# Patient Record
Sex: FEMALE | Race: WHITE | NOT HISPANIC OR LATINO | Employment: FULL TIME | URBAN - METROPOLITAN AREA
[De-identification: names, ages, dates, MRNs, and addresses within clinical notes are randomized per-mention and may not be internally consistent; named-entity substitution may affect disease eponyms.]

---

## 2018-01-05 ENCOUNTER — OFFICE VISIT CONVERTED (OUTPATIENT)
Dept: FAMILY MEDICINE CLINIC | Facility: CLINIC | Age: 38
End: 2018-01-05
Attending: NURSE PRACTITIONER

## 2018-02-13 ENCOUNTER — OFFICE VISIT CONVERTED (OUTPATIENT)
Dept: FAMILY MEDICINE CLINIC | Facility: CLINIC | Age: 38
End: 2018-02-13
Attending: NURSE PRACTITIONER

## 2018-03-13 ENCOUNTER — OFFICE VISIT CONVERTED (OUTPATIENT)
Dept: GASTROENTEROLOGY | Facility: CLINIC | Age: 38
End: 2018-03-13
Attending: NURSE PRACTITIONER

## 2018-04-10 ENCOUNTER — CONVERSION ENCOUNTER (OUTPATIENT)
Dept: FAMILY MEDICINE CLINIC | Facility: CLINIC | Age: 38
End: 2018-04-10

## 2018-04-10 ENCOUNTER — OFFICE VISIT CONVERTED (OUTPATIENT)
Dept: FAMILY MEDICINE CLINIC | Facility: CLINIC | Age: 38
End: 2018-04-10
Attending: NURSE PRACTITIONER

## 2018-04-11 ENCOUNTER — CONVERSION ENCOUNTER (OUTPATIENT)
Dept: SURGERY | Facility: CLINIC | Age: 38
End: 2018-04-11

## 2018-04-11 ENCOUNTER — OFFICE VISIT CONVERTED (OUTPATIENT)
Dept: UROLOGY | Facility: CLINIC | Age: 38
End: 2018-04-11
Attending: UROLOGY

## 2018-05-11 ENCOUNTER — OFFICE VISIT CONVERTED (OUTPATIENT)
Dept: UROLOGY | Facility: CLINIC | Age: 38
End: 2018-05-11
Attending: UROLOGY

## 2018-05-25 ENCOUNTER — OFFICE VISIT CONVERTED (OUTPATIENT)
Dept: FAMILY MEDICINE CLINIC | Facility: CLINIC | Age: 38
End: 2018-05-25
Attending: NURSE PRACTITIONER

## 2018-11-08 ENCOUNTER — CONVERSION ENCOUNTER (OUTPATIENT)
Dept: FAMILY MEDICINE CLINIC | Facility: CLINIC | Age: 38
End: 2018-11-08

## 2018-11-08 ENCOUNTER — OFFICE VISIT CONVERTED (OUTPATIENT)
Dept: FAMILY MEDICINE CLINIC | Facility: CLINIC | Age: 38
End: 2018-11-08
Attending: NURSE PRACTITIONER

## 2018-11-14 ENCOUNTER — OFFICE VISIT CONVERTED (OUTPATIENT)
Dept: UROLOGY | Facility: CLINIC | Age: 38
End: 2018-11-14
Attending: UROLOGY

## 2018-12-13 ENCOUNTER — OFFICE VISIT CONVERTED (OUTPATIENT)
Dept: FAMILY MEDICINE CLINIC | Facility: CLINIC | Age: 38
End: 2018-12-13
Attending: NURSE PRACTITIONER

## 2019-05-08 ENCOUNTER — HOSPITAL ENCOUNTER (OUTPATIENT)
Dept: URGENT CARE | Facility: CLINIC | Age: 39
Discharge: HOME | End: 2019-05-08

## 2019-05-10 LAB — BACTERIA SPEC AEROBE CULT: NORMAL

## 2019-06-08 ENCOUNTER — HOSPITAL ENCOUNTER (OUTPATIENT)
Dept: OTHER | Facility: HOSPITAL | Age: 39
Discharge: HOME | End: 2019-06-08
Attending: NURSE PRACTITIONER

## 2019-06-08 LAB
25(OH)D3 SERPL-MCNC: 41.2 NG/ML (ref 30–100)
ALBUMIN SERPL-MCNC: 3.6 G/DL (ref 3.5–5)
ALBUMIN/GLOB SERPL: 1.2 {RATIO} (ref 1.4–2.6)
ALP SERPL-CCNC: 65 U/L (ref 42–98)
ALT SERPL-CCNC: 43 U/L (ref 10–40)
ANION GAP SERPL CALC-SCNC: 18 MMOL/L (ref 8–19)
APPEARANCE UR: CLEAR
AST SERPL-CCNC: 57 U/L (ref 15–50)
BASOPHILS # BLD AUTO: 0.09 10*3/UL (ref 0–0.2)
BASOPHILS NFR BLD AUTO: 0.9 % (ref 0–3)
BILIRUB SERPL-MCNC: 0.17 MG/DL (ref 0.2–1.3)
BILIRUB UR QL: NEGATIVE
BUN SERPL-MCNC: 10 MG/DL (ref 5–25)
BUN/CREAT SERPL: 13 {RATIO} (ref 6–20)
CALCIUM SERPL-MCNC: 10 MG/DL (ref 8.7–10.4)
CHLORIDE SERPL-SCNC: 100 MMOL/L (ref 99–111)
CHOLEST SERPL-MCNC: 131 MG/DL (ref 107–200)
CHOLEST/HDLC SERPL: 3.6 {RATIO} (ref 3–6)
COLOR UR: YELLOW
CONV ABS IMM GRAN: 0.31 10*3/UL (ref 0–0.2)
CONV BACTERIA: NEGATIVE
CONV CO2: 26 MMOL/L (ref 22–32)
CONV COLLECTION SOURCE (UA): ABNORMAL
CONV CREATININE URINE, RANDOM: 56 MG/DL (ref 10–300)
CONV IMMATURE GRAN: 3 % (ref 0–1.8)
CONV MICROALBUM.,U,RANDOM: <12 MG/L (ref 0–20)
CONV TOTAL PROTEIN: 6.6 G/DL (ref 6.3–8.2)
CONV UROBILINOGEN IN URINE BY AUTOMATED TEST STRIP: 0.2 {EHRLICHU}/DL (ref 0.1–1)
CREAT UR-MCNC: 0.76 MG/DL (ref 0.5–0.9)
DEPRECATED RDW RBC AUTO: 48.1 FL (ref 36.4–46.3)
EOSINOPHIL # BLD AUTO: 0.16 10*3/UL (ref 0–0.7)
EOSINOPHIL # BLD AUTO: 1.5 % (ref 0–7)
ERYTHROCYTE [DISTWIDTH] IN BLOOD BY AUTOMATED COUNT: 16 % (ref 11.7–14.4)
EST. AVERAGE GLUCOSE BLD GHB EST-MCNC: 169 MG/DL
GFR SERPLBLD BASED ON 1.73 SQ M-ARVRAT: >60 ML/MIN/{1.73_M2}
GLOBULIN UR ELPH-MCNC: 3 G/DL (ref 2–3.5)
GLUCOSE SERPL-MCNC: 114 MG/DL (ref 65–99)
GLUCOSE UR QL: NEGATIVE MG/DL
HBA1C MFR BLD: 12.3 G/DL (ref 12–16)
HBA1C MFR BLD: 7.5 % (ref 3.5–5.7)
HCT VFR BLD AUTO: 38.2 % (ref 37–47)
HDLC SERPL-MCNC: 36 MG/DL (ref 40–60)
HGB UR QL STRIP: ABNORMAL
KETONES UR QL STRIP: NEGATIVE MG/DL
LDLC SERPL CALC-MCNC: 43 MG/DL (ref 70–100)
LEUKOCYTE ESTERASE UR QL STRIP: NEGATIVE
LYMPHOCYTES # BLD AUTO: 2.78 10*3/UL (ref 1–5)
MCH RBC QN AUTO: 27.4 PG (ref 27–31)
MCHC RBC AUTO-ENTMCNC: 32.2 G/DL (ref 33–37)
MCV RBC AUTO: 85.1 FL (ref 81–99)
MICROALBUMIN/CREAT UR: 21.4 MG/G{CRE} (ref 0–35)
MONOCYTES # BLD AUTO: 0.69 10*3/UL (ref 0.2–1.2)
MONOCYTES NFR BLD AUTO: 6.6 % (ref 3–10)
NEUTROPHILS # BLD AUTO: 6.43 10*3/UL (ref 2–8)
NEUTROPHILS NFR BLD AUTO: 61.4 % (ref 30–85)
NITRITE UR QL STRIP: NEGATIVE
NRBC CBCN: 0 % (ref 0–0.7)
OSMOLALITY SERPL CALC.SUM OF ELEC: 288 MOSM/KG (ref 273–304)
PH UR STRIP.AUTO: 6 [PH] (ref 5–8)
PLATELET # BLD AUTO: 245 10*3/UL (ref 130–400)
PMV BLD AUTO: 9.6 FL (ref 9.4–12.3)
POTASSIUM SERPL-SCNC: 4.6 MMOL/L (ref 3.5–5.3)
PROT UR QL: NEGATIVE MG/DL
RBC # BLD AUTO: 4.49 10*6/UL (ref 4.2–5.4)
RBC #/AREA URNS HPF: ABNORMAL /[HPF]
SODIUM SERPL-SCNC: 139 MMOL/L (ref 135–147)
SP GR UR: 1.01 (ref 1–1.03)
TRIGL SERPL-MCNC: 259 MG/DL (ref 40–150)
VARIANT LYMPHS NFR BLD MANUAL: 26.6 % (ref 20–45)
VLDLC SERPL-MCNC: 52 MG/DL (ref 5–37)
WBC # BLD AUTO: 10.46 10*3/UL (ref 4.8–10.8)
WBC #/AREA URNS HPF: ABNORMAL /[HPF]

## 2019-06-25 ENCOUNTER — OFFICE VISIT CONVERTED (OUTPATIENT)
Dept: FAMILY MEDICINE CLINIC | Facility: CLINIC | Age: 39
End: 2019-06-25
Attending: NURSE PRACTITIONER

## 2019-09-10 ENCOUNTER — HOSPITAL ENCOUNTER (OUTPATIENT)
Dept: GENERAL RADIOLOGY | Facility: HOSPITAL | Age: 39
Discharge: HOME | End: 2019-09-10
Attending: NURSE PRACTITIONER

## 2020-02-01 ENCOUNTER — HOSPITAL ENCOUNTER (OUTPATIENT)
Dept: OTHER | Facility: HOSPITAL | Age: 40
Discharge: HOME | End: 2020-02-01
Attending: NURSE PRACTITIONER

## 2020-02-01 LAB
ALBUMIN SERPL-MCNC: 4 G/DL (ref 3.5–5)
ALBUMIN/GLOB SERPL: 1.3 {RATIO} (ref 1.4–2.6)
ALP SERPL-CCNC: 65 U/L (ref 42–98)
ALT SERPL-CCNC: 41 U/L (ref 10–40)
ANION GAP SERPL CALC-SCNC: 18 MMOL/L (ref 8–19)
APPEARANCE UR: CLEAR
AST SERPL-CCNC: 68 U/L (ref 15–50)
BILIRUB SERPL-MCNC: 0.22 MG/DL (ref 0.2–1.3)
BILIRUB UR QL: ABNORMAL
BUN SERPL-MCNC: 13 MG/DL (ref 5–25)
BUN/CREAT SERPL: 17 {RATIO} (ref 6–20)
CALCIUM SERPL-MCNC: 9.4 MG/DL (ref 8.7–10.4)
CHLORIDE SERPL-SCNC: 99 MMOL/L (ref 99–111)
CHOLEST SERPL-MCNC: 141 MG/DL (ref 107–200)
CHOLEST/HDLC SERPL: 3.7 {RATIO} (ref 3–6)
COLOR UR: ABNORMAL
CONV BACTERIA: NEGATIVE
CONV CO2: 24 MMOL/L (ref 22–32)
CONV COLLECTION SOURCE (UA): ABNORMAL
CONV CREATININE URINE, RANDOM: 268.9 MG/DL (ref 10–300)
CONV HYALINE CASTS IN URINE MICRO: ABNORMAL /[LPF]
CONV MICROALBUM.,U,RANDOM: 47.2 MG/L (ref 0–20)
CONV TOTAL PROTEIN: 7.2 G/DL (ref 6.3–8.2)
CONV UROBILINOGEN IN URINE BY AUTOMATED TEST STRIP: 1 {EHRLICHU}/DL (ref 0.1–1)
CREAT UR-MCNC: 0.76 MG/DL (ref 0.5–0.9)
EST. AVERAGE GLUCOSE BLD GHB EST-MCNC: 189 MG/DL
GFR SERPLBLD BASED ON 1.73 SQ M-ARVRAT: >60 ML/MIN/{1.73_M2}
GLOBULIN UR ELPH-MCNC: 3.2 G/DL (ref 2–3.5)
GLUCOSE SERPL-MCNC: 168 MG/DL (ref 65–99)
GLUCOSE UR QL: NEGATIVE MG/DL
HBA1C MFR BLD: 8.2 % (ref 3.5–5.7)
HDLC SERPL-MCNC: 38 MG/DL (ref 40–60)
HGB UR QL STRIP: ABNORMAL
KETONES UR QL STRIP: ABNORMAL MG/DL
LDLC SERPL CALC-MCNC: 46 MG/DL (ref 70–100)
LEUKOCYTE ESTERASE UR QL STRIP: ABNORMAL
MICROALBUMIN/CREAT UR: 17.6 MG/G{CRE} (ref 0–35)
NITRITE UR QL STRIP: NEGATIVE
OSMOLALITY SERPL CALC.SUM OF ELEC: 286 MOSM/KG (ref 273–304)
PH UR STRIP.AUTO: 5.5 [PH] (ref 5–8)
POTASSIUM SERPL-SCNC: 4.7 MMOL/L (ref 3.5–5.3)
PROT UR QL: ABNORMAL MG/DL
RBC #/AREA URNS HPF: ABNORMAL /[HPF]
SODIUM SERPL-SCNC: 136 MMOL/L (ref 135–147)
SP GR UR: 1.03 (ref 1–1.03)
SQUAMOUS SPT QL MICRO: ABNORMAL /[HPF]
T4 FREE SERPL-MCNC: 1.5 NG/DL (ref 0.9–1.8)
TRIGL SERPL-MCNC: 284 MG/DL (ref 40–150)
TSH SERPL-ACNC: 1.75 M[IU]/L (ref 0.27–4.2)
VLDLC SERPL-MCNC: 57 MG/DL (ref 5–37)
WBC #/AREA URNS HPF: ABNORMAL /[HPF]

## 2020-02-04 ENCOUNTER — CONVERSION ENCOUNTER (OUTPATIENT)
Dept: FAMILY MEDICINE CLINIC | Facility: CLINIC | Age: 40
End: 2020-02-04

## 2020-02-04 ENCOUNTER — OFFICE VISIT CONVERTED (OUTPATIENT)
Dept: FAMILY MEDICINE CLINIC | Facility: CLINIC | Age: 40
End: 2020-02-04
Attending: NURSE PRACTITIONER

## 2020-02-08 ENCOUNTER — HOSPITAL ENCOUNTER (OUTPATIENT)
Dept: URGENT CARE | Facility: CLINIC | Age: 40
Discharge: HOME | End: 2020-02-08

## 2020-03-04 ENCOUNTER — OFFICE VISIT CONVERTED (OUTPATIENT)
Dept: FAMILY MEDICINE CLINIC | Facility: CLINIC | Age: 40
End: 2020-03-04
Attending: NURSE PRACTITIONER

## 2020-03-09 PROBLEM — R12 HEARTBURN: Status: ACTIVE | Noted: 2020-03-09

## 2020-03-09 PROBLEM — M25.50 MULTIPLE JOINT PAIN: Status: ACTIVE | Noted: 2020-03-09

## 2020-03-09 PROBLEM — E66.9 DIABETES MELLITUS TYPE 2 IN OBESE: Status: ACTIVE | Noted: 2020-03-09

## 2020-03-09 PROBLEM — F32.A DEPRESSION: Status: ACTIVE | Noted: 2020-03-09

## 2020-03-09 PROBLEM — I10 ESSENTIAL HYPERTENSION: Status: ACTIVE | Noted: 2020-03-09

## 2020-03-09 PROBLEM — E78.5 HYPERLIPIDEMIA: Status: ACTIVE | Noted: 2020-03-09

## 2020-03-09 PROBLEM — E11.69 DIABETES MELLITUS TYPE 2 IN OBESE (HCC): Status: ACTIVE | Noted: 2020-03-09

## 2020-03-09 PROBLEM — R53.82 CHRONIC FATIGUE: Status: ACTIVE | Noted: 2020-03-09

## 2020-03-17 DIAGNOSIS — E66.9 DIABETES MELLITUS TYPE 2 IN OBESE (HCC): ICD-10-CM

## 2020-03-17 DIAGNOSIS — E78.5 HYPERLIPIDEMIA, UNSPECIFIED HYPERLIPIDEMIA TYPE: ICD-10-CM

## 2020-03-17 DIAGNOSIS — E66.01 OBESITY, CLASS III, BMI 40-49.9 (MORBID OBESITY) (HCC): Primary | ICD-10-CM

## 2020-03-17 DIAGNOSIS — I10 ESSENTIAL HYPERTENSION: ICD-10-CM

## 2020-03-17 DIAGNOSIS — E11.69 DIABETES MELLITUS TYPE 2 IN OBESE (HCC): ICD-10-CM

## 2020-03-17 PROBLEM — E66.813 OBESITY, CLASS III, BMI 40-49.9 (MORBID OBESITY): Status: ACTIVE | Noted: 2020-03-17

## 2020-03-18 ENCOUNTER — HOSPITAL ENCOUNTER (OUTPATIENT)
Dept: GENERAL RADIOLOGY | Facility: HOSPITAL | Age: 40
Discharge: HOME | End: 2020-03-18

## 2020-03-18 ENCOUNTER — CONSULT (OUTPATIENT)
Dept: BARIATRICS/WEIGHT MGMT | Facility: CLINIC | Age: 40
End: 2020-03-18

## 2020-03-18 ENCOUNTER — APPOINTMENT (OUTPATIENT)
Dept: LAB | Facility: HOSPITAL | Age: 40
End: 2020-03-18

## 2020-03-18 ENCOUNTER — HOSPITAL ENCOUNTER (OUTPATIENT)
Dept: CARDIOLOGY | Facility: HOSPITAL | Age: 40
Discharge: HOME | End: 2020-03-18
Admitting: NURSE PRACTITIONER

## 2020-03-18 ENCOUNTER — HOSPITAL ENCOUNTER (OUTPATIENT)
Dept: CARDIOLOGY | Facility: HOSPITAL | Age: 40
Discharge: HOME | End: 2020-03-18

## 2020-03-18 VITALS
SYSTOLIC BLOOD PRESSURE: 134 MMHG | HEIGHT: 67 IN | DIASTOLIC BLOOD PRESSURE: 83 MMHG | WEIGHT: 293 LBS | BODY MASS INDEX: 45.99 KG/M2 | TEMPERATURE: 98.6 F | RESPIRATION RATE: 18 BRPM | HEART RATE: 82 BPM

## 2020-03-18 DIAGNOSIS — F32.A DEPRESSION, UNSPECIFIED DEPRESSION TYPE: ICD-10-CM

## 2020-03-18 DIAGNOSIS — E66.01 OBESITY, CLASS III, BMI 40-49.9 (MORBID OBESITY) (HCC): ICD-10-CM

## 2020-03-18 DIAGNOSIS — R53.82 CHRONIC FATIGUE: ICD-10-CM

## 2020-03-18 DIAGNOSIS — M25.50 MULTIPLE JOINT PAIN: ICD-10-CM

## 2020-03-18 DIAGNOSIS — E11.69 DIABETES MELLITUS TYPE 2 IN OBESE (HCC): ICD-10-CM

## 2020-03-18 DIAGNOSIS — E66.9 DIABETES MELLITUS TYPE 2 IN OBESE (HCC): ICD-10-CM

## 2020-03-18 DIAGNOSIS — I10 ESSENTIAL HYPERTENSION: ICD-10-CM

## 2020-03-18 DIAGNOSIS — K21.9 GASTROESOPHAGEAL REFLUX DISEASE, ESOPHAGITIS PRESENCE NOT SPECIFIED: ICD-10-CM

## 2020-03-18 DIAGNOSIS — E66.01 OBESITY, CLASS III, BMI 40-49.9 (MORBID OBESITY) (HCC): Primary | ICD-10-CM

## 2020-03-18 DIAGNOSIS — E78.5 HYPERLIPIDEMIA, UNSPECIFIED HYPERLIPIDEMIA TYPE: ICD-10-CM

## 2020-03-18 PROBLEM — K31.84 GASTROPARESIS: Status: ACTIVE | Noted: 2020-03-18

## 2020-03-18 LAB
ALBUMIN SERPL-MCNC: 4.1 G/DL (ref 3.5–5.2)
ALBUMIN/GLOB SERPL: 1.3 G/DL
ALP SERPL-CCNC: 64 U/L (ref 39–117)
ALT SERPL W P-5'-P-CCNC: 37 U/L (ref 1–33)
ANION GAP SERPL CALCULATED.3IONS-SCNC: 13.6 MMOL/L (ref 5–15)
AST SERPL-CCNC: 49 U/L (ref 1–32)
BASOPHILS # BLD AUTO: 0.11 10*3/MM3 (ref 0–0.2)
BASOPHILS NFR BLD AUTO: 0.9 % (ref 0–1.5)
BILIRUB SERPL-MCNC: <0.2 MG/DL (ref 0.2–1.2)
BUN BLD-MCNC: 14 MG/DL (ref 6–20)
BUN/CREAT SERPL: 15.7 (ref 7–25)
CALCIUM SPEC-SCNC: 9.1 MG/DL (ref 8.6–10.5)
CHLORIDE SERPL-SCNC: 97 MMOL/L (ref 98–107)
CHOLEST SERPL-MCNC: 137 MG/DL (ref 0–200)
CO2 SERPL-SCNC: 23.4 MMOL/L (ref 22–29)
CREAT BLD-MCNC: 0.89 MG/DL (ref 0.57–1)
DEPRECATED RDW RBC AUTO: 40.7 FL (ref 37–54)
EOSINOPHIL # BLD AUTO: 0.19 10*3/MM3 (ref 0–0.4)
EOSINOPHIL NFR BLD AUTO: 1.6 % (ref 0.3–6.2)
ERYTHROCYTE [DISTWIDTH] IN BLOOD BY AUTOMATED COUNT: 14.3 % (ref 12.3–15.4)
GFR SERPL CREATININE-BSD FRML MDRD: 70 ML/MIN/1.73
GLOBULIN UR ELPH-MCNC: 3.2 GM/DL
GLUCOSE BLD-MCNC: 172 MG/DL (ref 65–99)
HBA1C MFR BLD: 8.7 % (ref 4.8–5.6)
HCT VFR BLD AUTO: 40.1 % (ref 34–46.6)
HDLC SERPL-MCNC: 35 MG/DL (ref 40–60)
HGB BLD-MCNC: 13.4 G/DL (ref 12–15.9)
IMM GRANULOCYTES # BLD AUTO: 0.37 10*3/MM3 (ref 0–0.05)
IMM GRANULOCYTES NFR BLD AUTO: 3 % (ref 0–0.5)
LDLC SERPL CALC-MCNC: 50 MG/DL (ref 0–100)
LDLC/HDLC SERPL: 1.44 {RATIO}
LYMPHOCYTES # BLD AUTO: 3.28 10*3/MM3 (ref 0.7–3.1)
LYMPHOCYTES NFR BLD AUTO: 27 % (ref 19.6–45.3)
MCH RBC QN AUTO: 26.9 PG (ref 26.6–33)
MCHC RBC AUTO-ENTMCNC: 33.4 G/DL (ref 31.5–35.7)
MCV RBC AUTO: 80.4 FL (ref 79–97)
MONOCYTES # BLD AUTO: 0.78 10*3/MM3 (ref 0.1–0.9)
MONOCYTES NFR BLD AUTO: 6.4 % (ref 5–12)
NEUTROPHILS # BLD AUTO: 7.43 10*3/MM3 (ref 1.7–7)
NEUTROPHILS NFR BLD AUTO: 61.1 % (ref 42.7–76)
NRBC BLD AUTO-RTO: 0 /100 WBC (ref 0–0.2)
PLATELET # BLD AUTO: 256 10*3/MM3 (ref 140–450)
PMV BLD AUTO: 10.2 FL (ref 6–12)
POTASSIUM BLD-SCNC: 4.6 MMOL/L (ref 3.5–5.2)
PROT SERPL-MCNC: 7.3 G/DL (ref 6–8.5)
RBC # BLD AUTO: 4.99 10*6/MM3 (ref 3.77–5.28)
SODIUM BLD-SCNC: 134 MMOL/L (ref 136–145)
TRIGL SERPL-MCNC: 258 MG/DL (ref 0–150)
TSH SERPL DL<=0.05 MIU/L-ACNC: 2.1 UIU/ML (ref 0.27–4.2)
VLDLC SERPL-MCNC: 51.6 MG/DL (ref 5–40)
WBC NRBC COR # BLD: 12.16 10*3/MM3 (ref 3.4–10.8)

## 2020-03-18 PROCEDURE — 99205 OFFICE O/P NEW HI 60 MIN: CPT | Performed by: NURSE PRACTITIONER

## 2020-03-18 PROCEDURE — 83036 HEMOGLOBIN GLYCOSYLATED A1C: CPT | Performed by: NURSE PRACTITIONER

## 2020-03-18 PROCEDURE — 93010 ELECTROCARDIOGRAM REPORT: CPT | Performed by: INTERNAL MEDICINE

## 2020-03-18 PROCEDURE — 94690 O2 UPTK REST INDIRECT: CPT | Performed by: NURSE PRACTITIONER

## 2020-03-18 PROCEDURE — 71046 X-RAY EXAM CHEST 2 VIEWS: CPT

## 2020-03-18 PROCEDURE — 85025 COMPLETE CBC W/AUTO DIFF WBC: CPT | Performed by: NURSE PRACTITIONER

## 2020-03-18 PROCEDURE — 84443 ASSAY THYROID STIM HORMONE: CPT | Performed by: NURSE PRACTITIONER

## 2020-03-18 PROCEDURE — 93005 ELECTROCARDIOGRAM TRACING: CPT | Performed by: NURSE PRACTITIONER

## 2020-03-18 PROCEDURE — 36415 COLL VENOUS BLD VENIPUNCTURE: CPT | Performed by: NURSE PRACTITIONER

## 2020-03-18 PROCEDURE — 80053 COMPREHEN METABOLIC PANEL: CPT | Performed by: NURSE PRACTITIONER

## 2020-03-18 PROCEDURE — 80061 LIPID PANEL: CPT | Performed by: NURSE PRACTITIONER

## 2020-03-18 RX ORDER — BUPROPION HYDROCHLORIDE 300 MG/1
1 TABLET ORAL EVERY MORNING
COMMUNITY
Start: 2020-03-14 | End: 2021-06-16 | Stop reason: SDUPTHER

## 2020-03-18 RX ORDER — LISINOPRIL AND HYDROCHLOROTHIAZIDE 20; 12.5 MG/1; MG/1
1 TABLET ORAL DAILY
COMMUNITY
Start: 2020-03-14 | End: 2020-12-04

## 2020-03-18 RX ORDER — DICLOFENAC SODIUM 75 MG/1
1 TABLET, DELAYED RELEASE ORAL 2 TIMES DAILY
COMMUNITY
Start: 2020-01-30 | End: 2020-12-08 | Stop reason: HOSPADM

## 2020-03-18 NOTE — PROGRESS NOTES
[facility]ATR[facility] BARIATRIC SURGERY  [address] [id number] [phone]  [address] [id number] [phone]  Dept: [phone]  3/18/2020      Bianca Boles.  [id number]  [id number]  1980  female      Chief Complaint of weight gain; unable to maintain weight loss    History of Present Illness:   Bianca is a 39 y.o. female who presents today for evaluation, education and consultation regarding weight loss surgery. The patient is interested in the sleeve gastrectomy.      Diet History:Bianca has been overweight for at least 33 years, has been 35 pounds or more overweight for at least 35 years, has been 100 pounds or more overweight for 28 or more years and started dieting at age 14.  The most weight Bianca lost was 50 pounds on weight watchers and maintained the weight loss for 10 years. Bianca describes her eating habits as eating a lot of sweets, drinking coffee and soda, eating to cope with boredom, emotional eating. Bianca Boles has tried Weight Watchers, Fasting and reduced calorie among others with success of losing up to 50 pounds, but in each instance regained the weight.    See dietician documentation for complete history.    Bariatric Surgery Evaluation: The patient is being seen for an initial visit for bariatric surgery evaluation.     Bariatric Co-morbidities:  diabetes, hypertension, dyslipidemia, GERD and depression    Patient Active Problem List   Diagnosis   • Chronic fatigue   • Essential hypertension   • Hyperlipidemia   • Heartburn   • Multiple joint pain   • Depression   • Diabetes mellitus type 2 in obese (CMS/[facility])   • Obesity, Class III, BMI 40-49.9 (morbid obesity) (CMS/HCC)   • GERD (gastroesophageal reflux disease)       History reviewed. No pertinent past medical history.    Past Surgical History:   Procedure Laterality Date   • ADENOIDECTOMY     •  SECTION  ,   • CYSTOSCOPY  BLADDER STONE LITHOTRIPSY     • EAR TUBES  1984   • FOOT FRACTURE SURGERY Left 2017    screw placed   • TONSILLECTOMY  1984       No Known Allergies      Current Outpatient Medications:   •  B Complex Vitamins (VITAMIN B COMPLEX PO), Take  by mouth., Disp: , Rfl:   •  buPROPion XL (WELLBUTRIN XL) 300 MG 24 hr tablet, Take 1 tablet by mouth Daily., Disp: , Rfl:   •  diclofenac (VOLTAREN) 75 MG EC tablet, Take 1 tablet by mouth Daily., Disp: , Rfl:   •  Insulin Degludec (TRESIBA SC), Inject 12 Units under the skin into the appropriate area as directed Daily., Disp: , Rfl:   •  Levonorgest-Eth Estrad 91-Day (SEASONIQUE) 0.15-0.03 &0.01 MG tablet, Take  by mouth., Disp: , Rfl:   •  lisinopril-hydrochlorothiazide (PRINZIDE,ZESTORETIC) 20-12.5 MG per tablet, Take 1 tablet by mouth Daily., Disp: , Rfl:   •  metFORMIN (GLUCOPHAGE) 1000 MG tablet, Take 1,000 mg by mouth 2 (Two) Times a Day With Meals., Disp: , Rfl:   •  Multiple Vitamins-Minerals (MULTIVITAMIN PO), Take  by mouth., Disp: , Rfl:   •  Omega-3 Fatty Acids (FISH OIL PO), Take  by mouth., Disp: , Rfl:   •  omeprazole (priLOSEC) 20 MG capsule, Take 20 mg by mouth Daily., Disp: , Rfl:   •  pravastatin (PRAVACHOL) 20 MG tablet, Take 20 mg by mouth Daily., Disp: , Rfl:   •  Semaglutide (OZEMPIC, 0.25 OR 0.5 MG/DOSE, SC), Inject 0.25 mg under the skin into the appropriate area as directed Daily., Disp: , Rfl:   •  venlafaxine (EFFEXOR) 37.5 MG tablet, Take 37.5 mg by mouth Daily., Disp: , Rfl:     Social History     Socioeconomic History   • Marital status:      Spouse name: Not on file   • Number of children: Not on file   • Years of education: Not on file   • Highest education level: Not on file   Tobacco Use   • Smoking status: Current Every Day Smoker     Years: 35.00     Types: Cigarettes   • Smokeless tobacco: Never Used   • Tobacco comment: down to 1-2 per day   Substance and Sexual Activity   • Alcohol use: Yes     Frequency: Monthly or less     Drinks  per session: 1 or 2     Binge frequency: Never     Comment: RARELY   • Drug use: Never       Family History   Problem Relation Age of Onset   • Diabetes Father    • Hypertension Father    • Heart disease Father    • Cancer Father         BLADDER   • Stroke Maternal Grandmother    • Heart disease Maternal Grandmother    • Diabetes Paternal Grandfather    • Heart disease Paternal Grandfather          Review of Systems:  Review of Systems   Constitutional: Positive for fatigue.   HENT: Negative.    Respiratory: Negative.    Cardiovascular: Negative.    Gastrointestinal: Negative.    Endocrine: Negative.    Genitourinary: Negative.    Musculoskeletal: Positive for back pain.   Skin: Negative.    Neurological: Negative.    Psychiatric/Behavioral: Negative.        Physical Exam:  Vital Signs:  Weight: (!) 141 kg (310 lb)   Body mass index is 47.86 kg/m².  Temp: 98.6 °F (37 °C)   Heart Rate: 82   BP: 134/83     Physical Exam   Constitutional: She is oriented to person, place, and time. She appears well-developed and well-nourished.   HENT:   Head: Normocephalic and atraumatic.   Neck: Normal range of motion.   Cardiovascular: Normal rate, regular rhythm and normal heart sounds.   Pulmonary/Chest: Effort normal and breath sounds normal. No respiratory distress. She has no wheezes.   Abdominal: Soft. Bowel sounds are normal. She exhibits no distension. There is no tenderness.   Musculoskeletal: She exhibits no edema or deformity.   Neurological: She is alert and oriented to person, place, and time.   Skin: Skin is warm and dry.   Psychiatric: She has a normal mood and affect. Her behavior is normal.   Nursing note and vitals reviewed.         Assessment:         Bianca Boles is a 39 y.o. year old female with medically complicated severe obesity. Weight: (!) 141 kg (310 lb), Body mass index is 47.86 kg/m². and weight related problems including diabetes, hypertension, dyslipidemia, back pain, knee pain, GERD and  depression.    I explained in detail the procedures that we are performing.  All of those procedures can be performed laparoscopically but there is a chance to convert to open if any technical challenges or complications do occur.  Bariatric surgery is not cosmetic surgery but rather a tool to help a patient make a life-long commitment lifestyle changes including diet, exercise, behavior changes, and taking supplemental vitamins and minerals.    Due to the patient's BMI and co-morbidities they are at a high risk for surgery and will obtain the following:  The patient has been advised that a letter of medical support and a history and physical must be obtained from her primary care physician. A psychological evaluation will be arranged for this patient. CBC, CMP, FLP, TSH and HgbA1C will be drawn- reviewed in the office . Bianca Boles will obtain a pre-operative CXR and EKG.   Bianca Boles was screened for sleep apnea in our office today and based on their results she is low risk for ILIANA    Bianca Boles will be set up for a pre-operative diagnostic esophagogastroduodenoscopy with biopsy for evaluation. The risks and benefits of the procedure were discussed with the patient in detail and all questions were answered.  Possibility of perforation, bleeding, aspiration, anoxic brain injury, respiratory and/or cardiac arrest and death were discussed.   She received handouts regarding, all questions were answered.     The risks, benefits, alternatives, and potential complications of all of the procedures were explained in detail including, but not limited to death, anesthesia and medication adverse effect/DVT, pulmonary embolism, trocar site/incisional hernia, wound infection, abdominal infection, bleeding, failure to lose weight or gain weight and change in body image, metabolic complications with calcium, thiamine, vitamin B12, folate, iron, and anemia.    The patient was advised to start a high protein, low  fat and low carbohydrate diet. The patient was given individualized information by our dietician along with general group information and handouts.     The patient had the Basal Metabolic Rate test performed in our office today then I reviewed the results with them including changes to help increase metabolism and a recommended daily caloric intake range- see scanned results.     The patient was given information regarding the CRISTO educational video. CRISTO is an internet based educational video which explains the surgical procedure and answers basic questions regarding the procedure. The patient was provided with instructions and a password to watch the video.    The patient was encouraged to start routine exercise including but not limited to 150 minutes per week. The patient received a resistance band along with a handout of exercises.     The consultation plan was reviewed with the patient.    The patient understands the surgical procedures and the different surgical options that are available.  She understands the lifestyle changes that would be required after surgery and has agreed to participate in a pre-operative and postoperative weight management program.  She also expressed understanding of possible risks, had several questions answered and desires to proceed.    I think she is a good candidate for this surgery, and is interested in a sleeve gastrectomy.    Encounter Diagnoses   Name Primary?   • Obesity, Class III, BMI 40-49.9 (morbid obesity) (CMS/HCC) Yes   • Essential hypertension    • Hyperlipidemia, unspecified hyperlipidemia type    • Diabetes mellitus type 2 in obese (CMS/HCC)    • Multiple joint pain    • Chronic fatigue    • Depression, unspecified depression type    • Gastroesophageal reflux disease, esophagitis presence not specified        Plan:    Patient will have evaluations and follow up with bariatric dieticians and a psychologist before undergoing a multidisciplinary review of her  candidacy.  We also discussed the weight loss requirement and rationale, and other program requirements.      Lori Manzanares, CARLTON  3/18/2020

## 2020-03-18 NOTE — PROGRESS NOTES
"Bariatric Nutrition Counseling Interview    Patient Name:  Bianca Boles  YOB: 1980  Age:  39 y.o.  Sex:  female  MRN: [id number]  Date:  20    Procedure Considering:  Sleeve    Last Documented Height:    Ht Readings from Last 1 Encounters:   20 171.4 cm (67.48\")     Last Documented Weight:   Wt Readings from Last 1 Encounters:   20 (!) 141 kg (310 lb)      Body mass index is 47.86 kg/m².    Highest Weight:  350  Goal Weight: 170    History:  History reviewed. No pertinent past medical history.  Past Surgical History:   Procedure Laterality Date   • ADENOIDECTOMY     •  SECTION  ,   • CYSTOSCOPY BLADDER STONE LITHOTRIPSY     • EAR TUBES     • FOOT FRACTURE SURGERY Left 2017    screw placed   • TONSILLECTOMY       Family History   Problem Relation Age of Onset   • Diabetes Father    • Hypertension Father    • Heart disease Father    • Cancer Father         BLADDER   • Stroke Maternal Grandmother    • Heart disease Maternal Grandmother    • Diabetes Paternal Grandfather    • Heart disease Paternal Grandfather      Social History     Socioeconomic History   • Marital status:      Spouse name: Not on file   • Number of children: Not on file   • Years of education: Not on file   • Highest education level: Not on file   Tobacco Use   • Smoking status: Current Every Day Smoker     Years: 35.00     Types: Cigarettes   • Smokeless tobacco: Never Used   • Tobacco comment: down to 1-2 per day   Substance and Sexual Activity   • Alcohol use: Yes     Frequency: Monthly or less     Drinks per session: 1 or 2     Binge frequency: Never     Comment: RARELY   • Drug use: Never     Additional Health Issues to Consider:  Depression, GERD, Diabetes on insulin, high cholesterol, HTN, gastroparesis (vomits sometimes in the morning when wakes up) per patient report; high HgbA1c 8.7 and TG's noted    Weight History:  Always been overweight    Previous Weight Loss " Efforts:  Weight Watchers, total body makeover, Noom wilfrido  Most Successful Weight Loss Effort:  Weight Watchers - 45lbs    Eating Habits: Eat in response to stress, Late night eating, Snacking on high calorie foods, emotional eating, binge eating - has been in therapy in the past  Eat three meals on most days?  No  Worst eating habit?  emotional eating, binge eating    How often do you eat fast food? daily    Do you exercise regularly? (at least 3 times each week)  No    Occupation:  Pharmacy Tech    Personal Goal After Procedure:  Come off of insulin and improve blood sugar control   Personal Support:  parents    Assessment:  Program materials for successful weight loss before/after bariatric surgery were provided, reviewed, and discussed. The significance of taking in at least 70g of protein and 64 ounces of fluid was emphasized. The importance of routine exercise was discussed. Nutrition materials provided included a reduced calorie meal plan, protein sources, snack options, and diet guidelines post-surgery. Discussed personal habits and lifestyle behaviors that may influence diet efforts. She demonstrated a good comprehension of diet requirements and a commitment to work on personal challenges.  Patient was also provided a list of short-term goals to work towards prior to surgery (plan snacks and breakfast instead of vending machines and fast food, limit to 4 diet sodas/week to eventually eliminate). She appears to be an appropriate candidate for bariatric surgery.    Electronically signed by:  Gabby Cannon RD  03/18/20 17:19

## 2020-03-19 PROBLEM — K21.9 GASTROESOPHAGEAL REFLUX DISEASE: Status: ACTIVE | Noted: 2020-03-19

## 2020-03-20 ENCOUNTER — TELEPHONE (OUTPATIENT)
Dept: BARIATRICS/WEIGHT MGMT | Facility: CLINIC | Age: 40
End: 2020-03-20

## 2020-03-20 NOTE — TELEPHONE ENCOUNTER
----- Message from CARLTON Harley sent at 3/18/2020  2:02 PM EDT -----  theo reviewed in office with patient

## 2020-03-24 ENCOUNTER — TELEPHONE (OUTPATIENT)
Dept: BARIATRICS/WEIGHT MGMT | Facility: CLINIC | Age: 40
End: 2020-03-24

## 2020-03-24 DIAGNOSIS — E66.01 OBESITY, CLASS III, BMI 40-49.9 (MORBID OBESITY) (HCC): Primary | ICD-10-CM

## 2020-03-24 DIAGNOSIS — R94.31 ABNORMAL EKG: ICD-10-CM

## 2020-03-24 NOTE — TELEPHONE ENCOUNTER
Spoke to pt regarding EKG result and pt is aware that she needs to have a cardiac clearance as well. Informed pt someone would call to schedule that with her. Pt gave a verbal understanding.       ----- Message from CARLTON Amato sent at 3/24/2020  1:28 PM EDT -----  This patient needs cardiac clearance. Please push back scope until they can get her in. I will order referral

## 2020-04-08 ENCOUNTER — TELEMEDICINE CONVERTED (OUTPATIENT)
Dept: FAMILY MEDICINE CLINIC | Facility: CLINIC | Age: 40
End: 2020-04-08
Attending: NURSE PRACTITIONER

## 2020-04-15 ENCOUNTER — TELEMEDICINE CONVERTED (OUTPATIENT)
Dept: FAMILY MEDICINE CLINIC | Facility: CLINIC | Age: 40
End: 2020-04-15
Attending: NURSE PRACTITIONER

## 2020-04-17 ENCOUNTER — TELEMEDICINE - AUDIO (OUTPATIENT)
Dept: CARDIOLOGY | Facility: CLINIC | Age: 40
End: 2020-04-17

## 2020-04-17 VITALS — HEIGHT: 67 IN | BODY MASS INDEX: 45.99 KG/M2 | WEIGHT: 293 LBS

## 2020-04-17 DIAGNOSIS — I10 ESSENTIAL HYPERTENSION: Primary | ICD-10-CM

## 2020-04-17 DIAGNOSIS — E78.5 HYPERLIPIDEMIA, UNSPECIFIED HYPERLIPIDEMIA TYPE: ICD-10-CM

## 2020-04-17 DIAGNOSIS — Z01.818 PRE-OPERATIVE CLEARANCE: ICD-10-CM

## 2020-04-17 PROCEDURE — 99443 PR PHYS/QHP TELEPHONE EVALUATION 21-30 MIN: CPT | Performed by: INTERNAL MEDICINE

## 2020-04-17 NOTE — PROGRESS NOTES
Subjective:        [facility]  Cardiology Consult Note    Patient Identification:  Name: Binaca Boles  Age: 40 y.o.  Sex: female  :  1980  MRN: [id number]             CC  TELEPHONE CON  ABNORMAL EKG  CP 1 MONTH AGO  DM FOR 10 YR  HTN  FOR   F/H CAD      History of Present Illness:   40-year-old female with known history of the diabetes, hyperlipidemia, hypertension here for the cardiac evaluation as the patient needs a surgical clearance for the bariatric surgery was found to have abnormal EKG as preop evaluation recently    Bianca Boles has been complaining of the shortness of breath mild-to-moderate in intensity with mild-to-moderate usually relieved with rest associated with anxiety and fatigue      Comorbid cardiac risk factors:     Past Medical History:  Past Medical History:   Diagnosis Date   • Diabetes mellitus (CMS/HCC)    • Hyperlipidemia    • Hypertension      Past Surgical History:  Past Surgical History:   Procedure Laterality Date   • ADENOIDECTOMY     •  SECTION  ,   • CYSTOSCOPY BLADDER STONE LITHOTRIPSY     • EAR TUBES     • FOOT FRACTURE SURGERY Left 2017    screw placed   • TONSILLECTOMY        Allergies:  No Known Allergies  Home Meds:    (Not in a hospital admission)  Current Meds:   [unfilled]  Social History:   Social History     Tobacco Use   • Smoking status: Current Every Day Smoker     Years: 35.00     Types: Cigarettes   • Smokeless tobacco: Never Used   • Tobacco comment: down to 1-2 per day   Substance Use Topics   • Alcohol use: Yes     Frequency: Monthly or less     Drinks per session: 1 or 2     Binge frequency: Never     Comment: RARELY      Family History:  Family History   Problem Relation Age of Onset   • Diabetes Father    • Hypertension Father    • Heart disease Father    • Cancer Father         BLADDER   • Stroke Maternal Grandmother    • Heart disease Maternal Grandmother    • Diabetes Paternal Grandfather    • Heart  disease Paternal Grandfather         Review of Systems    Constitutional: No weakness,fatigue, fever, rigors, chills   Eyes: No vision changes, eye pain   ENT/oropharynx: No difficulty swallowing, sore throat, epistaxis, changes in hearing   Cardiovascular: No chest pain, chest tightness, palpitations, paroxysmal nocturnal dyspnea, orthopnea, diaphoresis, dizziness / syncopal episode   Respiratory:  Moderate shortness of breath, dyspnea on exertion, cough, wheezing hemoptysis   Gastrointestinal: No abdominal pain, nausea, vomiting, diarrhea, bloody stools   Genitourinary: No hematuria, dysuria   Neurological: No headache, tremors, numbness,  one-sided weakness    Musculoskeletal: No cramps, myalgias,  joint pain, joint swelling   Integument: No rash, edema           Constitutional:       Telephone conference only           Procedures    EKG normal sinus rhythm nonspecific ST-T wave changes personally reviewed    Cardiographics  ECG:     Telemetry:    Echocardiogram:     Imaging  Chest X-ray:     Lab Review               @[email or web address]@              Assessment:/ Recommendations / Plan:   Patient Active Problem List   Diagnosis   • Chronic fatigue   • Essential hypertension   • Hyperlipidemia   • Heartburn   • Multiple joint pain   • Depression   • Diabetes mellitus type 2 in obese (CMS/[facility])   • Obesity, Class III, BMI 40-49.9 (morbid obesity) (CMS/[facility])   • GERD (gastroesophageal reflux disease)   • Gastroparesis   • Gastroesophageal reflux disease                    ICD-10-CM ICD-9-CM   1. Essential hypertension I10 401.9   2. Hyperlipidemia, unspecified hyperlipidemia type E78.5 272.4   3. Pre-operative clearance Z01.818 V72.84     1. Essential hypertension  Blood pressure under control  - Stress Test With Myocardial Perfusion One Day  - Adult Transthoracic Echo Complete W/ Cont if Necessary Per Protocol    2. Hyperlipidemia, unspecified hyperlipidemia type  Continue current treatment  - Stress Test With Myocardial  Perfusion One Day  - Adult Transthoracic Echo Complete W/ Cont if Necessary Per Protocol    3. Pre-operative clearance  Considering the patient's symptoms as well as clinical situation and  EKG findings, along with cardiac risk factors, ischemic workup is necessary to rule out ischemic cardiomyopathy, stress induced arrhythmias, and functional capacity for diagnosis as well as prognostic consideration    Considering patient's medical condition as well as the risk factors, patient will require echocardiogram for further evaluation for the LV function, four-chamber evaluation, including the pressures, valvular function and  pericardial disease and pericardial effusion    - Stress Test With Myocardial Perfusion One Day  - Adult Transthoracic Echo Complete W/ Cont if Necessary Per Protocol       40-year-old female moderately obese, needs a surgical clearance for the bariatric surgery will require a stress Cardiolite as well as echocardiogram, this will be deferred for approximately 1 more month until COVID-19 situation is better    Labs/tests ordered for am    This patient has consented to a telehealth visit via TELEPHONE. The visit was scheduled as a TELEPHONE visit to comply with patient safety concerns in accordance with CDC recommendations.  All vitals recorded within this visit are reported by the patient.  I spent  25 minutes in total including but not limited to the 20 minutes spent in direct conversation with this patient.       Sander Benitez MD  4/17/2020, 09:54      EMR Dragon/Transcription:   Dictated utilizing Dragon dictation

## 2020-04-20 ENCOUNTER — HOSPITAL ENCOUNTER (OUTPATIENT)
Dept: URGENT CARE | Facility: CLINIC | Age: 40
Discharge: HOME | End: 2020-04-20
Attending: FAMILY MEDICINE

## 2020-04-29 ENCOUNTER — HOSPITAL ENCOUNTER (OUTPATIENT)
Dept: LAB | Facility: HOSPITAL | Age: 40
Discharge: HOME | End: 2020-04-29
Attending: NURSE PRACTITIONER

## 2020-04-29 LAB
ALBUMIN SERPL-MCNC: 3.9 G/DL (ref 3.5–5)
ALBUMIN/GLOB SERPL: 1.2 {RATIO} (ref 1.4–2.6)
ALP SERPL-CCNC: 73 U/L (ref 42–98)
ALT SERPL-CCNC: 38 U/L (ref 10–40)
ANION GAP SERPL CALC-SCNC: 27 MMOL/L (ref 8–19)
APPEARANCE UR: CLEAR
AST SERPL-CCNC: 49 U/L (ref 15–50)
BASOPHILS # BLD AUTO: 0.08 10*3/UL (ref 0–0.2)
BASOPHILS NFR BLD AUTO: 0.7 % (ref 0–3)
BILIRUB SERPL-MCNC: 0.21 MG/DL (ref 0.2–1.3)
BILIRUB UR QL: NEGATIVE
BUN SERPL-MCNC: 11 MG/DL (ref 5–25)
BUN/CREAT SERPL: 12 {RATIO} (ref 6–20)
CALCIUM SERPL-MCNC: 9.7 MG/DL (ref 8.7–10.4)
CHLORIDE SERPL-SCNC: 100 MMOL/L (ref 99–111)
CHOLEST SERPL-MCNC: 133 MG/DL (ref 107–200)
CHOLEST/HDLC SERPL: 3.6 {RATIO} (ref 3–6)
COLOR UR: NORMAL
CONV ABS IMM GRAN: 0.21 10*3/UL (ref 0–0.2)
CONV CO2: 21 MMOL/L (ref 22–32)
CONV COLLECTION SOURCE (UA): NORMAL
CONV CREATININE URINE, RANDOM: 142.4 MG/DL (ref 10–300)
CONV IMMATURE GRAN: 1.8 % (ref 0–1.8)
CONV MICROALBUM.,U,RANDOM: 53.3 MG/L (ref 0–20)
CONV TOTAL PROTEIN: 7.1 G/DL (ref 6.3–8.2)
CONV UROBILINOGEN IN URINE BY AUTOMATED TEST STRIP: 0.2 {EHRLICHU}/DL (ref 0.1–1)
CREAT UR-MCNC: 0.94 MG/DL (ref 0.5–0.9)
DEPRECATED RDW RBC AUTO: 45.5 FL (ref 36.4–46.3)
EOSINOPHIL # BLD AUTO: 0.35 10*3/UL (ref 0–0.7)
EOSINOPHIL # BLD AUTO: 3 % (ref 0–7)
ERYTHROCYTE [DISTWIDTH] IN BLOOD BY AUTOMATED COUNT: 15.1 % (ref 11.7–14.4)
EST. AVERAGE GLUCOSE BLD GHB EST-MCNC: 206 MG/DL
GFR SERPLBLD BASED ON 1.73 SQ M-ARVRAT: >60 ML/MIN/{1.73_M2}
GLOBULIN UR ELPH-MCNC: 3.2 G/DL (ref 2–3.5)
GLUCOSE SERPL-MCNC: 104 MG/DL (ref 65–99)
GLUCOSE UR QL: NEGATIVE MG/DL
HBA1C MFR BLD: 8.8 % (ref 3.5–5.7)
HCT VFR BLD AUTO: 41.2 % (ref 37–47)
HDLC SERPL-MCNC: 37 MG/DL (ref 40–60)
HGB BLD-MCNC: 12.8 G/DL (ref 12–16)
HGB UR QL STRIP: NEGATIVE
KETONES UR QL STRIP: NEGATIVE MG/DL
LDLC SERPL CALC-MCNC: 56 MG/DL (ref 70–100)
LEUKOCYTE ESTERASE UR QL STRIP: NEGATIVE
LYMPHOCYTES # BLD AUTO: 2.99 10*3/UL (ref 1–5)
LYMPHOCYTES NFR BLD AUTO: 25.6 % (ref 20–45)
MCH RBC QN AUTO: 26.3 PG (ref 27–31)
MCHC RBC AUTO-ENTMCNC: 31.1 G/DL (ref 33–37)
MCV RBC AUTO: 84.6 FL (ref 81–99)
MICROALBUMIN/CREAT UR: 37.4 MG/G{CRE} (ref 0–35)
MONOCYTES # BLD AUTO: 0.66 10*3/UL (ref 0.2–1.2)
MONOCYTES NFR BLD AUTO: 5.7 % (ref 3–10)
NEUTROPHILS # BLD AUTO: 7.38 10*3/UL (ref 2–8)
NEUTROPHILS NFR BLD AUTO: 63.2 % (ref 30–85)
NITRITE UR QL STRIP: NEGATIVE
NRBC CBCN: 0 % (ref 0–0.7)
OSMOLALITY SERPL CALC.SUM OF ELEC: 296 MOSM/KG (ref 273–304)
PH UR STRIP.AUTO: 5 [PH] (ref 5–8)
PLATELET # BLD AUTO: 250 10*3/UL (ref 130–400)
PMV BLD AUTO: 10.4 FL (ref 9.4–12.3)
POTASSIUM SERPL-SCNC: 4.5 MMOL/L (ref 3.5–5.3)
PROT UR QL: NEGATIVE MG/DL
RBC # BLD AUTO: 4.87 10*6/UL (ref 4.2–5.4)
SODIUM SERPL-SCNC: 143 MMOL/L (ref 135–147)
SP GR UR: 1.02 (ref 1–1.03)
T4 FREE SERPL-MCNC: 1.5 NG/DL (ref 0.9–1.8)
TRIGL SERPL-MCNC: 199 MG/DL (ref 40–150)
TSH SERPL-ACNC: 1.47 M[IU]/L (ref 0.27–4.2)
VLDLC SERPL-MCNC: 40 MG/DL (ref 5–37)
WBC # BLD AUTO: 11.67 10*3/UL (ref 4.8–10.8)

## 2020-05-01 ENCOUNTER — TELEMEDICINE CONVERTED (OUTPATIENT)
Dept: FAMILY MEDICINE CLINIC | Facility: CLINIC | Age: 40
End: 2020-05-01
Attending: NURSE PRACTITIONER

## 2020-07-08 ENCOUNTER — APPOINTMENT (OUTPATIENT)
Dept: CARDIOLOGY | Facility: HOSPITAL | Age: 40
End: 2020-07-08

## 2020-07-08 ENCOUNTER — HOSPITAL ENCOUNTER (OUTPATIENT)
Dept: CARDIOLOGY | Facility: HOSPITAL | Age: 40
End: 2020-07-08

## 2020-08-03 ENCOUNTER — HOSPITAL ENCOUNTER (OUTPATIENT)
Dept: OTHER | Facility: HOSPITAL | Age: 40
Discharge: HOME | End: 2020-08-03
Attending: NURSE PRACTITIONER

## 2020-08-03 LAB
ALBUMIN SERPL-MCNC: 3.8 G/DL (ref 3.5–5)
ALBUMIN/GLOB SERPL: 1.3 {RATIO} (ref 1.4–2.6)
ALP SERPL-CCNC: 58 U/L (ref 42–98)
ALT SERPL-CCNC: 32 U/L (ref 10–40)
ANION GAP SERPL CALC-SCNC: 19 MMOL/L (ref 8–19)
APPEARANCE UR: CLEAR
AST SERPL-CCNC: 36 U/L (ref 15–50)
BASOPHILS # BLD AUTO: 0.06 10*3/UL (ref 0–0.2)
BASOPHILS NFR BLD AUTO: 0.5 % (ref 0–3)
BILIRUB SERPL-MCNC: <0.15 MG/DL (ref 0.2–1.3)
BILIRUB UR QL: ABNORMAL
BUN SERPL-MCNC: 13 MG/DL (ref 5–25)
BUN/CREAT SERPL: 14 {RATIO} (ref 6–20)
CALCIUM SERPL-MCNC: 10 MG/DL (ref 8.7–10.4)
CHLORIDE SERPL-SCNC: 102 MMOL/L (ref 99–111)
CHOLEST SERPL-MCNC: 134 MG/DL (ref 107–200)
CHOLEST/HDLC SERPL: 3.9 {RATIO} (ref 3–6)
COLOR UR: ABNORMAL
CONV ABS IMM GRAN: 0.17 10*3/UL (ref 0–0.2)
CONV BACTERIA: NEGATIVE
CONV CO2: 22 MMOL/L (ref 22–32)
CONV COLLECTION SOURCE (UA): ABNORMAL
CONV CREATININE URINE, RANDOM: 401.6 MG/DL (ref 10–300)
CONV HYALINE CASTS IN URINE MICRO: ABNORMAL /[LPF]
CONV IMMATURE GRAN: 1.4 % (ref 0–1.8)
CONV MICROALBUM.,U,RANDOM: 422.5 MG/L (ref 0–20)
CONV TOTAL PROTEIN: 6.8 G/DL (ref 6.3–8.2)
CONV UROBILINOGEN IN URINE BY AUTOMATED TEST STRIP: 1 {EHRLICHU}/DL (ref 0.1–1)
CREAT UR-MCNC: 0.91 MG/DL (ref 0.5–0.9)
DEPRECATED RDW RBC AUTO: 44.1 FL (ref 36.4–46.3)
EOSINOPHIL # BLD AUTO: 0.3 10*3/UL (ref 0–0.7)
EOSINOPHIL # BLD AUTO: 2.5 % (ref 0–7)
ERYTHROCYTE [DISTWIDTH] IN BLOOD BY AUTOMATED COUNT: 14.5 % (ref 11.7–14.4)
EST. AVERAGE GLUCOSE BLD GHB EST-MCNC: 134 MG/DL
GFR SERPLBLD BASED ON 1.73 SQ M-ARVRAT: >60 ML/MIN/{1.73_M2}
GLOBULIN UR ELPH-MCNC: 3 G/DL (ref 2–3.5)
GLUCOSE SERPL-MCNC: 107 MG/DL (ref 65–99)
GLUCOSE UR QL: NEGATIVE MG/DL
HBA1C MFR BLD: 6.3 % (ref 3.5–5.7)
HCT VFR BLD AUTO: 38.5 % (ref 37–47)
HDLC SERPL-MCNC: 34 MG/DL (ref 40–60)
HGB BLD-MCNC: 12.4 G/DL (ref 12–16)
HGB UR QL STRIP: NEGATIVE
KETONES UR QL STRIP: ABNORMAL MG/DL
LDLC SERPL CALC-MCNC: 59 MG/DL (ref 70–100)
LEUKOCYTE ESTERASE UR QL STRIP: ABNORMAL
LYMPHOCYTES # BLD AUTO: 3.38 10*3/UL (ref 1–5)
LYMPHOCYTES NFR BLD AUTO: 28.6 % (ref 20–45)
MCH RBC QN AUTO: 27.4 PG (ref 27–31)
MCHC RBC AUTO-ENTMCNC: 32.2 G/DL (ref 33–37)
MCV RBC AUTO: 85 FL (ref 81–99)
MICROALBUMIN/CREAT UR: 105.2 MG/G{CRE} (ref 0–35)
MONOCYTES # BLD AUTO: 0.73 10*3/UL (ref 0.2–1.2)
MONOCYTES NFR BLD AUTO: 6.2 % (ref 3–10)
NEUTROPHILS # BLD AUTO: 7.17 10*3/UL (ref 2–8)
NEUTROPHILS NFR BLD AUTO: 60.8 % (ref 30–85)
NITRITE UR QL STRIP: NEGATIVE
NRBC CBCN: 0 % (ref 0–0.7)
OSMOLALITY SERPL CALC.SUM OF ELEC: 287 MOSM/KG (ref 273–304)
PH UR STRIP.AUTO: 5 [PH] (ref 5–8)
PLATELET # BLD AUTO: 264 10*3/UL (ref 130–400)
PMV BLD AUTO: 10 FL (ref 9.4–12.3)
POTASSIUM SERPL-SCNC: 4.8 MMOL/L (ref 3.5–5.3)
PROT UR QL: 100 MG/DL
RBC # BLD AUTO: 4.53 10*6/UL (ref 4.2–5.4)
RBC #/AREA URNS HPF: ABNORMAL /[HPF]
SODIUM SERPL-SCNC: 138 MMOL/L (ref 135–147)
SP GR UR: 1.03 (ref 1–1.03)
SQUAMOUS SPT QL MICRO: ABNORMAL /[HPF]
T4 FREE SERPL-MCNC: 1.2 NG/DL (ref 0.9–1.8)
TRIGL SERPL-MCNC: 205 MG/DL (ref 40–150)
TSH SERPL-ACNC: 2.83 M[IU]/L (ref 0.27–4.2)
VLDLC SERPL-MCNC: 41 MG/DL (ref 5–37)
WBC # BLD AUTO: 11.81 10*3/UL (ref 4.8–10.8)
WBC #/AREA URNS HPF: ABNORMAL /[HPF]

## 2020-08-04 ENCOUNTER — OFFICE VISIT CONVERTED (OUTPATIENT)
Dept: FAMILY MEDICINE CLINIC | Facility: CLINIC | Age: 40
End: 2020-08-04
Attending: NURSE PRACTITIONER

## 2020-08-05 ENCOUNTER — HOSPITAL ENCOUNTER (OUTPATIENT)
Dept: CARDIOLOGY | Facility: HOSPITAL | Age: 40
Discharge: HOME | End: 2020-08-05

## 2020-08-05 ENCOUNTER — HOSPITAL ENCOUNTER (OUTPATIENT)
Dept: CARDIOLOGY | Facility: HOSPITAL | Age: 40
Discharge: HOME | End: 2020-08-05
Admitting: INTERNAL MEDICINE

## 2020-08-05 VITALS — SYSTOLIC BLOOD PRESSURE: 90 MMHG | DIASTOLIC BLOOD PRESSURE: 48 MMHG | HEART RATE: 87 BPM

## 2020-08-05 VITALS
BODY MASS INDEX: 45.99 KG/M2 | HEIGHT: 67 IN | WEIGHT: 293 LBS | OXYGEN SATURATION: 98 % | SYSTOLIC BLOOD PRESSURE: 90 MMHG | HEART RATE: 80 BPM | DIASTOLIC BLOOD PRESSURE: 48 MMHG | RESPIRATION RATE: 18 BRPM

## 2020-08-05 LAB
BH CV ECHO MEAS - ACS: 2.1 CM
BH CV ECHO MEAS - AO ARCH DIAM (PROXIMAL TRANS.): 2 CM
BH CV ECHO MEAS - AO MAX PG (FULL): 7.8 MMHG
BH CV ECHO MEAS - AO MAX PG: 14.9 MMHG
BH CV ECHO MEAS - AO MEAN PG (FULL): 4.4 MMHG
BH CV ECHO MEAS - AO MEAN PG: 8.8 MMHG
BH CV ECHO MEAS - AO ROOT AREA (BSA CORRECTED): 1.2
BH CV ECHO MEAS - AO ROOT AREA: 6.6 CM^2
BH CV ECHO MEAS - AO ROOT DIAM: 2.9 CM
BH CV ECHO MEAS - AO V2 MAX: 193.1 CM/SEC
BH CV ECHO MEAS - AO V2 MEAN: 139.6 CM/SEC
BH CV ECHO MEAS - AO V2 VTI: 32.5 CM
BH CV ECHO MEAS - ASC AORTA: 2.8 CM
BH CV ECHO MEAS - AVA(I,A): 2.6 CM^2
BH CV ECHO MEAS - AVA(I,D): 2.6 CM^2
BH CV ECHO MEAS - AVA(V,A): 2.5 CM^2
BH CV ECHO MEAS - AVA(V,D): 2.5 CM^2
BH CV ECHO MEAS - BSA(HAYCOCK): 2.6 M^2
BH CV ECHO MEAS - BSA: 2.4 M^2
BH CV ECHO MEAS - BZI_BMI: 47.8 KILOGRAMS/M^2
BH CV ECHO MEAS - BZI_METRIC_HEIGHT: 170.2 CM
BH CV ECHO MEAS - BZI_METRIC_WEIGHT: 138.3 KG
BH CV ECHO MEAS - EDV(CUBED): 101 ML
BH CV ECHO MEAS - EDV(MOD-SP2): 108 ML
BH CV ECHO MEAS - EDV(MOD-SP4): 112 ML
BH CV ECHO MEAS - EDV(TEICH): 100.2 ML
BH CV ECHO MEAS - EF(CUBED): 70.9 %
BH CV ECHO MEAS - EF(MOD-BP): 65.2 %
BH CV ECHO MEAS - EF(MOD-SP2): 68.5 %
BH CV ECHO MEAS - EF(MOD-SP4): 61.6 %
BH CV ECHO MEAS - EF(TEICH): 62.6 %
BH CV ECHO MEAS - ESV(CUBED): 29.3 ML
BH CV ECHO MEAS - ESV(MOD-SP2): 34 ML
BH CV ECHO MEAS - ESV(MOD-SP4): 43 ML
BH CV ECHO MEAS - ESV(TEICH): 37.5 ML
BH CV ECHO MEAS - FS: 33.8 %
BH CV ECHO MEAS - IVS/LVPW: 1.2
BH CV ECHO MEAS - IVSD: 1.4 CM
BH CV ECHO MEAS - LA DIMENSION: 4.6 CM
BH CV ECHO MEAS - LA/AO: 1.6
BH CV ECHO MEAS - LAT PEAK E' VEL: 14.9 CM/SEC
BH CV ECHO MEAS - LV DIASTOLIC VOL/BSA (35-75): 46.3 ML/M^2
BH CV ECHO MEAS - LV MASS(C)D: 223.9 GRAMS
BH CV ECHO MEAS - LV MASS(C)DI: 92.6 GRAMS/M^2
BH CV ECHO MEAS - LV MAX PG: 7.1 MMHG
BH CV ECHO MEAS - LV MEAN PG: 4.4 MMHG
BH CV ECHO MEAS - LV SYSTOLIC VOL/BSA (12-30): 17.8 ML/M^2
BH CV ECHO MEAS - LV V1 MAX: 133.4 CM/SEC
BH CV ECHO MEAS - LV V1 MEAN: 99.3 CM/SEC
BH CV ECHO MEAS - LV V1 VTI: 23.3 CM
BH CV ECHO MEAS - LVIDD: 4.7 CM
BH CV ECHO MEAS - LVIDS: 3.1 CM
BH CV ECHO MEAS - LVLD AP2: 8.3 CM
BH CV ECHO MEAS - LVLD AP4: 8.3 CM
BH CV ECHO MEAS - LVLS AP2: 6.8 CM
BH CV ECHO MEAS - LVLS AP4: 6.7 CM
BH CV ECHO MEAS - LVOT AREA (M): 3.8 CM^2
BH CV ECHO MEAS - LVOT AREA: 3.6 CM^2
BH CV ECHO MEAS - LVOT DIAM: 2.2 CM
BH CV ECHO MEAS - LVPWD: 1.1 CM
BH CV ECHO MEAS - MED PEAK E' VEL: 6.7 CM/SEC
BH CV ECHO MEAS - MV A DUR: 0.14 SEC
BH CV ECHO MEAS - MV A MAX VEL: 115.9 CM/SEC
BH CV ECHO MEAS - MV DEC SLOPE: 230.4 CM/SEC^2
BH CV ECHO MEAS - MV DEC TIME: 0.2 SEC
BH CV ECHO MEAS - MV E MAX VEL: 99 CM/SEC
BH CV ECHO MEAS - MV E/A: 0.85
BH CV ECHO MEAS - MV MAX PG: 4.3 MMHG
BH CV ECHO MEAS - MV MEAN PG: 2.4 MMHG
BH CV ECHO MEAS - MV P1/2T MAX VEL: 94.5 CM/SEC
BH CV ECHO MEAS - MV P1/2T: 120.1 MSEC
BH CV ECHO MEAS - MV V2 MAX: 103.2 CM/SEC
BH CV ECHO MEAS - MV V2 MEAN: 74.1 CM/SEC
BH CV ECHO MEAS - MV V2 VTI: 28.4 CM
BH CV ECHO MEAS - MVA P1/2T LCG: 2.3 CM^2
BH CV ECHO MEAS - MVA(P1/2T): 1.8 CM^2
BH CV ECHO MEAS - MVA(VTI): 3 CM^2
BH CV ECHO MEAS - PA ACC TIME: 0.09 SEC
BH CV ECHO MEAS - PA MAX PG (FULL): 3.7 MMHG
BH CV ECHO MEAS - PA MAX PG: 6.4 MMHG
BH CV ECHO MEAS - PA PR(ACCEL): 38.6 MMHG
BH CV ECHO MEAS - PA V2 MAX: 126.2 CM/SEC
BH CV ECHO MEAS - PULM A REVS DUR: 0.16 SEC
BH CV ECHO MEAS - PULM A REVS VEL: 49.2 CM/SEC
BH CV ECHO MEAS - PULM DIAS VEL: 36.6 CM/SEC
BH CV ECHO MEAS - PULM S/D: 1.6
BH CV ECHO MEAS - PULM SYS VEL: 57.5 CM/SEC
BH CV ECHO MEAS - PVA(V,A): 2.2 CM^2
BH CV ECHO MEAS - PVA(V,D): 2.2 CM^2
BH CV ECHO MEAS - QP/QS: 0.55
BH CV ECHO MEAS - RAP SYSTOLE: 3 MMHG
BH CV ECHO MEAS - RV MAX PG: 2.7 MMHG
BH CV ECHO MEAS - RV MEAN PG: 1.5 MMHG
BH CV ECHO MEAS - RV V1 MAX: 81.5 CM/SEC
BH CV ECHO MEAS - RV V1 MEAN: 57 CM/SEC
BH CV ECHO MEAS - RV V1 VTI: 13.5 CM
BH CV ECHO MEAS - RVOT AREA: 3.5 CM^2
BH CV ECHO MEAS - RVOT DIAM: 2.1 CM
BH CV ECHO MEAS - RVSP: 30.9 MMHG
BH CV ECHO MEAS - SI(AO): 88.8 ML/M^2
BH CV ECHO MEAS - SI(CUBED): 29.6 ML/M^2
BH CV ECHO MEAS - SI(LVOT): 35.1 ML/M^2
BH CV ECHO MEAS - SI(MOD-SP2): 30.6 ML/M^2
BH CV ECHO MEAS - SI(MOD-SP4): 28.5 ML/M^2
BH CV ECHO MEAS - SI(TEICH): 25.9 ML/M^2
BH CV ECHO MEAS - SV(AO): 214.9 ML
BH CV ECHO MEAS - SV(CUBED): 71.6 ML
BH CV ECHO MEAS - SV(LVOT): 84.9 ML
BH CV ECHO MEAS - SV(MOD-SP2): 74 ML
BH CV ECHO MEAS - SV(MOD-SP4): 69 ML
BH CV ECHO MEAS - SV(RVOT): 47.1 ML
BH CV ECHO MEAS - SV(TEICH): 62.7 ML
BH CV ECHO MEAS - TAPSE (>1.6): 3.5 CM
BH CV ECHO MEAS - TR MAX VEL: 263.9 CM/SEC
BH CV ECHO MEASUREMENTS AVERAGE E/E' RATIO: 9.17
BH CV XLRA - RV BASE: 3.7 CM
BH CV XLRA - RV LENGTH: 7.2 CM
BH CV XLRA - RV MID: 3.5 CM
BH CV XLRA - TDI S': 17.8 CM/SEC
LEFT ATRIUM VOLUME INDEX: 24 ML/M2
MAXIMAL PREDICTED HEART RATE: 179 BPM
STRESS TARGET HR: 152 BPM

## 2020-08-05 PROCEDURE — A9500 TC99M SESTAMIBI: HCPCS | Performed by: INTERNAL MEDICINE

## 2020-08-05 PROCEDURE — 0 TECHNETIUM SESTAMIBI: Performed by: INTERNAL MEDICINE

## 2020-08-05 PROCEDURE — 25010000002 PERFLUTREN (DEFINITY) 8.476 MG IN SODIUM CHLORIDE (PF) 0.9 % 10 ML INJECTION: Performed by: INTERNAL MEDICINE

## 2020-08-05 PROCEDURE — 93016 CV STRESS TEST SUPVJ ONLY: CPT | Performed by: NURSE PRACTITIONER

## 2020-08-05 PROCEDURE — 78452 HT MUSCLE IMAGE SPECT MULT: CPT | Performed by: INTERNAL MEDICINE

## 2020-08-05 PROCEDURE — 78452 HT MUSCLE IMAGE SPECT MULT: CPT

## 2020-08-05 PROCEDURE — 93306 TTE W/DOPPLER COMPLETE: CPT | Performed by: INTERNAL MEDICINE

## 2020-08-05 PROCEDURE — 93017 CV STRESS TEST TRACING ONLY: CPT

## 2020-08-05 PROCEDURE — 93018 CV STRESS TEST I&R ONLY: CPT | Performed by: INTERNAL MEDICINE

## 2020-08-05 PROCEDURE — 93306 TTE W/DOPPLER COMPLETE: CPT

## 2020-08-05 RX ORDER — BIOTIN 1 MG
1000 TABLET ORAL DAILY
COMMUNITY
End: 2020-12-08 | Stop reason: HOSPADM

## 2020-08-05 RX ADMIN — SODIUM CHLORIDE 2 ML: 9 INJECTION INTRAMUSCULAR; INTRAVENOUS; SUBCUTANEOUS at 11:30

## 2020-08-05 RX ADMIN — TECHNETIUM TC 99M SESTAMIBI 1 DOSE: 1 INJECTION INTRAVENOUS at 10:42

## 2020-08-05 RX ADMIN — TECHNETIUM TC 99M SESTAMIBI 1 DOSE: 1 INJECTION INTRAVENOUS at 08:08

## 2020-08-06 LAB
BH CV STRESS BP STAGE 1: NORMAL
BH CV STRESS DURATION MIN STAGE 1: 3
BH CV STRESS DURATION SEC STAGE 1: 52
BH CV STRESS GRADE STAGE 1: 10
BH CV STRESS HR STAGE 1: 164
BH CV STRESS METS STAGE 1: 4.6
BH CV STRESS PROTOCOL 1: NORMAL
BH CV STRESS RECOVERY BP: NORMAL MMHG
BH CV STRESS RECOVERY HR: 96 BPM
BH CV STRESS RECOVERY O2: 98 %
BH CV STRESS SPEED STAGE 1: 1.7
BH CV STRESS STAGE 1: 1
LV EF NUC BP: 68 %
MAXIMAL PREDICTED HEART RATE: 179 BPM
PERCENT MAX PREDICTED HR: 91.62 %
STRESS BASELINE BP: NORMAL MMHG
STRESS BASELINE HR: 80 BPM
STRESS O2 SAT REST: 98 %
STRESS PERCENT HR: 108 %
STRESS POST ESTIMATED WORKLOAD: 4.6 METS
STRESS POST EXERCISE DUR MIN: 3 MIN
STRESS POST EXERCISE DUR SEC: 52 SEC
STRESS POST PEAK BP: NORMAL MMHG
STRESS POST PEAK HR: 164 BPM
STRESS TARGET HR: 152 BPM

## 2020-08-13 ENCOUNTER — OFFICE VISIT (OUTPATIENT)
Dept: CARDIOLOGY | Facility: CLINIC | Age: 40
End: 2020-08-13

## 2020-08-13 VITALS
DIASTOLIC BLOOD PRESSURE: 83 MMHG | HEART RATE: 78 BPM | HEIGHT: 67 IN | BODY MASS INDEX: 47.2 KG/M2 | SYSTOLIC BLOOD PRESSURE: 125 MMHG

## 2020-08-13 DIAGNOSIS — I10 ESSENTIAL HYPERTENSION: Primary | ICD-10-CM

## 2020-08-13 DIAGNOSIS — E78.5 HYPERLIPIDEMIA, UNSPECIFIED HYPERLIPIDEMIA TYPE: ICD-10-CM

## 2020-08-13 DIAGNOSIS — E66.01 OBESITY, CLASS III, BMI 40-49.9 (MORBID OBESITY) (HCC): ICD-10-CM

## 2020-08-13 PROCEDURE — 99213 OFFICE O/P EST LOW 20 MIN: CPT | Performed by: INTERNAL MEDICINE

## 2020-08-13 NOTE — PROGRESS NOTES
RESULTS   Subjective:        Bianca Boles is a 40 y.o. female who here for follow up    CC  SURG CLEARANCE FOR BARIATRIC  SURG  HPI  40-year-old female with a benign essential arterial hypertension, hyperlipidemia as well as obesity underwent the stress test as well as echocardiogram for the surgical clearance for bariatric surgery denies any chest pains tightness heaviness or the pressure sensation     Problem List Items Addressed This Visit        Cardiovascular and Mediastinum    Essential hypertension - Primary    Hyperlipidemia       Digestive    Obesity, Class III, BMI 40-49.9 (morbid obesity) (CMS/[facility])      Interpretation Summary        · Patient BMI is 47.2 kg/m2..  · Findings consistent with an equivocal ECG stress test.  · Left ventricular ejection fraction is normal (Calculated EF = 68%).  · Myocardial perfusion imaging indicates a normal myocardial perfusion study with no evidence of ischemia.  · Impressions are consistent with a low risk study.        Interpretation Summary     · Calculated EF = 65.2%  · There is no evidence of pericardial effusion.          .    The following portions of the patient's history were reviewed and updated as appropriate: allergies, current medications, past family history, past medical history, past social history, past surgical history and problem list.    Past Medical History:   Diagnosis Date   • Diabetes mellitus (CMS/[facility])    • Hyperlipidemia    • Hypertension    • Kidney stone      reports that she quit smoking about 6 months ago. Her smoking use included cigarettes. She quit after 35.00 years of use. She has never used smokeless tobacco. She reports that she drinks alcohol. She reports that she does not use drugs.   Family History   Problem Relation Age of Onset   • Diabetes Father    • Hypertension Father    • Heart disease Father    • Cancer Father         BLADDER   • Stroke Maternal Grandmother    • Heart disease Maternal Grandmother    • Diabetes Paternal  "Grandfather    • Heart disease Paternal Grandfather        Review of Systems  Constitutional: No wt loss, fever, fatigue  Gastrointestinal: No nausea, abdominal pain  Behavioral/Psych: No insomnia or anxiety   Cardiovascular no chest pains or tightness in the chest  Objective:       Physical Exam  /83 (BP Location: Right arm, Patient Position: Sitting)   Pulse 78   Ht 171.2 cm (67.4\")   BMI 47.20 kg/m²   General appearance: No acute changes   Neck: Trachea midline; NECK, supple, no thyromegaly or lymphadenopathy   Lungs: Normal size and shape, normal breath sounds, equal distribution of air, no rales and rhonchi   CV: S1-S2 regular, no murmurs, no rub, no gallop   Abdomen: Soft, non-tender; no masses , no abnormal abdominal sounds   Extremities: No deformity , normal color , no peripheral edema   Skin: Normal temperature, turgor and texture; no rash, ulcers          Procedures      Echocardiogram:        Current Outpatient Medications:   •  B Complex Vitamins (VITAMIN B COMPLEX PO), Take  by mouth., Disp: , Rfl:   •  Biotin 1000 MCG tablet, Take 1,000 mcg by mouth 3 (Three) Times a Day., Disp: , Rfl:   •  buPROPion XL (WELLBUTRIN XL) 300 MG 24 hr tablet, Take 1 tablet by mouth Daily., Disp: , Rfl:   •  diclofenac (VOLTAREN) 75 MG EC tablet, Take 1 tablet by mouth Daily., Disp: , Rfl:   •  Insulin Degludec (TRESIBA SC), Inject 12 Units under the skin into the appropriate area as directed Daily., Disp: , Rfl:   •  Levonorgest-Eth Estrad 91-Day (SEASONIQUE) 0.15-0.03 &0.01 MG tablet, Take  by mouth., Disp: , Rfl:   •  lisinopril-hydrochlorothiazide (PRINZIDE,ZESTORETIC) 20-12.5 MG per tablet, Take 1 tablet by mouth Daily., Disp: , Rfl:   •  metFORMIN (GLUCOPHAGE) 1000 MG tablet, Take 1,000 mg by mouth 2 (Two) Times a Day With Meals., Disp: , Rfl:   •  Multiple Vitamins-Minerals (MULTIVITAMIN PO), Take  by mouth., Disp: , Rfl:   •  omeprazole (priLOSEC) 20 MG capsule, Take 20 mg by mouth Daily., Disp: , Rfl: "   •  pravastatin (PRAVACHOL) 20 MG tablet, Take 20 mg by mouth Daily., Disp: , Rfl:   •  Semaglutide (OZEMPIC, 0.25 OR 0.5 MG/DOSE, SC), Inject 0.25 mg under the skin into the appropriate area as directed Daily., Disp: , Rfl:   •  venlafaxine (EFFEXOR) 37.5 MG tablet, Take 37.5 mg by mouth Daily., Disp: , Rfl:    Assessment:        Patient Active Problem List   Diagnosis   • Chronic fatigue   • Essential hypertension   • Hyperlipidemia   • Heartburn   • Multiple joint pain   • Depression   • Diabetes mellitus type 2 in obese (CMS/[facility])   • Obesity, Class III, BMI 40-49.9 (morbid obesity) (CMS/[facility])   • GERD (gastroesophageal reflux disease)   • Gastroparesis   • Gastroesophageal reflux disease               Plan:            ICD-10-CM ICD-9-CM   1. Essential hypertension I10 401.9   2. Obesity, Class III, BMI 40-49.9 (morbid obesity) (CMS/[facility]) E66.01 278.01   3. Hyperlipidemia, unspecified hyperlipidemia type E78.5 272.4     1. Essential hypertension  Blood pressure under control    2. Obesity, Class III, BMI 40-49.9 (morbid obesity) (CMS/[facility])  Significant risk of obesity to CAD, HTN has been explained  Advantages of wt reduction has been explained      3. Hyperlipidemia, unspecified hyperlipidemia type  Continue current treatment       Specificity and sensitivity of the stress test/ cardiac workup has been explained. Pt has been explained if  Symptoms continue please go to ER, and further w/p will be required.    Also explained this does not rule out coronary artery disease or the future events, continue to emphasize on risk reductions for coronary artery disease    Pt also advised to contact PCP for other causes of symptoms    Bianca Boles seen and examined with no clinical signs of angina or chf, pt is cleared for surgery with non modifiable risk factors.  Bianca Boles has been advised to take cardiac meds with sip of water on the day of surgery.    Please use beta blocker for tachycardia  perioperatively    Anticoagulation to be managed appropriately    Watch for chest pain, shortness of breath, palpitations, arrhythmias, and significant change in the blood pressure perioperatively,     Please check EKG preop and postop if any questions, notify us if any change in patient's cardiovascular conditions      SEE IN 1 YR  COUNSELING:    Bianca Monreal was given to patient for the following topics: diagnostic results, risk factor reductions, impressions, risks and benefits of treatment options and importance of treatment compliance .       SMOKING COUNSELING:    [unfilled]    Dictated using Dragon dictation

## 2020-10-12 ENCOUNTER — TRANSCRIBE ORDERS (OUTPATIENT)
Dept: ADMINISTRATIVE | Facility: HOSPITAL | Age: 40
End: 2020-10-12

## 2020-10-12 DIAGNOSIS — Z01.818 OTHER SPECIFIED PRE-OPERATIVE EXAMINATION: Primary | ICD-10-CM

## 2020-10-17 ENCOUNTER — LAB (OUTPATIENT)
Dept: LAB | Facility: HOSPITAL | Age: 40
End: 2020-10-17

## 2020-10-17 DIAGNOSIS — Z01.818 OTHER SPECIFIED PRE-OPERATIVE EXAMINATION: ICD-10-CM

## 2020-10-17 PROCEDURE — U0004 COV-19 TEST NON-CDC HGH THRU: HCPCS | Performed by: INTERNAL MEDICINE

## 2020-10-17 PROCEDURE — C9803 HOPD COVID-19 SPEC COLLECT: HCPCS

## 2020-10-19 LAB — SARS-COV-2 RNA RESP QL NAA+PROBE: NOT DETECTED

## 2020-10-19 RX ORDER — INSULIN DEGLUDEC 200 U/ML
66 INJECTION, SOLUTION SUBCUTANEOUS NIGHTLY
COMMUNITY
End: 2021-01-12

## 2020-10-19 RX ORDER — PRAVASTATIN SODIUM 10 MG
10 TABLET ORAL NIGHTLY
COMMUNITY
End: 2021-08-04 | Stop reason: SDUPTHER

## 2020-10-20 ENCOUNTER — ANESTHESIA (OUTPATIENT)
Dept: GASTROENTEROLOGY | Facility: HOSPITAL | Age: 40
End: 2020-10-20

## 2020-10-20 ENCOUNTER — ANESTHESIA EVENT (OUTPATIENT)
Dept: GASTROENTEROLOGY | Facility: HOSPITAL | Age: 40
End: 2020-10-20

## 2020-10-20 ENCOUNTER — HOSPITAL ENCOUNTER (OUTPATIENT)
Facility: HOSPITAL | Age: 40
Setting detail: HOSPITAL OUTPATIENT SURGERY
Discharge: HOME | End: 2020-10-20
Attending: SURGERY | Admitting: SURGERY

## 2020-10-20 VITALS
DIASTOLIC BLOOD PRESSURE: 84 MMHG | SYSTOLIC BLOOD PRESSURE: 113 MMHG | HEART RATE: 84 BPM | BODY MASS INDEX: 45.99 KG/M2 | WEIGHT: 293 LBS | RESPIRATION RATE: 16 BRPM | OXYGEN SATURATION: 96 % | HEIGHT: 67 IN

## 2020-10-20 DIAGNOSIS — E66.01 OBESITY, CLASS III, BMI 40-49.9 (MORBID OBESITY) (HCC): ICD-10-CM

## 2020-10-20 DIAGNOSIS — K21.9 GASTROESOPHAGEAL REFLUX DISEASE: ICD-10-CM

## 2020-10-20 DIAGNOSIS — K21.9 GASTROESOPHAGEAL REFLUX DISEASE, ESOPHAGITIS PRESENCE NOT SPECIFIED: ICD-10-CM

## 2020-10-20 PROBLEM — K31.7 GASTRIC POLYPS: Status: ACTIVE | Noted: 2020-10-20

## 2020-10-20 LAB
B-HCG UR QL: NEGATIVE
GLUCOSE BLDC GLUCOMTR-MCNC: 134 MG/DL (ref 70–130)
INTERNAL NEGATIVE CONTROL: NEGATIVE
INTERNAL POSITIVE CONTROL: POSITIVE
Lab: NORMAL

## 2020-10-20 PROCEDURE — 82962 GLUCOSE BLOOD TEST: CPT

## 2020-10-20 PROCEDURE — 43239 EGD BIOPSY SINGLE/MULTIPLE: CPT | Performed by: SURGERY

## 2020-10-20 PROCEDURE — 25010000002 PROPOFOL 10 MG/ML EMULSION: Performed by: ANESTHESIOLOGY

## 2020-10-20 PROCEDURE — 87081 CULTURE SCREEN ONLY: CPT | Performed by: SURGERY

## 2020-10-20 PROCEDURE — 81025 URINE PREGNANCY TEST: CPT | Performed by: SURGERY

## 2020-10-20 PROCEDURE — 88305 TISSUE EXAM BY PATHOLOGIST: CPT | Performed by: SURGERY

## 2020-10-20 RX ORDER — SODIUM CHLORIDE, SODIUM LACTATE, POTASSIUM CHLORIDE, CALCIUM CHLORIDE 600; 310; 30; 20 MG/100ML; MG/100ML; MG/100ML; MG/100ML
1000 INJECTION, SOLUTION INTRAVENOUS CONTINUOUS
Status: DISCONTINUED | OUTPATIENT
Start: 2020-10-20 | End: 2020-10-20 | Stop reason: HOSPADM

## 2020-10-20 RX ORDER — PROPOFOL 10 MG/ML
VIAL (ML) INTRAVENOUS CONTINUOUS PRN
Status: DISCONTINUED | OUTPATIENT
Start: 2020-10-20 | End: 2020-10-20 | Stop reason: SURG

## 2020-10-20 RX ORDER — LIDOCAINE HYDROCHLORIDE 20 MG/ML
INJECTION, SOLUTION INFILTRATION; PERINEURAL AS NEEDED
Status: DISCONTINUED | OUTPATIENT
Start: 2020-10-20 | End: 2020-10-20 | Stop reason: SURG

## 2020-10-20 RX ORDER — PROPOFOL 10 MG/ML
VIAL (ML) INTRAVENOUS AS NEEDED
Status: DISCONTINUED | OUTPATIENT
Start: 2020-10-20 | End: 2020-10-20 | Stop reason: SURG

## 2020-10-20 RX ADMIN — SODIUM CHLORIDE, POTASSIUM CHLORIDE, SODIUM LACTATE AND CALCIUM CHLORIDE 1000 ML: 600; 310; 30; 20 INJECTION, SOLUTION INTRAVENOUS at 06:29

## 2020-10-20 RX ADMIN — LIDOCAINE HYDROCHLORIDE 60 MG: 20 INJECTION, SOLUTION INFILTRATION; PERINEURAL at 07:04

## 2020-10-20 RX ADMIN — PROPOFOL 200 MG: 10 INJECTION, EMULSION INTRAVENOUS at 07:04

## 2020-10-20 RX ADMIN — PROPOFOL 225 MCG/KG/MIN: 10 INJECTION, EMULSION INTRAVENOUS at 07:04

## 2020-10-20 NOTE — ANESTHESIA PREPROCEDURE EVALUATION
Anesthesia Evaluation     Patient summary reviewed and Nursing notes reviewed                Airway   Mallampati: III  TM distance: >3 FB  Neck ROM: full  Possible difficult intubation  Dental - normal exam     Pulmonary - normal exam   (+) a smoker Former,   Cardiovascular - normal exam    (+) hypertension 2 medications or greater, hyperlipidemia,       Neuro/Psych  (+) headaches, psychiatric history Anxiety and Depression,       ROS Comment: Hx panic attacks  GI/Hepatic/Renal/Endo    (+) obesity, morbid obesity, GERD,  renal disease stones, diabetes mellitus type 2 using insulin,     ROS Comment: Gastroparesis    Musculoskeletal         ROS comment: Herniated lumbar disc  Abdominal   (+) obese,    Substance History      OB/GYN          Other                      Anesthesia Plan    ASA 3     MAC     intravenous induction     Anesthetic plan, all risks, benefits, and alternatives have been provided, discussed and informed consent has been obtained with: patient.

## 2020-10-20 NOTE — ANESTHESIA POSTPROCEDURE EVALUATION
Patient: Bianca Boles    Procedure Summary     Date: 10/20/20 Room / Location:  [facility]    Anesthesia Start: 0700 Anesthesia Stop: 0714    Procedure: ESOPHAGOGASTRODUODENOSCOPY WITH BIOPSY (N/A Esophagus) Diagnosis:       Obesity, Class III, BMI 40-49.9 (morbid obesity) (CMS/[facility])      Gastroesophageal reflux disease, esophagitis presence not specified      (Obesity, Class III, BMI 40-49.9 (morbid obesity) (CMS/[facility]) [E66.01])      (Gastroesophageal reflux disease, esophagitis presence not specified [K21.9])    Surgeon: Fidel Grajeda Jr., MD Provider: Levi Marley MD    Anesthesia Type: MAC ASA Status: 3          Anesthesia Type: MAC    Vitals  No vitals data found for the desired time range.          Post Anesthesia Care and Evaluation    Patient location during evaluation: PHASE II  Patient participation: complete - patient participated  Level of consciousness: awake and alert  Pain management: adequate  Airway patency: patent  Anesthetic complications: No anesthetic complications  PONV Status: none  Cardiovascular status: acceptable  Respiratory status: acceptable  Hydration status: acceptable

## 2020-10-20 NOTE — OP NOTE
Surgeon: Fidel Grajeda Jr., M.D.    Preoperative Diagnosis: Gastroesophageal reflux disease    Postoperative Diagnosis: #1 gastritis #2 left hand food questionable gastroparesis #3 gastric body polyps    Procedure Performed: Transoral esophagogastroduodenoscopy with forceps polypectomies and gastric antral biopsies    Indications: 40-year-old female with morbid obesity with complaints of heartburn.  Patient does take omeprazole on a regular basis.    Procedure:     The procedure, indications, preparation and potential complications were explained to the patient, who indicated understanding and signed the corresponding consent forms.  The patient was identified, taken to the endoscopy suite, and placed on the left side down decubitus position.  The patient underwent a MAC anesthesia and was appropriately monitored through the case by the anesthesia personnel with continuous pulse oximetry, blood pressure, and cardiac monitoring.  A bite block was placed.  After adequate IV sedation and using a transoral technique a lubed flexible endoscope was placed in the hypopharynx and advanced to the second portion of the duodenum without difficulty. The scope was then withdrawn back into the antrum of the stomach.  Cold forcep biopsies of the antrum were taken to rule out Helicobacter pylori.  The scope was retroflexed noting the body, fundus and cardia.  The scope was then withdrawn back into the esophagus after decompressing the stomach.  The Z line was noted and GE junction measured from the incisors.  The scope was then completely withdrawn.  The patient tolerated the procedure well and left the endoscopy suite in stable condition.  The findings are listed below.    Duodenum: Unremarkable  Antrum: Mild patchy erythema  Body/Fundus: Routine food throughout body.  The entire surface could not be visualized.  There were small polyps measuring up to 3 to 4 mm in diameter.  Several of these were removed with biopsy forceps and  sent off pathology rule out dysplasia.  No active bleeding noted  Cardia: Unremarkable  Esophagus: Z-line fairly regular, no erosive esophagitis; GE junction measured approximately 40 cm from the incisors    Recommendations:     Await biopsy results and follow-up in the office

## 2020-10-20 NOTE — DISCHARGE INSTRUCTIONS

## 2020-10-20 NOTE — H&P
Patient Care Team:  Patricia Hernandez APRN as PCP - General (Nurse Practitioner)    Chief complaint Heartburn and in need of preoperative clearance prior to surgery    Subjective     Patient is a 40 y.o. female who is a patient of ours and has undergone our extensive initial evaluation for bariatric surgery and needs to proceed with upper endoscopy for preoperative clearance prior to proceeding with surgery.  Please see the initial history and physical for further detailed information.      Review of Systems   Pertinent items are noted in HPI and no changes since last visit.    Past Medical History:   Diagnosis Date   • Anxiety and depression    • Diabetes mellitus (CMS/HCC)    • High triglycerides    • History of kidney stones    • Hypertension    • Lumbar herniated disc    • Panic attacks    • Psoriasis     ELBOWS   • Spinal headache     AFTER PAIN EPIDURALS.  NO BLOOD PATCH     Past Surgical History:   Procedure Laterality Date   • ADENOIDECTOMY     •  SECTION  ,   • CYSTOSCOPY BLADDER STONE LITHOTRIPSY     • EAR TUBES     • FOOT FRACTURE SURGERY Left 2017    screw placed   • TONSILLECTOMY       Family History   Problem Relation Age of Onset   • Diabetes Father    • Hypertension Father    • Heart disease Father    • Cancer Father         BLADDER   • Stroke Maternal Grandmother    • Heart disease Maternal Grandmother    • Diabetes Paternal Grandfather    • Heart disease Paternal Grandfather    • Malig Hyperthermia Neg Hx      Social History     Tobacco Use   • Smoking status: Former Smoker     Years: 35.00     Types: Cigarettes     Quit date: 1/15/2020     Years since quittin.7   • Smokeless tobacco: Never Used   Substance Use Topics   • Alcohol use: Yes     Frequency: Monthly or less     Drinks per session: 1 or 2     Binge frequency: Never     Comment: RARELY   • Drug use: Never     Medications Prior to Admission   Medication Sig Dispense Refill Last Dose   • ASPIRIN 81  PO Take 1 tablet by mouth Daily.   10/19/2020 at Unknown time   • B Complex Vitamins (VITAMIN B COMPLEX PO) Take 1 tablet by mouth Daily.   10/19/2020 at Unknown time   • Biotin 1000 MCG tablet Take 1,000 mcg by mouth Daily.   10/19/2020 at Unknown time   • buPROPion XL (WELLBUTRIN XL) 300 MG 24 hr tablet Take 1 tablet by mouth Daily.   10/19/2020 at Unknown time   • Calcium-Magnesium-Zinc (MATTY-MAG-ZINC PO) Take 1 tablet by mouth Daily.   10/19/2020 at Unknown time   • diclofenac (VOLTAREN) 75 MG EC tablet Take 1 tablet by mouth 2 (Two) Times a Day.   10/19/2020 at Unknown time   • Insulin Degludec (Tresiba FlexTouch) 200 UNIT/ML solution pen-injector pen injection Inject 160 Units under the skin into the appropriate area as directed Daily.   10/19/2020 at Unknown time   • Levonorgest-Eth Estrad 91-Day (SEASONIQUE) 0.15-0.03 &0.01 MG tablet Take  by mouth.   10/19/2020 at Unknown time   • lisinopril-hydrochlorothiazide (PRINZIDE,ZESTORETIC) 20-12.5 MG per tablet Take 1 tablet by mouth Daily.   10/19/2020 at Unknown time   • metFORMIN (GLUCOPHAGE) 1000 MG tablet Take 1,000 mg by mouth 2 (Two) Times a Day With Meals.   10/19/2020 at Unknown time   • Multiple Vitamins-Minerals (MULTIVITAMIN PO) Take 1 tablet by mouth Daily.   10/19/2020 at Unknown time   • omeprazole (priLOSEC) 20 MG capsule Take 20 mg by mouth Daily.   10/19/2020 at Unknown time   • pravastatin (PRAVACHOL) 10 MG tablet Take 10 mg by mouth Daily.   10/19/2020 at Unknown time   • Semaglutide (OZEMPIC, 0.25 OR 0.5 MG/DOSE, SC) Inject 1 mg under the skin into the appropriate area as directed 1 (One) Time Per Week. TAKES ON SUNDAYS   10/19/2020 at Unknown time   • venlafaxine (EFFEXOR) 37.5 MG tablet Take 37.5 mg by mouth Daily.   10/19/2020 at Unknown time     Allergies:  Patient has no known allergies.    Vital Signs  See PreOp record  Heart Rate:  [82] 82  Resp:  [16] 16  BP: (136)/(34) 136/68    Flowsheet Rows      First Filed Value   Admission Height   "170.2 cm (67\") Documented at 10/20/2020 0614   Admission Weight  136 kg (299 lb) Documented at 10/20/2020 0614           Physical Exam:   Heart: RR  Lungs: CTA B  Abd: soft and NT/ND  Ext: no clubbing, cyanosis    Results Review:    I have reviewed the patient's clinical results      Obesity, Class III, BMI 40-49.9 (morbid obesity) (CMS/[facility])    Gastroesophageal reflux disease      The risks and benefits of the procedure were discussed with the patient in detail and all questions were answered.  Possibility of perforation, bleeding, aspiration, anoxic brain injury, respiratory and/or cardiac arrest and death were discussed.  Consent will be signed and witnessed..     Fidel Grajeda MD  10/20/20  06:34 EDT  Time: Approximately 15 minutes was spent with the patient and over half that time was spent counseling.  All of the patients questions were answered.    "

## 2020-10-21 LAB
LAB AP CASE REPORT: NORMAL
PATH REPORT.FINAL DX SPEC: NORMAL
PATH REPORT.GROSS SPEC: NORMAL
UREASE TISS QL: NEGATIVE

## 2020-10-22 ENCOUNTER — TELEPHONE (OUTPATIENT)
Dept: BARIATRICS/WEIGHT MGMT | Facility: CLINIC | Age: 40
End: 2020-10-22

## 2020-10-22 NOTE — TELEPHONE ENCOUNTER
LVM for pt regarding negative result. Instructed pt to call the office with any questions.         ----- Message from Fidel Grajeda Jr., MD sent at 10/21/2020  9:37 AM EDT -----  Please call patient with negative results.

## 2020-11-11 ENCOUNTER — PREP FOR SURGERY (OUTPATIENT)
Dept: OTHER | Facility: HOSPITAL | Age: 40
End: 2020-11-11

## 2020-11-11 DIAGNOSIS — E66.01 OBESITY, CLASS III, BMI 40-49.9 (MORBID OBESITY) (HCC): Primary | ICD-10-CM

## 2020-11-11 RX ORDER — METOCLOPRAMIDE HYDROCHLORIDE 5 MG/ML
10 INJECTION INTRAMUSCULAR; INTRAVENOUS ONCE
Status: CANCELLED | OUTPATIENT
Start: 2020-12-07 | End: 2020-11-11

## 2020-11-11 RX ORDER — ACETAMINOPHEN 160 MG/5ML
975 SOLUTION ORAL ONCE
Status: CANCELLED | OUTPATIENT
Start: 2020-12-07 | End: 2020-11-11

## 2020-11-11 RX ORDER — CHLORHEXIDINE GLUCONATE 0.12 MG/ML
15 RINSE ORAL SEE ADMIN INSTRUCTIONS
Status: CANCELLED | OUTPATIENT
Start: 2020-12-07

## 2020-11-11 RX ORDER — PANTOPRAZOLE SODIUM 40 MG/10ML
40 INJECTION, POWDER, LYOPHILIZED, FOR SOLUTION INTRAVENOUS ONCE
Status: CANCELLED | OUTPATIENT
Start: 2020-12-07 | End: 2020-11-11

## 2020-11-11 RX ORDER — SODIUM CHLORIDE, SODIUM LACTATE, POTASSIUM CHLORIDE, CALCIUM CHLORIDE 600; 310; 30; 20 MG/100ML; MG/100ML; MG/100ML; MG/100ML
100 INJECTION, SOLUTION INTRAVENOUS CONTINUOUS
Status: CANCELLED | OUTPATIENT
Start: 2020-12-07

## 2020-11-11 RX ORDER — CEFAZOLIN SODIUM IN 0.9 % NACL 3 G/100 ML
3 INTRAVENOUS SOLUTION, PIGGYBACK (ML) INTRAVENOUS
Status: CANCELLED | OUTPATIENT
Start: 2020-12-07

## 2020-11-11 RX ORDER — SCOLOPAMINE TRANSDERMAL SYSTEM 1 MG/1
1 PATCH, EXTENDED RELEASE TRANSDERMAL CONTINUOUS
Status: CANCELLED | OUTPATIENT
Start: 2020-12-07 | End: 2020-12-10

## 2020-11-11 RX ORDER — SODIUM CHLORIDE 0.9 % (FLUSH) 0.9 %
3 SYRINGE (ML) INJECTION EVERY 12 HOURS SCHEDULED
Status: CANCELLED | OUTPATIENT
Start: 2020-11-11

## 2020-11-11 RX ORDER — SODIUM CHLORIDE 0.9 % (FLUSH) 0.9 %
3-10 SYRINGE (ML) INJECTION AS NEEDED
Status: CANCELLED | OUTPATIENT
Start: 2020-12-07

## 2020-11-25 ENCOUNTER — CONSULT (OUTPATIENT)
Dept: BARIATRICS/WEIGHT MGMT | Facility: CLINIC | Age: 40
End: 2020-11-25

## 2020-11-25 ENCOUNTER — LAB (OUTPATIENT)
Dept: LAB | Facility: HOSPITAL | Age: 40
End: 2020-11-25

## 2020-11-25 VITALS
DIASTOLIC BLOOD PRESSURE: 75 MMHG | SYSTOLIC BLOOD PRESSURE: 132 MMHG | TEMPERATURE: 97.7 F | BODY MASS INDEX: 45.99 KG/M2 | WEIGHT: 293 LBS | RESPIRATION RATE: 18 BRPM | HEART RATE: 86 BPM | HEIGHT: 67 IN

## 2020-11-25 DIAGNOSIS — R53.82 CHRONIC FATIGUE: ICD-10-CM

## 2020-11-25 DIAGNOSIS — K21.9 GASTROESOPHAGEAL REFLUX DISEASE, UNSPECIFIED WHETHER ESOPHAGITIS PRESENT: ICD-10-CM

## 2020-11-25 DIAGNOSIS — I10 ESSENTIAL HYPERTENSION: ICD-10-CM

## 2020-11-25 DIAGNOSIS — K31.84 GASTROPARESIS: ICD-10-CM

## 2020-11-25 DIAGNOSIS — M25.50 MULTIPLE JOINT PAIN: ICD-10-CM

## 2020-11-25 DIAGNOSIS — F32.A DEPRESSION, UNSPECIFIED DEPRESSION TYPE: ICD-10-CM

## 2020-11-25 DIAGNOSIS — K31.7 GASTRIC POLYPS: ICD-10-CM

## 2020-11-25 DIAGNOSIS — E66.01 OBESITY, CLASS III, BMI 40-49.9 (MORBID OBESITY) (HCC): Primary | ICD-10-CM

## 2020-11-25 DIAGNOSIS — E66.9 DIABETES MELLITUS TYPE 2 IN OBESE (HCC): ICD-10-CM

## 2020-11-25 DIAGNOSIS — E11.69 DIABETES MELLITUS TYPE 2 IN OBESE (HCC): ICD-10-CM

## 2020-11-25 DIAGNOSIS — E66.01 OBESITY, CLASS III, BMI 40-49.9 (MORBID OBESITY) (HCC): ICD-10-CM

## 2020-11-25 DIAGNOSIS — E78.5 HYPERLIPIDEMIA, UNSPECIFIED HYPERLIPIDEMIA TYPE: ICD-10-CM

## 2020-11-25 LAB
ALBUMIN SERPL-MCNC: 4.3 G/DL (ref 3.5–5.2)
ALBUMIN/GLOB SERPL: 1.5 G/DL
ALP SERPL-CCNC: 85 U/L (ref 39–117)
ALT SERPL W P-5'-P-CCNC: 47 U/L (ref 1–33)
ANION GAP SERPL CALCULATED.3IONS-SCNC: 10.6 MMOL/L (ref 5–15)
AST SERPL-CCNC: 45 U/L (ref 1–32)
BILIRUB SERPL-MCNC: 0.2 MG/DL (ref 0–1.2)
BUN SERPL-MCNC: 18 MG/DL (ref 6–20)
BUN/CREAT SERPL: 19.1 (ref 7–25)
CALCIUM SPEC-SCNC: 9.9 MG/DL (ref 8.6–10.5)
CHLORIDE SERPL-SCNC: 99 MMOL/L (ref 98–107)
CO2 SERPL-SCNC: 26.4 MMOL/L (ref 22–29)
CREAT SERPL-MCNC: 0.94 MG/DL (ref 0.57–1)
DEPRECATED RDW RBC AUTO: 41.9 FL (ref 37–54)
ERYTHROCYTE [DISTWIDTH] IN BLOOD BY AUTOMATED COUNT: 14.6 % (ref 12.3–15.4)
GFR SERPL CREATININE-BSD FRML MDRD: 66 ML/MIN/1.73
GLOBULIN UR ELPH-MCNC: 2.9 GM/DL
GLUCOSE SERPL-MCNC: 125 MG/DL (ref 65–99)
HCT VFR BLD AUTO: 38.9 % (ref 34–46.6)
HGB BLD-MCNC: 12.4 G/DL (ref 12–15.9)
MCH RBC QN AUTO: 25.4 PG (ref 26.6–33)
MCHC RBC AUTO-ENTMCNC: 31.9 G/DL (ref 31.5–35.7)
MCV RBC AUTO: 79.6 FL (ref 79–97)
PLATELET # BLD AUTO: 301 10*3/MM3 (ref 140–450)
PMV BLD AUTO: 10.1 FL (ref 6–12)
POTASSIUM SERPL-SCNC: 5.1 MMOL/L (ref 3.5–5.2)
PROT SERPL-MCNC: 7.2 G/DL (ref 6–8.5)
RBC # BLD AUTO: 4.89 10*6/MM3 (ref 3.77–5.28)
SODIUM SERPL-SCNC: 136 MMOL/L (ref 136–145)
WBC # BLD AUTO: 14.21 10*3/MM3 (ref 3.4–10.8)

## 2020-11-25 PROCEDURE — 99215 OFFICE O/P EST HI 40 MIN: CPT | Performed by: SURGERY

## 2020-11-25 PROCEDURE — 36415 COLL VENOUS BLD VENIPUNCTURE: CPT

## 2020-11-25 PROCEDURE — 80053 COMPREHEN METABOLIC PANEL: CPT

## 2020-11-25 PROCEDURE — 85027 COMPLETE CBC AUTOMATED: CPT

## 2020-11-25 RX ORDER — URSODIOL 300 MG/1
300 CAPSULE ORAL 2 TIMES DAILY
Qty: 60 CAPSULE | Refills: 5 | Status: SHIPPED | OUTPATIENT
Start: 2020-11-25 | End: 2021-06-16

## 2020-11-25 NOTE — PATIENT INSTRUCTIONS
Bariatric Manual    You were provided a manual specific to the procedure that you have chosen.  Please refer to that with any questions or call the office at [phone]

## 2020-11-25 NOTE — H&P
Bariatric Consult:  Referred by Patricia Hernandez APRN    Bianca Boles is here today for consult on Consult (Sleeve consult)      History of Present Illness:     Bianca Boles is a 40 y.o. female with morbid obesity with co-morbidities including diabetes, hypertension, osteoarthritis, back pain, knee pain, GERD and depression who presents for surgical consultation for the above procedure. Bianca has completed the initial intake visit and has been examined by our nurse practitioner, dietician, psychologist and underwent the extensive educational teaching process under the guidance of our bariatric coordinator and myself. Bianca also has seen the educational video CRISTO on the surgical procedure if available. Bianca attended today more educational teaching from our bariatric coordinator and myself. Bianca has had an extensive medical workup including a visit with their primary care physician, EKG, chest radiograph, blood work, EGD or UGI and possibly further testing. These have been reviewed by me and discussed with the patient. Bianca is now ready to proceed with surgery. Bianca presently denies nausea, vomiting, fever, chills, chest pain, shortness of air, melena, hematochezia, hemetemesis, dysuria, frequency, hematuria, jaundice or abdominal pain.       Past Medical History:   Diagnosis Date   • Anxiety and depression    • Diabetes mellitus (CMS/HCC)    • High triglycerides    • History of kidney stones    • Hypertension    • Lumbar herniated disc    • Panic attacks    • Psoriasis     ELBOWS   • Spinal headache     AFTER PAIN EPIDURALS.  NO BLOOD PATCH       Encounter Diagnoses   Name Primary?   • Obesity, Class III, BMI 40-49.9 (morbid obesity) (CMS/HCC) Yes   • Essential hypertension    • Diabetes mellitus type 2 in obese (CMS/HCC)    • Chronic fatigue    • Gastroesophageal reflux disease, unspecified whether esophagitis present    • Multiple joint pain    • Hyperlipidemia, unspecified hyperlipidemia type     • Gastroparesis    • Gastric polyps    • Depression, unspecified depression type        Past Surgical History:   Procedure Laterality Date   • ADENOIDECTOMY     •  SECTION  ,   • CYSTOSCOPY BLADDER STONE LITHOTRIPSY     • EAR TUBES     • ENDOSCOPY N/A 10/20/2020    Procedure: ESOPHAGOGASTRODUODENOSCOPY WITH BIOPSY;  Surgeon: Fidel Grajeda Jr., MD;  Location: [facility] ENDOSCOPY;  Service: General;  Laterality: N/A;  PRE- GERD  POST- RETAINED FOOD, GASTRITIS, GASTRIC POLYPS   • FOOT FRACTURE SURGERY Left 2017    screw placed   • TONSILLECTOMY         Patient Active Problem List   Diagnosis   • Chronic fatigue   • Essential hypertension   • Hyperlipidemia   • Heartburn   • Multiple joint pain   • Depression   • Diabetes mellitus type 2 in obese (CMS/HCC)   • Obesity, Class III, BMI 40-49.9 (morbid obesity) (CMS/HCC)   • Gastroparesis   • Gastroesophageal reflux disease   • Gastric polyps       No Known Allergies      Current Outpatient Medications:   •  ASPIRIN 81 PO, Take 1 tablet by mouth Daily., Disp: , Rfl:   •  B Complex Vitamins (VITAMIN B COMPLEX PO), Take 1 tablet by mouth Daily., Disp: , Rfl:   •  Biotin 1000 MCG tablet, Take 1,000 mcg by mouth Daily., Disp: , Rfl:   •  buPROPion XL (WELLBUTRIN XL) 300 MG 24 hr tablet, Take 1 tablet by mouth Daily., Disp: , Rfl:   •  Calcium-Magnesium-Zinc (MATTY-MAG-ZINC PO), Take 1 tablet by mouth Daily., Disp: , Rfl:   •  diclofenac (VOLTAREN) 75 MG EC tablet, Take 1 tablet by mouth 2 (Two) Times a Day., Disp: , Rfl:   •  Insulin Degludec (Tresiba FlexTouch) 200 UNIT/ML solution pen-injector pen injection, Inject 160 Units under the skin into the appropriate area as directed Daily., Disp: , Rfl:   •  lisinopril-hydrochlorothiazide (PRINZIDE,ZESTORETIC) 20-12.5 MG per tablet, Take 1 tablet by mouth Daily., Disp: , Rfl:   •  metFORMIN (GLUCOPHAGE) 1000 MG tablet, Take 1,000 mg by mouth 2 (Two) Times a Day With Meals., Disp: , Rfl:   •   Multiple Vitamins-Minerals (MULTIVITAMIN PO), Take 1 tablet by mouth Daily., Disp: , Rfl:   •  omeprazole (priLOSEC) 20 MG capsule, Take 20 mg by mouth Daily., Disp: , Rfl:   •  pravastatin (PRAVACHOL) 10 MG tablet, Take 10 mg by mouth Daily., Disp: , Rfl:   •  Semaglutide (OZEMPIC, 0.25 OR 0.5 MG/DOSE, SC), Inject 1 mg under the skin into the appropriate area as directed 1 (One) Time Per Week. TAKES ON SUNDAYS, Disp: , Rfl:   •  venlafaxine (EFFEXOR) 37.5 MG tablet, Take 37.5 mg by mouth Daily., Disp: , Rfl:   •  folic acid-vit B6-vit B12 (FOLBEE) 2.5-25-1 MG tablet tablet, Take 1 tablet by mouth Daily., Disp: 40 tablet, Rfl: 0  •  Levonorgest-Eth Estrad 91-Day (SEASONIQUE) 0.15-0.03 &0.01 MG tablet, Take  by mouth., Disp: , Rfl:   •  ursodiol (Actigall) 300 MG capsule, Take 1 capsule by mouth 2 (Two) Times a Day., Disp: 60 capsule, Rfl: 5    Social History     Socioeconomic History   • Marital status:      Spouse name: Not on file   • Number of children: Not on file   • Years of education: Not on file   • Highest education level: Not on file   Tobacco Use   • Smoking status: Former Smoker     Years: 35.00     Types: Cigarettes     Quit date: 1/15/2020     Years since quittin.8   • Smokeless tobacco: Never Used   Substance and Sexual Activity   • Alcohol use: Yes     Frequency: Monthly or less     Drinks per session: 1 or 2     Binge frequency: Never     Comment: RARELY   • Drug use: Never   • Sexual activity: Defer       Family History   Problem Relation Age of Onset   • Diabetes Father    • Hypertension Father    • Heart disease Father    • Cancer Father         BLADDER   • Stroke Maternal Grandmother    • Heart disease Maternal Grandmother    • Diabetes Paternal Grandfather    • Heart disease Paternal Grandfather    • Malig Hyperthermia Neg Hx        Review of Systems:  Review of Systems   Constitutional: Positive for fatigue.   Musculoskeletal: Positive for arthralgias.   All other systems reviewed  and are negative.        Physical Exam:    Vital Signs:  Weight: (!) 137 kg (301 lb)   Body mass index is 46.47 kg/m².  Temp: 97.7 °F (36.5 °C)   Heart Rate: 86   BP: 132/75       Physical Exam  Vitals signs reviewed.   HENT:      Head: Normocephalic and atraumatic.      Mouth/Throat:      Mouth: Mucous membranes are moist.      Pharynx: Oropharynx is clear.   Eyes:      General: No scleral icterus.     Extraocular Movements: Extraocular movements intact.      Conjunctiva/sclera: Conjunctivae normal.      Pupils: Pupils are equal, round, and reactive to light.   Neck:      Musculoskeletal: Normal range of motion and neck supple.      Thyroid: No thyromegaly.   Cardiovascular:      Rate and Rhythm: Normal rate.   Pulmonary:      Effort: Pulmonary effort is normal. No respiratory distress.      Breath sounds: Normal breath sounds. No stridor. No wheezing or rhonchi.   Abdominal:      General: Bowel sounds are normal. There is no distension.      Palpations: Abdomen is soft. There is no mass.      Tenderness: There is no abdominal tenderness. There is no right CVA tenderness, left CVA tenderness, guarding or rebound.      Hernia: No hernia is present.   Musculoskeletal: Normal range of motion.   Lymphadenopathy:      Cervical: No cervical adenopathy.   Skin:     General: Skin is warm and dry.      Findings: No erythema.   Neurological:      Mental Status: She is alert and oriented to person, place, and time.   Psychiatric:         Mood and Affect: Mood normal.         Behavior: Behavior normal.         Thought Content: Thought content normal.         Judgment: Judgment normal.           Assessment:    Bianca Boles is a 40 y.o. year old female with medically complicated severe obesity with a BMI of Body mass index is 46.47 kg/m². and multiple co-morbidities listed in the encounter diagnosis.    I think she is an appropriate candidate for this surgery, and is ready to proceed.      Plan/Discussion/Summary:  No hiatal  hernia per me but patient does take PPI regularly.  Patient with gastroparesis.  Patient gastric polyp.  H. pylori negative.  Patient does take PPI.  I instructed patient that the gastric bypass is the best procedure for gastroparesis but gastric sleeve should improve.  Patient does understand    The patient has returned to the office for a surgical consultation and has requested to proceed with a laparoscopic gastric sleeve.  I have had the opportunity to obtain a history, examine the patient and review the patient's chart.    The patient understands that surgery is a tool and that weight loss is not guaranteed but only seen in the context of appropriate use, regular follow up, exercise and making appropriate food choices.     I personally discussed the potential complications of the laparoscopic gastric sleeve with this patient.  The patient is well aware of potential complications of the surgery that include but not limited to bleeding, infections, deep vein thrombosis, pulmonary embolism, pulmonary complications such as pneumonia, cardiac event, hernias, small bowel obstruction, damage to the spleen or other organs, bowel injury, disfiguring scars, failure to lose weight, need for additional surgery, conversion to an open procedure and death.  The patient is also aware of complications which apply in particular to the gastric sleeve and can include but not limited to the leakage of gastric contents at the staple line, the development of an intra-abdominal abscess, gastroesophageal reflux disease, Roca's esophagus, ulcers, vitamin/mineral deficiencies, strictures, and the possibility of converting this procedure to a Viviana-en-Y gastric bypass. The patient also understands the possibility of requiring an acid reducer medication for the rest of their life.    The risks, benefits, potential complications and alternative therapies were discussed at great length as outlined in our extensive consent forms, online  consent and educational teaching processes.    The patient has confirmed the participation in the programs extensive educational activities.    All questions and concerns were answered to patient's satisfaction.  The patient now wishes to proceed with surgery.    Patient has declined the pre-operative insertion of an IVC filter.     The patient has declined a postoperative course of anitcoagulant therapy.      I instructed patient to start on a H2 blocker or proton pump inhibitor if not already on one of these medications.    I explained in detail the procedures that we perform.  All of these procedures have a chance to convert to open if any technical challenges or complications do occur.  Bariatric surgery is not cosmetic surgery but rather a tool to help a patient make a life-long commitment lifestyle change including diet, exercise, behavior changes, and taking supplemental vitamins and minerals.    Problems after surgery may require more operations to correct them.    The risks, benefits, alternatives, and potential complications of all of the procedures were explained in detail including, but not limited to death, anesthesia and medication adverse effect, deep venous thrombosis, pulmonary embolism, trocar site/incisional hernia, wound infection, abdominal infection, bleeding, failure to lose weight, gain weight, a change in body image, metabolic complications with vitamin deficiences and anemia.    Weight loss expectations were discussed with the patient in detail. The weight loss operations most commonly performed are the sleeve gastrectomy and the Viviana-en-Y gastric bypass. These operations result in weight losses up to approximately 25-35% of initial body weight 12 to 24 months after surgery with the gastric bypass usually the higher percent of weight loss but depends on patient using the tool.    For the gastric bypass and loop duodenal switch (AUDREY-S) the risks include but not limited to the following  early complications:  Anastomotic leak/peritonitis, Viviana/Alimentary/biliopancreatic limb obstruction, severe & minor wound infection/seroma, and nausea/vomiting.  Late complications can include but are not limited to malnutrition, vitamin deficiencies, frequent loose stools,  stomal stenosis, marginal ulcer, bowel obstruction, intussusception, internal, and incisional hernia.    Regarding the gastric sleeve, there is less long-term outcome data and higher risk of dysphagia and reflux compared to a gastric bypass, as well as risk of internal visceral/organ injury, splenectomy, bleeding, infection, leak (which could require further intervention possible conversion to Viviana-en-Y gastric bypass), stenosis and possibility of regaining weight.    Bianca was counseled regarding diagnostic results, instructions for management, risk factor reductions, prognosis, patient and family education, impressions, risks and benefits of treatment options and importance of compliance with treatment. Total face to face time of the encounter was over 45 minutes and over 30 minutes was spent counseling.     Nelson Report   As part of this patient's treatment plan I am prescribing controlled substances. The patient has been made aware of appropriate use of such medications, including potential risk of somnolence, limited ability to drive and /or work safely, and potential for dependence or overdose. It has also been made clear that these medications are for use by this patient only, without concomitant use of alcohol or other substances unless prescribed.    Bianca has completed prescribing agreement detailing terms of continued prescribing of controlled substances, including monitoring NELSON reports, urine drug screening, and pill counts if necessary. Bianca is aware that inappropriate use will result in cessation of prescribing such medications.    NELSON report has been reviewed      History and physical exam exhibit continued safe and  appropriate use of controlled substances.      Bianca understands the surgical procedures and the different surgical options that are available.  She understands the lifestyle changes that are required after surgery and has agreed to follow the guidelines outlined in the weight management program.  She also expressed understanding of the risks involved and had all of female questions answered and desires to proceed.      Fidel Grajeda MD  11/25/2020

## 2020-12-04 ENCOUNTER — APPOINTMENT (OUTPATIENT)
Dept: PREADMISSION TESTING | Facility: HOSPITAL | Age: 40
End: 2020-12-04

## 2020-12-04 ENCOUNTER — OFFICE VISIT CONVERTED (OUTPATIENT)
Dept: FAMILY MEDICINE CLINIC | Facility: CLINIC | Age: 40
End: 2020-12-04
Attending: NURSE PRACTITIONER

## 2020-12-04 ENCOUNTER — HOSPITAL ENCOUNTER (OUTPATIENT)
Dept: FAMILY MEDICINE CLINIC | Facility: CLINIC | Age: 40
Discharge: HOME | End: 2020-12-04
Attending: NURSE PRACTITIONER

## 2020-12-04 LAB — QT INTERVAL: 358 MS

## 2020-12-04 PROCEDURE — U0004 COV-19 TEST NON-CDC HGH THRU: HCPCS | Performed by: NURSE PRACTITIONER

## 2020-12-04 PROCEDURE — 93005 ELECTROCARDIOGRAM TRACING: CPT

## 2020-12-04 PROCEDURE — 93010 ELECTROCARDIOGRAM REPORT: CPT | Performed by: INTERNAL MEDICINE

## 2020-12-04 PROCEDURE — C9803 HOPD COVID-19 SPEC COLLECT: HCPCS | Performed by: NURSE PRACTITIONER

## 2020-12-04 RX ORDER — MELATONIN
1000 DAILY
COMMUNITY
End: 2021-08-04 | Stop reason: SDUPTHER

## 2020-12-04 RX ORDER — LISINOPRIL 10 MG/1
10 TABLET ORAL DAILY
COMMUNITY
End: 2021-03-16

## 2020-12-04 NOTE — DISCHARGE INSTRUCTIONS
Take only the following medications the morning of surgery:   EFFEXOR AND WELLBUTRIN    ARRIVE AT 8:00      Do not take Bariatric Vitamins, Folic Acid, Actigall (if applicable) or Lovenox Injections (if applicable) the morning of surgery.  If you have a history of blood clots or have a BMI greater than 50, Dr. Grajeda may order Lovenox for after surgery. Do not take Lovenox blood thinner before surgery.      General Instructions:   • Drink one 20 ounce Gatorade G2 the evening before surgery.  Nothing red in color.  • Do not eat solid food after midnight the night before surgery.    • The morning of surgery have another 20 ounce Gatorade G2.  Again, nothing red in color.  Your drink must be completed 2 hours before your arrival time.   • Patients who avoid smoking, chewing tobacco and alcohol for 4 weeks prior to surgery have a reduced risk of post-operative complications.  Quit smoking as many days before surgery as you can.  • Do not smoke, use chewing tobacco or drink alcohol the day of surgery.   • Bring any papers given to you in the doctor's office.  Wear clean comfortable clothes.  • Do not wear contact lenses, false eyelashes or make-up.  Bring a case for your glasses.   • Bring crutches or walker if applicable.  • Remove all piercings.  Leave jewelry and any other valuables at home.  • Remove fingernail polish, gel overlays or any artificial nails.  • Hair extensions with metal clips must be removed prior to surgery.  • The Pre-Admission Testing nurse will instruct you to bring medications if unable to obtain an accurate list in Pre-Admission Testing.    If you were given a blood bank ID arm band remember to bring it with you the day of surgery.    Preventing a Surgical Site Infection:  • For 2 to 3 days before surgery, avoid shaving with a razor because the razor can irritate skin and make it easier to develop an infection.    • Any areas of open skin can increase the risk of a post-operative wound infection  by allowing bacteria to enter and travel throughout the body.  Notify your surgeon if you have any skin wounds / rashes even if it is not near the expected surgical site.  The area will need assessed to determine if surgery should be delayed until it is healed.  • 2 days prior to surgery, take a shower using a fresh bar of anti-bacterial soap (such as Dial).  Use a clean washcloth and dry with a clean towel.    • The day prior to surgery, take a shower using a fresh bar of anti-bacterial soap (such as Dial).  Use a clean washcloth and dry with a clean towel.  Sleep in a clean bed with clean clothing.  Do not allow pets to sleep with you.  • The morning of surgery shower using a fresh bar of anti-bacterial soap (such as Dial).  Use a clean washcloth and dry with a clean towel.  Follow the Chlorhexidine instructions below.    CHLORHEXIDINE CLOTH INSTRUCTIONS  The morning of surgery follow these instructions using the Chlorhexidine cloths you've been given.  These steps reduce bacteria on the body.  Do not use the cloths near your eyes, ears mouth, genitalia or on open wounds.  Throw the cloths away after use but do not try to flush them down a toilet.    • Open and remove one cloth at a time from the package.    • Leave the cloth unfolded and begin the bathing.  • Massage the skin with the cloths using gentle pressure to remove bacteria.  Do not scrub harshly.   • Follow the steps below with one 2% CHG cloth per area (6 total cloths).  • One cloth for neck, shoulders and chest.  • One cloth for both arms, hands, fingers and underarms (do underarms last).  • One cloth for the abdomen followed by groin.  • One cloth for right leg and foot including between the toes.  • One cloth for left leg and foot including between the toes.  • The last cloth is to be used for the back of the neck, back and buttocks.    Allow the CHG to air dry 3 minutes on the skin which will give it time to work and decrease the chance of  irritation.  The skin may feel sticky until it is dry.  Do not rinse with water or any other liquid or you will lose the beneficial effects of the CHG.  If mild skin irritation occurs, do rinse the skin to remove the CHG.  Report this to the nurse at time of admission.  Do not apply lotions, creams, ointments, deodorants or perfumes after using the clothes. Dress in clean clothes before coming to the hospital.    • Ask your surgeon if you will be receiving antibiotics prior to surgery.  • Make sure you, your family, and all healthcare providers clean their hands with soap and water or an alcohol based hand  before caring for you or your wound.      Day of surgery:  Your arrival time is approximately two hours before your scheduled surgery time.  Upon arrival, a Pre-op nurse and Anesthesiologist will review your health history, obtain vital signs, and answer questions you may have.  A Pre-op nurse will start an IV and you may receive medication in preparation for surgery, including something to help you relax.  If applicable, we do ask that you have your C-PAP/BI-PAP machine available. It can be utilized the night of surgery.     Please be aware that surgery does come with discomfort.  We want to make every effort to control your discomfort so please discuss any uncontrolled symptoms with your nurse.   Your doctor will most likely have prescribed pain medications.      If you are going home after surgery you will receive individualized written care instructions before being discharged.  A responsible adult must drive you to and from the hospital on the day of your surgery and stay with you for 24 hours.    If you are staying overnight following surgery, you will be transported to your hospital room following the recovery period.  [facility] has all private rooms.    If you have any questions please call Pre-Admission Testing at [phone].  Deductibles and co-payments are collected on the  day of service. Please be prepared to pay the required co-pay, deductible or deposit on the day of service as defined by your plan.    Patient Education for Self-Quarantine Process    Following your COVID testing, we strongly recommend that you do not leave your home after you have been tested for COVID except to get medical care. This includes not going to work, school or to public areas.  If this is not possible for you to do please limit your activities to only required outings.  Be sure to wear a mask when you are with other people, practice social distancing and wash your hands frequently.      The following items provide additional details to keep you safe.  • Wash your hands with soap and water frequently for at least 20 seconds.   • Avoid touching your eyes, nose and mouth with unwashed hands.  • Do not share anything - utensils, towels, food from the same bowl.   • Have your own utensils, drinking glass, dishes, towels and bedding.   • Do not have visitors.   • Do use FaceTime to stay in touch with family and friends.  • You should stay in a specific room away from others if possible.   • Stay at least 6 feet away from others in the home if you cannot have a dedicated room to yourself.   • Do not snuggle with your pet. While the CDC says there is no evidence that pets can spread COVID-19 or be infected from humans, it is probably best to avoid “petting, snuggling, being kissed or licked and sharing food (during self-quarantine)”, according to the CDC.   • Sanitize household surfaces daily. Include all high touch areas (door handles, light switches, phones, countertops, etc.)  • Do not share a bathroom with others, if possible.   • Wear a mask around others in your home if you are unable to stay in a separate room or 6 feet apart. If  you are unable to wear a mask, have your family member wear a mask if they must be within 6 feet of you.   Call your surgeon immediately if you experience any of the following  symptoms:  • Sore Throat  • Shortness of Breath or difficulty breathing  • Cough  • Chills  • Body soreness or muscle pain  • Headache  • Fever  • New loss of taste or smell  • Do not arrive for your surgery ill.  Your procedure will need to be rescheduled to another time.  You will need to call your physician before the day of surgery to avoid any unnecessary exposure to hospital staff as well as other patients.

## 2020-12-05 LAB — SARS-COV-2 RNA RESP QL NAA+PROBE: NOT DETECTED

## 2020-12-07 ENCOUNTER — HOSPITAL ENCOUNTER (INPATIENT)
Facility: HOSPITAL | Age: 40
LOS: 1 days | Discharge: HOME | End: 2020-12-08
Attending: SURGERY | Admitting: SURGERY

## 2020-12-07 ENCOUNTER — ANESTHESIA EVENT (OUTPATIENT)
Dept: PERIOP | Facility: HOSPITAL | Age: 40
End: 2020-12-07

## 2020-12-07 ENCOUNTER — ANESTHESIA (OUTPATIENT)
Dept: PERIOP | Facility: HOSPITAL | Age: 40
End: 2020-12-07

## 2020-12-07 DIAGNOSIS — K21.9 GASTROESOPHAGEAL REFLUX DISEASE, UNSPECIFIED WHETHER ESOPHAGITIS PRESENT: ICD-10-CM

## 2020-12-07 DIAGNOSIS — M25.50 MULTIPLE JOINT PAIN: ICD-10-CM

## 2020-12-07 DIAGNOSIS — R12 HEARTBURN: ICD-10-CM

## 2020-12-07 DIAGNOSIS — E78.5 HYPERLIPIDEMIA, UNSPECIFIED HYPERLIPIDEMIA TYPE: ICD-10-CM

## 2020-12-07 DIAGNOSIS — F32.A DEPRESSION, UNSPECIFIED DEPRESSION TYPE: ICD-10-CM

## 2020-12-07 DIAGNOSIS — K31.84 GASTROPARESIS: ICD-10-CM

## 2020-12-07 DIAGNOSIS — I10 ESSENTIAL HYPERTENSION: ICD-10-CM

## 2020-12-07 DIAGNOSIS — E66.01 OBESITY, CLASS III, BMI 40-49.9 (MORBID OBESITY) (HCC): ICD-10-CM

## 2020-12-07 DIAGNOSIS — R53.82 CHRONIC FATIGUE: Primary | ICD-10-CM

## 2020-12-07 DIAGNOSIS — E66.9 DIABETES MELLITUS TYPE 2 IN OBESE (HCC): ICD-10-CM

## 2020-12-07 DIAGNOSIS — K31.7 GASTRIC POLYPS: ICD-10-CM

## 2020-12-07 DIAGNOSIS — E11.69 DIABETES MELLITUS TYPE 2 IN OBESE (HCC): ICD-10-CM

## 2020-12-07 LAB
B-HCG UR QL: NEGATIVE
GLUCOSE BLDC GLUCOMTR-MCNC: 108 MG/DL (ref 70–130)
GLUCOSE BLDC GLUCOMTR-MCNC: 109 MG/DL (ref 70–130)
GLUCOSE BLDC GLUCOMTR-MCNC: 112 MG/DL (ref 70–130)
GLUCOSE BLDC GLUCOMTR-MCNC: 113 MG/DL (ref 70–130)
GLUCOSE BLDC GLUCOMTR-MCNC: 87 MG/DL (ref 70–130)
INTERNAL NEGATIVE CONTROL: NEGATIVE
INTERNAL POSITIVE CONTROL: POSITIVE
Lab: NORMAL

## 2020-12-07 PROCEDURE — 25010000002 ROPIVACAINE PER 1 MG: Performed by: ANESTHESIOLOGY

## 2020-12-07 PROCEDURE — 0DB64Z3 EXCISION OF STOMACH, PERCUTANEOUS ENDOSCOPIC APPROACH, VERTICAL: ICD-10-PCS | Performed by: SURGERY

## 2020-12-07 PROCEDURE — 82962 GLUCOSE BLOOD TEST: CPT

## 2020-12-07 PROCEDURE — 25010000002 ENOXAPARIN PER 10 MG: Performed by: SURGERY

## 2020-12-07 PROCEDURE — 25010000002 METOCLOPRAMIDE PER 10 MG: Performed by: SURGERY

## 2020-12-07 PROCEDURE — 43775 LAP SLEEVE GASTRECTOMY: CPT | Performed by: SURGERY

## 2020-12-07 PROCEDURE — 88307 TISSUE EXAM BY PATHOLOGIST: CPT | Performed by: SURGERY

## 2020-12-07 PROCEDURE — 25010000002 PROPOFOL 10 MG/ML EMULSION: Performed by: NURSE ANESTHETIST, CERTIFIED REGISTERED

## 2020-12-07 PROCEDURE — 25010000002 ONDANSETRON PER 1 MG: Performed by: NURSE ANESTHETIST, CERTIFIED REGISTERED

## 2020-12-07 PROCEDURE — 81025 URINE PREGNANCY TEST: CPT | Performed by: ANESTHESIOLOGY

## 2020-12-07 PROCEDURE — 25010000002 MIDAZOLAM PER 1 MG: Performed by: ANESTHESIOLOGY

## 2020-12-07 PROCEDURE — 25010000002 FENTANYL CITRATE (PF) 100 MCG/2ML SOLUTION: Performed by: NURSE ANESTHETIST, CERTIFIED REGISTERED

## 2020-12-07 DEVICE — ENDOPATH ECHELON ENDOSCOPIC LINEAR CUTTER RELOADS, GOLD, 60MM
Type: IMPLANTABLE DEVICE | Site: ABDOMEN | Status: FUNCTIONAL
Brand: ECHELON ENDOPATH

## 2020-12-07 DEVICE — ENDOPATH ECHELON ENDOSCOPIC LINEAR CUTTER RELOADS, GREEN, 60MM
Type: IMPLANTABLE DEVICE | Site: ABDOMEN | Status: FUNCTIONAL
Brand: ECHELON ENDOPATH

## 2020-12-07 DEVICE — STPL/LN REINF PERISTRIP DRY VERITAS THN: Type: IMPLANTABLE DEVICE | Site: ABDOMEN | Status: FUNCTIONAL

## 2020-12-07 DEVICE — SEALANT WND FIBRIN TISSEEL PREFIL/SYR/PRIMAFZ 4ML: Type: IMPLANTABLE DEVICE | Site: ABDOMEN | Status: FUNCTIONAL

## 2020-12-07 DEVICE — FLOSEAL HEMOSTATIC MATRIX, 5ML
Type: IMPLANTABLE DEVICE | Site: ABDOMEN | Status: FUNCTIONAL
Brand: FLOSEAL HEMOSTATIC MATRIX

## 2020-12-07 RX ORDER — HYDROMORPHONE HYDROCHLORIDE 1 MG/ML
0.5 INJECTION, SOLUTION INTRAMUSCULAR; INTRAVENOUS; SUBCUTANEOUS
Status: DISCONTINUED | OUTPATIENT
Start: 2020-12-07 | End: 2020-12-07

## 2020-12-07 RX ORDER — CHLORHEXIDINE GLUCONATE 0.12 MG/ML
15 RINSE ORAL SEE ADMIN INSTRUCTIONS
Status: COMPLETED | OUTPATIENT
Start: 2020-12-07 | End: 2020-12-07

## 2020-12-07 RX ORDER — METOCLOPRAMIDE HYDROCHLORIDE 5 MG/ML
10 INJECTION INTRAMUSCULAR; INTRAVENOUS EVERY 6 HOURS
Status: DISCONTINUED | OUTPATIENT
Start: 2020-12-07 | End: 2020-12-08 | Stop reason: HOSPADM

## 2020-12-07 RX ORDER — FENTANYL CITRATE 50 UG/ML
INJECTION, SOLUTION INTRAMUSCULAR; INTRAVENOUS AS NEEDED
Status: DISCONTINUED | OUTPATIENT
Start: 2020-12-07 | End: 2020-12-07 | Stop reason: SURG

## 2020-12-07 RX ORDER — HALOPERIDOL 5 MG/ML
0.5 INJECTION INTRAMUSCULAR EVERY 4 HOURS PRN
Status: DISCONTINUED | OUTPATIENT
Start: 2020-12-07 | End: 2020-12-08 | Stop reason: HOSPADM

## 2020-12-07 RX ORDER — ONDANSETRON 2 MG/ML
INJECTION INTRAMUSCULAR; INTRAVENOUS AS NEEDED
Status: DISCONTINUED | OUTPATIENT
Start: 2020-12-07 | End: 2020-12-07 | Stop reason: SURG

## 2020-12-07 RX ORDER — MIRTAZAPINE 15 MG/1
15 TABLET, ORALLY DISINTEGRATING ORAL NIGHTLY
Status: DISCONTINUED | OUTPATIENT
Start: 2020-12-07 | End: 2020-12-08 | Stop reason: HOSPADM

## 2020-12-07 RX ORDER — OXYCODONE AND ACETAMINOPHEN 7.5; 325 MG/1; MG/1
1 TABLET ORAL ONCE AS NEEDED
Status: DISCONTINUED | OUTPATIENT
Start: 2020-12-07 | End: 2020-12-07 | Stop reason: HOSPADM

## 2020-12-07 RX ORDER — ONDANSETRON 4 MG/1
4 TABLET, ORALLY DISINTEGRATING ORAL EVERY 4 HOURS PRN
Status: DISCONTINUED | OUTPATIENT
Start: 2020-12-07 | End: 2020-12-08 | Stop reason: HOSPADM

## 2020-12-07 RX ORDER — PANTOPRAZOLE SODIUM 40 MG/10ML
40 INJECTION, POWDER, LYOPHILIZED, FOR SOLUTION INTRAVENOUS ONCE
Status: COMPLETED | OUTPATIENT
Start: 2020-12-07 | End: 2020-12-07

## 2020-12-07 RX ORDER — ROCURONIUM BROMIDE 10 MG/ML
INJECTION, SOLUTION INTRAVENOUS AS NEEDED
Status: DISCONTINUED | OUTPATIENT
Start: 2020-12-07 | End: 2020-12-07 | Stop reason: SURG

## 2020-12-07 RX ORDER — METOCLOPRAMIDE HYDROCHLORIDE 5 MG/ML
10 INJECTION INTRAMUSCULAR; INTRAVENOUS ONCE
Status: COMPLETED | OUTPATIENT
Start: 2020-12-07 | End: 2020-12-07

## 2020-12-07 RX ORDER — FAMOTIDINE 10 MG/ML
20 INJECTION, SOLUTION INTRAVENOUS EVERY 12 HOURS SCHEDULED
Status: DISCONTINUED | OUTPATIENT
Start: 2020-12-07 | End: 2020-12-08 | Stop reason: HOSPADM

## 2020-12-07 RX ORDER — ONDANSETRON 4 MG/1
4 TABLET, FILM COATED ORAL EVERY 4 HOURS PRN
Status: DISCONTINUED | OUTPATIENT
Start: 2020-12-07 | End: 2020-12-08 | Stop reason: HOSPADM

## 2020-12-07 RX ORDER — SODIUM CHLORIDE, SODIUM LACTATE, POTASSIUM CHLORIDE, CALCIUM CHLORIDE 600; 310; 30; 20 MG/100ML; MG/100ML; MG/100ML; MG/100ML
150 INJECTION, SOLUTION INTRAVENOUS CONTINUOUS
Status: DISCONTINUED | OUTPATIENT
Start: 2020-12-07 | End: 2020-12-08 | Stop reason: HOSPADM

## 2020-12-07 RX ORDER — MAGNESIUM HYDROXIDE 1200 MG/15ML
LIQUID ORAL AS NEEDED
Status: DISCONTINUED | OUTPATIENT
Start: 2020-12-07 | End: 2020-12-07 | Stop reason: HOSPADM

## 2020-12-07 RX ORDER — ALBUTEROL SULFATE 2.5 MG/3ML
2.5 SOLUTION RESPIRATORY (INHALATION) EVERY 6 HOURS PRN
Status: DISCONTINUED | OUTPATIENT
Start: 2020-12-07 | End: 2020-12-08 | Stop reason: HOSPADM

## 2020-12-07 RX ORDER — LABETALOL HYDROCHLORIDE 5 MG/ML
10 INJECTION, SOLUTION INTRAVENOUS
Status: DISCONTINUED | OUTPATIENT
Start: 2020-12-07 | End: 2020-12-08 | Stop reason: HOSPADM

## 2020-12-07 RX ORDER — FENTANYL CITRATE 50 UG/ML
100 INJECTION, SOLUTION INTRAMUSCULAR; INTRAVENOUS
Status: DISCONTINUED | OUTPATIENT
Start: 2020-12-07 | End: 2020-12-07 | Stop reason: HOSPADM

## 2020-12-07 RX ORDER — SODIUM CHLORIDE, SODIUM LACTATE, POTASSIUM CHLORIDE, CALCIUM CHLORIDE 600; 310; 30; 20 MG/100ML; MG/100ML; MG/100ML; MG/100ML
100 INJECTION, SOLUTION INTRAVENOUS CONTINUOUS
Status: DISCONTINUED | OUTPATIENT
Start: 2020-12-07 | End: 2020-12-07 | Stop reason: HOSPADM

## 2020-12-07 RX ORDER — ONDANSETRON 2 MG/ML
4 INJECTION INTRAMUSCULAR; INTRAVENOUS ONCE AS NEEDED
Status: DISCONTINUED | OUTPATIENT
Start: 2020-12-07 | End: 2020-12-07 | Stop reason: HOSPADM

## 2020-12-07 RX ORDER — HYDROMORPHONE HYDROCHLORIDE 1 MG/ML
0.5 INJECTION, SOLUTION INTRAMUSCULAR; INTRAVENOUS; SUBCUTANEOUS
Status: DISCONTINUED | OUTPATIENT
Start: 2020-12-07 | End: 2020-12-07 | Stop reason: HOSPADM

## 2020-12-07 RX ORDER — PROMETHAZINE HYDROCHLORIDE 25 MG/1
SUPPOSITORY RECTAL AS NEEDED
Status: DISCONTINUED | OUTPATIENT
Start: 2020-12-07 | End: 2020-12-07 | Stop reason: HOSPADM

## 2020-12-07 RX ORDER — SODIUM CHLORIDE, SODIUM LACTATE, POTASSIUM CHLORIDE, CALCIUM CHLORIDE 600; 310; 30; 20 MG/100ML; MG/100ML; MG/100ML; MG/100ML
9 INJECTION, SOLUTION INTRAVENOUS CONTINUOUS
Status: DISCONTINUED | OUTPATIENT
Start: 2020-12-07 | End: 2020-12-07 | Stop reason: HOSPADM

## 2020-12-07 RX ORDER — ONDANSETRON 2 MG/ML
4 INJECTION INTRAMUSCULAR; INTRAVENOUS EVERY 4 HOURS PRN
Status: DISCONTINUED | OUTPATIENT
Start: 2020-12-07 | End: 2020-12-08 | Stop reason: HOSPADM

## 2020-12-07 RX ORDER — LIDOCAINE HYDROCHLORIDE 10 MG/ML
0.5 INJECTION, SOLUTION EPIDURAL; INFILTRATION; INTRACAUDAL; PERINEURAL ONCE AS NEEDED
Status: DISCONTINUED | OUTPATIENT
Start: 2020-12-07 | End: 2020-12-07 | Stop reason: HOSPADM

## 2020-12-07 RX ORDER — PROPOFOL 10 MG/ML
VIAL (ML) INTRAVENOUS AS NEEDED
Status: DISCONTINUED | OUTPATIENT
Start: 2020-12-07 | End: 2020-12-07 | Stop reason: SURG

## 2020-12-07 RX ORDER — NITROGLYCERIN 0.4 MG/1
0.4 TABLET SUBLINGUAL
Status: DISCONTINUED | OUTPATIENT
Start: 2020-12-07 | End: 2020-12-08 | Stop reason: HOSPADM

## 2020-12-07 RX ORDER — FLUMAZENIL 0.1 MG/ML
0.2 INJECTION INTRAVENOUS AS NEEDED
Status: DISCONTINUED | OUTPATIENT
Start: 2020-12-07 | End: 2020-12-07 | Stop reason: HOSPADM

## 2020-12-07 RX ORDER — LORAZEPAM 1 MG/1
1 TABLET ORAL EVERY 12 HOURS PRN
Status: DISCONTINUED | OUTPATIENT
Start: 2020-12-07 | End: 2020-12-08 | Stop reason: HOSPADM

## 2020-12-07 RX ORDER — FENTANYL CITRATE 50 UG/ML
50 INJECTION, SOLUTION INTRAMUSCULAR; INTRAVENOUS
Status: DISCONTINUED | OUTPATIENT
Start: 2020-12-07 | End: 2020-12-07 | Stop reason: HOSPADM

## 2020-12-07 RX ORDER — ACETAMINOPHEN 500 MG
1000 TABLET ORAL EVERY 6 HOURS
Status: DISCONTINUED | OUTPATIENT
Start: 2020-12-07 | End: 2020-12-08 | Stop reason: HOSPADM

## 2020-12-07 RX ORDER — SODIUM CHLORIDE 9 MG/ML
INJECTION, SOLUTION INTRAVENOUS AS NEEDED
Status: DISCONTINUED | OUTPATIENT
Start: 2020-12-07 | End: 2020-12-07 | Stop reason: HOSPADM

## 2020-12-07 RX ORDER — SODIUM CHLORIDE 0.9 % (FLUSH) 0.9 %
3 SYRINGE (ML) INJECTION EVERY 12 HOURS SCHEDULED
Status: DISCONTINUED | OUTPATIENT
Start: 2020-12-07 | End: 2020-12-08 | Stop reason: HOSPADM

## 2020-12-07 RX ORDER — SODIUM CHLORIDE 0.9 % (FLUSH) 0.9 %
3-10 SYRINGE (ML) INJECTION AS NEEDED
Status: DISCONTINUED | OUTPATIENT
Start: 2020-12-07 | End: 2020-12-07 | Stop reason: HOSPADM

## 2020-12-07 RX ORDER — ALBUTEROL SULFATE 2.5 MG/3ML
2.5 SOLUTION RESPIRATORY (INHALATION)
Status: DISCONTINUED | OUTPATIENT
Start: 2020-12-07 | End: 2020-12-07

## 2020-12-07 RX ORDER — ACETAMINOPHEN 160 MG/5ML
975 SOLUTION ORAL ONCE
Status: COMPLETED | OUTPATIENT
Start: 2020-12-07 | End: 2020-12-07

## 2020-12-07 RX ORDER — HYDROCODONE BITARTRATE AND ACETAMINOPHEN 7.5; 325 MG/1; MG/1
1 TABLET ORAL ONCE AS NEEDED
Status: DISCONTINUED | OUTPATIENT
Start: 2020-12-07 | End: 2020-12-07 | Stop reason: HOSPADM

## 2020-12-07 RX ORDER — MIDAZOLAM HYDROCHLORIDE 1 MG/ML
2 INJECTION INTRAMUSCULAR; INTRAVENOUS
Status: DISCONTINUED | OUTPATIENT
Start: 2020-12-07 | End: 2020-12-07 | Stop reason: HOSPADM

## 2020-12-07 RX ORDER — BUPIVACAINE HYDROCHLORIDE AND EPINEPHRINE 5; 5 MG/ML; UG/ML
INJECTION, SOLUTION PERINEURAL AS NEEDED
Status: DISCONTINUED | OUTPATIENT
Start: 2020-12-07 | End: 2020-12-07 | Stop reason: HOSPADM

## 2020-12-07 RX ORDER — CYANOCOBALAMIN 1000 UG/ML
1000 INJECTION, SOLUTION INTRAMUSCULAR; SUBCUTANEOUS ONCE
Status: COMPLETED | OUTPATIENT
Start: 2020-12-08 | End: 2020-12-08

## 2020-12-07 RX ORDER — LISINOPRIL 10 MG/1
10 TABLET ORAL DAILY
Status: DISCONTINUED | OUTPATIENT
Start: 2020-12-07 | End: 2020-12-08 | Stop reason: HOSPADM

## 2020-12-07 RX ORDER — EPHEDRINE SULFATE 50 MG/ML
5 INJECTION, SOLUTION INTRAVENOUS ONCE AS NEEDED
Status: DISCONTINUED | OUTPATIENT
Start: 2020-12-07 | End: 2020-12-07 | Stop reason: HOSPADM

## 2020-12-07 RX ORDER — NALOXONE HCL 0.4 MG/ML
0.1 VIAL (ML) INJECTION
Status: DISCONTINUED | OUTPATIENT
Start: 2020-12-07 | End: 2020-12-08 | Stop reason: HOSPADM

## 2020-12-07 RX ORDER — SODIUM CHLORIDE 0.9 % (FLUSH) 0.9 %
3 SYRINGE (ML) INJECTION EVERY 12 HOURS SCHEDULED
Status: DISCONTINUED | OUTPATIENT
Start: 2020-12-07 | End: 2020-12-07 | Stop reason: HOSPADM

## 2020-12-07 RX ORDER — ROPIVACAINE HYDROCHLORIDE 5 MG/ML
INJECTION, SOLUTION EPIDURAL; INFILTRATION; PERINEURAL
Status: COMPLETED | OUTPATIENT
Start: 2020-12-07 | End: 2020-12-07

## 2020-12-07 RX ORDER — DIPHENHYDRAMINE HYDROCHLORIDE 50 MG/ML
25 INJECTION INTRAMUSCULAR; INTRAVENOUS EVERY 4 HOURS PRN
Status: DISCONTINUED | OUTPATIENT
Start: 2020-12-07 | End: 2020-12-08 | Stop reason: HOSPADM

## 2020-12-07 RX ORDER — PROMETHAZINE HYDROCHLORIDE 25 MG/1
25 SUPPOSITORY RECTAL ONCE
Status: DISCONTINUED | OUTPATIENT
Start: 2020-12-07 | End: 2020-12-07 | Stop reason: HOSPADM

## 2020-12-07 RX ORDER — LIDOCAINE HYDROCHLORIDE 20 MG/ML
INJECTION, SOLUTION INFILTRATION; PERINEURAL AS NEEDED
Status: DISCONTINUED | OUTPATIENT
Start: 2020-12-07 | End: 2020-12-07 | Stop reason: SURG

## 2020-12-07 RX ORDER — CEFAZOLIN SODIUM IN 0.9 % NACL 3 G/100 ML
3 INTRAVENOUS SOLUTION, PIGGYBACK (ML) INTRAVENOUS
Status: COMPLETED | OUTPATIENT
Start: 2020-12-07 | End: 2020-12-07

## 2020-12-07 RX ORDER — HYDROMORPHONE HYDROCHLORIDE 2 MG/1
2 TABLET ORAL EVERY 4 HOURS PRN
Status: DISCONTINUED | OUTPATIENT
Start: 2020-12-07 | End: 2020-12-07

## 2020-12-07 RX ORDER — SCOLOPAMINE TRANSDERMAL SYSTEM 1 MG/1
1 PATCH, EXTENDED RELEASE TRANSDERMAL CONTINUOUS
Status: DISCONTINUED | OUTPATIENT
Start: 2020-12-07 | End: 2020-12-08 | Stop reason: HOSPADM

## 2020-12-07 RX ORDER — GABAPENTIN 300 MG/1
300 CAPSULE ORAL EVERY 8 HOURS SCHEDULED
Status: DISCONTINUED | OUTPATIENT
Start: 2020-12-07 | End: 2020-12-08 | Stop reason: HOSPADM

## 2020-12-07 RX ORDER — LORAZEPAM 2 MG/ML
1 INJECTION INTRAMUSCULAR EVERY 12 HOURS PRN
Status: DISCONTINUED | OUTPATIENT
Start: 2020-12-07 | End: 2020-12-08 | Stop reason: HOSPADM

## 2020-12-07 RX ADMIN — ACETAMINOPHEN 1000 MG: 500 TABLET, FILM COATED ORAL at 14:18

## 2020-12-07 RX ADMIN — ONDANSETRON HYDROCHLORIDE 4 MG: 2 SOLUTION INTRAMUSCULAR; INTRAVENOUS at 12:13

## 2020-12-07 RX ADMIN — CEFAZOLIN 3 G: 1 INJECTION, POWDER, FOR SOLUTION INTRAMUSCULAR; INTRAVENOUS; PARENTERAL at 11:15

## 2020-12-07 RX ADMIN — FENTANYL CITRATE 100 MCG: 50 INJECTION INTRAMUSCULAR; INTRAVENOUS at 11:11

## 2020-12-07 RX ADMIN — MIRTAZAPINE 15 MG: 15 TABLET, ORALLY DISINTEGRATING ORAL at 20:27

## 2020-12-07 RX ADMIN — GABAPENTIN 300 MG: 300 CAPSULE ORAL at 22:12

## 2020-12-07 RX ADMIN — MIDAZOLAM 1 MG: 1 INJECTION INTRAMUSCULAR; INTRAVENOUS at 09:11

## 2020-12-07 RX ADMIN — SCOPALAMINE 1 PATCH: 1 PATCH, EXTENDED RELEASE TRANSDERMAL at 08:30

## 2020-12-07 RX ADMIN — SODIUM CHLORIDE, PRESERVATIVE FREE 3 ML: 5 INJECTION INTRAVENOUS at 14:19

## 2020-12-07 RX ADMIN — LIDOCAINE HYDROCHLORIDE 100 MG: 20 INJECTION, SOLUTION INFILTRATION; PERINEURAL at 11:11

## 2020-12-07 RX ADMIN — FAMOTIDINE 20 MG: 10 INJECTION INTRAVENOUS at 20:26

## 2020-12-07 RX ADMIN — PROPOFOL 200 MG: 10 INJECTION, EMULSION INTRAVENOUS at 11:11

## 2020-12-07 RX ADMIN — SUGAMMADEX 280 MG: 100 INJECTION, SOLUTION INTRAVENOUS at 12:14

## 2020-12-07 RX ADMIN — ROCURONIUM BROMIDE 50 MG: 10 INJECTION, SOLUTION INTRAVENOUS at 11:11

## 2020-12-07 RX ADMIN — SODIUM CHLORIDE, POTASSIUM CHLORIDE, SODIUM LACTATE AND CALCIUM CHLORIDE: 600; 310; 30; 20 INJECTION, SOLUTION INTRAVENOUS at 11:03

## 2020-12-07 RX ADMIN — ACETAMINOPHEN ORAL SOLUTION 975 MG: 325 SOLUTION ORAL at 08:30

## 2020-12-07 RX ADMIN — ACETAMINOPHEN 1000 MG: 500 TABLET, FILM COATED ORAL at 20:26

## 2020-12-07 RX ADMIN — METOCLOPRAMIDE HYDROCHLORIDE 10 MG: 5 INJECTION INTRAMUSCULAR; INTRAVENOUS at 09:02

## 2020-12-07 RX ADMIN — PANTOPRAZOLE SODIUM 40 MG: 40 INJECTION, POWDER, FOR SOLUTION INTRAVENOUS at 09:02

## 2020-12-07 RX ADMIN — PROPOFOL 200 MCG/KG/MIN: 10 INJECTION, EMULSION INTRAVENOUS at 11:15

## 2020-12-07 RX ADMIN — ENOXAPARIN SODIUM 40 MG: 40 INJECTION SUBCUTANEOUS at 10:44

## 2020-12-07 RX ADMIN — ROPIVACAINE HYDROCHLORIDE 30 ML: 5 INJECTION, SOLUTION EPIDURAL; INFILTRATION; PERINEURAL at 11:27

## 2020-12-07 RX ADMIN — METOCLOPRAMIDE HYDROCHLORIDE 10 MG: 5 INJECTION INTRAMUSCULAR; INTRAVENOUS at 14:18

## 2020-12-07 RX ADMIN — CHLORHEXIDINE GLUCONATE 15 ML: 1.2 RINSE ORAL at 08:30

## 2020-12-07 RX ADMIN — SODIUM CHLORIDE, POTASSIUM CHLORIDE, SODIUM LACTATE AND CALCIUM CHLORIDE 500 ML: 600; 310; 30; 20 INJECTION, SOLUTION INTRAVENOUS at 09:02

## 2020-12-07 RX ADMIN — METOCLOPRAMIDE HYDROCHLORIDE 10 MG: 5 INJECTION INTRAMUSCULAR; INTRAVENOUS at 20:26

## 2020-12-07 RX ADMIN — SODIUM CHLORIDE, POTASSIUM CHLORIDE, SODIUM LACTATE AND CALCIUM CHLORIDE 150 ML/HR: 600; 310; 30; 20 INJECTION, SOLUTION INTRAVENOUS at 13:57

## 2020-12-07 RX ADMIN — SODIUM CHLORIDE, POTASSIUM CHLORIDE, SODIUM LACTATE AND CALCIUM CHLORIDE 150 ML/HR: 600; 310; 30; 20 INJECTION, SOLUTION INTRAVENOUS at 22:12

## 2020-12-07 RX ADMIN — SODIUM CHLORIDE, PRESERVATIVE FREE 3 ML: 5 INJECTION INTRAVENOUS at 20:29

## 2020-12-07 RX ADMIN — GABAPENTIN 300 MG: 300 CAPSULE ORAL at 14:18

## 2020-12-07 NOTE — ANESTHESIA PROCEDURE NOTES
Peripheral Block      Patient reassessed immediately prior to procedure    Patient location during procedure: OR  Start time: 12/7/2020 11:15 AM  Stop time: 12/7/2020 11:20 AM  Reason for block: at surgeon's request and post-op pain management  Performed by  Anesthesiologist: Juan J Rubio MD  Preanesthetic Checklist  Completed: patient identified, site marked, surgical consent, pre-op evaluation, timeout performed, IV checked, risks and benefits discussed and monitors and equipment checked  Prep:  Pt Position: supine  Sterile barriers:cap, gloves and mask  Prep: ChloraPrep  Patient monitoring: blood pressure monitoring, continuous pulse oximetry and EKG  Procedure  Performed under: PNB  Guidance:ultrasound guided  ULTRASOUND INTERPRETATION. Using ultrasound guidance a 21 G gauge needle was placed in close proximity to the nerve, at which point, under ultrasound guidance anesthetic was injected in the area of the nerve and spread of the anesthesia was seen on ultrasound in close proximity thereto.  There were no abnormalities seen on ultrasound; a digital image was taken; and the patient tolerated the procedure with no complications. Images:still images obtained, printed/placed on chart    Laterality:Bilateral  Block Type:TAP  Injection Technique:single-shot  Needle Type:echogenic  Resistance on Injection: less than 15 psi    Medications Used: ropivacaine (NAROPIN) 0.5 % injection, 30 mL      Medications  Comment:30cc 0.25% Ropivacaine injected bilaterally    Post Assessment  Injection Assessment: negative aspiration for heme, no paresthesia on injection and incremental injection  Patient Tolerance:comfortable throughout block  Complications:no

## 2020-12-07 NOTE — ANESTHESIA PROCEDURE NOTES
Airway  Urgency: elective    Date/Time: 12/7/2020 11:14 AM  Airway not difficult    General Information and Staff    Patient location during procedure: OR  Anesthesiologist: Juan J Rubio MD  CRNA: Rosy Be CRNA    Indications and Patient Condition  Indications for airway management: airway protection    Preoxygenated: yes  Mask difficulty assessment: 0 - not attempted    Final Airway Details  Final airway type: endotracheal airway      Successful airway: ETT  Cuffed: yes   Successful intubation technique: direct laryngoscopy  Facilitating devices/methods: intubating stylet and cricoid pressure  Endotracheal tube insertion site: oral  Blade: Hill  Blade size: 2  ETT size (mm): 7.0  Cormack-Lehane Classification: grade IIa - partial view of glottis  Placement verified by: chest auscultation and capnometry   Measured from: lips  ETT/EBT  to lips (cm): 21  Number of attempts at approach: 1  Assessment: lips, teeth, and gum same as pre-op and atraumatic intubation    Additional Comments  Atraumatic, MOP to cuff, BSBE, no change to dentition, secured with tape

## 2020-12-07 NOTE — PLAN OF CARE
Problem: Adult Inpatient Plan of Care  Goal: Plan of Care Review  Outcome: Ongoing, Progressing   Goal Outcome Evaluation:  Plan of Care Reviewed With: patient      Vss. New admit, tolerating sips of powerade and ice chips. Pt ambulated in santana, tolerated well

## 2020-12-07 NOTE — OP NOTE
PREOPERATIVE DIAGNOSIS:  Morbid obesity with multiple comorbidities as referenced in the most recent history and physical.    POSTOPERATIVE DIAGNOSIS:  Morbid obesity with multiple comorbidities as referenced in the most recent history and physical.    PROCEDURES PERFORMED:  1.  Laparoscopic sleeve gastrectomy.  2.  Tisseel application.     SURGEON:  Fidel Grajeda Jr., MD    ASSISTANT: Suzie Montelongo MD        Surgery assisted and facilitated by a certified physician assistant, who directly resulted in a decreased operative time, anesthetic time, wound exposure, and possibly of an operative wound infection, thereby decreasing patient morbidity and ultimately total expenditures.  The surgical assistant assisted in placement of trochars, take down of the gastrocolic omentum, short gastric vessels and dissection at the angle of His.  Also assisted in retraction of the stomach during stapling so as not to kink the gastric sleeve.  Also assisted in removing of the gastric specimen, closure of the fascial defect as well as closure of the skin incisions.    ANESTHESIA:  General endotracheal and TAP block    ESTIMATED BLOOD LOSS:   Less than 25 mL unless dictated below.    FLUIDS:  Crystalloids.    SPECIMENS:  Gastric remnant    DRAINS:  None.    COUNTS:  Correct.    COMPLICATIONS:  None.    INDICATIONS:  This patient with morbid obesity and associated comorbidities presents for elective laparoscopic, possible open sleeve gastrectomy.  The patient has received medical clearance to proceed.  The patient has undergone our extensive educational process and consent process and wishes to proceed.    DESCRIPTION OF PROCEDURE:  The patient was brought to the operating room and placed supine upon the operating room table. SCD hose were placed.  The patient underwent uneventful general endotracheal anesthesia per the anesthesiology staff. The abdomen was prepped with ChloraPrep and draped in the usual sterile fashion.  An Ioban was  used as well if not allergic.  Anesthesia staff then passed a 18-Panamanian gastric tube into the stomach to decompress.  A 5-10 mm transverse incision was made a few centimeters above and to the left of the umbilicus and the peritoneal cavity entered under direct camera visualization using a 5 or 10 mm 0° laparoscope and an Optiview trocar.  The abdomen was then insufflated to a pressure of 15-16 mmHg with CO2 gas.  Exploratory laparoscopy revealed no evidence of injury from the entrance technique and no significant abnormalities unless addendum dictated below.  An angled laparoscope was then used.  The patient was placed in reverse Trendelenburg position.  Under direct camera visualization a 5 mm trocar was placed in the right lateral subcostal position.  A 12 mm trocar was placed in the right midabdominal position.  A 5-12 mm trocar was placed in the left midabdominal position. A Grisel retractor was placed through an epigastric incision and used to elevate the left lobe of the liver.  The fat pad was elevated and the left kajal exposed.  At this point, approximately retirement along the greater curvature, the gastrocolic omentum was divided with the Enseal and this proceeded superiorly to the angle of His taking down the short gastric vessels.  All posterior attachments of the lesser sac and posterior aspect of the stomach to the pancreas were taken down as well.  The left kajal was exposed along its length.  Dissection then proceeded medially taking down the greater curvature with an Enseal until just proximal to the pylorus. The standard clamp and 18 [place]amanian orogastric tube were used to size the gastric sleeve. The stomach was marked in the 3 positions using the indelible ink pen.  The 3 markings were at the angle of Hiss, the incisura and antrum using 1cm, 3cm and 5cm respectively. Green cartridges were used for a total of 4-5. The 1st load on the [place] Flex stapler with Veritas Fanny-Strip and this was placed 5 cm  proximal to the pylorus.  The next several loads placed with absorbable Veritas Fanny-Strips depending on the size of the stomach. Careful attention was made to stay 1 cm from the esophagus. Areas of the reinforced staple line were oversewn with absorbable sutures as needed for bleeding or questionable staple lines.  At this point, the gastrectomy specimen was withdrawn through the 12 mm trocar site incision. The specimen staple line was inspected and was intact.  The specimen was then sent off to pathology.  At this point, the sleeve was submerged under saline and using the orogastric tube to insufflate the stomach, a leak test was performed.  This revealed the sleeve to be watertight, no air bubbles, no leak, and no bleeding seen from the staple lines and no significant abnormalities.  Irrigation fluid from the abdomen was then suctioned.  The gastric sleeve staple line was then treated with Tisseel fibrin glue. The fascia at the 12 mm trocar site incision was closed with a single 0 Vicryl suture in a figure-of-eight fashion placed under direct laparoscopic camera visualization with a suture passer and tying the knot extracorporeally.  The fascia in the area was infiltrated with local anesthesia. All incisions were then infiltrated with local anesthetic. The remaining trocars were removed under direct camera visualization with no bleeding noted from their sites.  The abdomen was desufflated of gas. The skin in each incision was closed using 4-0 antibiotic impregnated Monocryl in a subcuticular fashion followed by Dermabond.  The patient tolerated the procedure well without complication and was taken to the recovery room in stable condition.  All sponge, needle and instrument counts were correct.     The hiatus was checked for a hernia and no hernia was detected.

## 2020-12-07 NOTE — ANESTHESIA PREPROCEDURE EVALUATION
Anesthesia Evaluation     Patient summary reviewed and Nursing notes reviewed   NPO Solid Status: > 8 hours             Airway   Mallampati: III  TM distance: >3 FB  Neck ROM: full  Possible difficult intubation  Dental - normal exam     Pulmonary - normal exam   (+) a smoker Former,   Cardiovascular - normal exam    (+) hypertension 2 medications or greater, hyperlipidemia,       Neuro/Psych  (+) headaches, psychiatric history Anxiety and Depression,       ROS Comment: Hx panic attacks  GI/Hepatic/Renal/Endo    (+) obesity, morbid obesity, GERD,  renal disease stones, diabetes mellitus type 2 using insulin,     ROS Comment: Gastroparesis    Musculoskeletal         ROS comment: Herniated lumbar disc  Abdominal   (+) obese,    Substance History      OB/GYN          Other                          Anesthesia Plan    ASA 3     general   (Tap popc)  intravenous induction     Anesthetic plan, all risks, benefits, and alternatives have been provided, discussed and informed consent has been obtained with: patient.    Plan discussed with CRNA.

## 2020-12-07 NOTE — ANESTHESIA POSTPROCEDURE EVALUATION
Patient: Bianca Boles    Procedure Summary     Date: 12/07/20 Room / Location:  [facility]  /  [facility]    Anesthesia Start: 1103 Anesthesia Stop: 1229    Procedure: GASTRIC SLEEVE LAPAROSCOPIC (N/A Abdomen) Diagnosis:       Obesity, Class III, BMI 40-49.9 (morbid obesity) (CMS/HCC)      (Obesity, Class III, BMI 40-49.9 (morbid obesity) (CMS/[facility]) [E66.01])    Surgeon: Fidel Grajeda Jr., MD Provider: Juan J Rubio MD    Anesthesia Type: general ASA Status: 3          Anesthesia Type: general    Vitals  Vitals Value Taken Time   /63 12/07/20 1330   Temp 36.4 °C (97.5 °F) 12/07/20 1227   Pulse 66 12/07/20 1332   Resp 18 12/07/20 1315   SpO2 94 % 12/07/20 1333   Vitals shown include unvalidated device data.        Post Anesthesia Care and Evaluation    Patient location during evaluation: bedside  Patient participation: complete - patient participated  Level of consciousness: awake and alert  Pain management: adequate  Airway patency: patent  Anesthetic complications: No anesthetic complications  PONV Status: none  Cardiovascular status: acceptable and hemodynamically stable  Respiratory status: acceptable and spontaneous ventilation  Hydration status: acceptable    Comments: /59   Pulse 67   Temp 36.4 °C (97.5 °F) (Oral)   Resp 18   LMP 12/01/2020   SpO2 98%

## 2020-12-07 NOTE — PERIOPERATIVE NURSING NOTE
Ambulated from slot # 43 to door heading out of pacu and back ( by the huddle board) tolerated walk, utilizing recliner. Afterambulation pain 6.5 tolerable per pt

## 2020-12-08 VITALS
HEIGHT: 67 IN | TEMPERATURE: 98.7 F | DIASTOLIC BLOOD PRESSURE: 81 MMHG | BODY MASS INDEX: 45.99 KG/M2 | WEIGHT: 293 LBS | HEART RATE: 98 BPM | OXYGEN SATURATION: 94 % | RESPIRATION RATE: 18 BRPM | SYSTOLIC BLOOD PRESSURE: 153 MMHG

## 2020-12-08 LAB
ALBUMIN SERPL-MCNC: 3.6 G/DL (ref 3.5–5.2)
ALBUMIN/GLOB SERPL: 1.2 G/DL
ALP SERPL-CCNC: 78 U/L (ref 39–117)
ALT SERPL W P-5'-P-CCNC: 31 U/L (ref 1–33)
ANION GAP SERPL CALCULATED.3IONS-SCNC: 7.8 MMOL/L (ref 5–15)
AST SERPL-CCNC: 30 U/L (ref 1–32)
BASOPHILS # BLD AUTO: 0.03 10*3/MM3 (ref 0–0.2)
BASOPHILS NFR BLD AUTO: 0.3 % (ref 0–1.5)
BILIRUB SERPL-MCNC: 0.2 MG/DL (ref 0–1.2)
BUN SERPL-MCNC: 11 MG/DL (ref 6–20)
BUN/CREAT SERPL: 16.4 (ref 7–25)
CALCIUM SPEC-SCNC: 8.9 MG/DL (ref 8.6–10.5)
CHLORIDE SERPL-SCNC: 100 MMOL/L (ref 98–107)
CO2 SERPL-SCNC: 25.2 MMOL/L (ref 22–29)
CONV LAST MENSTURAL PERIOD: NORMAL
CREAT SERPL-MCNC: 0.67 MG/DL (ref 0.57–1)
DEPRECATED RDW RBC AUTO: 40.4 FL (ref 37–54)
EOSINOPHIL # BLD AUTO: 0.03 10*3/MM3 (ref 0–0.4)
EOSINOPHIL NFR BLD AUTO: 0.3 % (ref 0.3–6.2)
ERYTHROCYTE [DISTWIDTH] IN BLOOD BY AUTOMATED COUNT: 14.8 % (ref 12.3–15.4)
GFR SERPL CREATININE-BSD FRML MDRD: 97 ML/MIN/1.73
GLOBULIN UR ELPH-MCNC: 2.9 GM/DL
GLUCOSE BLDC GLUCOMTR-MCNC: 93 MG/DL (ref 70–130)
GLUCOSE SERPL-MCNC: 93 MG/DL (ref 65–99)
HCT VFR BLD AUTO: 33.4 % (ref 34–46.6)
HGB BLD-MCNC: 11.1 G/DL (ref 12–15.9)
IMM GRANULOCYTES # BLD AUTO: 0.1 10*3/MM3 (ref 0–0.05)
IMM GRANULOCYTES NFR BLD AUTO: 1 % (ref 0–0.5)
LYMPHOCYTES # BLD AUTO: 2.09 10*3/MM3 (ref 0.7–3.1)
LYMPHOCYTES NFR BLD AUTO: 20 % (ref 19.6–45.3)
MAGNESIUM SERPL-MCNC: 1.7 MG/DL (ref 1.6–2.6)
MCH RBC QN AUTO: 25.7 PG (ref 26.6–33)
MCHC RBC AUTO-ENTMCNC: 33.2 G/DL (ref 31.5–35.7)
MCV RBC AUTO: 77.3 FL (ref 79–97)
MONOCYTES # BLD AUTO: 0.64 10*3/MM3 (ref 0.1–0.9)
MONOCYTES NFR BLD AUTO: 6.1 % (ref 5–12)
NEUTROPHILS NFR BLD AUTO: 7.57 10*3/MM3 (ref 1.7–7)
NEUTROPHILS NFR BLD AUTO: 72.3 % (ref 42.7–76)
NRBC BLD AUTO-RTO: 0 /100 WBC (ref 0–0.2)
PHOSPHATE SERPL-MCNC: 4 MG/DL (ref 2.5–4.5)
PLATELET # BLD AUTO: 245 10*3/MM3 (ref 140–450)
PMV BLD AUTO: 9.8 FL (ref 6–12)
POTASSIUM SERPL-SCNC: 3.8 MMOL/L (ref 3.5–5.2)
PROT SERPL-MCNC: 6.5 G/DL (ref 6–8.5)
RBC # BLD AUTO: 4.32 10*6/MM3 (ref 3.77–5.28)
SODIUM SERPL-SCNC: 133 MMOL/L (ref 136–145)
SPECIMEN SOURCE: NORMAL
SPECIMEN SOURCE: NORMAL
THIN PREP CVX: NORMAL
WBC # BLD AUTO: 10.46 10*3/MM3 (ref 3.4–10.8)

## 2020-12-08 PROCEDURE — 80053 COMPREHEN METABOLIC PANEL: CPT | Performed by: SURGERY

## 2020-12-08 PROCEDURE — 82962 GLUCOSE BLOOD TEST: CPT

## 2020-12-08 PROCEDURE — 84100 ASSAY OF PHOSPHORUS: CPT | Performed by: SURGERY

## 2020-12-08 PROCEDURE — 25010000002 METOCLOPRAMIDE PER 10 MG: Performed by: SURGERY

## 2020-12-08 PROCEDURE — 25010000002 THIAMINE PER 100 MG: Performed by: SURGERY

## 2020-12-08 PROCEDURE — 25010000002 CYANOCOBALAMIN PER 1000 MCG: Performed by: SURGERY

## 2020-12-08 PROCEDURE — 85025 COMPLETE CBC W/AUTO DIFF WBC: CPT | Performed by: SURGERY

## 2020-12-08 PROCEDURE — 83735 ASSAY OF MAGNESIUM: CPT | Performed by: SURGERY

## 2020-12-08 PROCEDURE — 25010000002 ENOXAPARIN PER 10 MG: Performed by: SURGERY

## 2020-12-08 RX ORDER — ONDANSETRON 4 MG/1
4 TABLET, FILM COATED ORAL EVERY 6 HOURS PRN
Qty: 10 TABLET | Refills: 0 | Status: SHIPPED | OUTPATIENT
Start: 2020-12-08 | End: 2021-01-12

## 2020-12-08 RX ORDER — HYDROMORPHONE HYDROCHLORIDE 2 MG/1
2 TABLET ORAL EVERY 4 HOURS PRN
Qty: 18 TABLET | Refills: 0 | Status: SHIPPED | OUTPATIENT
Start: 2020-12-08 | End: 2020-12-10

## 2020-12-08 RX ADMIN — LISINOPRIL 10 MG: 10 TABLET ORAL at 08:56

## 2020-12-08 RX ADMIN — SODIUM CHLORIDE, PRESERVATIVE FREE 3 ML: 5 INJECTION INTRAVENOUS at 08:56

## 2020-12-08 RX ADMIN — ACETAMINOPHEN 1000 MG: 500 TABLET, FILM COATED ORAL at 08:56

## 2020-12-08 RX ADMIN — ACETAMINOPHEN 1000 MG: 500 TABLET, FILM COATED ORAL at 02:21

## 2020-12-08 RX ADMIN — CYANOCOBALAMIN 1000 MCG: 1000 INJECTION INTRAMUSCULAR; SUBCUTANEOUS at 08:55

## 2020-12-08 RX ADMIN — METOCLOPRAMIDE HYDROCHLORIDE 10 MG: 5 INJECTION INTRAMUSCULAR; INTRAVENOUS at 02:28

## 2020-12-08 RX ADMIN — THIAMINE HYDROCHLORIDE 250 ML/HR: 100 INJECTION, SOLUTION INTRAMUSCULAR; INTRAVENOUS at 00:34

## 2020-12-08 RX ADMIN — FAMOTIDINE 20 MG: 10 INJECTION INTRAVENOUS at 08:55

## 2020-12-08 RX ADMIN — METOCLOPRAMIDE HYDROCHLORIDE 10 MG: 5 INJECTION INTRAMUSCULAR; INTRAVENOUS at 08:55

## 2020-12-08 RX ADMIN — GABAPENTIN 300 MG: 300 CAPSULE ORAL at 06:06

## 2020-12-08 RX ADMIN — SODIUM CHLORIDE, POTASSIUM CHLORIDE, SODIUM LACTATE AND CALCIUM CHLORIDE 150 ML/HR: 600; 310; 30; 20 INJECTION, SOLUTION INTRAVENOUS at 07:46

## 2020-12-08 RX ADMIN — ENOXAPARIN SODIUM 40 MG: 40 INJECTION SUBCUTANEOUS at 08:55

## 2020-12-08 NOTE — DISCHARGE INSTRUCTIONS
GOING HOME AFTER GASTRIC SLEEVE/ GASTRIC BYPASS SURGERY  [facility] Weight Loss: Post-Operative Information/Instructions  Fidel Grajeda Jr., MD  General Patient Instructions for Discharge   - Call Surgeon's office at [phone] for follow-up appointment.    - Be sure you, the patient, have a follow-up appointment to be seen within seven (7) days after discharge. If not, please call [phone] to schedule an appointment. If you are discharged on a Saturday or Sunday, please call Monday to schedule the appointment.  - Contact the Surgeon at [phone] for any questions or concerns, including temperature greater than or equal to 101F, shortness of breath, leg swelling, redness at incision sites, nausea, vomiting, chills, or problems or questions.    - Follow the Gastric Stage 1 Diet    à Clear liquids, room temperature, sugar-free, caffeine-free, non-carbonated, 70 grams of protein, No Straws.  - You may shower. No tub bath for 2 weeks.  - No lifting, pushing, pulling, or tugging >25 pounds for 3 weeks.  - Ambulate every 3 hours while awake minimum for seven (7) days, increase distance daily.  - For the next several weeks, you are at an increased risk for blood clot formation. Therefore, you should walk regularly. You should not sit for prolonged periods of time, more than 45 minutes, without getting up and walking for 5-10 minutes. This includes any car rides, including the drive home from the hospital. If driving any distance greater than 30 miles over the next two (2) weeks, stop every 30-45 minutes and walk for 5-10 minutes each time.  - Continue using Incentive Spirometer and coughing exercises at least every two (2) hours while awake for one week.  - Continue use of CPAP/BIPAP for diagnosis of sleep apnea as directed.  - No driving or operating machinery allowed while taking narcotic (prescription) pain medication, and until you feel comfortable forcefully applying the brakes if needed. (This  usually takes more than 3 days.)    - Make an appointment with your Primary Care Physician within one week post-op to look at your home medications for possible changes or discontinuity.   Medications  - The nurse will provide a list of medications for you to continue at home   - If you received a Lovenox (Enoxaparin) or Apixiban (Eliquis) prescription at pre-op visit with Surgeon, start taking the medicine the morning after discharge unless directed otherwise.    - If you were prescribed Lovenox (Enoxaparin), review the education/teaching material/video with the nurse.    - Take post op pain meds as prescribed as needed.   - Continue Foltx until finished.   - Resume use of Actigall (Ursodiol) one (1) week after surgery if patient still has gallbladder. You should have been given a prescription at your pre-op visit. Contact the office if you do not have the prescription.   - Resume bariatric vitamin regimen as instructed in pre-op education with bariatric coordinator.    - Zegerid or Prilosec OTC (or generic) by mouth once daily for four (4) weeks unless you are already taking a proton pump inhibitor as home medication. Follow dosing instructions on package.   Nausea/Vomiting:  The following are possible causes for nausea/vomiting:  - Drinking too much or too fast.  - Sinus drainage/post nasal drip for allergy sufferers (you may take Sudafed, Claritin, Tylenol Sinus/Allergy, or other decongestants and nose sprays to help with this discomfort).  - Low blood sugar (sweating, shaky, irritable, weakness, dizzy or tunnel-vision) - treatment is to sip 100% fruit juice - no sugar added until symptoms subside.  - Acid in fruit juice - (may dilute with water or avoid).  - Eating or drinking something that is not on clear liquid (stage 1) diet.  Any nausea/vomiting that prohibits you from keeping fluids down for greater than 24 hours requires a call to the surgeon's office.  Urine:  Use your urine color as a guide to  determine if you are drinking enough fluid. The darker the urine, the more fluids you need to drink. Urine should be clear to light yellow if you are getting enough fluid. If you should experience frequency, burning or pain with urination, blood in urine, contact us or your primary care physician for possible UTI (urinary tract infection), which could require antibiotics (liquid preferred).  Bowel Movements:  You may not have a bowel movement for 2-5 days after going home. You may then experience liquid, runny or loose stools for approximately 3-4 weeks following surgery. This would require you to drink even more fluids to prevent dehydration. Some patients may experience constipation, which can be treated with increased fluids, drinking warm liquids, increased activity and the use of a Fleets Enema, Milk of Magnesia, or suppositories. The first couple of bowel movements could be bloody, tarry black or dark maroon in color. This is OK as long as the stool returns to a normal color in 1-2 days. If however, you have frequent or a large amount of bloody or tarry black stools and/or become light-headed or dizzy, you may be bleeding and require urgent attention. Please call us right away.  Abdominal Incisions:  You will have small incisions. Do not scrub incisions, but allow the warm, soapy water to run over the incisions, rinse well, and pat dry. You may use any brand of anti-bacterial soap. Do not use Peroxide or Neosporin type ointments on sites, unless instructed to do so by a surgeon or nurse. Monitor daily for signs/symptoms of infection, which might include: drainage with a foul odor, pain, redness, swelling or heat at the incision sight; fever, body aches and chills. If you suspect infection or have a fever, give us a call.  Pain:  You will be given a prescription for pain medication to control your pain. If you feel the dose is too strong, you may take half the ordered dose, or you may take Tylenol adult liquid  per package instructions for minor pain. Do not take any medications that contain aspirin or aspirin products.  Do not take medications like: Motrin, Aleve, Ibuprofen, Advil, Naproxen, Celebrex, Daypro, Bextra, Meloxicam or other medications commonly used for arthritis or joint pain.  No steroids or cortisone injections. There may be pain, which should improve every few days. Pain should not suddenly get worse or more intense. Pain that suddenly changes and is constant and severe should be called in to the surgeon's office. Any sudden pain in the lower extremities with associated warmth and redness should be called in to the surgeon's office immediately. Do not rub or massage this area, as it could be a blood clot.  Diet:  Remain on the clear liquid diet (stage 1) per your  which includes 70 grams of protein each day, sugar free, non carbonated and no straws. Day 1 is the day of surgery. If you are tolerating the stage 1 diet, you may then proceed to stage 2 diet, as instructed in the . Do not progress to the stage 2 diet if you are having nausea/vomiting. Refer to the Basic Nutrition and Food Principles guide.  Medications:  The nurse will let you know which medications you will need to continue once you go home. Do not take any medications that are extended or time released if you had the gastric bypass procedure, OK to take if you had the gastric sleeve procedure. Large capsules can be opened and diluted with clear liquids. Check with your physician or pharmacist as to which pills may be crushed and which capsules may be opened and diluted safely. Continue taking Foltx as surgeon orders. If you still have your gall bladder and were prescribed Actigall (Ursodiol), you may resume this medication one week after your surgery. You will remain on Actigall (Ursodiol) for approximately 6 months. The dose is 1 pill, 2 times each day for 6 months.  Activity:  Continue your deep breathing and  coughing exercises with your Incentive Spirometer breathing device at least every 3 hours while awake (10 repetitions each time) for one week. May use CPAP. This will help to prevent respiratory problems such as pneumonia. No lifting, pulling or tugging anything over 25 pounds for 3 weeks after surgery. You may shower but no tub baths, hot tubs or swimming for 2 weeks. Moderate walking is recommended every 3 hours while awake minimum, increase distance daily. Further exercise will be discussed at the first post-op visit. No driving or operating machinery allow until off narcotic pain medication and until you feel comfortable forcefully applying the brakes (usually takes 3 or more days). For the next few weeks you are at an increased risk for blood clot formation. Therefore you should walk regularly and you should not sit for prolonged periods of time, more than 45 minutes without getting up and walking for 5-10 minutes. This includes car rides. Including riding home from the hospital. If riding a distance greater than 30 miles over the next 2 weeks stop every 30-45 minutes and walk 5-10 mintues each time. No tanning bed use for 8 weeks after surgery and in general, not recommended due to the increased risk for skin cancer. Incisions will burn/blister very badly with tanning bed use.  Illness:  Your primary care physician should treat general illness such as ear infections, sinus infections, and viral type illnesses, etc. Medications prescribed should be liquid/elixir form when possible, for the first 30 days.  General:  In general, it is recommended that you weigh yourself no more than once per week. Let the weight come off you and concentrate on more important things. Remember the weight was not gained overnight, nor will it be lost overnight. Gastric Bypass/ Gastric Sleeve weight loss will continue over a period of 12-18 months. Do not  yourself according to how others are doing after surgery, as this will  cause unnecessary discouragement.  THE ABOVE ARE GENERAL GUIDELINES TO ASSIST YOU ONCE HOME, IF YOU ARE IN DOUBT, OR YOU HAVE ANY QUESTIONS, CALL US AT THE NUMBERS LISTED BELOW.  IN THE EVENT OF SUDDEN CHEST PAIN, SHORTNESS OF BREATH, OR ANY LIFE THREATENING CONDITION, CALL 911.  Any time you are evaluated or admitted to another facility, please have someone notify the surgeon's office.  Supplements:  70 grams of protein taken EVERY DAY. Remember to drink at least 64 ounces of fluid a day, sipping slowly early on. Increase this amount during the summertime. Sipping slowly will not stretch your new stomach. Drinking too fast or gulping liquids will cause brief discomfort and early could cause staple line disruption (leak). With eating, tiny bites, then chew, chew, chew, and swallow. Lay your fork/spoon down for 2-3 minutes, and then take your next bite. Your pouch will tell you within 1-2 bites if it is going to tolerate what you are eating.   Protein Vendors:  Refer to protein vendors' handout from consult class. You can always find protein drinks at the bariatric office, grocery stores, [employer], drug [employer], [employer], health food stores, and on the Internet. Find one high in protein (15-30 grams per serving) and low carb (less than 18 grams per serving).  Now is a great time to re-read your . Please review specific instructions given to you at discharge by your physician (surgeon).  HOW/WHEN TO CONTACT US:  It is imperative that you contact us with any of the following:    Ÿ fever greater than 101 degrees  Ÿ shortness of breath  Ÿ leg swelling  Ÿ body aches  Ÿ shaking chills  Ÿ nausea and vomitting  Ÿ pain that has worsened  Ÿ redness at incision sites  Ÿ pus or foul smelling drainage from an incision or wound  Ÿ inability to keep fluids down for more than a day  Ÿ any other condition you feel needs our attention.  [facility] - Bariatric: [phone] call this number anytime 24 hours a  day / 7 days a week.  Teach-back Questions to be answered by the patient prior to discharge.   What complications would prompt you to call your doctor when you return home? _________________    What is the purpose of your prescribed medication? ________________  What are some potential side effects of the medications you will be taking at home? _______________

## 2020-12-08 NOTE — PLAN OF CARE
"Goal Outcome Evaluation:  Plan of Care Reviewed With: patient  Progress: improving   No c/o pain, just a little \"sore\". Patient tolerating PO diet and medications. Ambulating well in hallway. Patient discharged home.  "

## 2020-12-08 NOTE — PLAN OF CARE
Problem: Adult Inpatient Plan of Care  Goal: Plan of Care Review  Outcome: Ongoing, Progressing  Flowsheets (Taken 12/8/2020 0518)  Progress: improving  Plan of Care Reviewed With: patient  Outcome Summary: VSS, tolerating sips, +ambulation in hallway, +voiding, will continue to monitor

## 2020-12-08 NOTE — DISCHARGE SUMMARY
"Discharge Summary    Patient name: Bianca Boles    Medical record number: [id number]    Admission date: 12/7/2020  Discharge date:      Attending physician: Dr. Fidel Grajeda    Primary care physician: Patricia Hernandez APRN    Referring physician: Fidel Grajeda Jr., MD  [address] [address]    Condition on discharge: Stable    Primary Diagnoses:  Morbid obesity with co-morbidities    Operative Procedure:  Laparoscopic gastric sleeve     Bianca Boles  is post op day one status post procedure listed. Patient denies shortness of air and lower extremity pain. Feels better than yesterday. No vomiting this am. Ambulating well and using incentive spirometer.          /81 (BP Location: Right arm, Patient Position: Sitting)   Pulse 98   Temp 98.7 °F (37.1 °C) (Oral)   Resp 18   Ht 170.2 cm (67\")   Wt 135 kg (298 lb)   LMP 12/01/2020   SpO2 94%   BMI 46.67 kg/m²     General:  alert, appears stated age and cooperative   Abdomen: soft, bowel sounds active, appropriate tenderness   Incision:   healing well, no drainage, no erythema, no hernia, no seroma, no swelling, no dehiscence, incision well approximated   Heart: Regular rate   Lungs: Clear to auscultation bilaterally     I reviewed the patient's new clinical results.     Lab Results (last 24 hours)     Procedure Component Value Units Date/Time    POC Glucose Once [[id number]]  (Normal) Collected: 12/08/20 0616    Specimen: Blood Updated: 12/08/20 0618     Glucose 93 mg/dL     Comprehensive Metabolic Panel [[id number]]  (Abnormal) Collected: 12/08/20 0417    Specimen: Blood Updated: 12/08/20 0549     Glucose 93 mg/dL      BUN 11 mg/dL      Creatinine 0.67 mg/dL      Sodium 133 mmol/L      Potassium 3.8 mmol/L      Chloride 100 mmol/L      CO2 25.2 mmol/L      Calcium 8.9 mg/dL      Total Protein 6.5 g/dL      Albumin 3.60 g/dL      ALT (SGPT) 31 U/L      AST (SGOT) 30 U/L      Alkaline Phosphatase 78 U/L      Total " Bilirubin 0.2 mg/dL      eGFR Non African Amer 97 mL/min/1.73      Globulin 2.9 gm/dL      A/G Ratio 1.2 g/dL      BUN/Creatinine Ratio 16.4     Anion Gap 7.8 mmol/L     Narrative:      GFR Normal >60  Chronic Kidney Disease <60  Kidney Failure <15      Phosphorus [[id number]]  (Normal) Collected: 12/08/20 0417    Specimen: Blood Updated: 12/08/20 0549     Phosphorus 4.0 mg/dL     Magnesium [[id number]]  (Normal) Collected: 12/08/20 0417    Specimen: Blood Updated: 12/08/20 0549     Magnesium 1.7 mg/dL     CBC & Differential [[id number]]  (Abnormal) Collected: 12/08/20 0417    Specimen: Blood Updated: 12/08/20 0515    Narrative:      The following orders were created for panel order CBC & Differential.  Procedure                               Abnormality         Status                     ---------                               -----------         ------                     CBC Auto Differential[[id number]]        Abnormal            Final result                 Please view results for these tests on the individual orders.    CBC Auto Differential [[id number]]  (Abnormal) Collected: 12/08/20 0417    Specimen: Blood Updated: 12/08/20 0515     WBC 10.46 10*3/mm3      RBC 4.32 10*6/mm3      Hemoglobin 11.1 g/dL      Hematocrit 33.4 %      MCV 77.3 fL      MCH 25.7 pg      MCHC 33.2 g/dL      RDW 14.8 %      RDW-SD 40.4 fl      MPV 9.8 fL      Platelets 245 10*3/mm3      Neutrophil % 72.3 %      Lymphocyte % 20.0 %      Monocyte % 6.1 %      Eosinophil % 0.3 %      Basophil % 0.3 %      Immature Grans % 1.0 %      Neutrophils, Absolute 7.57 10*3/mm3      Lymphocytes, Absolute 2.09 10*3/mm3      Monocytes, Absolute 0.64 10*3/mm3      Eosinophils, Absolute 0.03 10*3/mm3      Basophils, Absolute 0.03 10*3/mm3      Immature Grans, Absolute 0.10 10*3/mm3      nRBC 0.0 /100 WBC     POC Glucose Once [[id number]]  (Normal) Collected: 12/07/20 2124    Specimen: Blood Updated: 12/07/20 2125     Glucose 109 mg/dL     POC Glucose Once  [[id number]]  (Normal) Collected: 12/07/20 2026    Specimen: Blood Updated: 12/07/20 2027     Glucose 112 mg/dL     POC Glucose Once [[id number]]  (Normal) Collected: 12/07/20 1623    Specimen: Blood Updated: 12/07/20 1624     Glucose 108 mg/dL     POC Glucose Once [[id number]]  (Normal) Collected: 12/07/20 1511    Specimen: Blood Updated: 12/07/20 1512     Glucose 113 mg/dL     Tissue Pathology Exam [[id number]] Collected: 12/07/20 1153    Specimen: Tissue from Stomach Updated: 12/07/20 1321             Assessment:      Doing well postoperatively.      Plan:   1. Continue Stage 1 diet  2. Continue with ambulation and Incentive spirometry  3. Plan for d/c home    Patient was seen and examined by Dr. Grajeda.    Hospital Course: The patient is a very pleasant 40 y.o. female that was admitted to the hospital with morbid obesity with co-morbidities. Patient underwent laparoscopic sleeve gastrectomy (see OP note) without complication. The patient was then admitted to the bariatric unit per protocol where they remained stable. POD #1 she was started on a stage 1 bariatric diet which she tolerated so she was able to be discharged home in good condition.        Discharge medications:      Discharge Medications      New Medications      Instructions Start Date   HYDROmorphone 2 MG tablet  Commonly known as: Dilaudid   2 mg, Oral, Every 4 Hours PRN      ondansetron 4 MG tablet  Commonly known as: Zofran   4 mg, Oral, Every 6 Hours PRN         Changes to Medications      Instructions Start Date   folic acid-vit B6-vit B12 2.5-25-1 MG tablet tablet  Commonly known as: FOLBEE  What changed: when to take this   1 tablet, Oral, Daily         Continue These Medications      Instructions Start Date   buPROPion  MG 24 hr tablet  Commonly known as: WELLBUTRIN XL   1 tablet, Oral, Every Morning      MATTY-MAG-ZINC PO   1 tablet, Oral, 2 times daily      cholecalciferol 25 MCG (1000 UT) tablet  Commonly known as: VITAMIN D3   1,000  Units, Oral, Daily      lisinopril 10 MG tablet  Commonly known as: PRINIVIL,ZESTRIL   10 mg, Oral, Daily      metFORMIN 1000 MG tablet  Commonly known as: GLUCOPHAGE   1,000 mg, Oral, 2 Times Daily With Meals      multivitamin tablet tablet  Commonly known as: THERAGRAN   1 tablet, Oral, Daily      omeprazole 20 MG capsule  Commonly known as: priLOSEC   20 mg, Oral, Nightly      OZEMPIC (0.25 OR 0.5 MG/DOSE) SC   1 mg, Subcutaneous, Weekly, TAKES ON SUNDAYS      pravastatin 10 MG tablet  Commonly known as: PRAVACHOL   10 mg, Oral, Nightly      Tresiba FlexTouch 200 UNIT/ML solution pen-injector pen injection  Generic drug: Insulin Degludec   66 Units, Subcutaneous, Nightly      ursodiol 300 MG capsule  Commonly known as: Actigall   300 mg, Oral, 2 Times Daily      venlafaxine 37.5 MG tablet  Commonly known as: EFFEXOR   37.5 mg, Oral, Every Morning         Stop These Medications    ASPIRIN 81 PO     Biotin 1000 MCG tablet     Chlorhexidine Gluconate 2 % pads     CRANBERRY EXTRACT PO     diclofenac 75 MG EC tablet  Commonly known as: VOLTAREN     Seasonique 0.15-0.03 &0.01 MG tablet  Generic drug: Levonorgest-Eth Estrad 91-Day     VITAMIN B COMPLEX PO            Discharge instructions:  Per Bariatric manual; per our protocol      Follow-up appointment: Follow up with Dr. Grajeda in the office as scheduled.  If not already scheduled call for appointment at [phone].

## 2020-12-08 NOTE — PROGRESS NOTES
Case Management Discharge Note      Final Note: Home with no needs. Transport by private auto         Selected Continued Care - Admitted Since 12/7/2020     Destination    No services have been selected for the patient.              Durable Medical Equipment    No services have been selected for the patient.              Dialysis/Infusion    No services have been selected for the patient.              Home Medical Care    No services have been selected for the patient.              Therapy    No services have been selected for the patient.              Community Resources    No services have been selected for the patient.                  Transportation Services  Private: Car    Final Discharge Disposition Code: 01 - home or self-care

## 2020-12-09 LAB
LAB AP CASE REPORT: NORMAL
PATH REPORT.FINAL DX SPEC: NORMAL
PATH REPORT.GROSS SPEC: NORMAL

## 2020-12-10 ENCOUNTER — OFFICE VISIT (OUTPATIENT)
Dept: BARIATRICS/WEIGHT MGMT | Facility: CLINIC | Age: 40
End: 2020-12-10

## 2020-12-10 VITALS
RESPIRATION RATE: 18 BRPM | WEIGHT: 286 LBS | HEART RATE: 103 BPM | HEIGHT: 67 IN | SYSTOLIC BLOOD PRESSURE: 128 MMHG | TEMPERATURE: 97.3 F | DIASTOLIC BLOOD PRESSURE: 73 MMHG | BODY MASS INDEX: 44.89 KG/M2

## 2020-12-10 DIAGNOSIS — E66.01 OBESITY, CLASS III, BMI 40-49.9 (MORBID OBESITY) (HCC): Primary | ICD-10-CM

## 2020-12-10 DIAGNOSIS — E11.69 DIABETES MELLITUS TYPE 2 IN OBESE (HCC): ICD-10-CM

## 2020-12-10 DIAGNOSIS — Z98.84 S/P LAPAROSCOPIC SLEEVE GASTRECTOMY: ICD-10-CM

## 2020-12-10 DIAGNOSIS — E66.9 DIABETES MELLITUS TYPE 2 IN OBESE (HCC): ICD-10-CM

## 2020-12-10 DIAGNOSIS — K21.9 GASTROESOPHAGEAL REFLUX DISEASE, UNSPECIFIED WHETHER ESOPHAGITIS PRESENT: ICD-10-CM

## 2020-12-10 DIAGNOSIS — R53.82 CHRONIC FATIGUE: ICD-10-CM

## 2020-12-10 PROCEDURE — 99024 POSTOP FOLLOW-UP VISIT: CPT | Performed by: SURGERY

## 2020-12-10 NOTE — PROGRESS NOTES
[facility] BARIATRIC SURGERY  [address] [facility] [address]  [facility] [id number] [phone]  [address] [facility] [address]  [facility] [id number] [phone]  Dept: [phone]  12/10/2020      Bianca Boles.  [id number]  [id number]  1980  female      Chief Complaint   Patient presents with   • Post-op     1 WEEK POST OP SLEEVE       BH Post-Op Bariatric Surgery:   Bianca Boles is status post laparopscopic Laparoscopic Sleeve procedure, performed on 12/7/20.     HPI:   Today's weight is 130 kg (286 lb) pounds, today's BMI is Body mass index is 44.16 kg/m².,@ has a  loss of 15 pounds since the last visit and@ weight loss since surgery is 15/24 pounds. The patient reports a decreased portion size and loss of appetite.  Bianca Boles denies nausea fever chills chest pain shortness of air or lower extremity pain and reports difficulty when taking too many pills.. The patient c/o appropriate post-op incisional discomfort that is improving. she is doing well with protein and water intake so far. Taking their vitamins, walking and using IS. Denies fevers, chills, chest pain or shortness of air.      Diet and Exercise: Diet history reviewed and discussed with the patient. Weight loss/gains to date discussed with the patient. No carbonated beverage consumption and exercising regularly- walking frequently.   Supplements: multivitamins, B-12, calcium, iron, B-1 and Vitamin D.   Patient is taking blood thinner as prescribed: Not indicated  Current outpatient and discharge medications have been reconciled for the patient.  Reviewed by: Fidel Grajeda Jr., MD        Review of Systems   Constitutional: Positive for fatigue.   Gastrointestinal: Positive for abdominal pain.   Musculoskeletal: Positive for arthralgias.   All other systems reviewed and are negative.      Patient Active Problem List   Diagnosis   • Chronic fatigue   • Essential hypertension   • Hyperlipidemia    • Heartburn   • Multiple joint pain   • Depression   • Diabetes mellitus type 2 in obese (CMS/[facility])   • Obesity, Class III, BMI 40-49.9 (morbid obesity) (CMS/[facility])   • Gastroparesis   • Gastroesophageal reflux disease   • Gastric polyps   • S/P laparoscopic sleeve gastrectomy       The following portions of the patient's history were reviewed and updated as appropriate: allergies, current medications, past family history, past medical history, past social history, past surgical history and problem list.    Vitals:    12/10/20 1258   BP: 128/73   Pulse: 103   Resp: 18   Temp: 97.3 °F (36.3 °C)       Physical Exam  Vitals signs reviewed.   HENT:      Head: Normocephalic and atraumatic.      Mouth/Throat:      Mouth: Mucous membranes are moist.      Pharynx: Oropharynx is clear.   Eyes:      General: No scleral icterus.     Extraocular Movements: Extraocular movements intact.      Conjunctiva/sclera: Conjunctivae normal.      Pupils: Pupils are equal, round, and reactive to light.   Neck:      Musculoskeletal: Normal range of motion and neck supple.      Thyroid: No thyromegaly.   Cardiovascular:      Rate and Rhythm: Normal rate.   Pulmonary:      Effort: Pulmonary effort is normal. No respiratory distress.      Breath sounds: Normal breath sounds. No stridor. No wheezing or rhonchi.   Abdominal:      General: Bowel sounds are normal. There is no distension.      Palpations: Abdomen is soft. There is no mass.      Tenderness: There is abdominal tenderness. There is no right CVA tenderness, left CVA tenderness, guarding or rebound.      Hernia: No hernia is present.      Comments: Incisions clean, dry and intact without erythema and appropriate tenderness   Musculoskeletal: Normal range of motion.   Lymphadenopathy:      Cervical: No cervical adenopathy.   Skin:     General: Skin is warm and dry.      Findings: No erythema.   Neurological:      Mental Status: She is alert and oriented to person, place, and time.    Psychiatric:         Mood and Affect: Mood normal.         Behavior: Behavior normal.         Thought Content: Thought content normal.         Judgment: Judgment normal.         Assessment:   Post-op, the patient is postop day 3 status post laparoscopic sleeve gastrectomy.  Patient is afebrile and hemodynamically stable and abdomen is benign on exam.  She is tolerating her diet without dysphagia.  She has started her vitamins and is exercising.  She states her glucose has been running normal in the 90s and has not resumed any Ozempic.  Continue with Metformin and to contact endocrinologist if anything changes with glucose..     Encounter Diagnoses   Name Primary?   • Obesity, Class III, BMI 40-49.9 (morbid obesity) (CMS/[facility]) Yes   • Diabetes mellitus type 2 in obese (CMS/[facility])    • Chronic fatigue    • S/P laparoscopic sleeve gastrectomy    • Gastroesophageal reflux disease, unspecified whether esophagitis present        Plan:   Reviewed with patient the importance of following the manual for diet progression. Increase activity as tolerated. Continue increasing daily intake of protein and water.   Return to work: the patient is to return to 3 weeks from their surgery date with no restrictions unless they develop medical problems in which we will see them back in the office. They received a note in our office today with their return to work date.  Activity restrictions: no lifting, pushing or pulling over 25lbs for 3 weeks.   Recommended patient be sure to get at least 70 grams of protein per day. Discussed with the patient the recommended amount of water per day to intake. Reviewed vitamin requirements. Be sure to do routine exercise and increase activity as tolerated. No asa, nsaids or steroids for 8 weeks if gastric sleeve procedure and lifelong if gastric bypass procedure.. Patient may use miralax as needed if necessary.     Instructions / Recommendations: dietary counseling recommended, recommended a daily  protein intake of  grams, vitamin supplement(s) recommended, recommended exercising at least 150 minutes per week, behavior modifications recommended and instructed to call the office for concerns, questions, or problems.     The patient was instructed to follow up at one month follow up appt.     The patient was counseled regarding post op bariatric manual

## 2020-12-10 NOTE — PATIENT INSTRUCTIONS
Bariatric Manual    You were provided a manual specific to the procedure that you have chosen.  Please refer to that with any questions or call the office at [phone]

## 2021-01-12 ENCOUNTER — OFFICE VISIT (OUTPATIENT)
Dept: BARIATRICS/WEIGHT MGMT | Facility: CLINIC | Age: 41
End: 2021-01-12

## 2021-01-12 VITALS
HEART RATE: 71 BPM | SYSTOLIC BLOOD PRESSURE: 112 MMHG | DIASTOLIC BLOOD PRESSURE: 65 MMHG | HEIGHT: 67 IN | WEIGHT: 263 LBS | BODY MASS INDEX: 41.28 KG/M2 | RESPIRATION RATE: 18 BRPM | TEMPERATURE: 97.3 F

## 2021-01-12 DIAGNOSIS — I10 ESSENTIAL HYPERTENSION: ICD-10-CM

## 2021-01-12 DIAGNOSIS — E11.69 DIABETES MELLITUS TYPE 2 IN OBESE (HCC): ICD-10-CM

## 2021-01-12 DIAGNOSIS — E66.9 DIABETES MELLITUS TYPE 2 IN OBESE (HCC): ICD-10-CM

## 2021-01-12 DIAGNOSIS — Z98.84 S/P LAPAROSCOPIC SLEEVE GASTRECTOMY: ICD-10-CM

## 2021-01-12 DIAGNOSIS — E66.01 OBESITY, CLASS III, BMI 40-49.9 (MORBID OBESITY) (HCC): Primary | ICD-10-CM

## 2021-01-12 DIAGNOSIS — R53.82 CHRONIC FATIGUE: ICD-10-CM

## 2021-01-12 PROCEDURE — 99024 POSTOP FOLLOW-UP VISIT: CPT | Performed by: SURGERY

## 2021-01-12 RX ORDER — NYSTATIN AND TRIAMCINOLONE ACETONIDE 100000; 1 [USP'U]/G; MG/G
OINTMENT TOPICAL
COMMUNITY
Start: 2020-12-04 | End: 2021-08-04

## 2021-02-03 ENCOUNTER — HOSPITAL ENCOUNTER (OUTPATIENT)
Dept: OTHER | Facility: HOSPITAL | Age: 41
Discharge: HOME | End: 2021-02-03
Attending: NURSE PRACTITIONER

## 2021-02-03 LAB
ALBUMIN SERPL-MCNC: 3.7 G/DL (ref 3.5–5)
ALBUMIN/GLOB SERPL: 1.3 {RATIO} (ref 1.4–2.6)
ALP SERPL-CCNC: 74 U/L (ref 42–98)
ALT SERPL-CCNC: 31 U/L (ref 10–40)
ANION GAP SERPL CALC-SCNC: 15 MMOL/L (ref 8–19)
APPEARANCE UR: CLEAR
AST SERPL-CCNC: 20 U/L (ref 15–50)
BASOPHILS # BLD AUTO: 0.04 10*3/UL (ref 0–0.2)
BASOPHILS NFR BLD AUTO: 0.5 % (ref 0–3)
BILIRUB SERPL-MCNC: 0.21 MG/DL (ref 0.2–1.3)
BILIRUB UR QL: ABNORMAL
BUN SERPL-MCNC: 11 MG/DL (ref 5–25)
BUN/CREAT SERPL: 14 {RATIO} (ref 6–20)
CALCIUM SERPL-MCNC: 9.3 MG/DL (ref 8.7–10.4)
CHLORIDE SERPL-SCNC: 107 MMOL/L (ref 99–111)
CHOLEST SERPL-MCNC: 121 MG/DL (ref 107–200)
CHOLEST/HDLC SERPL: 3.6 {RATIO} (ref 3–6)
COLOR UR: ABNORMAL
CONV ABS IMM GRAN: 0.04 10*3/UL (ref 0–0.2)
CONV BACTERIA: NEGATIVE
CONV CO2: 23 MMOL/L (ref 22–32)
CONV COLLECTION SOURCE (UA): ABNORMAL
CONV CREATININE URINE, RANDOM: 233 MG/DL (ref 10–300)
CONV HYALINE CASTS IN URINE MICRO: ABNORMAL /[LPF]
CONV IMMATURE GRAN: 0.5 % (ref 0–1.8)
CONV MICROALBUM.,U,RANDOM: 43.3 MG/L (ref 0–20)
CONV TOTAL PROTEIN: 6.5 G/DL (ref 6.3–8.2)
CONV UROBILINOGEN IN URINE BY AUTOMATED TEST STRIP: 1 {EHRLICHU}/DL (ref 0.1–1)
CREAT UR-MCNC: 0.81 MG/DL (ref 0.5–0.9)
DEPRECATED RDW RBC AUTO: 47.2 FL (ref 36.4–46.3)
EOSINOPHIL # BLD AUTO: 0.08 10*3/UL (ref 0–0.7)
EOSINOPHIL # BLD AUTO: 1 % (ref 0–7)
ERYTHROCYTE [DISTWIDTH] IN BLOOD BY AUTOMATED COUNT: 16.4 % (ref 11.7–14.4)
EST. AVERAGE GLUCOSE BLD GHB EST-MCNC: 111 MG/DL
GFR SERPLBLD BASED ON 1.73 SQ M-ARVRAT: >60 ML/MIN/{1.73_M2}
GLOBULIN UR ELPH-MCNC: 2.8 G/DL (ref 2–3.5)
GLUCOSE SERPL-MCNC: 84 MG/DL (ref 65–99)
GLUCOSE UR QL: NEGATIVE MG/DL
HBA1C MFR BLD: 5.5 % (ref 3.5–5.7)
HCT VFR BLD AUTO: 34.3 % (ref 37–47)
HDLC SERPL-MCNC: 34 MG/DL (ref 40–60)
HGB BLD-MCNC: 10.6 G/DL (ref 12–16)
HGB UR QL STRIP: ABNORMAL
IRON SERPL-MCNC: 34 UG/DL (ref 60–170)
KETONES UR QL STRIP: 15 MG/DL
LDLC SERPL CALC-MCNC: 62 MG/DL (ref 70–100)
LEUKOCYTE ESTERASE UR QL STRIP: NEGATIVE
LYMPHOCYTES # BLD AUTO: 2.27 10*3/UL (ref 1–5)
LYMPHOCYTES NFR BLD AUTO: 27.5 % (ref 20–45)
MCH RBC QN AUTO: 24.8 PG (ref 27–31)
MCHC RBC AUTO-ENTMCNC: 30.9 G/DL (ref 33–37)
MCV RBC AUTO: 80.1 FL (ref 81–99)
MICROALBUMIN/CREAT UR: 18.6 MG/G{CRE} (ref 0–35)
MONOCYTES # BLD AUTO: 0.52 10*3/UL (ref 0.2–1.2)
MONOCYTES NFR BLD AUTO: 6.3 % (ref 3–10)
NEUTROPHILS # BLD AUTO: 5.3 10*3/UL (ref 2–8)
NEUTROPHILS NFR BLD AUTO: 64.2 % (ref 30–85)
NITRITE UR QL STRIP: NEGATIVE
NRBC CBCN: 0 % (ref 0–0.7)
OSMOLALITY SERPL CALC.SUM OF ELEC: 291 MOSM/KG (ref 273–304)
PH UR STRIP.AUTO: 5.5 [PH] (ref 5–8)
PLATELET # BLD AUTO: 231 10*3/UL (ref 130–400)
PMV BLD AUTO: 10.8 FL (ref 9.4–12.3)
POTASSIUM SERPL-SCNC: 4.2 MMOL/L (ref 3.5–5.3)
PROT UR QL: 30 MG/DL
RBC # BLD AUTO: 4.28 10*6/UL (ref 4.2–5.4)
RBC #/AREA URNS HPF: ABNORMAL /[HPF]
SODIUM SERPL-SCNC: 141 MMOL/L (ref 135–147)
SP GR UR: 1.03 (ref 1–1.03)
SQUAMOUS SPT QL MICRO: ABNORMAL /[HPF]
T4 FREE SERPL-MCNC: 1.3 NG/DL (ref 0.9–1.8)
TRANSFERRIN SERPL-MCNC: 300 MG/DL (ref 250–380)
TRIGL SERPL-MCNC: 123 MG/DL (ref 40–150)
TSH SERPL-ACNC: 1.94 M[IU]/L (ref 0.27–4.2)
VLDLC SERPL-MCNC: 25 MG/DL (ref 5–37)
WBC # BLD AUTO: 8.25 10*3/UL (ref 4.8–10.8)
WBC #/AREA URNS HPF: ABNORMAL /[HPF]

## 2021-02-04 ENCOUNTER — HOSPITAL ENCOUNTER (OUTPATIENT)
Dept: FAMILY MEDICINE CLINIC | Facility: CLINIC | Age: 41
Discharge: HOME | End: 2021-02-04
Attending: NURSE PRACTITIONER

## 2021-02-04 ENCOUNTER — OFFICE VISIT CONVERTED (OUTPATIENT)
Dept: FAMILY MEDICINE CLINIC | Facility: CLINIC | Age: 41
End: 2021-02-04
Attending: NURSE PRACTITIONER

## 2021-02-04 LAB
25(OH)D3 SERPL-MCNC: 59.2 NG/ML (ref 30–100)
FERRITIN SERPL-MCNC: 11 NG/ML (ref 10–200)
FOLATE SERPL-MCNC: >20 NG/ML (ref 4.8–20)
PREALB SERPL-MCNC: 25.1 MG/DL (ref 20–40)
VIT B12 SERPL-MCNC: 755 PG/ML (ref 211–911)

## 2021-02-05 LAB — BACTERIA UR CULT: NORMAL

## 2021-02-10 LAB — CONV VITAMIN B-1 (THIAMINE): 236 NMOL/L (ref 66.5–200)

## 2021-03-11 ENCOUNTER — HOSPITAL ENCOUNTER (OUTPATIENT)
Dept: GENERAL RADIOLOGY | Facility: HOSPITAL | Age: 41
Discharge: HOME | End: 2021-03-11
Attending: NURSE PRACTITIONER

## 2021-03-16 ENCOUNTER — OFFICE VISIT (OUTPATIENT)
Dept: BARIATRICS/WEIGHT MGMT | Facility: CLINIC | Age: 41
End: 2021-03-16

## 2021-03-16 VITALS
RESPIRATION RATE: 18 BRPM | DIASTOLIC BLOOD PRESSURE: 71 MMHG | TEMPERATURE: 98.4 F | SYSTOLIC BLOOD PRESSURE: 141 MMHG | BODY MASS INDEX: 37.67 KG/M2 | HEART RATE: 63 BPM | WEIGHT: 240 LBS | HEIGHT: 67 IN

## 2021-03-16 DIAGNOSIS — Z98.84 S/P LAPAROSCOPIC SLEEVE GASTRECTOMY: ICD-10-CM

## 2021-03-16 DIAGNOSIS — E66.9 DIABETES MELLITUS TYPE 2 IN OBESE (HCC): ICD-10-CM

## 2021-03-16 DIAGNOSIS — F32.A DEPRESSION, UNSPECIFIED DEPRESSION TYPE: ICD-10-CM

## 2021-03-16 DIAGNOSIS — I10 ESSENTIAL HYPERTENSION: ICD-10-CM

## 2021-03-16 DIAGNOSIS — K21.9 GASTROESOPHAGEAL REFLUX DISEASE, UNSPECIFIED WHETHER ESOPHAGITIS PRESENT: ICD-10-CM

## 2021-03-16 DIAGNOSIS — E11.69 DIABETES MELLITUS TYPE 2 IN OBESE (HCC): ICD-10-CM

## 2021-03-16 DIAGNOSIS — E66.9 OBESITY, CLASS II, BMI 35-39.9: Primary | ICD-10-CM

## 2021-03-16 PROBLEM — E66.813 OBESITY, CLASS III, BMI 40-49.9 (MORBID OBESITY): Status: RESOLVED | Noted: 2020-03-17 | Resolved: 2021-03-16

## 2021-03-16 PROBLEM — E66.01 OBESITY, CLASS III, BMI 40-49.9 (MORBID OBESITY): Status: RESOLVED | Noted: 2020-03-17 | Resolved: 2021-03-16

## 2021-03-16 PROBLEM — E66.812 OBESITY, CLASS II, BMI 35-39.9: Status: ACTIVE | Noted: 2021-03-16

## 2021-03-16 PROCEDURE — 99214 OFFICE O/P EST MOD 30 MIN: CPT | Performed by: NURSE PRACTITIONER

## 2021-03-16 RX ORDER — FERROUS SULFATE 325(65) MG
1 TABLET ORAL DAILY
COMMUNITY
Start: 2021-02-04 | End: 2021-03-16

## 2021-03-16 RX ORDER — LISINOPRIL 2.5 MG/1
1 TABLET ORAL DAILY
COMMUNITY
Start: 2021-02-04 | End: 2021-08-04 | Stop reason: SDUPTHER

## 2021-03-16 RX ORDER — LEVONORGESTREL AND ETHINYL ESTRADIOL AND ETHINYL ESTRADIOL 150-30(84)
1 KIT ORAL DAILY
COMMUNITY
Start: 2021-02-04 | End: 2021-06-16 | Stop reason: SDUPTHER

## 2021-03-16 NOTE — PROGRESS NOTES
[facility] BARIATRIC SURGERY  [address] [facility] [address]  [facility] [address]  [phone]  [address] [address]  [phone]  Dept: [phone]  3/16/2021      Bianca Boles.  [id number]  [id number]  1980  female      Chief Complaint   Patient presents with   • Follow-up     3 MONTH SLEEVE       BH Post-Op Bariatric Surgery:   Bianca Boles is status post Laparoscopic Sleeve procedure, performed on 12/7/2020     HPI:   Today's weight is 109 kg (240 lb) pounds, today's BMI is Body mass index is 37.06 kg/m².,@ has a  loss of 23 pounds since the last visit and@ weight loss since surgery is 61 pounds. The patient reports a decreased portion size and loss of appetite.      Bianca Boles denies n/v, reflux and reports that she is obtaining her protein all from real food at this point.     Diet and Exercise: Diet history reviewed and discussed with the patient. Weight loss/gains to date discussed with the patient. The patient states they are eating 60 grams of protein per day. She reports eating 3 meals per day, a typical portion size of 1/2 cup, eating 1-2 snacks per day, drinking 5-6 or more 8-oz. glasses of water per day, no carbonated beverage consumption and exercising regularly- she is back to the gym- she is doing more weights and cardio. She is getting up to 1200 calories per day. She is getting some protein from the protein water.     Supplements: celebrate [employer] with iron and calcium.     Review of Systems   Constitutional: Positive for appetite change. Negative for fatigue and unexpected weight change.   HENT: Negative.    Eyes: Negative.    Respiratory: Negative.    Cardiovascular: Negative.  Negative for leg swelling.   Gastrointestinal: Negative for abdominal distention, abdominal pain, constipation, diarrhea, nausea and vomiting.   Genitourinary: Negative for difficulty urinating, frequency and urgency.   Musculoskeletal:  Negative for back pain.   Skin: Negative.    Psychiatric/Behavioral: Negative.    All other systems reviewed and are negative.      Patient Active Problem List   Diagnosis   • Chronic fatigue   • Essential hypertension   • Hyperlipidemia   • Heartburn   • Multiple joint pain   • Depression   • Diabetes mellitus type 2 in obese (CMS/HCC)   • Gastroparesis   • Gastroesophageal reflux disease   • Gastric polyps   • S/P laparoscopic sleeve gastrectomy   • Obesity, Class II, BMI 35-39.9       Past Medical History:   Diagnosis Date   • Anxiety and depression    • Diabetes mellitus (CMS/HCC)    • Gastroparesis    • GERD (gastroesophageal reflux disease)    • High triglycerides    • History of kidney stones    • Hypertension    • Lumbar herniated disc    • Panic attacks    • PCOS (polycystic ovarian syndrome)    • Psoriasis     ELBOWS   • Spinal headache     AFTER PAIN EPIDURALS.  NO BLOOD PATCH       The following portions of the patient's history were reviewed and updated as appropriate: allergies, current medications, past family history, past medical history, past social history, past surgical history and problem list.    Vitals:    03/16/21 1335   BP: 141/71   Pulse: 63   Resp: 18   Temp: 98.4 °F (36.9 °C)       Physical Exam  Vitals and nursing note reviewed.   Constitutional:       Appearance: She is well-developed.   Neck:      Thyroid: No thyromegaly.   Cardiovascular:      Rate and Rhythm: Normal rate and regular rhythm.      Heart sounds: Normal heart sounds.   Pulmonary:      Effort: Pulmonary effort is normal. No respiratory distress.      Breath sounds: Normal breath sounds. No wheezing.   Abdominal:      General: Bowel sounds are normal. There is no distension.      Palpations: Abdomen is soft.      Tenderness: There is no abdominal tenderness. There is no guarding.      Hernia: No hernia is present.   Musculoskeletal:         General: No tenderness.   Skin:     General: Skin is warm and dry.      Findings:  No erythema or rash.   Neurological:      Mental Status: She is alert.   Psychiatric:         Behavior: Behavior normal.         Assessment:   Post-op, the patient is doing well.     Encounter Diagnoses   Name Primary?   • Obesity, Class II, BMI 35-39.9 Yes   • S/P laparoscopic sleeve gastrectomy    • Diabetes mellitus type 2 in obese (CMS/[facility])    • Essential hypertension    • Gastroesophageal reflux disease, unspecified whether esophagitis present    • Depression, unspecified depression type        Plan:   She will trial stopping omeprazole a this time. Will focus on increasing fluid intake adding a fiber supplement twice daily.  Encouraged patient to be sure to get plenty of lean protein per day through small frequent meals all with a protein source.   Activity restrictions: none.   Recommended patient be sure to get at least 70 grams of protein per day by eating small, frequent meals all with high lean protein choices. Be sure to limit/cut back on daily carbohydrate intake. Discussed with the patient the recommended amount of water per day to intake- half of body weight in ounces. Reviewed vitamin requirements. Be sure to do routine exercise, 150 minutes per week minimum, including both cardio and strength training.     Instructions / Recommendations: dietary counseling recommended, recommended a daily protein intake of  grams, vitamin supplement(s) recommended, recommended exercising at least 150 minutes per week, behavior modifications recommended and instructed to call the office for concerns, questions, or problems.     The patient was instructed to follow up in 3 months .      Total time spent during this encounter today was 30 minutes

## 2021-03-18 NOTE — PROGRESS NOTES
Bianca Boles.  [id number]  [id number]  1980  female    Bariatric Nutrition Assessment Follow-Up    Procedure Performed:  Sleeve  Date of Surgery: 12/17/20    Highest Weight:  350 lbs  Current Weight:  240 lbs  Goal Weight:  170 lbs    Present Diet: high protein, low carb, low fat. Diet recall: Breakfast: [employer] yogurt or ham and a mandarin orange; Lunch: chicken and a salad or taco on low carb tortilla; Dinner: Spaghetti squash with meat sauce; Snack: sugar free popsicle. Drinking water and protein water. Logging in [employer] wilfrido and calorie range is 950-1200 per day, protein 60g and carbs <100g. Going to gym 1-2 times/wk and does 30 minutes cardio and 20 minutes weight machines; wants to increase days of exercise.      Stated Problem Areas/Concerns:  3 month post-op follow up; wants to make sure she is on the right track.     Recommendations/Plan:  encouraged to continue current calorie goals if satisfied and losing weight; aim for >70 grams protein/d and 64 oz water daily. Increase exercise to 3-4 days/wk.     Follow-Up:  PRN      Electronically signed by:  Carrol Mccall RD   15:16 EDT03/16/2021

## 2021-03-19 ENCOUNTER — HOSPITAL ENCOUNTER (OUTPATIENT)
Dept: GENERAL RADIOLOGY | Facility: HOSPITAL | Age: 41
Discharge: HOME | End: 2021-03-19
Attending: NURSE PRACTITIONER

## 2021-05-12 NOTE — PROGRESS NOTES
Progress Note      Patient Name: Bianca Boles   Patient ID: [id number]   Sex: Female   YOB: 1980    Primary Care Provider: Patricia VINCENT   Referring Provider: Patricia VINCENT    Visit Date: April 8, 2020    Provider: CARLTON Childers   Location: [facility]   Location Address: [address] [place] [address]  [id number]   Location Phone: [phone]          Chief Complaint  · one month follow DM2      History Of Present Illness  Video Conferencing Visit  Bianca Boles is a 39 year old /White female who is presenting for evaluation via video conferencing. Verbal consent obtained before beginning visit.   The following staff were present during this visit: Elisabet Marrero MA   Bianca Boles is a 39 year old /White female who presents for evaluation and treatment of:      1 month follow-up on DM2    Diabetes is uncontrolled  Fasting is running around 150  Last dose of Ozempic 0.5   Ran out of sample, waiting on PA to have filled  160 units of Tresiba in the evening  Ophthalmology appointment due in July  Podiatry has not been scheduled yet  Still has order just has not scheduled       Started the process with Dr Grajeda, Bariatric  EKG was abnormal so they sent her to a cardiologist in [place]       Past Medical History  Disease Name Date Onset Notes   Allergic rhinitis due to allergen 06/09/2016 --    Anxiety --  --    Diabetes --  --    Diabetes mellitus, type II --  --    Gastroparesis 06/18/2015 --    GERD 06/18/2015 --    Hyperlipemia --  --    Hyperlipidemia --  --    Hypertension --  --    Hypertension, Benign Essential --  --    Hypertriglyceridemia --  --    Psoriasis 09/30/2015 --    Reflux --  --    Seasonal allergies --  --          Past Surgical History  Procedure Name Date Notes   Adenoidectomy --  --    Cesarian Section 2006 2010 --    EGD 2014 --    Kidney --  --    Tonsillectomy --  --   "        Medication List  Name Date Started Instructions   apple cider vinegar 500 mg oral tablet  take 1 tablet by oral route 2 times a day   BD Ultra-Fine Short Pen Needle 31 gauge x 5/16\" miscellaneous needle 03/05/2020 USE AS DIRECTED   bupropion HCl 300 mg oral tablet extended release 24 hr 02/04/2020 TAKE 1 TABLET DAILY   Calcium 500 500 mg calcium (1,250 mg) oral tablet  take 1 tablet by oral route daily   Camrese 0.15 mg-30 mcg (84)/10 mcg (7) oral tablets,dose pack,3 month 02/04/2020 TAKE ONE TABLET BY MOUTH ONCE DAILY for 90 days   diclofenac sodium 75 mg oral tablet,delayed release (DR/EC) 01/30/2020 TAKE 1 TABLET TWICE A DAY   Effexor XR 37.5 mg oral capsule,extended release 24hr 02/04/2020 take 1 capsule (37.5 mg) by oral route once daily with food for 90 days   esomeprazole magnesium 20 mg oral capsule,delayed release(DR/EC)  take 1 capsule (20 mg) by oral route once daily at least 1 hour before a meal swallowing whole. Do not crush or chew granules.   FreeStyle Lancets 28 gauge miscellaneous misc 07/25/2019 use as directed to check glucose levels   FreeStyle Ling 14 Day [place] miscellaneous misc 07/25/2019 use as directed to check glucose levels   FreeStyle Ling 14 Day Sensor miscellaneous kit 03/05/2020 USE AS DIRECTED, CHANGE SENSOR AFTER NO MORE THAN 14 DAYS   Glucometer 01/05/2018 Use QD to check blood sugar (dx: E11.9)   Lancets 01/05/2018 Use QD to test blood sugar (dx:E11.9)   lisinopril-hydrochlorothiazide 20-12.5 mg oral tablet 02/04/2020 TAKE 1 TABLET DAILY   metformin 1,000 mg oral tablet 02/04/2020 TAKE 1 TABLET TWICE A DAY WITH MORNING AND EVENING MEALS for 90 days   multivitamin oral capsule  take 1 capsule by oral route daily   Ozempic 1 mg/dose (2 mg/1.5 mL) subcutaneous pen injector 04/08/2020 1 MG SQ once weekly   Pen Needle 31 gauge x 5/16\" miscellaneous needle 07/25/2019 use as directed   pravastatin 10 mg oral tablet 02/04/2020 TAKE 1 TABLET DAILY AT BEDTIME   Test strips " 01/05/2018 Use to test blood sugar QD (DX: E11.9)   Tresiba FlexTouch U-200 200 unit/mL (3 mL) subcutaneous insulin pen 03/04/2020 inject 160 units by subcutaneous route once a day (at bedtime) for 90 days   Vitamins B Complex oral capsule  take 1 capsule by oral route daily   Zofran 4 mg oral tablet 06/25/2019 Take 1-2 tabs po TID PRN         Allergy List  Allergen Name Date Reaction Notes   NO KNOWN DRUG ALLERGIES --  --  --          Family Medical History  Disease Name Relative/Age Notes   Colon Neoplasm, Malignant Father/57   currentyly 62   Cancer, Unspecified Father/   Father   Diabetes, unspecified type Father/   Father         Social History  Finding Status Start/Stop Quantity Notes   Alcohol Current some day --/-- --  1-2 glasses rarely   Alcohol Use Current some day --/-- --  rarely drinks, less than 1 drink per day, has been drinking for 6-10 years   lives with children --  --/-- --  --    lives with spouse --  --/-- --  --    . --  --/-- --  --    Recreational Drug Use Former --/-- --  in the past   Tobacco Former --/-- 1/4 ppd --    Working --  --/-- --  --          Immunizations  NameDate Admin Mfg Trade Name Lot Number Route Inj VIS Given VIS Publication   Hepatitis A10/02/2018 NE HAVRIX-ADULT  IM RD 10/02/2018    [facility]   Hepatitis B12/29/2016 NE Not Entered  NE NE 12/29/2016    Comments:    Hepatitis B07/28/2016 NE Not Entered  NE NE 07/29/2016    Comments:    Gqectwazn70/03/2018 NE Fluzone Quadrivalent  IM RA 08/06/2018    Comments: fluzone was given at [facility]   Ulnxebhxt46/07/2015 NE Not Entered  NE NE 09/08/2015    Comments: Given by [facility]   Ixjrhsjgwxxm52/14/2015 NE Not Entered [id number] NE NE 04/15/2015 10/06/2009   Comments: given at [facility]   Tdap11/07/2016 SKB BOOSTRIX [id number] IM RD 11/07/2016 02/24/2015   Comments: tolerated well         Review of Systems  · Constitutional  o Denies  o : fever  · Eyes  o Denies  o : blurred vision, changes in  vision  · HENT  o Denies  o : headaches  · Cardiovascular  o Denies  o : chest pain  · Respiratory  o Denies  o : cough  · Gastrointestinal  o Denies  o : nausea, vomiting, diarrhea, constipation, abdominal pain  · Genitourinary  o Denies  o : dysuria  · Endocrine  o Admits  o : central obesity  o Denies  o : polyuria, polydipsia      Physical Examination  · Constitutional  o Appearance  o : well-nourished, in no acute distress  · Eyes  o Conjunctivae  o : conjunctivae normal  o Sclerae  o : sclerae white  o Eyelids/Ocular Adnexae  o : eyelid appearance normal, no exudates present  · Ears, Nose, Mouth and Throat  o Ears  o :   § External Ears  § : external auditory canal appearance within normal limits, no discharge present  o Nose  o :   § External Nose  § : appearance normal  o Oral Cavity  o :   § Oral Mucosa  § : oral mucosa normal  § Lips  § : lip appearance normal  § Teeth  § : normal dentation for age  · Neck  o Inspection/Palpation  o : normal appearance, trachea midline  · Respiratory  o Respiratory Effort  o : breathing unlabored  · Cardiovascular  o Peripheral Vascular System  o :   § Extremities  § : no clubbing or edema  · Skin and Subcutaneous Tissue  o General Inspection  o : pink  · Psychiatric  o Judgement and Insight  o : judgment and insight intact  o Mood and Affect  o : mood normal, affect appropriate          Assessment  · Type 2 diabetes mellitus with other specified complication, with long-term current use of insulin     250.80/E11.69  continue Tresiba 160 units per day, increase osempic to 1 mg weekly starting next week, decrease carb intake, exercise daily, decrease caloric intake to 1200 calories per day  · Obesity     278.00/E66.9      Plan  · Orders  o ACO-39: Current medications updated and reviewed () - - 04/08/2020  o ACO-14: Influenza immunization administered or previously received () - - 04/08/2020  · Medications  o Ozempic 1 mg/dose (2 mg/1.5 mL) subcutaneous pen  injector   SI MG SQ once weekly   DISP: (2) 1.5 ml syringe with 5 refills  Adjusted on 2020     o FreeStyle Ling 14 Day Sensor miscellaneous kit   SIG: use as directed for 90 days   DISP: (3) Kit with 1 refills  Discontinued on 2020     · Instructions  o Continue blood sugar monitoring daily and record. Bring your log to office visits. Call the office for readings below 70 and above 250 or any complications.  o Daily foot care. Avoid walking barefoot. Annual Dilated Eye Exam.  o Discussed with patient blood pressure monitoring, hemoglobin A1C levels need to be below 7.0, and LDL (Lipid) goals below 70.  o Patient was educated/instructed on their diagnosis, treatment and medications prior to discharge from the clinic today.  · Disposition  o FOLLOW UP IN 3 WEEKS            Electronically Signed by: CARLTON Childers -Author on 2020 03:15:59 PM

## 2021-05-12 NOTE — PROGRESS NOTES
Progress Note      Patient Name: Bianca Boles   Patient ID: [id number]   Sex: Female   YOB: 1980    Primary Care Provider: Patricia VINCENT   Referring Provider: Patricia VINCENT    Visit Date: April 15, 2020    Provider: CARLTON Childers   Location: [facility]   Location Address: [address] [place] [address]  [id number]   Location Phone: [phone]          Chief Complaint  · Follow-up on DM2      History Of Present Illness  Video Conferencing Visit  Bianca Boles is a 40 year old /White female who is presenting for evaluation via video conferencing. Verbal consent obtained before beginning visit.   The following staff were present during this visit: Elisabet Marrero MA   Bianca Boles is a 40 year old /White female who presents for evaluation and treatment of:      Follow-up on DM2    Fasting blood sugars are around 125-130  2 hours after a meal 160-180  Tresiba 160 units and Ozempic 0.5 mg once a week  states he insurance is changing in the next 2 weeks  she was recently let go from her job and is applying for [employer]            Past Medical History  Disease Name Date Onset Notes   Allergic rhinitis due to allergen 06/09/2016 --    Anxiety --  --    Diabetes --  --    Diabetes mellitus, type II --  --    Gastroparesis 06/18/2015 --    GERD 06/18/2015 --    Hyperlipemia --  --    Hyperlipidemia --  --    Hypertension --  --    Hypertension, Benign Essential --  --    Hypertriglyceridemia --  --    Psoriasis 09/30/2015 --    Reflux --  --    Seasonal allergies --  --          Past Surgical History  Procedure Name Date Notes   Adenoidectomy --  --    Cesarian Section 2006 2010 --    EGD 2014 --    Kidney --  --    Tonsillectomy --  --          Medication List  Name Date Started Instructions   apple cider vinegar 500 mg oral tablet  take 1 tablet by oral route 2 times a day   BD Ultra-Fine Short Pen Needle  "31 gauge x 5/16\" miscellaneous needle 03/05/2020 USE AS DIRECTED   bupropion HCl 300 mg oral tablet extended release 24 hr 02/04/2020 TAKE 1 TABLET DAILY   Calcium 500 500 mg calcium (1,250 mg) oral tablet  take 1 tablet by oral route daily   Camrese 0.15 mg-30 mcg (84)/10 mcg (7) oral tablets,dose pack,3 month 02/04/2020 TAKE ONE TABLET BY MOUTH ONCE DAILY for 90 days   diclofenac sodium 75 mg oral tablet,delayed release (DR/EC) 01/30/2020 TAKE 1 TABLET TWICE A DAY   Effexor XR 37.5 mg oral capsule,extended release 24hr 02/04/2020 take 1 capsule (37.5 mg) by oral route once daily with food for 90 days   esomeprazole magnesium 20 mg oral capsule,delayed release(DR/EC)  take 1 capsule (20 mg) by oral route once daily at least 1 hour before a meal swallowing whole. Do not crush or chew granules.   FreeStyle Lancets 28 gauge miscellaneous misc 07/25/2019 use as directed to check glucose levels   FreeStyle Ling 14 Day [place] miscellaneous misc 07/25/2019 use as directed to check glucose levels   FreeStyle Ling 14 Day Sensor miscellaneous kit 03/05/2020 USE AS DIRECTED, CHANGE SENSOR AFTER NO MORE THAN 14 DAYS   Glucometer 01/05/2018 Use QD to check blood sugar (dx: E11.9)   Lancets 01/05/2018 Use QD to test blood sugar (dx:E11.9)   lisinopril-hydrochlorothiazide 20-12.5 mg oral tablet 02/04/2020 TAKE 1 TABLET DAILY   metformin 1,000 mg oral tablet 02/04/2020 TAKE 1 TABLET TWICE A DAY WITH MORNING AND EVENING MEALS for 90 days   multivitamin oral capsule  take 1 capsule by oral route daily   Ozempic 1 mg/dose (2 mg/1.5 mL) subcutaneous pen injector 04/08/2020 1 MG SQ once weekly   Pen Needle 31 gauge x 5/16\" miscellaneous needle 07/25/2019 use as directed   pravastatin 10 mg oral tablet 02/04/2020 TAKE 1 TABLET DAILY AT BEDTIME   Test strips 01/05/2018 Use to test blood sugar QD (DX: E11.9)   Tresiba FlexTouch U-200 200 unit/mL (3 mL) subcutaneous insulin pen 03/04/2020 inject 160 units by subcutaneous route once a day " (at bedtime) for 90 days   Vitamins B Complex oral capsule  take 1 capsule by oral route daily   Zofran 4 mg oral tablet 06/25/2019 Take 1-2 tabs po TID PRN         Allergy List  Allergen Name Date Reaction Notes   NO KNOWN DRUG ALLERGIES --  --  --          Family Medical History  Disease Name Relative/Age Notes   Colon Neoplasm, Malignant Father/57   currentyly 62   Cancer, Unspecified Father/   Father   Diabetes, unspecified type Father/   Father         Social History  Finding Status Start/Stop Quantity Notes   Alcohol Current some day --/-- --  1-2 glasses rarely   Alcohol Use Current some day --/-- --  rarely drinks, less than 1 drink per day, has been drinking for 6-10 years   lives with children --  --/-- --  --    lives with spouse --  --/-- --  --    . --  --/-- --  --    Recreational Drug Use Former --/-- --  in the past   Tobacco Former --/-- 1/4 ppd --    Working --  --/-- --  --          Immunizations  NameDate Admin Mfg Trade Name Lot Number Route Inj VIS Given VIS Publication   Hepatitis A10/02/2018 NE HAVRIX-ADULT  IM RD 10/02/2018    [facility]   Hepatitis B12/29/2016 NE Not Entered  NE NE 12/29/2016    Comments:    Hepatitis B07/28/2016 NE Not Entered  NE NE 07/29/2016    Comments:    Jihpdzheu99/03/2018 NE Fluzone Quadrivalent  IM RA 08/06/2018    Comments: fluzone was given at [facility]   Yciewqrvt33/07/2015 NE Not Entered  NE NE 09/08/2015    Comments: Given by [facility]   Fdjjfgvihyyf31/14/2015 NE Not Entered [id number] NE NE 04/15/2015 10/06/2009   Comments: given at [facility]   Tdap11/07/2016 SKB BOOSTRIX [id number] IM RD 11/07/2016 02/24/2015   Comments: tolerated well         Review of Systems  · Constitutional  o Denies  o : fever  · Eyes  o Denies  o : blurred vision, changes in vision  · HENT  o Denies  o : headaches  · Cardiovascular  o Denies  o : chest pain  · Respiratory  o Denies  o : cough  · Gastrointestinal  o Denies  o : nausea, vomiting, diarrhea,  constipation, abdominal pain  · Genitourinary  o Denies  o : dysuria  · Endocrine  o Admits  o : central obesity  o Denies  o : polyuria, polydipsia      Physical Examination  · Constitutional  o Appearance  o : well-nourished, in no acute distress  · Eyes  o Conjunctivae  o : conjunctivae normal  o Sclerae  o : sclerae white  o Eyelids/Ocular Adnexae  o : eyelid appearance normal, no exudates present  · Ears, Nose, Mouth and Throat  o Nose  o :   § External Nose  § : appearance normal  § Intranasal Exam  § : mucosa within normal limits  § Nasopharynx  § : no discharge present  o Oral Cavity  o :   § Oral Mucosa  § : oral mucosa normal  § Lips  § : lip appearance normal  o Throat  o :   § Oropharynx  § : pink  · Neck  o Inspection/Palpation  o : normal appearance,trachea midline  · Respiratory  o Respiratory Effort  o : breathing unlabored  · Cardiovascular  o Peripheral Vascular System  o :   § Extremities  § : no clubbing or edema  · Skin and Subcutaneous Tissue  o General Inspection  o : normal color   · Neurologic  o Mental Status Examination  o :   § Orientation  § : grossly oriented to person, place and time  o Gait and Station  o : normal gait, able to stand without difficulty  · Psychiatric  o Judgement and Insight  o : judgment and insight intact  o Mood and Affect  o : mood normal, affect appropriate              Assessment  · Diabetes mellitus, type 2     250.00/E11.9  increase ozempic to 1mg sq weekly, continue to decrease carb intake, increase exercise, and weight loss       Plan  · Orders  o ACO-39: Current medications updated and reviewed () - - 04/15/2020  o ACO-14: Influenza immunization administered or previously received () - - 04/15/2020  · Instructions  o Patient was educated/instructed on their diagnosis, treatment and medications prior to discharge from the clinic today.  · Disposition  o obtain labs prior to follow up appt  o follow up 2 weeks            Electronically Signed by:  Patricia Hernandez, APRN -Author on April 15, 2020 12:06:43 PM

## 2021-05-13 NOTE — PROGRESS NOTES
Progress Note      Patient Name: Bianca Boles   Patient ID: [id number]   Sex: Female   YOB: 1980    Primary Care Provider: Patricia VINCENT   Referring Provider: Patricia VINCENT    Visit Date: May 1, 2020    Provider: CARLTON Childers   Location: [facility]   Location Address: [address] [place] [address]  [id number]   Location Phone: [phone]          Chief Complaint  · follow up DM2, HTN, hyperlipidemia, and depression  · medication refills  · lab results      History Of Present Illness  Video Conferencing Visit  Bianca Boles is a 40 year old /White female who is presenting for evaluation via video conferencing. Verbal consent obtained before beginning visit.   The following staff were present during this visit: Leslie Avalos MA   Bianca Boles is a 40 year old /White female who presents for evaluation and treatment of:      received flu shot     F/up on DM2, HTN, hyperlipidemia, and depression     increase in ozempic 1 mg for 2 weeks   Tresiba 160 units daily, Ozempic 1mg once a week   fasting sugars are 104 to 125    unable to check blood pressure at home     weight at home 306  loss of 8lbs in the past 8 weeks       Past Medical History  Disease Name Date Onset Notes   Allergic rhinitis due to allergen 06/09/2016 --    Anxiety --  --    Diabetes --  --    Diabetes mellitus, type II --  --    Gastroparesis 06/18/2015 --    GERD 06/18/2015 --    Hyperlipemia --  --    Hyperlipidemia --  --    Hypertension --  --    Hypertension, Benign Essential --  --    Hypertriglyceridemia --  --    Psoriasis 09/30/2015 --    Reflux --  --    Seasonal allergies --  --          Past Surgical History  Procedure Name Date Notes   Adenoidectomy --  --    Cesarian Section 2006 2010 --    EGD 2014 --    Kidney --  --    Tonsillectomy --  --          Medication List  Name Date Started Instructions   apple cider vinegar 500 mg  "oral tablet  take 1 tablet by oral route 2 times a day   BD Ultra-Fine Short Pen Needle 31 gauge x 5/16\" miscellaneous needle 03/05/2020 USE AS DIRECTED   bupropion HCl 300 mg oral tablet extended release 24 hr 02/04/2020 TAKE 1 TABLET DAILY   Calcium 500 500 mg calcium (1,250 mg) oral tablet  take 1 tablet by oral route daily   Camrese 0.15 mg-30 mcg (84)/10 mcg (7) oral tablets,dose pack,3 month 02/04/2020 TAKE ONE TABLET BY MOUTH ONCE DAILY for 90 days   diclofenac sodium 75 mg oral tablet,delayed release (DR/EC) 04/30/2020 TAKE 1 TABLET TWICE A DAY   Effexor XR 37.5 mg oral capsule,extended release 24hr 02/04/2020 take 1 capsule (37.5 mg) by oral route once daily with food for 90 days   esomeprazole magnesium 20 mg oral capsule,delayed release(DR/EC)  take 1 capsule (20 mg) by oral route once daily at least 1 hour before a meal swallowing whole. Do not crush or chew granules.   FreeStyle Lancets 28 gauge miscellaneous misc 07/25/2019 use as directed to check glucose levels   FreeStyle Ling 14 Day [place] miscellaneous misc 07/25/2019 use as directed to check glucose levels   FreeStyle Ling 14 Day Sensor miscellaneous kit 03/05/2020 USE AS DIRECTED, CHANGE SENSOR AFTER NO MORE THAN 14 DAYS   Glucometer 01/05/2018 Use QD to check blood sugar (dx: E11.9)   Lancets 01/05/2018 Use QD to test blood sugar (dx:E11.9)   lisinopril-hydrochlorothiazide 20-12.5 mg oral tablet 02/04/2020 TAKE 1 TABLET DAILY   metformin 1,000 mg oral tablet 02/04/2020 TAKE 1 TABLET TWICE A DAY WITH MORNING AND EVENING MEALS for 90 days   multivitamin oral capsule  take 1 capsule by oral route daily   Ozempic 1 mg/dose (2 mg/1.5 mL) subcutaneous pen injector 05/01/2020 1 MG SQ once weekly   Pen Needle 31 gauge x 5/16\" miscellaneous needle 07/25/2019 use as directed   pravastatin 10 mg oral tablet 02/04/2020 TAKE 1 TABLET DAILY AT BEDTIME   Test strips 01/05/2018 Use to test blood sugar QD (DX: E11.9)   Tresiba FlexTouch U-200 200 unit/mL (3 " mL) subcutaneous insulin pen 03/04/2020 inject 160 units by subcutaneous route once a day (at bedtime) for 90 days   Vitamins B Complex oral capsule  take 1 capsule by oral route daily   Zofran 4 mg oral tablet 06/25/2019 Take 1-2 tabs po TID PRN         Allergy List  Allergen Name Date Reaction Notes   NO KNOWN DRUG ALLERGIES --  --  --          Family Medical History  Disease Name Relative/Age Notes   Colon Neoplasm, Malignant Father/57   currentyly 62   Cancer, Unspecified Father/   Father   Diabetes, unspecified type Father/   Father         Social History  Finding Status Start/Stop Quantity Notes   Alcohol Current some day --/-- --  1-2 glasses rarely   Alcohol Use Current some day --/-- --  rarely drinks, less than 1 drink per day, has been drinking for 6-10 years   lives with children --  --/-- --  --    lives with spouse --  --/-- --  --    . --  --/-- --  --    Recreational Drug Use Former --/-- --  in the past   Tobacco Former --/-- 1/4 ppd --    Working --  --/-- --  --          Immunizations  NameDate Admin Mfg Trade Name Lot Number Route Inj VIS Given VIS Publication   Hepatitis A10/02/2018 NE HAVRIX-ADULT  IM RD 10/02/2018    Comments: Given at [facility]   Hepatitis B12/29/2016 NE Not Entered  NE NE 12/29/2016    Comments:    Hepatitis B07/28/2016 NE Not Entered  NE NE 07/29/2016    Comments:    Pnagugopc42/03/2018 NE Fluzone Quadrivalent  IM RA 08/06/2018    Comments: fluzone was given at [facility]   Gcrlbdnyo86/07/2015 NE Not Entered  NE NE 09/08/2015    Comments: [facility]   Dscdqcbsfqhe94/14/2015 NE Not Entered [id number] NE NE 04/15/2015 10/06/2009   Comments: given at [facility]   Tdap11/07/2016 SKB BOOSTRIX [id number] IM RD 11/07/2016 02/24/2015   Comments: tolerated well         Review of Systems  · Constitutional  o Denies  o : fever, fatigue, weight loss, weight gain  · Cardiovascular  o Denies  o : lower extremity edema, claudication, chest pressure,  palpitations  · Respiratory  o Denies  o : shortness of breath, wheezing, frequent cough, hemoptysis, dyspnea on exertion  · Gastrointestinal  o Denies  o : nausea, vomiting, diarrhea, constipation, abdominal pain      Physical Examination  · Constitutional  o Appearance  o : well-nourished, in no acute distress  · Eyes  o Conjunctivae  o : conjunctivae normal  o Sclerae  o : sclerae white  o Eyelids/Ocular Adnexae  o : eyelid appearance normal, no exudates present  · Neck  o Inspection/Palpation  o : normal appearance, trachea midline  · Respiratory  o Respiratory Effort  o : breathing unlabored  · Cardiovascular  o Peripheral Vascular System  o :   § Extremities  § : no clubbing or edema  · Skin and Subcutaneous Tissue  o General Inspection  o : normal color  · Neurologic  o Mental Status Examination  o :   § Orientation  § : grossly oriented to person, place and time  o Gait and Station  o : normal gait, able to stand without difficulty  · Psychiatric  o Judgement and Insight  o : judgment and insight intact  o Mood and Affect  o : mood normal, affect appropriate          Assessment  · Depression     311/F32.9  currently controlled   · Type 2 diabetes mellitus with other specified complication, with long-term current use of insulin     250.80/E11.69  continue tresiba 160 units qhs and ozempic 1 mg weekly, continue weight loss, increase exercise   · Essential hypertension     401.9/I10  continue current medication, monitor at home if possible   · Hyperlipidemia     272.4/E78.5  stable on statin       Plan  · Orders  o Diabetes 2 Panel (Urine Microalbumin, CMP, Lipid, A1c, ) [facility] (61070, 30511, 52817, 53848) - - 08/01/2020  o CBC with Auto Diff [facility] (41650) - - 08/01/2020  o Urinalysis with Reflex Microscopy if abnormal ([facility]) (59595) - - 08/01/2020  o Thyroid Profile (42040, 22987, THYII) - - 08/01/2020  o OPHTHALMOLOGY CONSULTATION (OPHTH) - - 05/01/2020 20/20 eye care please request records   o PODIATRY  CONSULTATION (PODIA) - - 2020  o ACO-39: Current medications updated and reviewed () - - 2020  · Medications  o Ozempic 1 mg/dose (2 mg/1.5 mL) subcutaneous pen injector   SI MG SQ once weekly   DISP: (2) 1.5 ml syringe with 5 refills  Adjusted on 2020     · Instructions  o Patient was educated/instructed on their diagnosis, treatment and medications prior to discharge from the clinic today.  · Disposition  o Follow Up in Office in 3 months or sooner if needed            Electronically Signed by: CARLTON Childers -Author on May 1, 2020 10:15:09 AM

## 2021-05-13 NOTE — PROGRESS NOTES
Progress Note      Patient Name: Bianca Boles   Patient ID: [id number]   Sex: Female   YOB: 1980    Primary Care Provider: Patricia VINCENT   Referring Provider: Patricia VINCENT    Visit Date: December 4, 2020    Provider: CARLTON Childers   Location: [facility]   Location Address: [address] [place], [place] [id number]   Location Phone: [phone]          Chief Complaint  · Annual Exam  · PAP exam  · follow up DM2      History Of Present Illness  Last PAP Smear: 12/18/2018.   Date of Last Mammogram: 9/10/2019- is scheduled for 3/2021.   Date of Last Colonoscopy: NONE   Bianca Boles is a 40 year old /White female who presents for evaluation and treatment of:      pap    c/o- blood sugars have dropped since doing a high protein diet with  meal replacement shakes   so she's had to adjust insulin.   tresciba- 66 units QHS  bariatric surgery scheduled for Monday 12.7.2020  diet was provided by bariatric surgery     loss of 14 lbs since august  total loss of weight 27lbs since march  blood pressure running lower     mammo- 9/10/2019- has scheduled for 03.2021  pap- 12/18/2018  colonoscopy- never had.    LMP NOW    denies abnormal vaginal itching, discharge or burning       Past Medical History  Disease Name Date Onset Notes   Allergic rhinitis due to allergen 06/09/2016 --    Anxiety --  --    Diabetes --  --    Diabetes Mellitus, Type II --  --    Gastroparesis 06/18/2015 --    GERD 06/18/2015 --    Hyperlipemia --  --    Hyperlipidemia --  --    Hypertension --  --    Hypertension, Benign Essential --  --    Hypertriglyceridemia --  --    Psoriasis 09/30/2015 --    Reflux --  --    Seasonal allergies --  --          Past Surgical History  Procedure Name Date Notes   Adenoidectomy --  --    Cesarian Section 2006 2010 --    EGD 2014 --    Kidney --  --    Tonsillectomy --  --          Medication List  Name  "Date Started Instructions   BD Ultra-Fine Short Pen Needle 31 gauge x 5/16\" miscellaneous needle 03/05/2020 USE AS DIRECTED   bupropion HCl 300 mg oral tablet extended release 24 hr 08/04/2020 TAKE 1 TABLET DAILY   Calcium 500 500 mg calcium (1,250 mg) oral tablet  take 1 tablet by oral route daily   Camrese 0.15 mg-30 mcg (84)/10 mcg (7) oral tablets,dose pack,3 month 08/04/2020 TAKE ONE TABLET BY MOUTH ONCE DAILY for 90 days   diclofenac sodium 75 mg oral tablet,delayed release (DR/EC) 09/21/2020 Take 1 tablet by mouth twice daily   Effexor XR 37.5 mg oral capsule,extended release 24hr 08/04/2020 take 1 capsule (37.5 mg) by oral route once daily with food for 90 days   esomeprazole magnesium 20 mg oral capsule,delayed release(DR/EC)  take 1 capsule (20 mg) by oral route once daily at least 1 hour before a meal swallowing whole. Do not crush or chew granules.   FreeStyle Lancets 28 gauge miscellaneous misc 07/25/2019 use as directed to check glucose levels   FreeStyle Ling 14 Day [place] miscellaneous misc 07/25/2019 use as directed to check glucose levels   FreeStyle Ling 14 Day Sensor miscellaneous kit 03/05/2020 USE AS DIRECTED, CHANGE SENSOR AFTER NO MORE THAN 14 DAYS   Lancets 01/05/2018 Use QD to test blood sugar (dx:E11.9)   lisinopril-hydrochlorothiazide 20-12.5 mg oral tablet 08/04/2020 TAKE 1 TABLET DAILY   metformin 1,000 mg oral tablet 08/04/2020 TAKE 1 TABLET TWICE A DAY WITH MORNING AND EVENING MEALS for 90 days   multivitamin oral capsule  take 1 capsule by oral route daily   Ozempic 1 mg/dose (2 mg/1.5 mL) subcutaneous pen injector 05/01/2020 1 MG SQ once weekly   Pen Needle 31 gauge x 5/16\" miscellaneous needle 07/25/2019 use as directed   pravastatin 10 mg oral tablet 08/04/2020 TAKE 1 TABLET DAILY AT BEDTIME   Test strips 01/05/2018 Use to test blood sugar QD (DX: E11.9)   Tresiba FlexTouch 200 UNIT/ML Subcutaneous Solution Pen-injector 10/24/2020 INJECT 160 UNITS SUBCUTANEOUSLY EVERYDAY AT " BEDTIME   Tresiba FlexTouch U-200 200 unit/mL (3 mL) subcutaneous insulin pen 12/04/2020 INJECT 66 UNITS SUBCUTANEOUSLY EVERYDAY AT BEDTIME   Vitamins B Complex oral capsule  take 1 capsule by oral route daily         Allergy List  Allergen Name Date Reaction Notes   NO KNOWN DRUG ALLERGIES --  --  --          Family Medical History  Disease Name Relative/Age Notes   Colon Neoplasm, Malignant Father/57   currentyly 62   Cancer, Unspecified Father/   Father   Diabetes, unspecified type Father/   Father         Social History  Finding Status Start/Stop Quantity Notes   Alcohol Current some day --/-- --  1-2 glasses rarely   Alcohol Use Current some day --/-- --  rarely drinks, less than 1 drink per day, has been drinking for 6-10 years   lives with children --  --/-- --  --    lives with spouse --  --/-- --  --    . --  --/-- --  --    Recreational Drug Use Former --/-- --  in the past   Tobacco Former --/-- 1/4 ppd --    Working --  --/-- --  --          Immunizations  NameDate Admin Mfg Trade Name Lot Number Route Inj VIS Given VIS Publication   Hepatitis A10/02/2018 NE HAVRIX-ADULT  IM RD 10/02/2018    Comments: Given at [facility]   Hepatitis B12/29/2016 NE Not Entered  NE NE 12/29/2016    Comments:    Hepatitis B07/28/2016 NE Not Entered  NE NE 07/29/2016    Comments:    Tlhqimxva64/03/2018 NE Fluzone Quadrivalent  IM RA 08/06/2018    Comments: fluzone was given at [email or web address]   Shaapbvvabim65/14/2015 NE Not Entered [id number] NE NE 04/15/2015 10/06/2009   Comments: given at [facility]   Tdap11/07/2016 SKB BOOSTRIX [id number] IM RD 11/07/2016 02/24/2015   Comments: tolerated well         Review of Systems  · Constitutional  o Denies  o : fever, chills  · Cardiovascular  o Denies  o : chest Pain, palpitations, edema (swelling)  · Respiratory  o Denies  o : frequent cough, shortness of breath  · Gastrointestinal  o Denies  o : abdominal pain, nausea, vomiting, changes in bowel habits  · Genitourinary  o Denies  o :  "dysuria  · Endocrine  o Denies  o : polydipsia, polyphagia      Vitals  Date Time BP Position Site L\R Cuff Size HR RR TEMP (F) WT  HT  BMI kg/m2 BSA m2 O2 Sat FR L/min FiO2 HC       03/04/2020 08:16 /67 Sitting    83 - R 18 98 313lbs 16oz 5'  7\" 49.18 2.59 98 %  21%    08/04/2020 10:40 /62 Sitting    88 - R 14 98.1 301lbs 0oz 5'  7\" 47.14 2.54 97 %  21%    12/04/2020 08:53 /56 Sitting    93 - R 22 98 287lbs 0oz 5'  7\" 44.95 2.48 98 %            Physical Examination  · Constitutional  o Appearance  o : well-nourished, in no acute distress  · Neck  o Inspection/Palpation  o : normal appearance, trachea midline  o Thyroid  o : gland size normal, nontender  · Respiratory  o Respiratory Effort  o : breathing unlabored  o Inspection of Chest  o : normal appearance  o Auscultation of Lungs  o : normal breath sounds throughout  · Cardiovascular  o Heart  o :   § Auscultation of Heart  § : regular rate and rhythm, no murmurs  · Breasts  o Inspection of Breasts  o : breasts symmetrical, erythematous lacy rash below both breasts, no deformities present, no discharge present  o Palpation of Breasts, Axillae  o : no masses present on palpation, no breast tenderness  · Gastrointestinal  o Abdominal Examination  o : abdomen nontender to palpation, tone normal without rigidity or guarding, no masses present, normal bowel sounds  · Genitourinary  o External Genitalia  o : no inflammation, no lesions present  o Vagina  o : normal vaginal vault, no discharge present, no inflammatory lesions present, no masses present  o Urethra  o :   § Urethral Meatus  § : no inflammation present  o Bladder  o : nontender to palpation  o Cervix  o : appearance healthy, no lesions present, nontender to palpation, no discharges, no bleeding present, normal midline position  o Uterus  o : nontender to palpation, no masses present, position midline/midplane  o Adnexa  o : no tenderness or masses present on bimanual " examination  o Anus  o : no inflammation or lesions present  o Perineum  o : perineum within normal limits  · Lymphatic  o Neck  o : no lymphadenopathy present  o Axilla  o : no lymphadenopathy present  o Groin  o : no lymphadenopathy present  · Neurologic  o Mental Status Examination  o :   § Orientation  § : grossly oriented to person, place and time  o Gait and Station  o : normal gait, able to stand without difficulty  · Psychiatric  o Judgement and Insight  o : judgment and insight intact  o Mood and Affect  o : mood normal, affect appropriate          Assessment  · Pap Smear     V76.2/Z01.419  · Routine gynecological examination     V72.31/Z01.419  · Pap smear, as part of routine gynecological examination     V76.2/Z01.419  · Type 2 diabetes mellitus with other specified complication, without long-term current use of insulin     250.80/E11.69  stable on current dose at 66 units of tresiba. hgb a1c due 2.2020 will follow up at this time   · Essential hypertension     401.9/I10  will reduce Lisinopril/hctz to 1isinopril 10 mg daily   · Candidiasis of skin     112.3/B37.2      Plan  · Orders  o Pap smear (73579) - V76.2/Z01.419 - 12/04/2020  o Vaginal Screen (Candida, Gardnerella & Trichomonas) (55297) - V72.31/Z01.419 - 12/04/2020  o ACO-39: Current medications updated and reviewed (1159F, ) - - 12/04/2020  o Estab. Pt. 15 Min Low/Moderate (92149) - 250.80/E11.69, 401.9/I10 - 12/04/2020  · Medications  o nystatin-triamcinolone 100,000-0.1 unit/gram-% topical ointment   SIG: apply to the affected area(s) by topical route 3 times per day for 14 days   DISP: (1) Tube with 0 refills  Prescribed on 12/04/2020     o lisinopril 10 mg oral tablet   SIG: one tablet po daily   DISP: (90) Tablet with 1 refills  Adjusted on 12/04/2020     o Medications have been Reconciled  o Transition of Care or Provider Policy  · Instructions  o **Pap Test/Liquid Based:   o Thin Prep  o Source:   o Cervix  o **Perform Reflex Human  Papilloma Virus (HPV) High Risk on this Pap (If atypical squamous cells of the undetermined signifigcance (ASCUS)/Atypical Glandular Cells of undetermined significance (AGCUS): Low Grade Squamous Intraepitheal lesion (LGSIL): **  o No  o ********  o Medicare:  o No  o **Is this an annual PAP:  o Yes  o **Pap Test/Liquid Based:   o Thin Prep  o Source:  o Cervix  o **Perform Reflex Human Papilloma Virus (HPV) High Risk on this Pap (If atypical squamous cells of the undetermined signifigcance (ASCUS)/Atypical Glandular Cells of undetermined significance (AGCUS): Low Grade Squamous Intraepitheal lesion (LGSIL): **  o Yes, upon instruction from sending office  o Medicare:  o No  o **Is this an annual PAP:  o Yes  o Counseled on monthly breast self exams.   o Counseled on STD prevention.  o Counseled on diet and exercise.   o Counseled on weight-bearing exercise.  o Recommended Calcium with Vitamin D twice daily.  o Patient was educated/instructed on their diagnosis, treatment and medications prior to discharge from the clinic today.  · Disposition  o obtain labs prior to follow up appt  o Follow up in office in 2 months            Electronically Signed by: CARLTON Childers -Author on December 4, 2020 09:17:18 AM

## 2021-05-13 NOTE — PROGRESS NOTES
Progress Note      Patient Name: Bianca Boles   Patient ID: [id number]   Sex: Female   YOB: 1980    Primary Care Provider: Patircia VINCENT   Referring Provider: Patricia VINCENT    Visit Date: August 4, 2020    Provider: CARLTON Childers   Location: [facility]   Location Address: [address] [place] [address]  [id number]   Location Phone: [phone]          Chief Complaint  · follow up DM2, HTN, hyperlipidemia, and depression  · lab results       History Of Present Illness  Bianca Boles is a 40 year old /White female who presents for evaluation and treatment of:      Follow up DM2, HTN, hyperlipidemia, and depression  lab results     Referral for weight loss surgery for passport  scheduled stress test for cardiac clearance  also EGD schedule for preop   pt reports appointment march 18 with dr Grajeda to discuss procedure of gastric sleeve  needs to follow for 6 months with diet, weight management, and exercise    last eye exam due at 20/20 will schedule   checking feet daily     12.2018 pap  mammogram 9.2019     pt c/o pelvic pressure with dark urine, increase water intake, symptoms improved   history of kidney stones       Past Medical History  Disease Name Date Onset Notes   Allergic rhinitis due to allergen 06/09/2016 --    Anxiety --  --    Diabetes --  --    Diabetes Mellitus, Type II --  --    Gastroparesis 06/18/2015 --    GERD 06/18/2015 --    Hyperlipemia --  --    Hyperlipidemia --  --    Hypertension --  --    Hypertension, Benign Essential --  --    Hypertriglyceridemia --  --    Psoriasis 09/30/2015 --    Reflux --  --    Seasonal allergies --  --          Past Surgical History  Procedure Name Date Notes   Adenoidectomy --  --    Cesarian Section 2006 2010 --    EGD 2014 --    Kidney --  --    Tonsillectomy --  --          Medication List  Name Date Started Instructions   apple cider vinegar 500 mg oral tablet   "take 1 tablet by oral route 2 times a day   BD Ultra-Fine Short Pen Needle 31 gauge x 5/16\" miscellaneous needle 03/05/2020 USE AS DIRECTED   bupropion HCl 300 mg oral tablet extended release 24 hr 08/04/2020 TAKE 1 TABLET DAILY   Calcium 500 500 mg calcium (1,250 mg) oral tablet  take 1 tablet by oral route daily   Camrese 0.15 mg-30 mcg (84)/10 mcg (7) oral tablets,dose pack,3 month 08/04/2020 TAKE ONE TABLET BY MOUTH ONCE DAILY for 90 days   diclofenac sodium 75 mg oral tablet,delayed release (DR/EC) 07/24/2020 TAKE ONE TABLET BY MOUTH TWICE DAILY   Effexor XR 37.5 mg oral capsule,extended release 24hr 08/04/2020 take 1 capsule (37.5 mg) by oral route once daily with food for 90 days   esomeprazole magnesium 20 mg oral capsule,delayed release(DR/EC)  take 1 capsule (20 mg) by oral route once daily at least 1 hour before a meal swallowing whole. Do not crush or chew granules.   FreeStyle Lancets 28 gauge miscellaneous misc 07/25/2019 use as directed to check glucose levels   FreeStyle Ling 14 Day [place] miscellaneous misc 07/25/2019 use as directed to check glucose levels   FreeStyle Ling 14 Day Sensor miscellaneous kit 03/05/2020 USE AS DIRECTED, CHANGE SENSOR AFTER NO MORE THAN 14 DAYS   Glucometer 01/05/2018 Use QD to check blood sugar (dx: E11.9)   Lancets 01/05/2018 Use QD to test blood sugar (dx:E11.9)   lisinopril-hydrochlorothiazide 20-12.5 mg oral tablet 08/04/2020 TAKE 1 TABLET DAILY   metformin 1,000 mg oral tablet 08/04/2020 TAKE 1 TABLET TWICE A DAY WITH MORNING AND EVENING MEALS for 90 days   multivitamin oral capsule  take 1 capsule by oral route daily   Ozempic 1 mg/dose (2 mg/1.5 mL) subcutaneous pen injector 05/01/2020 1 MG SQ once weekly   Pen Needle 31 gauge x 5/16\" miscellaneous needle 07/25/2019 use as directed   pravastatin 10 mg oral tablet 08/04/2020 TAKE 1 TABLET DAILY AT BEDTIME   Test strips 01/05/2018 Use to test blood sugar QD (DX: E11.9)   Tresiba FlexTouch U-200 200 unit/mL (3 mL) " subcutaneous insulin pen 06/05/2020 inject 160 units by subcutaneous route once a day (at bedtime) for 90 days   Vitamins B Complex oral capsule  take 1 capsule by oral route daily   Zofran 4 mg oral tablet 06/25/2019 Take 1-2 tabs po TID PRN         Allergy List  Allergen Name Date Reaction Notes   NO KNOWN DRUG ALLERGIES --  --  --        Allergies Reconciled  Family Medical History  Disease Name Relative/Age Notes   Colon Neoplasm, Malignant Father/57   currentyly 62   Cancer, Unspecified Father/   Father   Diabetes, unspecified type Father/   Father         Social History  Finding Status Start/Stop Quantity Notes   Alcohol Current some day --/-- --  1-2 glasses rarely   Alcohol Use Current some day --/-- --  rarely drinks, less than 1 drink per day, has been drinking for 6-10 years   lives with children --  --/-- --  --    lives with spouse --  --/-- --  --    . --  --/-- --  --    Recreational Drug Use Former --/-- --  in the past   Tobacco Former --/-- 1/4 ppd --    Working --  --/-- --  --          Immunizations  NameDate Admin Mfg Trade Name Lot Number Route Inj VIS Given VIS Publication   Hepatitis A10/02/2018 NE HAVRIX-ADULT  IM RD 10/02/2018    Comments: Given at [facility]   Hepatitis B12/29/2016 NE Not Entered  NE NE 12/29/2016    Comments:    Hepatitis B07/28/2016 NE Not Entered  NE NE 07/29/2016    Comments:    Sragkgoqi49/03/2018 NE Fluzone Quadrivalent  IM RA 08/06/2018    Comments: fluzone was given at [employer] [facility]   Eoxqbbhra34/07/2015 NE Not Entered  NE NE 09/08/2015    Comments: Given by [facility]   Splswbcpqyup06/14/2015 NE Not Entered [id number] NE NE 04/15/2015 10/06/2009   Comments: given at [facility]   Tdap11/07/2016 SKB BOOSTRIX [id number] IM RD 11/07/2016 02/24/2015   Comments: tolerated well         Review of Systems  · Constitutional  o Denies  o : fatigue  · Eyes  o Denies  o : blurred vision, changes in vision  · HENT  o Denies  o : headaches  · Breasts  o Denies  o : lumps,  "tenderness, swelling  · Cardiovascular  o Denies  o : chest pain  · Respiratory  o Denies  o : cough  · Gastrointestinal  o Denies  o : nausea, vomiting, diarrhea, constipation, abdominal pain  · Genitourinary  o Denies  o : dysuria  · Integument  o Denies  o : rash  · Musculoskeletal  o Denies  o : joint pain, joint swelling, muscle pain  · Endocrine  o Admits  o : central obesity  o Denies  o : polyuria, polydipsia  · Psychiatric  o Admits  o : depression      Vitals  Date Time BP Position Site L\R Cuff Size HR RR TEMP (F) WT  HT  BMI kg/m2 BSA m2 O2 Sat HC       02/04/2020 09:06 /65 Sitting    88 - R 19 97.8 313lbs 16oz 5'  7\" 49.18 2.59 95 %    03/04/2020 08:16 /67 Sitting    83 - R 18 98 313lbs 16oz 5'  7\" 49.18 2.59 98 %    08/04/2020 10:40 /62 Sitting    88 - R 14 98.1 301lbs 0oz 5'  7\" 47.14 2.54 97 %          Physical Examination  · Constitutional  o Appearance  o : well-nourished, in no acute distress  · Eyes  o Conjunctivae  o : conjunctivae normal  o Sclerae  o : sclerae white  o Pupils and Irises  o : pupils equal and round  o Eyelids/Ocular Adnexae  o : eyelid appearance normal, no exudates present  · Ears, Nose, Mouth and Throat  o Throat  o : clear   · Neck  o Inspection/Palpation  o : normal appearance, no masses or tenderness, trachea midline  o Thyroid  o : gland size normal, nontender, no nodules or masses present on palpation, symmetric  · Respiratory  o Respiratory Effort  o : breathing unlabored  o Inspection of Chest  o : normal appearance  o Auscultation of Lungs  o : normal breath sounds throughout inspiration and expiration  · Cardiovascular  o Heart  o :   § Auscultation of Heart  § : regular rate and rhythm, no murmurs  o Peripheral Vascular System  o :   § Carotid Arteries  § : no bruits present  § Pedal Pulses  § : pulses 2 bilaterally  § Extremities  § : no clubbing or edema  · Gastrointestinal  o Abdominal Examination  o : abdomen nontender to palpation, tone normal " without rigidity or guarding, no masses present, bowel sounds present   · Lymphatic  o Neck  o : no lymphadenopathy present  · Skin and Subcutaneous Tissue  o General Inspection  o : pink, warm, dry   · Neurologic  o Gait and Station  o : normal gait, able to stand without difficulty  · Left DM Foot Exam  o Sensation  o : normal sensory exam perceptible to 10-gram nylon monofilament exam (4/5) and light touch.  o Visual Inspection  o : visual inspection is normal with no signs of breakdown, ulcerations or deformities unless otherwise noted.   o Vascular  o : palpable dorsalis pedis and posterir tibialis pulses present, normal capillary refill  · Right DM Foot Exam  o Sensation  o : normal sensory exam perceptible to 10-gram nylon monofilament exam (4/5) and light touch.  o Visual Inspection  o : visual inspection is normal with no signs of breakdown, ulcerations or deformities unless otherwise noted.   o Vascular  o : palpable dorsalis pedis and posterir tibialis pulses present, normal capillary refill          Assessment  · Screening for depression     V79.0/Z13.89  · Visit for screening mammogram     V76.12/Z12.31  · Depression     311/F32.9  currently controlled   · Type 2 diabetes mellitus with other specified complication, with long-term current use of insulin     250.80/E11.69  currently controlled   · Essential hypertension     401.9/I10  currently controlled   · Hyperlipidemia     272.4/E78.5  stable on statine  · Obesity     278.00/E66.9  decrease 1200 calories, one gallon of water per day, exercise 150 minutes per week, will refer to bariatric surgery       Plan  · Orders  o ACO-18: Negative screen for clinical depression using a standardized tool () - V79.0/Z13.89 - 08/04/2020  o Screening Mammography; Bilateral 3D (69956, 87763, ) - V76.12/Z12.31 - 08/04/2020  o Diabetes 2 Panel (Urine Microalbumin, CMP, Lipid, A1c, ) [facility] (11028, 64353, 66560, 38216) - - 02/04/2021  o CBC with Auto Diff [facility]  (52860) - - 02/04/2021  o Urinalysis with Reflex Microscopy if abnormal ([facility]) (48647) - - 02/04/2021  o Thyroid Profile (60670, 84213, THYII) - - 02/04/2021  o ACO-39: Current medications updated and reviewed () - - 08/04/2020  o ACO-14: Influenza immunization administered or previously received () - - 08/04/2020  o Bariatric Consultation ([facility]) - 278.00/E66.9 - 08/04/2020  · Medications  o bupropion HCl 300 mg oral tablet extended release 24 hr   SIG: TAKE 1 TABLET DAILY   DISP: (90) Tablet with 1 refills  Adjusted on 08/04/2020     o Camrese 0.15 mg-30 mcg (84)/10 mcg (7) oral tablets,dose pack,3 month   SIG: TAKE ONE TABLET BY MOUTH ONCE DAILY for 90 days   DISP: (90) Tablet with 1 refills  Refilled on 08/04/2020     o Effexor XR 37.5 mg oral capsule,extended release 24hr   SIG: take 1 capsule (37.5 mg) by oral route once daily with food for 90 days   DISP: (90) capsules with 1 refills  Refilled on 08/04/2020     o lisinopril-hydrochlorothiazide 20-12.5 mg oral tablet   SIG: TAKE 1 TABLET DAILY   DISP: (90) Tablet with 1 refills  Refilled on 08/04/2020     o metformin 1,000 mg oral tablet   SIG: TAKE 1 TABLET TWICE A DAY WITH MORNING AND EVENING MEALS for 90 days   DISP: (360) Tablet with 1 refills  Refilled on 08/04/2020     o pravastatin 10 mg oral tablet   SIG: TAKE 1 TABLET DAILY AT BEDTIME   DISP: (90) Tablet with 1 refills  Refilled on 08/04/2020     o Medications have been Reconciled  o Transition of Care or Provider Policy  · Instructions  o Depression Screen completed and scanned into the EMR under the designated folder within the patient's documents.  o Today's PHQ-9 result is 4  o Continue blood sugar monitoring daily and record. Bring your log to office visits. Call the office for readings below 70 and above 250 or any complications.  o Daily foot care. Avoid walking barefoot. Annual Dilated Eye Exam.  o Discussed with patient blood pressure monitoring, hemoglobin A1C levels need to be  below 7.0, and LDL (Lipid) goals below 70.  o Patient was educated/instructed on their diagnosis, treatment and medications prior to discharge from the clinic today.  · Disposition  o Follow Up in Office in 6 months or sooner if needed  o obtain labs prior to follow up appt  o schedule PAP            Electronically Signed by: Patricia Hernandez APRN -Author on August 4, 2020 11:26:17 AM

## 2021-05-14 ENCOUNTER — HOSPITAL ENCOUNTER (OUTPATIENT)
Dept: GENERAL RADIOLOGY | Facility: HOSPITAL | Age: 41
Discharge: HOME | End: 2021-05-14
Attending: NURSE PRACTITIONER

## 2021-05-14 ENCOUNTER — OFFICE VISIT CONVERTED (OUTPATIENT)
Dept: FAMILY MEDICINE CLINIC | Facility: CLINIC | Age: 41
End: 2021-05-14
Attending: NURSE PRACTITIONER

## 2021-05-14 ENCOUNTER — HOSPITAL ENCOUNTER (OUTPATIENT)
Dept: FAMILY MEDICINE CLINIC | Facility: CLINIC | Age: 41
Discharge: HOME | End: 2021-05-14
Attending: NURSE PRACTITIONER

## 2021-05-14 VITALS
DIASTOLIC BLOOD PRESSURE: 51 MMHG | TEMPERATURE: 99 F | OXYGEN SATURATION: 98 % | WEIGHT: 258.12 LBS | BODY MASS INDEX: 40.51 KG/M2 | RESPIRATION RATE: 20 BRPM | HEART RATE: 54 BPM | SYSTOLIC BLOOD PRESSURE: 120 MMHG | HEIGHT: 67 IN

## 2021-05-14 VITALS
HEART RATE: 93 BPM | SYSTOLIC BLOOD PRESSURE: 100 MMHG | DIASTOLIC BLOOD PRESSURE: 56 MMHG | TEMPERATURE: 98 F | BODY MASS INDEX: 45.04 KG/M2 | HEIGHT: 67 IN | WEIGHT: 287 LBS | OXYGEN SATURATION: 98 % | RESPIRATION RATE: 22 BRPM

## 2021-05-14 LAB
BILIRUB UR QL STRIP: NEGATIVE
COLOR UR: YELLOW
CONV CLARITY OF URINE: CLEAR
CONV PROTEIN IN URINE BY AUTOMATED TEST STRIP: NORMAL
CONV UROBILINOGEN IN URINE BY AUTOMATED TEST STRIP: NORMAL
GLUCOSE UR QL: NEGATIVE
HGB UR QL STRIP: NORMAL
KETONES UR QL STRIP: NEGATIVE
LEUKOCYTE ESTERASE UR QL STRIP: NORMAL
NITRITE UR QL STRIP: NEGATIVE
PH UR STRIP.AUTO: 5.5 [PH]
SP GR UR: NORMAL

## 2021-05-14 NOTE — PROGRESS NOTES
Progress Note      Patient Name: Bianca Boles   Patient ID: [id number]   Sex: Female   YOB: 1980    Primary Care Provider: Patricia VINCENT   Referring Provider: Patricia VINCENT    Visit Date: February 4, 2021    Provider: CARLTON Childers   Location: [facility]   Location Address: [address] [place] [address]  [id number]   Location Phone: [phone]          Chief Complaint  · follow up for DM2, HTN, anxiety, depression, and hyperlipidemia       History Of Present Illness  Bianca Boles is a 40 year old /White female who presents for evaluation and treatment of:      follow up for DM2, HTN, anxiety, depression, and hyperlipidemia   review labs  medication refills    bariatric sugery 12.7.2020 gastric sleeve  stopped taking lisinopril around beginning of January, because she states that her BP kept dropping.  stopped insulin as well     Wants to know if she can get labs drawn for bariatric surgeon dr Grajeda [phone]    Concerned about iron level, was going to donate blood on Friday but they said her iron was low.   Takes Vistacal bariatric multivitamin     mammo- scheduled 3/2021  pap-12/2020  eye-5/2018, will self schedule  foot- 2018  [place] foot and ankle, checking feet daily trimming own toenails     BS checked: 1 time every few days, Fasting (around 93)          Past Medical History  Disease Name Date Onset Notes   Allergic rhinitis due to allergen 06/09/2016 --    Anxiety --  --    Diabetes --  --    Diabetes Mellitus, Type II --  --    Gastroparesis 06/18/2015 --    GERD 06/18/2015 --    Hyperlipemia --  --    Hyperlipidemia --  --    Hypertension --  --    Hypertension, Benign Essential --  --    Hypertriglyceridemia --  --    Psoriasis 09/30/2015 --    Reflux --  --    Seasonal allergies --  --          Past Surgical History  Procedure Name Date Notes   Adenoidectomy --  --    Cesarian Section 2006  "2010 --    EGD 2014 --    Kidney --  --    Tonsillectomy --  --          Medication List  Name Date Started Instructions   BD Ultra-Fine Short Pen Needle 31 gauge x 5/16\" miscellaneous needle 03/05/2020 USE AS DIRECTED   bupropion HCl 300 mg oral tablet extended release 24 hr 02/04/2021 TAKE 1 TABLET DAILY for 90 days   Calcium 500 500 mg calcium (1,250 mg) oral tablet  take 1 tablet by oral route daily   Camrese 0.15 mg-30 mcg (84)/10 mcg (7) oral tablets,dose pack,3 month 02/04/2021 TAKE ONE TABLET BY MOUTH ONCE DAILY for 90 days   Effexor XR 37.5 mg oral capsule,extended release 24hr 02/04/2021 take 1 capsule (37.5 mg) by oral route once daily with food for 90 days   esomeprazole magnesium 20 mg oral capsule,delayed release(DR/EC)  take 1 capsule (20 mg) by oral route once daily at least 1 hour before a meal swallowing whole. Do not crush or chew granules.   FreeStyle Lancets 28 gauge miscellaneous misc 07/25/2019 use as directed to check glucose levels   FreeStyle Ling 14 Day Jennings miscellaneous misc 07/25/2019 use as directed to check glucose levels   FreeStyle Ling 14 Day Sensor miscellaneous kit 03/05/2020 USE AS DIRECTED, CHANGE SENSOR AFTER NO MORE THAN 14 DAYS   Lancets 01/05/2018 Use QD to test blood sugar (dx:E11.9)   metformin 1,000 mg oral tablet 02/04/2021 TAKE 1 TABLET TWICE A DAY WITH MORNING AND EVENING MEALS for 90 days   multivitamin oral capsule  take 1 capsule by oral route daily   nystatin-triamcinolone 100,000-0.1 unit/gram-% topical ointment 12/04/2020 apply to the affected area(s) by topical route 3 times per day for 14 days   Pen Needle 31 gauge x 5/16\" miscellaneous needle 07/25/2019 use as directed   pravastatin 10 mg oral tablet 02/04/2021 TAKE 1 TABLET DAILY AT BEDTIME for 90 days   Test strips 01/05/2018 Use to test blood sugar QD (DX: E11.9)   ursodiol 300 mg oral capsule  take 1 capsule (300 mg) by oral route 2 times per day   Vitamins B Complex oral capsule  take 1 capsule by " oral route daily         Allergy List  Allergen Name Date Reaction Notes   NO KNOWN DRUG ALLERGIES --  --  --          Family Medical History  Disease Name Relative/Age Notes   Colon Neoplasm, Malignant Father/57   currentyly 62   Cancer, Unspecified Father/   Father   Diabetes, unspecified type Father/   Father         Social History  Finding Status Start/Stop Quantity Notes   Alcohol Current some day --/-- --  1-2 glasses rarely   Alcohol Use Current some day --/-- --  rarely drinks, less than 1 drink per day, has been drinking for 6-10 years   lives with children --  --/-- --  --    lives with spouse --  --/-- --  --    . --  --/-- --  --    Recreational Drug Use Former --/-- --  in the past   Tobacco Former --/-- 1/4 ppd --    Working --  --/-- --  --          Immunizations  NameDate Admin Mfg Trade Name Lot Number Route Inj VIS Given VIS Publication   Hepatitis A10/02/2018 NE HAVRIX-ADULT  IM RD 10/02/2018    Comments: Given at [facility]   Hepatitis B12/29/2016 NE Not Entered  NE NE 12/29/2016    Comments:    Hepatitis B07/28/2016 NE Not Entered  NE NE 07/29/2016    Comments:    Zvvdtdncr36/03/2018 NE Fluzone Quadrivalent  IM RA 08/06/2018    Comments: fluzone was given at [facility]   Jmsrhfcetrqo82/14/2015 NE Not Entered [id number] NE NE 04/15/2015 10/06/2009   Comments: given at [facility]   Tdap11/07/2016 SKB BOOSTRIX [id number] IM RD 11/07/2016 02/24/2015   Comments: tolerated well         Review of Systems  · Constitutional  o Admits  o : weight loss  o Denies  o : fever, chills  · Eyes  o Denies  o : changes in vision  · HENT  o Denies  o : ear pain, sore throat  · Cardiovascular  o Denies  o : chest Pain, palpitations, edema (swelling)  · Respiratory  o Denies  o : frequent cough, shortness of breath  · Gastrointestinal  o Denies  o : nausea, vomiting, changes in bowel habits  · Genitourinary  o Denies  o : dysuria, urinary frequency, urinary urgency, polyuria  · Integument  o Denies  o :  "rash  · Neurologic  o Denies  o : headache, tingling or numbness, dizziness  · Musculoskeletal  o Denies  o : joint pain, myalgias  · Endocrine  o Denies  o : polydipsia, polyphagia  · Psychiatric  o Denies  o : mood changes, memory changes, SI/HI  · Heme-Lymph  o Denies  o : easy bruising, easy bleeding, lymph node enlargement or tenderness  · Allergic-Immunologic  o Denies  o : eczema, seasonal allergies, urticaria      Vitals  Date Time BP Position Site L\R Cuff Size HR RR TEMP (F) WT  HT  BMI kg/m2 BSA m2 O2 Sat FR L/min FiO2 HC       02/04/2020 09:06 /65 Sitting    88 - R 19 97.8 313lbs 16oz 5'  7\" 49.18 2.59 95 %  21%    03/04/2020 08:16 /67 Sitting    83 - R 18 98 313lbs 16oz 5'  7\" 49.18 2.59 98 %  21%    08/04/2020 10:40 /62 Sitting    88 - R 14 98.1 301lbs 0oz 5'  7\" 47.14 2.54 97 %  21%    12/04/2020 08:53 /56 Sitting    93 - R 22 98 287lbs 0oz 5'  7\" 44.95 2.48 98 %      02/04/2021 09:33 /51 Sitting    54 - R 20 99 258lbs 2oz 5'  7\" 40.43 2.35 98 %            Physical Examination  · Constitutional  o Appearance  o : well-nourished, in no acute distress  · Eyes  o Conjunctivae  o : conjunctivae normal  o Sclerae  o : sclerae white  o Pupils and Irises  o : pupils equal and round  o Eyelids/Ocular Adnexae  o : eyelid appearance normal  · Ears, Nose, Mouth and Throat  o Ears  o :   § External Ears  § : external auditory canal appearance within normal limits  § Otoscopic Examination  § : tympanic membrane appearance within normal limits bilaterally  o Nose  o :   § External Nose  § : appearance normal  § Intranasal Exam  § : mucosa within normal limits  § Nasopharynx  § : no discharge present  o Oral Cavity  o :   § Oral Mucosa  § : oral mucosa normal  o Throat  o :   § Oropharynx  § : no inflammation or lesions present  · Neck  o Inspection/Palpation  o : normal appearance, trachea midline  o Thyroid  o : gland size normal, nontender  · Respiratory  o Respiratory Effort  o : " breathing unlabored  o Auscultation of Lungs  o : normal breath sounds throughout inspiration and expiration  · Cardiovascular  o Heart  o :   § Auscultation of Heart  § : regular rate and rhythm, no murmurs  o Peripheral Vascular System  o :   § Carotid Arteries  § : no bruits present  § Extremities  § : no clubbing or edema  · Gastrointestinal  o Abdominal Examination  o : abdomen nontender to palpation, bowel sounds present   · Lymphatic  o Neck  o : no lymphadenopathy present  · Skin and Subcutaneous Tissue  o General Inspection  o : pink, warm, dry   · Neurologic  o Mental Status Examination  o :   § Orientation  § : grossly oriented   o Gait and Station  o : normal gait, able to stand without difficulty  · Psychiatric  o Judgement and Insight  o : judgment and insight intact  o Mood and Affect  o : mood normal, affect appropriate  · Left DM Foot Exam  o Sensation  o : normal sensory exam perceptible to 10-gram nylon monofilament exam (5/5) and light touch.  o Visual Inspection  o : visual inspection is normal with no signs of breakdown, ulcerations or deformities unless otherwise noted.   o Vascular  o : palpable dorsalis pedis and posterir tibialis pulses present, normal capillary refill  · Right DM Foot Exam  o Sensation  o : normal sensory exam perceptible to 10-gram nylon monofilament exam (5/5) and light touch.  o Visual Inspection  o : visual inspection is normal with no signs of breakdown, ulcerations or deformities unless otherwise noted.   o Vascular  o : palpable dorsalis pedis and posterir tibialis pulses present, normal capillary refill              Assessment  · Anemia     285.9/D64.9  will start iron supplementation   · Anxiety disorder     300.00/F41.9  currently controlled   · Depression     311/F32.9  stable at this time on Wellbutrin   · Type 2 diabetes mellitus with other specified complication, without long-term current use of insulin     250.80/E11.69  currently controlled on metformin    · Essential hypertension     401.9/I10  will resume renal protection dose of Lisinopril 2.5 mg po daily   · Hyperlipidemia     272.4/E78.5  LDL below goal, stable on statin   · History of bariatric surgery     V45.86/Z98.84      Plan  · Orders  o Iron panel (iron, TIBC, transferrin saturation) (53811, 30670, 31727) - 285.9/D64.9 - 08/04/2021  o Diabetes 2 Panel (Urine Microalbumin, CMP, Lipid, A1c, ) [facility] (11911, 58635, 79151, 01696) - - 08/04/2021  o CBC with Auto Diff [facility] (99220) - - 08/04/2021  o Urinalysis with Reflex Microscopy ([facility]) (98446) - - 08/04/2021  o Thyroid Profile (17369, 07128, THYII) - - 08/04/2021  o Diabetic Foot (Motor and Sensory) Exam Completed [facility] (, , 2028F) - - 02/04/2021  o Vitamin D (25-Hydroxy) Level (44772) - - 02/04/2021  o ACO-39: Current medications updated and reviewed (1159F, ) - - 02/04/2021  o B12 Folate levels (B12FO) - - 02/04/2021  o Vitamin B1 (01761) - - 02/04/2021  o Prealbumin level (08301) - - 02/04/2021  · Medications  o lisinopril 2.5 mg oral tablet   SIG: take 1 tablet (2.5 mg) by oral route once daily for 90 days   DISP: (90) Tablet with 1 refills  Prescribed on 02/04/2021     o ferrous sulfate 325 mg (65 mg iron) oral tablet   SIG: take 1 tablet by oral route daily for 90 days   DISP: (90) Tablet with 1 refills  Prescribed on 02/04/2021     o Vitamin C 500 mg oral tablet   SIG: take 1 tablet by oral route daily for 90 days   DISP: (90) Tablet with 1 refills  Prescribed on 02/04/2021     o bupropion HCl 300 mg oral tablet extended release 24 hr   SIG: TAKE 1 TABLET DAILY for 90 days   DISP: (90) Tablet with 0 refills  Refilled on 02/04/2021     o Camrese 0.15 mg-30 mcg (84)/10 mcg (7) oral tablets,dose pack,3 month   SIG: TAKE ONE TABLET BY MOUTH ONCE DAILY for 90 days   DISP: (90) Tablet with 1 refills  Refilled on 02/04/2021     o Effexor XR 37.5 mg oral capsule,extended release 24hr   SIG: take 1 capsule (37.5 mg) by oral route once daily with food  for 90 days   DISP: (90) Capsule with 1 refills  Refilled on 02/04/2021     o metformin 1,000 mg oral tablet   SIG: TAKE 1 TABLET TWICE A DAY WITH MORNING AND EVENING MEALS for 90 days   DISP: (360) Tablet with 1 refills  Refilled on 02/04/2021     o pravastatin 10 mg oral tablet   SIG: TAKE 1 TABLET DAILY AT BEDTIME for 90 days   DISP: (90) Tablet with 1 refills  Refilled on 02/04/2021     o nystatin-triamcinolone 100,000-0.1 unit/gram-% topical ointment   SIG: apply to the affected area(s) by topical route 3 times per day for 14 days   DISP: (1) Tube with 0 refills  Discontinued on 02/04/2021     o Medications have been Reconciled  o Transition of Care or Provider Policy  · Instructions  o Patient was educated/instructed on their diagnosis, treatment and medications prior to discharge from the clinic today.  · Disposition  o Follow Up in Office in 6 months or sooner if needed  o obtain labs prior to follow up appt            Electronically Signed by: CARLTON Childers -Author on February 4, 2021 10:06:28 AM

## 2021-05-15 VITALS
OXYGEN SATURATION: 97 % | HEART RATE: 88 BPM | RESPIRATION RATE: 14 BRPM | DIASTOLIC BLOOD PRESSURE: 62 MMHG | WEIGHT: 293 LBS | BODY MASS INDEX: 45.99 KG/M2 | TEMPERATURE: 98.1 F | HEIGHT: 67 IN | SYSTOLIC BLOOD PRESSURE: 112 MMHG

## 2021-05-15 VITALS
SYSTOLIC BLOOD PRESSURE: 133 MMHG | HEART RATE: 83 BPM | DIASTOLIC BLOOD PRESSURE: 67 MMHG | HEIGHT: 67 IN | TEMPERATURE: 98 F | OXYGEN SATURATION: 98 % | RESPIRATION RATE: 18 BRPM | BODY MASS INDEX: 45.99 KG/M2 | WEIGHT: 293 LBS

## 2021-05-15 VITALS
RESPIRATION RATE: 19 BRPM | SYSTOLIC BLOOD PRESSURE: 129 MMHG | OXYGEN SATURATION: 95 % | WEIGHT: 293 LBS | HEART RATE: 88 BPM | HEIGHT: 67 IN | BODY MASS INDEX: 45.99 KG/M2 | TEMPERATURE: 97.8 F | DIASTOLIC BLOOD PRESSURE: 65 MMHG

## 2021-05-15 VITALS
OXYGEN SATURATION: 97 % | RESPIRATION RATE: 18 BRPM | SYSTOLIC BLOOD PRESSURE: 135 MMHG | HEIGHT: 67 IN | HEART RATE: 88 BPM | BODY MASS INDEX: 45.99 KG/M2 | WEIGHT: 293 LBS | DIASTOLIC BLOOD PRESSURE: 71 MMHG | TEMPERATURE: 97.3 F

## 2021-05-16 VITALS
TEMPERATURE: 98.3 F | HEIGHT: 67 IN | SYSTOLIC BLOOD PRESSURE: 128 MMHG | RESPIRATION RATE: 19 BRPM | WEIGHT: 293 LBS | DIASTOLIC BLOOD PRESSURE: 78 MMHG | HEART RATE: 85 BPM | OXYGEN SATURATION: 100 % | BODY MASS INDEX: 45.99 KG/M2

## 2021-05-16 VITALS — HEIGHT: 67 IN | WEIGHT: 293 LBS | RESPIRATION RATE: 14 BRPM | BODY MASS INDEX: 45.99 KG/M2

## 2021-05-16 VITALS
RESPIRATION RATE: 16 BRPM | SYSTOLIC BLOOD PRESSURE: 130 MMHG | WEIGHT: 293 LBS | HEIGHT: 67 IN | DIASTOLIC BLOOD PRESSURE: 60 MMHG | OXYGEN SATURATION: 98 % | HEART RATE: 79 BPM | BODY MASS INDEX: 45.99 KG/M2

## 2021-05-16 VITALS
SYSTOLIC BLOOD PRESSURE: 135 MMHG | HEART RATE: 85 BPM | WEIGHT: 293 LBS | RESPIRATION RATE: 16 BRPM | DIASTOLIC BLOOD PRESSURE: 64 MMHG | OXYGEN SATURATION: 98 % | HEIGHT: 67 IN | BODY MASS INDEX: 45.99 KG/M2

## 2021-05-16 VITALS
BODY MASS INDEX: 45.99 KG/M2 | HEART RATE: 72 BPM | OXYGEN SATURATION: 99 % | HEIGHT: 67 IN | RESPIRATION RATE: 18 BRPM | TEMPERATURE: 98.7 F | DIASTOLIC BLOOD PRESSURE: 56 MMHG | WEIGHT: 293 LBS | SYSTOLIC BLOOD PRESSURE: 119 MMHG

## 2021-05-16 VITALS
BODY MASS INDEX: 45.99 KG/M2 | RESPIRATION RATE: 16 BRPM | SYSTOLIC BLOOD PRESSURE: 147 MMHG | WEIGHT: 293 LBS | HEART RATE: 81 BPM | OXYGEN SATURATION: 98 % | DIASTOLIC BLOOD PRESSURE: 74 MMHG | HEIGHT: 67 IN

## 2021-05-16 VITALS
WEIGHT: 293 LBS | TEMPERATURE: 98.1 F | BODY MASS INDEX: 45.99 KG/M2 | HEIGHT: 67 IN | OXYGEN SATURATION: 98 % | SYSTOLIC BLOOD PRESSURE: 141 MMHG | DIASTOLIC BLOOD PRESSURE: 78 MMHG | HEART RATE: 94 BPM | RESPIRATION RATE: 18 BRPM

## 2021-05-16 VITALS — OXYGEN SATURATION: 98 % | WEIGHT: 293 LBS | HEIGHT: 67 IN | HEART RATE: 82 BPM | BODY MASS INDEX: 45.99 KG/M2

## 2021-05-16 VITALS
WEIGHT: 293 LBS | SYSTOLIC BLOOD PRESSURE: 151 MMHG | DIASTOLIC BLOOD PRESSURE: 78 MMHG | BODY MASS INDEX: 45.99 KG/M2 | HEIGHT: 67 IN

## 2021-05-16 LAB — BACTERIA UR CULT: NORMAL

## 2021-06-05 NOTE — PROGRESS NOTES
"   Progress Note      Patient Name: Bianca Boles   Patient ID: [id number]   Sex: Female   YOB: 1980    Primary Care Provider: Patricia VINCENT   Referring Provider: Patricia VINCENT    Visit Date: May 14, 2021    Provider: CARLTON Childers   Location: [facility]   Location Address: [address] [place], [place] [id number]   Location Phone: [phone]          Chief Complaint  · follow up on pain in right shoulder       History Of Present Illness  Bianca Boles is a 41 year old /White female who presents for evaluation and treatment of:      follow up on pain in right shoulder    Pt states she has had pain in shoulder since last 2.2020  states she was suppose to get an X-ray for it, but never did.   states she had weight loss surgery and every since then the surgery, the pain had become more severe    states the pain in right hip no injury, worse when bending and lifting leg up, pain for intermittent 2 weeks  exercising more 1-2 x per week at the gym    also c/o urinary urgency and frequency with dribbling at times       Past Medical History  Disease Name Date Onset Notes   Allergic rhinitis due to allergen 06/09/2016 --    Anxiety --  --    Diabetes --  --    Diabetes Mellitus, Type II --  --    Gastroparesis 06/18/2015 --    GERD 06/18/2015 --    History of bariatric surgery 02/04/2021 --    Hyperlipemia --  --    Hyperlipidemia --  --    Hypertension --  --    Hypertension, Benign Essential --  --    Hypertriglyceridemia --  --    Psoriasis 09/30/2015 --    Reflux --  --    Seasonal allergies --  --          Past Surgical History  Procedure Name Date Notes   Adenoidectomy --  --    Cesarian Section 2006 2010 --    EGD 2014 --    Kidney --  --    Tonsillectomy --  --          Medication List  Name Date Started Instructions   BD Ultra-Fine Short Pen Needle 31 gauge x 5/16\" miscellaneous needle 03/05/2020 USE AS " "DIRECTED   bupropion HCl 300 mg oral tablet extended release 24 hr 02/04/2021 TAKE 1 TABLET DAILY for 90 days   Calcium 500 500 mg calcium (1,250 mg) oral tablet  take 1 tablet by oral route daily   Effexor XR 37.5 mg oral capsule,extended release 24hr 02/04/2021 take 1 capsule (37.5 mg) by oral route once daily with food for 90 days   esomeprazole magnesium 20 mg oral capsule,delayed release(DR/EC)  take 1 capsule (20 mg) by oral route once daily at least 1 hour before a meal swallowing whole. Do not crush or chew granules.   ferrous sulfate 325 mg (65 mg iron) oral tablet 02/04/2021 take 1 tablet by oral route daily for 90 days   FreeStyle Lancets 28 gauge miscellaneous misc 07/25/2019 use as directed to check glucose levels   FreeStyle Ling 14 Day [place] miscellaneous misc 07/25/2019 use as directed to check glucose levels   FreeStyle Ling 14 Day Sensor miscellaneous kit 03/05/2020 USE AS DIRECTED, CHANGE SENSOR AFTER NO MORE THAN 14 DAYS   Lancets 01/05/2018 Use QD to test blood sugar (dx:E11.9)   lisinopril 2.5 mg oral tablet 02/04/2021 take 1 tablet (2.5 mg) by oral route once daily for 90 days   metformin 1,000 mg oral tablet 02/04/2021 TAKE 1 TABLET TWICE A DAY WITH MORNING AND EVENING MEALS for 90 days   multivitamin oral capsule  take 1 capsule by oral route daily   Pen Needle 31 gauge x 5/16\" miscellaneous needle 07/25/2019 use as directed   pravastatin 10 mg oral tablet 02/04/2021 TAKE 1 TABLET DAILY AT BEDTIME for 90 days   Test strips 01/05/2018 Use to test blood sugar QD (DX: E11.9)   ursodiol 300 mg oral capsule  take 1 capsule (300 mg) by oral route 2 times per day   Vitamin C 500 mg oral tablet 02/04/2021 take 1 tablet by oral route daily for 90 days   Vitamin D3 50 mcg (2,000 unit) oral tablet 02/08/2021 take 1 tablet by oral route daily for 90 days   Vitamins B Complex oral capsule  take 1 capsule by oral route daily         Allergy List  Allergen Name Date Reaction Notes   NO KNOWN DRUG " ALLERGIES --  --  --        Allergies Reconciled  Family Medical History  Disease Name Relative/Age Notes   Colon Neoplasm, Malignant Father/57   currentyly 62   Cancer, Unspecified Father/   Father   Diabetes, unspecified type Father/   Father         Social History  Finding Status Start/Stop Quantity Notes   Alcohol Current some day --/-- --  05/14/2021 - 1-2 glasses rarely   Alcohol Use Current some day --/-- --  rarely drinks, less than 1 drink per day, has been drinking for 6-10 years   lives with children --  --/-- --  --    lives with spouse --  --/-- --  --    . --  --/-- --  --    Recreational Drug Use Former --/-- --  in the past   Tobacco Former --/-- 1/4 ppd --    Working --  --/-- --  --          Immunizations  NameDate Admin Mfg Trade Name Lot Number Route Inj VIS Given VIS Publication   Hepatitis A10/02/2018 NE HAVRIX-ADULT  IM RD 10/02/2018    Comments: Given at [facility]   Hepatitis B12/29/2016 NE Not Entered  NE NE 12/29/2016    Comments:    Hepatitis B07/28/2016 NE Not Entered  NE NE 07/29/2016    Comments:    Jxdnzyrsi94/03/2018 NE Fluzone Quadrivalent  IM RA 08/06/2018    Comments: fluzone was given at [facility]   Defrjybdapcm84/14/2015 NE Not Entered [id number] NE NE 04/15/2015 10/06/2009   Comments: given at [facility]   Tdap11/07/2016 SKB BOOSTRIX [id number] IM RD 11/07/2016 02/24/2015   Comments: tolerated well         Review of Systems  · Constitutional  o Denies  o : fever, chills  · Cardiovascular  o Denies  o : chest Pain, palpitations, edema (swelling)  · Respiratory  o Denies  o : frequent cough, shortness of breath  · Gastrointestinal  o Denies  o : abdominal pain, nausea, vomiting, changes in bowel habits  · Genitourinary  o Admits  o : urinary frequency, urinary urgency  o Denies  o : dysuria  · Neurologic  o Admits  o : tingling or numbness  · Musculoskeletal  o Admits  o : shoulder pain, hip pain      Vitals  Date Time BP Position Site L\R Cuff Size HR RR TEMP (F) WT  HT  BMI kg/m2  "BSA m2 O2 Sat FR L/min FiO2 HC       03/04/2020 08:16 /67 Sitting    83 - R 18 98 313lbs 16oz 5'  7\" 49.18 2.59 98 %  21%    08/04/2020 10:40 /62 Sitting    88 - R 14 98.1 301lbs 0oz 5'  7\" 47.14 2.54 97 %  21%    12/04/2020 08:53 /56 Sitting    93 - R 22 98 287lbs 0oz 5'  7\" 44.95 2.48 98 %      02/04/2021 09:33 /51 Sitting    54 - R 20 99 258lbs 2oz 5'  7\" 40.43 2.35 98 %      05/14/2021 08:10 /60 Sitting    57 - R  97.1 224lbs 2oz 5'  7\" 35.1 2.19 100 %  21%          Physical Examination  · Constitutional  o Appearance  o : well-nourished, in no acute distress  · Respiratory  o Respiratory Effort  o : breathing unlabored  o Auscultation of Lungs  o : normal breath sounds throughout inspiration and expiration  · Cardiovascular  o Heart  o :   § Auscultation of Heart  § : regular rate and rhythm, no murmurs  · Gastrointestinal  o Abdominal Examination  o : abdomen nontender to palpation, bowel sounds present   · Musculoskeletal  o Spine  o :   § Range of Motion  § : spine range of motion normal  o Right Upper Extremity  o :   § Inspection/Palpation  § : mild pain with rom, rom slightly decreased   o Left Upper Extremity  o :   § Inspection/Palpation  § : no tenderness to palpation, ROM normal  o Right Lower Extremity  o :   § Inspection/Palpation  § : hip pain with internal rotation   o Left Lower Extremity  o :   § Inspection/Palpation  § : no joint or limb tenderness to palpation, ROM normal  · Skin and Subcutaneous Tissue  o General Inspection  o : pink, warm, dry   · Neurologic  o Mental Status Examination  o :   § Orientation  § : grossly oriented to person, place and time  o Gait and Station  o : normal gait, able to stand without difficulty  · Psychiatric  o Judgement and Insight  o : judgment and insight intact  o Mood and Affect  o : mood normal, affect appropriate  · Back  o Inspection  o : no gross deformities  o Palpation  o : right thoracic area tenderness to palpation, no " swelling  o Range of Motion  o : normal range of motion  o Reflexes  o : normal reflexes  o Gait  o : normal gait  o Special Tests  o : negative straight leg raise  · IMPShoulder Injection  o Shoulder  o : Right Shoulder   o Procedure info  o : Informed consent obtained. Patient was informed of possible adverse effects including but not limited to bleeding, damage to nerve, tendon or artery, increased blood sugar, or increased blood pressure. They were instructed to call if they have any concerns or questions. Time out performed. Patient placed with shoulder passively hanging from side of body at 0 degrees. Lateral edgeof scapular spine was palpated and site was marked just inferior to this, to proceed with injection per typical posterior approach. Betadine was swabbed at injection site per typical technique. 27 ga. 1.5 inch needle injected into bursa, directed slightly medially, aspiration was performed and then depomedrol 40 mg and 2 mL of 1% Lidocaine without Epi was slowly injected into joint space, fluid was free flowing. Needle was removed, betadine wiped off and band-aid applied to injection site.           Results  · In-Office Procedures  o Lab procedure  § IOP - Urinalysis without Microscopy (Clinitek) [facility] ([id number])   § Color Ur: Yellow   § Clarity Ur: Clear   § Glucose Ur Ql Strip: Negative   § Bilirub Ur Ql Strip: Negative   § Ketones Ur Ql Strip: Negative   § Sp Gr Ur Qn: greater than 1.030   § Hgb Ur Ql Strip: Moderate   § pH Ur-LsCnc: 5.5   § Prot Ur Ql Strip: >=300 mg/dL   § Urobilinogen Ur Strip-mCnc: 0.2 E.U./dL   § Nitrite Ur Ql Strip: Negative   § WBC Est Ur Ql Strip: Trace       Assessment  · Thoracic back pain     724.1/M54.6  will refer to physical therapy   · Urinary tract infection     599.0/N39.0  will obtain culture   · Shoulder pain, right     719.41/M25.511  will obtain xray and refer to PT   · Right Hip pain     719.45/M25.559  will obtain xray and refer to PT   · Urinary  urgency     788.63/R39.15  · Urinary frequency     788.41/R35.0      Plan  · Orders  o Thoracic Spine (3 view) [facility] ([id number]) - 724.1/M54.6 - 05/14/2021  o Urine Culture (Clean Catch) (85301, 81611) - 599.0/N39.0 - 05/14/2021  o Joint Injection; major joint or bursa (eg. shoulder, hip, knee, subacromial bursa) (20610) - 719.41/M25.511 - 05/14/2021  o ACO-39: Current medications updated and reviewed (1159F, ) - - 05/14/2021  o Xray shoulder right [facility] Preferred View ([id number]) - 719.41/M25.511 - 05/14/2021  o Xray hip right 2 or more views (includes AP Pelvis) [facility]. (70084) - 719.45/M25.559 - 05/14/2021  o PHYSICAL THERAPY/OCCUPATIONAL THERAPY CONSULTATION (Evaluate and Treat) (PTOTR) - 719.41/M25.511, 719.45/M25.559, 724.1/M54.6 - 05/14/2021  o Depo-Medrol injection 80mg () - 719.41/M25.511 - 05/14/2021   LOT [id number] EXP 1/2022  · Medications  o Mobic 15 mg oral tablet   SIG: take 1 tablet (15 mg) by oral route once daily for 14 days   DISP: (14) Tablet with 0 refills  Prescribed on 05/14/2021     o Cipro 500 mg oral tablet   SIG: take 1 tablet (500 mg) by oral route every 12 hours for 7 days   DISP: (14) Tablet with 0 refills  Prescribed on 05/14/2021     · Instructions  o Patient was educated/instructed on their diagnosis, treatment and medications prior to discharge from the clinic today.  · Disposition  o will contact with diagnostics results  o FOLLOW UP PENDING RESULTS            Electronically Signed by: CARLTON Childers -Author on May 14, 2021 09:59:36 AM

## 2021-06-16 ENCOUNTER — OFFICE VISIT (OUTPATIENT)
Dept: BARIATRICS/WEIGHT MGMT | Facility: CLINIC | Age: 41
End: 2021-06-16

## 2021-06-16 ENCOUNTER — LAB (OUTPATIENT)
Dept: LAB | Facility: HOSPITAL | Age: 41
End: 2021-06-16

## 2021-06-16 VITALS
RESPIRATION RATE: 18 BRPM | HEIGHT: 67 IN | BODY MASS INDEX: 34.84 KG/M2 | DIASTOLIC BLOOD PRESSURE: 65 MMHG | HEART RATE: 99 BPM | TEMPERATURE: 96.6 F | SYSTOLIC BLOOD PRESSURE: 100 MMHG | WEIGHT: 222 LBS

## 2021-06-16 DIAGNOSIS — E66.9 DIABETES MELLITUS TYPE 2 IN OBESE (HCC): ICD-10-CM

## 2021-06-16 DIAGNOSIS — E66.9 OBESITY, CLASS I, BMI 30-34.9: ICD-10-CM

## 2021-06-16 DIAGNOSIS — K21.9 GASTROESOPHAGEAL REFLUX DISEASE, UNSPECIFIED WHETHER ESOPHAGITIS PRESENT: ICD-10-CM

## 2021-06-16 DIAGNOSIS — M25.50 MULTIPLE JOINT PAIN: ICD-10-CM

## 2021-06-16 DIAGNOSIS — E11.69 DIABETES MELLITUS TYPE 2 IN OBESE (HCC): ICD-10-CM

## 2021-06-16 DIAGNOSIS — I10 ESSENTIAL HYPERTENSION: ICD-10-CM

## 2021-06-16 DIAGNOSIS — Z98.84 S/P LAPAROSCOPIC SLEEVE GASTRECTOMY: ICD-10-CM

## 2021-06-16 DIAGNOSIS — E66.9 OBESITY, CLASS I, BMI 30-34.9: Primary | ICD-10-CM

## 2021-06-16 PROBLEM — F41.9 ANXIETY: Status: ACTIVE | Noted: 2021-06-16

## 2021-06-16 PROBLEM — E66.811 OBESITY, CLASS I, BMI 30-34.9: Status: ACTIVE | Noted: 2021-03-16

## 2021-06-16 PROBLEM — E66.812 OBESITY, CLASS II, BMI 35-39.9: Status: RESOLVED | Noted: 2021-03-16 | Resolved: 2021-06-16

## 2021-06-16 LAB
BASOPHILS # BLD AUTO: 0.06 10*3/MM3 (ref 0–0.2)
BASOPHILS NFR BLD AUTO: 0.6 % (ref 0–1.5)
DEPRECATED RDW RBC AUTO: 48.6 FL (ref 37–54)
EOSINOPHIL # BLD AUTO: 0.17 10*3/MM3 (ref 0–0.4)
EOSINOPHIL NFR BLD AUTO: 1.6 % (ref 0.3–6.2)
ERYTHROCYTE [DISTWIDTH] IN BLOOD BY AUTOMATED COUNT: 16.5 % (ref 12.3–15.4)
FERRITIN SERPL-MCNC: 32.1 NG/ML (ref 13–150)
HCT VFR BLD AUTO: 36.7 % (ref 34–46.6)
HGB BLD-MCNC: 11.9 G/DL (ref 12–15.9)
IMM GRANULOCYTES # BLD AUTO: 0.06 10*3/MM3 (ref 0–0.05)
IMM GRANULOCYTES NFR BLD AUTO: 0.6 % (ref 0–0.5)
IRON 24H UR-MRATE: 57 MCG/DL (ref 37–145)
LYMPHOCYTES # BLD AUTO: 3.23 10*3/MM3 (ref 0.7–3.1)
LYMPHOCYTES NFR BLD AUTO: 30.8 % (ref 19.6–45.3)
MCH RBC QN AUTO: 26.6 PG (ref 26.6–33)
MCHC RBC AUTO-ENTMCNC: 32.4 G/DL (ref 31.5–35.7)
MCV RBC AUTO: 81.9 FL (ref 79–97)
MONOCYTES # BLD AUTO: 0.61 10*3/MM3 (ref 0.1–0.9)
MONOCYTES NFR BLD AUTO: 5.8 % (ref 5–12)
NEUTROPHILS NFR BLD AUTO: 6.35 10*3/MM3 (ref 1.7–7)
NEUTROPHILS NFR BLD AUTO: 60.6 % (ref 42.7–76)
NRBC BLD AUTO-RTO: 0 /100 WBC (ref 0–0.2)
PLATELET # BLD AUTO: 253 10*3/MM3 (ref 140–450)
PMV BLD AUTO: 10 FL (ref 6–12)
RBC # BLD AUTO: 4.48 10*6/MM3 (ref 3.77–5.28)
WBC # BLD AUTO: 10.48 10*3/MM3 (ref 3.4–10.8)

## 2021-06-16 PROCEDURE — 99213 OFFICE O/P EST LOW 20 MIN: CPT | Performed by: NURSE PRACTITIONER

## 2021-06-16 PROCEDURE — 83540 ASSAY OF IRON: CPT

## 2021-06-16 PROCEDURE — 82728 ASSAY OF FERRITIN: CPT

## 2021-06-16 PROCEDURE — 85025 COMPLETE CBC W/AUTO DIFF WBC: CPT

## 2021-06-16 PROCEDURE — 36415 COLL VENOUS BLD VENIPUNCTURE: CPT

## 2021-06-16 RX ORDER — ETONOGESTREL AND ETHINYL ESTRADIOL .12; .015 MG/D; MG/D
1 RING VAGINAL
COMMUNITY
Start: 2021-05-26 | End: 2021-07-13 | Stop reason: SDUPTHER

## 2021-06-16 RX ORDER — ASPIRIN 81 MG/1
1 TABLET ORAL DAILY
COMMUNITY
End: 2021-06-16

## 2021-06-16 RX ORDER — BUPROPION HYDROCHLORIDE 300 MG/1
1 TABLET ORAL DAILY
COMMUNITY
Start: 2021-06-01 | End: 2021-08-04 | Stop reason: SDUPTHER

## 2021-06-16 RX ORDER — MELOXICAM 15 MG/1
15 TABLET ORAL DAILY
COMMUNITY
Start: 2021-05-14 | End: 2021-09-27

## 2021-06-16 NOTE — PROGRESS NOTES
[facility] SURGERY  [address] [facility] [address]  [facility] [id number] [phone]  [address] [facility] [address]  [facility] [id number] [phone]  Dept: [phone]  6/16/2021      Bianca Boles.  [id number]  [id number]  1980  female      Chief Complaint   Patient presents with   • Follow-up     6 month sleeve       BH Post-Op Bariatric Surgery:   Bianca Boles is status post Laparoscopic Sleeve procedure, performed on 12/7/20     HPI:   Today's weight is 101 kg (222 lb) pounds, today's BMI is Body mass index is 34.28 kg/m².,@ has a  loss of 18 pounds since the last visit and@ weight loss since surgery is 79 pounds. The patient reports a decreased portion size and loss of appetite.      Bianca Boles denies nausea, vomiting, reflux and reports that she is doing well, tolerating all foods in small amounts.      Diet and Exercise: Diet history reviewed and discussed with the patient. Weight loss/gains to date discussed with the patient. The patient states they are eating 60 grams of protein per day. She reports eating 3 meals per day, a typical portion size of 1/2 cup, eating 1 snacks per day, drinking 5-6 or more 8-oz. glasses of water per day, no carbonated beverage consumption. She has activity restrictions due to hip and shoulder but is in PT.     She usually gets a snack between breakfast and lunch and then a small snack after dinner. She has been busier this last week, but has getting 3 of the 20oz bottles of water in most days. She has tried the [employer] shakes. She reports that she may have some heartburn if she eats too late close to laying down for bed.     Supplements: celebrate [employer] with iron and calcium.     Review of Systems   Constitutional: Positive for appetite change. Negative for fatigue and unexpected weight change.   HENT: Negative.    Eyes: Negative.    Respiratory: Negative.    Cardiovascular: Negative.  Negative for leg  swelling.   Gastrointestinal: Negative for abdominal distention, abdominal pain, constipation, diarrhea, nausea and vomiting.   Genitourinary: Negative for difficulty urinating, frequency and urgency.   Musculoskeletal: Negative for back pain.   Skin: Negative.    Psychiatric/Behavioral: Negative.    All other systems reviewed and are negative.      Patient Active Problem List   Diagnosis   • Chronic fatigue   • Essential hypertension   • Hyperlipidemia   • Heartburn   • Multiple joint pain   • Depression   • Diabetes mellitus type 2 in obese (CMS/HCC)   • Gastroparesis   • Gastroesophageal reflux disease   • Gastric polyps   • S/P laparoscopic sleeve gastrectomy   • Obesity, Class I, BMI 30-34.9   • Anxiety       Past Medical History:   Diagnosis Date   • Anxiety and depression    • Diabetes mellitus (CMS/HCC)    • Gastroparesis    • GERD (gastroesophageal reflux disease)    • High triglycerides    • History of kidney stones    • Hypertension    • Lumbar herniated disc    • Panic attacks    • PCOS (polycystic ovarian syndrome)    • Psoriasis     ELBOWS   • Spinal headache     AFTER PAIN EPIDURALS.  NO BLOOD PATCH       The following portions of the patient's history were reviewed and updated as appropriate: allergies, current medications, past family history, past medical history, past social history, past surgical history and problem list.    Vitals:    06/16/21 1535   BP: 100/65   Pulse: 99   Resp: 18   Temp: 96.6 °F (35.9 °C)       Physical Exam  Vitals and nursing note reviewed.   Constitutional:       Appearance: She is well-developed.   Neck:      Thyroid: No thyromegaly.   Cardiovascular:      Rate and Rhythm: Normal rate and regular rhythm.      Heart sounds: Normal heart sounds.   Pulmonary:      Effort: Pulmonary effort is normal. No respiratory distress.      Breath sounds: Normal breath sounds. No wheezing.   Abdominal:      General: Bowel sounds are normal. There is no distension.      Palpations:  Abdomen is soft.      Tenderness: There is no abdominal tenderness. There is no guarding.      Hernia: No hernia is present.   Musculoskeletal:         General: No tenderness.   Skin:     General: Skin is warm and dry.      Findings: No erythema or rash.   Neurological:      Mental Status: She is alert.   Psychiatric:         Behavior: Behavior normal.         Assessment:   Post-op, the patient is doing well.     Encounter Diagnoses   Name Primary?   • Obesity, Class I, BMI 30-34.9 Yes   • S/P laparoscopic sleeve gastrectomy    • Diabetes mellitus type 2 in obese (CMS/[facility])    • Essential hypertension    • Gastroesophageal reflux disease, unspecified whether esophagitis present    • Multiple joint pain        Plan:   Encouraged patient to be sure to get plenty of lean protein per day through small frequent meals all with a protein source.   Activity restrictions: none.   Recommended patient be sure to get at least 70 grams of protein per day by eating small, frequent meals all with high lean protein choices. Be sure to limit/cut back on daily carbohydrate intake. Discussed with the patient the recommended amount of water per day to intake- half of body weight in ounces. Reviewed vitamin requirements. Be sure to do routine exercise, 150 minutes per week minimum, including both cardio and strength training.     Instructions / Recommendations: dietary counseling recommended, recommended a daily protein intake of  grams, vitamin supplement(s) recommended, recommended exercising at least 150 minutes per week, behavior modifications recommended and instructed to call the office for concerns, questions, or problems.     The patient was instructed to follow up in 3 months .     Total time spent during this encounter today was 25 minutes

## 2021-06-18 ENCOUNTER — TELEPHONE (OUTPATIENT)
Dept: BARIATRICS/WEIGHT MGMT | Facility: CLINIC | Age: 41
End: 2021-06-18

## 2021-06-18 NOTE — TELEPHONE ENCOUNTER
Spoke to patient regarding lab results. Patient had no further questions.         ----- Message from CARLTON Amato sent at 6/17/2021  5:03 PM EDT -----  Ferritin and hemoglobin have improved and anemia appears to be resolving

## 2021-06-22 ENCOUNTER — TELEPHONE (OUTPATIENT)
Dept: FAMILY MEDICINE CLINIC | Facility: CLINIC | Age: 41
End: 2021-06-22

## 2021-06-22 NOTE — TELEPHONE ENCOUNTER
Caller: Bianca Boles    Relationship: Self    Best call back number: [phone]      What was the call regarding: PATIENT SAID DR NAVARRO WAS SUPPOSE TO SCHEDULE MRI RT SHOULDER OR HIP. PATIENT HASN'T HEARD ANYTHING YET.  ALSO A REFERRAL TO SEE ORTHOPEDIC SURGEON. OK TO LEAVE MESSAGE ON CELL PLEASE ADVISE     Do you require a callback: YES

## 2021-06-30 DIAGNOSIS — M25.559 HIP PAIN: Primary | ICD-10-CM

## 2021-06-30 NOTE — TELEPHONE ENCOUNTER
Left detailed voicemail on patient's phone. Orders did not transfer from [facility]. They have been put into [facility] today for [facility] to schedule.

## 2021-07-14 RX ORDER — ETONOGESTREL AND ETHINYL ESTRADIOL .12; .015 MG/D; MG/D
1 RING VAGINAL
Qty: 1 EACH | Refills: 2 | Status: SHIPPED | OUTPATIENT
Start: 2021-07-14 | End: 2021-08-04 | Stop reason: SDUPTHER

## 2021-07-15 VITALS
HEART RATE: 57 BPM | BODY MASS INDEX: 35.18 KG/M2 | OXYGEN SATURATION: 100 % | HEIGHT: 67 IN | WEIGHT: 224.12 LBS | DIASTOLIC BLOOD PRESSURE: 60 MMHG | SYSTOLIC BLOOD PRESSURE: 101 MMHG | TEMPERATURE: 97.1 F

## 2021-07-20 ENCOUNTER — APPOINTMENT (OUTPATIENT)
Dept: MRI IMAGING | Facility: HOSPITAL | Age: 41
End: 2021-07-20

## 2021-07-23 ENCOUNTER — OFFICE VISIT (OUTPATIENT)
Dept: ORTHOPEDIC SURGERY | Facility: CLINIC | Age: 41
End: 2021-07-23

## 2021-07-23 VITALS — OXYGEN SATURATION: 98 % | HEIGHT: 67 IN | WEIGHT: 228.4 LBS | BODY MASS INDEX: 35.85 KG/M2 | HEART RATE: 73 BPM

## 2021-07-23 DIAGNOSIS — M76.891 HIP FLEXOR TENDINITIS, RIGHT: Primary | ICD-10-CM

## 2021-07-23 PROCEDURE — 99203 OFFICE O/P NEW LOW 30 MIN: CPT | Performed by: ORTHOPAEDIC SURGERY

## 2021-07-23 NOTE — PROGRESS NOTES
"I have personally performed the services described in this document as scribed by the above individual and it is both accurate and complete. Domenic Bradley MD 21 Chief Complaint  Initial Evaluation of the Right Hip     Subjective      Bianca Boles presents to [facility] for an evaluation of right hip. Patient had weight loss surgery in 2020. She started moving more and going to the gym. She began developing right hip pain that shoots down her leg. She states pain with steps and bending over. She has been going to PT since 2021 that has been giving her relief.     Allergies   Allergen Reactions   • Morphine Itching and Nausea And Vomiting        Social History     Socioeconomic History   • Marital status:      Spouse name: Not on file   • Number of children: Not on file   • Years of education: Not on file   • Highest education level: Not on file   Tobacco Use   • Smoking status: Former Smoker     Years: 35.00     Types: Cigarettes     Quit date: 1/15/2020     Years since quittin.5   • Smokeless tobacco: Never Used   • Tobacco comment: A PACK A WEEK   Vaping Use   • Vaping Use: Never used   Substance and Sexual Activity   • Alcohol use: Yes     Comment: RARELY   • Drug use: Never   • Sexual activity: Defer        Review of Systems     Objective   Vital Signs:   Pulse 73   Ht 170.2 cm (67\")   Wt 104 kg (228 lb 6.4 oz)   SpO2 98%   BMI 35.77 kg/m²       Physical Exam  Constitutional:       Appearance: Normal appearance. He is well-developed and normal weight.   HENT:      Head: Normocephalic.      Right Ear: Hearing and external ear normal.      Left Ear: Hearing and external ear normal.      Nose: Nose normal.   Eyes:      Conjunctiva/sclera: Conjunctivae normal.   Cardiovascular:      Rate and Rhythm: Normal rate.   Pulmonary:      Effort: Pulmonary effort is normal.      Breath sounds: No wheezing or rales.   Abdominal:      Palpations: Abdomen " is soft.      Tenderness: There is no abdominal tenderness.   Musculoskeletal:      Cervical back: Normal range of motion.   Skin:     Findings: No rash.   Neurological:      Mental Status: He is alert and oriented to person, place, and time.   Psychiatric:         Mood and Affect: Mood and affect normal.         Judgment: Judgment normal.       Ortho Exam      RIGHT HIP: Good tone of hip flexors, hip extensors, hip adductor, hip abductors. Good strength to hamstrings, quadriceps, dorsiflexors and plantar flexors. Sensation grossly intact. Neurovascular intact. Skin intact. Dorsal Pedal Pulse 2+, posteriror tibialis pulse 2+. Calf supple, non-tender. Non-tender hip and pelvic muscles. Tender hip flexor. Full hip range of motion. Full leg raise. Pain with straight leg resistance.     Procedures        Imaging Results (Most Recent)     None           Result Review :       [facility]            PACS RADIOLOGY REPORT     Patient: BEBO CHARLTON     Acct: [id number]     Report: #[id number]  UNIT #: [id number]     DOS: 2021 0926     Order #: [id number]  [facility]     : 1980  ORDERING: ROEL NAVARRO  DICTATING: Glenda Hong  REQ #: [id number]     EXAM: HIPPELRT - HIP RIGHT 2 VIEWS w pelvis  REASON FOR EXAM:   REASON FOR VISIT: THORACIC BACK PN/SHOULDER PN RT/RT HIP PN  PROCEDURE: 2 VIEW RIGHT HIP  WITH SINGLE VIEW AP PELVIS         COMPARISON: None         INDICATIONS: GENERAL RIGHT HIP PAIN FOR 3 MONTHS, WORSE THE LAST 2 WEEKS. NO INJURY.         FINDINGS:     There is no evidence for acute fracture or dislocation. The bilateral hips are intact. No focal     osseous abnormalities are seen. The sacral ala are intact. The SI joints are intact.  No     significant degenerative changes identified.  Mild trochanteric enthesopathy is present.  The soft     tissues are unremarkable.         CONCLUSION:     No acute osseous abnormality.  No  significant joint space narrowing.  Mild trochanteric     enthesopathy is present consistent with gluteal tendinopathy.             Assessment and Plan     DX: Right hip flexor tendinitis     Discussed treatment plans and diagnosis with the patient. Patient will continue physical therapy, this has been providing her with relief so far. If she has increasing pain or fails to improve, we will discuss obtaining an MRI in the future.     Call or return if worsening symptoms.    Follow Up     PRN.       Patient was given instructions and counseling regarding her condition or for health maintenance advice. Please see specific information pulled into the AVS if appropriate.     Scribed for Domenic Bradley MD by Sully Martinez.  07/23/21   13:34 EDT    I have personally performed the services described in this document as scribed by the above individual and it is both accurate and complete.  Domenic Bradley MD 07/23/21  13:34 EDT

## 2021-08-02 ENCOUNTER — LAB (OUTPATIENT)
Dept: LAB | Facility: HOSPITAL | Age: 41
End: 2021-08-02

## 2021-08-02 DIAGNOSIS — D64.9 ANEMIA, UNSPECIFIED TYPE: ICD-10-CM

## 2021-08-02 DIAGNOSIS — E11.69 DIABETES MELLITUS TYPE 2 IN OBESE (HCC): ICD-10-CM

## 2021-08-02 DIAGNOSIS — E55.9 VITAMIN D DEFICIENCY: ICD-10-CM

## 2021-08-02 DIAGNOSIS — E66.9 DIABETES MELLITUS TYPE 2 IN OBESE (HCC): ICD-10-CM

## 2021-08-02 DIAGNOSIS — Z98.84 HX OF BARIATRIC SURGERY: ICD-10-CM

## 2021-08-02 DIAGNOSIS — E78.5 HYPERLIPIDEMIA, UNSPECIFIED HYPERLIPIDEMIA TYPE: ICD-10-CM

## 2021-08-02 DIAGNOSIS — I10 HYPERTENSION, ESSENTIAL: ICD-10-CM

## 2021-08-02 LAB
25(OH)D3 SERPL-MCNC: 102 NG/ML
ALBUMIN SERPL-MCNC: 3.7 G/DL (ref 3.5–5.2)
ALBUMIN UR-MCNC: 52.2 MG/DL
ALBUMIN/GLOB SERPL: 1.2 G/DL
ALP SERPL-CCNC: 84 U/L (ref 39–117)
ALT SERPL W P-5'-P-CCNC: 11 U/L (ref 1–33)
ANION GAP SERPL CALCULATED.3IONS-SCNC: 9.3 MMOL/L (ref 5–15)
AST SERPL-CCNC: 12 U/L (ref 1–32)
BACTERIA UR QL AUTO: ABNORMAL /HPF
BASOPHILS # BLD AUTO: 0.04 10*3/MM3 (ref 0–0.2)
BASOPHILS NFR BLD AUTO: 0.4 % (ref 0–1.5)
BILIRUB SERPL-MCNC: 0.2 MG/DL (ref 0–1.2)
BILIRUB UR QL STRIP: NEGATIVE
BUN SERPL-MCNC: 17 MG/DL (ref 6–20)
BUN/CREAT SERPL: 17.7 (ref 7–25)
CALCIUM SPEC-SCNC: 9.4 MG/DL (ref 8.6–10.5)
CHLORIDE SERPL-SCNC: 105 MMOL/L (ref 98–107)
CHOLEST SERPL-MCNC: 135 MG/DL (ref 0–200)
CLARITY UR: ABNORMAL
CO2 SERPL-SCNC: 24.7 MMOL/L (ref 22–29)
COLOR UR: YELLOW
CREAT SERPL-MCNC: 0.96 MG/DL (ref 0.57–1)
CREAT UR-MCNC: 106.8 MG/DL
DEPRECATED RDW RBC AUTO: 43.9 FL (ref 37–54)
EOSINOPHIL # BLD AUTO: 0.25 10*3/MM3 (ref 0–0.4)
EOSINOPHIL NFR BLD AUTO: 2.3 % (ref 0.3–6.2)
ERYTHROCYTE [DISTWIDTH] IN BLOOD BY AUTOMATED COUNT: 14.4 % (ref 12.3–15.4)
FOLATE SERPL-MCNC: >20 NG/ML (ref 4.78–24.2)
GFR SERPL CREATININE-BSD FRML MDRD: 64 ML/MIN/1.73
GLOBULIN UR ELPH-MCNC: 3.2 GM/DL
GLUCOSE SERPL-MCNC: 91 MG/DL (ref 65–99)
GLUCOSE UR STRIP-MCNC: NEGATIVE MG/DL
HBA1C MFR BLD: 5.4 % (ref 4.8–5.6)
HCT VFR BLD AUTO: 38.9 % (ref 34–46.6)
HDLC SERPL-MCNC: 43 MG/DL (ref 40–60)
HGB BLD-MCNC: 12.4 G/DL (ref 12–15.9)
HGB UR QL STRIP.AUTO: ABNORMAL
HYALINE CASTS UR QL AUTO: ABNORMAL /LPF
IMM GRANULOCYTES # BLD AUTO: 0.07 10*3/MM3 (ref 0–0.05)
IMM GRANULOCYTES NFR BLD AUTO: 0.6 % (ref 0–0.5)
IRON 24H UR-MRATE: 53 MCG/DL (ref 37–145)
IRON SATN MFR SERPL: 11 % (ref 20–50)
KETONES UR QL STRIP: NEGATIVE
LDLC SERPL CALC-MCNC: 63 MG/DL (ref 0–100)
LDLC/HDLC SERPL: 1.35 {RATIO}
LEUKOCYTE ESTERASE UR QL STRIP.AUTO: ABNORMAL
LYMPHOCYTES # BLD AUTO: 2.88 10*3/MM3 (ref 0.7–3.1)
LYMPHOCYTES NFR BLD AUTO: 26.2 % (ref 19.6–45.3)
MCH RBC QN AUTO: 27.1 PG (ref 26.6–33)
MCHC RBC AUTO-ENTMCNC: 31.9 G/DL (ref 31.5–35.7)
MCV RBC AUTO: 84.9 FL (ref 79–97)
MICROALBUMIN/CREAT UR: 488.8 MG/G
MONOCYTES # BLD AUTO: 0.48 10*3/MM3 (ref 0.1–0.9)
MONOCYTES NFR BLD AUTO: 4.4 % (ref 5–12)
NEUTROPHILS NFR BLD AUTO: 66.1 % (ref 42.7–76)
NEUTROPHILS NFR BLD AUTO: 7.29 10*3/MM3 (ref 1.7–7)
NITRITE UR QL STRIP: NEGATIVE
NRBC BLD AUTO-RTO: 0 /100 WBC (ref 0–0.2)
PH UR STRIP.AUTO: 5.5 [PH] (ref 5–8)
PLATELET # BLD AUTO: 264 10*3/MM3 (ref 140–450)
PMV BLD AUTO: 10.6 FL (ref 6–12)
POTASSIUM SERPL-SCNC: 4.2 MMOL/L (ref 3.5–5.2)
PROT SERPL-MCNC: 6.9 G/DL (ref 6–8.5)
PROT UR QL STRIP: ABNORMAL
RBC # BLD AUTO: 4.58 10*6/MM3 (ref 3.77–5.28)
RBC # UR: ABNORMAL /HPF
REF LAB TEST METHOD: ABNORMAL
SODIUM SERPL-SCNC: 139 MMOL/L (ref 136–145)
SP GR UR STRIP: 1.02 (ref 1–1.03)
SQUAMOUS #/AREA URNS HPF: ABNORMAL /HPF
T4 SERPL-MCNC: 10.7 MCG/DL (ref 4.5–11.7)
TIBC SERPL-MCNC: 472 MCG/DL (ref 298–536)
TRANSFERRIN SERPL-MCNC: 317 MG/DL (ref 200–360)
TRIGL SERPL-MCNC: 169 MG/DL (ref 0–150)
TSH SERPL DL<=0.05 MIU/L-ACNC: 2.98 UIU/ML (ref 0.27–4.2)
UROBILINOGEN UR QL STRIP: ABNORMAL
VIT B12 BLD-MCNC: 1530 PG/ML (ref 211–946)
VLDLC SERPL-MCNC: 29 MG/DL (ref 5–40)
WBC # BLD AUTO: 11.01 10*3/MM3 (ref 3.4–10.8)
WBC UR QL AUTO: ABNORMAL /HPF

## 2021-08-02 PROCEDURE — 80061 LIPID PANEL: CPT

## 2021-08-02 PROCEDURE — 84436 ASSAY OF TOTAL THYROXINE: CPT

## 2021-08-02 PROCEDURE — 82570 ASSAY OF URINE CREATININE: CPT

## 2021-08-02 PROCEDURE — 36415 COLL VENOUS BLD VENIPUNCTURE: CPT

## 2021-08-02 PROCEDURE — 84443 ASSAY THYROID STIM HORMONE: CPT

## 2021-08-02 PROCEDURE — 83036 HEMOGLOBIN GLYCOSYLATED A1C: CPT

## 2021-08-02 PROCEDURE — 85025 COMPLETE CBC W/AUTO DIFF WBC: CPT

## 2021-08-02 PROCEDURE — 82306 VITAMIN D 25 HYDROXY: CPT

## 2021-08-02 PROCEDURE — 82746 ASSAY OF FOLIC ACID SERUM: CPT

## 2021-08-02 PROCEDURE — 82607 VITAMIN B-12: CPT

## 2021-08-02 PROCEDURE — 82043 UR ALBUMIN QUANTITATIVE: CPT

## 2021-08-02 PROCEDURE — 84466 ASSAY OF TRANSFERRIN: CPT

## 2021-08-02 PROCEDURE — 80053 COMPREHEN METABOLIC PANEL: CPT

## 2021-08-02 PROCEDURE — 83540 ASSAY OF IRON: CPT

## 2021-08-02 PROCEDURE — 81001 URINALYSIS AUTO W/SCOPE: CPT

## 2021-08-04 ENCOUNTER — OFFICE VISIT (OUTPATIENT)
Dept: FAMILY MEDICINE CLINIC | Facility: CLINIC | Age: 41
End: 2021-08-04

## 2021-08-04 VITALS
SYSTOLIC BLOOD PRESSURE: 99 MMHG | HEIGHT: 67 IN | WEIGHT: 222 LBS | TEMPERATURE: 97.8 F | OXYGEN SATURATION: 98 % | BODY MASS INDEX: 34.84 KG/M2 | DIASTOLIC BLOOD PRESSURE: 55 MMHG | HEART RATE: 52 BPM

## 2021-08-04 DIAGNOSIS — R31.9 URINARY TRACT INFECTION WITH HEMATURIA, SITE UNSPECIFIED: ICD-10-CM

## 2021-08-04 DIAGNOSIS — F41.9 ANXIETY: ICD-10-CM

## 2021-08-04 DIAGNOSIS — E78.5 HYPERLIPIDEMIA, UNSPECIFIED HYPERLIPIDEMIA TYPE: ICD-10-CM

## 2021-08-04 DIAGNOSIS — D72.829 LEUKOCYTOSIS, UNSPECIFIED TYPE: ICD-10-CM

## 2021-08-04 DIAGNOSIS — N39.0 URINARY TRACT INFECTION WITH HEMATURIA, SITE UNSPECIFIED: ICD-10-CM

## 2021-08-04 DIAGNOSIS — M25.511 RIGHT SHOULDER PAIN, UNSPECIFIED CHRONICITY: Primary | ICD-10-CM

## 2021-08-04 DIAGNOSIS — E11.69 DIABETES MELLITUS TYPE 2 IN OBESE (HCC): Primary | ICD-10-CM

## 2021-08-04 DIAGNOSIS — G47.00 INSOMNIA, UNSPECIFIED TYPE: ICD-10-CM

## 2021-08-04 DIAGNOSIS — E66.9 DIABETES MELLITUS TYPE 2 IN OBESE (HCC): Primary | ICD-10-CM

## 2021-08-04 DIAGNOSIS — I10 ESSENTIAL HYPERTENSION: ICD-10-CM

## 2021-08-04 DIAGNOSIS — F32.A DEPRESSION, UNSPECIFIED DEPRESSION TYPE: ICD-10-CM

## 2021-08-04 PROCEDURE — 99214 OFFICE O/P EST MOD 30 MIN: CPT | Performed by: NURSE PRACTITIONER

## 2021-08-04 PROCEDURE — 87086 URINE CULTURE/COLONY COUNT: CPT | Performed by: NURSE PRACTITIONER

## 2021-08-04 RX ORDER — ETONOGESTREL AND ETHINYL ESTRADIOL .12; .015 MG/D; MG/D
1 RING VAGINAL
Qty: 3 EACH | Refills: 0 | Status: SHIPPED | OUTPATIENT
Start: 2021-08-04 | End: 2022-11-04 | Stop reason: SDUPTHER

## 2021-08-04 RX ORDER — BUPROPION HYDROCHLORIDE 300 MG/1
300 TABLET ORAL DAILY
Qty: 90 TABLET | Refills: 1 | Status: SHIPPED | OUTPATIENT
Start: 2021-08-04 | End: 2022-02-08 | Stop reason: SDUPTHER

## 2021-08-04 RX ORDER — CIPROFLOXACIN 500 MG/1
500 TABLET, FILM COATED ORAL 2 TIMES DAILY
Qty: 20 TABLET | Refills: 0 | Status: SHIPPED | OUTPATIENT
Start: 2021-08-04 | End: 2021-09-16

## 2021-08-04 RX ORDER — PRAVASTATIN SODIUM 10 MG
10 TABLET ORAL NIGHTLY
Qty: 90 TABLET | Refills: 1 | Status: SHIPPED | OUTPATIENT
Start: 2021-08-04 | End: 2022-02-08 | Stop reason: SDUPTHER

## 2021-08-04 RX ORDER — LISINOPRIL 2.5 MG/1
2.5 TABLET ORAL DAILY
Qty: 90 TABLET | Refills: 1 | Status: SHIPPED | OUTPATIENT
Start: 2021-08-04 | End: 2022-02-08 | Stop reason: SDUPTHER

## 2021-08-04 RX ORDER — GLUCOSAMINE/D3/BOSWELLIA SERRA 1500MG-400
1 TABLET ORAL NIGHTLY
COMMUNITY

## 2021-08-04 RX ORDER — MELATONIN
2000 DAILY
Qty: 180 TABLET | Refills: 1 | Status: SHIPPED | OUTPATIENT
Start: 2021-08-04 | End: 2022-02-08 | Stop reason: SDUPTHER

## 2021-08-04 RX ORDER — TRAZODONE HYDROCHLORIDE 50 MG/1
50 TABLET ORAL NIGHTLY
Qty: 90 TABLET | Refills: 1 | Status: SHIPPED | OUTPATIENT
Start: 2021-08-04 | End: 2022-02-08 | Stop reason: SDUPTHER

## 2021-08-04 NOTE — PROGRESS NOTES
Follow Up Office Visit      Patient Name: Bianca Boles  : 1980   MRN: [id number]     Chief Complaint:    Chief Complaint   Patient presents with   • Follow-up   • Hypertension   • Hyperlipidemia   • Diabetes       History of Present Illness: Bianca Boles is a 41 y.o. female who is here today to follow up for     F/U-DM2,HTN,anxiety,depression,and hyperlipdemia.    C/O-wants something to help her sleep, states she is working second shift and having trouble falling asleep when she gets home from work   Would like to try trazodone   Mammogram-  Pap-2020  Eye-  Checking feet daily   Pt report few episodes of difficulty urinating, holding urine at work pharmacy tech  Subjective      Review of Systems:   Review of Systems   Constitutional: Positive for fatigue.   HENT: Negative for ear pain, sinus pain and sore throat.    Eyes: Negative for discharge.   Respiratory: Negative for cough and shortness of breath.    Cardiovascular: Negative for chest pain.   Gastrointestinal: Negative for abdominal pain, diarrhea, nausea and vomiting.   Endocrine: Negative for polydipsia and polyuria.   Genitourinary: Positive for difficulty urinating. Negative for dysuria and urgency.   Musculoskeletal: Negative for myalgias.   Skin: Negative for pallor.   Neurological: Negative for dizziness, seizures and headaches.   Psychiatric/Behavioral: Positive for sleep disturbance. Negative for confusion. The patient is not nervous/anxious.         Past Medical History:   Past Medical History:   Diagnosis Date   • Acid reflux    • Allergic rhinitis due to allergen 2016   • Anxiety    • Anxiety and depression    • Benign essential hypertension    • Diabetes (CMS/HCC)    • Diabetes mellitus (CMS/HCC)    • Diabetes mellitus, type 2 (CMS/HCC)    • Gastroparesis    • GERD (gastroesophageal reflux disease)    • High triglycerides    • History of bariatric surgery 2021   • History of kidney stones    • HTN  (hypertension)    • Hyperlipemia    • Hyperlipidemia    • Hypertension    • Hypertriglyceridemia    • Lumbar herniated disc    • Panic attacks    • PCOS (polycystic ovarian syndrome)    • Psoriasis     ELBOWS   • Seasonal allergies    • Spinal headache     AFTER PAIN EPIDURALS.  NO BLOOD PATCH       Past Surgical History:   Past Surgical History:   Procedure Laterality Date   • ADENOIDECTOMY     • APPENDECTOMY     •  SECTION  ,   • CYSTOSCOPY BLADDER STONE LITHOTRIPSY     • EAR TUBES     • ENDOSCOPY N/A 10/20/2020    Procedure: ESOPHAGOGASTRODUODENOSCOPY WITH BIOPSY;  Surgeon: Fidel Grajeda Jr., MD;  Location: [facility] END[facility]COPY;  Service: General;  Laterality: N/A;  PRE- GERD  POST- RETAINED FOOD, GASTRITIS, GASTRIC POLYPS   • ENDOSCOPY     • FOOT FRACTURE SURGERY Left 2017    screw placed   • GASTRIC SLEEVE LAPAROSCOPIC N/A 2020    Procedure: GASTRIC SLEEVE LAPAROSCOPIC;  Surgeon: Fidel Grajeda Jr., MD;  [place] [facility] [place] [facility] [place]  Service: Bariatric;  Laterality: N/A;   • KIDNEY SURGERY     • TONSILLECTOMY         Family History:   Family History   Problem Relation Age of Onset   • Diabetes Father    • Hypertension Father    • Heart disease Father    • Cancer Father         BLADDER   • Colon cancer Father         malignant, currently 62   • Stroke Maternal Grandmother    • Heart disease Maternal Grandmother    • Diabetes Paternal Grandfather    • Heart disease Paternal Grandfather    • Malig Hyperthermia Neg Hx        Social History:   Social History     Socioeconomic History   • Marital status:      Spouse name: Not on file   • Number of children: Not on file   • Years of education: Not on file   • Highest education level: Not on file   Tobacco Use   • Smoking status: Former Smoker     Years: 35.00     Types: Cigarettes     Quit date: 1/15/2020     Years since quittin.5   • Smokeless tobacco: Never Used   • Tobacco comment: A PACK A WEEK   Vaping Use   •  "Vaping Use: Never used   Substance and Sexual Activity   • Alcohol use: Yes     Comment: RARELY   • Drug use: Never   • Sexual activity: Defer       Medications:     Current Outpatient Medications:   •  Biotin 94617 MCG tablet, Take 1 tablet by mouth Daily., Disp: , Rfl:   •  buPROPion XL (WELLBUTRIN XL) 300 MG 24 hr tablet, Take 1 tablet by mouth Daily., Disp: 90 tablet, Rfl: 1  •  Calcium-Magnesium-Zinc (MATTY-MAG-ZINC PO), Take 1 tablet by mouth 2 (two) times a day., Disp: , Rfl:   •  cholecalciferol (VITAMIN D3) 25 MCG (1000 UT) tablet, Take 2 tablets by mouth Daily., Disp: 180 tablet, Rfl: 1  •  ciprofloxacin (Cipro) 500 MG tablet, Take 1 tablet by mouth 2 (Two) Times a Day., Disp: 20 tablet, Rfl: 0  •  EluRyng 0.12-0.015 MG/24HR vaginal ring, Insert 1 each into the vagina Every 30 (Thirty) Days., Disp: 3 each, Rfl: 4  •  esomeprazole (nexIUM) 20 MG capsule, Take 1 capsule by mouth Daily., Disp: 90 capsule, Rfl: 1  •  FERROUS SULFATE-VITAMIN C PO, Take 1 tablet by mouth Daily., Disp: , Rfl:   •  lisinopril (PRINIVIL,ZESTRIL) 2.5 MG tablet, Take 1 tablet by mouth Daily., Disp: 90 tablet, Rfl: 1  •  meloxicam (MOBIC) 15 MG tablet, Take 15 mg by mouth Daily., Disp: , Rfl:   •  metFORMIN (GLUCOPHAGE) 1000 MG tablet, Take 1 tablet by mouth 2 (Two) Times a Day With Meals., Disp: 180 tablet, Rfl: 1  •  Multiple Vitamins-Minerals (MULTIVITAMIN PO), Take 1 tablet by mouth Daily., Disp: , Rfl:   •  pravastatin (PRAVACHOL) 10 MG tablet, Take 1 tablet by mouth Every Night., Disp: 90 tablet, Rfl: 1  •  traZODone (DESYREL) 50 MG tablet, Take 1 tablet by mouth Every Night., Disp: 90 tablet, Rfl: 1    Allergies:   Allergies   Allergen Reactions   • Morphine Itching and Nausea And Vomiting           PHQ-2 Total Score: 0   PHQ-9 Total Score: 0     Objective     Physical Exam:  Vital Signs:   Vitals:    08/04/21 0916   BP: 99/55   Pulse: 52   Temp: 97.8 °F (36.6 °C)   SpO2: 98%   Weight: 101 kg (222 lb)   Height: 170.2 cm (67\") "     Body mass index is 34.77 kg/m².     Physical Exam  HENT:      Head: Normocephalic.      Right Ear: Tympanic membrane normal.      Left Ear: Tympanic membrane normal.      Nose: Nose normal.      Mouth/Throat:      Mouth: Mucous membranes are moist.   Eyes:      Conjunctiva/sclera: Conjunctivae normal.      Pupils: Pupils are equal, round, and reactive to light.   Cardiovascular:      Rate and Rhythm: Normal rate and regular rhythm.      Heart sounds: Normal heart sounds. No murmur heard.     Pulmonary:      Effort: Pulmonary effort is normal.      Breath sounds: Normal breath sounds.   Abdominal:      General: Bowel sounds are normal.      Palpations: Abdomen is soft.      Tenderness: There is no right CVA tenderness or left CVA tenderness.   Musculoskeletal:      Cervical back: Neck supple.      Right lower leg: No edema.      Left lower leg: No edema.   Skin:     General: Skin is warm and dry.      Capillary Refill: Capillary refill takes less than 2 seconds.   Neurological:      Mental Status: She is alert and oriented to person, place, and time.   Psychiatric:         Mood and Affect: Mood normal.         Behavior: Behavior normal.             Assessment / Plan      Assessment/Plan:   Diagnoses and all orders for this visit:    1. Diabetes mellitus type 2 in obese (CMS/[facility]) (Primary)  -     CBC Auto Differential; Future  -     Lipid Panel; Future  -     Hemoglobin A1c; Future  -     Microalbumin / Creatinine Urine Ratio - Urine, Clean Catch; Future  -     Comprehensive Metabolic Panel; Future  -     Urinalysis With Culture If Indicated -; Future  -     TSH; Future  -     Urine Culture - Urine, Urine, Clean Catch; Future  -     CBC Auto Differential; Future    2. Hyperlipidemia, unspecified hyperlipidemia type  -     CBC Auto Differential; Future  -     Lipid Panel; Future  -     Hemoglobin A1c; Future  -     Microalbumin / Creatinine Urine Ratio - Urine, Clean Catch; Future  -     Comprehensive Metabolic  Panel; Future  -     Urinalysis With Culture If Indicated -; Future  -     TSH; Future  -     Urine Culture - Urine, Urine, Clean Catch; Future  -     CBC Auto Differential; Future    3. Essential hypertension  -     CBC Auto Differential; Future  -     Lipid Panel; Future  -     Hemoglobin A1c; Future  -     Microalbumin / Creatinine Urine Ratio - Urine, Clean Catch; Future  -     Comprehensive Metabolic Panel; Future  -     Urinalysis With Culture If Indicated -; Future  -     TSH; Future  -     Urine Culture - Urine, Urine, Clean Catch; Future  -     CBC Auto Differential; Future    4. Anxiety  -     CBC Auto Differential; Future  -     Lipid Panel; Future  -     Hemoglobin A1c; Future  -     Microalbumin / Creatinine Urine Ratio - Urine, Clean Catch; Future  -     Comprehensive Metabolic Panel; Future  -     Urinalysis With Culture If Indicated -; Future  -     TSH; Future  -     Urine Culture - Urine, Urine, Clean Catch; Future  -     CBC Auto Differential; Future    5. Depression, unspecified depression type  -     CBC Auto Differential; Future  -     Lipid Panel; Future  -     Hemoglobin A1c; Future  -     Microalbumin / Creatinine Urine Ratio - Urine, Clean Catch; Future  -     Comprehensive Metabolic Panel; Future  -     Urinalysis With Culture If Indicated -; Future  -     TSH; Future  -     Urine Culture - Urine, Urine, Clean Catch; Future  -     CBC Auto Differential; Future    6. Urinary tract infection with hematuria, site unspecified  -     CBC Auto Differential; Future  -     Lipid Panel; Future  -     Hemoglobin A1c; Future  -     Microalbumin / Creatinine Urine Ratio - Urine, Clean Catch; Future  -     Comprehensive Metabolic Panel; Future  -     Urinalysis With Culture If Indicated -; Future  -     TSH; Future  -     Urine Culture - Urine, Urine, Clean Catch; Future  -     CBC Auto Differential; Future    7. Leukocytosis, unspecified type  -     CBC Auto Differential; Future  -     Lipid Panel;  Future  -     Hemoglobin A1c; Future  -     Microalbumin / Creatinine Urine Ratio - Urine, Clean Catch; Future  -     Comprehensive Metabolic Panel; Future  -     Urinalysis With Culture If Indicated -; Future  -     TSH; Future  -     Urine Culture - Urine, Urine, Clean Catch; Future  -     CBC Auto Differential; Future    8. Insomnia, unspecified type    Other orders  -     pravastatin (PRAVACHOL) 10 MG tablet; Take 1 tablet by mouth Every Night.  Dispense: 90 tablet; Refill: 1  -     metFORMIN (GLUCOPHAGE) 1000 MG tablet; Take 1 tablet by mouth 2 (Two) Times a Day With Meals.  Dispense: 180 tablet; Refill: 1  -     lisinopril (PRINIVIL,ZESTRIL) 2.5 MG tablet; Take 1 tablet by mouth Daily.  Dispense: 90 tablet; Refill: 1  -     esomeprazole (nexIUM) 20 MG capsule; Take 1 capsule by mouth Daily.  Dispense: 90 capsule; Refill: 1  -     cholecalciferol (VITAMIN D3) 25 MCG (1000 UT) tablet; Take 2 tablets by mouth Daily.  Dispense: 180 tablet; Refill: 1  -     buPROPion XL (WELLBUTRIN XL) 300 MG 24 hr tablet; Take 1 tablet by mouth Daily.  Dispense: 90 tablet; Refill: 1  -     EluRyng 0.12-0.015 MG/24HR vaginal ring; Insert 1 each into the vagina Every 30 (Thirty) Days.  Dispense: 3 each; Refill: 4  -     ciprofloxacin (Cipro) 500 MG tablet; Take 1 tablet by mouth 2 (Two) Times a Day.  Dispense: 20 tablet; Refill: 0  -     traZODone (DESYREL) 50 MG tablet; Take 1 tablet by mouth Every Night.  Dispense: 90 tablet; Refill: 1       Diabetes mellitus type 2 currently controlled hemoglobin A1c below goal we will continue Metformin refills provided  Hyperlipidemia stable on statin LDL  below goal liver enzymes and lipid panel reviewed provide refills of pravastatin monitor in 6 months  Anxiety and Depression currently controlled on Wellbutrin will provide refills  Abnormal urinalysis will obtain urine culture in office leukocytes elevated will recheck in 1 month we will treat for urinary tract infection and call with urine  "culture results and further instructions  Insomnia will try trazodone, discuss good sleep hygiene       Follow Up:   Return in about 6 months (around 2/4/2022).    Kofi Hernandez, APRN    \"Please note that portions of this note were completed with a voice recognition program.\"    "

## 2021-08-05 ENCOUNTER — HOSPITAL ENCOUNTER (OUTPATIENT)
Dept: MRI IMAGING | Facility: HOSPITAL | Age: 41
Discharge: HOME | End: 2021-08-05
Admitting: NURSE PRACTITIONER

## 2021-08-05 DIAGNOSIS — M19.90 OSTEOARTHRITIS, UNSPECIFIED OSTEOARTHRITIS TYPE, UNSPECIFIED SITE: Primary | ICD-10-CM

## 2021-08-05 DIAGNOSIS — M75.101 TEAR OF RIGHT SUPRASPINATUS TENDON: ICD-10-CM

## 2021-08-05 DIAGNOSIS — M25.511 RIGHT SHOULDER PAIN, UNSPECIFIED CHRONICITY: ICD-10-CM

## 2021-08-05 LAB — BACTERIA SPEC AEROBE CULT: NO GROWTH

## 2021-08-05 PROCEDURE — 73221 MRI JOINT UPR EXTREM W/O DYE: CPT | Performed by: RADIOLOGY

## 2021-08-05 PROCEDURE — 73221 MRI JOINT UPR EXTREM W/O DYE: CPT

## 2021-08-11 ENCOUNTER — OFFICE VISIT (OUTPATIENT)
Dept: ORTHOPEDIC SURGERY | Facility: CLINIC | Age: 41
End: 2021-08-11

## 2021-08-11 VITALS — HEIGHT: 67 IN | BODY MASS INDEX: 35.82 KG/M2 | OXYGEN SATURATION: 99 % | HEART RATE: 64 BPM | WEIGHT: 228.2 LBS

## 2021-08-11 DIAGNOSIS — M75.41 SHOULDER IMPINGEMENT, RIGHT: ICD-10-CM

## 2021-08-11 DIAGNOSIS — M75.81 ROTATOR CUFF TENDINITIS, RIGHT: Primary | ICD-10-CM

## 2021-08-11 PROCEDURE — 20610 DRAIN/INJ JOINT/BURSA W/O US: CPT | Performed by: ORTHOPAEDIC SURGERY

## 2021-08-11 PROCEDURE — 99213 OFFICE O/P EST LOW 20 MIN: CPT | Performed by: ORTHOPAEDIC SURGERY

## 2021-08-11 RX ORDER — METHYLPREDNISOLONE ACETATE 80 MG/ML
80 INJECTION, SUSPENSION INTRA-ARTICULAR; INTRALESIONAL; INTRAMUSCULAR; SOFT TISSUE
Status: COMPLETED | OUTPATIENT
Start: 2021-08-11 | End: 2021-08-11

## 2021-08-11 RX ORDER — LIDOCAINE HYDROCHLORIDE 10 MG/ML
9 INJECTION, SOLUTION INFILTRATION; PERINEURAL
Status: COMPLETED | OUTPATIENT
Start: 2021-08-11 | End: 2021-08-11

## 2021-08-11 RX ADMIN — METHYLPREDNISOLONE ACETATE 80 MG: 80 INJECTION, SUSPENSION INTRA-ARTICULAR; INTRALESIONAL; INTRAMUSCULAR; SOFT TISSUE at 08:15

## 2021-08-11 RX ADMIN — LIDOCAINE HYDROCHLORIDE 9 ML: 10 INJECTION, SOLUTION INFILTRATION; PERINEURAL at 08:15

## 2021-08-11 NOTE — PROGRESS NOTES
"Chief Complaint  Initial Evaluation of the Right Shoulder     Subjective      Bianca Boles presents to [facility] for an evaluation of right shoulder. She has been having right shoulder pain for over 1 year. She states she did not have any injury to her shoulder, she woke up one morning with shoulder pain. Patient states she hasn't taken NSAIDs because of bariatric surgery. She has been attending PT, which has been giving her relief. She states pain on the anterior shoulder that radiates down her arm. She states she gets numbness and tingling in her fingers as well.     Allergies   Allergen Reactions   • Morphine Itching and Nausea And Vomiting        Social History     Socioeconomic History   • Marital status:      Spouse name: Not on file   • Number of children: Not on file   • Years of education: Not on file   • Highest education level: Not on file   Tobacco Use   • Smoking status: Former Smoker     Years: 35.00     Types: Cigarettes     Quit date: 1/15/2020     Years since quittin.5   • Smokeless tobacco: Never Used   • Tobacco comment: A PACK A WEEK   Vaping Use   • Vaping Use: Never used   Substance and Sexual Activity   • Alcohol use: Yes     Comment: RARELY   • Drug use: Never   • Sexual activity: Defer        Review of Systems     Objective   Vital Signs:   Pulse 64   Ht 170.2 cm (67\")   Wt 104 kg (228 lb 3.2 oz)   SpO2 99%   BMI 35.74 kg/m²       Physical Exam  Constitutional:       Appearance: Normal appearance. He is well-developed and normal weight.   HENT:      Head: Normocephalic.      Right Ear: Hearing and external ear normal.      Left Ear: Hearing and external ear normal.      Nose: Nose normal.   Eyes:      Conjunctiva/sclera: Conjunctivae normal.   Cardiovascular:      Rate and Rhythm: Normal rate.   Pulmonary:      Effort: Pulmonary effort is normal.      Breath sounds: No wheezing or rales.   Abdominal:      Palpations: Abdomen is soft.      " Tenderness: There is no abdominal tenderness.   Musculoskeletal:      Cervical back: Normal range of motion.   Skin:     Findings: No rash.   Neurological:      Mental Status: He is alert and oriented to person, place, and time.   Psychiatric:         Mood and Affect: Mood and affect normal.         Judgment: Judgment normal.       Ortho Exam      RIGHT SHOULDER: IR to L5. ER to 90 degrees. Full forward elevation. 4 out of 5 strength. Good tone of deltoid, triceps, wrist extensors and wrist flexors. Sensation grossly intact. Neurovascular intact. Skin intact. Mildly tender AC joint. Pain with cross body adduction. No swelling or skin discoloration. Sensation grossly intact. Neurovascular intact. Radial pulse 2+, ulnar pulse 2+. Full elbow flexion and extension. Positive impingement signs.       Large Joint Arthrocentesis: R subacromial bursa  Date/Time: 8/11/2021 8:15 AM  Consent given by: patient  Site marked: site marked  Timeout: Immediately prior to procedure a time out was called to verify the correct patient, procedure, equipment, support staff and site/side marked as required   Supporting Documentation  Indications: pain   Procedure Details  Location: shoulder - R subacromial bursa  Preparation: Patient was prepped and draped in the usual sterile fashion  Needle size: 22 G  Medications administered: 9 mL lidocaine 1 %; 80 mg methylPREDNISolone acetate 80 MG/ML  Patient tolerance: patient tolerated the procedure well with no immediate complications              Imaging Results (Most Recent)     None           Result Review :       MRI Shoulder Right Without Contrast    Result Date: 8/5/2021  Narrative: PROCEDURE: MRI SHOULDER RIGHT WO CONTRAST  COMPARISON: [facility], CR, SHOULDER >OR= 2V RT, 5/14/2021, 9:36.  INDICATIONS: RIGHT SHOULDER PAIN WITH LIMITED RANGE OF MOTION AND NUMBNESS IN FINGERS OF RIGHT HAND SINCE 2/2021, NO KNOWN INJURY  TECHNIQUE: A variety of imaging planes and  parameters were utilized for visualization of suspected pathology.  Images were performed without contrast.   FINDINGS:  Small cysts are noted in the superolateral humeral head.  No fracture or malalignment is seen.  Mild acromioclavicular osteoarthritis is noted.   Mild intermediate signal abnormality in the supraspinatus tendon is consistent with tendinosis.  There is a small intrasubstance tear involving the anterior fibers of the supraspinatus tendon.  There is a small focus of susceptibility artifact in the distal supraspinatus tendon consistent with calcification/calcific tendinosis.  Mild infra spinatus tendinosis is noted.  The rotator cuff otherwise appears unremarkable.  The rotator cuff otherwise appears unremarkable.  No muscle body atrophy is seen.  The biceps long head tendon and its attachment to the superior labrum are intact.  There is a small tear in the mid anterior labrum.  Intermediate signal abnormality elsewhere in the labrum is consistent with degenerative change.  No glenohumeral joint effusion or loose body is seen.  Cartilage in the joint is intact.   CONCLUSION:  1. Mild acromioclavicular osteoarthritis 2. Mild supraspinatus and infra spinatus tendinosis 3. Small intrasubstance tear of the anterior fibers of the supraspinatus tendon 4. Tiny tear in the mid anterior labrum.      Dandy Soto M.D.       Electronically Signed and Approved By: Dandy Soto M.D. on 8/05/2021 at 11:49                     Assessment and Plan     DX: Right shoulder impingement   Right RTC tendinitis     Discussed treatment plans and diagnosis with the patient. Patient give a right shoulder steroid injection and tolerated this procedure well. She was given home exercises for rotator cuff tendinitis. She is to continue PT.     Call or return if worsening symptoms.    Follow Up     PRN.     Patient was given instructions and counseling regarding her condition or for health maintenance advice. Please see specific  information pulled into the AVS if appropriate.     Scribed for Domenic Bradley MD by Sully Martinez.  08/11/21   07:54 EDT    I have personally performed the services described in this document as scribed by the above individual and it is both accurate and complete. Domenic Bradley MD 08/11/21

## 2021-09-16 ENCOUNTER — OFFICE VISIT (OUTPATIENT)
Dept: BARIATRICS/WEIGHT MGMT | Facility: CLINIC | Age: 41
End: 2021-09-16

## 2021-09-16 VITALS
HEIGHT: 67 IN | RESPIRATION RATE: 18 BRPM | WEIGHT: 220 LBS | DIASTOLIC BLOOD PRESSURE: 76 MMHG | SYSTOLIC BLOOD PRESSURE: 138 MMHG | HEART RATE: 67 BPM | BODY MASS INDEX: 34.53 KG/M2 | TEMPERATURE: 97.1 F

## 2021-09-16 DIAGNOSIS — Z98.84 S/P LAPAROSCOPIC SLEEVE GASTRECTOMY: ICD-10-CM

## 2021-09-16 DIAGNOSIS — E55.9 VITAMIN D DEFICIENCY: ICD-10-CM

## 2021-09-16 DIAGNOSIS — E11.69 DIABETES MELLITUS TYPE 2 IN OBESE (HCC): ICD-10-CM

## 2021-09-16 DIAGNOSIS — I10 HYPERTENSION, ESSENTIAL: ICD-10-CM

## 2021-09-16 DIAGNOSIS — E78.1 HYPERTRIGLYCERIDEMIA: ICD-10-CM

## 2021-09-16 DIAGNOSIS — K21.9 GASTROESOPHAGEAL REFLUX DISEASE, UNSPECIFIED WHETHER ESOPHAGITIS PRESENT: ICD-10-CM

## 2021-09-16 DIAGNOSIS — E78.5 HYPERLIPIDEMIA, UNSPECIFIED HYPERLIPIDEMIA TYPE: ICD-10-CM

## 2021-09-16 DIAGNOSIS — E66.9 OBESITY, CLASS I, BMI 30-34.9: ICD-10-CM

## 2021-09-16 DIAGNOSIS — M25.551 RIGHT HIP PAIN: ICD-10-CM

## 2021-09-16 DIAGNOSIS — E66.9 DIABETES MELLITUS TYPE 2 IN OBESE (HCC): ICD-10-CM

## 2021-09-16 DIAGNOSIS — F32.A DEPRESSION, UNSPECIFIED DEPRESSION TYPE: ICD-10-CM

## 2021-09-16 DIAGNOSIS — D64.9 ANEMIA, UNSPECIFIED TYPE: ICD-10-CM

## 2021-09-16 DIAGNOSIS — F41.9 ANXIETY: ICD-10-CM

## 2021-09-16 DIAGNOSIS — M25.511 RIGHT SHOULDER PAIN, UNSPECIFIED CHRONICITY: ICD-10-CM

## 2021-09-16 DIAGNOSIS — Z98.84 HX OF BARIATRIC SURGERY: Primary | ICD-10-CM

## 2021-09-16 PROCEDURE — 99213 OFFICE O/P EST LOW 20 MIN: CPT | Performed by: NURSE PRACTITIONER

## 2021-09-16 RX ORDER — TOPIRAMATE 25 MG/1
25 TABLET ORAL NIGHTLY
Qty: 30 TABLET | Refills: 0 | Status: SHIPPED | OUTPATIENT
Start: 2021-09-16 | End: 2021-11-08

## 2021-09-16 RX ORDER — CHOLECALCIFEROL (VITAMIN D3) 25 MCG
2000 CAPSULE ORAL DAILY
COMMUNITY
Start: 2021-08-04 | End: 2021-09-16

## 2021-09-16 RX ORDER — FAMOTIDINE 20 MG/1
20 TABLET, FILM COATED ORAL DAILY
COMMUNITY
Start: 2021-08-27 | End: 2021-09-16

## 2021-09-16 RX ORDER — HYDROXYZINE HYDROCHLORIDE 25 MG/1
25 TABLET, FILM COATED ORAL AS NEEDED
COMMUNITY
Start: 2021-08-27 | End: 2021-09-16

## 2021-09-16 RX ORDER — TOPIRAMATE 50 MG/1
50 TABLET, FILM COATED ORAL NIGHTLY
Qty: 60 TABLET | Refills: 0 | Status: SHIPPED | OUTPATIENT
Start: 2021-10-16 | End: 2021-12-15 | Stop reason: SDUPTHER

## 2021-09-16 NOTE — PROGRESS NOTES
"[facility] BARIATRIC [facility] BARIATRIC SURGERY  [address] [facility] [address]  [facility] [id number] [phone]  [address] [id number] [phone]  Dept: [phone]  9/16/2021      Bianca Boles.  [id number]  [id number]  1980  female      Chief Complaint   Patient presents with   • Follow-up     9 MONTH SLEEVE FOLLOW UP       BH Post-Op Bariatric Surgery:   Bianca Boles is status post Laparoscopic Sleeve procedure, performed on 12/7/2020     HPI:   Today's weight is 99.8 kg (220 lb) pounds, today's BMI is Body mass index is 33.97 kg/m².,@ has a  loss of 2 pounds since the last visit and@ weight loss since surgery is 81 pounds. The patient reports a decreased portion size and loss of appetite.      Bianca Boles denies nausea, vomiting, reflux and reports that she is doing well. She reports having more cravings in the evening and having a hard time \"shutting her brain off\" to get to sleep.      Diet and Exercise: Diet history reviewed and discussed with the patient. Weight loss/gains to date discussed with the patient. The patient states they are eating 60 grams of protein per day. She reports eating 3 meals per day, a typical portion size of 1/2 cup, eating 1-2 snacks per day, drinking 5-6 or more 8-oz. glasses of water per day, no carbonated beverage consumption and exercising regularly- she started back exercising a couple of weeks ago    Supplements: celebrate [employer] with iron and calcium, as well as supplemental iron, vitamin C,     Review of Systems   Constitutional: Positive for appetite change. Negative for fatigue and unexpected weight change.   HENT: Negative.    Eyes: Negative.    Respiratory: Negative.    Cardiovascular: Negative.  Negative for leg swelling.   Gastrointestinal: Negative for abdominal distention, abdominal pain, constipation, diarrhea, nausea and vomiting.   Genitourinary: Negative for difficulty urinating, frequency and " urgency.   Musculoskeletal: Negative for back pain.   Skin: Negative.    Psychiatric/Behavioral: Negative.    All other systems reviewed and are negative.      Patient Active Problem List   Diagnosis   • Chronic fatigue   • Essential hypertension   • Hyperlipidemia   • Heartburn   • Multiple joint pain   • Depression   • Diabetes mellitus type 2 in obese (CMS/HCC)   • Gastroparesis   • Gastroesophageal reflux disease   • Gastric polyps   • S/P laparoscopic sleeve gastrectomy   • Obesity, Class I, BMI 30-34.9   • Anxiety   • Urinary tract infection with hematuria   • Leukocytosis   • Insomnia       Past Medical History:   Diagnosis Date   • Acid reflux    • Allergic rhinitis due to allergen 06/09/2016   • Anxiety    • Anxiety and depression    • Benign essential hypertension    • Diabetes (CMS/HCC)    • Diabetes mellitus (CMS/HCC)    • Diabetes mellitus, type 2 (CMS/HCC)    • Gastroparesis    • GERD (gastroesophageal reflux disease)    • High triglycerides    • History of bariatric surgery 02/04/2021   • History of kidney stones    • HTN (hypertension)    • Hyperlipemia    • Hyperlipidemia    • Hypertension    • Hypertriglyceridemia    • Lumbar herniated disc    • Panic attacks    • PCOS (polycystic ovarian syndrome)    • Psoriasis     ELBOWS   • Seasonal allergies    • Spinal headache     AFTER PAIN EPIDURALS.  NO BLOOD PATCH       The following portions of the patient's history were reviewed and updated as appropriate: allergies, current medications, past family history, past medical history, past social history, past surgical history and problem list.    Vitals:    09/16/21 1443   BP: 138/76   Pulse: 67   Resp: 18   Temp: 97.1 °F (36.2 °C)       Physical Exam  Vitals and nursing note reviewed.   Constitutional:       Appearance: She is well-developed.   Neck:      Thyroid: No thyromegaly.   Cardiovascular:      Rate and Rhythm: Normal rate.   Pulmonary:      Effort: Pulmonary effort is normal. No respiratory  distress.      Breath sounds: Normal breath sounds. No wheezing.   Abdominal:      General: Bowel sounds are normal. There is no distension.      Palpations: Abdomen is soft.      Tenderness: There is no abdominal tenderness. There is no guarding.      Hernia: No hernia is present.   Musculoskeletal:         General: No tenderness.   Skin:     General: Skin is warm and dry.      Findings: No erythema or rash.   Neurological:      Mental Status: She is alert.   Psychiatric:         Behavior: Behavior normal.         Assessment:   Post-op, the patient is doing well.     Encounter Diagnoses   Name Primary?   • Hx of bariatric surgery Yes   • Hyperlipidemia, unspecified hyperlipidemia type    • Hypertension, essential    • Hypertriglyceridemia    • Anemia, unspecified type    • Vitamin D deficiency    • Diabetes mellitus type 2 in obese (CMS/HCC)    • Anxiety    • Right shoulder pain, unspecified chronicity    • Right hip pain    • Gastroesophageal reflux disease, unspecified whether esophagitis present    • S/P laparoscopic sleeve gastrectomy    • Obesity, Class I, BMI 30-34.9    • Depression, unspecified depression type        Plan:   We discussed starting topirimate at 25mg in the evenings as adjunct therapy to help with cravings and help improve her sleep quality. She will take this dose for a month and then increase to the 50mg dose. She has had a kidney stone once previously but is great about getting her fluids in. She denies a seizure history, and verbalized understanding at the recommendation to wean this medication down if she ever desires to stop it.  Encouraged patient to be sure to get plenty of lean protein per day through small frequent meals all with a protein source.   Activity restrictions: none.   Recommended patient be sure to get at least 70 grams of protein per day by eating small, frequent meals all with high lean protein choices. Be sure to limit/cut back on daily carbohydrate intake. Discussed  with the patient the recommended amount of water per day to intake- half of body weight in ounces. Reviewed vitamin requirements. Be sure to do routine exercise, 150 minutes per week minimum, including both cardio and strength training.     Instructions / Recommendations: dietary counseling recommended, recommended a daily protein intake of  grams, vitamin supplement(s) recommended, recommended exercising at least 150 minutes per week, behavior modifications recommended and instructed to call the office for concerns, questions, or problems.     The patient was instructed to follow up in 3 months .     The patient was counseled regarding. Total time spent during this encounter today was 35 minutes

## 2021-09-27 ENCOUNTER — APPOINTMENT (OUTPATIENT)
Dept: CT IMAGING | Facility: HOSPITAL | Age: 41
End: 2021-09-27

## 2021-09-27 ENCOUNTER — PREP FOR SURGERY (OUTPATIENT)
Dept: OTHER | Facility: HOSPITAL | Age: 41
End: 2021-09-27

## 2021-09-27 ENCOUNTER — HOSPITAL ENCOUNTER (EMERGENCY)
Facility: HOSPITAL | Age: 41
Discharge: HOME | End: 2021-09-27
Attending: EMERGENCY MEDICINE

## 2021-09-27 VITALS
HEART RATE: 62 BPM | BODY MASS INDEX: 34.12 KG/M2 | HEIGHT: 67 IN | WEIGHT: 217.37 LBS | DIASTOLIC BLOOD PRESSURE: 78 MMHG | SYSTOLIC BLOOD PRESSURE: 121 MMHG | TEMPERATURE: 97.7 F | RESPIRATION RATE: 20 BRPM | OXYGEN SATURATION: 98 %

## 2021-09-27 DIAGNOSIS — N20.0 RIGHT NEPHROLITHIASIS: ICD-10-CM

## 2021-09-27 DIAGNOSIS — N20.1 RIGHT URETERAL STONE: Primary | ICD-10-CM

## 2021-09-27 DIAGNOSIS — N23 RENAL COLIC ON RIGHT SIDE: Primary | ICD-10-CM

## 2021-09-27 DIAGNOSIS — N28.89 RIGHT RENAL MASS: ICD-10-CM

## 2021-09-27 LAB
ALBUMIN SERPL-MCNC: 3.8 G/DL (ref 3.5–5.2)
ALBUMIN/GLOB SERPL: 1.4 G/DL
ALP SERPL-CCNC: 90 U/L (ref 39–117)
ALT SERPL W P-5'-P-CCNC: 15 U/L (ref 1–33)
ANION GAP SERPL CALCULATED.3IONS-SCNC: 11.8 MMOL/L (ref 5–15)
AST SERPL-CCNC: 22 U/L (ref 1–32)
BACTERIA UR QL AUTO: ABNORMAL /HPF
BASOPHILS # BLD AUTO: 0.05 10*3/MM3 (ref 0–0.2)
BASOPHILS NFR BLD AUTO: 0.4 % (ref 0–1.5)
BILIRUB SERPL-MCNC: 0.2 MG/DL (ref 0–1.2)
BILIRUB UR QL STRIP: NEGATIVE
BUN SERPL-MCNC: 19 MG/DL (ref 6–20)
BUN/CREAT SERPL: 21.1 (ref 7–25)
CALCIUM SPEC-SCNC: 8.9 MG/DL (ref 8.6–10.5)
CHLORIDE SERPL-SCNC: 103 MMOL/L (ref 98–107)
CLARITY UR: CLEAR
CO2 SERPL-SCNC: 22.2 MMOL/L (ref 22–29)
COLOR UR: ABNORMAL
CREAT SERPL-MCNC: 0.9 MG/DL (ref 0.57–1)
DEPRECATED RDW RBC AUTO: 42.8 FL (ref 37–54)
EOSINOPHIL # BLD AUTO: 0.09 10*3/MM3 (ref 0–0.4)
EOSINOPHIL NFR BLD AUTO: 0.8 % (ref 0.3–6.2)
ERYTHROCYTE [DISTWIDTH] IN BLOOD BY AUTOMATED COUNT: 14.1 % (ref 12.3–15.4)
GFR SERPL CREATININE-BSD FRML MDRD: 69 ML/MIN/1.73
GLOBULIN UR ELPH-MCNC: 2.7 GM/DL
GLUCOSE SERPL-MCNC: 102 MG/DL (ref 65–99)
GLUCOSE UR STRIP-MCNC: NEGATIVE MG/DL
HCG INTACT+B SERPL-ACNC: <0.5 MIU/ML
HCT VFR BLD AUTO: 36.2 % (ref 34–46.6)
HGB BLD-MCNC: 11.9 G/DL (ref 12–15.9)
HGB UR QL STRIP.AUTO: ABNORMAL
HOLD SPECIMEN: NORMAL
HOLD SPECIMEN: NORMAL
HYALINE CASTS UR QL AUTO: ABNORMAL /LPF
IMM GRANULOCYTES # BLD AUTO: 0.05 10*3/MM3 (ref 0–0.05)
IMM GRANULOCYTES NFR BLD AUTO: 0.4 % (ref 0–0.5)
KETONES UR QL STRIP: NEGATIVE
LEUKOCYTE ESTERASE UR QL STRIP.AUTO: ABNORMAL
LIPASE SERPL-CCNC: 45 U/L (ref 13–60)
LYMPHOCYTES # BLD AUTO: 2.91 10*3/MM3 (ref 0.7–3.1)
LYMPHOCYTES NFR BLD AUTO: 25.1 % (ref 19.6–45.3)
MCH RBC QN AUTO: 27.7 PG (ref 26.6–33)
MCHC RBC AUTO-ENTMCNC: 32.9 G/DL (ref 31.5–35.7)
MCV RBC AUTO: 84.2 FL (ref 79–97)
MONOCYTES # BLD AUTO: 0.57 10*3/MM3 (ref 0.1–0.9)
MONOCYTES NFR BLD AUTO: 4.9 % (ref 5–12)
NEUTROPHILS NFR BLD AUTO: 68.4 % (ref 42.7–76)
NEUTROPHILS NFR BLD AUTO: 7.91 10*3/MM3 (ref 1.7–7)
NITRITE UR QL STRIP: NEGATIVE
NRBC BLD AUTO-RTO: 0 /100 WBC (ref 0–0.2)
PH UR STRIP.AUTO: 5.5 [PH] (ref 5–8)
PLATELET # BLD AUTO: 219 10*3/MM3 (ref 140–450)
PMV BLD AUTO: 10.2 FL (ref 6–12)
POTASSIUM SERPL-SCNC: 3.9 MMOL/L (ref 3.5–5.2)
PROT SERPL-MCNC: 6.5 G/DL (ref 6–8.5)
PROT UR QL STRIP: ABNORMAL
RBC # BLD AUTO: 4.3 10*6/MM3 (ref 3.77–5.28)
RBC # UR: ABNORMAL /HPF
REF LAB TEST METHOD: ABNORMAL
SODIUM SERPL-SCNC: 137 MMOL/L (ref 136–145)
SP GR UR STRIP: 1.02 (ref 1–1.03)
SQUAMOUS #/AREA URNS HPF: ABNORMAL /HPF
UROBILINOGEN UR QL STRIP: ABNORMAL
WBC # BLD AUTO: 11.58 10*3/MM3 (ref 3.4–10.8)
WBC UR QL AUTO: ABNORMAL /HPF
WHOLE BLOOD HOLD SPECIMEN: NORMAL
WHOLE BLOOD HOLD SPECIMEN: NORMAL

## 2021-09-27 PROCEDURE — 25010000002 KETOROLAC TROMETHAMINE PER 15 MG: Performed by: EMERGENCY MEDICINE

## 2021-09-27 PROCEDURE — 85025 COMPLETE CBC W/AUTO DIFF WBC: CPT

## 2021-09-27 PROCEDURE — 84702 CHORIONIC GONADOTROPIN TEST: CPT

## 2021-09-27 PROCEDURE — 74176 CT ABD & PELVIS W/O CONTRAST: CPT

## 2021-09-27 PROCEDURE — 36415 COLL VENOUS BLD VENIPUNCTURE: CPT

## 2021-09-27 PROCEDURE — 25010000002 ONDANSETRON PER 1 MG: Performed by: EMERGENCY MEDICINE

## 2021-09-27 PROCEDURE — 96374 THER/PROPH/DIAG INJ IV PUSH: CPT

## 2021-09-27 PROCEDURE — 81001 URINALYSIS AUTO W/SCOPE: CPT | Performed by: EMERGENCY MEDICINE

## 2021-09-27 PROCEDURE — 96375 TX/PRO/DX INJ NEW DRUG ADDON: CPT

## 2021-09-27 PROCEDURE — 83690 ASSAY OF LIPASE: CPT

## 2021-09-27 PROCEDURE — 99283 EMERGENCY DEPT VISIT LOW MDM: CPT

## 2021-09-27 PROCEDURE — 80053 COMPREHEN METABOLIC PANEL: CPT

## 2021-09-27 RX ORDER — SODIUM CHLORIDE 9 MG/ML
100 INJECTION, SOLUTION INTRAVENOUS CONTINUOUS
Status: CANCELLED | OUTPATIENT
Start: 2021-09-27

## 2021-09-27 RX ORDER — HYDROCODONE BITARTRATE AND ACETAMINOPHEN 7.5; 325 MG/1; MG/1
1 TABLET ORAL EVERY 6 HOURS PRN
Qty: 15 TABLET | Refills: 0 | Status: SHIPPED | OUTPATIENT
Start: 2021-09-27 | End: 2021-11-08

## 2021-09-27 RX ORDER — SODIUM CHLORIDE 0.9 % (FLUSH) 0.9 %
10 SYRINGE (ML) INJECTION EVERY 12 HOURS SCHEDULED
Status: CANCELLED | OUTPATIENT
Start: 2021-09-27

## 2021-09-27 RX ORDER — SODIUM CHLORIDE 0.9 % (FLUSH) 0.9 %
10 SYRINGE (ML) INJECTION AS NEEDED
Status: CANCELLED | OUTPATIENT
Start: 2021-09-27

## 2021-09-27 RX ORDER — LEVOFLOXACIN 5 MG/ML
500 INJECTION, SOLUTION INTRAVENOUS ONCE
Status: CANCELLED | OUTPATIENT
Start: 2021-09-27 | End: 2021-09-27

## 2021-09-27 RX ORDER — KETOROLAC TROMETHAMINE 30 MG/ML
30 INJECTION, SOLUTION INTRAMUSCULAR; INTRAVENOUS ONCE
Status: COMPLETED | OUTPATIENT
Start: 2021-09-27 | End: 2021-09-27

## 2021-09-27 RX ORDER — KETOROLAC TROMETHAMINE 10 MG/1
10 TABLET, FILM COATED ORAL EVERY 6 HOURS PRN
Qty: 15 TABLET | Refills: 0 | Status: SHIPPED | OUTPATIENT
Start: 2021-09-27 | End: 2021-11-08

## 2021-09-27 RX ORDER — ONDANSETRON 2 MG/ML
4 INJECTION INTRAMUSCULAR; INTRAVENOUS ONCE
Status: COMPLETED | OUTPATIENT
Start: 2021-09-27 | End: 2021-09-27

## 2021-09-27 RX ORDER — SODIUM CHLORIDE 0.9 % (FLUSH) 0.9 %
10 SYRINGE (ML) INJECTION AS NEEDED
Status: DISCONTINUED | OUTPATIENT
Start: 2021-09-27 | End: 2021-09-27 | Stop reason: HOSPADM

## 2021-09-27 RX ADMIN — KETOROLAC TROMETHAMINE 30 MG: 30 INJECTION, SOLUTION INTRAMUSCULAR; INTRAVENOUS at 13:07

## 2021-09-27 RX ADMIN — ONDANSETRON 4 MG: 2 INJECTION INTRAMUSCULAR; INTRAVENOUS at 13:05

## 2021-09-27 NOTE — H&P
[facility]   Urology HISTORY AND PHYSICAL    Patient Name: Bianca Boles  : 1980  MRN: [id number]  Primary Care Physician:  Patricia Hernandez APRN  Date of admission: (Not on file)    Subjective   Subjective     Chief Complaint: Right renal stone (1cm)    Patient was seen in the ED with right flank pain and has a 1 cm right renal stone.  She does also have a new exophytic right renal mass that is not fully evaluated without contrast.      Personal History     Past Medical History:   Diagnosis Date   • Acid reflux    • Allergic rhinitis due to allergen 2016   • Anxiety    • Anxiety and depression    • Benign essential hypertension    • Diabetes (CMS/HCC)    • Diabetes mellitus (CMS/HCC)    • Diabetes mellitus, type 2 (CMS/HCC)    • Gastroparesis    • GERD (gastroesophageal reflux disease)    • High triglycerides    • History of bariatric surgery 2021   • History of kidney stones    • HTN (hypertension)    • Hyperlipemia    • Hyperlipidemia    • Hypertension    • Hypertriglyceridemia    • Lumbar herniated disc    • Panic attacks    • PCOS (polycystic ovarian syndrome)    • Psoriasis     ELBOWS   • Seasonal allergies    • Spinal headache     AFTER PAIN EPIDURALS.  NO BLOOD PATCH       Past Surgical History:   Procedure Laterality Date   • ADENOIDECTOMY     •  SECTION  ,   • CYSTOSCOPY BLADDER STONE LITHOTRIPSY     • EAR TUBES     • ENDOSCOPY N/A 10/20/2020    Procedure: ESOPHAGOGASTRODUODENOSCOPY WITH BIOPSY;  Surgeon: Fidel Grajeda Jr., MD;  Location: [facility] ENDOSCOPY;  Service: General;  Laterality: N/A;  PRE- GERD  POST- RETAINED FOOD, GASTRITIS, GASTRIC POLYPS   • ENDOSCOPY     • FOOT FRACTURE SURGERY Left 2017    screw placed   • GASTRIC SLEEVE LAPAROSCOPIC N/A 2020    Procedure: GASTRIC SLEEVE LAPAROSCOPIC;  Surgeon: Fidel Grajeda Jr., MD;  [place] [facility] [place] [facility]  Service: Bariatric;  Laterality: N/A;   • KIDNEY SURGERY     •  TONSILLECTOMY  1984       Family History: family history includes Cancer in her father; Colon cancer in her father; Diabetes in her father and paternal grandfather; Heart disease in her father, maternal grandmother, and paternal grandfather; Hypertension in her father; Stroke in her maternal grandmother. Otherwise pertinent FHx was reviewed and not pertinent to current issue.    Social History:  reports that she quit smoking about 20 months ago. Her smoking use included cigarettes. She quit after 35.00 years of use. She has never used smokeless tobacco. She reports current alcohol use. She reports that she does not use drugs.    Home Medications:  Biotin, Calcium-Magnesium-Zinc, Ferrous Sulfate-Vitamin C, buPROPion XL, cholecalciferol, etonogestrel-ethinyl estradiol, lisinopril, meloxicam, metFORMIN, multivitamin, pravastatin, topiramate, and traZODone    Allergies:  Allergies   Allergen Reactions   • Morphine Itching and Nausea And Vomiting       Objective    Objective     Vitals:   Temp:  [97.6 °F (36.4 °C)] 97.6 °F (36.4 °C)  Heart Rate:  [54-65] 59  Resp:  [18] 18  BP: (111-150)/(55-81) 119/73    Physical Exam  Constitutional:       Appearance: Normal appearance.   Cardiovascular:      Rate and Rhythm: Normal rate and regular rhythm.   Pulmonary:      Effort: Pulmonary effort is normal.      Breath sounds: Normal breath sounds.   Neurological:      Mental Status: She is alert. Mental status is at baseline.   Psychiatric:         Mood and Affect: Mood and affect normal.         Speech: Speech normal.         Judgment: Judgment normal.         Result Review    Result Review:  I have personally reviewed the results from the time of this admission to 9/27/2021 15:33 EDT and agree with these findings:  [x]  Laboratory  []  Microbiology  [x]  Radiology  []  EKG/Telemetry   []  Cardiology/Vascular   []  Pathology  [x]  Old records  []  Other:      Assessment/Plan   Assessment / Plan       Active Hospital  Problems:  There are no active hospital problems to display for this patient.      Plan: Right ureteroscopy, laser lithotripsy, right ureteral stent placement.  Risks and benefits discussed with patient and they are agreeable to proceed.    DVT prophylaxis:  No DVT prophylaxis order currently exists.    CODE STATUS:           Electronically signed by Lori Troy MD, 09/27/21, 3:33 PM EDT.

## 2021-09-28 ENCOUNTER — ANESTHESIA (OUTPATIENT)
Dept: PERIOP | Facility: HOSPITAL | Age: 41
End: 2021-09-28

## 2021-09-28 ENCOUNTER — APPOINTMENT (OUTPATIENT)
Dept: GENERAL RADIOLOGY | Facility: HOSPITAL | Age: 41
End: 2021-09-28

## 2021-09-28 ENCOUNTER — HOSPITAL ENCOUNTER (OUTPATIENT)
Facility: HOSPITAL | Age: 41
Setting detail: HOSPITAL OUTPATIENT SURGERY
Discharge: HOME | End: 2021-09-28
Attending: UROLOGY | Admitting: UROLOGY

## 2021-09-28 ENCOUNTER — ANESTHESIA EVENT (OUTPATIENT)
Dept: PERIOP | Facility: HOSPITAL | Age: 41
End: 2021-09-28

## 2021-09-28 VITALS
BODY MASS INDEX: 34.39 KG/M2 | HEIGHT: 67 IN | HEART RATE: 58 BPM | DIASTOLIC BLOOD PRESSURE: 59 MMHG | SYSTOLIC BLOOD PRESSURE: 113 MMHG | WEIGHT: 219.14 LBS | TEMPERATURE: 96.7 F | OXYGEN SATURATION: 96 % | RESPIRATION RATE: 16 BRPM

## 2021-09-28 DIAGNOSIS — N20.1 RIGHT URETERAL STONE: ICD-10-CM

## 2021-09-28 LAB — GLUCOSE BLDC GLUCOMTR-MCNC: 115 MG/DL (ref 70–99)

## 2021-09-28 PROCEDURE — 25010000002 MIDAZOLAM PER 1MG: Performed by: ANESTHESIOLOGY

## 2021-09-28 PROCEDURE — 82962 GLUCOSE BLOOD TEST: CPT

## 2021-09-28 PROCEDURE — C1758 CATHETER, URETERAL: HCPCS | Performed by: UROLOGY

## 2021-09-28 PROCEDURE — 25010000002 DEXAMETHASONE PER 1 MG: Performed by: NURSE ANESTHETIST, CERTIFIED REGISTERED

## 2021-09-28 PROCEDURE — 25010000002 METOCLOPRAMIDE PER 10 MG: Performed by: ANESTHESIOLOGY

## 2021-09-28 PROCEDURE — C1769 GUIDE WIRE: HCPCS | Performed by: UROLOGY

## 2021-09-28 PROCEDURE — 74018 RADEX ABDOMEN 1 VIEW: CPT

## 2021-09-28 PROCEDURE — 82365 CALCULUS SPECTROSCOPY: CPT | Performed by: UROLOGY

## 2021-09-28 PROCEDURE — 25010000002 FENTANYL CITRATE (PF) 50 MCG/ML SOLUTION: Performed by: NURSE ANESTHETIST, CERTIFIED REGISTERED

## 2021-09-28 PROCEDURE — 76000 FLUOROSCOPY <1 HR PHYS/QHP: CPT

## 2021-09-28 PROCEDURE — 25010000002 PROPOFOL 10 MG/ML EMULSION: Performed by: NURSE ANESTHETIST, CERTIFIED REGISTERED

## 2021-09-28 PROCEDURE — 88300 SURGICAL PATH GROSS: CPT | Performed by: UROLOGY

## 2021-09-28 PROCEDURE — 25010000002 ONDANSETRON PER 1 MG: Performed by: NURSE ANESTHETIST, CERTIFIED REGISTERED

## 2021-09-28 PROCEDURE — 25010000002 KETOROLAC TROMETHAMINE PER 15 MG: Performed by: NURSE ANESTHETIST, CERTIFIED REGISTERED

## 2021-09-28 PROCEDURE — C1894 INTRO/SHEATH, NON-LASER: HCPCS | Performed by: UROLOGY

## 2021-09-28 PROCEDURE — 25010000002 LEVOFLOXACIN PER 250 MG: Performed by: UROLOGY

## 2021-09-28 PROCEDURE — C2617 STENT, NON-COR, TEM W/O DEL: HCPCS | Performed by: UROLOGY

## 2021-09-28 PROCEDURE — 52356 CYSTO/URETERO W/LITHOTRIPSY: CPT | Performed by: UROLOGY

## 2021-09-28 DEVICE — STNT URETRL CLASSC DBL PIG 6F 26CM: Type: IMPLANTABLE DEVICE | Site: URETER | Status: FUNCTIONAL

## 2021-09-28 RX ORDER — ACETAMINOPHEN 500 MG
1000 TABLET ORAL ONCE
Status: COMPLETED | OUTPATIENT
Start: 2021-09-28 | End: 2021-09-28

## 2021-09-28 RX ORDER — ONDANSETRON 4 MG/1
4 TABLET, FILM COATED ORAL ONCE AS NEEDED
Status: DISCONTINUED | OUTPATIENT
Start: 2021-09-28 | End: 2021-09-28 | Stop reason: HOSPADM

## 2021-09-28 RX ORDER — PROMETHAZINE HYDROCHLORIDE 25 MG/1
25 SUPPOSITORY RECTAL ONCE AS NEEDED
Status: DISCONTINUED | OUTPATIENT
Start: 2021-09-28 | End: 2021-09-28 | Stop reason: HOSPADM

## 2021-09-28 RX ORDER — MIDAZOLAM HYDROCHLORIDE 2 MG/2ML
2 INJECTION, SOLUTION INTRAMUSCULAR; INTRAVENOUS ONCE
Status: COMPLETED | OUTPATIENT
Start: 2021-09-28 | End: 2021-09-28

## 2021-09-28 RX ORDER — MAGNESIUM HYDROXIDE 1200 MG/15ML
LIQUID ORAL AS NEEDED
Status: DISCONTINUED | OUTPATIENT
Start: 2021-09-28 | End: 2021-09-28 | Stop reason: HOSPADM

## 2021-09-28 RX ORDER — METOCLOPRAMIDE HYDROCHLORIDE 5 MG/ML
10 INJECTION INTRAMUSCULAR; INTRAVENOUS ONCE AS NEEDED
Status: COMPLETED | OUTPATIENT
Start: 2021-09-28 | End: 2021-09-28

## 2021-09-28 RX ORDER — LEVOFLOXACIN 5 MG/ML
500 INJECTION, SOLUTION INTRAVENOUS ONCE
Status: COMPLETED | OUTPATIENT
Start: 2021-09-28 | End: 2021-09-28

## 2021-09-28 RX ORDER — MEPERIDINE HYDROCHLORIDE 25 MG/ML
12.5 INJECTION INTRAMUSCULAR; INTRAVENOUS; SUBCUTANEOUS
Status: DISCONTINUED | OUTPATIENT
Start: 2021-09-28 | End: 2021-09-28 | Stop reason: HOSPADM

## 2021-09-28 RX ORDER — FENTANYL CITRATE 50 UG/ML
INJECTION, SOLUTION INTRAMUSCULAR; INTRAVENOUS AS NEEDED
Status: DISCONTINUED | OUTPATIENT
Start: 2021-09-28 | End: 2021-09-28 | Stop reason: SURG

## 2021-09-28 RX ORDER — DEXAMETHASONE SODIUM PHOSPHATE 4 MG/ML
INJECTION, SOLUTION INTRA-ARTICULAR; INTRALESIONAL; INTRAMUSCULAR; INTRAVENOUS; SOFT TISSUE AS NEEDED
Status: DISCONTINUED | OUTPATIENT
Start: 2021-09-28 | End: 2021-09-28 | Stop reason: SURG

## 2021-09-28 RX ORDER — PROMETHAZINE HYDROCHLORIDE 12.5 MG/1
25 TABLET ORAL ONCE AS NEEDED
Status: DISCONTINUED | OUTPATIENT
Start: 2021-09-28 | End: 2021-09-28 | Stop reason: HOSPADM

## 2021-09-28 RX ORDER — OXYCODONE HYDROCHLORIDE 5 MG/1
5 TABLET ORAL
Status: DISCONTINUED | OUTPATIENT
Start: 2021-09-28 | End: 2021-09-28 | Stop reason: HOSPADM

## 2021-09-28 RX ORDER — SODIUM CHLORIDE 0.9 % (FLUSH) 0.9 %
10 SYRINGE (ML) INJECTION AS NEEDED
Status: DISCONTINUED | OUTPATIENT
Start: 2021-09-28 | End: 2021-09-28 | Stop reason: HOSPADM

## 2021-09-28 RX ORDER — ONDANSETRON 2 MG/ML
4 INJECTION INTRAMUSCULAR; INTRAVENOUS ONCE AS NEEDED
Status: DISCONTINUED | OUTPATIENT
Start: 2021-09-28 | End: 2021-09-28 | Stop reason: HOSPADM

## 2021-09-28 RX ORDER — PROPOFOL 10 MG/ML
VIAL (ML) INTRAVENOUS AS NEEDED
Status: DISCONTINUED | OUTPATIENT
Start: 2021-09-28 | End: 2021-09-28 | Stop reason: SURG

## 2021-09-28 RX ORDER — LIDOCAINE HYDROCHLORIDE 20 MG/ML
INJECTION, SOLUTION INFILTRATION; PERINEURAL AS NEEDED
Status: DISCONTINUED | OUTPATIENT
Start: 2021-09-28 | End: 2021-09-28 | Stop reason: SURG

## 2021-09-28 RX ORDER — OXYCODONE HYDROCHLORIDE AND ACETAMINOPHEN 5; 325 MG/1; MG/1
1-2 TABLET ORAL EVERY 6 HOURS PRN
Qty: 20 TABLET | Refills: 0 | Status: SHIPPED | OUTPATIENT
Start: 2021-09-28 | End: 2021-11-08

## 2021-09-28 RX ORDER — SODIUM CHLORIDE 9 MG/ML
9 INJECTION, SOLUTION INTRAVENOUS CONTINUOUS PRN
Status: DISCONTINUED | OUTPATIENT
Start: 2021-09-28 | End: 2021-09-28 | Stop reason: HOSPADM

## 2021-09-28 RX ORDER — IBUPROFEN 600 MG/1
600 TABLET ORAL EVERY 6 HOURS PRN
Status: DISCONTINUED | OUTPATIENT
Start: 2021-09-28 | End: 2021-09-28 | Stop reason: HOSPADM

## 2021-09-28 RX ORDER — PROMETHAZINE HYDROCHLORIDE 12.5 MG/1
12.5 TABLET ORAL ONCE AS NEEDED
Status: DISCONTINUED | OUTPATIENT
Start: 2021-09-28 | End: 2021-09-28 | Stop reason: HOSPADM

## 2021-09-28 RX ORDER — KETOROLAC TROMETHAMINE 30 MG/ML
INJECTION, SOLUTION INTRAMUSCULAR; INTRAVENOUS AS NEEDED
Status: DISCONTINUED | OUTPATIENT
Start: 2021-09-28 | End: 2021-09-28 | Stop reason: SURG

## 2021-09-28 RX ORDER — ONDANSETRON 2 MG/ML
INJECTION INTRAMUSCULAR; INTRAVENOUS AS NEEDED
Status: DISCONTINUED | OUTPATIENT
Start: 2021-09-28 | End: 2021-09-28 | Stop reason: SURG

## 2021-09-28 RX ORDER — SODIUM CHLORIDE 9 MG/ML
100 INJECTION, SOLUTION INTRAVENOUS CONTINUOUS
Status: DISCONTINUED | OUTPATIENT
Start: 2021-09-28 | End: 2021-09-28 | Stop reason: HOSPADM

## 2021-09-28 RX ORDER — SODIUM CHLORIDE 0.9 % (FLUSH) 0.9 %
10 SYRINGE (ML) INJECTION EVERY 12 HOURS SCHEDULED
Status: DISCONTINUED | OUTPATIENT
Start: 2021-09-28 | End: 2021-09-28 | Stop reason: HOSPADM

## 2021-09-28 RX ORDER — ACETAMINOPHEN 325 MG/1
650 TABLET ORAL ONCE
Status: DISCONTINUED | OUTPATIENT
Start: 2021-09-28 | End: 2021-09-28 | Stop reason: HOSPADM

## 2021-09-28 RX ADMIN — METOCLOPRAMIDE HYDROCHLORIDE 10 MG: 5 INJECTION INTRAMUSCULAR; INTRAVENOUS at 08:57

## 2021-09-28 RX ADMIN — MIDAZOLAM HYDROCHLORIDE 2 MG: 1 INJECTION, SOLUTION INTRAMUSCULAR; INTRAVENOUS at 08:57

## 2021-09-28 RX ADMIN — DEXAMETHASONE SODIUM PHOSPHATE 4 MG: 4 INJECTION INTRA-ARTICULAR; INTRALESIONAL; INTRAMUSCULAR; INTRAVENOUS; SOFT TISSUE at 09:14

## 2021-09-28 RX ADMIN — FENTANYL CITRATE 50 MCG: 50 INJECTION INTRAMUSCULAR; INTRAVENOUS at 09:09

## 2021-09-28 RX ADMIN — ONDANSETRON 4 MG: 2 INJECTION INTRAMUSCULAR; INTRAVENOUS at 10:18

## 2021-09-28 RX ADMIN — PROPOFOL 200 MG: 10 INJECTION, EMULSION INTRAVENOUS at 09:09

## 2021-09-28 RX ADMIN — KETOROLAC TROMETHAMINE 30 MG: 30 INJECTION, SOLUTION INTRAMUSCULAR; INTRAVENOUS at 10:19

## 2021-09-28 RX ADMIN — ACETAMINOPHEN 1000 MG: 500 TABLET ORAL at 08:16

## 2021-09-28 RX ADMIN — LEVOFLOXACIN 500 MG: 5 INJECTION, SOLUTION INTRAVENOUS at 09:09

## 2021-09-28 RX ADMIN — FENTANYL CITRATE 25 MCG: 50 INJECTION INTRAMUSCULAR; INTRAVENOUS at 09:39

## 2021-09-28 RX ADMIN — SODIUM CHLORIDE 9 ML/HR: 9 INJECTION, SOLUTION INTRAVENOUS at 08:16

## 2021-09-28 RX ADMIN — LIDOCAINE HYDROCHLORIDE 100 MG: 20 INJECTION, SOLUTION INFILTRATION; PERINEURAL at 09:09

## 2021-09-28 RX ADMIN — FENTANYL CITRATE 25 MCG: 50 INJECTION INTRAMUSCULAR; INTRAVENOUS at 09:23

## 2021-09-28 NOTE — ANESTHESIA PREPROCEDURE EVALUATION
Anesthesia Evaluation     Patient summary reviewed and Nursing notes reviewed   no history of anesthetic complications:  NPO Solid Status: > 8 hours  NPO Liquid Status: > 2 hours           Airway   Mallampati: II  TM distance: >3 FB  Neck ROM: full  No difficulty expected  Dental      Pulmonary - negative pulmonary ROS and normal exam    breath sounds clear to auscultation  Cardiovascular - normal exam  Exercise tolerance: good (4-7 METS)    Rhythm: regular    (+) hypertension, hyperlipidemia,       Neuro/Psych  (+) headaches, psychiatric history,     GI/Hepatic/Renal/Endo    (+)  GERD,  renal disease stones, diabetes mellitus type 2,     Musculoskeletal (-) negative ROS    Abdominal    Substance History - negative use     OB/GYN negative ob/gyn ROS         Other - negative ROS                       Anesthesia Plan    ASA 2     general   (Patient understands anesthesia not responsible for dental damage.)  intravenous induction     Anesthetic plan, all risks, benefits, and alternatives have been provided, discussed and informed consent has been obtained with: patient.  Use of blood products discussed with patient .   Plan discussed with CRNA.

## 2021-09-28 NOTE — ANESTHESIA POSTPROCEDURE EVALUATION
Patient: Bianca Boles    Procedure Summary     Date: 09/28/21 Room / Location: [facility] ED OR 01 / [facility] MAIN OR    Anesthesia Start: 0904 Anesthesia Stop: 1026    Procedure: CYSTOSCOPY, RIGHT URETEROSCOPY, LASERTRIPSY, STONE BASKET EXTRACTION AND STENT INSERTION (Right Ureter) Diagnosis:       Right ureteral stone      (Right ureteral stone [N20.1])    Surgeons: Lori Troy MD Provider: Braulio Rod MD    Anesthesia Type: general ASA Status: 2          Anesthesia Type: general    Vitals  Vitals Value Taken Time   /67 09/28/21 1043   Temp 36.3 °C (97.3 °F) 09/28/21 1028   Pulse 59 09/28/21 1047   Resp 16 09/28/21 1028   SpO2 95 % 09/28/21 1047   Vitals shown include unvalidated device data.        Post Anesthesia Care and Evaluation    Patient location during evaluation: PACU  Patient participation: complete - patient participated  Level of consciousness: awake and alert  Pain score: 0  Pain management: adequate  Airway patency: patent  Anesthetic complications: No anesthetic complications  PONV Status: none  Cardiovascular status: acceptable  Respiratory status: acceptable  Hydration status: acceptable  No anesthesia care post op

## 2021-09-29 LAB
LAB AP CASE REPORT: NORMAL
LAB AP CLINICAL INFORMATION: NORMAL
PATH REPORT.FINAL DX SPEC: NORMAL
PATH REPORT.GROSS SPEC: NORMAL

## 2021-10-05 LAB
CALCIUM OXALATE DIHYDRATE MFR STONE IR: 50 %
COLOR STONE: NORMAL
COM MFR STONE: 10 %
COMPN STONE: NORMAL
LABORATORY COMMENT REPORT: NORMAL
Lab: NORMAL
Lab: NORMAL
PHOTO: NORMAL
SIZE STONE: NORMAL MM
SPEC SOURCE SUBJ: NORMAL
URATE MFR STONE: 40 %
WT STONE: 524 MG

## 2021-10-06 ENCOUNTER — OFFICE VISIT (OUTPATIENT)
Dept: UROLOGY | Facility: CLINIC | Age: 41
End: 2021-10-06

## 2021-10-06 VITALS
DIASTOLIC BLOOD PRESSURE: 54 MMHG | SYSTOLIC BLOOD PRESSURE: 108 MMHG | WEIGHT: 213 LBS | HEIGHT: 67 IN | BODY MASS INDEX: 33.43 KG/M2

## 2021-10-06 DIAGNOSIS — R31.9 HEMATURIA, UNSPECIFIED TYPE: ICD-10-CM

## 2021-10-06 DIAGNOSIS — N20.0 KIDNEY STONE: Primary | ICD-10-CM

## 2021-10-06 DIAGNOSIS — N30.01 ACUTE CYSTITIS WITH HEMATURIA: ICD-10-CM

## 2021-10-06 DIAGNOSIS — N28.89 RENAL MASS: ICD-10-CM

## 2021-10-06 LAB
BILIRUB BLD-MCNC: ABNORMAL MG/DL
CLARITY, POC: CLEAR
COLOR UR: ABNORMAL
GLUCOSE UR STRIP-MCNC: NEGATIVE MG/DL
KETONES UR QL: ABNORMAL
LEUKOCYTE EST, POC: ABNORMAL
NITRITE UR-MCNC: POSITIVE MG/ML
PH UR: 5 [PH] (ref 5–8)
PROT UR STRIP-MCNC: ABNORMAL MG/DL
RBC # UR STRIP: ABNORMAL /UL
SP GR UR: 1.02 (ref 1–1.03)
UROBILINOGEN UR QL: NORMAL

## 2021-10-06 PROCEDURE — 81003 URINALYSIS AUTO W/O SCOPE: CPT | Performed by: UROLOGY

## 2021-10-06 PROCEDURE — 99214 OFFICE O/P EST MOD 30 MIN: CPT | Performed by: UROLOGY

## 2021-10-06 RX ORDER — PHENAZOPYRIDINE HYDROCHLORIDE 95 MG/1
95 TABLET ORAL 3 TIMES DAILY PRN
COMMUNITY
End: 2021-11-08

## 2021-10-06 RX ORDER — SULFAMETHOXAZOLE AND TRIMETHOPRIM 800; 160 MG/1; MG/1
1 TABLET ORAL 2 TIMES DAILY
Qty: 14 TABLET | Refills: 0 | Status: SHIPPED | OUTPATIENT
Start: 2021-10-06 | End: 2021-10-13

## 2021-10-06 NOTE — ASSESSMENT & PLAN NOTE
She will need a CT scan with and without IV contrast to fully evaluate her renal mass.  I will call her with those results.

## 2021-10-06 NOTE — PROGRESS NOTES
"Chief Complaint  Post-op Follow-up (stent pulled 10/1)    Subjective          Bianca Boles presents to [facility]  Ms. Boles is 1 week out from right ureteroscopy with laser lithotripsy and stent insertion.  She removed her stent at home.  She said a few cramps but is now having more frequency and pressure.  She is taking AZO which seems to be helping.  Her urine is nitrite positive.  She is not having any flank pain at this point.    She does have a renal mass that was not completely evaluated on her CT scan for kidney stones and so we will get that ordered and I will call her with those results within the next week or so.      Objective   Vital Signs:   /54   Ht 170.2 cm (67\")   Wt 96.6 kg (213 lb)   BMI 33.36 kg/m²     Physical Exam  Vitals and nursing note reviewed.   Constitutional:       Appearance: Normal appearance. She is well-developed.   Pulmonary:      Effort: Pulmonary effort is normal.      Breath sounds: Normal air entry.   Neurological:      Mental Status: She is alert and oriented to person, place, and time.      Motor: Motor function is intact.   Psychiatric:         Mood and Affect: Mood normal.         Behavior: Behavior normal.        Result Review :                  Results for orders placed or performed in visit on 10/06/21   POC Urinalysis Dipstick, Automated    Specimen: Urine   Result Value Ref Range    Color Orange (A) Yellow, Straw, Dark Yellow, Sheri    Clarity, UA Clear Clear    Specific Gravity  1.025 1.005 - 1.030    pH, Urine 5.0 5.0 - 8.0    Leukocytes Small (1+) (A) Negative    Nitrite, UA Positive (A) Negative    Protein,  mg/dL (A) Negative mg/dL    Glucose, UA Negative Negative, 1000 mg/dL (3+) mg/dL    Ketones, UA Trace (A) Negative    Urobilinogen, UA Normal Normal    Bilirubin Small (1+) (A) Negative    Blood, UA Large (A) Negative       Assessment and Plan    Diagnoses and all orders for this visit:    1. Kidney stone " (Primary)  Assessment & Plan:  Her large right renal stone was removed last week in the operating room.  She is done well since then.  Stent is out.  She will have a KUB in 1 year.    Orders:  -     POC Urinalysis Dipstick, Automated  -     CT Abdomen Pelvis With & Without Contrast; Future    2. Hematuria, unspecified type  Assessment & Plan:  Her cystoscopy was normal.  She did have a large stone.  She still needs a CT scan with and without IV contrast which is ordered.    Orders:  -     CT Abdomen Pelvis With & Without Contrast; Future    3. Renal mass  Assessment & Plan:  She will need a CT scan with and without IV contrast to fully evaluate her renal mass.  I will call her with those results.    Orders:  -     CT Abdomen Pelvis With & Without Contrast; Future    4. Acute cystitis with hematuria  Assessment & Plan:  I am sending her urine for culture today.  If any go ahead and start her on antibiotics.    Orders:  -     Urine Culture - Urine, Urine, Clean Catch; Future  -     sulfamethoxazole-trimethoprim (Bactrim DS) 800-160 MG per tablet; Take 1 tablet by mouth 2 (Two) Times a Day for 7 days.  Dispense: 14 tablet; Refill: 0      Follow Up   No follow-ups on file.  Patient was given instructions and counseling regarding her condition or for health maintenance advice. Please see specific information pulled into the AVS if appropriate.

## 2021-10-06 NOTE — ASSESSMENT & PLAN NOTE
Her cystoscopy was normal.  She did have a large stone.  She still needs a CT scan with and without IV contrast which is ordered.

## 2021-10-06 NOTE — ASSESSMENT & PLAN NOTE
Her large right renal stone was removed last week in the operating room.  She is done well since then.  Stent is out.  She will have a KUB in 1 year.

## 2021-10-22 ENCOUNTER — PREP FOR SURGERY (OUTPATIENT)
Dept: OTHER | Facility: HOSPITAL | Age: 41
End: 2021-10-22

## 2021-10-22 ENCOUNTER — TELEPHONE (OUTPATIENT)
Dept: UROLOGY | Facility: CLINIC | Age: 41
End: 2021-10-22

## 2021-10-22 ENCOUNTER — HOSPITAL ENCOUNTER (OUTPATIENT)
Dept: CT IMAGING | Facility: HOSPITAL | Age: 41
Discharge: HOME | End: 2021-10-22
Admitting: UROLOGY

## 2021-10-22 DIAGNOSIS — N28.89 RENAL MASS: ICD-10-CM

## 2021-10-22 DIAGNOSIS — N28.89 RENAL MASS: Primary | ICD-10-CM

## 2021-10-22 DIAGNOSIS — R31.9 HEMATURIA, UNSPECIFIED TYPE: ICD-10-CM

## 2021-10-22 DIAGNOSIS — N20.0 KIDNEY STONE: ICD-10-CM

## 2021-10-22 PROCEDURE — 0 IOPAMIDOL PER 1 ML: Performed by: UROLOGY

## 2021-10-22 PROCEDURE — 74178 CT ABD&PLV WO CNTR FLWD CNTR: CPT

## 2021-10-22 RX ORDER — SODIUM CHLORIDE 0.9 % (FLUSH) 0.9 %
10 SYRINGE (ML) INJECTION AS NEEDED
Status: CANCELLED | OUTPATIENT
Start: 2021-10-22

## 2021-10-22 RX ORDER — SODIUM CHLORIDE 0.9 % (FLUSH) 0.9 %
3 SYRINGE (ML) INJECTION EVERY 12 HOURS SCHEDULED
Status: CANCELLED | OUTPATIENT
Start: 2021-10-22

## 2021-10-22 RX ORDER — SODIUM CHLORIDE 0.9 % (FLUSH) 0.9 %
10 SYRINGE (ML) INJECTION EVERY 12 HOURS SCHEDULED
Status: CANCELLED | OUTPATIENT
Start: 2021-10-22

## 2021-10-22 RX ORDER — LEVOFLOXACIN 5 MG/ML
500 INJECTION, SOLUTION INTRAVENOUS ONCE
Status: CANCELLED | OUTPATIENT
Start: 2021-10-22 | End: 2021-10-22

## 2021-10-22 RX ORDER — SODIUM CHLORIDE 9 MG/ML
100 INJECTION, SOLUTION INTRAVENOUS CONTINUOUS
Status: CANCELLED | OUTPATIENT
Start: 2021-10-22

## 2021-10-22 RX ADMIN — IOPAMIDOL 100 ML: 755 INJECTION, SOLUTION INTRAVENOUS at 09:05

## 2021-10-22 NOTE — H&P
[facility]   Urology HISTORY AND PHYSICAL    Patient Name: Bianca Boles  : 1980  MRN: [id number]  Primary Care Physician:  Patricia Hernandez APRN  Date of admission: (Not on file)    Subjective   Subjective     Chief Complaint: 4cm right renal mass    Patient has a 4 cm right upper pole renal mass and presents for robotic partial nephrectomy.      Personal History     Past Medical History:   Diagnosis Date   • Acid reflux    • Allergic rhinitis due to allergen 2016   • Anxiety    • Anxiety and depression    • Benign essential hypertension    • Diabetes (HCC)    • Diabetes mellitus (HCC)    • Diabetes mellitus, type 2 (HCC)    • Gastroparesis    • GERD (gastroesophageal reflux disease)    • High triglycerides    • History of bariatric surgery 2021   • History of kidney stones    • HTN (hypertension)    • Hyperlipemia    • Hyperlipidemia    • Hypertension    • Hypertriglyceridemia    • Kidney stone    • Lumbar herniated disc    • Panic attacks    • PCOS (polycystic ovarian syndrome)    • Psoriasis     ELBOWS   • Seasonal allergies    • Spinal headache     AFTER PAIN EPIDURALS.  NO BLOOD PATCH       Past Surgical History:   Procedure Laterality Date   • ABDOMINAL SURGERY     • ADENOIDECTOMY     •  SECTION  ,   • CYSTOSCOPY BLADDER STONE LITHOTRIPSY     • EAR TUBES     • ENDOSCOPY N/A 10/20/2020    Procedure: ESOPHAGOGASTRODUODENOSCOPY WITH BIOPSY;  Surgeon: Fidel Grajeda Jr., MD;  Location: [facility] ENDOSCOPY;  Service: General;  Laterality: N/A;  PRE- GERD  POST- RETAINED FOOD, GASTRITIS, GASTRIC POLYPS   • ENDOSCOPY     • FOOT FRACTURE SURGERY Left 2017    screw placed   • FRACTURE SURGERY     • GASTRECTOMY     • GASTRIC SLEEVE LAPAROSCOPIC N/A 2020    Procedure: GASTRIC SLEEVE LAPAROSCOPIC;  Surgeon: Fidel Grajeda Jr., MD;  [place] [facility] [place] [facility]  Service: Bariatric;  Laterality: N/A;   • KIDNEY SURGERY     • TONSILLECTOMY     •  URETEROSCOPY LASER LITHOTRIPSY WITH STENT INSERTION Right 9/28/2021    Procedure: CYSTOSCOPY, RIGHT URETEROSCOPY, LASERTRIPSY, STONE BASKET EXTRACTION AND STENT INSERTION;  Surgeon: Lori Troy MD;  Location: [facility] OR;  Service: Urology;  Laterality: Right;       Family History: family history includes Cancer in her father; Colon cancer in her father; Diabetes in her father and paternal grandfather; Heart disease in her father, maternal grandmother, and paternal grandfather; Hypertension in her father; Stroke in her maternal grandmother. Otherwise pertinent FHx was reviewed and not pertinent to current issue.    Social History:  reports that she quit smoking about 21 months ago. Her smoking use included cigarettes. She quit after 35.00 years of use. She has never used smokeless tobacco. She reports current alcohol use. She reports that she does not use drugs.    Home Medications:  Biotin, Ferrous Sulfate-Vitamin C, HYDROcodone-acetaminophen, buPROPion XL, cholecalciferol, etonogestrel-ethinyl estradiol, ketorolac, lisinopril, metFORMIN, multivitamin, oxyCODONE-acetaminophen, phenazopyridine, pravastatin, topiramate, and traZODone    Allergies:  Allergies   Allergen Reactions   • Morphine Itching and Nausea And Vomiting       Objective    Objective     Vitals:        Physical Exam  Constitutional:       Appearance: Normal appearance.   Cardiovascular:      Rate and Rhythm: Normal rate and regular rhythm.   Pulmonary:      Effort: Pulmonary effort is normal.      Breath sounds: Normal breath sounds.   Neurological:      Mental Status: She is alert. Mental status is at baseline.   Psychiatric:         Mood and Affect: Mood and affect normal.         Speech: Speech normal.         Judgment: Judgment normal.         Result Review    Result Review:  I have personally reviewed the results from the time of this admission to 10/22/2021 12:35 EDT and agree with these findings:  [x]  Laboratory  []   Microbiology  [x]  Radiology  []  EKG/Telemetry   []  Cardiology/Vascular   []  Pathology  [x]  Old records  []  Other:      Assessment/Plan   Assessment / Plan       Active Hospital Problems:  There are no active hospital problems to display for this patient.      Plan: Right robotic partial nephrectomy  Risks and benefits discussed with patient and they are agreeable to proceed.    DVT prophylaxis:  No DVT prophylaxis order currently exists.    CODE STATUS:           Electronically signed by Lori Troy MD, 10/22/21, 12:35 PM EDT.

## 2021-10-22 NOTE — TELEPHONE ENCOUNTER
Called patient and discussed her right renal mass.  It is solid and enhancing and concerning for renal cell carcinoma.  She is understanding of this.  We discussed options for treatment and she would like to proceed with a robotic nephrectomy.  We will schedule this for November 15.

## 2021-10-25 ENCOUNTER — TELEPHONE (OUTPATIENT)
Dept: UROLOGY | Facility: CLINIC | Age: 41
End: 2021-10-25

## 2021-10-25 NOTE — TELEPHONE ENCOUNTER
Pt is scheduled for surgery with Dr. Troy on 11/15/21 (partial nephrectomy). She needs to know an estimated length of recovery for her job. Call at your convenience and you may leave a message.

## 2021-10-26 NOTE — TELEPHONE ENCOUNTER
Spoke to Bianca this morning- she is aware that her surgery will be on 11/15/21 at [facility]. She is going to come to the office on Thursday to go over all surgery instructions and drop off paperwork for FMLA. She is aware she will need to be off work for at least one month.

## 2021-10-26 NOTE — TELEPHONE ENCOUNTER
Spoke to Bianca this morning- she is aware that her surgery will be on 11/15/21 at [facility]. She is going to come to the office on Thursday to go over all surgery instructions and drop off paperwork for FMLA. She is aware she will need to be off work for at least one month.

## 2021-11-08 ENCOUNTER — HOSPITAL ENCOUNTER (OUTPATIENT)
Dept: GENERAL RADIOLOGY | Facility: HOSPITAL | Age: 41
Discharge: HOME | End: 2021-11-08

## 2021-11-08 ENCOUNTER — PRE-ADMISSION TESTING (OUTPATIENT)
Dept: PREADMISSION TESTING | Facility: HOSPITAL | Age: 41
End: 2021-11-08

## 2021-11-08 VITALS
BODY MASS INDEX: 33.67 KG/M2 | SYSTOLIC BLOOD PRESSURE: 120 MMHG | WEIGHT: 214.51 LBS | HEIGHT: 67 IN | RESPIRATION RATE: 16 BRPM | HEART RATE: 62 BPM | OXYGEN SATURATION: 98 % | DIASTOLIC BLOOD PRESSURE: 68 MMHG | TEMPERATURE: 97.2 F

## 2021-11-08 DIAGNOSIS — N30.01 ACUTE CYSTITIS WITH HEMATURIA: ICD-10-CM

## 2021-11-08 DIAGNOSIS — N28.89 RENAL MASS: ICD-10-CM

## 2021-11-08 LAB
ABO GROUP BLD: NORMAL
ALBUMIN SERPL-MCNC: 3.6 G/DL (ref 3.5–5.2)
ALBUMIN/GLOB SERPL: 1.4 G/DL
ALP SERPL-CCNC: 70 U/L (ref 39–117)
ALT SERPL W P-5'-P-CCNC: 11 U/L (ref 1–33)
ANION GAP SERPL CALCULATED.3IONS-SCNC: 10.1 MMOL/L (ref 5–15)
AST SERPL-CCNC: 12 U/L (ref 1–32)
B-HCG UR QL: NEGATIVE
BILIRUB SERPL-MCNC: <0.2 MG/DL (ref 0–1.2)
BUN SERPL-MCNC: 20 MG/DL (ref 6–20)
BUN/CREAT SERPL: 21.5 (ref 7–25)
CALCIUM SPEC-SCNC: 9.2 MG/DL (ref 8.6–10.5)
CHLORIDE SERPL-SCNC: 106 MMOL/L (ref 98–107)
CO2 SERPL-SCNC: 21.9 MMOL/L (ref 22–29)
CREAT SERPL-MCNC: 0.93 MG/DL (ref 0.57–1)
DEPRECATED RDW RBC AUTO: 46.3 FL (ref 37–54)
ERYTHROCYTE [DISTWIDTH] IN BLOOD BY AUTOMATED COUNT: 15.3 % (ref 12.3–15.4)
GFR SERPL CREATININE-BSD FRML MDRD: 66 ML/MIN/1.73
GLOBULIN UR ELPH-MCNC: 2.6 GM/DL
GLUCOSE SERPL-MCNC: 94 MG/DL (ref 65–99)
HCT VFR BLD AUTO: 35.8 % (ref 34–46.6)
HGB BLD-MCNC: 11.6 G/DL (ref 12–15.9)
MCH RBC QN AUTO: 27.4 PG (ref 26.6–33)
MCHC RBC AUTO-ENTMCNC: 32.4 G/DL (ref 31.5–35.7)
MCV RBC AUTO: 84.4 FL (ref 79–97)
PLATELET # BLD AUTO: 201 10*3/MM3 (ref 140–450)
PMV BLD AUTO: 10.3 FL (ref 6–12)
POTASSIUM SERPL-SCNC: 4.5 MMOL/L (ref 3.5–5.2)
PROT SERPL-MCNC: 6.2 G/DL (ref 6–8.5)
RBC # BLD AUTO: 4.24 10*6/MM3 (ref 3.77–5.28)
RH BLD: POSITIVE
SODIUM SERPL-SCNC: 138 MMOL/L (ref 136–145)
WBC # BLD AUTO: 9.2 10*3/MM3 (ref 3.4–10.8)

## 2021-11-08 PROCEDURE — 87086 URINE CULTURE/COLONY COUNT: CPT

## 2021-11-08 PROCEDURE — 71046 X-RAY EXAM CHEST 2 VIEWS: CPT

## 2021-11-08 PROCEDURE — 93005 ELECTROCARDIOGRAM TRACING: CPT

## 2021-11-08 PROCEDURE — 80053 COMPREHEN METABOLIC PANEL: CPT

## 2021-11-08 PROCEDURE — 36415 COLL VENOUS BLD VENIPUNCTURE: CPT

## 2021-11-08 PROCEDURE — 93010 ELECTROCARDIOGRAM REPORT: CPT | Performed by: INTERNAL MEDICINE

## 2021-11-08 PROCEDURE — 85027 COMPLETE CBC AUTOMATED: CPT

## 2021-11-08 PROCEDURE — 86901 BLOOD TYPING SEROLOGIC RH(D): CPT

## 2021-11-08 PROCEDURE — 81025 URINE PREGNANCY TEST: CPT | Performed by: UROLOGY

## 2021-11-08 PROCEDURE — 86900 BLOOD TYPING SEROLOGIC ABO: CPT

## 2021-11-08 NOTE — DISCHARGE INSTRUCTIONS
IMPORTANT INSTRUCTIONS - PRE-ADMISSION TESTING  1. DO NOT EAT OR CHEW anything after midnight the night before your procedure.    2. You may have CLEAR liquids up to __2____ hours prior to ARRIVAL time.   3. Take the following medications the morning of your procedure with JUST A SIP OF WATER:  _ WELLBUTRIN     4. DO NOT BRING your medications to the hospital with you, UNLESS something has changed since your PRE-Admission Testing appointment.  5. Hold all vitamins, supplements, and NSAIDS (Non- steroidal anti-inflammatory meds) for one week prior to surgery (you MAY take Tylenol or Acetaminophen). 11-08-21 stop  6. If you are diabetic, check your blood sugar the morning of your procedure. If it is less than 70 or if you are feeling symptomatic, call the following number for further instructions: [phone]_.  7. Use your inhalers/nebulizers as usual, the morning of your procedure. BRING YOUR INHALERS with you.   8. Bring your CPAP or BIPAP to hospital, ONLY IF YOU WILL BE SPENDING THE NIGHT.   9. Make sure you have a ride home and have someone who will stay with you the day of your procedure after you go home.  10. If you have any questions, please call your Pre-Admission Testing Nurse, Pravin___ at [phone]_.   11. Per anesthesia request, do not smoke for 24 hours before your procedure or as instructed by your surgeon.      SURGERY 11-15-21 ELEVATOR A, THIRD FLOOR  WILL CALL ARRIVAL TIME THE Friday PRIOR TO SURGERY AFTER 1 PM  USE SURGICAL SOAP 2 TIMES  FOLLOW ANY INSTRUCTIONS DR GIRON'S ON CLEAR LIQUIDS DIET AND BOWEL PREP   TAKE METFORMIN ON Sunday BY 6 PM      PREOPERATIVE (BEFORE SURGERY)              BATHING INSTRUCTIONS  Instructions:   • You will need to shower 2  separate times utilizing the soap provided; at the times indicated   below:     11-14-21 Sunday PM  11-15-21 Monday AM     • Wash your hair and face with normal shampoo and soap, rinse it well before using the surgical soap.     • In the  shower, wet the skin completely with water from your neck to your feet. Apply the cleanser to your   body ONLY FROM THE NECK TO YOUR FEET.    • Do NOT USE THE CLEANSER ON YOUR FACE, HEAD, OR GENITAL (PRIVATE) AREAS.   Keep it out of your eyes, ears, and mouth because of the risk of injury to those areas.     • Scrub with a clean washcloth for each bath utilizing the soap provided from the top of your body to the   bottom starting at the neck area.     • Pay close attention to your armpits, groin area, and the site of surgery.     • Wash your body gently for 5 minutes. Stand outside the stream or turn off the water while scrubbing your   body. Do NOT wash with your regular soap after the surgical cleanser is used.     • RINSE THE CLEANSER OFF COMPLETELY with plenty of water. Rinse the area again thoroughly.     • Dry off with a clean towel. The surgical soap can cause dryness; however do NOT APPLY LOTION,   CREAM, POWDER, and/or DEODORANT AFTER SHOWERING.    • Be sure to where clean clothes after showering.     • Ensure CLEAN BED LINENS AFTER FIRST wash with the surgical soap.     • NO PETS ALLOWED IN THE BED with you after utilizing the surgical soap.      ••••••Clear Liquid Diet        Find out when you need to start a clear liquid diet.   Think of “clear liquids” as anything you could read a newspaper through. This includes things like water, broth, sports drinks, or tea WITHOUT any kind of milk or cream.           Once you are told to start a clear liquid diet, only drink these things until 2 hours before arrival to the hospital or when the hospital says to stop. Total volume limitation: 8 oz.       Clear liquids you CAN drink:   ; Water   ; Clear broth: beef, chicken, vegetable, or bone broth with nothing in it   ; Gatorade   ; Lemonade or Khalif-aid   ; Soda   ; Tea, coffee (NO cream or honey)   ; Jell-O (without fruit)   ; Popsicles (without fruit or cream)   ; Italian ices   ; Juice without pulp: apple, white,  grape   ; You may use salt, pepper, and sugar    Do NOT drink:   ; Milk or cream   ; Soy milk, almond milk, coconut milk, or other non-dairy drinks and   creamers   ; Milkshakes or smoothies   ; Tomato juice   ; Orange juice   ; Grapefruit juice   ; Cream soups or any other than broth         Clear Liquid Diet:  ? Do NOT eat any solid food.  ? Do NOT eat or suck on mints or candy.  ? Do NOT chew gum.  ? Do NOT drink thick liquids like milk or juice with pulp in it.  ? Do NOT add milk, cream, or anything like soy milk or almond milk to coffee or tea.

## 2021-11-09 LAB — BACTERIA SPEC AEROBE CULT: NO GROWTH

## 2021-11-10 ENCOUNTER — ANESTHESIA EVENT (OUTPATIENT)
Dept: PERIOP | Facility: HOSPITAL | Age: 41
End: 2021-11-10

## 2021-11-11 ENCOUNTER — HOSPITAL ENCOUNTER (EMERGENCY)
Facility: HOSPITAL | Age: 41
Discharge: HOME | End: 2021-11-11
Attending: EMERGENCY MEDICINE | Admitting: EMERGENCY MEDICINE

## 2021-11-11 VITALS
SYSTOLIC BLOOD PRESSURE: 122 MMHG | WEIGHT: 209 LBS | HEART RATE: 72 BPM | TEMPERATURE: 98.7 F | BODY MASS INDEX: 32.8 KG/M2 | HEIGHT: 67 IN | DIASTOLIC BLOOD PRESSURE: 64 MMHG | RESPIRATION RATE: 15 BRPM | OXYGEN SATURATION: 100 %
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
## 2021-11-11 LAB
ALBUMIN SERPL-MCNC: 3.9 G/DL (ref 3.5–5.2)
ALBUMIN/GLOB SERPL: 1.3 G/DL
ALP SERPL-CCNC: 80 U/L (ref 39–117)
ALT SERPL W P-5'-P-CCNC: 15 U/L (ref 1–33)
ANION GAP SERPL CALCULATED.3IONS-SCNC: 9.7 MMOL/L (ref 5–15)
AST SERPL-CCNC: 16 U/L (ref 1–32)
BILIRUB SERPL-MCNC: 0.2 MG/DL (ref 0–1.2)
BUN SERPL-MCNC: 28 MG/DL (ref 6–20)
BUN/CREAT SERPL: 20.9 (ref 7–25)
CALCIUM SPEC-SCNC: 8.9 MG/DL (ref 8.6–10.5)
CHLORIDE SERPL-SCNC: 106 MMOL/L (ref 98–107)
CO2 SERPL-SCNC: 21.3 MMOL/L (ref 22–29)
CREAT SERPL-MCNC: 1.34 MG/DL (ref 0.57–1)
DEPRECATED RDW RBC AUTO: 45.7 FL (ref 37–54)
ERYTHROCYTE [DISTWIDTH] IN BLOOD BY AUTOMATED COUNT: 15.2 % (ref 12.3–15.4)
GFR SERPL CREATININE-BSD FRML MDRD: 43 ML/MIN/1.73
GLOBULIN UR ELPH-MCNC: 3 GM/DL
GLUCOSE SERPL-MCNC: 85 MG/DL (ref 65–99)
HCT VFR BLD AUTO: 38.4 % (ref 34–46.6)
HGB BLD-MCNC: 12.7 G/DL (ref 12–15.9)
MCH RBC QN AUTO: 27.8 PG (ref 26.6–33)
MCHC RBC AUTO-ENTMCNC: 33.1 G/DL (ref 31.5–35.7)
MCV RBC AUTO: 84 FL (ref 79–97)
PLATELET # BLD AUTO: 238 10*3/MM3 (ref 140–450)
PMV BLD AUTO: 9.7 FL (ref 6–12)
POTASSIUM SERPL-SCNC: 4.2 MMOL/L (ref 3.5–5.2)
PROT SERPL-MCNC: 6.9 G/DL (ref 6–8.5)
QT INTERVAL: 394 MS
RBC # BLD AUTO: 4.57 10*6/MM3 (ref 3.77–5.28)
SODIUM SERPL-SCNC: 137 MMOL/L (ref 136–145)
WBC # BLD AUTO: 8.83 10*3/MM3 (ref 3.4–10.8)

## 2021-11-11 PROCEDURE — 86803 HEPATITIS C AB TEST: CPT | Performed by: NURSE PRACTITIONER

## 2021-11-11 PROCEDURE — 85027 COMPLETE CBC AUTOMATED: CPT | Performed by: NURSE PRACTITIONER

## 2021-11-11 PROCEDURE — 80053 COMPREHEN METABOLIC PANEL: CPT | Performed by: NURSE PRACTITIONER

## 2021-11-11 PROCEDURE — G0432 EIA HIV-1/HIV-2 SCREEN: HCPCS | Performed by: NURSE PRACTITIONER

## 2021-11-11 PROCEDURE — 86706 HEP B SURFACE ANTIBODY: CPT | Performed by: NURSE PRACTITIONER

## 2021-11-11 PROCEDURE — 99283 EMERGENCY DEPT VISIT LOW MDM: CPT

## 2021-11-11 PROCEDURE — 87340 HEPATITIS B SURFACE AG IA: CPT | Performed by: NURSE PRACTITIONER

## 2021-11-12 LAB
HBV SURFACE AB SER RIA-ACNC: NORMAL
HBV SURFACE AG SERPL QL IA: NORMAL
HCV AB SER DONR QL: NORMAL
HIV1+2 AB SER QL: NORMAL

## 2021-11-12 NOTE — ED PROVIDER NOTES
Subjective   Pt was giving flu shot to gentleman, did not feel a needlestick when engaging the safety cap. Took her glove off after pt left and noticed blood on her finger tip and superficial laceration, believed the needle grazed her finger but did not puncture it when safety was engaged.       History provided by:  Patient  Body Fluid Exposure  Type of exposure:  Needle stick  Exposure substance: blood    Exposure location:  Finger  Finger exposure location:  L index finger  Time since exposure:  1 hour  Context:  Patient care  Tetanus immunization status:  Up to date  Patient immunocompromised: no    Known source: yes    Source HIV status:  Unknown  Source hepatitis B status:  Unknown  Source hepatitis C status:  Unknown  STD concern: no        Review of Systems   Constitutional: Negative for chills and fever.   HENT: Negative for congestion, ear pain and sore throat.    Eyes: Negative for pain.   Respiratory: Negative for cough, chest tightness and shortness of breath.    Cardiovascular: Negative for chest pain.   Gastrointestinal: Negative for abdominal pain, diarrhea, nausea and vomiting.   Genitourinary: Negative for flank pain and hematuria.   Musculoskeletal: Negative for joint swelling.   Skin: Negative for pallor.   Neurological: Negative for seizures and headaches.   All other systems reviewed and are negative.      Past Medical History:   Diagnosis Date   • Acid reflux    • Allergic rhinitis due to allergen 06/09/2016   • Anxiety    • Anxiety and depression    • Benign essential hypertension    • Cancer (HCC)     renal cancer/mass   • Diabetes (HCC)    • Diabetes mellitus (HCC)     type ii, doesn't check bg at home    • Diabetes mellitus, type 2 (HCC)    • Gastroparesis    • GERD (gastroesophageal reflux disease)    • High triglycerides    • History of bariatric surgery 02/04/2021   • History of kidney stones    • HTN (hypertension)    • Hyperlipemia    • Hyperlipidemia    • Hypertension    •  Hypertriglyceridemia    • Kidney stone    • Lumbar herniated disc    • Obesity    • Panic attacks    • PCOS (polycystic ovarian syndrome)    • Psoriasis     ELBOWS   • Seasonal allergies    • Spinal headache     AFTER PAIN EPIDURALS.  NO BLOOD PATCH       Allergies   Allergen Reactions   • Morphine Itching and Nausea And Vomiting       Past Surgical History:   Procedure Laterality Date   • ABDOMINAL SURGERY     • ADENOIDECTOMY     •  SECTION  ,   • CYSTOSCOPY BLADDER STONE LITHOTRIPSY     • EAR TUBES     • ENDOSCOPY N/A 10/20/2020    Procedure: ESOPHAGOGASTRODUODENOSCOPY WITH BIOPSY;  Surgeon: Fidel Grajeda Jr., MD;  Location: [facility] ENDOSCOPY;  Service: General;  Laterality: N/A;  PRE- GERD  POST- RETAINED FOOD, GASTRITIS, GASTRIC POLYPS   • ENDOSCOPY     • FOOT FRACTURE SURGERY Left 2017    screw placed   • FRACTURE SURGERY     • GASTRECTOMY     • GASTRIC SLEEVE LAPAROSCOPIC N/A 2020    Procedure: GASTRIC SLEEVE LAPAROSCOPIC;  Surgeon: Fidel Grajeda Jr., MD;  Location: [facility] [place] [facility];  Service: Bariatric;  Laterality: N/A;   • KIDNEY SURGERY     • TONSILLECTOMY     • URETEROSCOPY LASER LITHOTRIPSY WITH STENT INSERTION Right 2021    Procedure: CYSTOSCOPY, RIGHT URETEROSCOPY, LASERTRIPSY, STONE BASKET EXTRACTION AND STENT INSERTION;  Surgeon: Lori Troy MD;  Location: [facility] OR;  Service: Urology;  Laterality: Right;       Family History   Problem Relation Age of Onset   • Diabetes Father    • Hypertension Father    • Heart disease Father    • Cancer Father         BLADDER   • Colon cancer Father         malignant, currently 62   • Stroke Maternal Grandmother    • Heart disease Maternal Grandmother    • Diabetes Paternal Grandfather    • Heart disease Paternal Grandfather    • Malig Hyperthermia Neg Hx        Social History     Socioeconomic History   • Marital status:    Tobacco Use   • Smoking status: Former Smoker     Years: 35.00     Types:  Cigarettes     Quit date: 1/15/2020     Years since quittin.8   • Smokeless tobacco: Never Used   • Tobacco comment: A PACK A WEEK   Vaping Use   • Vaping Use: Never used   Substance and Sexual Activity   • Alcohol use: Yes     Comment: RARELY   • Drug use: Never   • Sexual activity: Defer           Objective   Physical Exam  Vitals and nursing note reviewed.   Constitutional:       General: She is not in acute distress.     Appearance: Normal appearance. She is not toxic-appearing.   HENT:      Head: Normocephalic and atraumatic.      Mouth/Throat:      Mouth: Mucous membranes are moist.   Eyes:      General: No scleral icterus.  Cardiovascular:      Rate and Rhythm: Normal rate and regular rhythm.      Pulses: Normal pulses.      Heart sounds: Normal heart sounds.   Pulmonary:      Effort: Pulmonary effort is normal. No respiratory distress.      Breath sounds: Normal breath sounds.   Abdominal:      General: Abdomen is flat.      Palpations: Abdomen is soft.      Tenderness: There is no abdominal tenderness.   Musculoskeletal:         General: Normal range of motion.      Left hand: Laceration present. Normal capillary refill. Normal pulse.        Hands:       Cervical back: Normal range of motion and neck supple.   Skin:     General: Skin is warm and dry.   Neurological:      Mental Status: She is alert and oriented to person, place, and time. Mental status is at baseline.         Procedures           ED Course                                           MDM  Number of Diagnoses or Management Options  Exposure to body fluid due to accidental needlestick injury: new and requires workup  Diagnosis management comments: I have spoken with the patient. I have explained the patient´s condition, diagnoses and treatment plan based on the information available to me at this time. I have answered the patient's questions and addressed any concerns. The patient has a good  understanding of the patient´s diagnosis,  condition, and treatment plan as can be expected at this point. The vital signs have been stable. The patient´s condition is stable and appropriate for discharge from the emergency department.      The patient will pursue further outpatient evaluation with the primary care physician or other designated or consulting physician as outlined in the discharge instructions. They are agreeable to this plan of care and follow-up instructions have been explained in detail. The patient has received these instructions in written format and have expressed an understanding of the discharge instructions. The patient is aware that any significant change in condition or worsening of symptoms should prompt an immediate return to this or the closest emergency department or call to 911.    Risk of Complications, Morbidity, and/or Mortality  Presenting problems: low  Diagnostic procedures: low  Management options: low    Patient Progress  Patient progress: stable      Final diagnoses:   Exposure to body fluid due to accidental needlestick injury       ED Disposition  ED Disposition     ED Disposition Condition Comment    Discharge Stable           Patricia Hernandez, APRN  [address] [facility] [address]  [facility] [address]  [phone]    In 3 days  As needed    [facility]  [address]  [facility] [address]  [phone]             Medication List      Changed    EluRyng 0.12-0.015 MG/24HR vaginal ring  Generic drug: etonogestrel-ethinyl estradiol  Insert 1 each into the vagina Every 30 (Thirty) Days.  What changed: additional instructions     metFORMIN 1000 MG tablet  Commonly known as: GLUCOPHAGE  Take 1 tablet by mouth 2 (Two) Times a Day With Meals.  What changed: additional instructions     traZODone 50 MG tablet  Commonly known as: DESYREL  Take 1 tablet by mouth Every Night.  What changed:   · when to take this  · reasons to take this             Chuy Guerra,  APRN  11/11/21 8862

## 2021-11-15 ENCOUNTER — ANESTHESIA (OUTPATIENT)
Dept: PERIOP | Facility: HOSPITAL | Age: 41
End: 2021-11-15

## 2021-11-15 ENCOUNTER — HOSPITAL ENCOUNTER (OUTPATIENT)
Facility: HOSPITAL | Age: 41
Discharge: HOME | End: 2021-11-16
Attending: UROLOGY | Admitting: UROLOGY

## 2021-11-15 DIAGNOSIS — N28.89 RENAL MASS: ICD-10-CM

## 2021-11-15 DIAGNOSIS — N28.89 RIGHT RENAL MASS: Primary | ICD-10-CM

## 2021-11-15 LAB
ABO GROUP BLD: NORMAL
BLD GP AB SCN SERPL QL: NEGATIVE
GLUCOSE BLDC GLUCOMTR-MCNC: 137 MG/DL (ref 70–99)
RH BLD: POSITIVE
T&S EXPIRATION DATE: NORMAL

## 2021-11-15 PROCEDURE — 86850 RBC ANTIBODY SCREEN: CPT | Performed by: UROLOGY

## 2021-11-15 PROCEDURE — C1889 IMPLANT/INSERT DEVICE, NOC: HCPCS | Performed by: UROLOGY

## 2021-11-15 PROCEDURE — 25010000002 FENTANYL CITRATE (PF) 50 MCG/ML SOLUTION: Performed by: NURSE ANESTHETIST, CERTIFIED REGISTERED

## 2021-11-15 PROCEDURE — 25010000002 DEXAMETHASONE PER 1 MG: Performed by: NURSE ANESTHETIST, CERTIFIED REGISTERED

## 2021-11-15 PROCEDURE — 25010000002 MIDAZOLAM PER 1MG: Performed by: ANESTHESIOLOGY

## 2021-11-15 PROCEDURE — 50543 LAPARO PARTIAL NEPHRECTOMY: CPT | Performed by: UROLOGY

## 2021-11-15 PROCEDURE — 82962 GLUCOSE BLOOD TEST: CPT

## 2021-11-15 PROCEDURE — 25010000002 PROPOFOL 10 MG/ML EMULSION: Performed by: NURSE ANESTHETIST, CERTIFIED REGISTERED

## 2021-11-15 PROCEDURE — 25010000002 ONDANSETRON PER 1 MG: Performed by: NURSE ANESTHETIST, CERTIFIED REGISTERED

## 2021-11-15 PROCEDURE — 25010000002 HYDROMORPHONE 1 MG/ML SOLUTION: Performed by: NURSE ANESTHETIST, CERTIFIED REGISTERED

## 2021-11-15 PROCEDURE — 94799 UNLISTED PULMONARY SVC/PX: CPT

## 2021-11-15 PROCEDURE — G0378 HOSPITAL OBSERVATION PER HR: HCPCS

## 2021-11-15 PROCEDURE — 88307 TISSUE EXAM BY PATHOLOGIST: CPT | Performed by: UROLOGY

## 2021-11-15 PROCEDURE — 86901 BLOOD TYPING SEROLOGIC RH(D): CPT | Performed by: UROLOGY

## 2021-11-15 PROCEDURE — 25010000002 METOCLOPRAMIDE PER 10 MG: Performed by: ANESTHESIOLOGY

## 2021-11-15 PROCEDURE — 86900 BLOOD TYPING SEROLOGIC ABO: CPT | Performed by: UROLOGY

## 2021-11-15 PROCEDURE — 25010000002 LEVOFLOXACIN PER 250 MG: Performed by: UROLOGY

## 2021-11-15 DEVICE — FLOSEAL HEMOSTATIC MATRIX, 5ML
Type: IMPLANTABLE DEVICE | Site: KIDNEY | Status: FUNCTIONAL
Brand: FLOSEAL HEMOSTATIC MATRIX

## 2021-11-15 DEVICE — ABSORBABLE WOUND CLOSURE DEVICE
Type: IMPLANTABLE DEVICE | Site: KIDNEY | Status: FUNCTIONAL
Brand: V-LOC 90

## 2021-11-15 DEVICE — ABSORBABLE HEMOSTAT (OXIDIZED REGENERATED CELLULOSE)
Type: IMPLANTABLE DEVICE | Site: KIDNEY | Status: FUNCTIONAL
Brand: SURGICEL NU-KNIT

## 2021-11-15 DEVICE — CLIP LIG HEMOLOK PA LG 6CT PRP: Type: IMPLANTABLE DEVICE | Site: KIDNEY | Status: FUNCTIONAL

## 2021-11-15 RX ORDER — ONDANSETRON 2 MG/ML
4 INJECTION INTRAMUSCULAR; INTRAVENOUS EVERY 6 HOURS PRN
Status: DISCONTINUED | OUTPATIENT
Start: 2021-11-15 | End: 2021-11-16 | Stop reason: HOSPADM

## 2021-11-15 RX ORDER — TOPIRAMATE 25 MG/1
50 TABLET ORAL NIGHTLY
Status: DISCONTINUED | OUTPATIENT
Start: 2021-11-15 | End: 2021-11-16 | Stop reason: HOSPADM

## 2021-11-15 RX ORDER — PROMETHAZINE HYDROCHLORIDE 12.5 MG/1
25 TABLET ORAL ONCE AS NEEDED
Status: DISCONTINUED | OUTPATIENT
Start: 2021-11-15 | End: 2021-11-15

## 2021-11-15 RX ORDER — LEVOFLOXACIN 5 MG/ML
500 INJECTION, SOLUTION INTRAVENOUS ONCE
Status: COMPLETED | OUTPATIENT
Start: 2021-11-15 | End: 2021-11-15

## 2021-11-15 RX ORDER — PROMETHAZINE HYDROCHLORIDE 12.5 MG/1
12.5 TABLET ORAL EVERY 6 HOURS PRN
Status: DISCONTINUED | OUTPATIENT
Start: 2021-11-15 | End: 2021-11-16 | Stop reason: HOSPADM

## 2021-11-15 RX ORDER — SODIUM CHLORIDE 0.9 % (FLUSH) 0.9 %
3 SYRINGE (ML) INJECTION EVERY 12 HOURS SCHEDULED
Status: DISCONTINUED | OUTPATIENT
Start: 2021-11-15 | End: 2021-11-15 | Stop reason: HOSPADM

## 2021-11-15 RX ORDER — ACETAMINOPHEN 325 MG/1
650 TABLET ORAL EVERY 4 HOURS PRN
Status: DISCONTINUED | OUTPATIENT
Start: 2021-11-15 | End: 2021-11-16 | Stop reason: HOSPADM

## 2021-11-15 RX ORDER — OXYCODONE HYDROCHLORIDE AND ACETAMINOPHEN 5; 325 MG/1; MG/1
2 TABLET ORAL EVERY 4 HOURS PRN
Status: DISCONTINUED | OUTPATIENT
Start: 2021-11-15 | End: 2021-11-16 | Stop reason: HOSPADM

## 2021-11-15 RX ORDER — LISINOPRIL 2.5 MG/1
2.5 TABLET ORAL DAILY
Status: DISCONTINUED | OUTPATIENT
Start: 2021-11-15 | End: 2021-11-16 | Stop reason: HOSPADM

## 2021-11-15 RX ORDER — BUPIVACAINE HYDROCHLORIDE 5 MG/ML
INJECTION, SOLUTION EPIDURAL; INTRACAUDAL AS NEEDED
Status: DISCONTINUED | OUTPATIENT
Start: 2021-11-15 | End: 2021-11-15 | Stop reason: HOSPADM

## 2021-11-15 RX ORDER — ONDANSETRON 4 MG/1
4 TABLET, FILM COATED ORAL EVERY 6 HOURS PRN
Status: DISCONTINUED | OUTPATIENT
Start: 2021-11-15 | End: 2021-11-16 | Stop reason: HOSPADM

## 2021-11-15 RX ORDER — TRAZODONE HYDROCHLORIDE 50 MG/1
50 TABLET ORAL NIGHTLY PRN
Status: DISCONTINUED | OUTPATIENT
Start: 2021-11-15 | End: 2021-11-16 | Stop reason: HOSPADM

## 2021-11-15 RX ORDER — MIDAZOLAM HYDROCHLORIDE 2 MG/2ML
2 INJECTION, SOLUTION INTRAMUSCULAR; INTRAVENOUS ONCE
Status: COMPLETED | OUTPATIENT
Start: 2021-11-15 | End: 2021-11-15

## 2021-11-15 RX ORDER — SODIUM CHLORIDE, SODIUM LACTATE, POTASSIUM CHLORIDE, CALCIUM CHLORIDE 600; 310; 30; 20 MG/100ML; MG/100ML; MG/100ML; MG/100ML
100 INJECTION, SOLUTION INTRAVENOUS CONTINUOUS
Status: DISCONTINUED | OUTPATIENT
Start: 2021-11-15 | End: 2021-11-16 | Stop reason: HOSPADM

## 2021-11-15 RX ORDER — LIDOCAINE HYDROCHLORIDE 20 MG/ML
INJECTION, SOLUTION INFILTRATION; PERINEURAL AS NEEDED
Status: DISCONTINUED | OUTPATIENT
Start: 2021-11-15 | End: 2021-11-15 | Stop reason: SURG

## 2021-11-15 RX ORDER — PROMETHAZINE HYDROCHLORIDE 25 MG/1
25 SUPPOSITORY RECTAL ONCE AS NEEDED
Status: DISCONTINUED | OUTPATIENT
Start: 2021-11-15 | End: 2021-11-15

## 2021-11-15 RX ORDER — NALOXONE HCL 0.4 MG/ML
0.1 VIAL (ML) INJECTION
Status: DISCONTINUED | OUTPATIENT
Start: 2021-11-15 | End: 2021-11-16 | Stop reason: HOSPADM

## 2021-11-15 RX ORDER — ACETAMINOPHEN 500 MG
1000 TABLET ORAL ONCE
Status: COMPLETED | OUTPATIENT
Start: 2021-11-15 | End: 2021-11-15

## 2021-11-15 RX ORDER — PRAVASTATIN SODIUM 10 MG
10 TABLET ORAL NIGHTLY
Status: DISCONTINUED | OUTPATIENT
Start: 2021-11-15 | End: 2021-11-16 | Stop reason: HOSPADM

## 2021-11-15 RX ORDER — ONDANSETRON 2 MG/ML
4 INJECTION INTRAMUSCULAR; INTRAVENOUS ONCE AS NEEDED
Status: DISCONTINUED | OUTPATIENT
Start: 2021-11-15 | End: 2021-11-15

## 2021-11-15 RX ORDER — SODIUM CHLORIDE 0.9 % (FLUSH) 0.9 %
10 SYRINGE (ML) INJECTION EVERY 12 HOURS SCHEDULED
Status: DISCONTINUED | OUTPATIENT
Start: 2021-11-15 | End: 2021-11-15 | Stop reason: HOSPADM

## 2021-11-15 RX ORDER — OXYCODONE HYDROCHLORIDE 5 MG/1
5 TABLET ORAL
Status: DISCONTINUED | OUTPATIENT
Start: 2021-11-15 | End: 2021-11-15

## 2021-11-15 RX ORDER — ACETAMINOPHEN 650 MG/1
650 SUPPOSITORY RECTAL EVERY 4 HOURS PRN
Status: DISCONTINUED | OUTPATIENT
Start: 2021-11-15 | End: 2021-11-16 | Stop reason: HOSPADM

## 2021-11-15 RX ORDER — SODIUM CHLORIDE 0.9 % (FLUSH) 0.9 %
10 SYRINGE (ML) INJECTION AS NEEDED
Status: DISCONTINUED | OUTPATIENT
Start: 2021-11-15 | End: 2021-11-15 | Stop reason: HOSPADM

## 2021-11-15 RX ORDER — MEPERIDINE HYDROCHLORIDE 25 MG/ML
12.5 INJECTION INTRAMUSCULAR; INTRAVENOUS; SUBCUTANEOUS
Status: DISCONTINUED | OUTPATIENT
Start: 2021-11-15 | End: 2021-11-15

## 2021-11-15 RX ORDER — MAGNESIUM HYDROXIDE 1200 MG/15ML
LIQUID ORAL AS NEEDED
Status: DISCONTINUED | OUTPATIENT
Start: 2021-11-15 | End: 2021-11-15 | Stop reason: HOSPADM

## 2021-11-15 RX ORDER — BUPROPION HYDROCHLORIDE 150 MG/1
300 TABLET ORAL DAILY
Status: DISCONTINUED | OUTPATIENT
Start: 2021-11-15 | End: 2021-11-16 | Stop reason: HOSPADM

## 2021-11-15 RX ORDER — AMOXICILLIN 250 MG
2 CAPSULE ORAL 2 TIMES DAILY
Status: DISCONTINUED | OUTPATIENT
Start: 2021-11-15 | End: 2021-11-16 | Stop reason: HOSPADM

## 2021-11-15 RX ORDER — GLYCOPYRROLATE 0.2 MG/ML
INJECTION INTRAMUSCULAR; INTRAVENOUS AS NEEDED
Status: DISCONTINUED | OUTPATIENT
Start: 2021-11-15 | End: 2021-11-15 | Stop reason: SURG

## 2021-11-15 RX ORDER — FENTANYL CITRATE 50 UG/ML
INJECTION, SOLUTION INTRAMUSCULAR; INTRAVENOUS AS NEEDED
Status: DISCONTINUED | OUTPATIENT
Start: 2021-11-15 | End: 2021-11-15 | Stop reason: SURG

## 2021-11-15 RX ORDER — DEXAMETHASONE SODIUM PHOSPHATE 4 MG/ML
INJECTION, SOLUTION INTRA-ARTICULAR; INTRALESIONAL; INTRAMUSCULAR; INTRAVENOUS; SOFT TISSUE AS NEEDED
Status: DISCONTINUED | OUTPATIENT
Start: 2021-11-15 | End: 2021-11-15 | Stop reason: SURG

## 2021-11-15 RX ORDER — PHENYLEPHRINE HCL IN 0.9% NACL 1 MG/10 ML
SYRINGE (ML) INTRAVENOUS AS NEEDED
Status: DISCONTINUED | OUTPATIENT
Start: 2021-11-15 | End: 2021-11-15 | Stop reason: SURG

## 2021-11-15 RX ORDER — ONDANSETRON 2 MG/ML
INJECTION INTRAMUSCULAR; INTRAVENOUS AS NEEDED
Status: DISCONTINUED | OUTPATIENT
Start: 2021-11-15 | End: 2021-11-15 | Stop reason: SURG

## 2021-11-15 RX ORDER — SODIUM CHLORIDE, SODIUM LACTATE, POTASSIUM CHLORIDE, CALCIUM CHLORIDE 600; 310; 30; 20 MG/100ML; MG/100ML; MG/100ML; MG/100ML
9 INJECTION, SOLUTION INTRAVENOUS CONTINUOUS PRN
Status: DISCONTINUED | OUTPATIENT
Start: 2021-11-15 | End: 2021-11-15

## 2021-11-15 RX ORDER — SODIUM CHLORIDE 9 MG/ML
100 INJECTION, SOLUTION INTRAVENOUS CONTINUOUS
Status: DISCONTINUED | OUTPATIENT
Start: 2021-11-15 | End: 2021-11-16 | Stop reason: HOSPADM

## 2021-11-15 RX ORDER — HYDROMORPHONE HCL 110MG/55ML
1 PATIENT CONTROLLED ANALGESIA SYRINGE INTRAVENOUS EVERY 4 HOURS PRN
Status: DISCONTINUED | OUTPATIENT
Start: 2021-11-15 | End: 2021-11-16 | Stop reason: HOSPADM

## 2021-11-15 RX ORDER — PROPOFOL 10 MG/ML
VIAL (ML) INTRAVENOUS AS NEEDED
Status: DISCONTINUED | OUTPATIENT
Start: 2021-11-15 | End: 2021-11-15 | Stop reason: SURG

## 2021-11-15 RX ORDER — METOCLOPRAMIDE HYDROCHLORIDE 5 MG/ML
10 INJECTION INTRAMUSCULAR; INTRAVENOUS ONCE AS NEEDED
Status: COMPLETED | OUTPATIENT
Start: 2021-11-15 | End: 2021-11-15

## 2021-11-15 RX ORDER — ROCURONIUM BROMIDE 10 MG/ML
INJECTION, SOLUTION INTRAVENOUS AS NEEDED
Status: DISCONTINUED | OUTPATIENT
Start: 2021-11-15 | End: 2021-11-15 | Stop reason: SURG

## 2021-11-15 RX ADMIN — SODIUM CHLORIDE, POTASSIUM CHLORIDE, SODIUM LACTATE AND CALCIUM CHLORIDE 9 ML/HR: 600; 310; 30; 20 INJECTION, SOLUTION INTRAVENOUS at 09:13

## 2021-11-15 RX ADMIN — MIDAZOLAM HYDROCHLORIDE 2 MG: 1 INJECTION, SOLUTION INTRAMUSCULAR; INTRAVENOUS at 11:46

## 2021-11-15 RX ADMIN — TOPIRAMATE 50 MG: 25 TABLET, FILM COATED ORAL at 21:45

## 2021-11-15 RX ADMIN — LIDOCAINE HYDROCHLORIDE 60 MG: 20 INJECTION, SOLUTION INFILTRATION; PERINEURAL at 11:56

## 2021-11-15 RX ADMIN — GLYCOPYRROLATE 0.2 MCG: 0.2 INJECTION INTRAMUSCULAR; INTRAVENOUS at 12:21

## 2021-11-15 RX ADMIN — ROCURONIUM BROMIDE 20 MG: 10 INJECTION INTRAVENOUS at 12:53

## 2021-11-15 RX ADMIN — OXYCODONE HYDROCHLORIDE AND ACETAMINOPHEN 2 TABLET: 5; 325 TABLET ORAL at 21:48

## 2021-11-15 RX ADMIN — PROPOFOL 150 MG: 10 INJECTION, EMULSION INTRAVENOUS at 11:56

## 2021-11-15 RX ADMIN — SENNOSIDES AND DOCUSATE SODIUM 2 TABLET: 50; 8.6 TABLET ORAL at 21:45

## 2021-11-15 RX ADMIN — SUGAMMADEX 200 MG: 100 INJECTION, SOLUTION INTRAVENOUS at 14:47

## 2021-11-15 RX ADMIN — FENTANYL CITRATE 50 MCG: 50 INJECTION, SOLUTION INTRAMUSCULAR; INTRAVENOUS at 14:56

## 2021-11-15 RX ADMIN — Medication 50 MCG: at 14:13

## 2021-11-15 RX ADMIN — DEXAMETHASONE SODIUM PHOSPHATE 4 MG: 4 INJECTION INTRA-ARTICULAR; INTRALESIONAL; INTRAMUSCULAR; INTRAVENOUS; SOFT TISSUE at 11:56

## 2021-11-15 RX ADMIN — FENTANYL CITRATE 50 MCG: 50 INJECTION, SOLUTION INTRAMUSCULAR; INTRAVENOUS at 14:54

## 2021-11-15 RX ADMIN — HYDROMORPHONE HYDROCHLORIDE 0.5 MG: 1 INJECTION, SOLUTION INTRAMUSCULAR; INTRAVENOUS; SUBCUTANEOUS at 15:43

## 2021-11-15 RX ADMIN — FENTANYL CITRATE 100 MCG: 50 INJECTION, SOLUTION INTRAMUSCULAR; INTRAVENOUS at 11:56

## 2021-11-15 RX ADMIN — ROCURONIUM BROMIDE 50 MG: 10 INJECTION INTRAVENOUS at 11:56

## 2021-11-15 RX ADMIN — ONDANSETRON 4 MG: 2 INJECTION INTRAMUSCULAR; INTRAVENOUS at 14:48

## 2021-11-15 RX ADMIN — Medication 50 MCG: at 12:30

## 2021-11-15 RX ADMIN — Medication 100 MCG: at 12:19

## 2021-11-15 RX ADMIN — PRAVASTATIN SODIUM 10 MG: 10 TABLET ORAL at 21:45

## 2021-11-15 RX ADMIN — LEVOFLOXACIN 500 MG: 5 INJECTION, SOLUTION INTRAVENOUS at 12:03

## 2021-11-15 RX ADMIN — SODIUM CHLORIDE, POTASSIUM CHLORIDE, SODIUM LACTATE AND CALCIUM CHLORIDE: 600; 310; 30; 20 INJECTION, SOLUTION INTRAVENOUS at 14:26

## 2021-11-15 RX ADMIN — METOCLOPRAMIDE HYDROCHLORIDE 10 MG: 5 INJECTION INTRAMUSCULAR; INTRAVENOUS at 11:46

## 2021-11-15 RX ADMIN — ACETAMINOPHEN 1000 MG: 500 TABLET ORAL at 09:17

## 2021-11-15 RX ADMIN — LISINOPRIL 2.5 MG: 2.5 TABLET ORAL at 17:22

## 2021-11-15 NOTE — PLAN OF CARE
Goal Outcome Evaluation:         Minimal complaints of pain. Pt had partial nephrectomy today. Dressings intact with old drainage. Austin in place, good UO. VS stable. Able to stand and ambulate. Tolerating clear liquids well. Will continue to monitor. Obdulia Anna RN

## 2021-11-15 NOTE — OP NOTE
NEPHRECTOMY PARTIAL LAPAROSCOPIC WITH DAVINCI ROBOT  Procedure Report    Patient Name:  Bianca Boles  YOB: 1980    Date of Surgery:  11/15/2021     Pre-op Diagnosis:   Renal mass [N28.89]       Post-Op Diagnosis Codes:     * Renal mass [N28.89]    Procedure/CPT® Codes:      Procedure(s):  NEPHRECTOMY PARTIAL LAPAROSCOPIC WITH DAVINCI ROBOT    Staff:  Surgeon(s):  Lori Troy MD    Assistant: Feliciano Swain RN    Anesthesia: General    Estimated Blood Loss: 400 mL    Implants:    Implant Name Type Inv. Item Serial No.  Lot No. LRB No. Used Action   CLIP LIG HEMOLOK PA LG 6CT PRP - [id number] Implant CLIP LIG HEMOLOK PA LG 6CT PRP  [employer] [id number] Right 2 Implanted   HEMOST ABS SURGICEL NUKNIT 3X4IN - [id number] Implant HEMOST ABS SURGICEL NUKNIT 3X4IN  [employer] J [id number] Right 1 Implanted   KT SEAL HEMOS ABS FLOSEAL MATRX FAST/PREP 5ML - [id number] Implant KT SEAL HEMOS ABS FLOSEAL MATRX FAST/PREP 5ML  [employer] [id number] Right 2 Implanted   DEV CLS WND VLOC/90 GASPER ABS 1/2CIR SZ3/0 26MM 23CM LISA - [id number] Implant DEV CLS WND VLOC/90 GASPER ABS 1/2CIR SZ3/0 26MM 23CM LISA  [employer] [id number] Right 1 Implanted       Specimen:          Specimens     ID Source Type Tests Collected By Collected At Frozen?    A Kidney, Right Tissue · TISSUE PATHOLOGY EXAM   Lori Troy MD 11/15/21 1403 No    Description: RIGHT RENAL MASS              Complications: None    Description of Procedure: After appropriate consent was obtained, the patient was taken to the operating room.  After induction of general anesthesia the patient  was placed in the left lateral decubitus position with the right side up.  Care was taken to pad all bony prominences to prevent neurological and vascular injury.  An 8 mm trocar was placed in the right upper quadrant, just lateral to the umbilicus.    Veress needle was inserted through this and the abdomen was insufflated.  At this  point, 8 mm robotic port was placed.  All other ports were placed under direct vision.  8 mm trocar was placed in the right upper quadrant and 8 mm trocar in the right lower quadrant and a 10 mm trocar just above the midline in the right upper quadrant.  Due to the very large liver overlying the area of tumor in the right kidney a fourth arm trocar was placed laterally liver retraction.The robot was then docked in the standard fashion.  The colon was taken down off of the right kidney using sharp dissection.  The attachments from the peritoneum to the liver were taken down, as well, using sharp dissection.  Once the colon was reflected off, I located the lower pole of the kidney and the right ureter and elevated that. I then followed the  right ureter up toward the right renal hilum.    There was a right gonadal vein, which we left in place.  Following that up further, I encountered the right renal artery and right renal vein.  The right renal vein was just superior to the right renal artery.  I cleaned off the right renal artery to prepare for clamping.  At this point, Gerota's fascia was taken off the right lower  pole until the mass was noted and I did leave Gerota's fascia overlying the mass itself.  The tumor was then marked around the edge scoring for resection.  At this time, a bulldog clamp was placed across the right renal artery.  The mass was then excised in cold fashion using scissors.    All margins appeared clear.  There was a small area with branch of the collecting system and two bleeding vessels on the resection bed.  These were sutured using Vicryl sutures.  At this point, the edges of the resection bed were cauterized using the monopolar cautery.  I ran the resection bed using a V-lock suture.  FloSeal was placed into the resection bed and a Surgicel bolster was placed on that.  I then placed two stay-sutures to hold the bolsters in place.  These were tightened down.  At this point, the bulldog  clamp on the right renal artery was removed.  Total clamp time was 20 minutes and 23 seconds.  There was no evidence of bleeding at the renal hilum with removal of the clamp.  There was no evidence of any further bleeding at the resection bed, either.  The clamps were then tightened down on the bed and LAPRA-TYs were placed. A second Surgicel bolster was then placed near the area of resection.  The rest of the FloSeal was used, as well.  The patient tolerated the procedure well.  The right partial nephrectomy specimen was placed into the bag.  Robot was then undocked in the standard fashion.  The cyst was removed through the 12 mm port in the right upper quadrant.      I closed the fascia using interrupted Vicryl sutures.  All the rest of the ports were removed.  The abdomen was desufflated.  The patient tolerated the procedure well.  Skin was closed using 4-0 Monocryl sutures.  The patient was transferred to the postanesthesia care unit in satisfactory condition with stable vital signs.  All counts were correct at the end of the procedure.    Assistant: Feliciano Swain RN  was responsible for performing the following activities: Retraction, Suction, Irrigation, Suturing, Closing, Placing Dressing, Held/Positioned Camera and clamping of renal artery. and their skilled assistance was necessary for the success of this case.    Lori Troy MD     Date: 11/15/2021  Time: 15:28 EST

## 2021-11-15 NOTE — ANESTHESIA POSTPROCEDURE EVALUATION
Patient: Bianca Boles    Procedure Summary     Date: 11/15/21 Room / Location: [facility] OR 08 / [facility] MAIN OR    Anesthesia Start: 1153 Anesthesia Stop: 1516    Procedure: NEPHRECTOMY PARTIAL LAPAROSCOPIC WITH DAVINCI ROBOT (Right Abdomen) Diagnosis:       Renal mass      (Renal mass [N28.89])    Surgeons: Lori Troy MD Provider: Lance Sewell MD    Anesthesia Type: general ASA Status: 2          Anesthesia Type: general    Vitals  Vitals Value Taken Time   /57 11/15/21 1540   Temp 36.2 °C (97.2 °F) 11/15/21 1515   Pulse 61 11/15/21 1544   Resp 12 11/15/21 1540   SpO2 99 % 11/15/21 1544   Vitals shown include unvalidated device data.        Post Anesthesia Care and Evaluation    Patient location during evaluation: bedside  Patient participation: complete - patient participated  Level of consciousness: awake  Pain management: adequate  Airway patency: patent  Anesthetic complications: No anesthetic complications  PONV Status: none  Cardiovascular status: acceptable and stable  Respiratory status: acceptable  Hydration status: acceptable    Comments: An Anesthesiologist personally participated in the most demanding procedures (including induction and emergence if applicable) in the anesthesia plan, monitored the course of anesthesia administration at frequent intervals and remained physically present and available for immediate diagnosis and treatment of emergencies.

## 2021-11-15 NOTE — ANESTHESIA PREPROCEDURE EVALUATION
Anesthesia Evaluation     Patient summary reviewed and Nursing notes reviewed   history of anesthetic complications: PONV  NPO Solid Status: > 8 hours  NPO Liquid Status: > 2 hours           Airway   Mallampati: II  TM distance: >3 FB  Neck ROM: full  No difficulty expected  Dental      Pulmonary - negative pulmonary ROS and normal exam    breath sounds clear to auscultation  Cardiovascular - normal exam  Exercise tolerance: good (4-7 METS)    Rhythm: regular    (+) hypertension, hyperlipidemia,       Neuro/Psych  (+) headaches, psychiatric history,     GI/Hepatic/Renal/Endo    (+) obesity,  GERD,  renal disease, diabetes mellitus type 2,     Musculoskeletal (-) negative ROS    Abdominal    Substance History - negative use     OB/GYN negative ob/gyn ROS         Other      history of cancer    ROS/Med Hx Other: Pt took wellbutrin this am with a sip of water      Phys Exam Other: One chipped right lower premolar tooth                Anesthesia Plan    ASA 2     general   (Patient understands anesthesia not responsible for dental damage.)  intravenous induction     Anesthetic plan, all risks, benefits, and alternatives have been provided, discussed and informed consent has been obtained with: patient.  Use of blood products discussed with patient .   Plan discussed with CRNA.

## 2021-11-16 ENCOUNTER — READMISSION MANAGEMENT (OUTPATIENT)
Dept: CALL CENTER | Facility: HOSPITAL | Age: 41
End: 2021-11-16

## 2021-11-16 VITALS
OXYGEN SATURATION: 97 % | SYSTOLIC BLOOD PRESSURE: 101 MMHG | RESPIRATION RATE: 18 BRPM | TEMPERATURE: 97.7 F | BODY MASS INDEX: 33.22 KG/M2 | WEIGHT: 211.64 LBS | DIASTOLIC BLOOD PRESSURE: 53 MMHG | HEART RATE: 73 BPM | HEIGHT: 67 IN

## 2021-11-16 LAB
ANION GAP SERPL CALCULATED.3IONS-SCNC: 9.2 MMOL/L (ref 5–15)
BUN SERPL-MCNC: 17 MG/DL (ref 6–20)
BUN/CREAT SERPL: 17.2 (ref 7–25)
CALCIUM SPEC-SCNC: 8.8 MG/DL (ref 8.6–10.5)
CHLORIDE SERPL-SCNC: 106 MMOL/L (ref 98–107)
CO2 SERPL-SCNC: 23.8 MMOL/L (ref 22–29)
CREAT SERPL-MCNC: 0.99 MG/DL (ref 0.57–1)
DEPRECATED RDW RBC AUTO: 44.4 FL (ref 37–54)
ERYTHROCYTE [DISTWIDTH] IN BLOOD BY AUTOMATED COUNT: 14.7 % (ref 12.3–15.4)
GFR SERPL CREATININE-BSD FRML MDRD: 62 ML/MIN/1.73
GLUCOSE SERPL-MCNC: 99 MG/DL (ref 65–99)
HCT VFR BLD AUTO: 35.7 % (ref 34–46.6)
HGB BLD-MCNC: 11.7 G/DL (ref 12–15.9)
MCH RBC QN AUTO: 27.2 PG (ref 26.6–33)
MCHC RBC AUTO-ENTMCNC: 32.8 G/DL (ref 31.5–35.7)
MCV RBC AUTO: 83 FL (ref 79–97)
PLATELET # BLD AUTO: 211 10*3/MM3 (ref 140–450)
PMV BLD AUTO: 9.9 FL (ref 6–12)
POTASSIUM SERPL-SCNC: 4.2 MMOL/L (ref 3.5–5.2)
RBC # BLD AUTO: 4.3 10*6/MM3 (ref 3.77–5.28)
SODIUM SERPL-SCNC: 139 MMOL/L (ref 136–145)
WBC # BLD AUTO: 11.59 10*3/MM3 (ref 3.4–10.8)

## 2021-11-16 PROCEDURE — 94799 UNLISTED PULMONARY SVC/PX: CPT

## 2021-11-16 PROCEDURE — 80048 BASIC METABOLIC PNL TOTAL CA: CPT | Performed by: UROLOGY

## 2021-11-16 PROCEDURE — 85027 COMPLETE CBC AUTOMATED: CPT | Performed by: UROLOGY

## 2021-11-16 PROCEDURE — G0378 HOSPITAL OBSERVATION PER HR: HCPCS

## 2021-11-16 RX ORDER — OXYCODONE HYDROCHLORIDE AND ACETAMINOPHEN 5; 325 MG/1; MG/1
1 TABLET ORAL EVERY 4 HOURS PRN
Qty: 18 TABLET | Refills: 0 | Status: SHIPPED | OUTPATIENT
Start: 2021-11-16 | End: 2021-11-19

## 2021-11-16 RX ADMIN — OXYCODONE HYDROCHLORIDE AND ACETAMINOPHEN 2 TABLET: 5; 325 TABLET ORAL at 05:21

## 2021-11-16 RX ADMIN — BUPROPION HYDROCHLORIDE 300 MG: 150 TABLET, EXTENDED RELEASE ORAL at 08:12

## 2021-11-16 RX ADMIN — LISINOPRIL 2.5 MG: 2.5 TABLET ORAL at 08:12

## 2021-11-16 RX ADMIN — SENNOSIDES AND DOCUSATE SODIUM 2 TABLET: 50; 8.6 TABLET ORAL at 08:12

## 2021-11-16 RX ADMIN — OXYCODONE HYDROCHLORIDE AND ACETAMINOPHEN 2 TABLET: 5; 325 TABLET ORAL at 10:36

## 2021-11-16 RX ADMIN — SODIUM CHLORIDE, POTASSIUM CHLORIDE, SODIUM LACTATE AND CALCIUM CHLORIDE 100 ML/HR: 600; 310; 30; 20 INJECTION, SOLUTION INTRAVENOUS at 02:58

## 2021-11-16 NOTE — SIGNIFICANT NOTE
11/16/21 1030   Plan   Plan Patient reports lives with spouse that can assist if needed.  No needs with transportation or medications.  Reports works at [employer].  Plans to return home with no needs at this time.

## 2021-11-16 NOTE — PROGRESS NOTES
[facility]     Progress Note    Patient Name: Bianca Boles  : 1980  MRN: [id number]    Date of admission: 11/15/2021    Subjective   Subjective     Patient without complaints.  Tolerating diet.  Pain controlled.         Objective   Objective     Review of Systems:    A 10 point review of systems was performed and all are negative except for what is documented in the HPI.     Vitals:   Temp:  [97.2 °F (36.2 °C)-97.9 °F (36.6 °C)] 97.7 °F (36.5 °C)  Heart Rate:  [51-81] 73  Resp:  [12-18] 18  BP: ()/(49-83) 101/53  Flow (L/min):  [2] 2  Physical Exam    Constitutional: Awake, alert   Eyes: PERRLA    Neck: Supple, trachea midline   Respiratory: respirations even and unlabored   Cardiovascular: RRR   Gastrointestinal: Soft, approp TTP   Psychiatric: Appropriate affect, cooperative   Neurologic: Oriented x 3, speech clear   Skin: No rashes     Result Review    Result Review:  I have personally reviewed the results from the time of this admission to 2021 14:04 EST and agree with these findings:  []  Laboratory  []  Microbiology  []  Radiology  []  EKG/Telemetry   []  Cardiology/Vascular   []  Pathology  []  Old records  []  Other:      Assessment/Plan   Assessment / Plan     Diagnosis     Renal mass  S/P NEPHRECTOMY PARTIAL LAPAROSCOPIC WITH DAVINCI ROBOT       Active Hospital Problems:  Active Hospital Problems    Diagnosis    • Right renal mass        Plan:      DC home later today       DVT prophylaxis:  Mechanical DVT prophylaxis orders are present.          This document has been electronically signed by CARLTON Purdy  2021 14:04 EST

## 2021-11-16 NOTE — PLAN OF CARE
Problem: Adult Inpatient Plan of Care  Goal: Plan of Care Review  Outcome: Met  Flowsheets  Taken 11/16/2021 0554 by Annabelle Ojeda RN  Progress: improving  Outcome Summary: VSS, pain controlled PO PRN meds  Taken 11/15/2021 1357 by Ashlee Faria RN  Plan of Care Reviewed With: patient  Goal: Patient-Specific Goal (Individualized)  Outcome: Met  Goal: Absence of Hospital-Acquired Illness or Injury  Outcome: Met  Intervention: Identify and Manage Fall Risk  Recent Flowsheet Documentation  Taken 11/16/2021 0441 by Annabelle Ojeda RN  Safety Promotion/Fall Prevention: safety round/check completed  Taken 11/16/2021 0208 by Annabelle Ojeda RN  Safety Promotion/Fall Prevention: safety round/check completed  Taken 11/16/2021 0015 by Annabelle Ojeda RN  Safety Promotion/Fall Prevention: safety round/check completed  Taken 11/15/2021 2245 by Annabelle Ojeda RN  Safety Promotion/Fall Prevention: safety round/check completed  Taken 11/15/2021 2145 by Annabelle Ojeda RN  Safety Promotion/Fall Prevention: safety round/check completed  Taken 11/15/2021 2005 by Annabelle Ojeda RN  Safety Promotion/Fall Prevention: safety round/check completed  Taken 11/15/2021 1910 by Annabelle Ojeda RN  Safety Promotion/Fall Prevention: safety round/check completed  Intervention: Prevent and Manage VTE (venous thromboembolism) Risk  Recent Flowsheet Documentation  Taken 11/15/2021 1910 by Annabelle Ojeda RN  VTE Prevention/Management:   bilateral   sequential compression devices on  Goal: Optimal Comfort and Wellbeing  Outcome: Met  Intervention: Provide Person-Centered Care  Recent Flowsheet Documentation  Taken 11/15/2021 1910 by Annabelle Ojeda RN  Trust Relationship/Rapport: care explained  Goal: Readiness for Transition of Care  Outcome: Met     Problem: Bleeding (Surgery Nonspecified)  Goal: Absence of Bleeding  Outcome: Met     Problem: Bowel Elimination Impaired (Surgery Nonspecified)  Goal: Effective Bowel Elimination  Outcome: Met     Problem:  Infection (Surgery Nonspecified)  Goal: Absence of Infection Signs and Symptoms  Outcome: Met     Problem: Ongoing Anesthesia Effects (Surgery Nonspecified)  Goal: Anesthesia/Sedation Recovery  Outcome: Met  Intervention: Optimize Anesthesia Recovery  Recent Flowsheet Documentation  Taken 11/16/2021 0441 by Annabelle Ojeda RN  Safety Promotion/Fall Prevention: safety round/check completed  Taken 11/16/2021 0208 by Annabelle Ojeda RN  Safety Promotion/Fall Prevention: safety round/check completed  Taken 11/16/2021 0015 by Annabelle Ojeda RN  Safety Promotion/Fall Prevention: safety round/check completed  Taken 11/15/2021 2245 by Annabelle Ojeda RN  Safety Promotion/Fall Prevention: safety round/check completed  Taken 11/15/2021 2145 by Annabelle Ojeda RN  Safety Promotion/Fall Prevention: safety round/check completed  Taken 11/15/2021 2005 by Annabelle Ojeda RN  Safety Promotion/Fall Prevention: safety round/check completed  Taken 11/15/2021 1910 by Annabelle Ojeda RN  Safety Promotion/Fall Prevention: safety round/check completed     Problem: Pain (Surgery Nonspecified)  Goal: Acceptable Pain Control  Outcome: Met  Intervention: Prevent or Manage Pain  Recent Flowsheet Documentation  Taken 11/15/2021 2145 by Annabelle Ojeda RN  Pain Management Interventions: see MAR     Problem: Postoperative Nausea and Vomiting (Surgery Nonspecified)  Goal: Nausea and Vomiting Relief  Outcome: Met     Problem: Postoperative Urinary Retention (Surgery Nonspecified)  Goal: Effective Urinary Elimination  Outcome: Met   Goal Outcome Evaluation:           Progress: improving  Outcome Summary: VSS, pain controlled PO PRN meds

## 2021-11-16 NOTE — OUTREACH NOTE
Prep Survey      Responses   [facility] patient discharged from? [facility]   Is LACE score < 7 ? Yes   Emergency Room discharge w/ pulse ox? No   Eligibility [facility]   Date of Admission 11/15/21   Date of Discharge 11/16/21   Discharge Disposition Home or Self Care   Discharge diagnosis NEPHRECTOMY PARTIAL LAPAROSCOPIC WITH DAVINCI ROBOT   Does the patient have one of the following disease processes/diagnoses(primary or secondary)? General Surgery   Does the patient have Home health ordered? No   Is there a DME ordered? No   Prep survey completed? Yes          Breonna Laird RN

## 2021-11-17 ENCOUNTER — TRANSITIONAL CARE MANAGEMENT TELEPHONE ENCOUNTER (OUTPATIENT)
Dept: CALL CENTER | Facility: HOSPITAL | Age: 41
End: 2021-11-17

## 2021-11-17 ENCOUNTER — TELEPHONE (OUTPATIENT)
Dept: UROLOGY | Facility: CLINIC | Age: 41
End: 2021-11-17

## 2021-11-17 LAB
CYTO UR: NORMAL
LAB AP CASE REPORT: NORMAL
LAB AP CLINICAL INFORMATION: NORMAL
LAB AP SYNOPTIC CHECKLIST: NORMAL
PATH REPORT.FINAL DX SPEC: NORMAL
PATH REPORT.GROSS SPEC: NORMAL

## 2021-11-17 NOTE — OUTREACH NOTE
Call Center TCM Note      Responses   [facility] patient discharged from? [facility]   Does the patient have one of the following disease processes/diagnoses(primary or secondary)? General Surgery   TCM attempt successful? Yes   Call start time 1318   Call end time 1323   Discharge diagnosis NEPHRECTOMY PARTIAL LAPAROSCOPIC WITH DAVINCI ROBOT   Meds reviewed with patient/caregiver? Yes   Is the patient having any side effects they believe may be caused by any medication additions or changes? No   Does the patient have all medications related to this admission filled (includes all antibiotics, pain medications, etc.) Yes   Is the patient taking all medications as directed (includes completed medication regime)? Yes   Medication comments Taking Percocet for effective pain control   Does the patient have a follow up appointment scheduled with their surgeon? Yes  [Surgical f/u on 11/24/21]   Has the patient kept scheduled appointments due by today? N/A   Comments Hospital PCP FOLLOW UP APPOINTMENT IS 11/23/21 @315pm   Has home health visited the patient within 72 hours of discharge? N/A   Psychosocial issues? No   Did the patient receive a copy of their discharge instructions? Yes   Nursing interventions Reviewed instructions with patient   What is the patient's perception of their health status since discharge? Improving  [Pt with expected surgical pain, using pain medications to control. Patient tolerating po intake, has had a post op BM and urinating w/o issue.  Patient is ambulating frequently-aware of limitations.]   Nursing interventions Nurse provided patient education   Is the patient /caregiver able to teach back basic post-op care? No tub bath, swimming, or hot tub until instructed by MD,  Practice 'cough and deep breath',  Keep incision areas clean,dry and protected,  Lifting as instructed by MD in discharge instructions   Is the patient/caregiver able to teach back signs and symptoms of incisional  infection? Increased redness, swelling or pain at the incisonal site,  Increased drainage or bleeding,  Incisional warmth,  Pus or odor from incision,  Fever  [Pt to removed dressing today--rev'd s/s infection]   Is the patient/caregiver able to teach back steps to recovery at home? Set small, achievable goals for return to baseline health,  Rest and rebuild strength, gradually increase activity   Is the patient/caregiver able to teach back the hierarchy of who to call/visit for symptoms/problems? PCP, Specialist, Home health nurse, Urgent Care, ED, 911 Yes   TCM call completed? Yes          Quin Boland RN    11/17/2021, 13:26 EST

## 2021-11-17 NOTE — OUTREACH NOTE
Call Center TCM Note      Responses   [facility] patient discharged from? [facility]   Does the patient have one of the following disease processes/diagnoses(primary or secondary)? General Surgery   TCM attempt successful? No   Unsuccessful attempts Attempt 1  [Spoke with  who requested a return call to wife after noon]          Quin Boland RN    11/17/2021, 09:04 EST

## 2021-11-18 NOTE — DISCHARGE SUMMARY
Date of Discharge:  11/18/2021    Discharge Diagnosis:   Renal mass [N28.89]  Right renal mass [N28.89]  Post-Op Diagnosis Codes:     * Renal mass [N28.89]     Problem List:  Active Hospital Problems    Diagnosis  POA   • Right renal mass [N28.89]  Yes      Resolved Hospital Problems   No resolved problems to display.       Presenting Problem/History of Present Illness          Hospital Course  Patient is a 41 y.o. female presented to [facility] for a planned partial laparoscopic nephrectomy with davinci robot.  She was admitted after the procedure for routine postoperative care.  Upon discharge to home she is tolerating a regular diet, her pain is controlled, and she is ambulating without difficulty.   Procedures Performed    Procedure(s):  NEPHRECTOMY PARTIAL LAPAROSCOPIC WITH DAVINCI ROBOT  -------------------       Consults:   Consults     No orders found from 10/17/2021 to 11/16/2021.          Pertinent Test Results:       Vital Signs       Discharge Disposition  Home or Self Care    Discharge Medications     Discharge Medications      New Medications      Instructions Start Date   oxyCODONE-acetaminophen 5-325 MG per tablet  Commonly known as: PERCOCET   1 tablet, Oral, Every 4 Hours PRN         Changes to Medications      Instructions Start Date   EluRyng 0.12-0.015 MG/24HR vaginal ring  Generic drug: etonogestrel-ethinyl estradiol  What changed: additional instructions   1 each, Vaginal, Every 30 Days      metFORMIN 1000 MG tablet  Commonly known as: GLUCOPHAGE  What changed: additional instructions   1,000 mg, Oral, 2 Times Daily With Meals      traZODone 50 MG tablet  Commonly known as: DESYREL  What changed:   · when to take this  · reasons to take this   50 mg, Oral, Nightly         Continue These Medications      Instructions Start Date   Biotin 22860 MCG tablet   1 tablet, Oral, Nightly      buPROPion  MG 24 hr tablet  Commonly known as: WELLBUTRIN XL   300 mg, Oral, Daily      CALCIUM-MAGNESIUM-ZINC  PO   3 tablets, Oral, Daily      cholecalciferol 25 MCG (1000 UT) tablet  Commonly known as: VITAMIN D3   2,000 Units, Oral, Daily      COLLAGEN PO   3 tablets, Oral, Daily      FERROUS SULFATE-VITAMIN C PO   325 mg, Oral, Nightly, Iron/vitamin C combo      lisinopril 2.5 MG tablet  Commonly known as: PRINIVIL,ZESTRIL   2.5 mg, Oral, Daily      multivitamin tablet tablet  Commonly known as: THERAGRAN   1 tablet, Oral, Nightly      pravastatin 10 MG tablet  Commonly known as: PRAVACHOL   10 mg, Oral, Nightly      PROBIOTIC-10 PO   1 tablet, Oral, Nightly      topiramate 50 MG tablet  Commonly known as: TOPAMAX   50 mg, Oral, Nightly             Discharge Diet   Diet Instructions     Diet: Regular      Discharge Diet: Regular          Activity at Discharge  Activity Instructions     Activity as Tolerated      Bathing Restrictions      Type of Restriction: Bathing    Bathing Restrictions: No Tub Bath    May shower starting tomorrow    Driving Restrictions      Type of Restriction: Driving    Driving Restrictions: No Driving While Taking Narcotics          Follow-up Appointments  Future Appointments   Date Time Provider Department [place]   11/23/2021  3:15 PM Patricia Hernandez APRN [facility]   11/24/2021  9:45 AM Lori Troy MD [facility]   12/15/2021 11:30 AM Lori Manzanares APRN [facility] None   2/8/2022  8:00 AM Patricia Hernandez APRN [facility]     Additional Instructions for the Follow-ups that You Need to Schedule     Call MD With Problems / Concerns   As directed      Instructions: call if you develop a fever, chills, body aches, or pain uncontrolled with pain medication    Order Comments: Instructions: call if you develop a fever, chills, body aches, or pain uncontrolled with pain medication          Discharge Follow-up with Specialty: Dr Troy; 1 Week   As directed      Specialty: Dr Troy    Follow Up: 1 Week

## 2021-11-23 ENCOUNTER — OFFICE VISIT (OUTPATIENT)
Dept: FAMILY MEDICINE CLINIC | Facility: CLINIC | Age: 41
End: 2021-11-23

## 2021-11-23 VITALS
DIASTOLIC BLOOD PRESSURE: 59 MMHG | TEMPERATURE: 98.2 F | SYSTOLIC BLOOD PRESSURE: 107 MMHG | OXYGEN SATURATION: 99 % | BODY MASS INDEX: 32.49 KG/M2 | WEIGHT: 207 LBS | HEIGHT: 67 IN | HEART RATE: 80 BPM

## 2021-11-23 DIAGNOSIS — I10 ESSENTIAL HYPERTENSION: ICD-10-CM

## 2021-11-23 DIAGNOSIS — E78.2 MIXED HYPERLIPIDEMIA: ICD-10-CM

## 2021-11-23 DIAGNOSIS — J30.2 SEASONAL ALLERGIES: ICD-10-CM

## 2021-11-23 DIAGNOSIS — R39.15 URINARY URGENCY: ICD-10-CM

## 2021-11-23 DIAGNOSIS — N28.89 RENAL MASS: ICD-10-CM

## 2021-11-23 DIAGNOSIS — E11.69 DIABETES MELLITUS TYPE 2 IN OBESE (HCC): ICD-10-CM

## 2021-11-23 DIAGNOSIS — E66.9 DIABETES MELLITUS TYPE 2 IN OBESE (HCC): ICD-10-CM

## 2021-11-23 LAB
BILIRUB BLD-MCNC: NEGATIVE MG/DL
CLARITY, POC: CLEAR
COLOR UR: YELLOW
EXPIRATION DATE: 0
GLUCOSE UR STRIP-MCNC: NEGATIVE MG/DL
KETONES UR QL: ABNORMAL
LEUKOCYTE EST, POC: ABNORMAL
Lab: 0
NITRITE UR-MCNC: NEGATIVE MG/ML
PH UR: 5 [PH] (ref 5–8)
PROT UR STRIP-MCNC: ABNORMAL MG/DL
RBC # UR STRIP: ABNORMAL /UL
SP GR UR: 1.03 (ref 1–1.03)
UROBILINOGEN UR QL: NORMAL

## 2021-11-23 PROCEDURE — 87086 URINE CULTURE/COLONY COUNT: CPT | Performed by: NURSE PRACTITIONER

## 2021-11-23 PROCEDURE — 99495 TRANSJ CARE MGMT MOD F2F 14D: CPT | Performed by: NURSE PRACTITIONER

## 2021-11-23 PROCEDURE — 81003 URINALYSIS AUTO W/O SCOPE: CPT | Performed by: NURSE PRACTITIONER

## 2021-11-23 RX ORDER — FLUTICASONE PROPIONATE 50 MCG
2 SPRAY, SUSPENSION (ML) NASAL DAILY
Qty: 3 ML | Refills: 1 | Status: SHIPPED | OUTPATIENT
Start: 2021-11-23 | End: 2022-02-08 | Stop reason: SDUPTHER

## 2021-11-23 NOTE — PROGRESS NOTES
Transitional Care Follow Up Visit     Patient Name: Bianca Boles  : 1980   MRN: [id number]     Chief Complaint:    Chief Complaint   Patient presents with   • Hospital Follow Up Visit       History of Present Illness: Bianca Boles is a 41 y.o. female who presents today for transitional care management visit.  The patient was contacted by our office within 48 hours after the discharge of the patient via telephone to coordinate their follow up care and needs.   PT was admitted on 11/15/2021-2021 for a Partial nephrectomy of a right renal mass. She has a follow-up appt. with Urology tomorrow 2021    Pt c/o congestion, ear fullness     Subjective      Review of Systems:   Review of Systems   Constitutional: Negative for fever.   Respiratory: Negative for cough.    Cardiovascular: Negative for chest pain.   Gastrointestinal: Negative for abdominal pain, constipation, diarrhea, nausea and vomiting.   Genitourinary: Negative for dysuria.   Musculoskeletal: Negative for myalgias.   Neurological: Negative for dizziness and headache.       I reviewed and discussed the details of that call along with the discharge summary, hospital problems, inpatient lab results, inpatient diagnostic studies, and consultation reports with Bianca Boles.     Current outpatient and discharge medications have been reconciled for the patient.  Date of TCM Phone Call 2021   [facility]   Date of Admission 11/15/2021   Date of Discharge 2021   Discharge Disposition Home or Self Care       Medications:     Current Outpatient Medications:   •  Biotin 56319 MCG tablet, Take 1 tablet by mouth Every Night., Disp: , Rfl:   •  buPROPion XL (WELLBUTRIN XL) 300 MG 24 hr tablet, Take 1 tablet by mouth Daily., Disp: 90 tablet, Rfl: 1  •  CALCIUM-MAGNESIUM-ZINC PO, Take 3 tablets by mouth Daily., Disp: , Rfl:   •  Cetirizine HCl 10 MG capsule, Take 10 mg by mouth every night at bedtime.,  Disp: 90 capsule, Rfl: 1  •  cholecalciferol (VITAMIN D3) 25 MCG (1000 UT) tablet, Take 2 tablets by mouth Daily., Disp: 180 tablet, Rfl: 1  •  COLLAGEN PO, Take 3 tablets by mouth Daily., Disp: , Rfl:   •  EluRyng 0.12-0.015 MG/24HR vaginal ring, Insert 1 each into the vagina Every 30 (Thirty) Days. (Patient taking differently: Insert 1 each into the vagina Every 30 (Thirty) Days. Removed currently. To be placed on Sunday 11-14-21), Disp: 3 each, Rfl: 4  •  FERROUS SULFATE-VITAMIN C PO, Take 325 mg by mouth Every Night. Iron/vitamin C combo, Disp: , Rfl:   •  fluticasone (FLONASE) 50 MCG/ACT nasal spray, 2 sprays into the nostril(s) as directed by provider Daily. 2 sprays each nostril daily, Disp: 3 mL, Rfl: 1  •  lisinopril (PRINIVIL,ZESTRIL) 2.5 MG tablet, Take 1 tablet by mouth Daily., Disp: 90 tablet, Rfl: 1  •  metFORMIN (GLUCOPHAGE) 1000 MG tablet, Take 1 tablet by mouth 2 (Two) Times a Day With Meals. (Patient taking differently: Take 1,000 mg by mouth 2 (Two) Times a Day With Meals. Instructed per anesthesia standing orders), Disp: 180 tablet, Rfl: 1  •  Multiple Vitamins-Minerals (MULTIVITAMIN PO), Take 1 tablet by mouth Every Night., Disp: , Rfl:   •  pravastatin (PRAVACHOL) 10 MG tablet, Take 1 tablet by mouth Every Night., Disp: 90 tablet, Rfl: 1  •  Probiotic Product (PROBIOTIC-10 PO), Take 1 tablet by mouth Every Night., Disp: , Rfl:   •  topiramate (TOPAMAX) 50 MG tablet, Take 1 tablet by mouth Every Night for 60 days., Disp: 60 tablet, Rfl: 0  •  traZODone (DESYREL) 50 MG tablet, Take 1 tablet by mouth Every Night. (Patient taking differently: Take 50 mg by mouth At Night As Needed.), Disp: 90 tablet, Rfl: 1    Allergies:   Allergies   Allergen Reactions   • Morphine Itching and Nausea And Vomiting       Hospital Course Information:  Risk for Readmission (LACE) Score: 4 (11/16/2021  6:01 AM)       Course During Hospital Stay:  Patient is a 41 y.o. female presented to [facility] for a planned partial  "laparoscopic nephrectomy with davinci robot.  She was admitted after the procedure for routine postoperative care.   PMH, PSH, Care Team, Medications including OTC/CAM and Allergies reviewed and updated as appropriate.     Objective     Physical Exam:  Vital Signs:   Vitals:    11/23/21 1517   BP: 107/59   Pulse: 80   Temp: 98.2 °F (36.8 °C)   SpO2: 99%   Weight: 93.9 kg (207 lb)   Height: 170.2 cm (67\")     Body mass index is 32.42 kg/m².     Physical Exam  HENT:      Right Ear: Tympanic membrane normal.      Left Ear: Tympanic membrane normal.      Nose: Nose normal.      Mouth/Throat:      Mouth: Mucous membranes are moist.   Eyes:      Pupils: Pupils are equal, round, and reactive to light.   Neck:      Vascular: No carotid bruit.   Cardiovascular:      Rate and Rhythm: Normal rate and regular rhythm.      Heart sounds: Normal heart sounds. No murmur heard.      Pulmonary:      Effort: Pulmonary effort is normal.      Breath sounds: Normal breath sounds.   Abdominal:      General: Bowel sounds are normal.      Palpations: Abdomen is soft.   Musculoskeletal:      Right lower leg: No edema.      Left lower leg: No edema.   Skin:     General: Skin is warm and dry.   Neurological:      Mental Status: She is alert.         Assessment / Plan      Assessment/Plan:   Diagnoses and all orders for this visit:    1. Diabetes mellitus type 2 in obese (HCC)    2. Essential hypertension    3. Mixed hyperlipidemia    4. Renal mass    5. Seasonal allergies    6. Urinary urgency  -     POCT urinalysis dipstick, automated  -     Urine Culture - Urine, Urine, Clean Catch    Other orders  -     fluticasone (FLONASE) 50 MCG/ACT nasal spray; 2 sprays into the nostril(s) as directed by provider Daily. 2 sprays each nostril daily  Dispense: 3 mL; Refill: 1  -     Cetirizine HCl 10 MG capsule; Take 10 mg by mouth every night at bedtime.  Dispense: 90 capsule; Refill: 1    DM2 currently controlled reviewed hgb a1c below 6  On 8.2021 will " "repeat at follow up visit  HTN currently controlled  Hyperlipidemia LDL below goal on pravastatin 10 mg po daily   Seasonal allergies will start flonase and zyrtec           Follow Up:   Return in about 11 weeks (around 2/8/2022).      David Gomez, [place]    \"Please note that portions of this note were completed with a voice recognition program.\"      *Note: 74955 is for high complexity patients with a face to face visit within 7 days of discharge.  32238 is for high complexity patients with a face to face on days 8-14 post discharge or medium complexity with face to face visit within 14 days post discharge.  "

## 2021-11-24 ENCOUNTER — OFFICE VISIT (OUTPATIENT)
Dept: UROLOGY | Facility: CLINIC | Age: 41
End: 2021-11-24

## 2021-11-24 VITALS
HEIGHT: 67 IN | SYSTOLIC BLOOD PRESSURE: 108 MMHG | DIASTOLIC BLOOD PRESSURE: 62 MMHG | BODY MASS INDEX: 32.08 KG/M2 | WEIGHT: 204.4 LBS

## 2021-11-24 DIAGNOSIS — N20.0 KIDNEY STONE: Primary | ICD-10-CM

## 2021-11-24 DIAGNOSIS — N28.89 RENAL MASS: ICD-10-CM

## 2021-11-24 DIAGNOSIS — C64.1 RENAL CELL CARCINOMA OF RIGHT KIDNEY (HCC): ICD-10-CM

## 2021-11-24 LAB
BACTERIA SPEC AEROBE CULT: NO GROWTH
BILIRUB BLD-MCNC: ABNORMAL MG/DL
CLARITY, POC: CLEAR
COLOR UR: YELLOW
EXPIRATION DATE: ABNORMAL
GLUCOSE UR STRIP-MCNC: NEGATIVE MG/DL
KETONES UR QL: ABNORMAL
LEUKOCYTE EST, POC: NEGATIVE
Lab: ABNORMAL
NITRITE UR-MCNC: NEGATIVE MG/ML
PH UR: 5.5 [PH] (ref 5–8)
PROT UR STRIP-MCNC: ABNORMAL MG/DL
RBC # UR STRIP: ABNORMAL /UL
SP GR UR: 1.02 (ref 1–1.03)
UROBILINOGEN UR QL: NORMAL

## 2021-11-24 PROCEDURE — 81003 URINALYSIS AUTO W/O SCOPE: CPT | Performed by: UROLOGY

## 2021-11-24 PROCEDURE — 99024 POSTOP FOLLOW-UP VISIT: CPT | Performed by: UROLOGY

## 2021-11-24 NOTE — PROGRESS NOTES
"Chief Complaint  Post-op Follow-up ([facility] 1 WK)    Subjective          Bianca Boles presents to [facility] UROLOGY  History of Present Illness     She underwent a right robotic partial nephrectomy on 11/15/2021. Pathology revealed a clear cell renal carcinoma grade 2 with negative margins. The size of the tumor was 4 cm and stage was T1a.     She reports she is feeling well and beginning to feel somewhat itchy. The patient notes of soreness and mild pain. She reports that she takes 2.5 mg lisinopril as a \"protective dose\" and that her blood pressure is within normal limits on this. The patient denies visible hematuria.       Objective   Vital Signs:   /62   Ht 170.2 cm (67\")   Wt 92.7 kg (204 lb 6.4 oz)   BMI 32.01 kg/m²     Physical Exam  Vitals and nursing note reviewed.   Constitutional:       Appearance: Normal appearance. She is well-developed.   Pulmonary:      Effort: Pulmonary effort is normal.      Breath sounds: Normal air entry.   Neurological:      Mental Status: She is alert and oriented to person, place, and time.      Motor: Motor function is intact.   Psychiatric:         Mood and Affect: Mood normal.         Behavior: Behavior normal.        Result Review :                  Results for orders placed or performed in visit on 11/24/21   POC Urinalysis Dipstick, Automated    Specimen: Urine   Result Value Ref Range    Color Yellow Yellow, Straw, Dark Yellow, Sheri    Clarity, UA Clear Clear    Specific Gravity  1.025 1.005 - 1.030    pH, Urine 5.5 5.0 - 8.0    Leukocytes Negative Negative    Nitrite, UA Negative Negative    Protein, POC 30 mg/dL (A) Negative mg/dL    Glucose, UA Negative Negative, 1000 mg/dL (3+) mg/dL    Ketones, UA Trace (A) Negative    Urobilinogen, UA Normal Normal    Bilirubin Small (1+) (A) Negative    Blood, UA Large (A) Negative    Lot Number [id number]     Expiration Date 11/30/2021        Assessment and Plan    Diagnoses and all orders for this " visit:    1. Kidney stone (Primary)    2. Renal cell carcinoma of right kidney (HCC)  -  She will follow-up in 3 to 4 weeks and she will return to work following this. Pathology was discussed with the patient today. She is advised to continue limiting physical activity postoperatively until she advised to no longer do so.     Current outpatient and discharge medications have been reconciled for the patient.  Reviewed by: Lori Troy MD      Follow Up   Return in about 4 weeks (around 12/22/2021).  Patient was given instructions and counseling regarding her condition or for health maintenance advice. Please see specific information pulled into the AVS if appropriate.     Transcribed from ambient dictation for Lori Troy MD by Yanique Thomas.  11/24/21   11:13 EST    Patient verbalized consent to the visit recording.  I have personally performed the services described in this document as transcribed by the above individual, and it is both accurate and complete.  Lori Troy MD  11/24/2021  17:06 EST

## 2021-12-10 DIAGNOSIS — Z09 STATUS POST UROLOGICAL SURGERY, FOLLOW-UP EXAM: Primary | ICD-10-CM

## 2021-12-15 ENCOUNTER — OFFICE VISIT (OUTPATIENT)
Dept: BARIATRICS/WEIGHT MGMT | Facility: CLINIC | Age: 41
End: 2021-12-15

## 2021-12-15 ENCOUNTER — LAB (OUTPATIENT)
Dept: LAB | Facility: HOSPITAL | Age: 41
End: 2021-12-15

## 2021-12-15 ENCOUNTER — OFFICE VISIT (OUTPATIENT)
Dept: UROLOGY | Facility: CLINIC | Age: 41
End: 2021-12-15

## 2021-12-15 VITALS
HEART RATE: 68 BPM | RESPIRATION RATE: 18 BRPM | BODY MASS INDEX: 32.8 KG/M2 | DIASTOLIC BLOOD PRESSURE: 67 MMHG | TEMPERATURE: 97.1 F | SYSTOLIC BLOOD PRESSURE: 108 MMHG | WEIGHT: 209 LBS | HEIGHT: 67 IN

## 2021-12-15 VITALS
SYSTOLIC BLOOD PRESSURE: 112 MMHG | DIASTOLIC BLOOD PRESSURE: 62 MMHG | BODY MASS INDEX: 32.74 KG/M2 | WEIGHT: 208.6 LBS | HEIGHT: 67 IN

## 2021-12-15 DIAGNOSIS — N28.89 RIGHT RENAL MASS: Primary | ICD-10-CM

## 2021-12-15 DIAGNOSIS — E66.9 DIABETES MELLITUS TYPE 2 IN OBESE (HCC): ICD-10-CM

## 2021-12-15 DIAGNOSIS — I10 ESSENTIAL HYPERTENSION: ICD-10-CM

## 2021-12-15 DIAGNOSIS — E11.69 DIABETES MELLITUS TYPE 2 IN OBESE (HCC): ICD-10-CM

## 2021-12-15 DIAGNOSIS — E66.9 OBESITY, CLASS I, BMI 30-34.9: Primary | ICD-10-CM

## 2021-12-15 DIAGNOSIS — M25.50 MULTIPLE JOINT PAIN: ICD-10-CM

## 2021-12-15 DIAGNOSIS — E66.9 OBESITY, CLASS I, BMI 30-34.9: ICD-10-CM

## 2021-12-15 DIAGNOSIS — Z98.84 S/P LAPAROSCOPIC SLEEVE GASTRECTOMY: ICD-10-CM

## 2021-12-15 DIAGNOSIS — K21.9 GASTROESOPHAGEAL REFLUX DISEASE, UNSPECIFIED WHETHER ESOPHAGITIS PRESENT: ICD-10-CM

## 2021-12-15 LAB
25(OH)D3 SERPL-MCNC: 87.2 NG/ML (ref 30–100)
BILIRUB BLD-MCNC: NEGATIVE MG/DL
CLARITY, POC: CLEAR
COLOR UR: YELLOW
EXPIRATION DATE: ABNORMAL
GLUCOSE UR STRIP-MCNC: NEGATIVE MG/DL
KETONES UR QL: ABNORMAL
LEUKOCYTE EST, POC: NEGATIVE
Lab: ABNORMAL
NITRITE UR-MCNC: NEGATIVE MG/ML
PH UR: 5 [PH] (ref 5–8)
PROT UR STRIP-MCNC: NEGATIVE MG/DL
RBC # UR STRIP: NEGATIVE /UL
SP GR UR: 1.03 (ref 1–1.03)
UROBILINOGEN UR QL: NORMAL

## 2021-12-15 PROCEDURE — 99024 POSTOP FOLLOW-UP VISIT: CPT | Performed by: UROLOGY

## 2021-12-15 PROCEDURE — 81003 URINALYSIS AUTO W/O SCOPE: CPT | Performed by: UROLOGY

## 2021-12-15 PROCEDURE — 36415 COLL VENOUS BLD VENIPUNCTURE: CPT

## 2021-12-15 PROCEDURE — 99214 OFFICE O/P EST MOD 30 MIN: CPT | Performed by: NURSE PRACTITIONER

## 2021-12-15 PROCEDURE — 82306 VITAMIN D 25 HYDROXY: CPT

## 2021-12-15 RX ORDER — TOPIRAMATE 50 MG/1
50 TABLET, FILM COATED ORAL NIGHTLY
Qty: 90 TABLET | Refills: 1 | Status: SHIPPED | OUTPATIENT
Start: 2021-12-15 | End: 2022-06-08

## 2021-12-15 NOTE — PROGRESS NOTES
[facility] BARIATR[facility] BARIATRIC SURGERY  [address] [facility] [address]  [facility] [address]  [phone]  [address] [id number] [phone]  Dept: [phone]  12/15/2021      Bianca Boles.  [id number]  [id number]  1980  female      Chief Complaint   Patient presents with   • Follow-up     1 year sleeve follow up       BH Post-Op Bariatric Surgery:   Bianca Boles is status post Laparoscopic Sleeve procedure, performed on 12/7/2020     HPI:   Today's weight is 94.8 kg (209 lb) pounds, today's BMI is Body mass index is 32.27 kg/m².,@ has a  loss of 11 pounds since the last visit and@ weight loss since surgery is 92 pounds. The patient reports a decreased portion size and loss of appetite.      Bianca Boles denies nausea, vomiting, reflux and reports that she had to undergo a partial nephrectomy due to renal cancer noted during an ER visit to treat a kidney stone. She has been taking topirimate at the 50mg dose and reports diminished cravings in the evenings.      Diet and Exercise: Diet history reviewed and discussed with the patient. Weight loss/gains to date discussed with the patient. The patient states they are eating 50-70 grams of protein per day. She reports eating 2 meals per day, a typical portion size of 1/2 cup, eating 3 snacks per day, drinking 5-6 or more 8-oz. glasses of water per day, no carbonated beverage consumption. She has not yet been cleared by her urologic surgeon for exercise but is excited to get back into it this next week.    Supplements: celebrate [employer] with iron and calcium, OTC vitamin D3 1000IU daily, OTC iron     Review of Systems   Constitutional: Positive for appetite change. Negative for fatigue and unexpected weight change.   HENT: Negative.    Eyes: Negative.    Respiratory: Negative.    Cardiovascular: Negative.  Negative for leg swelling.   Gastrointestinal: Positive for abdominal pain (incisional  soreness related to partial nephrectomy). Negative for abdominal distention, constipation, diarrhea, nausea and vomiting.   Genitourinary: Negative for difficulty urinating, frequency and urgency.   Musculoskeletal: Negative for back pain.   Skin: Negative.    Psychiatric/Behavioral: Negative.    All other systems reviewed and are negative.      Patient Active Problem List   Diagnosis   • Chronic fatigue   • Essential hypertension   • Hyperlipidemia   • Heartburn   • Multiple joint pain   • Depression   • Diabetes mellitus type 2 in obese (HCC)   • Gastroparesis   • Gastroesophageal reflux disease   • Gastric polyps   • S/P laparoscopic sleeve gastrectomy   • Obesity, Class I, BMI 30-34.9   • Anxiety   • Urinary tract infection with hematuria   • Leukocytosis   • Insomnia   • Right ureteral stone   • Kidney stone   • Hematuria   • Acute cystitis with hematuria   • Right renal mass   • Seasonal allergies   • Urinary urgency   • Renal cell carcinoma of right kidney (HCC)       Past Medical History:   Diagnosis Date   • Acid reflux    • Allergic rhinitis due to allergen 06/09/2016   • Anxiety    • Anxiety and depression    • Benign essential hypertension    • Cancer (HCC)     renal cancer/mass   • Diabetes (HCC)    • Diabetes mellitus (HCC)     type ii, doesn't check bg at home    • Diabetes mellitus, type 2 (HCC)    • Gastroparesis    • GERD (gastroesophageal reflux disease)    • High triglycerides    • History of bariatric surgery 02/04/2021   • History of kidney stones    • HTN (hypertension)    • Hyperlipemia    • Hyperlipidemia    • Hypertension    • Hypertriglyceridemia    • Kidney stone    • Lumbar herniated disc    • Obesity    • Panic attacks    • PCOS (polycystic ovarian syndrome)    • Psoriasis     ELBOWS   • Seasonal allergies    • Spinal headache     AFTER PAIN EPIDURALS.  NO BLOOD PATCH       The following portions of the patient's history were reviewed and updated as appropriate: allergies, current  medications, past family history, past medical history, past social history, past surgical history and problem list.    Vitals:    12/15/21 1144   BP: 108/67   Pulse: 68   Resp: 18   Temp: 97.1 °F (36.2 °C)       Physical Exam  Vitals and nursing note reviewed.   Constitutional:       Appearance: She is well-developed.   Neck:      Thyroid: No thyromegaly.   Cardiovascular:      Rate and Rhythm: Normal rate.   Pulmonary:      Effort: Pulmonary effort is normal. No respiratory distress.   Abdominal:      Palpations: Abdomen is soft.   Musculoskeletal:         General: No tenderness.   Skin:     General: Skin is warm and dry.   Neurological:      Mental Status: She is alert.   Psychiatric:         Behavior: Behavior normal.         Assessment:   Post-op, the patient is doing well.     Encounter Diagnoses   Name Primary?   • Obesity, Class I, BMI 30-34.9 Yes   • Essential hypertension    • Diabetes mellitus type 2 in obese (HCC)    • S/P laparoscopic sleeve gastrectomy    • Gastroesophageal reflux disease, unspecified whether esophagitis present    • Multiple joint pain        Plan:   We will continue topirimate for now but patient was advised to discuss usage with her urologist as it does increase the risk of kidney stones. She was also advised to consider her calcium intake regarding her chewable calcium (500mg calcium citrate TID) along with drinking premixed protein shakes such as premier or [employer] brands as they usually contain 650-700mg of calcium as well. Her PCP did trend her B vitamins, iron, and folic acid 4 months ago, CBC and CMP were drawn in the ER last month. Her last vitamin D level was elevated and I am suspect she may no longer need her OTC dose of vitamin D on top of her bariatric vitamin. We will trend vitamin D today.  Encouraged patient to be sure to get plenty of lean protein per day through small frequent meals all with a protein source.   Activity restrictions: none.   Recommended patient be  sure to get at least 70 grams of protein per day by eating small, frequent meals all with high lean protein choices. Be sure to limit/cut back on daily carbohydrate intake. Discussed with the patient the recommended amount of water per day to intake- half of body weight in ounces. Reviewed vitamin requirements. Be sure to do routine exercise, 150 minutes per week minimum, including both cardio and strength training.     Instructions / Recommendations: dietary counseling recommended, recommended a daily protein intake of  grams, vitamin supplement(s) recommended, recommended exercising at least 150 minutes per week, behavior modifications recommended and instructed to call the office for concerns, questions, or problems.     The patient was instructed to follow up in 6 months .      Total time spent during this encounter today was 35 minutes

## 2021-12-15 NOTE — PROGRESS NOTES
[facility] BARIATR[facility] BARIATRIC SURGERY  [address] [id number] [phone]  [address] [id number] [phone]  Dept: [phone]  1/12/2021      Bianca Boles.  [id number]  [id number]  1980  female      Chief Complaint   Patient presents with   • Post-op     1 MONTH POST OP SLEEVE       BH Post-Op Bariatric Surgery:   Bianca Boles is status post Laparoscopic Sleeve procedure, performed on 12/7/20     HPI:   Today's weight is 119 kg (263 lb) pounds, today's BMI is Body mass index is 40.61 kg/m².,@ has a  loss of 23 pounds since the last visit and@ weight loss since surgery is 38/45 pounds. The patient reports a decreased portion size and loss of appetite.      Bianca Boles denies fever chills chest pain shortness of air or lower extremity pain and reports nausea when taking her multivitamin.     Diet and Exercise: Diet history reviewed and discussed with the patient. Weight loss/gains to date discussed with the patient. The patient states they are eating 70 grams of protein per day. She reports eating 3 meals per day, a typical portion size of 1 cup, eating 1 snacks per day, drinking 5 or more 8-oz. glasses of water per day, no carbonated beverage consumption and exercising regularly.     Supplements: Bariatric per gastric sleeve causing nausea.     Review of Systems   Constitutional: Positive for fatigue.   Gastrointestinal: Positive for nausea.   Musculoskeletal: Positive for arthralgias.   All other systems reviewed and are negative.      Patient Active Problem List   Diagnosis   • Chronic fatigue   • Essential hypertension   • Hyperlipidemia   • Heartburn   • Multiple joint pain   • Depression   • Diabetes mellitus type 2 in obese (CMS/HCC)   • Obesity, Class III, BMI 40-49.9 (morbid obesity) (CMS/HCC)   • Gastroparesis   • Gastroesophageal reflux disease   • Gastric polyps   • S/P laparoscopic sleeve  gastrectomy       Past Medical History:   Diagnosis Date   • Anxiety and depression    • Diabetes mellitus (CMS/HCC)    • Gastroparesis    • GERD (gastroesophageal reflux disease)    • High triglycerides    • History of kidney stones    • Hypertension    • Lumbar herniated disc    • Panic attacks    • PCOS (polycystic ovarian syndrome)    • Psoriasis     ELBOWS   • Spinal headache     AFTER PAIN EPIDURALS.  NO BLOOD PATCH       The following portions of the patient's history were reviewed and updated as appropriate: allergies, current medications, past family history, past medical history, past social history, past surgical history and problem list.    Vitals:    01/12/21 1408   BP: 112/65   Pulse: 71   Resp: 18   Temp: 97.3 °F (36.3 °C)       Physical Exam  Vitals signs reviewed.   HENT:      Head: Normocephalic and atraumatic.      Mouth/Throat:      Mouth: Mucous membranes are moist.      Pharynx: Oropharynx is clear.   Eyes:      General: No scleral icterus.     Extraocular Movements: Extraocular movements intact.      Conjunctiva/sclera: Conjunctivae normal.      Pupils: Pupils are equal, round, and reactive to light.   Neck:      Musculoskeletal: Normal range of motion and neck supple.      Thyroid: No thyromegaly.   Cardiovascular:      Rate and Rhythm: Normal rate.   Pulmonary:      Effort: Pulmonary effort is normal. No respiratory distress.      Breath sounds: Normal breath sounds. No stridor. No wheezing or rhonchi.   Abdominal:      General: Bowel sounds are normal. There is no distension.      Palpations: Abdomen is soft. There is no mass.      Tenderness: There is no abdominal tenderness. There is no right CVA tenderness, left CVA tenderness, guarding or rebound.      Hernia: No hernia is present.   Musculoskeletal: Normal range of motion.   Lymphadenopathy:      Cervical: No cervical adenopathy.   Skin:     General: Skin is warm and dry.      Findings: No erythema.   Neurological:      Mental Status:  She is alert and oriented to person, place, and time.   Psychiatric:         Mood and Affect: Mood normal.         Behavior: Behavior normal.         Thought Content: Thought content normal.         Judgment: Judgment normal.         Assessment:   Post-op, the patient 1 month status post laparoscopic sleeve gastrectomy.  Patient is afebrile and hemodynamically stable.  She has had 2 hold her blood pressure medication on occasion secondary to being low.  She sees her primary care physician this week.  She has stopped the Ozempic as well as Tresiba and is taking Metformin.  She states her blood sugars have been running normal anywhere from .  I instructed her to stop the ursodiol medication to see if this is causing her nausea.  If it is the multivitamin then to try over-the-counter for a few weeks and then try back with the bariatric.  She would like to have her labs drawn by her PCP which we gave her sheet on what to order.  She will again talk with her primary care doctor regarding her blood pressure medications to see if still needed.  She has not started any exercise routine but will start now.  We discussed strength training as well as cardio type exercises..     Encounter Diagnoses   Name Primary?   • Obesity, Class III, BMI 40-49.9 (morbid obesity) (CMS/HCC) Yes   • S/P laparoscopic sleeve gastrectomy    • Diabetes mellitus type 2 in obese (CMS/HCC)    • Chronic fatigue    • Essential hypertension        Plan:     Encouraged patient to be sure to get plenty of lean protein per day through small frequent meals all with a protein source.   Activity restrictions: none.   Recommended patient be sure to get at least 70 grams of protein per day by eating small, frequent meals all with high lean protein choices. Be sure to limit/cut back on daily carbohydrate intake. Discussed with the patient the recommended amount of water per day to intake- half of body weight in ounces. Reviewed vitamin requirements. Be  sure to do routine exercise, 150 minutes per week minimum, including both cardio and strength training.     Instructions / Recommendations: dietary counseling recommended, recommended a daily protein intake of  grams, vitamin supplement(s) recommended, recommended exercising at least 150 minutes per week, behavior modifications recommended and instructed to call the office for concerns, questions, or problems.     The patient was instructed to follow up in 2 months.     The patient was counseled regarding. Total time spent face to face was 20 minutes and 15 minutes was spent counseling.    stated

## 2021-12-16 NOTE — PROGRESS NOTES
"Chief Complaint  Follow-up (4 WK F/U Incision questions)    Subjective          Bianca Boles presents to [facility] UROLOGY  History of Present Illness     The patient has consented being recorded using JE.    The patient is here for a 1 month follow-up status post robotic partial nephrectomy on 11/15/2021. Her pathology revealed a clear cell renal cell carcinoma grade 2 with negative margins. Tumor was 4 cm and stage was T1a.    She complains of mild pain and burning sensation at her incisions. She states that she puts Bio-Oil on her incisions. She requests some paperwork to be filled out for her to return to work on 12/20/2021.    Objective   Vital Signs:   /62   Ht 170.2 cm (67\")   Wt 94.6 kg (208 lb 9.6 oz)   BMI 32.67 kg/m²     Physical Exam  Vitals and nursing note reviewed.   Constitutional:       Appearance: Normal appearance. She is well-developed.   Pulmonary:      Effort: Pulmonary effort is normal.      Breath sounds: Normal air entry.   Neurological:      Mental Status: She is alert and oriented to person, place, and time.      Motor: Motor function is intact.   Psychiatric:         Mood and Affect: Mood normal.         Behavior: Behavior normal.        Review of Systems-  A review of systems was completed and positive findings are noted in the HPI.     Result Review :                  Results for orders placed or performed in visit on 12/15/21   POC Urinalysis Dipstick, Automated    Specimen: Urine   Result Value Ref Range    Color Yellow Yellow, Straw, Dark Yellow, Sheri    Clarity, UA Clear Clear    Specific Gravity  1.030 1.005 - 1.030    pH, Urine 5.0 5.0 - 8.0    Leukocytes Negative Negative    Nitrite, UA Negative Negative    Protein, POC Negative Negative mg/dL    Glucose, UA Negative Negative, 1000 mg/dL (3+) mg/dL    Ketones, UA Trace (A) Negative    Urobilinogen, UA Normal Normal    Bilirubin Negative Negative    Blood, UA Negative Negative    Lot Number " [id number]     Expiration Date 12/01/2022        Assessment and Plan    Diagnoses and all orders for this visit:    1. Right renal mass (Primary)  -     POC Urinalysis Dipstick, Automated      1. Follow-up status post robotic partial nephrectomy  Her incisions are healing well.  - She will follow up in 5 months with a CT scan prior to the visit.  - She will return to work in 1 week.    Transcribed from ambient dictation for Lori Troy MD by Walker Bedoya.  12/16/21   12:02 EST    Patient verbalized consent to the visit recording.          Follow Up   No follow-ups on file.  Patient was given instructions and counseling regarding her condition or for health maintenance advice. Please see specific information pulled into the AVS if appropriate.

## 2021-12-17 ENCOUNTER — TELEPHONE (OUTPATIENT)
Dept: UROLOGY | Facility: CLINIC | Age: 41
End: 2021-12-17

## 2021-12-17 NOTE — TELEPHONE ENCOUNTER
Caller: Edvin Bolesah Ann    Relationship: Self    Best call back number: [phone]    What form or medical record are you requesting: RETURN TO WORK NOTE    Who is requesting this form or medical record from you: EMPLOYER    How would you like to receive the form or medical records (pick-up, mail, fax):     Timeframe paperwork needed: Monday MORNING    Additional notes: PATIENT WAS HOPING TO RETURN TO WORK Monday MORNING AT 10:00AM AND WAS HOPING TO  RETURN TO WORK PAPERWORK ON HER WAY IN. STATES SHE DROPPED OFF A SPECIFIC FORM THAT NEEDED TO BE FILLED OUT AT HER APPT WITH DR. GIRON ON 12/15/21.     SPOKE WITH [employer] AT THE OFFICE, STATED SHE DID NOT SEE THE FORM AND TO CHECK WITH [employer]LOISE MEMBRENO AND ONE OF THEM WOULD CALL THE PATIENT BACK Monday MORNING.

## 2021-12-20 NOTE — TELEPHONE ENCOUNTER
I gave Cindy that paperwork today. She has completed and Cindy called and LMOM for her that paperwork is complete.

## 2022-02-07 ENCOUNTER — LAB (OUTPATIENT)
Dept: LAB | Facility: HOSPITAL | Age: 42
End: 2022-02-07

## 2022-02-07 DIAGNOSIS — F41.9 ANXIETY: ICD-10-CM

## 2022-02-07 DIAGNOSIS — I10 ESSENTIAL HYPERTENSION: ICD-10-CM

## 2022-02-07 DIAGNOSIS — E11.69 DIABETES MELLITUS TYPE 2 IN OBESE: ICD-10-CM

## 2022-02-07 DIAGNOSIS — E78.5 HYPERLIPIDEMIA, UNSPECIFIED HYPERLIPIDEMIA TYPE: ICD-10-CM

## 2022-02-07 DIAGNOSIS — R31.9 URINARY TRACT INFECTION WITH HEMATURIA, SITE UNSPECIFIED: ICD-10-CM

## 2022-02-07 DIAGNOSIS — E66.9 DIABETES MELLITUS TYPE 2 IN OBESE: ICD-10-CM

## 2022-02-07 DIAGNOSIS — N39.0 URINARY TRACT INFECTION WITH HEMATURIA, SITE UNSPECIFIED: ICD-10-CM

## 2022-02-07 DIAGNOSIS — D72.829 LEUKOCYTOSIS, UNSPECIFIED TYPE: ICD-10-CM

## 2022-02-07 DIAGNOSIS — F32.A DEPRESSION, UNSPECIFIED DEPRESSION TYPE: ICD-10-CM

## 2022-02-07 LAB
ALBUMIN SERPL-MCNC: 3.6 G/DL (ref 3.5–5.2)
ALBUMIN/GLOB SERPL: 1.3 G/DL
ALP SERPL-CCNC: 73 U/L (ref 39–117)
ALT SERPL W P-5'-P-CCNC: 16 U/L (ref 1–33)
ANION GAP SERPL CALCULATED.3IONS-SCNC: 8.4 MMOL/L (ref 5–15)
AST SERPL-CCNC: 17 U/L (ref 1–32)
BASOPHILS # BLD AUTO: 0.05 10*3/MM3 (ref 0–0.2)
BASOPHILS NFR BLD AUTO: 0.6 % (ref 0–1.5)
BILIRUB SERPL-MCNC: 0.2 MG/DL (ref 0–1.2)
BUN SERPL-MCNC: 17 MG/DL (ref 6–20)
BUN/CREAT SERPL: 17 (ref 7–25)
CALCIUM SPEC-SCNC: 9.4 MG/DL (ref 8.6–10.5)
CHLORIDE SERPL-SCNC: 105 MMOL/L (ref 98–107)
CHOLEST SERPL-MCNC: 160 MG/DL (ref 0–200)
CO2 SERPL-SCNC: 25.6 MMOL/L (ref 22–29)
CREAT SERPL-MCNC: 1 MG/DL (ref 0.57–1)
DEPRECATED RDW RBC AUTO: 41.9 FL (ref 37–54)
EOSINOPHIL # BLD AUTO: 0.3 10*3/MM3 (ref 0–0.4)
EOSINOPHIL NFR BLD AUTO: 3.4 % (ref 0.3–6.2)
ERYTHROCYTE [DISTWIDTH] IN BLOOD BY AUTOMATED COUNT: 14.2 % (ref 12.3–15.4)
GFR SERPL CREATININE-BSD FRML MDRD: 61 ML/MIN/1.73
GLOBULIN UR ELPH-MCNC: 2.7 GM/DL
GLUCOSE SERPL-MCNC: 87 MG/DL (ref 65–99)
HBA1C MFR BLD: 6.29 % (ref 4.8–5.6)
HCT VFR BLD AUTO: 37.6 % (ref 34–46.6)
HDLC SERPL-MCNC: 49 MG/DL (ref 40–60)
HGB BLD-MCNC: 12.4 G/DL (ref 12–15.9)
IMM GRANULOCYTES # BLD AUTO: 0.04 10*3/MM3 (ref 0–0.05)
IMM GRANULOCYTES NFR BLD AUTO: 0.5 % (ref 0–0.5)
LDLC SERPL CALC-MCNC: 86 MG/DL (ref 0–100)
LDLC/HDLC SERPL: 1.68 {RATIO}
LYMPHOCYTES # BLD AUTO: 3.2 10*3/MM3 (ref 0.7–3.1)
LYMPHOCYTES NFR BLD AUTO: 36.3 % (ref 19.6–45.3)
MCH RBC QN AUTO: 27 PG (ref 26.6–33)
MCHC RBC AUTO-ENTMCNC: 33 G/DL (ref 31.5–35.7)
MCV RBC AUTO: 81.7 FL (ref 79–97)
MONOCYTES # BLD AUTO: 0.52 10*3/MM3 (ref 0.1–0.9)
MONOCYTES NFR BLD AUTO: 5.9 % (ref 5–12)
NEUTROPHILS NFR BLD AUTO: 4.7 10*3/MM3 (ref 1.7–7)
NEUTROPHILS NFR BLD AUTO: 53.3 % (ref 42.7–76)
NRBC BLD AUTO-RTO: 0 /100 WBC (ref 0–0.2)
PLATELET # BLD AUTO: 212 10*3/MM3 (ref 140–450)
PMV BLD AUTO: 10.5 FL (ref 6–12)
POTASSIUM SERPL-SCNC: 4.3 MMOL/L (ref 3.5–5.2)
PROT SERPL-MCNC: 6.3 G/DL (ref 6–8.5)
RBC # BLD AUTO: 4.6 10*6/MM3 (ref 3.77–5.28)
SODIUM SERPL-SCNC: 139 MMOL/L (ref 136–145)
TRIGL SERPL-MCNC: 144 MG/DL (ref 0–150)
TSH SERPL DL<=0.05 MIU/L-ACNC: 1.55 UIU/ML (ref 0.27–4.2)
VLDLC SERPL-MCNC: 25 MG/DL (ref 5–40)
WBC NRBC COR # BLD: 8.81 10*3/MM3 (ref 3.4–10.8)

## 2022-02-07 PROCEDURE — 83036 HEMOGLOBIN GLYCOSYLATED A1C: CPT

## 2022-02-07 PROCEDURE — 80061 LIPID PANEL: CPT

## 2022-02-07 PROCEDURE — 80050 GENERAL HEALTH PANEL: CPT

## 2022-02-08 ENCOUNTER — OFFICE VISIT (OUTPATIENT)
Dept: FAMILY MEDICINE CLINIC | Facility: CLINIC | Age: 42
End: 2022-02-08

## 2022-02-08 VITALS
HEIGHT: 67 IN | BODY MASS INDEX: 32.18 KG/M2 | OXYGEN SATURATION: 100 % | TEMPERATURE: 95.8 F | HEART RATE: 63 BPM | WEIGHT: 205 LBS | DIASTOLIC BLOOD PRESSURE: 47 MMHG | SYSTOLIC BLOOD PRESSURE: 102 MMHG

## 2022-02-08 DIAGNOSIS — K21.9 GASTROESOPHAGEAL REFLUX DISEASE WITHOUT ESOPHAGITIS: ICD-10-CM

## 2022-02-08 DIAGNOSIS — F51.01 PRIMARY INSOMNIA: ICD-10-CM

## 2022-02-08 DIAGNOSIS — J30.2 SEASONAL ALLERGIES: ICD-10-CM

## 2022-02-08 DIAGNOSIS — F33.0 MILD EPISODE OF RECURRENT MAJOR DEPRESSIVE DISORDER: ICD-10-CM

## 2022-02-08 DIAGNOSIS — I10 ESSENTIAL HYPERTENSION: ICD-10-CM

## 2022-02-08 DIAGNOSIS — Z98.84 S/P LAPAROSCOPIC SLEEVE GASTRECTOMY: ICD-10-CM

## 2022-02-08 DIAGNOSIS — Z12.31 SCREENING MAMMOGRAM FOR BREAST CANCER: ICD-10-CM

## 2022-02-08 DIAGNOSIS — E78.2 MIXED HYPERLIPIDEMIA: ICD-10-CM

## 2022-02-08 DIAGNOSIS — F41.9 ANXIETY: ICD-10-CM

## 2022-02-08 DIAGNOSIS — E11.69 DIABETES MELLITUS TYPE 2 IN OBESE: ICD-10-CM

## 2022-02-08 DIAGNOSIS — E66.9 DIABETES MELLITUS TYPE 2 IN OBESE: ICD-10-CM

## 2022-02-08 DIAGNOSIS — D64.9 ANEMIA, UNSPECIFIED TYPE: Primary | ICD-10-CM

## 2022-02-08 PROCEDURE — 99214 OFFICE O/P EST MOD 30 MIN: CPT | Performed by: NURSE PRACTITIONER

## 2022-02-08 RX ORDER — LISINOPRIL 2.5 MG/1
2.5 TABLET ORAL DAILY
Qty: 90 TABLET | Refills: 1 | Status: SHIPPED | OUTPATIENT
Start: 2022-02-08 | End: 2022-05-04 | Stop reason: SDUPTHER

## 2022-02-08 RX ORDER — PRAVASTATIN SODIUM 10 MG
10 TABLET ORAL NIGHTLY
Qty: 90 TABLET | Refills: 1 | Status: SHIPPED | OUTPATIENT
Start: 2022-02-08 | End: 2022-05-04 | Stop reason: SDUPTHER

## 2022-02-08 RX ORDER — TRAZODONE HYDROCHLORIDE 50 MG/1
50 TABLET ORAL NIGHTLY
Qty: 90 TABLET | Refills: 1 | Status: SHIPPED | OUTPATIENT
Start: 2022-02-08 | End: 2022-05-04 | Stop reason: SDUPTHER

## 2022-02-08 RX ORDER — FLUTICASONE PROPIONATE 50 MCG
2 SPRAY, SUSPENSION (ML) NASAL DAILY
Qty: 3 ML | Refills: 1 | Status: SHIPPED | OUTPATIENT
Start: 2022-02-08 | End: 2022-05-04 | Stop reason: SDUPTHER

## 2022-02-08 RX ORDER — MELATONIN
2000 DAILY
Qty: 180 TABLET | Refills: 1 | Status: SHIPPED | OUTPATIENT
Start: 2022-02-08 | End: 2022-05-04 | Stop reason: SDUPTHER

## 2022-02-08 RX ORDER — BUPROPION HYDROCHLORIDE 300 MG/1
300 TABLET ORAL DAILY
Qty: 90 TABLET | Refills: 1 | Status: SHIPPED | OUTPATIENT
Start: 2022-02-08 | End: 2022-05-04 | Stop reason: SDUPTHER

## 2022-02-08 NOTE — PROGRESS NOTES
Follow Up Office Visit      Patient Name: Bianca Boles  : 1980   MRN: [id number]     Chief Complaint:    Chief Complaint   Patient presents with   • Diabetes   • Hypertension   • Anxiety   • Depression   • Hyperlipidemia       History of Present Illness: Bianca Boles is a 41 y.o. female who is here today to follow up for diabetes mellitus type 2 hypertension hyperlipidemia anxiety depression allergic rhinitis    BS-has not been checking recently     Patient complain of postnasal drainage bilateral ear pressure no use of Zyrtec or Flonase    Last Eye exam  dr patricia arce   Mammogram 3-2021  Pap   Checking feet daily      Subjective      Review of Systems:   Review of Systems   Constitutional: Negative for chills and fever.   HENT: Positive for ear pain and postnasal drip. Negative for sinus pain and sore throat.    Eyes: Negative for visual disturbance.   Respiratory: Negative for cough.    Cardiovascular: Negative for chest pain.   Gastrointestinal: Negative for abdominal pain, diarrhea, nausea and vomiting.   Endocrine: Negative for polydipsia and polyuria.   Genitourinary: Negative for dysuria.   Musculoskeletal: Negative for myalgias.   Allergic/Immunologic: Positive for environmental allergies.   Neurological: Negative for dizziness and headaches.   Psychiatric/Behavioral: Negative for sleep disturbance. The patient is not nervous/anxious.         Past Medical History:   Past Medical History:   Diagnosis Date   • Acid reflux    • Allergic rhinitis due to allergen 2016   • Anxiety    • Anxiety and depression    • Benign essential hypertension    • Cancer (HCC)     renal cancer/mass   • Diabetes (HCC)    • Diabetes mellitus (HCC)     type ii, doesn't check bg at home    • Diabetes mellitus, type 2 (HCC)    • Gastroparesis    • GERD (gastroesophageal reflux disease)    • High triglycerides    • History of bariatric surgery 2021   • History of kidney stones     • HTN (hypertension)    • Hyperlipemia    • Hyperlipidemia    • Hypertension    • Hypertriglyceridemia    • Kidney stone    • Lumbar herniated disc    • Obesity    • Panic attacks    • PCOS (polycystic ovarian syndrome)    • Psoriasis     ELBOWS   • Seasonal allergies    • Spinal headache     AFTER PAIN EPIDURALS.  NO BLOOD PATCH       Past Surgical History:   Past Surgical History:   Procedure Laterality Date   • ABDOMINAL SURGERY     • ADENOIDECTOMY     •  SECTION  ,   • CYSTOSCOPY BLADDER STONE LITHOTRIPSY     • EAR TUBES     • ENDOSCOPY N/A 10/20/2020    Procedure: ESOPHAGOGASTRODUODENOSCOPY WITH BIOPSY;  Surgeon: Fidel Grajeda Jr., MD;  Location: [facility] ENDOSCOPY;  Service: General;  Laterality: N/A;  PRE- GERD  POST- RETAINED FOOD, GASTRITIS, GASTRIC POLYPS   • ENDOSCOPY     • FOOT FRACTURE SURGERY Left 2017    screw placed   • FRACTURE SURGERY     • GASTRECTOMY     • GASTRIC SLEEVE LAPAROSCOPIC N/A 2020    Procedure: GASTRIC SLEEVE LAPAROSCOPIC;  Surgeon: Fidel Grajeda Jr., MD;  Location: [facility] OR OSC;  Service: Bariatric;  Laterality: N/A;   • KIDNEY SURGERY     • NEPHRECTOMY PARTIAL Right 11/15/2021    Procedure: NEPHRECTOMY PARTIAL LAPAROSCOPIC WITH DAVINCI ROBOT;  Surgeon: Lori Troy MD;  Location: [facility] OR;  Service: Robotics - DaVinci;  Laterality: Right;   • TONSILLECTOMY     • URETEROSCOPY LASER LITHOTRIPSY WITH STENT INSERTION Right 2021    Procedure: CYSTOSCOPY, RIGHT URETEROSCOPY, LASERTRIPSY, STONE BASKET EXTRACTION AND STENT INSERTION;  Surgeon: Lori Troy MD;  Location: [facility] OR;  Service: Urology;  Laterality: Right;       Family History:   Family History   Problem Relation Age of Onset   • Diabetes Father    • Hypertension Father    • Heart disease Father    • Cancer Father         BLADDER   • Colon cancer Father         malignant, currently 62   • Stroke Maternal Grandmother    • Heart disease Maternal  Grandmother    • Diabetes Paternal Grandfather    • Heart disease Paternal Grandfather    • Malig Hyperthermia Neg Hx        Social History:   Social History     Socioeconomic History   • Marital status:    Tobacco Use   • Smoking status: Former Smoker     Years: 35.00     Types: Cigarettes     Quit date: 1/15/2020     Years since quittin.0   • Smokeless tobacco: Never Used   • Tobacco comment: A PACK A WEEK   Vaping Use   • Vaping Use: Never used   Substance and Sexual Activity   • Alcohol use: Yes     Comment: RARELY   • Drug use: Never   • Sexual activity: Defer       Medications:     Current Outpatient Medications:   •  Biotin 37854 MCG tablet, Take 1 tablet by mouth Every Night., Disp: , Rfl:   •  buPROPion XL (WELLBUTRIN XL) 300 MG 24 hr tablet, Take 1 tablet by mouth Daily., Disp: 90 tablet, Rfl: 1  •  CALCIUM-MAGNESIUM-ZINC PO, Take 3 tablets by mouth Daily., Disp: , Rfl:   •  Cetirizine HCl 10 MG capsule, Take 10 mg by mouth every night at bedtime., Disp: 90 capsule, Rfl: 1  •  cholecalciferol (VITAMIN D3) 25 MCG (1000 UT) tablet, Take 2 tablets by mouth Daily., Disp: 180 tablet, Rfl: 1  •  COLLAGEN PO, Take 3 tablets by mouth Daily., Disp: , Rfl:   •  EluRyng 0.12-0.015 MG/24HR vaginal ring, Insert 1 each into the vagina Every 30 (Thirty) Days. (Patient taking differently: Insert 1 each into the vagina Every 30 (Thirty) Days. Removed currently. To be placed on 21), Disp: 3 each, Rfl: 4  •  fluticasone (FLONASE) 50 MCG/ACT nasal spray, 2 sprays into the nostril(s) as directed by provider Daily. 2 sprays each nostril daily, Disp: 3 mL, Rfl: 1  •  lisinopril (PRINIVIL,ZESTRIL) 2.5 MG tablet, Take 1 tablet by mouth Daily., Disp: 90 tablet, Rfl: 1  •  metFORMIN (GLUCOPHAGE) 1000 MG tablet, Take 1 tablet by mouth 2 (Two) Times a Day With Meals., Disp: 180 tablet, Rfl: 1  •  Multiple Vitamins-Minerals (MULTIVITAMIN PO), Take 1 tablet by mouth Every Night., Disp: , Rfl:   •  pravastatin  "(PRAVACHOL) 10 MG tablet, Take 1 tablet by mouth Every Night., Disp: 90 tablet, Rfl: 1  •  Probiotic Product (PROBIOTIC-10 PO), Take 1 tablet by mouth Every Night., Disp: , Rfl:   •  topiramate (TOPAMAX) 50 MG tablet, Take 1 tablet by mouth Every Night for 180 days., Disp: 90 tablet, Rfl: 1  •  traZODone (DESYREL) 50 MG tablet, Take 1 tablet by mouth Every Night., Disp: 90 tablet, Rfl: 1    Allergies:   Allergies   Allergen Reactions   • Morphine Itching and Nausea And Vomiting           PHQ-2 Total Score: 0   PHQ-9 Total Score: 0     Objective     Physical Exam:  Vital Signs:   Vitals:    02/08/22 0817   BP: 102/47   Pulse: 63   Temp: 95.8 °F (35.4 °C)   SpO2: 100%   Weight: 93 kg (205 lb)   Height: 170.2 cm (67\")     Body mass index is 32.11 kg/m².     Physical Exam  HENT:      Right Ear: Tympanic membrane normal.      Left Ear: Tympanic membrane normal.      Nose: Nose normal.      Mouth/Throat:      Mouth: Mucous membranes are moist.   Eyes:      Conjunctiva/sclera: Conjunctivae normal.      Pupils: Pupils are equal, round, and reactive to light.   Neck:      Vascular: No carotid bruit.   Cardiovascular:      Rate and Rhythm: Normal rate and regular rhythm.      Pulses:           Dorsalis pedis pulses are 2+ on the right side and 2+ on the left side.        Posterior tibial pulses are 2+ on the right side and 2+ on the left side.      Heart sounds: Normal heart sounds. No murmur heard.      Pulmonary:      Effort: Pulmonary effort is normal.      Breath sounds: Normal breath sounds.   Abdominal:      General: Bowel sounds are normal.      Palpations: Abdomen is soft.   Musculoskeletal:      Right lower leg: No edema.      Left lower leg: No edema.   Feet:      Right foot:      Protective Sensation: 10 sites tested. 10 sites sensed.      Skin integrity: Skin integrity normal.      Left foot:      Protective Sensation: 10 sites tested. 10 sites sensed.      Skin integrity: Skin integrity normal.   Skin:     " General: Skin is warm and dry.   Neurological:      Mental Status: She is alert.   Psychiatric:         Mood and Affect: Mood normal.         Behavior: Behavior normal.             Assessment / Plan      Assessment/Plan:   Diagnoses and all orders for this visit:    1. Anemia, unspecified type (Primary)  -     Iron Profile; Future  -     Ferritin; Future    2. Diabetes mellitus type 2 in obese (HCC)  -     CBC Auto Differential; Future  -     Hemoglobin A1c; Future  -     Comprehensive Metabolic Panel; Future  -     Microalbumin / Creatinine Urine Ratio - Urine, Clean Catch; Future  -     Lipid Panel; Future  -     TSH; Future  -     Urinalysis With Culture If Indicated -; Future    3. Mixed hyperlipidemia  -     Lipid Panel; Future    4. Essential hypertension    5. Seasonal allergies    6. Gastroesophageal reflux disease without esophagitis    7. Primary insomnia    8. Anxiety    9. Mild episode of recurrent major depressive disorder (HCC)    10. S/P laparoscopic sleeve gastrectomy    11. Screening mammogram for breast cancer  -     Mammo Screening Digital Tomosynthesis Bilateral With CAD; Future    Other orders  -     metFORMIN (GLUCOPHAGE) 1000 MG tablet; Take 1 tablet by mouth 2 (Two) Times a Day With Meals.  Dispense: 180 tablet; Refill: 1  -     traZODone (DESYREL) 50 MG tablet; Take 1 tablet by mouth Every Night.  Dispense: 90 tablet; Refill: 1  -     pravastatin (PRAVACHOL) 10 MG tablet; Take 1 tablet by mouth Every Night.  Dispense: 90 tablet; Refill: 1  -     lisinopril (PRINIVIL,ZESTRIL) 2.5 MG tablet; Take 1 tablet by mouth Daily.  Dispense: 90 tablet; Refill: 1  -     Cetirizine HCl 10 MG capsule; Take 10 mg by mouth every night at bedtime.  Dispense: 90 capsule; Refill: 1  -     buPROPion XL (WELLBUTRIN XL) 300 MG 24 hr tablet; Take 1 tablet by mouth Daily.  Dispense: 90 tablet; Refill: 1  -     cholecalciferol (VITAMIN D3) 25 MCG (1000 UT) tablet; Take 2 tablets by mouth Daily.  Dispense: 180 tablet;  "Refill: 1  -     fluticasone (FLONASE) 50 MCG/ACT nasal spray; 2 sprays into the nostril(s) as directed by provider Daily. 2 sprays each nostril daily  Dispense: 3 mL; Refill: 1       Diabetes mellitus type 2 currently controlled with Metformin will provide refills  Hyperlipidemia LDL above goal patient reports she had a poor diet during the holidays currently taking pravastatin 10 mg would like to recheck in 90 days if remains elevated will increase to 20 mg nightly  Hypertension currently controlled lisinopril will provide refills  Anxiety depression currently controlled with Wellbutrin will provide refills  Reflux currently controlled at this time.medication  Seasonal allergies controlled without the use of Zyrtec and Flonase recommend to resume both medications refill provided if symptoms persist will add Singulair  Patient is currently taking vitamin D supplementation calcium supplementation and Topamax per bariatric surgeon status post gastric sleeve  Insomnia currently controlled with trazodone as needed use at this time will provide refills denies morning sedation      Follow Up:   Return in about 3 months (around 5/8/2022).    Kofi Hernandez, CARLTON    \"Please note that portions of this note were completed with a voice recognition program.\"    "

## 2022-04-07 ENCOUNTER — HOSPITAL ENCOUNTER (OUTPATIENT)
Dept: MAMMOGRAPHY | Facility: HOSPITAL | Age: 42
Discharge: HOME | End: 2022-04-07
Admitting: NURSE PRACTITIONER

## 2022-04-07 DIAGNOSIS — Z12.31 SCREENING MAMMOGRAM FOR BREAST CANCER: ICD-10-CM

## 2022-04-07 PROCEDURE — 77063 BREAST TOMOSYNTHESIS BI: CPT

## 2022-04-07 PROCEDURE — 77067 SCR MAMMO BI INCL CAD: CPT

## 2022-05-02 ENCOUNTER — LAB (OUTPATIENT)
Dept: LAB | Facility: HOSPITAL | Age: 42
End: 2022-05-02

## 2022-05-02 DIAGNOSIS — E66.9 DIABETES MELLITUS TYPE 2 IN OBESE: ICD-10-CM

## 2022-05-02 DIAGNOSIS — D64.9 ANEMIA, UNSPECIFIED TYPE: ICD-10-CM

## 2022-05-02 DIAGNOSIS — E11.69 DIABETES MELLITUS TYPE 2 IN OBESE: ICD-10-CM

## 2022-05-02 DIAGNOSIS — E78.2 MIXED HYPERLIPIDEMIA: ICD-10-CM

## 2022-05-02 LAB
ALBUMIN SERPL-MCNC: 3.8 G/DL (ref 3.5–5.2)
ALBUMIN UR-MCNC: <1.2 MG/DL
ALBUMIN/GLOB SERPL: 1.4 G/DL
ALP SERPL-CCNC: 78 U/L (ref 39–117)
ALT SERPL W P-5'-P-CCNC: 28 U/L (ref 1–33)
ANION GAP SERPL CALCULATED.3IONS-SCNC: 13.9 MMOL/L (ref 5–15)
AST SERPL-CCNC: 28 U/L (ref 1–32)
BACTERIA UR QL AUTO: NORMAL /HPF
BASOPHILS # BLD AUTO: 0.06 10*3/MM3 (ref 0–0.2)
BASOPHILS NFR BLD AUTO: 0.7 % (ref 0–1.5)
BILIRUB SERPL-MCNC: 0.2 MG/DL (ref 0–1.2)
BILIRUB UR QL STRIP: NEGATIVE
BUN SERPL-MCNC: 21 MG/DL (ref 6–20)
BUN/CREAT SERPL: 22.1 (ref 7–25)
CALCIUM SPEC-SCNC: 9.2 MG/DL (ref 8.6–10.5)
CHLORIDE SERPL-SCNC: 104 MMOL/L (ref 98–107)
CHOLEST SERPL-MCNC: 147 MG/DL (ref 0–200)
CLARITY UR: CLEAR
CO2 SERPL-SCNC: 23.1 MMOL/L (ref 22–29)
COD CRY URNS QL: NORMAL /HPF
COLOR UR: YELLOW
CREAT SERPL-MCNC: 0.95 MG/DL (ref 0.57–1)
CREAT UR-MCNC: 112.7 MG/DL
DEPRECATED RDW RBC AUTO: 39.8 FL (ref 37–54)
EGFRCR SERPLBLD CKD-EPI 2021: 76.4 ML/MIN/1.73
EOSINOPHIL # BLD AUTO: 0.43 10*3/MM3 (ref 0–0.4)
EOSINOPHIL NFR BLD AUTO: 4.7 % (ref 0.3–6.2)
ERYTHROCYTE [DISTWIDTH] IN BLOOD BY AUTOMATED COUNT: 13.2 % (ref 12.3–15.4)
FERRITIN SERPL-MCNC: 21.5 NG/ML (ref 13–150)
GLOBULIN UR ELPH-MCNC: 2.7 GM/DL
GLUCOSE SERPL-MCNC: 92 MG/DL (ref 65–99)
GLUCOSE UR STRIP-MCNC: NEGATIVE MG/DL
HBA1C MFR BLD: 5.6 % (ref 4.8–5.6)
HCT VFR BLD AUTO: 39.5 % (ref 34–46.6)
HDLC SERPL-MCNC: 56 MG/DL (ref 40–60)
HGB BLD-MCNC: 13.5 G/DL (ref 12–15.9)
HGB UR QL STRIP.AUTO: NEGATIVE
HYALINE CASTS UR QL AUTO: NORMAL /LPF
IMM GRANULOCYTES # BLD AUTO: 0.07 10*3/MM3 (ref 0–0.05)
IMM GRANULOCYTES NFR BLD AUTO: 0.8 % (ref 0–0.5)
IRON 24H UR-MRATE: 45 MCG/DL (ref 37–145)
IRON SATN MFR SERPL: 9 % (ref 20–50)
KETONES UR QL STRIP: NEGATIVE
LDLC SERPL CALC-MCNC: 65 MG/DL (ref 0–100)
LDLC/HDLC SERPL: 1.08 {RATIO}
LEUKOCYTE ESTERASE UR QL STRIP.AUTO: ABNORMAL
LYMPHOCYTES # BLD AUTO: 3.74 10*3/MM3 (ref 0.7–3.1)
LYMPHOCYTES NFR BLD AUTO: 41.3 % (ref 19.6–45.3)
MCH RBC QN AUTO: 28.6 PG (ref 26.6–33)
MCHC RBC AUTO-ENTMCNC: 34.2 G/DL (ref 31.5–35.7)
MCV RBC AUTO: 83.7 FL (ref 79–97)
MICROALBUMIN/CREAT UR: NORMAL MG/G{CREAT}
MONOCYTES # BLD AUTO: 0.63 10*3/MM3 (ref 0.1–0.9)
MONOCYTES NFR BLD AUTO: 7 % (ref 5–12)
NEUTROPHILS NFR BLD AUTO: 4.13 10*3/MM3 (ref 1.7–7)
NEUTROPHILS NFR BLD AUTO: 45.5 % (ref 42.7–76)
NITRITE UR QL STRIP: NEGATIVE
NRBC BLD AUTO-RTO: 0 /100 WBC (ref 0–0.2)
PH UR STRIP.AUTO: 6.5 [PH] (ref 5–8)
PLATELET # BLD AUTO: 240 10*3/MM3 (ref 140–450)
PMV BLD AUTO: 10.4 FL (ref 6–12)
POTASSIUM SERPL-SCNC: 3.7 MMOL/L (ref 3.5–5.2)
PROT SERPL-MCNC: 6.5 G/DL (ref 6–8.5)
PROT UR QL STRIP: NEGATIVE
RBC # BLD AUTO: 4.72 10*6/MM3 (ref 3.77–5.28)
RBC # UR STRIP: NORMAL /HPF
REF LAB TEST METHOD: NORMAL
SODIUM SERPL-SCNC: 141 MMOL/L (ref 136–145)
SP GR UR STRIP: 1.02 (ref 1–1.03)
SQUAMOUS #/AREA URNS HPF: NORMAL /HPF
TIBC SERPL-MCNC: 477 MCG/DL (ref 298–536)
TRANSFERRIN SERPL-MCNC: 320 MG/DL (ref 200–360)
TRIGL SERPL-MCNC: 154 MG/DL (ref 0–150)
TSH SERPL DL<=0.05 MIU/L-ACNC: 1.77 UIU/ML (ref 0.27–4.2)
UROBILINOGEN UR QL STRIP: ABNORMAL
VLDLC SERPL-MCNC: 26 MG/DL (ref 5–40)
WBC # UR STRIP: NORMAL /HPF
WBC NRBC COR # BLD: 9.06 10*3/MM3 (ref 3.4–10.8)

## 2022-05-02 PROCEDURE — 81001 URINALYSIS AUTO W/SCOPE: CPT

## 2022-05-02 PROCEDURE — 83540 ASSAY OF IRON: CPT

## 2022-05-02 PROCEDURE — 80050 GENERAL HEALTH PANEL: CPT

## 2022-05-02 PROCEDURE — 82043 UR ALBUMIN QUANTITATIVE: CPT

## 2022-05-02 PROCEDURE — 84466 ASSAY OF TRANSFERRIN: CPT

## 2022-05-02 PROCEDURE — 83036 HEMOGLOBIN GLYCOSYLATED A1C: CPT

## 2022-05-02 PROCEDURE — 82728 ASSAY OF FERRITIN: CPT

## 2022-05-02 PROCEDURE — 80061 LIPID PANEL: CPT

## 2022-05-02 PROCEDURE — 82570 ASSAY OF URINE CREATININE: CPT

## 2022-05-04 ENCOUNTER — OFFICE VISIT (OUTPATIENT)
Dept: FAMILY MEDICINE CLINIC | Facility: CLINIC | Age: 42
End: 2022-05-04

## 2022-05-04 VITALS
OXYGEN SATURATION: 99 % | DIASTOLIC BLOOD PRESSURE: 58 MMHG | HEART RATE: 63 BPM | HEIGHT: 67 IN | BODY MASS INDEX: 34.53 KG/M2 | TEMPERATURE: 98.3 F | SYSTOLIC BLOOD PRESSURE: 108 MMHG | WEIGHT: 220 LBS

## 2022-05-04 DIAGNOSIS — F51.01 PRIMARY INSOMNIA: ICD-10-CM

## 2022-05-04 DIAGNOSIS — E66.9 OBESITY, CLASS I, BMI 30-34.9: ICD-10-CM

## 2022-05-04 DIAGNOSIS — E66.9 DIABETES MELLITUS TYPE 2 IN OBESE: ICD-10-CM

## 2022-05-04 DIAGNOSIS — E11.69 DIABETES MELLITUS TYPE 2 IN OBESE: ICD-10-CM

## 2022-05-04 DIAGNOSIS — F41.9 ANXIETY: ICD-10-CM

## 2022-05-04 DIAGNOSIS — R12 HEARTBURN: ICD-10-CM

## 2022-05-04 DIAGNOSIS — E78.2 MIXED HYPERLIPIDEMIA: ICD-10-CM

## 2022-05-04 DIAGNOSIS — L40.9 PSORIASIS: ICD-10-CM

## 2022-05-04 DIAGNOSIS — F33.0 MILD EPISODE OF RECURRENT MAJOR DEPRESSIVE DISORDER: ICD-10-CM

## 2022-05-04 DIAGNOSIS — I10 ESSENTIAL HYPERTENSION: ICD-10-CM

## 2022-05-04 DIAGNOSIS — J30.2 SEASONAL ALLERGIES: ICD-10-CM

## 2022-05-04 DIAGNOSIS — Z98.84 S/P LAPAROSCOPIC SLEEVE GASTRECTOMY: ICD-10-CM

## 2022-05-04 PROCEDURE — 99214 OFFICE O/P EST MOD 30 MIN: CPT | Performed by: NURSE PRACTITIONER

## 2022-05-04 RX ORDER — FLUTICASONE PROPIONATE 50 MCG
2 SPRAY, SUSPENSION (ML) NASAL DAILY
Qty: 48 G | Refills: 1 | Status: SHIPPED | OUTPATIENT
Start: 2022-05-04

## 2022-05-04 RX ORDER — TRAZODONE HYDROCHLORIDE 50 MG/1
50 TABLET ORAL NIGHTLY
Qty: 90 TABLET | Refills: 0 | Status: SHIPPED | OUTPATIENT
Start: 2022-05-04 | End: 2022-11-04 | Stop reason: SDUPTHER

## 2022-05-04 RX ORDER — LISINOPRIL 2.5 MG/1
2.5 TABLET ORAL DAILY
Qty: 90 TABLET | Refills: 1 | Status: SHIPPED | OUTPATIENT
Start: 2022-05-04 | End: 2022-11-04 | Stop reason: SDUPTHER

## 2022-05-04 RX ORDER — BUPROPION HYDROCHLORIDE 300 MG/1
300 TABLET ORAL DAILY
Qty: 90 TABLET | Refills: 1 | Status: SHIPPED | OUTPATIENT
Start: 2022-05-04 | End: 2022-08-01

## 2022-05-04 RX ORDER — PRAVASTATIN SODIUM 10 MG
10 TABLET ORAL NIGHTLY
Qty: 90 TABLET | Refills: 1 | Status: SHIPPED | OUTPATIENT
Start: 2022-05-04 | End: 2023-01-22 | Stop reason: SDUPTHER

## 2022-05-12 DIAGNOSIS — N28.89 RIGHT RENAL MASS: Primary | ICD-10-CM

## 2022-06-06 ENCOUNTER — HOSPITAL ENCOUNTER (OUTPATIENT)
Dept: CT IMAGING | Facility: HOSPITAL | Age: 42
Discharge: HOME | End: 2022-06-06
Admitting: UROLOGY

## 2022-06-06 DIAGNOSIS — N28.89 RIGHT RENAL MASS: ICD-10-CM

## 2022-06-06 LAB
CREAT BLDA-MCNC: 1.1 MG/DL
EGFRCR SERPLBLD CKD-EPI 2021: 64.1 ML/MIN/1.73

## 2022-06-06 PROCEDURE — 74178 CT ABD&PLV WO CNTR FLWD CNTR: CPT

## 2022-06-06 PROCEDURE — 82565 ASSAY OF CREATININE: CPT

## 2022-06-06 PROCEDURE — 0 IOPAMIDOL PER 1 ML: Performed by: UROLOGY

## 2022-06-06 RX ADMIN — IOPAMIDOL 100 ML: 755 INJECTION, SOLUTION INTRAVENOUS at 15:08

## 2022-06-08 RX ORDER — TOPIRAMATE 50 MG/1
TABLET, FILM COATED ORAL
Qty: 4 TABLET | Refills: 0 | Status: SHIPPED | OUTPATIENT
Start: 2022-06-08 | End: 2022-06-16 | Stop reason: SDUPTHER

## 2022-06-14 ENCOUNTER — OFFICE VISIT (OUTPATIENT)
Dept: BARIATRICS/WEIGHT MGMT | Facility: CLINIC | Age: 42
End: 2022-06-14

## 2022-06-14 VITALS
RESPIRATION RATE: 18 BRPM | HEART RATE: 74 BPM | HEIGHT: 67 IN | BODY MASS INDEX: 34.84 KG/M2 | DIASTOLIC BLOOD PRESSURE: 78 MMHG | TEMPERATURE: 98.4 F | SYSTOLIC BLOOD PRESSURE: 137 MMHG | WEIGHT: 222 LBS

## 2022-06-14 DIAGNOSIS — E66.9 DIABETES MELLITUS TYPE 2 IN OBESE: ICD-10-CM

## 2022-06-14 DIAGNOSIS — E66.9 OBESITY, CLASS I, BMI 30-34.9: Primary | ICD-10-CM

## 2022-06-14 DIAGNOSIS — E11.69 DIABETES MELLITUS TYPE 2 IN OBESE: ICD-10-CM

## 2022-06-14 DIAGNOSIS — I10 ESSENTIAL HYPERTENSION: ICD-10-CM

## 2022-06-14 DIAGNOSIS — K21.9 GASTROESOPHAGEAL REFLUX DISEASE WITHOUT ESOPHAGITIS: ICD-10-CM

## 2022-06-14 PROCEDURE — 99213 OFFICE O/P EST LOW 20 MIN: CPT | Performed by: NURSE PRACTITIONER

## 2022-06-14 RX ORDER — CETIRIZINE HYDROCHLORIDE 10 MG/1
10 TABLET ORAL
COMMUNITY
Start: 2022-05-31

## 2022-06-14 NOTE — PROGRESS NOTES
[facility]IATRIC SURGERY  [address]  [facility] [id number] [phone]  [address] [facility]  [facility] [id number] [phone]  Dept: [phone]  6/14/2022      Bianca Boles.  [id number]  [id number]  1980  female      Chief Complaint   Patient presents with   • Follow-up     1.3 year sleeve       BH Post-Op Bariatric Surgery:   Bianca Boles is status post Laparoscopic Sleeve procedure, performed on 12/7/2020     HPI:   Today's weight is 101 kg (222 lb) pounds, today's BMI is Body mass index is 34.28 kg/m².,@ has a  gain of 13 pounds since the last visit and@ weight loss since surgery is 79 pounds. The patient reports a decreased portion size and loss of appetite.      Bianca Boles denies nausea, vomiting, reflux and reports that she has been going to the gym more and doing more strength training.      Diet and Exercise: Diet history reviewed and discussed with the patient. Weight loss/gains to date discussed with the patient. The patient states they are eating  grams of protein per day. She reports eating 3 meals per day, a typical portion size of 1/2 cup, eating 1-2 snacks per day, drinking 5-6 or more 8-oz. glasses of water per day, no carbonated beverage consumption and exercising regularly- cardio and strength training 3-4 days per week. She will warm up on the bike, will do arms one days and legs another day. She is lifting for around 40 minutes and getting around 15 minutes on the treadmill. She does feel more energetic.     She hasn't noted a change in her portions or intake but has been seeing more sugar cravings. She has been trying to get more fiber from produce. She has gotten away from snacking on chips and is doing more celery with cream cheese in the evenings.     Supplements: bariatric pal MTV with iron and calcium, additional iron and vitamin C.     Review of Systems   Constitutional: Positive for  appetite change. Negative for fatigue and unexpected weight change.   HENT: Negative.    Eyes: Negative.    Respiratory: Negative.    Cardiovascular: Negative.  Negative for leg swelling.   Gastrointestinal: Positive for constipation and diarrhea. Negative for abdominal distention, abdominal pain, nausea and vomiting.   Genitourinary: Negative for difficulty urinating, frequency and urgency.   Musculoskeletal: Negative for back pain.   Skin: Negative.    Psychiatric/Behavioral: Negative.    All other systems reviewed and are negative.      Patient Active Problem List   Diagnosis   • Chronic fatigue   • Essential hypertension   • Hyperlipidemia   • Heartburn   • Multiple joint pain   • Depression   • Diabetes mellitus type 2 in obese (HCC)   • Gastroparesis   • Gastroesophageal reflux disease   • Gastric polyps   • S/P laparoscopic sleeve gastrectomy   • Obesity, Class I, BMI 30-34.9   • Anxiety   • Urinary tract infection with hematuria   • Leukocytosis   • Insomnia   • Right ureteral stone   • Kidney stone   • Hematuria   • Acute cystitis with hematuria   • Right renal mass   • Seasonal allergies   • Urinary urgency   • Renal cell carcinoma of right kidney (HCC)   • Psoriasis       Past Medical History:   Diagnosis Date   • Acid reflux    • Allergic rhinitis due to allergen 06/09/2016   • Anxiety    • Anxiety and depression    • Benign essential hypertension    • Cancer (HCC)     renal cancer/mass   • Diabetes (HCC)    • Diabetes mellitus (HCC)     type ii, doesn't check bg at home    • Diabetes mellitus, type 2 (HCC)    • Gastroparesis    • GERD (gastroesophageal reflux disease)    • High triglycerides    • History of bariatric surgery 02/04/2021   • History of kidney stones    • HTN (hypertension)    • Hyperlipemia    • Hyperlipidemia    • Hypertension    • Hypertriglyceridemia    • Kidney stone    • Lumbar herniated disc    • Obesity    • Panic attacks    • PCOS (polycystic ovarian syndrome)    • Psoriasis      ELBOWS   • Seasonal allergies    • Spinal headache     AFTER PAIN EPIDURALS.  NO BLOOD PATCH       The following portions of the patient's history were reviewed and updated as appropriate: allergies, current medications, past family history, past medical history, past social history, past surgical history and problem list.    Vitals:    06/14/22 1134   BP: 137/78   Pulse: 74   Resp: 18   Temp: 98.4 °F (36.9 °C)       Physical Exam  Vitals and nursing note reviewed.   Constitutional:       Appearance: She is well-developed.   Neck:      Thyroid: No thyromegaly.   Cardiovascular:      Rate and Rhythm: Normal rate.   Pulmonary:      Effort: Pulmonary effort is normal. No respiratory distress.   Abdominal:      Palpations: Abdomen is soft.   Musculoskeletal:         General: No tenderness.   Skin:     General: Skin is warm and dry.   Neurological:      Mental Status: She is alert.   Psychiatric:         Behavior: Behavior normal.         Assessment:   Post-op, the patient is doing well.     Encounter Diagnoses   Name Primary?   • Obesity, Class I, BMI 30-34.9 Yes   • Diabetes mellitus type 2 in obese (HCC)    • Essential hypertension    • Gastroesophageal reflux disease without esophagitis        Plan:     Encouraged patient to be sure to get plenty of lean protein per day through small frequent meals all with a protein source.   Activity restrictions: none.   Recommended patient be sure to get at least 70 grams of protein per day by eating small, frequent meals all with high lean protein choices. Be sure to limit/cut back on daily carbohydrate intake. Discussed with the patient the recommended amount of water per day to intake- half of body weight in ounces. Reviewed vitamin requirements. Be sure to do routine exercise, 150 minutes per week minimum, including both cardio and strength training.     Instructions / Recommendations: dietary counseling recommended, recommended a daily protein intake of  grams, vitamin  supplement(s) recommended, recommended exercising at least 150 minutes per week, behavior modifications recommended and instructed to call the office for concerns, questions, or problems.     The patient was instructed to follow up in 3 months .   Total time spent during this encounter today was 25 minutes

## 2022-06-15 RX ORDER — TOPIRAMATE 50 MG/1
TABLET, FILM COATED ORAL
Qty: 90 TABLET | Refills: 0 | OUTPATIENT
Start: 2022-06-15

## 2022-06-16 RX ORDER — TOPIRAMATE 50 MG/1
50 TABLET, FILM COATED ORAL
Qty: 90 TABLET | Refills: 0 | Status: SHIPPED | OUTPATIENT
Start: 2022-06-16 | End: 2022-09-08 | Stop reason: SDUPTHER

## 2022-06-20 ENCOUNTER — TELEPHONE (OUTPATIENT)
Dept: UROLOGY | Facility: CLINIC | Age: 42
End: 2022-06-20

## 2022-06-20 NOTE — TELEPHONE ENCOUNTER
Patient has had her CT done now, and is asking to get worked in for appointment.  Her appointment was cancelled in May, because she hadn't had the CT done.

## 2022-06-22 NOTE — TELEPHONE ENCOUNTER
Dr. Troy called patient and left her a message. Patient can follow up in a year or can come in if she wants too.

## 2022-08-01 RX ORDER — BUPROPION HYDROCHLORIDE 300 MG/1
TABLET ORAL
Qty: 90 TABLET | Refills: 0 | Status: SHIPPED | OUTPATIENT
Start: 2022-08-01 | End: 2022-11-04 | Stop reason: SDUPTHER

## 2022-09-08 RX ORDER — TOPIRAMATE 50 MG/1
50 TABLET, FILM COATED ORAL
Qty: 90 TABLET | Refills: 0 | Status: SHIPPED | OUTPATIENT
Start: 2022-09-08 | End: 2022-12-13 | Stop reason: SDUPTHER

## 2022-09-09 PROCEDURE — U0004 COV-19 TEST NON-CDC HGH THRU: HCPCS | Performed by: NURSE PRACTITIONER

## 2022-09-10 ENCOUNTER — TELEPHONE (OUTPATIENT)
Dept: URGENT CARE | Facility: CLINIC | Age: 42
End: 2022-09-10

## 2022-09-11 ENCOUNTER — TELEPHONE (OUTPATIENT)
Dept: URGENT CARE | Facility: CLINIC | Age: 42
End: 2022-09-11

## 2022-09-11 NOTE — TELEPHONE ENCOUNTER
Called patient, results reviewed, patient states that she just feels like she has a bad sinus infection, continue quarantine and supportive care, follow-up as needed or symptoms worsen or persist, patient verbalized understanding.

## 2022-09-12 RX ORDER — TOPIRAMATE 50 MG/1
TABLET, FILM COATED ORAL
Qty: 90 TABLET | Refills: 0 | OUTPATIENT
Start: 2022-09-12

## 2022-09-29 ENCOUNTER — OFFICE VISIT (OUTPATIENT)
Dept: FAMILY MEDICINE CLINIC | Facility: CLINIC | Age: 42
End: 2022-09-29

## 2022-09-29 VITALS
DIASTOLIC BLOOD PRESSURE: 63 MMHG | BODY MASS INDEX: 34.37 KG/M2 | WEIGHT: 219 LBS | HEART RATE: 63 BPM | OXYGEN SATURATION: 100 % | TEMPERATURE: 97.3 F | SYSTOLIC BLOOD PRESSURE: 103 MMHG | HEIGHT: 67 IN

## 2022-09-29 DIAGNOSIS — E11.69 DIABETES MELLITUS TYPE 2 IN OBESE: ICD-10-CM

## 2022-09-29 DIAGNOSIS — R00.2 PALPITATION: ICD-10-CM

## 2022-09-29 DIAGNOSIS — E66.9 DIABETES MELLITUS TYPE 2 IN OBESE: ICD-10-CM

## 2022-09-29 DIAGNOSIS — U09.9 POST-COVID SYNDROME: ICD-10-CM

## 2022-09-29 DIAGNOSIS — I10 ESSENTIAL HYPERTENSION: ICD-10-CM

## 2022-09-29 LAB
T3FREE SERPL-MCNC: 3.54 PG/ML (ref 2–4.4)
T4 FREE SERPL-MCNC: 1.21 NG/DL (ref 0.93–1.7)
TSH SERPL DL<=0.05 MIU/L-ACNC: 2.36 UIU/ML (ref 0.27–4.2)

## 2022-09-29 PROCEDURE — 84481 FREE ASSAY (FT-3): CPT | Performed by: NURSE PRACTITIONER

## 2022-09-29 PROCEDURE — 93000 ELECTROCARDIOGRAM COMPLETE: CPT | Performed by: NURSE PRACTITIONER

## 2022-09-29 PROCEDURE — 84443 ASSAY THYROID STIM HORMONE: CPT | Performed by: NURSE PRACTITIONER

## 2022-09-29 PROCEDURE — 84439 ASSAY OF FREE THYROXINE: CPT | Performed by: NURSE PRACTITIONER

## 2022-09-29 PROCEDURE — 99213 OFFICE O/P EST LOW 20 MIN: CPT | Performed by: NURSE PRACTITIONER

## 2022-09-29 NOTE — PROGRESS NOTES
ACUTE VISIT     Patient Name: Bianca Boles  : 1980   MRN: [id number]     Chief Complaint:    Chief Complaint   Patient presents with   • Rapid Heart Rate       History of Present Illness: Bianca Boles is a 42 y.o. female who is here today for post covid elevated heart rate.    Patient states she will be standing still or sitting and she can feel her heart racing and she will monitor it and it will be in the 100's.   Also feeling palpitations, catching her breath   She states this didn't happen before she had covid (tested positive 22), but happened during covid and afterwards.  Dx covid 2022        Subjective      Review of Systems:   Review of Systems   Constitutional: Negative for fever.   Respiratory: Negative for shortness of breath.    Cardiovascular: Positive for palpitations. Negative for chest pain.   Neurological: Negative for dizziness and headaches.        Past Medical History:   Past Medical History:   Diagnosis Date   • Acid reflux    • Allergic rhinitis due to allergen 2016   • Anxiety    • Anxiety and depression    • Benign essential hypertension    • Cancer (HCC)     renal cancer/mass   • Diabetes (HCC)    • Diabetes mellitus (HCC)     type ii, doesn't check bg at home    • Diabetes mellitus, type 2 (HCC)    • Gastroparesis    • GERD (gastroesophageal reflux disease)    • High triglycerides    • History of bariatric surgery 2021   • History of kidney stones    • HTN (hypertension)    • Hyperlipemia    • Hyperlipidemia    • Hypertension    • Hypertriglyceridemia    • Kidney stone    • Lumbar herniated disc    • Obesity    • Panic attacks    • PCOS (polycystic ovarian syndrome)    • Psoriasis     ELBOWS   • Seasonal allergies    • Spinal headache     AFTER PAIN EPIDURALS.  NO BLOOD PATCH       Past Surgical History:   Past Surgical History:   Procedure Laterality Date   • ABDOMINAL SURGERY     • ADENOIDECTOMY     •  SECTION  ,   •  CYSTOSCOPY BLADDER STONE LITHOTRIPSY     • EAR TUBES     • ENDOSCOPY N/A 10/20/2020    Procedure: ESOPHAGOGASTRODUODENOSCOPY WITH BIOPSY;  Surgeon: Fidel Grajeda Jr., MD;  Location: [facility] ENDOSCOPY;  Service: General;  Laterality: N/A;  PRE- GERD  POST- RETAINED FOOD, GASTRITIS, GASTRIC POLYPS   • ENDOSCOPY     • FOOT FRACTURE SURGERY Left 2017    screw placed   • FRACTURE SURGERY     • GASTRECTOMY     • GASTRIC SLEEVE LAPAROSCOPIC N/A 2020    Procedure: GASTRIC SLEEVE LAPAROSCOPIC;  Surgeon: Fidel Grajeda Jr., MD;  Location: [facility] OR [facility];  Service: Bariatric;  Laterality: N/A;   • KIDNEY SURGERY     • NEPHRECTOMY PARTIAL Right 11/15/2021    Procedure: NEPHRECTOMY PARTIAL LAPAROSCOPIC WITH DAVINCI ROBOT;  Surgeon: Lori Troy MD;  Location: [facility] OR;  Service: Robotics - DaVinci;  Laterality: Right;   • TONSILLECTOMY     • URETEROSCOPY LASER LITHOTRIPSY WITH STENT INSERTION Right 2021    Procedure: CYSTOSCOPY, RIGHT URETEROSCOPY, LASERTRIPSY, STONE BASKET EXTRACTION AND STENT INSERTION;  Surgeon: Lori Troy MD;  Location: [facility] OR;  Service: Urology;  Laterality: Right;       Family History:   Family History   Problem Relation Age of Onset   • Diabetes Father    • Hypertension Father    • Heart disease Father    • Cancer Father         BLADDER   • Colon cancer Father         malignant, currently 62   • Stroke Maternal Grandmother    • Heart disease Maternal Grandmother    • Diabetes Paternal Grandfather    • Heart disease Paternal Grandfather    • Malig Hyperthermia Neg Hx        Social History:   Social History     Socioeconomic History   • Marital status:    Tobacco Use   • Smoking status: Former Smoker     Years: 35.00     Types: Cigarettes     Quit date: 1/15/2020     Years since quittin.7   • Smokeless tobacco: Never Used   • Tobacco comment: A PACK A WEEK   Vaping Use   • Vaping Use: Never used   Substance and Sexual Activity   • Alcohol  use: Yes     Comment: RARELY   • Drug use: Never   • Sexual activity: Defer       Medications:     Current Outpatient Medications:   •  Apremilast (Otezla) 30 MG tablet, take 1 tablet by mouth twice a day, Disp: 60 tablet, Rfl: 3  •  Biotin 60195 MCG tablet, Take 1 tablet by mouth Every Night., Disp: , Rfl:   •  buPROPion XL (WELLBUTRIN XL) 300 MG 24 hr tablet, Take 1 tablet by mouth once daily, Disp: 90 tablet, Rfl: 0  •  CALCIUM-MAGNESIUM-ZINC PO, Take 3 tablets by mouth Daily., Disp: , Rfl:   •  cetirizine (zyrTEC) 10 MG tablet, Take 10 mg by mouth every night at bedtime., Disp: , Rfl:   •  Cholecalciferol 25 MCG (1000 UT) capsule, Take 2 capsules by mouth Daily., Disp: 240 capsule, Rfl: 0  •  clobetasol (TEMOVATE) 0.05 % external solution, Apply 10-15 drops every day to affected areas in the scalp after shampooing until clear., Disp: 50 mL, Rfl: 3  •  COLLAGEN PO, Take 3 tablets by mouth Daily., Disp: , Rfl:   •  EluRyng 0.12-0.015 MG/24HR vaginal ring, Insert 1 each into the vagina Every 30 (Thirty) Days as directed, Disp: 3 each, Rfl: 0  •  fluticasone (FLONASE) 50 MCG/ACT nasal spray, USE 2 SPRAYS IN EACH NOSTRIL ONCE DAILY AS DIRECTED, Disp: 48 g, Rfl: 1  •  IRON-VITAMIN C PO, Take 1 tablet by mouth Daily., Disp: , Rfl:   •  lisinopril (PRINIVIL,ZESTRIL) 2.5 MG tablet, Take 1 tablet by mouth Daily., Disp: 90 tablet, Rfl: 1  •  metFORMIN (GLUCOPHAGE) 1000 MG tablet, TAKE 1 TABLET BY MOUTH TWICE DAILY WITH MEALS, Disp: 180 tablet, Rfl: 0  •  Multiple Vitamins-Minerals (MULTIVITAMIN PO), Take 1 tablet by mouth Every Night., Disp: , Rfl:   •  pravastatin (PRAVACHOL) 10 MG tablet, Take 1 tablet by mouth Every Night., Disp: 90 tablet, Rfl: 1  •  Probiotic Product (PROBIOTIC-10 PO), Take 1 tablet by mouth Every Night., Disp: , Rfl:   •  topiramate (TOPAMAX) 50 MG tablet, Take 1 tablet by mouth every night at bedtime for 90 days., Disp: 90 tablet, Rfl: 0  •  traZODone (DESYREL) 50 MG tablet, Take 1 tablet by mouth  "Every Night., Disp: 90 tablet, Rfl: 0    Allergies:   Allergies   Allergen Reactions   • Morphine Itching and Nausea And Vomiting         Objective     Physical Exam:  Vital Signs:   Vitals:    09/29/22 0755   BP: 103/63   Pulse: 63   Temp: 97.3 °F (36.3 °C)   SpO2: 100%   Weight: 99.3 kg (219 lb)   Height: 170.2 cm (67\")     Body mass index is 34.3 kg/m².     Physical Exam  Neck:      Vascular: No carotid bruit.   Cardiovascular:      Rate and Rhythm: Normal rate and regular rhythm.      Heart sounds: Normal heart sounds. No murmur heard.  Pulmonary:      Effort: Pulmonary effort is normal.   Musculoskeletal:      Right lower leg: No edema.      Left lower leg: No edema.   Skin:     General: Skin is warm and dry.   Neurological:      Mental Status: She is alert.           ECG 12 Lead    Date/Time: 9/29/2022 1:30 PM  Performed by: Patricia Hernandez APRN  Authorized by: Patricia Hernandez APRN   Rhythm: sinus rhythm  Conduction: conduction normal  ST Segments: ST segments normal  T Waves: T waves normal    Clinical impression: normal ECG             Assessment / Plan      Assessment/Plan:   Diagnoses and all orders for this visit:    1. Diabetes mellitus type 2 in obese (HCC)    2. Essential hypertension    3. Palpitation  -     Holter Monitor - 48 Hour; Future  -     TSH  -     T3, Free  -     T4, Free  -     ECG 12 Lead    4. Post-COVID syndrome       Diabetes mellitus type 2 currently controlled we will reassess at follow-up in 6 weeks  Hypertension currently controlled at this time on lisinopril   Palpitations will obtain labs Holter monitor EKG in office normal feel this is related to post-COVID syndrome we will call with results of labs and Holter monitor and further recommendations      Follow Up:   Return in about 6 weeks (around 11/10/2022).    CARLTON Solis      Please note that portions of this note were completed with a voice recognition program.  "

## 2022-10-07 ENCOUNTER — HOSPITAL ENCOUNTER (OUTPATIENT)
Dept: CARDIOLOGY | Facility: HOSPITAL | Age: 42
Discharge: HOME | End: 2022-10-07
Admitting: NURSE PRACTITIONER

## 2022-10-07 DIAGNOSIS — R00.2 PALPITATION: ICD-10-CM

## 2022-10-07 PROCEDURE — 93225 XTRNL ECG REC<48 HRS REC: CPT

## 2022-10-19 LAB
MAXIMAL PREDICTED HEART RATE: 177 BPM
STRESS TARGET HR: 150 BPM

## 2022-10-21 ENCOUNTER — TELEPHONE (OUTPATIENT)
Dept: FAMILY MEDICINE CLINIC | Facility: CLINIC | Age: 42
End: 2022-10-21

## 2022-10-21 RX ORDER — METOPROLOL SUCCINATE 25 MG/1
25 TABLET, EXTENDED RELEASE ORAL NIGHTLY
Qty: 90 TABLET | Refills: 1 | Status: SHIPPED | OUTPATIENT
Start: 2022-10-21 | End: 2022-11-04 | Stop reason: SDUPTHER

## 2022-10-21 NOTE — TELEPHONE ENCOUNTER
----- Message from CARLTON Santa sent at 10/20/2022 12:54 PM EDT -----  Basic rhythm was sinus with sinus tachycardia and sinus bradycardia.  There were frequent PVCs and occasional PACs.  No episodes of atrial flutter fibrillation, ventricular or supraventricular cardia, or pauses.    May start toprol xl 25 mg nightly if she is still having palpitation

## 2022-10-21 NOTE — TELEPHONE ENCOUNTER
----- Message from CARLTON Santa sent at 10/21/2022 11:44 AM EDT -----  We will start Toprol-XL 25 mg nightly heart rate blood pressure check in 2 weeks prescription sent to pharmacy  ----- Message -----  From: Debbie Mccall  Sent: 10/21/2022  11:22 AM EDT  To: CARLTON Santa    Notified patient of results, patient stated she is still having palpitations some not as frequent

## 2022-11-01 ENCOUNTER — LAB (OUTPATIENT)
Dept: LAB | Facility: HOSPITAL | Age: 42
End: 2022-11-01

## 2022-11-01 DIAGNOSIS — E11.69 DIABETES MELLITUS TYPE 2 IN OBESE: ICD-10-CM

## 2022-11-01 DIAGNOSIS — Z98.84 S/P LAPAROSCOPIC SLEEVE GASTRECTOMY: ICD-10-CM

## 2022-11-01 DIAGNOSIS — E78.2 MIXED HYPERLIPIDEMIA: ICD-10-CM

## 2022-11-01 DIAGNOSIS — E66.9 DIABETES MELLITUS TYPE 2 IN OBESE: ICD-10-CM

## 2022-11-01 LAB
ALBUMIN SERPL-MCNC: 4 G/DL (ref 3.5–5.2)
ALBUMIN UR-MCNC: <1.2 MG/DL
ALBUMIN/GLOB SERPL: 1.6 G/DL
ALP SERPL-CCNC: 67 U/L (ref 39–117)
ALT SERPL W P-5'-P-CCNC: 18 U/L (ref 1–33)
ANION GAP SERPL CALCULATED.3IONS-SCNC: 11 MMOL/L (ref 5–15)
AST SERPL-CCNC: 22 U/L (ref 1–32)
BASOPHILS # BLD AUTO: 0.05 10*3/MM3 (ref 0–0.2)
BASOPHILS NFR BLD AUTO: 0.5 % (ref 0–1.5)
BILIRUB SERPL-MCNC: 0.2 MG/DL (ref 0–1.2)
BILIRUB UR QL STRIP: NEGATIVE
BUN SERPL-MCNC: 17 MG/DL (ref 6–20)
BUN/CREAT SERPL: 16.3 (ref 7–25)
CALCIUM SPEC-SCNC: 9.1 MG/DL (ref 8.6–10.5)
CHLORIDE SERPL-SCNC: 105 MMOL/L (ref 98–107)
CHOLEST SERPL-MCNC: 130 MG/DL (ref 0–200)
CLARITY UR: ABNORMAL
CO2 SERPL-SCNC: 24 MMOL/L (ref 22–29)
COLOR UR: YELLOW
CREAT SERPL-MCNC: 1.04 MG/DL (ref 0.57–1)
CREAT UR-MCNC: 104.2 MG/DL
DEPRECATED RDW RBC AUTO: 39.7 FL (ref 37–54)
EGFRCR SERPLBLD CKD-EPI 2021: 68.5 ML/MIN/1.73
EOSINOPHIL # BLD AUTO: 0.23 10*3/MM3 (ref 0–0.4)
EOSINOPHIL NFR BLD AUTO: 2.3 % (ref 0.3–6.2)
ERYTHROCYTE [DISTWIDTH] IN BLOOD BY AUTOMATED COUNT: 13.1 % (ref 12.3–15.4)
GLOBULIN UR ELPH-MCNC: 2.5 GM/DL
GLUCOSE SERPL-MCNC: 94 MG/DL (ref 65–99)
GLUCOSE UR STRIP-MCNC: NEGATIVE MG/DL
HBA1C MFR BLD: 5.3 % (ref 4.8–5.6)
HCT VFR BLD AUTO: 34.9 % (ref 34–46.6)
HDLC SERPL-MCNC: 52 MG/DL (ref 40–60)
HGB BLD-MCNC: 11.8 G/DL (ref 12–15.9)
HGB UR QL STRIP.AUTO: NEGATIVE
IMM GRANULOCYTES # BLD AUTO: 0.06 10*3/MM3 (ref 0–0.05)
IMM GRANULOCYTES NFR BLD AUTO: 0.6 % (ref 0–0.5)
KETONES UR QL STRIP: NEGATIVE
LDLC SERPL CALC-MCNC: 48 MG/DL (ref 0–100)
LDLC/HDLC SERPL: 0.79 {RATIO}
LEUKOCYTE ESTERASE UR QL STRIP.AUTO: NEGATIVE
LYMPHOCYTES # BLD AUTO: 2.74 10*3/MM3 (ref 0.7–3.1)
LYMPHOCYTES NFR BLD AUTO: 27 % (ref 19.6–45.3)
MCH RBC QN AUTO: 28 PG (ref 26.6–33)
MCHC RBC AUTO-ENTMCNC: 33.8 G/DL (ref 31.5–35.7)
MCV RBC AUTO: 82.9 FL (ref 79–97)
MICROALBUMIN/CREAT UR: NORMAL MG/G{CREAT}
MONOCYTES # BLD AUTO: 0.54 10*3/MM3 (ref 0.1–0.9)
MONOCYTES NFR BLD AUTO: 5.3 % (ref 5–12)
NEUTROPHILS NFR BLD AUTO: 6.54 10*3/MM3 (ref 1.7–7)
NEUTROPHILS NFR BLD AUTO: 64.3 % (ref 42.7–76)
NITRITE UR QL STRIP: NEGATIVE
NRBC BLD AUTO-RTO: 0 /100 WBC (ref 0–0.2)
PH UR STRIP.AUTO: 7.5 [PH] (ref 5–8)
PLATELET # BLD AUTO: 232 10*3/MM3 (ref 140–450)
PMV BLD AUTO: 10.6 FL (ref 6–12)
POTASSIUM SERPL-SCNC: 4.2 MMOL/L (ref 3.5–5.2)
PROT SERPL-MCNC: 6.5 G/DL (ref 6–8.5)
PROT UR QL STRIP: NEGATIVE
RBC # BLD AUTO: 4.21 10*6/MM3 (ref 3.77–5.28)
SODIUM SERPL-SCNC: 140 MMOL/L (ref 136–145)
SP GR UR STRIP: 1.02 (ref 1–1.03)
TRIGL SERPL-MCNC: 184 MG/DL (ref 0–150)
TSH SERPL DL<=0.05 MIU/L-ACNC: 1.72 UIU/ML (ref 0.27–4.2)
UROBILINOGEN UR QL STRIP: ABNORMAL
VIT B12 BLD-MCNC: 1766 PG/ML (ref 211–946)
VLDLC SERPL-MCNC: 30 MG/DL (ref 5–40)
WBC NRBC COR # BLD: 10.16 10*3/MM3 (ref 3.4–10.8)

## 2022-11-01 PROCEDURE — 82043 UR ALBUMIN QUANTITATIVE: CPT

## 2022-11-01 PROCEDURE — 82570 ASSAY OF URINE CREATININE: CPT

## 2022-11-01 PROCEDURE — 80061 LIPID PANEL: CPT

## 2022-11-01 PROCEDURE — 83036 HEMOGLOBIN GLYCOSYLATED A1C: CPT

## 2022-11-01 PROCEDURE — 81003 URINALYSIS AUTO W/O SCOPE: CPT

## 2022-11-01 PROCEDURE — 82607 VITAMIN B-12: CPT

## 2022-11-01 PROCEDURE — 80050 GENERAL HEALTH PANEL: CPT

## 2022-11-03 ENCOUNTER — HOSPITAL ENCOUNTER (EMERGENCY)
Facility: HOSPITAL | Age: 42
Discharge: HOME | End: 2022-11-03
Attending: EMERGENCY MEDICINE | Admitting: EMERGENCY MEDICINE

## 2022-11-03 ENCOUNTER — APPOINTMENT (OUTPATIENT)
Dept: CT IMAGING | Facility: HOSPITAL | Age: 42
End: 2022-11-03

## 2022-11-03 VITALS
BODY MASS INDEX: 34.74 KG/M2 | SYSTOLIC BLOOD PRESSURE: 104 MMHG | OXYGEN SATURATION: 98 % | HEART RATE: 56 BPM | TEMPERATURE: 98.1 F | DIASTOLIC BLOOD PRESSURE: 63 MMHG | RESPIRATION RATE: 20 BRPM | WEIGHT: 221.78 LBS

## 2022-11-03 DIAGNOSIS — N20.0 LEFT NEPHROLITHIASIS: Primary | ICD-10-CM

## 2022-11-03 DIAGNOSIS — N13.30 HYDRONEPHROSIS OF LEFT KIDNEY: ICD-10-CM

## 2022-11-03 LAB
ALBUMIN SERPL-MCNC: 3.8 G/DL (ref 3.5–5.2)
ALBUMIN/GLOB SERPL: 1.2 G/DL
ALP SERPL-CCNC: 73 U/L (ref 39–117)
ALT SERPL W P-5'-P-CCNC: 18 U/L (ref 1–33)
ANION GAP SERPL CALCULATED.3IONS-SCNC: 11.4 MMOL/L (ref 5–15)
AST SERPL-CCNC: 17 U/L (ref 1–32)
BACTERIA UR QL AUTO: ABNORMAL /HPF
BASOPHILS # BLD AUTO: 0.04 10*3/MM3 (ref 0–0.2)
BASOPHILS NFR BLD AUTO: 0.4 % (ref 0–1.5)
BILIRUB SERPL-MCNC: <0.2 MG/DL (ref 0–1.2)
BILIRUB UR QL STRIP: NEGATIVE
BUN SERPL-MCNC: 21 MG/DL (ref 6–20)
BUN/CREAT SERPL: 19.8 (ref 7–25)
CALCIUM SPEC-SCNC: 9.6 MG/DL (ref 8.6–10.5)
CHLORIDE SERPL-SCNC: 107 MMOL/L (ref 98–107)
CLARITY UR: ABNORMAL
CO2 SERPL-SCNC: 22.6 MMOL/L (ref 22–29)
COD CRY URNS QL: ABNORMAL /HPF
COLOR UR: ABNORMAL
CREAT SERPL-MCNC: 1.06 MG/DL (ref 0.57–1)
DEPRECATED RDW RBC AUTO: 44.5 FL (ref 37–54)
EGFRCR SERPLBLD CKD-EPI 2021: 67 ML/MIN/1.73
EOSINOPHIL # BLD AUTO: 0.18 10*3/MM3 (ref 0–0.4)
EOSINOPHIL NFR BLD AUTO: 2 % (ref 0.3–6.2)
ERYTHROCYTE [DISTWIDTH] IN BLOOD BY AUTOMATED COUNT: 14.3 % (ref 12.3–15.4)
GLOBULIN UR ELPH-MCNC: 3.1 GM/DL
GLUCOSE SERPL-MCNC: 101 MG/DL (ref 65–99)
GLUCOSE UR STRIP-MCNC: NEGATIVE MG/DL
HCG INTACT+B SERPL-ACNC: <0.5 MIU/ML
HCT VFR BLD AUTO: 36.3 % (ref 34–46.6)
HGB BLD-MCNC: 11.9 G/DL (ref 12–15.9)
HGB UR QL STRIP.AUTO: ABNORMAL
HOLD SPECIMEN: NORMAL
HOLD SPECIMEN: NORMAL
HYALINE CASTS UR QL AUTO: ABNORMAL /LPF
IMM GRANULOCYTES # BLD AUTO: 0.05 10*3/MM3 (ref 0–0.05)
IMM GRANULOCYTES NFR BLD AUTO: 0.6 % (ref 0–0.5)
KETONES UR QL STRIP: ABNORMAL
LEUKOCYTE ESTERASE UR QL STRIP.AUTO: ABNORMAL
LIPASE SERPL-CCNC: 66 U/L (ref 13–60)
LYMPHOCYTES # BLD AUTO: 2.38 10*3/MM3 (ref 0.7–3.1)
LYMPHOCYTES NFR BLD AUTO: 26.4 % (ref 19.6–45.3)
MCH RBC QN AUTO: 28.6 PG (ref 26.6–33)
MCHC RBC AUTO-ENTMCNC: 32.8 G/DL (ref 31.5–35.7)
MCV RBC AUTO: 87.3 FL (ref 79–97)
MONOCYTES # BLD AUTO: 0.46 10*3/MM3 (ref 0.1–0.9)
MONOCYTES NFR BLD AUTO: 5.1 % (ref 5–12)
NEUTROPHILS NFR BLD AUTO: 5.89 10*3/MM3 (ref 1.7–7)
NEUTROPHILS NFR BLD AUTO: 65.5 % (ref 42.7–76)
NITRITE UR QL STRIP: NEGATIVE
NRBC BLD AUTO-RTO: 0 /100 WBC (ref 0–0.2)
PH UR STRIP.AUTO: 5.5 [PH] (ref 5–8)
PLATELET # BLD AUTO: 241 10*3/MM3 (ref 140–450)
PMV BLD AUTO: 9.9 FL (ref 6–12)
POTASSIUM SERPL-SCNC: 4.3 MMOL/L (ref 3.5–5.2)
PROT SERPL-MCNC: 6.9 G/DL (ref 6–8.5)
PROT UR QL STRIP: ABNORMAL
RBC # BLD AUTO: 4.16 10*6/MM3 (ref 3.77–5.28)
RBC # UR STRIP: ABNORMAL /HPF
REF LAB TEST METHOD: ABNORMAL
SODIUM SERPL-SCNC: 141 MMOL/L (ref 136–145)
SP GR UR STRIP: >1.03 (ref 1–1.03)
SQUAMOUS #/AREA URNS HPF: ABNORMAL /HPF
UROBILINOGEN UR QL STRIP: ABNORMAL
WBC # UR STRIP: ABNORMAL /HPF
WBC NRBC COR # BLD: 9 10*3/MM3 (ref 3.4–10.8)
WHOLE BLOOD HOLD COAG: NORMAL
WHOLE BLOOD HOLD SPECIMEN: NORMAL

## 2022-11-03 PROCEDURE — 25010000002 CEFTRIAXONE PER 250 MG

## 2022-11-03 PROCEDURE — 83690 ASSAY OF LIPASE: CPT | Performed by: EMERGENCY MEDICINE

## 2022-11-03 PROCEDURE — 87086 URINE CULTURE/COLONY COUNT: CPT

## 2022-11-03 PROCEDURE — 81001 URINALYSIS AUTO W/SCOPE: CPT | Performed by: EMERGENCY MEDICINE

## 2022-11-03 PROCEDURE — 80053 COMPREHEN METABOLIC PANEL: CPT | Performed by: EMERGENCY MEDICINE

## 2022-11-03 PROCEDURE — 99284 EMERGENCY DEPT VISIT MOD MDM: CPT

## 2022-11-03 PROCEDURE — 25010000002 KETOROLAC TROMETHAMINE PER 15 MG: Performed by: EMERGENCY MEDICINE

## 2022-11-03 PROCEDURE — 25010000002 ONDANSETRON PER 1 MG: Performed by: EMERGENCY MEDICINE

## 2022-11-03 PROCEDURE — 84702 CHORIONIC GONADOTROPIN TEST: CPT | Performed by: EMERGENCY MEDICINE

## 2022-11-03 PROCEDURE — 96365 THER/PROPH/DIAG IV INF INIT: CPT

## 2022-11-03 PROCEDURE — 36415 COLL VENOUS BLD VENIPUNCTURE: CPT

## 2022-11-03 PROCEDURE — 96375 TX/PRO/DX INJ NEW DRUG ADDON: CPT

## 2022-11-03 PROCEDURE — 85025 COMPLETE CBC W/AUTO DIFF WBC: CPT | Performed by: EMERGENCY MEDICINE

## 2022-11-03 PROCEDURE — 74176 CT ABD & PELVIS W/O CONTRAST: CPT

## 2022-11-03 RX ORDER — SODIUM CHLORIDE 0.9 % (FLUSH) 0.9 %
10 SYRINGE (ML) INJECTION AS NEEDED
Status: DISCONTINUED | OUTPATIENT
Start: 2022-11-03 | End: 2022-11-03 | Stop reason: HOSPADM

## 2022-11-03 RX ORDER — KETOROLAC TROMETHAMINE 30 MG/ML
30 INJECTION, SOLUTION INTRAMUSCULAR; INTRAVENOUS ONCE
Status: DISCONTINUED | OUTPATIENT
Start: 2022-11-03 | End: 2022-11-03

## 2022-11-03 RX ORDER — CEPHALEXIN 500 MG/1
500 CAPSULE ORAL 4 TIMES DAILY
Qty: 28 CAPSULE | Refills: 0 | Status: SHIPPED | OUTPATIENT
Start: 2022-11-03 | End: 2022-11-10

## 2022-11-03 RX ORDER — ONDANSETRON 2 MG/ML
4 INJECTION INTRAMUSCULAR; INTRAVENOUS ONCE
Status: DISCONTINUED | OUTPATIENT
Start: 2022-11-03 | End: 2022-11-03

## 2022-11-03 RX ORDER — ONDANSETRON 2 MG/ML
4 INJECTION INTRAMUSCULAR; INTRAVENOUS ONCE
Status: COMPLETED | OUTPATIENT
Start: 2022-11-03 | End: 2022-11-03

## 2022-11-03 RX ORDER — KETOROLAC TROMETHAMINE 10 MG/1
10 TABLET, FILM COATED ORAL EVERY 6 HOURS PRN
Qty: 20 TABLET | Refills: 0 | Status: SHIPPED | OUTPATIENT
Start: 2022-11-03 | End: 2022-11-13

## 2022-11-03 RX ORDER — CEFTRIAXONE SODIUM 1 G/50ML
1 INJECTION, SOLUTION INTRAVENOUS ONCE
Status: COMPLETED | OUTPATIENT
Start: 2022-11-03 | End: 2022-11-03

## 2022-11-03 RX ORDER — KETOROLAC TROMETHAMINE 15 MG/ML
15 INJECTION, SOLUTION INTRAMUSCULAR; INTRAVENOUS ONCE
Status: COMPLETED | OUTPATIENT
Start: 2022-11-03 | End: 2022-11-03

## 2022-11-03 RX ADMIN — KETOROLAC TROMETHAMINE 15 MG: 15 INJECTION, SOLUTION INTRAMUSCULAR; INTRAVENOUS at 17:21

## 2022-11-03 RX ADMIN — CEFTRIAXONE SODIUM 1 G: 1 INJECTION, SOLUTION INTRAVENOUS at 18:41

## 2022-11-03 RX ADMIN — ONDANSETRON 4 MG: 2 INJECTION INTRAMUSCULAR; INTRAVENOUS at 17:21

## 2022-11-03 NOTE — ED NOTES
Pt ambulatory to room 40 c/o left flank pain radiating to left lower back x 2 hours. Hx of kidney stones with lithotripsy in 2021.

## 2022-11-03 NOTE — ED PROVIDER NOTES
Time: 4:35 PM EDT  Chief Complaint:   Chief Complaint   Patient presents with   • Flank Pain     left           History of Present Illness:  Patient is a 42 y.o. year old female who presents to the emergency department with left flank pain.  Has been having her off for several days but today approximate 1 hour ago it worsened and is radiating down to her abdomen and groin.  Patient has history of kidney stones.  Urologist is Dr. Cruz.  Has had to have intervention in the past for kidney stones with stenting and lithotripsy.  No fevers but was having nausea and vomiting when pain was severe.  Took Tylenol without relief.  Pain is now rated 3/10. Patient states she had a right partial nephrectomy last year.            History provided by:  Patient   used: No    Flank Pain  Pain location:  L flank  Pain quality: sharp and shooting    Pain radiates to:  LLQ  Pain severity:  Mild (3/10)  Timing:  Constant  Progression:  Waxing and waning  Chronicity:  New  Relieved by:  NSAIDs  Ineffective treatments:  Acetaminophen  Associated symptoms: nausea and vomiting    Associated symptoms: no anorexia, no belching, no chest pain, no chills, no constipation, no cough, no diarrhea, no dysuria, no fatigue, no fever, no hematuria, no melena and no shortness of breath            Patient Care Team  Primary Care Provider: Patricia Hernandez APRN    Past Medical History:     Allergies   Allergen Reactions   • Morphine Itching and Nausea And Vomiting     Past Medical History:   Diagnosis Date   • Acid reflux    • Allergic rhinitis due to allergen 06/09/2016   • Anxiety    • Anxiety and depression    • Benign essential hypertension    • Cancer (HCC)     renal cancer/mass   • Diabetes (HCC)    • Diabetes mellitus (HCC)     type ii, doesn't check bg at home    • Diabetes mellitus, type 2 (HCC)    • Gastroparesis    • GERD (gastroesophageal reflux disease)    • High triglycerides    • History of bariatric surgery  2021   • History of kidney stones    • HTN (hypertension)    • Hyperlipemia    • Hyperlipidemia    • Hypertension    • Hypertriglyceridemia    • Kidney stone    • Lumbar herniated disc    • Obesity    • Panic attacks    • PCOS (polycystic ovarian syndrome)    • Psoriasis     ELBOWS   • Seasonal allergies    • Spinal headache     AFTER PAIN EPIDURALS.  NO BLOOD PATCH     Past Surgical History:   Procedure Laterality Date   • ABDOMINAL SURGERY     • ADENOIDECTOMY     •  SECTION  ,   • CYSTOSCOPY BLADDER STONE LITHOTRIPSY     • EAR TUBES     • ENDOSCOPY N/A 10/20/2020    Procedure: ESOPHAGOGASTRODUODENOSCOPY WITH BIOPSY;  Surgeon: Fidel Grajeda Jr., MD;  Location: [facility] ENDOSCOPY;  Service: General;  Laterality: N/A;  PRE- GERD  POST- RETAINED FOOD, GASTRITIS, GASTRIC POLYPS   • ENDOSCOPY     • FOOT FRACTURE SURGERY Left 2017    screw placed   • FRACTURE SURGERY     • GASTRECTOMY     • GASTRIC SLEEVE LAPAROSCOPIC N/A 2020    Procedure: GASTRIC SLEEVE LAPAROSCOPIC;  Surgeon: Fidel Grajeda Jr., MD;  Location: [facility] OR OSC;  Service: Bariatric;  Laterality: N/A;   • KIDNEY SURGERY     • NEPHRECTOMY PARTIAL Right 11/15/2021    Procedure: NEPHRECTOMY PARTIAL LAPAROSCOPIC WITH DAVINCI ROBOT;  Surgeon: Lori Troy MD;  Location: [facility] OR;  Service: Robotics - DaVinci;  Laterality: Right;   • TONSILLECTOMY     • URETEROSCOPY LASER LITHOTRIPSY WITH STENT INSERTION Right 2021    Procedure: CYSTOSCOPY, RIGHT URETEROSCOPY, LASERTRIPSY, STONE BASKET EXTRACTION AND STENT INSERTION;  Surgeon: Lori Troy MD;  Location: [facility] OR;  Service: Urology;  Laterality: Right;     Family History   Problem Relation Age of Onset   • Diabetes Father    • Hypertension Father    • Heart disease Father    • Cancer Father         BLADDER   • Colon cancer Father         malignant, currently 62   • Stroke Maternal Grandmother    • Heart disease Maternal Grandmother     • Diabetes Paternal Grandfather    • Heart disease Paternal Grandfather    • Malig Hyperthermia Neg Hx        Home Medications:  Prior to Admission medications    Medication Sig Start Date End Date Taking? Authorizing Provider   Apremilast (Otezla) 30 MG tablet take 1 tablet by mouth twice a day 8/16/22      Biotin 37617 MCG tablet Take 1 tablet by mouth Every Night.    Heron Campuzano MD   buPROPion XL (WELLBUTRIN XL) 300 MG 24 hr tablet Take 1 tablet by mouth once daily 8/1/22   Patricia Hernandez APRN   CALCIUM-MAGNESIUM-ZINC PO Take 3 tablets by mouth Daily.    ProviderHeron MD   cetirizine (zyrTEC) 10 MG tablet Take 10 mg by mouth every night at bedtime. 5/31/22   Heron Campuzano MD   Cholecalciferol 25 MCG (1000 UT) capsule Take 2 capsules by mouth Daily. 5/4/22   Patricia Hernandez APRN   clobetasol (TEMOVATE) 0.05 % external solution Apply 10-15 drops every day to affected areas in the scalp after shampooing until clear. 8/16/22      COLLAGEN PO Take 3 tablets by mouth Daily.    ProviderHeron MD   EluRyng 0.12-0.015 MG/24HR vaginal ring Insert 1 each into the vagina Every 30 (Thirty) Days as directed 8/4/21   Patricia Hernandez APRN   fluticasone (FLONASE) 50 MCG/ACT nasal spray USE 2 SPRAYS IN EACH NOSTRIL ONCE DAILY AS DIRECTED 5/4/22   Patricia Hernandez APRN   IRON-VITAMIN C PO Take 1 tablet by mouth Daily.    Heron Campuzano MD   lisinopril (PRINIVIL,ZESTRIL) 2.5 MG tablet Take 1 tablet by mouth Daily. 5/4/22   Patricia Hernandez APRN   metFORMIN (GLUCOPHAGE) 1000 MG tablet TAKE 1 TABLET BY MOUTH TWICE DAILY WITH MEALS 8/1/22   Patricia Hernandez APRN   metoprolol succinate XL (Toprol XL) 25 MG 24 hr tablet Take 1 tablet by mouth Every Night. 10/21/22   Patricia Hernandez APRN   Multiple Vitamins-Minerals (MULTIVITAMIN PO) Take 1 tablet by mouth Every Night.    Heron Campuzano MD   pravastatin (PRAVACHOL) 10 MG  tablet Take 1 tablet by mouth Every Night. 22   Patricia Hernandez APRN   Probiotic Product (PROBIOTIC-10 PO) Take 1 tablet by mouth Every Night.    Provider, MD Heron   topiramate (TOPAMAX) 50 MG tablet Take 1 tablet by mouth every night at bedtime for 90 days. 22  Lori Manzanares APRN   traZODone (DESYREL) 50 MG tablet Take 1 tablet by mouth Every Night. 22   Patricia Hernandez APRN        Social History:   Social History     Tobacco Use   • Smoking status: Former     Years: 35.00     Types: Cigarettes     Quit date: 1/15/2020     Years since quittin.8   • Smokeless tobacco: Never   • Tobacco comments:     A PACK A WEEK   Vaping Use   • Vaping Use: Never used   Substance Use Topics   • Alcohol use: Yes     Comment: RARELY   • Drug use: Never         Review of Systems:  Review of Systems   Constitutional: Negative for chills, fatigue and fever.   HENT: Negative for ear pain.    Eyes: Negative for pain.   Respiratory: Negative for cough and shortness of breath.    Cardiovascular: Negative for chest pain.   Gastrointestinal: Positive for nausea and vomiting. Negative for abdominal pain, anorexia, constipation, diarrhea and melena.   Genitourinary: Positive for flank pain. Negative for dysuria and hematuria.   Musculoskeletal: Negative for arthralgias.   Skin: Negative for rash.   Neurological: Negative for headaches.        Physical Exam:  /63   Pulse 56   Temp 98.1 °F (36.7 °C) (Oral)   Resp 20   Wt 101 kg (221 lb 12.5 oz)   LMP 10/19/2022 (Approximate)   SpO2 97%   BMI 34.74 kg/m²     Physical Exam  Vitals and nursing note reviewed.   Constitutional:       Appearance: Normal appearance.   HENT:      Head: Normocephalic and atraumatic.      Nose: Nose normal.   Eyes:      Extraocular Movements: Extraocular movements intact.      Conjunctiva/sclera: Conjunctivae normal.      Pupils: Pupils are equal, round, and reactive to light.   Cardiovascular:      Rate  and Rhythm: Normal rate and regular rhythm.      Heart sounds: Normal heart sounds.   Pulmonary:      Effort: Pulmonary effort is normal.      Breath sounds: Normal breath sounds.   Abdominal:      General: Abdomen is flat. Bowel sounds are normal. There is no distension.      Palpations: Abdomen is soft. There is no mass.      Tenderness: There is no abdominal tenderness. There is left CVA tenderness. There is no right CVA tenderness or guarding.   Musculoskeletal:         General: Normal range of motion.      Cervical back: Normal range of motion and neck supple.   Skin:     General: Skin is warm and dry.   Neurological:      General: No focal deficit present.      Mental Status: She is alert and oriented to person, place, and time.   Psychiatric:         Mood and Affect: Mood normal.         Behavior: Behavior normal.         Thought Content: Thought content normal.         Judgment: Judgment normal.                Medications in the Emergency Department:  Medications   sodium chloride 0.9 % flush 10 mL (has no administration in time range)   cefTRIAXone (ROCEPHIN) IVPB 1 g (1 g Intravenous New Bag 11/3/22 1841)   ondansetron (ZOFRAN) injection 4 mg (4 mg Intravenous Given 11/3/22 1721)   ketorolac (TORADOL) injection 15 mg (15 mg Intravenous Given 11/3/22 1721)        Labs  Lab Results (last 24 hours)     Procedure Component Value Units Date/Time    CBC & Differential [[id number]]  (Abnormal) Collected: 11/03/22 1650    Specimen: Blood Updated: 11/03/22 1713    Narrative:      The following orders were created for panel order CBC & Differential.  Procedure                               Abnormality         Status                     ---------                               -----------         ------                     CBC Auto Differential[[id number]]        Abnormal            Final result                 Please view results for these tests on the individual orders.    Comprehensive Metabolic Panel [[id number]]   (Abnormal) Collected: 11/03/22 1650    Specimen: Blood Updated: 11/03/22 1739     Glucose 101 mg/dL      BUN 21 mg/dL      Creatinine 1.06 mg/dL      Sodium 141 mmol/L      Potassium 4.3 mmol/L      Chloride 107 mmol/L      CO2 22.6 mmol/L      Calcium 9.6 mg/dL      Total Protein 6.9 g/dL      Albumin 3.80 g/dL      ALT (SGPT) 18 U/L      AST (SGOT) 17 U/L      Alkaline Phosphatase 73 U/L      Total Bilirubin <0.2 mg/dL      Globulin 3.1 gm/dL      A/G Ratio 1.2 g/dL      BUN/Creatinine Ratio 19.8     Anion Gap 11.4 mmol/L      eGFR 67.0 mL/min/1.73      Comment: National Kidney Foundation and American Society of Nephrology (ASN) Task Force recommended calculation based on the Chronic Kidney Disease Epidemiology Collaboration (CKD-EPI) equation refit without adjustment for race.       Narrative:      GFR Normal >60  Chronic Kidney Disease <60  Kidney Failure <15      Lipase [[id number]]  (Abnormal) Collected: 11/03/22 1650    Specimen: Blood Updated: 11/03/22 1739     Lipase 66 U/L     hCG, Quantitative, Pregnancy [[id number]] Collected: 11/03/22 1650    Specimen: Blood Updated: 11/03/22 1738     HCG Quantitative <0.50 mIU/mL     Narrative:      HCG Ranges by Gestational Age    Females - non-pregnant premenopausal   </= 1mIU/mL HCG  Females - postmenopausal               </= 7mIU/mL HCG    3 Weeks       5.4   -      72 mIU/mL  4 Weeks      10.2   -     708 mIU/mL  5 Weeks       217   -   8,245 mIU/mL  6 Weeks       152   -  32,177 mIU/mL  7 Weeks     4,059   - 153,767 mIU/mL  8 Weeks    31,366   - 149,094 mIU/mL  9 Weeks    59,109   - 135,901 mIU/mL  10 Weeks   44,186   - 170,409 mIU/mL  12 Weeks   27,107   - 201,615 mIU/mL  14 Weeks   24,302   -  93,646 mIU/mL  15 Weeks   12,540   -  69,747 mIU/mL  16 Weeks    8,904   -  55,332 mIU/mL  17 Weeks    8,240   -  51,793 mIU/mL  18 Weeks    9,649   -  55,271 mIU/mL    Results may be falsely decreased if patient taking Biotin.      CBC Auto Differential [[id number]]   (Abnormal) Collected: 11/03/22 1650    Specimen: Blood Updated: 11/03/22 1713     WBC 9.00 10*3/mm3      RBC 4.16 10*6/mm3      Hemoglobin 11.9 g/dL      Hematocrit 36.3 %      MCV 87.3 fL      MCH 28.6 pg      MCHC 32.8 g/dL      RDW 14.3 %      RDW-SD 44.5 fl      MPV 9.9 fL      Platelets 241 10*3/mm3      Neutrophil % 65.5 %      Lymphocyte % 26.4 %      Monocyte % 5.1 %      Eosinophil % 2.0 %      Basophil % 0.4 %      Immature Grans % 0.6 %      Neutrophils, Absolute 5.89 10*3/mm3      Lymphocytes, Absolute 2.38 10*3/mm3      Monocytes, Absolute 0.46 10*3/mm3      Eosinophils, Absolute 0.18 10*3/mm3      Basophils, Absolute 0.04 10*3/mm3      Immature Grans, Absolute 0.05 10*3/mm3      nRBC 0.0 /100 WBC     Urinalysis With Microscopic If Indicated (No Culture) - Urine, Clean Catch [[id number]]  (Abnormal) Collected: 11/03/22 1701    Specimen: Urine, Clean Catch Updated: 11/03/22 1722     Color, UA Dark Yellow     Appearance, UA Cloudy     pH, UA 5.5     Specific Gravity, UA >1.030     Glucose, UA Negative     Ketones, UA Trace     Bilirubin, UA Negative     Blood, UA Moderate (2+)     Protein,  mg/dL (2+)     Leuk Esterase, UA Trace     Nitrite, UA Negative     Urobilinogen, UA 0.2 E.U./dL    Urinalysis, Microscopic Only - Urine, Clean Catch [[id number]]  (Abnormal) Collected: 11/03/22 1701    Specimen: Urine, Clean Catch Updated: 11/03/22 1800     RBC, UA Too Numerous to Count /HPF      WBC, UA 0-2 /HPF      Bacteria, UA Trace /HPF      Squamous Epithelial Cells, UA 0-2 /HPF      Hyaline Casts, UA 0-2 /LPF      Calcium Oxalate Crystals, UA Small/1+ /HPF      Methodology Manual Light Microscopy    Urine Culture - Urine, Urine, Clean Catch [[id number]] Collected: 11/03/22 1701    Specimen: Urine, Clean Catch Updated: 11/03/22 1834           Imaging:  CT Abdomen Pelvis Stone Protocol    Result Date: 11/3/2022  PROCEDURE: CT ABDOMEN PELVIS STONE PROTOCOL  COMPARISON: [facility], CT, CT  ABDOMEN PELVIS W WO CONTRAST, 10/22/2021, 8:35.  [facility], CT, CT ABDOMEN PELVIS WO CONTRAST, 9/27/2021, 13:20.  [facility], CT, CT ABDOMEN PELVIS W WO CONTRAST, 6/06/2022, 15:02.  INDICATIONS: Left Flank pain, kidney stone suspected  PROTOCOL:   Standard imaging protocol performed    RADIATION:   DLP: 768.4 mGy*cm   Automated exposure control was utilized to minimize radiation dose.  TECHNIQUE: Axial images of the abdomen and pelvis without intravenous or oral contrast.  FINDINGS:  ABDOMEN: There is a 7 mm calculus in the left renal pelvis at the UPJ with mild left hydronephrosis.  The unenhanced liver, spleen, pancreas and adrenal glands are normal.  There are postoperative changes in the upper pole of the right kidney.  There is a punctate nonobstructing calcification in the lower pole of the right kidney.  The lung bases are clear.  Prior gastric sleeve procedure.  There are no inflammatory changes around the gallbladder.  PELVIS: No evidence of bowel obstruction, perforation or abscess.  The appendix and terminal ileum are normal.  No CT evidence of colitis.  No distal ureteral or urinary bladder calcifications are identified.  The abdominal aorta has a normal caliber.  No CT evidence of colitis.  There is a NuvaRing.  IMPRESSION:   1. 7 mm calculus in the left renal pelvis at the UPJ with mild left hydronephrosis.  2. Punctate nonobstructing calcification in the lower pole of the right kidney.   VIDYA PARIS MD       Electronically Signed and Approved By: VIDYA PARIS MD on 11/03/2022 at 18:09               Procedures:  Procedures    Progress  ED Course as of 11/03/22 1857   u Nov 03, 2022   1639   --- PROVIDER IN TRIAGE NOTE ---    Patient was evaluated in triage by Juan mccauley PA-C.  In short, the pt presented with flank pain.  Orders were written and the patient was placed in waiting, currently awaiting disposition.  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 Troy [MD]   1827 Spoke with Dr. Mckinney and she stated to treat the patient with Rocephin in the ED and have patient call Dr. Torrez office in the morning for follow up appointment since she completed the right partial nephrectomy. [MD]      ED Course User Index  [KM] Juan Corbett PA-C  [MD] Pollo Albrecht PA-C                            The patient was initially evaluated in the triage area where orders were placed. The patient was later dispositioned by Polol Albrecht PA-C.      The patient was advised to stay for completion of workup which includes but is not limited to communication of labs and radiological results, reassessment and plan. The patient was advised that leaving prior to disposition by a provider could result in critical findings that are not communicated to the patient.     Medical Decision Making:  MDM  Number of Diagnoses or Management Options  Hydronephrosis of left kidney  Left nephrolithiasis  Diagnosis management comments: I have spoken with patient. I have explained the patient´s condition, diagnoses and treatment plan based on the information available to me at this time. I have answered the patient's questions and addressed any concerns. The patient has a good  understanding of the patient´s diagnosis, condition, and treatment plan as can be expected at this point. The vital signs have been stable. The patient´s condition is stable and appropriate for discharge from the emergency department.      The patient will pursue further outpatient evaluation with the primary care physician or other designated or consulting physician as outlined in the discharge instructions. They are agreeable to this plan of care and follow-up instructions have been explained in detail. The patient has received these instructions in written format and have expressed an understanding of the discharge instructions. The patient is  aware that any significant change in condition or worsening of symptoms should prompt an immediate return to this or the closest emergency department or call to 911.       Amount and/or Complexity of Data Reviewed  Clinical lab tests: reviewed and ordered  Tests in the radiology section of CPT®: reviewed and ordered  Discuss the patient with other providers: yes (Dr. Devi)    Risk of Complications, Morbidity, and/or Mortality  Presenting problems: moderate  Diagnostic procedures: moderate  Management options: low    Patient Progress  Patient progress: stable           The following orders were placed after triage and evaluation:  Orders Placed This Encounter   Procedures   • Urine Culture - Urine,   • CT Abdomen Pelvis Stone Protocol   • [place] Draw   • Comprehensive Metabolic Panel   • Lipase   • Urinalysis With Microscopic If Indicated (No Culture) - Urine, Clean Catch   • hCG, Quantitative, Pregnancy   • CBC Auto Differential   • Urinalysis, Microscopic Only - Urine, Clean Catch   • NPO Diet NPO Type: Strict NPO   • Undress & Gown   • IP General Consult (Use specialty-specific consult if known)   • Insert Peripheral IV   • CBC & Differential   • Green Top (Gel)   • Lavender Top   • Gold Top - SST   • Light Blue Top       Final diagnoses:   Left nephrolithiasis   Hydronephrosis of left kidney          Disposition:  ED Disposition     ED Disposition   Discharge    Condition   Stable    Comment   --             This medical record created using voice recognition software.           Pollo Albrecht PA-C  11/03/22 4836

## 2022-11-03 NOTE — DISCHARGE INSTRUCTIONS
Take Keflex as prescribed.  Take Tylenol as needed for pain control.  Is important that you call Dr. Cruz's office first thing in the morning to schedule a follow-up appointment.  Return immediately to the emergency department if you develop fever, have intractable vomiting, or uncontrollable pain.

## 2022-11-04 ENCOUNTER — OFFICE VISIT (OUTPATIENT)
Dept: FAMILY MEDICINE CLINIC | Facility: CLINIC | Age: 42
End: 2022-11-04

## 2022-11-04 ENCOUNTER — TELEPHONE (OUTPATIENT)
Dept: UROLOGY | Facility: CLINIC | Age: 42
End: 2022-11-04

## 2022-11-04 ENCOUNTER — PREP FOR SURGERY (OUTPATIENT)
Dept: OTHER | Facility: HOSPITAL | Age: 42
End: 2022-11-04

## 2022-11-04 VITALS
HEART RATE: 56 BPM | OXYGEN SATURATION: 99 % | HEIGHT: 67 IN | DIASTOLIC BLOOD PRESSURE: 62 MMHG | SYSTOLIC BLOOD PRESSURE: 104 MMHG | WEIGHT: 223 LBS | BODY MASS INDEX: 35 KG/M2 | TEMPERATURE: 96.8 F

## 2022-11-04 DIAGNOSIS — E66.9 OBESITY, CLASS I, BMI 30-34.9: ICD-10-CM

## 2022-11-04 DIAGNOSIS — R31.9 HEMATURIA, UNSPECIFIED TYPE: ICD-10-CM

## 2022-11-04 DIAGNOSIS — K21.9 GASTROESOPHAGEAL REFLUX DISEASE WITHOUT ESOPHAGITIS: ICD-10-CM

## 2022-11-04 DIAGNOSIS — E11.69 DIABETES MELLITUS TYPE 2 IN OBESE: ICD-10-CM

## 2022-11-04 DIAGNOSIS — R00.2 PALPITATION: ICD-10-CM

## 2022-11-04 DIAGNOSIS — F41.9 ANXIETY: ICD-10-CM

## 2022-11-04 DIAGNOSIS — I10 ESSENTIAL HYPERTENSION: ICD-10-CM

## 2022-11-04 DIAGNOSIS — J30.2 SEASONAL ALLERGIES: ICD-10-CM

## 2022-11-04 DIAGNOSIS — E66.9 DIABETES MELLITUS TYPE 2 IN OBESE: ICD-10-CM

## 2022-11-04 DIAGNOSIS — R11.0 NAUSEA: ICD-10-CM

## 2022-11-04 DIAGNOSIS — N20.1 LEFT URETERAL STONE: Primary | ICD-10-CM

## 2022-11-04 DIAGNOSIS — F51.01 PRIMARY INSOMNIA: ICD-10-CM

## 2022-11-04 DIAGNOSIS — E78.2 MIXED HYPERLIPIDEMIA: ICD-10-CM

## 2022-11-04 DIAGNOSIS — F33.0 MILD EPISODE OF RECURRENT MAJOR DEPRESSIVE DISORDER: ICD-10-CM

## 2022-11-04 DIAGNOSIS — N20.0 LEFT RENAL STONE: ICD-10-CM

## 2022-11-04 LAB — BACTERIA SPEC AEROBE CULT: NORMAL

## 2022-11-04 PROCEDURE — 99214 OFFICE O/P EST MOD 30 MIN: CPT | Performed by: NURSE PRACTITIONER

## 2022-11-04 PROCEDURE — 96372 THER/PROPH/DIAG INJ SC/IM: CPT | Performed by: NURSE PRACTITIONER

## 2022-11-04 RX ORDER — METOPROLOL SUCCINATE 25 MG/1
25 TABLET, EXTENDED RELEASE ORAL NIGHTLY
Qty: 90 TABLET | Refills: 1 | Status: SHIPPED | OUTPATIENT
Start: 2022-11-04

## 2022-11-04 RX ORDER — SODIUM CHLORIDE 9 MG/ML
100 INJECTION, SOLUTION INTRAVENOUS CONTINUOUS
Status: CANCELLED | OUTPATIENT
Start: 2022-11-04

## 2022-11-04 RX ORDER — BUPROPION HYDROCHLORIDE 300 MG/1
300 TABLET ORAL DAILY
Qty: 90 TABLET | Refills: 1 | Status: SHIPPED | OUTPATIENT
Start: 2022-11-04

## 2022-11-04 RX ORDER — LEVOFLOXACIN 5 MG/ML
500 INJECTION, SOLUTION INTRAVENOUS ONCE
Status: CANCELLED | OUTPATIENT
Start: 2022-11-04 | End: 2022-11-04

## 2022-11-04 RX ORDER — ONDANSETRON 4 MG/1
4 TABLET, ORALLY DISINTEGRATING ORAL EVERY 8 HOURS PRN
Qty: 21 TABLET | Refills: 5 | Status: SHIPPED | OUTPATIENT
Start: 2022-11-04

## 2022-11-04 RX ORDER — LISINOPRIL 2.5 MG/1
2.5 TABLET ORAL DAILY
Qty: 90 TABLET | Refills: 1 | Status: SHIPPED | OUTPATIENT
Start: 2022-11-04

## 2022-11-04 RX ORDER — TRAZODONE HYDROCHLORIDE 50 MG/1
50 TABLET ORAL NIGHTLY
Qty: 90 TABLET | Refills: 1 | Status: SHIPPED | OUTPATIENT
Start: 2022-11-04

## 2022-11-04 RX ORDER — ONDANSETRON 2 MG/ML
4 INJECTION INTRAMUSCULAR; INTRAVENOUS ONCE
Status: COMPLETED | OUTPATIENT
Start: 2022-11-04 | End: 2022-11-04

## 2022-11-04 RX ORDER — ETONOGESTREL AND ETHINYL ESTRADIOL 11.7; 2.7 MG/1; MG/1
1 INSERT, EXTENDED RELEASE VAGINAL
Qty: 3 EACH | Refills: 3 | Status: SHIPPED | OUTPATIENT
Start: 2022-11-04

## 2022-11-04 RX ADMIN — ONDANSETRON 4 MG: 2 INJECTION INTRAMUSCULAR; INTRAVENOUS at 08:52

## 2022-11-04 NOTE — H&P (VIEW-ONLY)
[facility]   Urology HISTORY AND PHYSICAL    Patient Name: Bianca Boles  : 1980  MRN: [id number]  Primary Care Physician:  Patricia Hernandez APRN  Date of admission: (Not on file)    Subjective   Subjective       History of Present Illness  Patient has a left proximal ureteral stone and presents for left ureteroscopy, laser lithotripsy and left ureteral stent placement.       Personal History     Past Medical History:   Diagnosis Date   • Acid reflux    • Allergic rhinitis due to allergen 2016   • Anxiety    • Anxiety and depression    • Benign essential hypertension    • Cancer (HCC)     renal cancer/mass   • Diabetes (HCC)    • Diabetes mellitus (HCC)     type ii, doesn't check bg at home    • Diabetes mellitus, type 2 (HCC)    • Gastroparesis    • GERD (gastroesophageal reflux disease)    • High triglycerides    • History of bariatric surgery 2021   • History of kidney stones    • HTN (hypertension)    • Hyperlipemia    • Hyperlipidemia    • Hypertension    • Hypertriglyceridemia    • Kidney stone    • Lumbar herniated disc    • Obesity    • Panic attacks    • PCOS (polycystic ovarian syndrome)    • Psoriasis     ELBOWS   • Seasonal allergies    • Spinal headache     AFTER PAIN EPIDURALS.  NO BLOOD PATCH       Past Surgical History:   Procedure Laterality Date   • ABDOMINAL SURGERY     • ADENOIDECTOMY     •  SECTION  ,   • CYSTOSCOPY BLADDER STONE LITHOTRIPSY     • EAR TUBES     • ENDOSCOPY N/A 10/20/2020    Procedure: ESOPHAGOGASTRODUODENOSCOPY WITH BIOPSY;  Surgeon: Fidel Grajeda Jr., MD;  Location: [facility];  Service: General;  Laterality: N/A;  PRE- GERD  POST- RETAINED FOOD, GASTRITIS, GASTRIC POLYPS   • ENDOSCOPY     • FOOT FRACTURE SURGERY Left 2017    screw placed   • FRACTURE SURGERY     • GASTRECTOMY     • GASTRIC SLEEVE LAPAROSCOPIC N/A 2020    Procedure: GASTRIC SLEEVE LAPAROSCOPIC;  Surgeon: Fidel Grajeda Jr.,  MD;  Location:  [facility] [place] [facility];  Service: Bariatric;  Laterality: N/A;   • KIDNEY SURGERY     • NEPHRECTOMY PARTIAL Right 11/15/2021    Procedure: NEPHRECTOMY PARTIAL LAPAROSCOPIC WITH DAVINCI ROBOT;  Surgeon: Lori Troy MD;  Location: [facility] OR;  Service: Robotics - DaVinci;  Laterality: Right;   • TONSILLECTOMY  1984   • URETEROSCOPY LASER LITHOTRIPSY WITH STENT INSERTION Right 9/28/2021    Procedure: CYSTOSCOPY, RIGHT URETEROSCOPY, LASERTRIPSY, STONE BASKET EXTRACTION AND STENT INSERTION;  Surgeon: Lori Troy MD;  Location: [facility] OR;  Service: Urology;  Laterality: Right;       Family History: family history includes Cancer in her father; Colon cancer in her father; Diabetes in her father and paternal grandfather; Heart disease in her father, maternal grandmother, and paternal grandfather; Hypertension in her father; Stroke in her maternal grandmother. Otherwise pertinent FHx was reviewed and not pertinent to current issue.    Social History:  reports that she quit smoking about 2 years ago. Her smoking use included cigarettes. She has never used smokeless tobacco. She reports current alcohol use. She reports that she does not use drugs.    Home Medications:  Apremilast, Biotin, Calcium-Magnesium-Zinc, Cholecalciferol, Collagen, Iron-Vitamin C, Probiotic Product, buPROPion XL, cephalexin, cetirizine, clobetasol, etonogestrel-ethinyl estradiol, fluticasone, ketorolac, lisinopril, metFORMIN, metoprolol succinate XL, multivitamin, ondansetron ODT, pravastatin, topiramate, and traZODone    Allergies:  Allergies   Allergen Reactions   • Morphine Itching and Nausea And Vomiting       Objective    Objective     Vitals:   Temp:  [96.8 °F (36 °C)-98.1 °F (36.7 °C)] 96.8 °F (36 °C)  Heart Rate:  [56-66] 56  Resp:  [20] 20  BP: ()/(55-69) 104/62    Physical Exam  Constitutional:       Appearance: Normal appearance.   Cardiovascular:      Rate and Rhythm: Normal rate and regular rhythm.    Pulmonary:      Effort: Pulmonary effort is normal.      Breath sounds: Normal breath sounds.   Neurological:      Mental Status: She is alert. Mental status is at baseline.   Psychiatric:         Mood and Affect: Mood and affect normal.         Speech: Speech normal.         Judgment: Judgment normal.         Result Review    Result Review:  I have personally reviewed the results from the time of this admission to 11/4/2022 08:54 EDT and agree with these findings:  [x]  Laboratory  []  Microbiology  [x]  Radiology  []  EKG/Telemetry   []  Cardiology/Vascular   []  Pathology  [x]  Old records  []  Other:      Assessment & Plan   Assessment / Plan       Active Hospital Problems:  There are no active hospital problems to display for this patient.      Plan: left ureteroscopy, laser lithotripsy and left ureteral stent placement  Risks and benefits discussed with patient and they are agreeable to proceed.    DVT prophylaxis:  No DVT prophylaxis order currently exists.    CODE STATUS:           Electronically signed by Lori Troy MD, 11/04/22, 8:54 AM EDT.

## 2022-11-04 NOTE — TELEPHONE ENCOUNTER
Spoke with PT and canceled her appointment for Monday and scheduled her surgery with Dr. Troy on November 8. I went over preop instructions and she voiced understanding.

## 2022-11-04 NOTE — H&P
[facility]   Urology HISTORY AND PHYSICAL    Patient Name: Bianca Boles  : 1980  MRN: [id number]  Primary Care Physician:  Patricia Hernandez APRN  Date of admission: (Not on file)    Subjective   Subjective       History of Present Illness  Patient has a left proximal ureteral stone and presents for left ureteroscopy, laser lithotripsy and left ureteral stent placement.       Personal History     Past Medical History:   Diagnosis Date   • Acid reflux    • Allergic rhinitis due to allergen 2016   • Anxiety    • Anxiety and depression    • Benign essential hypertension    • Cancer (HCC)     renal cancer/mass   • Diabetes (HCC)    • Diabetes mellitus (HCC)     type ii, doesn't check bg at home    • Diabetes mellitus, type 2 (HCC)    • Gastroparesis    • GERD (gastroesophageal reflux disease)    • High triglycerides    • History of bariatric surgery 2021   • History of kidney stones    • HTN (hypertension)    • Hyperlipemia    • Hyperlipidemia    • Hypertension    • Hypertriglyceridemia    • Kidney stone    • Lumbar herniated disc    • Obesity    • Panic attacks    • PCOS (polycystic ovarian syndrome)    • Psoriasis     ELBOWS   • Seasonal allergies    • Spinal headache     AFTER PAIN EPIDURALS.  NO BLOOD PATCH       Past Surgical History:   Procedure Laterality Date   • ABDOMINAL SURGERY     • ADENOIDECTOMY     •  SECTION  ,   • CYSTOSCOPY BLADDER STONE LITHOTRIPSY     • EAR TUBES     • ENDOSCOPY N/A 10/20/2020    Procedure: ESOPHAGOGASTRODUODENOSCOPY WITH BIOPSY;  Surgeon: Fidel Grajeda Jr., MD;  Location: [facility];  Service: General;  Laterality: N/A;  PRE- GERD  POST- RETAINED FOOD, GASTRITIS, GASTRIC POLYPS   • ENDOSCOPY     • FOOT FRACTURE SURGERY Left 2017    screw placed   • FRACTURE SURGERY     • GASTRECTOMY     • GASTRIC SLEEVE LAPAROSCOPIC N/A 2020    Procedure: GASTRIC SLEEVE LAPAROSCOPIC;  Surgeon: Fidel Grajeda Jr.,  MD;  Location:  [facility] [place];  Service: Bariatric;  Laterality: N/A;   • KIDNEY SURGERY     • NEPHRECTOMY PARTIAL Right 11/15/2021    Procedure: NEPHRECTOMY PARTIAL LAPAROSCOPIC WITH DAVINCI ROBOT;  Surgeon: Lori Troy MD;  Location: [facility] OR;  Service: Robotics - DaVinci;  Laterality: Right;   • TONSILLECTOMY  1984   • URETEROSCOPY LASER LITHOTRIPSY WITH STENT INSERTION Right 9/28/2021    Procedure: CYSTOSCOPY, RIGHT URETEROSCOPY, LASERTRIPSY, STONE BASKET EXTRACTION AND STENT INSERTION;  Surgeon: Lori Troy MD;  Location: [facility] OR;  Service: Urology;  Laterality: Right;       Family History: family history includes Cancer in her father; Colon cancer in her father; Diabetes in her father and paternal grandfather; Heart disease in her father, maternal grandmother, and paternal grandfather; Hypertension in her father; Stroke in her maternal grandmother. Otherwise pertinent FHx was reviewed and not pertinent to current issue.    Social History:  reports that she quit smoking about 2 years ago. Her smoking use included cigarettes. She has never used smokeless tobacco. She reports current alcohol use. She reports that she does not use drugs.    Home Medications:  Apremilast, Biotin, Calcium-Magnesium-Zinc, Cholecalciferol, Collagen, Iron-Vitamin C, Probiotic Product, buPROPion XL, cephalexin, cetirizine, clobetasol, etonogestrel-ethinyl estradiol, fluticasone, ketorolac, lisinopril, metFORMIN, metoprolol succinate XL, multivitamin, ondansetron ODT, pravastatin, topiramate, and traZODone    Allergies:  Allergies   Allergen Reactions   • Morphine Itching and Nausea And Vomiting       Objective    Objective     Vitals:   Temp:  [96.8 °F (36 °C)-98.1 °F (36.7 °C)] 96.8 °F (36 °C)  Heart Rate:  [56-66] 56  Resp:  [20] 20  BP: ()/(55-69) 104/62    Physical Exam  Constitutional:       Appearance: Normal appearance.   Cardiovascular:      Rate and Rhythm: Normal rate and regular rhythm.    Pulmonary:      Effort: Pulmonary effort is normal.      Breath sounds: Normal breath sounds.   Neurological:      Mental Status: She is alert. Mental status is at baseline.   Psychiatric:         Mood and Affect: Mood and affect normal.         Speech: Speech normal.         Judgment: Judgment normal.         Result Review    Result Review:  I have personally reviewed the results from the time of this admission to 11/4/2022 08:54 EDT and agree with these findings:  [x]  Laboratory  []  Microbiology  [x]  Radiology  []  EKG/Telemetry   []  Cardiology/Vascular   []  Pathology  [x]  Old records  []  Other:      Assessment & Plan   Assessment / Plan       Active Hospital Problems:  There are no active hospital problems to display for this patient.      Plan: left ureteroscopy, laser lithotripsy and left ureteral stent placement  Risks and benefits discussed with patient and they are agreeable to proceed.    DVT prophylaxis:  No DVT prophylaxis order currently exists.    CODE STATUS:           Electronically signed by Lori Troy MD, 11/04/22, 8:54 AM EDT.

## 2022-11-04 NOTE — PROGRESS NOTES
ACUTE VISIT     Patient Name: Bianca Boles  : 1980   MRN: [id number]     Chief Complaint:    Chief Complaint   Patient presents with   • Hypertension   • Hyperlipidemia   • Diabetes   • Heartburn   • Anxiety   • Depression   • Insomnia       History of Present Illness: Bianca Boles is a 42 y.o. female who is here today for a ER follow up and 6 month follow up      Patient was in the ER last night for lt flank pain with nausea and vomiting when the pain is extreme. Patient had a UA, urine culture and CT abd/pelvis that showed   1. 7 mm calculus in the left renal pelvis at the UPJ with mild left hydronephrosis.   2. Punctate nonobstructing calcification in the lower pole of the right kidney.   .  She was treated with Cephalexin and Torodol and will be following up with Dr. Troy.      History of Present Illness: Bianca Boles is a 42 y.o. female who is here today to follow up for DM2, HTN, hyperlipidemia, GERD, anxiety, depression, insomnia  Review lab results     BS- checks in the mornings. avg 92-94  A1C- 2022  Eye exam-  or  [employer]  Foot exam- self checks, trimming own nails   mammogram- 2022  Pap- 2020    Pt c/o right flank pain, received toradol last night and has rx     Subjective      Review of Systems:   Review of Systems   Constitutional: Negative for fever.   Eyes: Negative for visual disturbance.   Respiratory: Negative for cough.    Cardiovascular: Negative for chest pain.   Gastrointestinal: Positive for abdominal pain. Negative for diarrhea, nausea and vomiting.   Genitourinary: Positive for flank pain, frequency and urgency. Negative for dysuria.   Musculoskeletal: Negative for myalgias.        Past Medical History:   Past Medical History:   Diagnosis Date   • Acid reflux    • Allergic rhinitis due to allergen 2016   • Anxiety    • Anxiety and depression    • Benign essential hypertension    • Cancer (HCC)     renal cancer/mass   • Diabetes (HCC)     • Diabetes mellitus (HCC)     type ii, doesn't check bg at home    • Diabetes mellitus, type 2 (HCC)    • Gastroparesis    • GERD (gastroesophageal reflux disease)    • High triglycerides    • History of bariatric surgery 2021   • History of kidney stones    • HTN (hypertension)    • Hyperlipemia    • Hyperlipidemia    • Hypertension    • Hypertriglyceridemia    • Kidney stone    • Lumbar herniated disc    • Obesity    • Panic attacks    • PCOS (polycystic ovarian syndrome)    • Psoriasis     ELBOWS   • Seasonal allergies    • Spinal headache     AFTER PAIN EPIDURALS.  NO BLOOD PATCH       Past Surgical History:   Past Surgical History:   Procedure Laterality Date   • ABDOMINAL SURGERY     • ADENOIDECTOMY     •  SECTION  ,   • CYSTOSCOPY BLADDER STONE LITHOTRIPSY     • EAR TUBES     • ENDOSCOPY N/A 10/20/2020    Procedure: ESOPHAGOGASTRODUODENOSCOPY WITH BIOPSY;  Surgeon: Fidel Grajeda Jr., MD;  Location: [facility] ENDOSCOPY;  Service: General;  Laterality: N/A;  PRE- GERD  POST- RETAINED FOOD, GASTRITIS, GASTRIC POLYPS   • ENDOSCOPY     • FOOT FRACTURE SURGERY Left 2017    screw placed   • FRACTURE SURGERY     • GASTRECTOMY     • GASTRIC SLEEVE LAPAROSCOPIC N/A 2020    Procedure: GASTRIC SLEEVE LAPAROSCOPIC;  Surgeon: Fidel Grajeda Jr., MD;  Location: [facility] OR [facility];  Service: Bariatric;  Laterality: N/A;   • KIDNEY SURGERY     • NEPHRECTOMY PARTIAL Right 11/15/2021    Procedure: NEPHRECTOMY PARTIAL LAPAROSCOPIC WITH DAVINCI ROBOT;  Surgeon: Lori Troy MD;  Location: [facility] OR;  Service: Robotics - DaVinci;  Laterality: Right;   • TONSILLECTOMY     • URETEROSCOPY LASER LITHOTRIPSY WITH STENT INSERTION Right 2021    Procedure: CYSTOSCOPY, RIGHT URETEROSCOPY, LASERTRIPSY, STONE BASKET EXTRACTION AND STENT INSERTION;  Surgeon: Lori Troy MD;  Location: [facility] OR;  Service: Urology;  Laterality: Right;       Family History:   Family  History   Problem Relation Age of Onset   • Diabetes Father    • Hypertension Father    • Heart disease Father    • Cancer Father         BLADDER   • Colon cancer Father         malignant, currently 62   • Stroke Maternal Grandmother    • Heart disease Maternal Grandmother    • Diabetes Paternal Grandfather    • Heart disease Paternal Grandfather    • Malig Hyperthermia Neg Hx        Social History:   Social History     Socioeconomic History   • Marital status:    Tobacco Use   • Smoking status: Former     Years: 35.00     Types: Cigarettes     Quit date: 1/15/2020     Years since quittin.8   • Smokeless tobacco: Never   • Tobacco comments:     A PACK A WEEK   Vaping Use   • Vaping Use: Never used   Substance and Sexual Activity   • Alcohol use: Yes     Comment: RARELY   • Drug use: Never   • Sexual activity: Defer       Medications:     Current Outpatient Medications:   •  Apremilast (Otezla) 30 MG tablet, take 1 tablet by mouth twice a day, Disp: 60 tablet, Rfl: 3  •  Biotin 44719 MCG tablet, Take 1 tablet by mouth Every Night., Disp: , Rfl:   •  buPROPion XL (WELLBUTRIN XL) 300 MG 24 hr tablet, Take 1 tablet by mouth Daily., Disp: 90 tablet, Rfl: 1  •  CALCIUM-MAGNESIUM-ZINC PO, Take 3 tablets by mouth Daily., Disp: , Rfl:   •  cephalexin (KEFLEX) 500 MG capsule, Take 1 capsule by mouth 4 (Four) Times a Day for 7 days., Disp: 28 capsule, Rfl: 0  •  cetirizine (zyrTEC) 10 MG tablet, Take 10 mg by mouth every night at bedtime., Disp: , Rfl:   •  Cholecalciferol 25 MCG (1000 UT) capsule, Take 2 capsules by mouth Daily., Disp: 240 capsule, Rfl: 0  •  clobetasol (TEMOVATE) 0.05 % external solution, Apply 10-15 drops every day to affected areas in the scalp after shampooing until clear., Disp: 50 mL, Rfl: 3  •  COLLAGEN PO, Take 3 tablets by mouth Daily., Disp: , Rfl:   •  EluRyng 0.12-0.015 MG/24HR vaginal ring, Insert 1 each into the vagina Every 30 (Thirty) Days as directed, Disp: 3 each, Rfl: 3  •   "fluticasone (FLONASE) 50 MCG/ACT nasal spray, USE 2 SPRAYS IN EACH NOSTRIL ONCE DAILY AS DIRECTED, Disp: 48 g, Rfl: 1  •  IRON-VITAMIN C PO, Take 1 tablet by mouth Daily., Disp: , Rfl:   •  ketorolac (TORADOL) 10 MG tablet, Take 1 tablet by mouth Every 6 (Six) Hours As Needed for Moderate Pain., Disp: 20 tablet, Rfl: 0  •  lisinopril (PRINIVIL,ZESTRIL) 2.5 MG tablet, Take 1 tablet by mouth Daily., Disp: 90 tablet, Rfl: 1  •  metFORMIN (GLUCOPHAGE) 1000 MG tablet, Take 1 tablet by mouth 2 (Two) Times a Day With Meals., Disp: 180 tablet, Rfl: 1  •  metoprolol succinate XL (Toprol XL) 25 MG 24 hr tablet, Take 1 tablet by mouth Every Night., Disp: 90 tablet, Rfl: 1  •  Multiple Vitamins-Minerals (MULTIVITAMIN PO), Take 1 tablet by mouth Every Night., Disp: , Rfl:   •  pravastatin (PRAVACHOL) 10 MG tablet, Take 1 tablet by mouth Every Night., Disp: 90 tablet, Rfl: 1  •  Probiotic Product (PROBIOTIC-10 PO), Take 1 tablet by mouth Every Night., Disp: , Rfl:   •  topiramate (TOPAMAX) 50 MG tablet, Take 1 tablet by mouth every night at bedtime for 90 days., Disp: 90 tablet, Rfl: 0  •  traZODone (DESYREL) 50 MG tablet, Take 1 tablet by mouth Every Night., Disp: 90 tablet, Rfl: 1  •  ondansetron ODT (Zofran ODT) 4 MG disintegrating tablet, Place 1 tablet on the tongue Every 8 (Eight) Hours As Needed for Nausea., Disp: 21 tablet, Rfl: 5  No current facility-administered medications for this visit.    Allergies:   Allergies   Allergen Reactions   • Morphine Itching and Nausea And Vomiting         Objective     Physical Exam:  Vital Signs:   Vitals:    11/04/22 0812 11/04/22 0850   BP: 95/55 104/62   Pulse: 56    Temp: 96.8 °F (36 °C)    SpO2: 99%    Weight: 101 kg (223 lb)    Height: 170.2 cm (67\")      Body mass index is 34.93 kg/m².     Physical Exam  HENT:      Right Ear: Tympanic membrane normal.      Left Ear: Tympanic membrane normal.      Nose: Nose normal.      Mouth/Throat:      Mouth: Mucous membranes are moist. "   Eyes:      Conjunctiva/sclera: Conjunctivae normal.   Neck:      Vascular: No carotid bruit.   Cardiovascular:      Rate and Rhythm: Normal rate and regular rhythm.      Pulses:           Dorsalis pedis pulses are 2+ on the right side and 2+ on the left side.        Posterior tibial pulses are 2+ on the right side and 2+ on the left side.      Heart sounds: Normal heart sounds. No murmur heard.  Pulmonary:      Effort: Pulmonary effort is normal.      Breath sounds: Normal breath sounds.   Abdominal:      General: Bowel sounds are normal.      Palpations: Abdomen is soft.      Tenderness: There is abdominal tenderness. There is left CVA tenderness.      Comments: LLQ tender to palpation   Feet:      Right foot:      Protective Sensation: 10 sites tested. 8 sites sensed.      Skin integrity: Skin integrity normal.      Toenail Condition: Right toenails are normal.      Left foot:      Protective Sensation: 10 sites tested. 7 sites sensed.      Skin integrity: Skin integrity normal.      Toenail Condition: Left toenails are normal.   Neurological:      Mental Status: She is alert.           Assessment / Plan      Assessment/Plan:   Diagnoses and all orders for this visit:    1. Diabetes mellitus type 2 in obese (HCC)  -     CBC Auto Differential; Future  -     Comprehensive Metabolic Panel; Future  -     Microalbumin / Creatinine Urine Ratio - Urine, Clean Catch; Future  -     Hemoglobin A1c; Future  -     Lipid Panel; Future  -     TSH; Future  -     Urinalysis With Culture If Indicated -; Future    2. Mixed hyperlipidemia  -     Comprehensive Metabolic Panel; Future  -     Lipid Panel; Future    3. Essential hypertension  -     Comprehensive Metabolic Panel; Future    4. Seasonal allergies    5. Palpitation    6. Obesity, Class I, BMI 30-34.9    7. Gastroesophageal reflux disease without esophagitis    8. Anxiety    9. Mild episode of recurrent major depressive disorder (HCC)    10. Primary insomnia    11. Left  renal stone    12. Nausea  -     ondansetron (ZOFRAN) injection 4 mg    13. Hematuria, unspecified type    Other orders  -     metFORMIN (GLUCOPHAGE) 1000 MG tablet; Take 1 tablet by mouth 2 (Two) Times a Day With Meals.  Dispense: 180 tablet; Refill: 1  -     buPROPion XL (WELLBUTRIN XL) 300 MG 24 hr tablet; Take 1 tablet by mouth Daily.  Dispense: 90 tablet; Refill: 1  -     EluRyng 0.12-0.015 MG/24HR vaginal ring; Insert 1 each into the vagina Every 30 (Thirty) Days as directed  Dispense: 3 each; Refill: 3  -     lisinopril (PRINIVIL,ZESTRIL) 2.5 MG tablet; Take 1 tablet by mouth Daily.  Dispense: 90 tablet; Refill: 1  -     metoprolol succinate XL (Toprol XL) 25 MG 24 hr tablet; Take 1 tablet by mouth Every Night.  Dispense: 90 tablet; Refill: 1  -     traZODone (DESYREL) 50 MG tablet; Take 1 tablet by mouth Every Night.  Dispense: 90 tablet; Refill: 1  -     ondansetron ODT (Zofran ODT) 4 MG disintegrating tablet; Place 1 tablet on the tongue Every 8 (Eight) Hours As Needed for Nausea.  Dispense: 21 tablet; Refill: 5    Diabetes mellitus type 2 currently controlled hemoglobin A1c below 6.5 we will continue metformin  Hyperlipidemia LDL below goal of 70 we will continue pravastatin 10 mg nightly denies myalgias  Hypertension currently controlled lisinopril 2.5 mg will provide refills  Seasonal allergies currently stable with Flonase  Reflux currently controlled  Anxiety depression stable on Wellbutrin insomnia controlled on trazodone  Left renal stone with nausea will provide a prescription for Zofran and provide injection in office Dr. Cruz was contacted patient will follow up in office on Monday schedule surgery for Tuesday          Follow Up:   Return in about 6 months (around 5/4/2023).    CARLTON Solis      Please note that portions of this note were completed with a voice recognition program.

## 2022-11-04 NOTE — PROGRESS NOTES
Follow Up Office Visit      Patient Name: Bianca Boles  : 1980   MRN: [id number]     Chief Complaint:    Chief Complaint   Patient presents with   • Hypertension   • Hyperlipidemia   • Diabetes   • Heartburn   • Anxiety   • Depression   • Insomnia       History of Present Illness: Bianca Boles is a 42 y.o. female who is here today to follow up for DM2, HTN, hyperlipidemia, GERD, anxiety, depression, insomnia    BS-  A1C- 2022  Eye exam-  Foot exam-  mammo- 2022  dexa-  Colonoscopy-  Pap- 2020    C/o     Subjective      Review of Systems:   Review of Systems     Past Medical History:   Past Medical History:   Diagnosis Date   • Acid reflux    • Allergic rhinitis due to allergen 2016   • Anxiety    • Anxiety and depression    • Benign essential hypertension    • Cancer (HCC)     renal cancer/mass   • Diabetes (HCC)    • Diabetes mellitus (HCC)     type ii, doesn't check bg at home    • Diabetes mellitus, type 2 (HCC)    • Gastroparesis    • GERD (gastroesophageal reflux disease)    • High triglycerides    • History of bariatric surgery 2021   • History of kidney stones    • HTN (hypertension)    • Hyperlipemia    • Hyperlipidemia    • Hypertension    • Hypertriglyceridemia    • Kidney stone    • Lumbar herniated disc    • Obesity    • Panic attacks    • PCOS (polycystic ovarian syndrome)    • Psoriasis     ELBOWS   • Seasonal allergies    • Spinal headache     AFTER PAIN EPIDURALS.  NO BLOOD PATCH       Past Surgical History:   Past Surgical History:   Procedure Laterality Date   • ABDOMINAL SURGERY     • ADENOIDECTOMY     •  SECTION  ,   • CYSTOSCOPY BLADDER STONE LITHOTRIPSY     • EAR TUBES     • ENDOSCOPY N/A 10/20/2020    Procedure: ESOPHAGOGASTRODUODENOSCOPY WITH BIOPSY;  Surgeon: Fidel Grajeda Jr., MD;  Location: [facility] ENDOSCOPY;  Service: General;  Laterality: N/A;  PRE- GERD  POST- RETAINED FOOD, GASTRITIS, GASTRIC POLYPS   • ENDOSCOPY   2014   • FOOT FRACTURE SURGERY Left 2017    screw placed   • FRACTURE SURGERY     • GASTRECTOMY     • GASTRIC SLEEVE LAPAROSCOPIC N/A 2020    Procedure: GASTRIC SLEEVE LAPAROSCOPIC;  Surgeon: Fidel Grajeda Jr., MD;  Location[facility];  Service: Bariatric;  Laterality: N/A;   • KIDNEY SURGERY     • NEPHRECTOMY PARTIAL Right 11/15/2021    Procedure: NEPHRECTOMY PARTIAL LAPAROSCOPIC WITH DAVINCI ROBOT;  Surgeon: Lori Troy MD;  Location: [facility];  Service: Robotics - DaVinci;  Laterality: Right;   • TONSILLECTOMY  1984   • URETEROSCOPY LASER LITHOTRIPSY WITH STENT INSERTION Right 2021    Procedure: CYSTOSCOPY, RIGHT URETEROSCOPY, LASERTRIPSY, STONE BASKET EXTRACTION AND STENT INSERTION;  Surgeon: Lori Troy MD;  Location: [facility];  Service: Urology;  Laterality: Right;       Family History:   Family History   Problem Relation Age of Onset   • Diabetes Father    • Hypertension Father    • Heart disease Father    • Cancer Father         BLADDER   • Colon cancer Father         malignant, currently 62   • Stroke Maternal Grandmother    • Heart disease Maternal Grandmother    • Diabetes Paternal Grandfather    • Heart disease Paternal Grandfather    • Malig Hyperthermia Neg Hx        Social History:   Social History     Socioeconomic History   • Marital status:    Tobacco Use   • Smoking status: Former     Years: 35.00     Types: Cigarettes     Quit date: 1/15/2020     Years since quittin.8   • Smokeless tobacco: Never   • Tobacco comments:     A PACK A WEEK   Vaping Use   • Vaping Use: Never used   Substance and Sexual Activity   • Alcohol use: Yes     Comment: RARELY   • Drug use: Never   • Sexual activity: Defer       Medications:     Current Outpatient Medications:   •  Apremilast (Otezla) 30 MG tablet, take 1 tablet by mouth twice a day, Disp: 60 tablet, Rfl: 3  •  Biotin 91739 MCG tablet, Take 1 tablet by mouth Every Night., Disp: , Rfl:   •  buPROPion XL  (WELLBUTRIN XL) 300 MG 24 hr tablet, Take 1 tablet by mouth once daily, Disp: 90 tablet, Rfl: 0  •  CALCIUM-MAGNESIUM-ZINC PO, Take 3 tablets by mouth Daily., Disp: , Rfl:   •  cephalexin (KEFLEX) 500 MG capsule, Take 1 capsule by mouth 4 (Four) Times a Day for 7 days., Disp: 28 capsule, Rfl: 0  •  cetirizine (zyrTEC) 10 MG tablet, Take 10 mg by mouth every night at bedtime., Disp: , Rfl:   •  Cholecalciferol 25 MCG (1000 UT) capsule, Take 2 capsules by mouth Daily., Disp: 240 capsule, Rfl: 0  •  clobetasol (TEMOVATE) 0.05 % external solution, Apply 10-15 drops every day to affected areas in the scalp after shampooing until clear., Disp: 50 mL, Rfl: 3  •  COLLAGEN PO, Take 3 tablets by mouth Daily., Disp: , Rfl:   •  EluRyng 0.12-0.015 MG/24HR vaginal ring, Insert 1 each into the vagina Every 30 (Thirty) Days as directed, Disp: 3 each, Rfl: 0  •  fluticasone (FLONASE) 50 MCG/ACT nasal spray, USE 2 SPRAYS IN EACH NOSTRIL ONCE DAILY AS DIRECTED, Disp: 48 g, Rfl: 1  •  IRON-VITAMIN C PO, Take 1 tablet by mouth Daily., Disp: , Rfl:   •  ketorolac (TORADOL) 10 MG tablet, Take 1 tablet by mouth Every 6 (Six) Hours As Needed for Moderate Pain., Disp: 20 tablet, Rfl: 0  •  lisinopril (PRINIVIL,ZESTRIL) 2.5 MG tablet, Take 1 tablet by mouth Daily., Disp: 90 tablet, Rfl: 1  •  metFORMIN (GLUCOPHAGE) 1000 MG tablet, TAKE 1 TABLET BY MOUTH TWICE DAILY WITH MEALS, Disp: 180 tablet, Rfl: 0  •  metoprolol succinate XL (Toprol XL) 25 MG 24 hr tablet, Take 1 tablet by mouth Every Night., Disp: 90 tablet, Rfl: 1  •  Multiple Vitamins-Minerals (MULTIVITAMIN PO), Take 1 tablet by mouth Every Night., Disp: , Rfl:   •  pravastatin (PRAVACHOL) 10 MG tablet, Take 1 tablet by mouth Every Night., Disp: 90 tablet, Rfl: 1  •  Probiotic Product (PROBIOTIC-10 PO), Take 1 tablet by mouth Every Night., Disp: , Rfl:   •  topiramate (TOPAMAX) 50 MG tablet, Take 1 tablet by mouth every night at bedtime for 90 days., Disp: 90 tablet, Rfl: 0  •   "traZODone (DESYREL) 50 MG tablet, Take 1 tablet by mouth Every Night., Disp: 90 tablet, Rfl: 0  No current facility-administered medications for this visit.    Allergies:   Allergies   Allergen Reactions   • Morphine Itching and Nausea And Vomiting       Health Maintenance:   Colorectal Screening:     Last Completed Colonoscopy     This patient has no relevant Health Maintenance data.       ***  Pap:    Last Completed Pap Smear          PAP SMEAR (Every 3 Years) Next due on 12/10/2023    12/10/2020  Done    10/21/2014  Outside Procedure: CHG CYTOPATH CERV/VAG THIN LAYER             ***  Mammogram:    Last Completed Mammogram     This patient has no relevant Health Maintenance data.           CT for Smoker (Age 50-80, 20pk yr):  ***    PHQ-2 Total Score:     PHQ-9 Total Score:       Objective     Physical Exam:  Vital Signs: There were no vitals filed for this visit.  There is no height or weight on file to calculate BMI.     Physical Exam    Procedures     Assessment / Plan      Assessment/Plan:   There are no diagnoses linked to this encounter.     1. ***      Follow Up:   No follow-ups on file.    CARLTON Solis    \"Please note that portions of this note were completed with a voice recognition program.\"    "

## 2022-11-08 ENCOUNTER — ANESTHESIA EVENT (OUTPATIENT)
Dept: PERIOP | Facility: HOSPITAL | Age: 42
End: 2022-11-08

## 2022-11-08 ENCOUNTER — APPOINTMENT (OUTPATIENT)
Dept: GENERAL RADIOLOGY | Facility: HOSPITAL | Age: 42
End: 2022-11-08

## 2022-11-08 ENCOUNTER — HOSPITAL ENCOUNTER (OUTPATIENT)
Facility: HOSPITAL | Age: 42
Setting detail: HOSPITAL OUTPATIENT SURGERY
Discharge: HOME | End: 2022-11-08
Attending: UROLOGY | Admitting: UROLOGY

## 2022-11-08 ENCOUNTER — ANESTHESIA (OUTPATIENT)
Dept: PERIOP | Facility: HOSPITAL | Age: 42
End: 2022-11-08

## 2022-11-08 VITALS
BODY MASS INDEX: 34.95 KG/M2 | DIASTOLIC BLOOD PRESSURE: 64 MMHG | TEMPERATURE: 97.6 F | SYSTOLIC BLOOD PRESSURE: 152 MMHG | HEIGHT: 67 IN | RESPIRATION RATE: 16 BRPM | HEART RATE: 60 BPM | WEIGHT: 222.66 LBS | OXYGEN SATURATION: 99 %

## 2022-11-08 DIAGNOSIS — N20.0 LEFT RENAL STONE: Primary | ICD-10-CM

## 2022-11-08 DIAGNOSIS — N20.1 LEFT URETERAL STONE: ICD-10-CM

## 2022-11-08 LAB
ANION GAP SERPL CALCULATED.3IONS-SCNC: 11.3 MMOL/L (ref 5–15)
B-HCG UR QL: NEGATIVE
BUN SERPL-MCNC: 21 MG/DL (ref 6–20)
BUN/CREAT SERPL: 21 (ref 7–25)
CALCIUM SPEC-SCNC: 9.2 MG/DL (ref 8.6–10.5)
CHLORIDE SERPL-SCNC: 107 MMOL/L (ref 98–107)
CO2 SERPL-SCNC: 21.7 MMOL/L (ref 22–29)
CREAT SERPL-MCNC: 1 MG/DL (ref 0.57–1)
EGFRCR SERPLBLD CKD-EPI 2021: 71.8 ML/MIN/1.73
GLUCOSE BLDC GLUCOMTR-MCNC: 72 MG/DL (ref 70–99)
GLUCOSE BLDC GLUCOMTR-MCNC: 75 MG/DL (ref 70–99)
GLUCOSE SERPL-MCNC: 78 MG/DL (ref 65–99)
POTASSIUM SERPL-SCNC: 4.2 MMOL/L (ref 3.5–5.2)
SODIUM SERPL-SCNC: 140 MMOL/L (ref 136–145)

## 2022-11-08 PROCEDURE — C1758 CATHETER, URETERAL: HCPCS | Performed by: UROLOGY

## 2022-11-08 PROCEDURE — 74018 RADEX ABDOMEN 1 VIEW: CPT

## 2022-11-08 PROCEDURE — 80048 BASIC METABOLIC PNL TOTAL CA: CPT | Performed by: ANESTHESIOLOGY

## 2022-11-08 PROCEDURE — 52356 CYSTO/URETERO W/LITHOTRIPSY: CPT | Performed by: UROLOGY

## 2022-11-08 PROCEDURE — 82962 GLUCOSE BLOOD TEST: CPT

## 2022-11-08 PROCEDURE — 25010000002 PROPOFOL 10 MG/ML EMULSION

## 2022-11-08 PROCEDURE — 81025 URINE PREGNANCY TEST: CPT | Performed by: UROLOGY

## 2022-11-08 PROCEDURE — C2617 STENT, NON-COR, TEM W/O DEL: HCPCS | Performed by: UROLOGY

## 2022-11-08 PROCEDURE — 82365 CALCULUS SPECTROSCOPY: CPT | Performed by: UROLOGY

## 2022-11-08 PROCEDURE — C1769 GUIDE WIRE: HCPCS | Performed by: UROLOGY

## 2022-11-08 PROCEDURE — C1894 INTRO/SHEATH, NON-LASER: HCPCS | Performed by: UROLOGY

## 2022-11-08 PROCEDURE — 25010000002 DEXAMETHASONE PER 1 MG

## 2022-11-08 PROCEDURE — 25010000002 FENTANYL CITRATE (PF) 50 MCG/ML SOLUTION

## 2022-11-08 PROCEDURE — 25010000002 MIDAZOLAM PER 1 MG: Performed by: ANESTHESIOLOGY

## 2022-11-08 PROCEDURE — 76000 FLUOROSCOPY <1 HR PHYS/QHP: CPT

## 2022-11-08 PROCEDURE — 25010000002 ONDANSETRON PER 1 MG

## 2022-11-08 PROCEDURE — 88300 SURGICAL PATH GROSS: CPT | Performed by: UROLOGY

## 2022-11-08 PROCEDURE — 25010000002 LEVOFLOXACIN PER 250 MG: Performed by: UROLOGY

## 2022-11-08 DEVICE — STNT CLASSC DBL PIG 4.5F 26CM: Type: IMPLANTABLE DEVICE | Site: URETER | Status: FUNCTIONAL

## 2022-11-08 RX ORDER — OXYCODONE HYDROCHLORIDE 5 MG/1
5 TABLET ORAL
Status: DISCONTINUED | OUTPATIENT
Start: 2022-11-08 | End: 2022-11-08 | Stop reason: HOSPADM

## 2022-11-08 RX ORDER — PROMETHAZINE HYDROCHLORIDE 12.5 MG/1
25 TABLET ORAL ONCE AS NEEDED
Status: DISCONTINUED | OUTPATIENT
Start: 2022-11-08 | End: 2022-11-08 | Stop reason: HOSPADM

## 2022-11-08 RX ORDER — MIDAZOLAM HYDROCHLORIDE 1 MG/ML
2 INJECTION INTRAMUSCULAR; INTRAVENOUS ONCE
Status: COMPLETED | OUTPATIENT
Start: 2022-11-08 | End: 2022-11-08

## 2022-11-08 RX ORDER — SODIUM CHLORIDE 9 MG/ML
100 INJECTION, SOLUTION INTRAVENOUS CONTINUOUS
Status: DISCONTINUED | OUTPATIENT
Start: 2022-11-08 | End: 2022-11-08 | Stop reason: HOSPADM

## 2022-11-08 RX ORDER — OXYCODONE HYDROCHLORIDE AND ACETAMINOPHEN 5; 325 MG/1; MG/1
1-2 TABLET ORAL EVERY 4 HOURS PRN
Qty: 20 TABLET | Refills: 0 | Status: SHIPPED | OUTPATIENT
Start: 2022-11-08 | End: 2023-01-18

## 2022-11-08 RX ORDER — ONDANSETRON 2 MG/ML
4 INJECTION INTRAMUSCULAR; INTRAVENOUS ONCE AS NEEDED
Status: DISCONTINUED | OUTPATIENT
Start: 2022-11-08 | End: 2022-11-08 | Stop reason: HOSPADM

## 2022-11-08 RX ORDER — SCOLOPAMINE TRANSDERMAL SYSTEM 1 MG/1
1 PATCH, EXTENDED RELEASE TRANSDERMAL ONCE
Status: DISCONTINUED | OUTPATIENT
Start: 2022-11-08 | End: 2022-11-08 | Stop reason: HOSPADM

## 2022-11-08 RX ORDER — ACETAMINOPHEN 500 MG
1000 TABLET ORAL ONCE
Status: COMPLETED | OUTPATIENT
Start: 2022-11-08 | End: 2022-11-08

## 2022-11-08 RX ORDER — ONDANSETRON 2 MG/ML
INJECTION INTRAMUSCULAR; INTRAVENOUS AS NEEDED
Status: DISCONTINUED | OUTPATIENT
Start: 2022-11-08 | End: 2022-11-08 | Stop reason: SURG

## 2022-11-08 RX ORDER — ROCURONIUM BROMIDE 10 MG/ML
INJECTION, SOLUTION INTRAVENOUS AS NEEDED
Status: DISCONTINUED | OUTPATIENT
Start: 2022-11-08 | End: 2022-11-08 | Stop reason: SURG

## 2022-11-08 RX ORDER — PROPOFOL 10 MG/ML
VIAL (ML) INTRAVENOUS AS NEEDED
Status: DISCONTINUED | OUTPATIENT
Start: 2022-11-08 | End: 2022-11-08 | Stop reason: SURG

## 2022-11-08 RX ORDER — PROMETHAZINE HYDROCHLORIDE 12.5 MG/1
12.5 TABLET ORAL ONCE AS NEEDED
Status: DISCONTINUED | OUTPATIENT
Start: 2022-11-08 | End: 2022-11-08 | Stop reason: HOSPADM

## 2022-11-08 RX ORDER — SODIUM CHLORIDE, SODIUM LACTATE, POTASSIUM CHLORIDE, CALCIUM CHLORIDE 600; 310; 30; 20 MG/100ML; MG/100ML; MG/100ML; MG/100ML
9 INJECTION, SOLUTION INTRAVENOUS CONTINUOUS PRN
Status: DISCONTINUED | OUTPATIENT
Start: 2022-11-08 | End: 2022-11-08 | Stop reason: HOSPADM

## 2022-11-08 RX ORDER — ACETAMINOPHEN 325 MG/1
650 TABLET ORAL ONCE
Status: DISCONTINUED | OUTPATIENT
Start: 2022-11-08 | End: 2022-11-08 | Stop reason: HOSPADM

## 2022-11-08 RX ORDER — LIDOCAINE HYDROCHLORIDE 20 MG/ML
INJECTION, SOLUTION EPIDURAL; INFILTRATION; INTRACAUDAL; PERINEURAL AS NEEDED
Status: DISCONTINUED | OUTPATIENT
Start: 2022-11-08 | End: 2022-11-08 | Stop reason: SURG

## 2022-11-08 RX ORDER — MAGNESIUM HYDROXIDE 1200 MG/15ML
LIQUID ORAL AS NEEDED
Status: DISCONTINUED | OUTPATIENT
Start: 2022-11-08 | End: 2022-11-08 | Stop reason: HOSPADM

## 2022-11-08 RX ORDER — IBUPROFEN 600 MG/1
600 TABLET ORAL EVERY 6 HOURS PRN
Status: DISCONTINUED | OUTPATIENT
Start: 2022-11-08 | End: 2022-11-08 | Stop reason: HOSPADM

## 2022-11-08 RX ORDER — DEXAMETHASONE SODIUM PHOSPHATE 4 MG/ML
INJECTION, SOLUTION INTRA-ARTICULAR; INTRALESIONAL; INTRAMUSCULAR; INTRAVENOUS; SOFT TISSUE AS NEEDED
Status: DISCONTINUED | OUTPATIENT
Start: 2022-11-08 | End: 2022-11-08 | Stop reason: SURG

## 2022-11-08 RX ORDER — MEPERIDINE HYDROCHLORIDE 25 MG/ML
12.5 INJECTION INTRAMUSCULAR; INTRAVENOUS; SUBCUTANEOUS
Status: DISCONTINUED | OUTPATIENT
Start: 2022-11-08 | End: 2022-11-08 | Stop reason: HOSPADM

## 2022-11-08 RX ORDER — FENTANYL CITRATE 50 UG/ML
INJECTION, SOLUTION INTRAMUSCULAR; INTRAVENOUS AS NEEDED
Status: DISCONTINUED | OUTPATIENT
Start: 2022-11-08 | End: 2022-11-08 | Stop reason: SURG

## 2022-11-08 RX ORDER — PROMETHAZINE HYDROCHLORIDE 25 MG/1
25 SUPPOSITORY RECTAL ONCE AS NEEDED
Status: DISCONTINUED | OUTPATIENT
Start: 2022-11-08 | End: 2022-11-08 | Stop reason: HOSPADM

## 2022-11-08 RX ORDER — LEVOFLOXACIN 5 MG/ML
500 INJECTION, SOLUTION INTRAVENOUS ONCE
Status: COMPLETED | OUTPATIENT
Start: 2022-11-08 | End: 2022-11-08

## 2022-11-08 RX ADMIN — ONDANSETRON 4 MG: 2 INJECTION INTRAMUSCULAR; INTRAVENOUS at 17:45

## 2022-11-08 RX ADMIN — PROPOFOL 150 MG: 10 INJECTION, EMULSION INTRAVENOUS at 17:30

## 2022-11-08 RX ADMIN — ROCURONIUM BROMIDE 50 MG: 10 INJECTION INTRAVENOUS at 17:30

## 2022-11-08 RX ADMIN — FENTANYL CITRATE 100 MCG: 50 INJECTION, SOLUTION INTRAMUSCULAR; INTRAVENOUS at 17:30

## 2022-11-08 RX ADMIN — LEVOFLOXACIN 500 MG: 500 INJECTION, SOLUTION INTRAVENOUS at 17:27

## 2022-11-08 RX ADMIN — DEXAMETHASONE SODIUM PHOSPHATE 4 MG: 4 INJECTION, SOLUTION INTRA-ARTICULAR; INTRALESIONAL; INTRAMUSCULAR; INTRAVENOUS; SOFT TISSUE at 17:45

## 2022-11-08 RX ADMIN — SODIUM CHLORIDE, POTASSIUM CHLORIDE, SODIUM LACTATE AND CALCIUM CHLORIDE 9 ML/HR: 600; 310; 30; 20 INJECTION, SOLUTION INTRAVENOUS at 17:07

## 2022-11-08 RX ADMIN — ACETAMINOPHEN 1000 MG: 500 TABLET ORAL at 17:07

## 2022-11-08 RX ADMIN — MIDAZOLAM 2 MG: 1 INJECTION INTRAMUSCULAR; INTRAVENOUS at 17:07

## 2022-11-08 RX ADMIN — LIDOCAINE HYDROCHLORIDE 100 MG: 20 INJECTION, SOLUTION EPIDURAL; INFILTRATION; INTRACAUDAL; PERINEURAL at 17:30

## 2022-11-08 RX ADMIN — SCOPALAMINE 1 PATCH: 1 PATCH, EXTENDED RELEASE TRANSDERMAL at 17:07

## 2022-11-08 RX ADMIN — SUGAMMADEX 200 MG: 100 INJECTION, SOLUTION INTRAVENOUS at 18:06

## 2022-11-08 RX ADMIN — SODIUM CHLORIDE, POTASSIUM CHLORIDE, SODIUM LACTATE AND CALCIUM CHLORIDE 9 ML/HR: 600; 310; 30; 20 INJECTION, SOLUTION INTRAVENOUS at 18:35

## 2022-11-08 NOTE — ANESTHESIA PREPROCEDURE EVALUATION
Anesthesia Evaluation     Patient summary reviewed and Nursing notes reviewed   history of anesthetic complications: PONV               Airway   Mallampati: I  TM distance: >3 FB  Neck ROM: full  No difficulty expected  Dental      Pulmonary - negative pulmonary ROS and normal exam    breath sounds clear to auscultation  Cardiovascular - normal exam    Rhythm: regular  Rate: normal    (+) hypertension, hyperlipidemia,       Neuro/Psych  (+) headaches, psychiatric history Anxiety and Depression,    GI/Hepatic/Renal/Endo    (+) obesity,  GERD,  renal disease, diabetes mellitus,     Musculoskeletal (-) negative ROS    Abdominal   (+) obese,    Substance History - negative use     OB/GYN negative ob/gyn ROS         Other      history of cancer                    Anesthesia Plan    ASA 3     general     intravenous induction     Anesthetic plan, risks, benefits, and alternatives have been provided, discussed and informed consent has been obtained with: patient.        CODE STATUS:

## 2022-11-08 NOTE — DISCHARGE INSTRUCTIONS
DISCHARGE INSTRUCTIONS  Extracorporeal Shock Wave Lithotripsy (ESWL)/ Ureteroscopy Lasertripsy      For your surgery you had:  General anesthesia (you may have a sore throat for the first 24 hours)  You received a medicated path for nausea prevention today (behind your ear). It is recommended that you remove it 24-48 hours post-operatively. It must be removed within 72 hours.   You have received an anesthesia medication today that can cause hormonal forms of birth control to be ineffective. You should use a different form of birth control (to prevent pregnancy) for 7 days.   You may experience dizziness, drowsiness, or lightheadedness for several hours following surgery.  Do not stay alone today or tonight.  Limit your activity for 24 hours.  You should not drive or operate machinery, drink alcohol, or sign legally binding documents for 24 hours or while you are taking pain medication.  Resume your diet slowly.  Follow any special dietary instructions you may have been given by your doctor.     NOTIFY YOUR DOCTOR IF YOU EXPERIENCE ANY OF THE FOLLOWING:  Temperature greater than 101 degrees Fahrenheit  Shaking Chills  Redness or excessive drainage from incision  Nausea, vomiting and/or pain that is not controlled by prescribed medications  Increase in bleeding or bleeding that is excessive  Unable to urinate in 6 hours after surgery  If unable to reach your doctor, please go to the closest Emergency Room  Strain urine if instructed by physician.  Collect any fragments and take with you on your scheduled appointment. You may pass stone pieces or small blood clots.  Blood in your urine is normal.  It could be light pink to cherry color.  Drink 6-8 glasses of fluid each day to assist with passing of stone fragments.  Back pain is common.  It may feel like a dull ache or back spasm.  Urine will be bloody for several days.  Slight redness or bruising may be noticed on treated side.  If you have difficulty urinating, try  sitting in a bathtub of warm water.    If you have a stent, it must be managed by your urologist.  Do NOT forget.  Medications per physician instructions as indicated on Discharge Medication Information Sheet.      SPECIAL INSTRUCTIONS:  Pull stent by string in 5 days (Sunday).      Last dose of pain medication was given at:   Tylenol (1000mg) last at 5pm. Do not exceed 4000mg of tylenol in a 24 hour period.  May take ibuprofen/toradol at anytime if needed.  May take percocet next at anytime

## 2022-11-08 NOTE — ANESTHESIA POSTPROCEDURE EVALUATION
Patient: Bianca Boles    Procedure Summary     Date: 11/08/22 Room / Location: [facility] OR 07 / [facility] MAIN OR    Anesthesia Start: 1723 Anesthesia Stop: 1815    Procedure: URETEROSCOPY LASER LITHOTRIPSY WITH URETERAL STENT INSERTION (Left) Diagnosis:       Left ureteral stone      (Left ureteral stone [N20.1])    Surgeons: Lori Troy MD Provider: Cabrera Gooden MD    Anesthesia Type: general ASA Status: 3          Anesthesia Type: general    Vitals  Vitals Value Taken Time   /61 11/08/22 1834   Temp 36.1 °C (97 °F) 11/08/22 1814   Pulse 60 11/08/22 1838   Resp 14 11/08/22 1814   SpO2 97 % 11/08/22 1838   Vitals shown include unvalidated device data.        Post Anesthesia Care and Evaluation    Patient location during evaluation: bedside  Patient participation: complete - patient participated  Level of consciousness: awake  Pain management: adequate    Airway patency: patent  Anesthetic complications: No anesthetic complications  PONV Status: none  Cardiovascular status: acceptable  Respiratory status: acceptable  Hydration status: acceptable    Comments: An Anesthesiologist personally participated in the most demanding procedures (including induction and emergence if applicable) in the anesthesia plan, monitored the course of anesthesia administration at frequent intervals and remained physically present and available for immediate diagnosis and treatment of emergencies.

## 2022-11-08 NOTE — OP NOTE
URETEROSCOPY LASER LITHOTRIPSY WITH STENT INSERTION  Procedure Report    Patient Name:  Bianca Boles  YOB: 1980    Date of Surgery:  11/8/2022     Pre-op Diagnosis:   Left ureteral stone [N20.1]       Post-Op Diagnosis Codes:     * Left ureteral stone [N20.1]      Procedure/CPT® Codes:    Procedure(s):  LEFT URETEROSCOPY, LASER LITHOTRIPSY, STONE BASKET EXTRACTION WITH URETERAL STENT INSERTION      Staff:  Surgeon(s):  Lori Troy MD         Anesthesia: General    Estimated Blood Loss: 0 mL    Implants:    Implant Name Type Inv. Item Serial No.  Lot No. LRB No. Used Action   STNT CLASSC DBL PIG 4.5F 26CM - [id number] Stent STNT CLASSC DBL PIG 4.5F 26CM  [facility] JESSICA [id number] Left 1 Implanted       Specimen:          Specimens     ID Source Type Tests Collected By Collected At Frozen?    A Kidney, Left Calculus · TISSUE PATHOLOGY EXAM  · STONE ANALYSIS   Lori Troy MD 11/8/22 [id number] No    Description: LEFT RENAL STONE              Complications: None    Description of Procedure:     After proper consent was obtained, patient was taken to operating room and placed in the dorsal lithotomy position.  The patient was prepped and draped in the normal sterile fashion for a left ureteroscopy.      A 22 Haitian rigid cystoscopy was passed per urethra into the bladder.  The bladder was inspected in a systemic meridian fashion.  No stones, tumors or other abnormalities were seen.      A glide wire was passed up the left ureteral orifice without difficulty.  A dual lumen catheter was placed and a second wire was placed as a safety wire.  Over the working wire a ureteral access sheath was passed.  A flexible scope was then passed into the ureter.  There was a stone  encountered in the left lower pole .  There were no other stones seen.     A 270 laser fiber was used to break the stone up into several small pieces.  A stone basket was placed into the ureter and the stones were  grasped and removed.      There was no evidence of injury to the ureter.  The ureteroscope was removed.  Over the wire, a 4.5 Guatemalan 26 cm stent was passed.  Under fluoroscopy it was seen curling in the renal pelvis and under cystoscopy was seen curling in the bladder.  The cystoscope was removed.      A string was  left on the stent.      The patient tolerated the procedure well and was transferred to the PACU in stable condition.      Lori Troy MD     Date: 11/8/2022  Time: 18:10 EST

## 2022-11-09 ENCOUNTER — TELEPHONE (OUTPATIENT)
Dept: UROLOGY | Facility: CLINIC | Age: 42
End: 2022-11-09

## 2022-11-09 DIAGNOSIS — R11.2 NAUSEA AND VOMITING, UNSPECIFIED VOMITING TYPE: Primary | ICD-10-CM

## 2022-11-09 RX ORDER — PROMETHAZINE HYDROCHLORIDE 25 MG/1
25 SUPPOSITORY RECTAL EVERY 6 HOURS PRN
Qty: 12 SUPPOSITORY | Refills: 1 | Status: SHIPPED | OUTPATIENT
Start: 2022-11-09

## 2022-11-09 NOTE — TELEPHONE ENCOUNTER
Patient called stating she has been vomiting non stop since her surgery. She stated she still has the nausea patch on and is taking zofran but is vomiting it up right after. She can't keep her pain medication down either. She states she is having pain in her left side and in her upper left back that she hasn't experienced before. She is trying to take sips of water but is vomiting that up also. No fever that she can tell.

## 2022-11-12 ENCOUNTER — APPOINTMENT (OUTPATIENT)
Dept: CT IMAGING | Facility: HOSPITAL | Age: 42
End: 2022-11-12

## 2022-11-12 ENCOUNTER — HOSPITAL ENCOUNTER (EMERGENCY)
Facility: HOSPITAL | Age: 42
Discharge: HOME | End: 2022-11-13
Attending: EMERGENCY MEDICINE | Admitting: EMERGENCY MEDICINE

## 2022-11-12 DIAGNOSIS — N30.01 ACUTE CYSTITIS WITH HEMATURIA: Primary | ICD-10-CM

## 2022-11-12 DIAGNOSIS — N13.30 HYDRONEPHROSIS OF LEFT KIDNEY: ICD-10-CM

## 2022-11-12 LAB
ALBUMIN SERPL-MCNC: 3.8 G/DL (ref 3.5–5.2)
ALBUMIN/GLOB SERPL: 1.2 G/DL
ALP SERPL-CCNC: 87 U/L (ref 39–117)
ALT SERPL W P-5'-P-CCNC: 13 U/L (ref 1–33)
ANION GAP SERPL CALCULATED.3IONS-SCNC: 11.1 MMOL/L (ref 5–15)
AST SERPL-CCNC: 15 U/L (ref 1–32)
BACTERIA UR QL AUTO: ABNORMAL /HPF
BASOPHILS # BLD AUTO: 0.05 10*3/MM3 (ref 0–0.2)
BASOPHILS NFR BLD AUTO: 0.4 % (ref 0–1.5)
BILIRUB SERPL-MCNC: 0.2 MG/DL (ref 0–1.2)
BILIRUB UR QL STRIP: NEGATIVE
BUN SERPL-MCNC: 20 MG/DL (ref 6–20)
BUN/CREAT SERPL: 16.1 (ref 7–25)
CALCIUM SPEC-SCNC: 9.4 MG/DL (ref 8.6–10.5)
CHLORIDE SERPL-SCNC: 103 MMOL/L (ref 98–107)
CLARITY UR: ABNORMAL
CO2 SERPL-SCNC: 22.9 MMOL/L (ref 22–29)
COD CRY URNS QL: ABNORMAL /HPF
COLOR UR: ABNORMAL
CREAT SERPL-MCNC: 1.24 MG/DL (ref 0.57–1)
DEPRECATED RDW RBC AUTO: 42.2 FL (ref 37–54)
EGFRCR SERPLBLD CKD-EPI 2021: 55.5 ML/MIN/1.73
EOSINOPHIL # BLD AUTO: 0.36 10*3/MM3 (ref 0–0.4)
EOSINOPHIL NFR BLD AUTO: 3 % (ref 0.3–6.2)
ERYTHROCYTE [DISTWIDTH] IN BLOOD BY AUTOMATED COUNT: 13.9 % (ref 12.3–15.4)
GLOBULIN UR ELPH-MCNC: 3.1 GM/DL
GLUCOSE SERPL-MCNC: 122 MG/DL (ref 65–99)
GLUCOSE UR STRIP-MCNC: NEGATIVE MG/DL
GRAN CASTS URNS QL MICRO: ABNORMAL /LPF
HCG INTACT+B SERPL-ACNC: <0.5 MIU/ML
HCT VFR BLD AUTO: 41.9 % (ref 34–46.6)
HGB BLD-MCNC: 14 G/DL (ref 12–15.9)
HGB UR QL STRIP.AUTO: ABNORMAL
HOLD SPECIMEN: NORMAL
HOLD SPECIMEN: NORMAL
HYALINE CASTS UR QL AUTO: ABNORMAL /LPF
IMM GRANULOCYTES # BLD AUTO: 0.08 10*3/MM3 (ref 0–0.05)
IMM GRANULOCYTES NFR BLD AUTO: 0.7 % (ref 0–0.5)
KETONES UR QL STRIP: ABNORMAL
LEUKOCYTE ESTERASE UR QL STRIP.AUTO: ABNORMAL
LIPASE SERPL-CCNC: 37 U/L (ref 13–60)
LYMPHOCYTES # BLD AUTO: 2.71 10*3/MM3 (ref 0.7–3.1)
LYMPHOCYTES NFR BLD AUTO: 22.2 % (ref 19.6–45.3)
MCH RBC QN AUTO: 28.1 PG (ref 26.6–33)
MCHC RBC AUTO-ENTMCNC: 33.4 G/DL (ref 31.5–35.7)
MCV RBC AUTO: 84 FL (ref 79–97)
MONOCYTES # BLD AUTO: 0.84 10*3/MM3 (ref 0.1–0.9)
MONOCYTES NFR BLD AUTO: 6.9 % (ref 5–12)
NEUTROPHILS NFR BLD AUTO: 66.8 % (ref 42.7–76)
NEUTROPHILS NFR BLD AUTO: 8.15 10*3/MM3 (ref 1.7–7)
NITRITE UR QL STRIP: NEGATIVE
NRBC BLD AUTO-RTO: 0 /100 WBC (ref 0–0.2)
PH UR STRIP.AUTO: 5.5 [PH] (ref 5–8)
PLATELET # BLD AUTO: 250 10*3/MM3 (ref 140–450)
PMV BLD AUTO: 9.5 FL (ref 6–12)
POTASSIUM SERPL-SCNC: 4.2 MMOL/L (ref 3.5–5.2)
PROT SERPL-MCNC: 6.9 G/DL (ref 6–8.5)
PROT UR QL STRIP: ABNORMAL
RBC # BLD AUTO: 4.99 10*6/MM3 (ref 3.77–5.28)
RBC # UR STRIP: ABNORMAL /HPF
REF LAB TEST METHOD: ABNORMAL
SODIUM SERPL-SCNC: 137 MMOL/L (ref 136–145)
SP GR UR STRIP: 1.03 (ref 1–1.03)
SQUAMOUS #/AREA URNS HPF: ABNORMAL /HPF
UROBILINOGEN UR QL STRIP: ABNORMAL
WBC # UR STRIP: ABNORMAL /HPF
WBC NRBC COR # BLD: 12.19 10*3/MM3 (ref 3.4–10.8)
WHOLE BLOOD HOLD COAG: NORMAL
WHOLE BLOOD HOLD SPECIMEN: NORMAL

## 2022-11-12 PROCEDURE — 36415 COLL VENOUS BLD VENIPUNCTURE: CPT

## 2022-11-12 PROCEDURE — 80053 COMPREHEN METABOLIC PANEL: CPT

## 2022-11-12 PROCEDURE — 87086 URINE CULTURE/COLONY COUNT: CPT | Performed by: NURSE PRACTITIONER

## 2022-11-12 PROCEDURE — 99283 EMERGENCY DEPT VISIT LOW MDM: CPT

## 2022-11-12 PROCEDURE — 85025 COMPLETE CBC W/AUTO DIFF WBC: CPT

## 2022-11-12 PROCEDURE — 25010000002 ONDANSETRON PER 1 MG: Performed by: NURSE PRACTITIONER

## 2022-11-12 PROCEDURE — 83690 ASSAY OF LIPASE: CPT

## 2022-11-12 PROCEDURE — 84702 CHORIONIC GONADOTROPIN TEST: CPT

## 2022-11-12 PROCEDURE — 81001 URINALYSIS AUTO W/SCOPE: CPT

## 2022-11-12 PROCEDURE — 96375 TX/PRO/DX INJ NEW DRUG ADDON: CPT

## 2022-11-12 PROCEDURE — 74176 CT ABD & PELVIS W/O CONTRAST: CPT

## 2022-11-12 RX ORDER — ONDANSETRON 2 MG/ML
4 INJECTION INTRAMUSCULAR; INTRAVENOUS ONCE
Status: COMPLETED | OUTPATIENT
Start: 2022-11-12 | End: 2022-11-12

## 2022-11-12 RX ORDER — CEFTRIAXONE SODIUM 2 G/50ML
2 INJECTION, SOLUTION INTRAVENOUS ONCE
Status: COMPLETED | OUTPATIENT
Start: 2022-11-13 | End: 2022-11-13

## 2022-11-12 RX ORDER — SODIUM CHLORIDE 0.9 % (FLUSH) 0.9 %
10 SYRINGE (ML) INJECTION AS NEEDED
Status: DISCONTINUED | OUTPATIENT
Start: 2022-11-12 | End: 2022-11-13 | Stop reason: HOSPADM

## 2022-11-12 RX ADMIN — SODIUM CHLORIDE 1000 ML: 9 INJECTION, SOLUTION INTRAVENOUS at 23:57

## 2022-11-12 RX ADMIN — ONDANSETRON 4 MG: 2 INJECTION INTRAMUSCULAR; INTRAVENOUS at 23:59

## 2022-11-13 VITALS
DIASTOLIC BLOOD PRESSURE: 63 MMHG | WEIGHT: 213.19 LBS | HEART RATE: 79 BPM | SYSTOLIC BLOOD PRESSURE: 108 MMHG | HEIGHT: 67 IN | BODY MASS INDEX: 33.46 KG/M2 | RESPIRATION RATE: 19 BRPM | OXYGEN SATURATION: 95 % | TEMPERATURE: 98.7 F

## 2022-11-13 PROCEDURE — 96376 TX/PRO/DX INJ SAME DRUG ADON: CPT

## 2022-11-13 PROCEDURE — 96367 TX/PROPH/DG ADDL SEQ IV INF: CPT

## 2022-11-13 PROCEDURE — 25010000002 KETOROLAC TROMETHAMINE PER 15 MG: Performed by: NURSE PRACTITIONER

## 2022-11-13 PROCEDURE — 0 CEFTRIAXONE PER 250 MG: Performed by: NURSE PRACTITIONER

## 2022-11-13 PROCEDURE — 25010000002 ONDANSETRON PER 1 MG: Performed by: NURSE PRACTITIONER

## 2022-11-13 PROCEDURE — 25010000002 LEVOFLOXACIN PER 250 MG: Performed by: NURSE PRACTITIONER

## 2022-11-13 PROCEDURE — 96375 TX/PRO/DX INJ NEW DRUG ADDON: CPT

## 2022-11-13 PROCEDURE — 96365 THER/PROPH/DIAG IV INF INIT: CPT

## 2022-11-13 RX ORDER — LEVOFLOXACIN 500 MG/1
500 TABLET, FILM COATED ORAL DAILY
Qty: 7 TABLET | Refills: 0 | Status: SHIPPED | OUTPATIENT
Start: 2022-11-13 | End: 2022-11-20

## 2022-11-13 RX ORDER — ONDANSETRON 2 MG/ML
4 INJECTION INTRAMUSCULAR; INTRAVENOUS ONCE
Status: COMPLETED | OUTPATIENT
Start: 2022-11-13 | End: 2022-11-13

## 2022-11-13 RX ORDER — KETOROLAC TROMETHAMINE 30 MG/ML
30 INJECTION, SOLUTION INTRAMUSCULAR; INTRAVENOUS ONCE
Status: COMPLETED | OUTPATIENT
Start: 2022-11-13 | End: 2022-11-13

## 2022-11-13 RX ORDER — HYDROCODONE BITARTRATE AND ACETAMINOPHEN 5; 325 MG/1; MG/1
1 TABLET ORAL ONCE
Status: COMPLETED | OUTPATIENT
Start: 2022-11-13 | End: 2022-11-13

## 2022-11-13 RX ORDER — LEVOFLOXACIN 5 MG/ML
750 INJECTION, SOLUTION INTRAVENOUS ONCE
Status: COMPLETED | OUTPATIENT
Start: 2022-11-13 | End: 2022-11-13

## 2022-11-13 RX ADMIN — CEFTRIAXONE SODIUM 2 G: 2 INJECTION, SOLUTION INTRAVENOUS at 00:00

## 2022-11-13 RX ADMIN — LEVOFLOXACIN 750 MG: 750 INJECTION, SOLUTION INTRAVENOUS at 01:20

## 2022-11-13 RX ADMIN — KETOROLAC TROMETHAMINE 30 MG: 30 INJECTION, SOLUTION INTRAMUSCULAR; INTRAVENOUS at 01:20

## 2022-11-13 RX ADMIN — HYDROCODONE BITARTRATE AND ACETAMINOPHEN 1 TABLET: 5; 325 TABLET ORAL at 01:23

## 2022-11-13 RX ADMIN — ONDANSETRON 4 MG: 2 INJECTION INTRAMUSCULAR; INTRAVENOUS at 01:22

## 2022-11-13 NOTE — DISCHARGE INSTRUCTIONS
CT scan shows persistent hydronephrosis of the left ureter and kidney.  No obstructing stones were detected.    Urine shows persistent urinary tract infection despite treatment with antibiotic.  You will be started on a new antibiotic and a culture will be sent.    Take medication as prescribed for pain as well as home medications postop.    Follow-up with your urologist on Monday for reexam and further pain management or other treatment as indicated.    Return for new or worsening symptoms

## 2022-11-13 NOTE — ED PROVIDER NOTES
Time: 10:20 PM EST  Chief Complaint:   Chief Complaint   Patient presents with   • Difficulty Urinating   • Vomiting           History of Present Illness:  Patient is a 42 y.o. year old female who presents to the emergency department with c/o difficulty urinating. She is s/p lithotripsy with stent placement 5 days ago. Reports decreased UOP. Reports pain with urination. Patient states she removed her own stent today and pain increased. She reports she is still urinating but not as much. Also complains of nauseated. Vomited earlier today. Has not had anti-emetics since this am.         History provided by:  Patient  Difficulty Urinating  Pain quality:  Aching  Pain severity:  Moderate  Onset quality:  Gradual  Duration:  3 days  Timing:  Constant  Progression:  Waxing and waning  Chronicity:  Recurrent  Recent urinary tract infections: yes    Relieved by:  Nothing  Worsened by:  Nothing  Ineffective treatments: She was on Keflex for UTI.  Urinary symptoms: frequent urination, hesitancy and incontinence    Associated symptoms: abdominal pain, flank pain, nausea and vomiting    Associated symptoms: no fever    Vomiting  The primary symptoms include abdominal pain, nausea, vomiting and dysuria. Primary symptoms do not include fever, fatigue, diarrhea, myalgias, arthralgias or rash.   The dysuria is associated with frequency and urgency.   The illness does not include chills or back pain.       Patient Care Team  Primary Care Provider: Patricia Hernandez APRN    Past Medical History:     Allergies   Allergen Reactions   • Morphine Itching and Nausea And Vomiting     Past Medical History:   Diagnosis Date   • Acid reflux    • Allergic rhinitis due to allergen 06/09/2016   • Anxiety    • Anxiety and depression    • Benign essential hypertension    • Cancer (HCC)     renal cancer/mass   • Diabetes (HCC)    • Diabetes mellitus (HCC)     type ii, doesn't check bg at home    • Diabetes mellitus, type 2 (HCC)    •  Gastroparesis    • GERD (gastroesophageal reflux disease)    • High triglycerides    • History of bariatric surgery 2021   • History of kidney stones    • HTN (hypertension)    • Hyperlipemia    • Hyperlipidemia    • Hypertension    • Hypertriglyceridemia    • Kidney stone    • Lumbar herniated disc    • Obesity    • Panic attacks    • PCOS (polycystic ovarian syndrome)    • Psoriasis     ELBOWS   • Seasonal allergies    • Spinal headache     AFTER PAIN EPIDURALS.  NO BLOOD PATCH     Past Surgical History:   Procedure Laterality Date   • ABDOMINAL SURGERY     • ADENOIDECTOMY     •  SECTION  ,   • CYSTOSCOPY BLADDER STONE LITHOTRIPSY     • EAR TUBES     • ENDOSCOPY N/A 10/20/2020    Procedure: ESOPHAGOGASTRODUODENOSCOPY WITH BIOPSY;  Surgeon: Fidel Grajeda Jr., MD;  Location: [facility] ENDOSCOPY;  Service: General;  Laterality: N/A;  PRE- GERD  POST- RETAINED FOOD, GASTRITIS, GASTRIC POLYPS   • ENDOSCOPY     • FOOT FRACTURE SURGERY Left 2017    screw placed   • FRACTURE SURGERY     • GASTRECTOMY     • GASTRIC SLEEVE LAPAROSCOPIC N/A 2020    Procedure: GASTRIC SLEEVE LAPAROSCOPIC;  Surgeon: Fidel Grajeda Jr., MD;  Location: [facility] OR OSC;  Service: Bariatric;  Laterality: N/A;   • KIDNEY SURGERY     • NEPHRECTOMY PARTIAL Right 11/15/2021    Procedure: NEPHRECTOMY PARTIAL LAPAROSCOPIC WITH DAVINCI ROBOT;  Surgeon: Lori Troy MD;  Location: [facility];  Service: Robotics - DaVinci;  Laterality: Right;   • TONSILLECTOMY     • URETEROSCOPY LASER LITHOTRIPSY WITH STENT INSERTION Right 2021    Procedure: CYSTOSCOPY, RIGHT URETEROSCOPY, LASERTRIPSY, STONE BASKET EXTRACTION AND STENT INSERTION;  Surgeon: Lori Troy MD;  Location: [facility] OR;  Service: Urology;  Laterality: Right;   • URETEROSCOPY LASER LITHOTRIPSY WITH STENT INSERTION Left 2022    Procedure: URETEROSCOPY LASER LITHOTRIPSY WITH URETERAL STENT INSERTION;  Surgeon: Woodrow  Lori COFFEY MD;  Location: [facility] MAIN OR;  Service: Urology;  Laterality: Left;     Family History   Problem Relation Age of Onset   • Diabetes Father    • Hypertension Father    • Heart disease Father    • Cancer Father         BLADDER   • Colon cancer Father         malignant, currently 62   • Stroke Maternal Grandmother    • Heart disease Maternal Grandmother    • Diabetes Paternal Grandfather    • Heart disease Paternal Grandfather    • Malig Hyperthermia Neg Hx        Home Medications:  Prior to Admission medications    Medication Sig Start Date End Date Taking? Authorizing Provider   Apremilast (Otezla) 30 MG tablet take 1 tablet by mouth twice a day 8/16/22      Biotin 57679 MCG tablet Take 1 tablet by mouth Every Night.    Heron Campuzano MD   buPROPion XL (WELLBUTRIN XL) 300 MG 24 hr tablet Take 1 tablet by mouth Daily. 11/4/22   Patricia Hernandez APRN   CALCIUM-MAGNESIUM-ZINC PO Take 3 tablets by mouth Daily.    Heron Campuzano MD   cetirizine (zyrTEC) 10 MG tablet Take 10 mg by mouth every night at bedtime. 5/31/22   Heron Campuzano MD   Cholecalciferol 25 MCG (1000 UT) capsule Take 2 capsules by mouth Daily. 5/4/22   Patricia Hernandez APRN   clobetasol (TEMOVATE) 0.05 % external solution Apply 10-15 drops every day to affected areas in the scalp after shampooing until clear. 8/16/22      COLLAGEN PO Take 3 tablets by mouth Daily.    Heron Campuzano MD   EluRyng 0.12-0.015 MG/24HR vaginal ring Insert 1 each into the vagina Every 30 (Thirty) Days as directed 11/4/22   Patricia Hernandez APRN   fluticasone (FLONASE) 50 MCG/ACT nasal spray USE 2 SPRAYS IN EACH NOSTRIL ONCE DAILY AS DIRECTED 5/4/22   Patricia Hernandez APRN   IRON-VITAMIN C PO Take 1 tablet by mouth Daily.    Heron Campuzano MD   ketorolac (TORADOL) 10 MG tablet Take 1 tablet by mouth Every 6 (Six) Hours As Needed for Moderate Pain. 11/3/22   Pollo Albrecht PA-C   lisinopril  (PRINIVIL,ZESTRIL) 2.5 MG tablet Take 1 tablet by mouth Daily. 22   Patricia Hernandez APRN   metFORMIN (GLUCOPHAGE) 1000 MG tablet Take 1 tablet by mouth 2 (Two) Times a Day With Meals. 22   Patricia Hernandez APRN   metoprolol succinate XL (Toprol XL) 25 MG 24 hr tablet Take 1 tablet by mouth Every Night. 22   Patricia Hernandez APRN   Multiple Vitamins-Minerals (MULTIVITAMIN PO) Take 1 tablet by mouth Every Night.    Provider, MD Heron   ondansetron ODT (Zofran ODT) 4 MG disintegrating tablet Place 1 tablet on the tongue Every 8 (Eight) Hours As Needed for Nausea. 22   Patricia Hernandez APRN   oxyCODONE-acetaminophen (PERCOCET) 5-325 MG per tablet Take 1-2 tablets by mouth Every 4 (Four) Hours As Needed for Moderate Pain or Severe Pain (Pain). 22   Lori Troy MD   pravastatin (PRAVACHOL) 10 MG tablet Take 1 tablet by mouth Every Night. 22   Patricia Hernandez APRN   Probiotic Product (PROBIOTIC-10 PO) Take 1 tablet by mouth Every Night.    Provider, MD Heron   promethazine (PHENERGAN) 25 MG suppository Insert 1 suppository into the rectum Every 6 (Six) Hours As Needed for Nausea. 22   Lori Troy MD   topiramate (TOPAMAX) 50 MG tablet Take 1 tablet by mouth every night at bedtime for 90 days. 22  Lori Manzanares APRN   traZODone (DESYREL) 50 MG tablet Take 1 tablet by mouth Every Night. 22   Patricia Hernandez APRN        Social History:   Social History     Tobacco Use   • Smoking status: Former     Years: 35.00     Types: Cigarettes     Quit date: 1/15/2020     Years since quittin.8   • Smokeless tobacco: Never   • Tobacco comments:     A PACK A WEEK   Vaping Use   • Vaping Use: Never used   Substance Use Topics   • Alcohol use: Yes     Comment: RARELY   • Drug use: Never         Review of Systems:  Review of Systems   Constitutional: Negative for chills, fatigue and fever.   HENT:  "Negative for ear pain, rhinorrhea and sore throat.    Eyes: Negative for visual disturbance.   Respiratory: Negative for cough and shortness of breath.    Cardiovascular: Negative for chest pain.   Gastrointestinal: Positive for abdominal pain, nausea and vomiting. Negative for diarrhea.   Genitourinary: Positive for difficulty urinating, dysuria, flank pain, frequency and urgency.   Musculoskeletal: Negative for arthralgias, back pain and myalgias.   Skin: Negative for rash.   Neurological: Negative for light-headedness and headaches.   Hematological: Negative for adenopathy.   Psychiatric/Behavioral: Negative.         Physical Exam:  /93 (BP Location: Left arm, Patient Position: Sitting)   Pulse 70   Temp 98.1 °F (36.7 °C) (Oral)   Resp 19   Ht 170.2 cm (67\")   Wt 96.7 kg (213 lb 3 oz)   LMP 10/19/2022 (Approximate) Comment: urine preg today  SpO2 94%   BMI 33.39 kg/m²     Physical Exam  Vitals and nursing note reviewed.   Constitutional:       General: She is not in acute distress.     Appearance: Normal appearance. She is not toxic-appearing.   HENT:      Head: Normocephalic and atraumatic.      Nose: Nose normal.      Mouth/Throat:      Mouth: Mucous membranes are moist.   Eyes:      Conjunctiva/sclera: Conjunctivae normal.      Pupils: Pupils are equal, round, and reactive to light.   Cardiovascular:      Rate and Rhythm: Normal rate and regular rhythm.      Heart sounds: Normal heart sounds.   Pulmonary:      Effort: Pulmonary effort is normal.      Breath sounds: Normal breath sounds.   Abdominal:      General: Bowel sounds are normal. There is no distension.      Palpations: Abdomen is soft.      Tenderness: There is abdominal tenderness ( Left mid and lower quadrant). There is left CVA tenderness. There is no right CVA tenderness.   Musculoskeletal:         General: Normal range of motion.      Cervical back: Normal range of motion and neck supple.   Skin:     General: Skin is warm and dry. "   Neurological:      General: No focal deficit present.      Mental Status: She is alert and oriented to person, place, and time.   Psychiatric:         Mood and Affect: Mood normal.         Behavior: Behavior normal.         Thought Content: Thought content normal.         Judgment: Judgment normal.            Medications in the Emergency Department:  Medications   sodium chloride 0.9 % flush 10 mL (has no administration in time range)   sodium chloride 0.9 % flush 10 mL (has no administration in time range)   sodium chloride 0.9 % bolus 1,000 mL (0 mL Intravenous Stopped 11/13/22 0121)   ondansetron (ZOFRAN) injection 4 mg (4 mg Intravenous Given 11/12/22 2359)   cefTRIAXone (ROCEPHIN) IVPB 2 g (0 g Intravenous Stopped 11/13/22 0121)   levoFLOXacin (LEVAQUIN) 750 mg/150 mL D5W (premix) (LEVAQUIN) 750 mg (750 mg Intravenous New Bag 11/13/22 0120)   ketorolac (TORADOL) injection 30 mg (30 mg Intravenous Given 11/13/22 0120)   ondansetron (ZOFRAN) injection 4 mg (4 mg Intravenous Given 11/13/22 0122)   HYDROcodone-acetaminophen (NORCO) 5-325 MG per tablet 1 tablet (1 tablet Oral Given 11/13/22 0123)        Labs  Lab Results (last 24 hours)     Procedure Component Value Units Date/Time    CBC & Differential [[id number]]  (Abnormal) Collected: 11/12/22 2128    Specimen: Blood Updated: 11/12/22 2152    Narrative:      The following orders were created for panel order CBC & Differential.  Procedure                               Abnormality         Status                     ---------                               -----------         ------                     CBC Auto Differential[[id number]]        Abnormal            Final result                 Please view results for these tests on the individual orders.    Comprehensive Metabolic Panel [[id number]]  (Abnormal) Collected: 11/12/22 2128    Specimen: Blood Updated: 11/12/22 2216     Glucose 122 mg/dL      BUN 20 mg/dL      Creatinine 1.24 mg/dL      Sodium 137 mmol/L       Potassium 4.2 mmol/L      Chloride 103 mmol/L      CO2 22.9 mmol/L      Calcium 9.4 mg/dL      Total Protein 6.9 g/dL      Albumin 3.80 g/dL      ALT (SGPT) 13 U/L      AST (SGOT) 15 U/L      Alkaline Phosphatase 87 U/L      Total Bilirubin 0.2 mg/dL      Globulin 3.1 gm/dL      A/G Ratio 1.2 g/dL      BUN/Creatinine Ratio 16.1     Anion Gap 11.1 mmol/L      eGFR 55.5 mL/min/1.73      Comment: National Kidney Foundation and American Society of Nephrology (ASN) Task Force recommended calculation based on the Chronic Kidney Disease Epidemiology Collaboration (CKD-EPI) equation refit without adjustment for race.       Narrative:      GFR Normal >60  Chronic Kidney Disease <60  Kidney Failure <15      Lipase [[id number]]  (Normal) Collected: 11/12/22 2128    Specimen: Blood Updated: 11/12/22 2216     Lipase 37 U/L     hCG, Quantitative, Pregnancy [[id number]] Collected: 11/12/22 2128    Specimen: Blood Updated: 11/12/22 2215     HCG Quantitative <0.50 mIU/mL     Narrative:      HCG Ranges by Gestational Age    Females - non-pregnant premenopausal   </= 1mIU/mL HCG  Females - postmenopausal               </= 7mIU/mL HCG    3 Weeks       5.4   -      72 mIU/mL  4 Weeks      10.2   -     708 mIU/mL  5 Weeks       217   -   8,245 mIU/mL  6 Weeks       152   -  32,177 mIU/mL  7 Weeks     4,059   - 153,767 mIU/mL  8 Weeks    31,366   - 149,094 mIU/mL  9 Weeks    59,109   - 135,901 mIU/mL  10 Weeks   44,186   - 170,409 mIU/mL  12 Weeks   27,107   - 201,615 mIU/mL  14 Weeks   24,302   -  93,646 mIU/mL  15 Weeks   12,540   -  69,747 mIU/mL  16 Weeks    8,904   -  55,332 mIU/mL  17 Weeks    8,240   -  51,793 mIU/mL  18 Weeks    9,649   -  55,271 mIU/mL    Results may be falsely decreased if patient taking Biotin.      CBC Auto Differential [[id number]]  (Abnormal) Collected: 11/12/22 2128    Specimen: Blood Updated: 11/12/22 2152     WBC 12.19 10*3/mm3      RBC 4.99 10*6/mm3      Hemoglobin 14.0 g/dL      Hematocrit 41.9 %      MCV  84.0 fL      MCH 28.1 pg      MCHC 33.4 g/dL      RDW 13.9 %      RDW-SD 42.2 fl      MPV 9.5 fL      Platelets 250 10*3/mm3      Neutrophil % 66.8 %      Lymphocyte % 22.2 %      Monocyte % 6.9 %      Eosinophil % 3.0 %      Basophil % 0.4 %      Immature Grans % 0.7 %      Neutrophils, Absolute 8.15 10*3/mm3      Lymphocytes, Absolute 2.71 10*3/mm3      Monocytes, Absolute 0.84 10*3/mm3      Eosinophils, Absolute 0.36 10*3/mm3      Basophils, Absolute 0.05 10*3/mm3      Immature Grans, Absolute 0.08 10*3/mm3      nRBC 0.0 /100 WBC     Urinalysis With Microscopic If Indicated (No Culture) - Urine, Clean Catch [[id number]]  (Abnormal) Collected: 11/12/22 2139    Specimen: Urine, Clean Catch Updated: 11/12/22 2158     Color, UA Dark Yellow     Appearance, UA Turbid     pH, UA 5.5     Specific Gravity, UA 1.026     Glucose, UA Negative     Ketones, UA Trace     Bilirubin, UA Negative     Blood, UA Large (3+)     Protein, UA >=300 mg/dL (3+)     Leuk Esterase, UA Small (1+)     Nitrite, UA Negative     Urobilinogen, UA 1.0 E.U./dL    Urinalysis, Microscopic Only - Urine, Clean Catch [[id number]]  (Abnormal) Collected: 11/12/22 2139    Specimen: Urine, Clean Catch Updated: 11/12/22 2218     RBC, UA Too Numerous to Count /HPF      WBC, UA 21-30 /HPF      Bacteria, UA 2+ /HPF      Squamous Epithelial Cells, UA 3-6 /HPF      Hyaline Casts, UA 0-2 /LPF      Granular Casts, UA 0-2 /LPF      Calcium Oxalate Crystals, UA Small/1+ /HPF      Methodology Manual Light Microscopy    Urine Culture - Urine, Urine, Clean Catch [[id number]] Collected: 11/12/22 2139    Specimen: Urine, Clean Catch Updated: 11/13/22 0039           Imaging:  CT Abdomen Pelvis Stone Protocol    Result Date: 11/13/2022  PROCEDURE: CT ABDOMEN PELVIS STONE PROTOCOL  COMPARISONS: 11/8/2022; 11/3/2022; 6/6/2022.  INDICATIONS: 42-YEAR-OLD FEMALE W/ H/O LEFT FLANK PAIN; RECENT H/O LITHOTRIPSY W/ LEFT URETERAL STENT PLACEMENT; THE PATIENT REMOVED HER STENT TODAY; H/O  RIGHT RENAL CELL CARCINOMA (2021).  TECHNIQUE: 742 CT images were created without intravenous or oral contrast agent administration.   PROTOCOL:   Standard CT imaging protocol performed.    RADIATION:   DLP: 738.7mGy*cm   Automated exposure control was utilized to minimize radiation dose.  FINDINGS: There has been interval removal of the left-sided ureteral stent since the 11/8/2022 studies.  There is mild-to-moderate left hydronephrosis and left hydroureter without an obstructing ureteral calculus.  The findings may be related to persistent edema at the left ureterovesical junction (UVJ).  A distal left ureteral stricture cannot be excluded.  Left-sided vesicoureteral reflux disease is possible.  Nonobstructing renal calyceal stones are seen on the left, measuring about 3 mm or less.  Probably no right-sided nephrolithiasis.  No right-sided hydronephrosis or ureterolithiasis.  There are postoperative changes of the right kidney, most suggestive of partial nephrectomy involving its upper pole.  No definite urinary bladder calculi are seen.  No other definite acute findings are seen otherwise.  There is no acute intraperitoneal or retroperitoneal hemorrhage.  No definite sizable (drainable) perinephric fluid collection is seen.  There is mild nonspecific periureteral and perinephric inflammatory change on the left.  The lung bases are clear of acute infiltrate.  No acute fracture.  No aggressive osseous lesion.  No convincing nonenhanced CT evidence for an infectious spondylodiscitis.  The patient has undergone sleeve gastrectomy.  The other incidental nonemergent findings are as described in the prior CT exam reports from 11/3/2022 and 6/6/2022.       Mild-to-moderate left-sided hydronephrosis is seen without an obstructing left ureteral calculus.  Nonobstructing left nephrolithiasis is present.  No right-sided hydronephrosis or obstructive uropathy.  No definite right-sided nephrolithiasis or ureterolithiasis.  No  urinary bladder calculi are seen.  Please see above comments for further detail.     Please note that portions of this note were completed with a voice recognition program.  MARIE SAUNDERS JR, MD       Electronically Signed and Approved By: MARIE SAUNDERS JR, MD on 11/13/2022 at 0:26             Progress  ED Course as of 11/13/22 0242   Sat Nov 12, 2022   6899 --- PROVIDER IN TRIAGE NOTE ---    The patient was evaluated in triage by Lori mccauley. Orders were placed and the patient is currently awaiting disposition. [AR]   Sun Nov 13, 2022   0036 CT Abdomen Pelvis Stone Protocol  Left sided hydro no sign of obstructing uropathy.  Patient continues to have urinary tract infection.  Patient had been originally placed on Keflex and has 1 dose left.  Patient will be started on different antibiotic and follow-up with Sacha [DS]      ED Course User Index  [AR] Lori Thompson APRN  [DS] Annalisa Moore APRN                            The patient was initially evaluated in the triage area where orders were placed. The patient was later dispositioned by CARLTON Grant.      The patient was advised to stay for completion of workup which includes but is not limited to communication of labs and radiological results, reassessment and plan. The patient was advised that leaving prior to disposition by a provider could result in critical findings that are not communicated to the patient.     Medical Decision Making:  MDM  Number of Diagnoses or Management Options  Acute cystitis with hematuria  Hydronephrosis of left kidney  Diagnosis management comments: The patient is resting comfortably and feels better, is alert and in no distress. Repeat examination is unremarkable and benign; in particular, there's no discomfort at McBurney's point and there is no pulsatile mass. The history, exam, diagnostic testing, and current condition does not suggest acute appendicitis, bowel obstruction, acute cholecystitis, bowel  perforation, major gastrointestinal bleeding, severe diverticulitis, abdominal aortic aneurysm, mesenteric ischemia, volvulus, sepsis, or other significant pathology that warrants further testing, continued ED treatment, admission, for surgical evaluation at this point. The vital signs have been stable. The patient does not have uncontrollable pain, intractable vomiting, or other significant symptoms. The patient's condition is stable and appropriate for discharge from the emergency department.         Amount and/or Complexity of Data Reviewed  Clinical lab tests: reviewed and ordered  Tests in the radiology section of CPT®: reviewed and ordered  Tests in the medicine section of CPT®: ordered and reviewed  Decide to obtain previous medical records or to obtain history from someone other than the patient: yes  Review and summarize past medical records: yes (Reviewed previous visit.  Urinalysis looks to be worsening instead of improving from previous.  New antibiotic will be started and patient can follow-up with her surgeon)    Risk of Complications, Morbidity, and/or Mortality  Presenting problems: moderate  Diagnostic procedures: moderate  Management options: low    Patient Progress  Patient progress: stable           The following orders were placed after triage and evaluation:  Orders Placed This Encounter   Procedures   • Urine Culture - Urine,   • CT Abdomen Pelvis Stone Protocol   • [place] Draw   • Comprehensive Metabolic Panel   • Lipase   • Urinalysis With Microscopic If Indicated (No Culture) - Urine, Clean Catch   • hCG, Quantitative, Pregnancy   • CBC Auto Differential   • Urinalysis, Microscopic Only - Urine, Clean Catch   • NPO Diet NPO Type: Strict NPO   • Undress & Gown   • Insert Peripheral IV   • Insert peripheral IV   • CBC & Differential   • Green Top (Gel)   • Lavender Top   • Gold Top - SST   • Light Blue Top       Final diagnoses:   Acute cystitis with hematuria   Hydronephrosis of left kidney           Disposition:  ED Disposition     ED Disposition   Discharge    Condition   Stable    Comment   --             This medical record created using voice recognition software.           Annalisa Moore, APRN  11/13/22 0247

## 2022-11-14 ENCOUNTER — HOSPITAL ENCOUNTER (OUTPATIENT)
Dept: CARDIOLOGY | Facility: HOSPITAL | Age: 42
End: 2022-11-14

## 2022-11-14 LAB — BACTERIA SPEC AEROBE CULT: NO GROWTH

## 2022-11-16 ENCOUNTER — OFFICE VISIT (OUTPATIENT)
Dept: UROLOGY | Facility: CLINIC | Age: 42
End: 2022-11-16

## 2022-11-16 VITALS — BODY MASS INDEX: 33.49 KG/M2 | RESPIRATION RATE: 16 BRPM | HEIGHT: 67 IN | WEIGHT: 213.4 LBS

## 2022-11-16 DIAGNOSIS — N28.89 RIGHT RENAL MASS: Primary | ICD-10-CM

## 2022-11-16 LAB
BILIRUB BLD-MCNC: NEGATIVE MG/DL
CLARITY, POC: CLEAR
COLOR UR: YELLOW
EXPIRATION DATE: ABNORMAL
GLUCOSE UR STRIP-MCNC: NEGATIVE MG/DL
KETONES UR QL: NEGATIVE
LEUKOCYTE EST, POC: ABNORMAL
Lab: ABNORMAL
NITRITE UR-MCNC: NEGATIVE MG/ML
PH UR: 6.5 [PH] (ref 5–8)
PROT UR STRIP-MCNC: ABNORMAL MG/DL
RBC # UR STRIP: ABNORMAL /UL
SP GR UR: 1.03 (ref 1–1.03)
UROBILINOGEN UR QL: ABNORMAL

## 2022-11-16 PROCEDURE — 81003 URINALYSIS AUTO W/O SCOPE: CPT | Performed by: UROLOGY

## 2022-11-16 PROCEDURE — 99212 OFFICE O/P EST SF 10 MIN: CPT | Performed by: UROLOGY

## 2022-11-16 NOTE — PROGRESS NOTES
"Chief Complaint  No chief complaint on file.    Subjective          Bianca Boles presents to [facility]  History of Present Illness      The patient presents today for a follow-up.    She reports that she is doing well. She states that she still has a little bit of soreness and pain on the left side.     A review of systems was completed and positive findings are noted in the HPI.    Objective   Vital Signs:   Resp 16   Ht 170.2 cm (67\")   Wt 96.8 kg (213 lb 6.4 oz)   BMI 33.42 kg/m²       Physical Exam  Vitals and nursing note reviewed.   Constitutional:       Appearance: Normal appearance. She is well-developed.   Pulmonary:      Effort: Pulmonary effort is normal.      Breath sounds: Normal air entry.   Neurological:      Mental Status: She is alert and oriented to person, place, and time.      Motor: Motor function is intact.   Psychiatric:         Mood and Affect: Mood normal.         Behavior: Behavior normal.          Result Review :   The following data was reviewed by: Lori Troy MD on 11/16/2022:    Results for orders placed or performed in visit on 11/16/22   POC Urinalysis Dipstick, Automated    Specimen: Urine   Result Value Ref Range    Color Yellow Yellow, Straw, Dark Yellow, Sheri    Clarity, UA Clear Clear    Specific Gravity  1.030 1.005 - 1.030    pH, Urine 6.5 5.0 - 8.0    Leukocytes Small (1+) (A) Negative    Nitrite, UA Negative Negative    Protein, POC 30 mg/dL (A) Negative mg/dL    Glucose, UA Negative Negative mg/dL    Ketones, UA Negative Negative    Urobilinogen, UA 0.2 E.U./dL Normal, 0.2 E.U./dL    Bilirubin Negative Negative    Blood, UA Trace (A) Negative    Lot Number [id number]     Expiration Date 112,023             Assessment and Plan    Diagnoses and all orders for this visit:    1. Right renal mass (Primary)  -     POC Urinalysis Dipstick, Automated    1. Kidney stones  - We will plan on x-ray in 1 year.        Follow Up       No follow-ups " on file.  Patient was given instructions and counseling regarding her condition or for health maintenance advice. Please see specific information pulled into the AVS if appropriate.     Transcribed from ambient dictation for Lori Troy MD by Kelsey Daniels, Quality .  11/16/22   12:11 EST    Patient or patient representative verbalized consent to the visit recording.  I have personally performed the services described in this document as transcribed by the above individual, and it is both accurate and complete.  Lori Troy MD  11/17/2022  15:02 EST

## 2022-11-21 LAB
CALCIUM OXALATE DIHYDRATE MFR STONE IR: 80 %
COLOR STONE: NORMAL
COM MFR STONE: 20 %
COMPN STONE: NORMAL
LABORATORY COMMENT REPORT: NORMAL
LABORATORY COMMENT REPORT: NORMAL
Lab: NORMAL
Lab: NORMAL
PHOTO: NORMAL
SIZE STONE: NORMAL MM
SPEC SOURCE SUBJ: NORMAL
SPECIMEN STATUS: NORMAL
WT STONE: 13 MG

## 2022-12-13 RX ORDER — TOPIRAMATE 50 MG/1
50 TABLET, FILM COATED ORAL
Qty: 90 TABLET | Refills: 0 | Status: SHIPPED | OUTPATIENT
Start: 2022-12-13 | End: 2023-03-06 | Stop reason: SDUPTHER

## 2022-12-14 ENCOUNTER — OFFICE VISIT (OUTPATIENT)
Dept: FAMILY MEDICINE CLINIC | Facility: CLINIC | Age: 42
End: 2022-12-14

## 2022-12-14 VITALS
OXYGEN SATURATION: 100 % | SYSTOLIC BLOOD PRESSURE: 98 MMHG | HEART RATE: 60 BPM | HEIGHT: 67 IN | WEIGHT: 215 LBS | TEMPERATURE: 97.5 F | DIASTOLIC BLOOD PRESSURE: 52 MMHG | BODY MASS INDEX: 33.74 KG/M2

## 2022-12-14 DIAGNOSIS — Z12.4 SCREENING FOR MALIGNANT NEOPLASM OF CERVIX: ICD-10-CM

## 2022-12-14 DIAGNOSIS — R82.90 ABNORMAL URINALYSIS: ICD-10-CM

## 2022-12-14 DIAGNOSIS — N89.8 VAGINAL DISCHARGE: ICD-10-CM

## 2022-12-14 DIAGNOSIS — Z00.00 ENCOUNTER FOR WELL WOMAN EXAM WITHOUT GYNECOLOGICAL EXAM: Primary | ICD-10-CM

## 2022-12-14 LAB
BILIRUB BLD-MCNC: NEGATIVE MG/DL
CANDIDA SPECIES: NEGATIVE
CLARITY, POC: CLEAR
COLOR UR: YELLOW
EXPIRATION DATE: ABNORMAL
GARDNERELLA VAGINALIS: NEGATIVE
GLUCOSE UR STRIP-MCNC: NEGATIVE MG/DL
KETONES UR QL: ABNORMAL
LEUKOCYTE EST, POC: NEGATIVE
Lab: ABNORMAL
NITRITE UR-MCNC: NEGATIVE MG/ML
PH UR: 5.5 [PH] (ref 5–8)
PROT UR STRIP-MCNC: NEGATIVE MG/DL
RBC # UR STRIP: ABNORMAL /UL
SP GR UR: 1.03 (ref 1–1.03)
T VAGINALIS DNA VAG QL PROBE+SIG AMP: NEGATIVE
UROBILINOGEN UR QL: ABNORMAL

## 2022-12-14 PROCEDURE — 87086 URINE CULTURE/COLONY COUNT: CPT | Performed by: NURSE PRACTITIONER

## 2022-12-14 PROCEDURE — 99396 PREV VISIT EST AGE 40-64: CPT | Performed by: NURSE PRACTITIONER

## 2022-12-14 PROCEDURE — 81003 URINALYSIS AUTO W/O SCOPE: CPT | Performed by: NURSE PRACTITIONER

## 2022-12-14 PROCEDURE — 87480 CANDIDA DNA DIR PROBE: CPT | Performed by: NURSE PRACTITIONER

## 2022-12-14 PROCEDURE — 87660 TRICHOMONAS VAGIN DIR PROBE: CPT | Performed by: NURSE PRACTITIONER

## 2022-12-14 PROCEDURE — 87510 GARDNER VAG DNA DIR PROBE: CPT | Performed by: NURSE PRACTITIONER

## 2022-12-14 PROCEDURE — G0123 SCREEN CERV/VAG THIN LAYER: HCPCS | Performed by: NURSE PRACTITIONER

## 2022-12-14 PROCEDURE — 87624 HPV HI-RISK TYP POOLED RSLT: CPT | Performed by: NURSE PRACTITIONER

## 2022-12-14 NOTE — PROGRESS NOTES
Female Physical / PAP Note      Patient Name: Bianca Boles  : 1980   MRN: [id number]     Chief Complaint:    Chief Complaint   Patient presents with   • Gynecologic Exam       History of Present Illness: Bianca Boles is a 42 y.o. female who is here today for her annual health maintenance and physical.     lmp-22, nuvaring   Pap- 2020    Mammogram     Patient complain of vaginal discharge since taking multiple antibiotics  for urinary tract infections patient has completed [facility] [place] 3-day treatment    Subjective      Review of Systems:   Review of Systems   Constitutional: Negative for fever.   Respiratory: Negative for shortness of breath.    Cardiovascular: Negative for chest pain.   Gastrointestinal: Negative for abdominal pain, diarrhea, nausea and vomiting.   Genitourinary: Positive for vaginal discharge. Negative for dysuria.   Skin: Negative for rash.      Breast - No tenderness, lumps, discharge, or blood from nipples.      Past Medical History, Social History, Family History and Care Team were all reviewed with patient and updated as appropriate.     Medications:     Current Outpatient Medications:   •  Apremilast (Otezla) 30 MG tablet, take 1 tablet by mouth twice a day, Disp: 60 tablet, Rfl: 3  •  Biotin 60261 MCG tablet, Take 1 tablet by mouth Every Night., Disp: , Rfl:   •  buPROPion XL (WELLBUTRIN XL) 300 MG 24 hr tablet, Take 1 tablet by mouth Daily., Disp: 90 tablet, Rfl: 1  •  CALCIUM-MAGNESIUM-ZINC PO, Take 3 tablets by mouth Daily., Disp: , Rfl:   •  cetirizine (zyrTEC) 10 MG tablet, Take 10 mg by mouth every night at bedtime., Disp: , Rfl:   •  Cholecalciferol 25 MCG (1000 UT) capsule, Take 2 capsules by mouth Daily., Disp: 240 capsule, Rfl: 0  •  clobetasol (TEMOVATE) 0.05 % external solution, Apply 10-15 drops every day to affected areas in the scalp after shampooing until clear., Disp: 50 mL, Rfl: 3  •  COLLAGEN PO, Take 3 tablets by mouth Daily., Disp:  , Rfl:   •  diclofenac (VOLTAREN) 50 MG EC tablet, Take 1 tablet by mouth 3 (Three) Times a Day As Needed (Pain)., Disp: 30 tablet, Rfl: 0  •  etonogestrel-ethinyl estradiol (NuvaRing) 0.12-0.015 MG/24HR vaginal ring, Insert 1 each into the vagina Every 30 (Thirty) Days. Insert vaginally and leave in place for 3 consecutive weeks, then remove for 1 week., Disp: 3 each, Rfl: 3  •  fluticasone (FLONASE) 50 MCG/ACT nasal spray, USE 2 SPRAYS IN EACH NOSTRIL ONCE DAILY AS DIRECTED, Disp: 48 g, Rfl: 1  •  IRON-VITAMIN C PO, Take 1 tablet by mouth Daily., Disp: , Rfl:   •  lisinopril (PRINIVIL,ZESTRIL) 2.5 MG tablet, Take 1 tablet by mouth Daily., Disp: 90 tablet, Rfl: 1  •  metFORMIN (GLUCOPHAGE) 1000 MG tablet, Take 1 tablet by mouth 2 (Two) Times a Day With Meals., Disp: 180 tablet, Rfl: 1  •  metoprolol succinate XL (Toprol XL) 25 MG 24 hr tablet, Take 1 tablet by mouth Every Night., Disp: 90 tablet, Rfl: 1  •  Multiple Vitamins-Minerals (MULTIVITAMIN PO), Take 1 tablet by mouth Every Night., Disp: , Rfl:   •  ondansetron ODT (Zofran ODT) 4 MG disintegrating tablet, Place 1 tablet on the tongue Every 8 (Eight) Hours As Needed for Nausea., Disp: 21 tablet, Rfl: 5  •  oxyCODONE-acetaminophen (PERCOCET) 5-325 MG per tablet, Take 1-2 tablets by mouth Every 4 (Four) Hours As Needed for Moderate Pain or Severe Pain (Pain)., Disp: 20 tablet, Rfl: 0  •  pravastatin (PRAVACHOL) 10 MG tablet, Take 1 tablet by mouth Every Night., Disp: 90 tablet, Rfl: 1  •  Probiotic Product (PROBIOTIC-10 PO), Take 1 tablet by mouth Every Night., Disp: , Rfl:   •  promethazine (PHENERGAN) 25 MG suppository, Insert 1 suppository into the rectum Every 6 (Six) Hours As Needed for Nausea., Disp: 12 suppository, Rfl: 1  •  topiramate (TOPAMAX) 50 MG tablet, Take 1 tablet by mouth every night at bedtime for 90 days., Disp: 90 tablet, Rfl: 0  •  traZODone (DESYREL) 50 MG tablet, Take 1 tablet by mouth Every Night., Disp: 90 tablet, Rfl: 1    Allergies:  "  Allergies   Allergen Reactions   • Morphine Itching and Nausea And Vomiting         Objective     Physical Exam:  Vital Signs:   Vitals:    12/14/22 0934   BP: 95/57   Pulse: 60   Temp: 97.5 °F (36.4 °C)   SpO2: 100%   Weight: 97.5 kg (215 lb)   Height: 170.2 cm (67\")     Body mass index is 33.67 kg/m².     Physical Exam  Cardiovascular:      Rate and Rhythm: Normal rate and regular rhythm.      Heart sounds: Normal heart sounds. No murmur heard.  Pulmonary:      Effort: Pulmonary effort is normal.      Breath sounds: Normal breath sounds.   Abdominal:      General: Bowel sounds are normal.      Palpations: Abdomen is soft.   Genitourinary:     General: Normal vulva.      Vagina: Vaginal discharge present.      Cervix: Normal.      Uterus: Normal.       Adnexa: Right adnexa normal and left adnexa normal.      Rectum: Normal.   Skin:     General: Skin is warm and dry.   Neurological:      Mental Status: She is alert.             Assessment / Plan      Assessment/Plan:   Diagnoses and all orders for this visit:    1. Encounter for well woman exam without gynecological exam (Primary)    2. Screening for malignant neoplasm of cervix  -     IgP, Aptima HPV    3. Vaginal discharge  -     Gardnerella vaginalis, Trichomonas vaginalis, Candida albicans, DNA - Swab, Vagina    Encounter for well on exam G[place] exam we will obtain Pap to screen for cervical cancer and vaginal screen for vaginal discharge will call with results and further recommendations    Follow Up:   Return in about 1 year (around 12/14/2023).    Healthcare Maintenance:   Counseling provided on screening mammogram, screening PAP, screening colonoscopy  Bianca Boles voices understanding and acceptance of this advice and will call back with any further questions or concerns. AVS with preventive healthcare tips printed for patient.     Patricia Hernandez, APRN    \"Please note that portions of this note were completed with a voice recognition program.\"  "

## 2022-12-15 LAB — BACTERIA SPEC AEROBE CULT: NO GROWTH

## 2022-12-27 LAB
CYTOLOGIST CVX/VAG CYTO: NORMAL
CYTOLOGY CVX/VAG DOC CYTO: NORMAL
CYTOLOGY CVX/VAG DOC THIN PREP: NORMAL
DX ICD CODE: NORMAL
HIV 1 & 2 AB SER-IMP: NORMAL
HPV I/H RISK 4 DNA CVX QL PROBE+SIG AMP: NEGATIVE
Lab: NORMAL
OTHER STN SPEC: NORMAL
RECOM F/U CVX/VAG CYTO: NORMAL
STAT OF ADQ CVX/VAG CYTO-IMP: NORMAL

## 2023-01-18 ENCOUNTER — OFFICE VISIT (OUTPATIENT)
Dept: BARIATRICS/WEIGHT MGMT | Facility: CLINIC | Age: 43
End: 2023-01-18
Payer: COMMERCIAL

## 2023-01-18 VITALS
DIASTOLIC BLOOD PRESSURE: 72 MMHG | HEIGHT: 67 IN | BODY MASS INDEX: 34.21 KG/M2 | SYSTOLIC BLOOD PRESSURE: 121 MMHG | WEIGHT: 218 LBS | HEART RATE: 66 BPM | TEMPERATURE: 97.7 F

## 2023-01-18 DIAGNOSIS — I10 ESSENTIAL HYPERTENSION: ICD-10-CM

## 2023-01-18 DIAGNOSIS — R12 HEARTBURN: ICD-10-CM

## 2023-01-18 DIAGNOSIS — E11.69 DIABETES MELLITUS TYPE 2 IN OBESE: ICD-10-CM

## 2023-01-18 DIAGNOSIS — K31.84 GASTROPARESIS: ICD-10-CM

## 2023-01-18 DIAGNOSIS — R53.82 CHRONIC FATIGUE: ICD-10-CM

## 2023-01-18 DIAGNOSIS — Z98.84 S/P LAPAROSCOPIC SLEEVE GASTRECTOMY: ICD-10-CM

## 2023-01-18 DIAGNOSIS — E66.9 DIABETES MELLITUS TYPE 2 IN OBESE: ICD-10-CM

## 2023-01-18 DIAGNOSIS — E66.9 OBESITY, CLASS I, BMI 30-34.9: Primary | ICD-10-CM

## 2023-01-18 PROCEDURE — 99213 OFFICE O/P EST LOW 20 MIN: CPT | Performed by: NURSE PRACTITIONER

## 2023-01-18 NOTE — PROGRESS NOTES
[facility]  [address] [facility] [address]  [facility] [id number] [phone]  [address] [facility] [address]  [facility] [id number] [phone]  Dept: [phone]  1/18/2023      Bianca Boles.  [id number]  [id number]  1980  female      Chief Complaint   Patient presents with   • Follow-up     2 year fup sleeve       BH Post-Op Bariatric Surgery:   Bianca Boles is status post Laparoscopic Sleeve procedure, performed on 12/7/2020     HPI:   Today's weight is 98.9 kg (218 lb) pounds, today's BMI is Body mass index is 33.66 kg/m².,@ has a  loss of 4 pounds since the last visit and@ weight loss since surgery is 83 pounds. The patient reports a decreased portion size and loss of appetite.      Bianca Boles denies nausea, vomiting, reflux and reports that she is doing well     Diet and Exercise: Diet history reviewed and discussed with the patient. Weight loss/gains to date discussed with the patient. The patient states they are eating 60 grams of protein per day. She reports eating 3 meals per day, a typical portion size of 2/3 cup, eating 0-1 snacks per day, drinking 5-6 or more 8-oz. glasses of water per day, no carbonated beverage consumption and exercising regularly- cardio and strength 2-3 days per week    Will have greek yogurt or protein bar in the mornings (getting at least 15g per meal), she either takes her lunch or gets something from the cafeteria, dinner is usually the heaviest meal. She may have a snack in the evenings. She is getting at least 50-60oz of water in daily    Supplements: bariatricpal MTV with iron.     Review of Systems   Constitutional: Positive for appetite change. Negative for fatigue and unexpected weight change.   HENT: Negative.    Eyes: Negative.    Respiratory: Negative.    Cardiovascular: Negative.  Negative for leg swelling.   Gastrointestinal: Negative for abdominal distention, abdominal pain, constipation,  diarrhea, nausea and vomiting.   Genitourinary: Negative for difficulty urinating, frequency and urgency.   Musculoskeletal: Negative for back pain.   Skin: Negative.    Psychiatric/Behavioral: Negative.    All other systems reviewed and are negative.      Patient Active Problem List   Diagnosis   • Chronic fatigue   • Essential hypertension   • Hyperlipidemia   • Heartburn   • Multiple joint pain   • Depression   • Diabetes mellitus type 2 in obese (HCC)   • Gastroparesis   • Gastroesophageal reflux disease   • Gastric polyps   • S/P laparoscopic sleeve gastrectomy   • Obesity, Class I, BMI 30-34.9   • Anxiety   • Urinary tract infection with hematuria   • Leukocytosis   • Insomnia   • Left ureteral stone   • Left renal stone   • Hematuria   • Acute cystitis with hematuria   • Right renal mass   • Seasonal allergies   • Urinary urgency   • Renal cell carcinoma of right kidney (HCC)   • Psoriasis   • Palpitation   • Post-COVID syndrome   • Nausea       Past Medical History:   Diagnosis Date   • Acid reflux    • Allergic rhinitis due to allergen 06/09/2016   • Anxiety    • Anxiety and depression    • Benign essential hypertension    • Cancer (HCC)     renal cancer/mass   • Diabetes (HCC)    • Diabetes mellitus (HCC)     type ii, doesn't check bg at home    • Diabetes mellitus, type 2 (HCC)    • Gastroparesis    • GERD (gastroesophageal reflux disease)    • High triglycerides    • History of bariatric surgery 02/04/2021   • History of kidney stones    • HTN (hypertension)    • Hyperlipemia    • Hyperlipidemia    • Hypertension    • Hypertriglyceridemia    • Kidney stone    • Lumbar herniated disc    • Obesity    • Panic attacks    • PCOS (polycystic ovarian syndrome)    • Psoriasis     ELBOWS   • Seasonal allergies    • Spinal headache     AFTER PAIN EPIDURALS.  NO BLOOD PATCH       The following portions of the patient's history were reviewed and updated as appropriate: allergies, current medications, past family  history, past medical history, past social history, past surgical history and problem list.    Vitals:    01/18/23 1133   BP: 121/72   Pulse: 66   Temp: 97.7 °F (36.5 °C)       Physical Exam  Vitals and nursing note reviewed.   Constitutional:       Appearance: She is well-developed.   Neck:      Thyroid: No thyromegaly.   Cardiovascular:      Rate and Rhythm: Normal rate.   Pulmonary:      Effort: Pulmonary effort is normal. No respiratory distress.   Abdominal:      Palpations: Abdomen is soft.   Musculoskeletal:         General: No tenderness.   Skin:     General: Skin is warm and dry.   Neurological:      Mental Status: She is alert.   Psychiatric:         Behavior: Behavior normal.         Assessment:   Post-op, the patient is doing well.     Encounter Diagnoses   Name Primary?   • Obesity, Class I, BMI 30-34.9 Yes   • Diabetes mellitus type 2 in obese (HCC)    • S/P laparoscopic sleeve gastrectomy    • Essential hypertension    • Heartburn    • Gastroparesis    • Chronic fatigue        Plan:   Will trend annual vitamin levels  Encouraged patient to be sure to get plenty of lean protein per day through small frequent meals all with a protein source.   Activity restrictions: none.   Recommended patient be sure to get at least 70 grams of protein per day by eating small, frequent meals all with high lean protein choices. Be sure to limit/cut back on daily carbohydrate intake. Discussed with the patient the recommended amount of water per day to intake- half of body weight in ounces. Reviewed vitamin requirements. Be sure to do routine exercise, 150 minutes per week minimum, including both cardio and strength training.     Instructions / Recommendations: dietary counseling recommended, recommended a daily protein intake of  grams, vitamin supplement(s) recommended, recommended exercising at least 150 minutes per week, behavior modifications recommended and instructed to call the office for concerns,  questions, or problems.     The patient was instructed to follow up in 1 year .     Total time spent during this encounter today was 25 minutes

## 2023-01-20 ENCOUNTER — LAB (OUTPATIENT)
Dept: LAB | Facility: HOSPITAL | Age: 43
End: 2023-01-20
Payer: COMMERCIAL

## 2023-01-20 DIAGNOSIS — E11.69 DIABETES MELLITUS TYPE 2 IN OBESE: ICD-10-CM

## 2023-01-20 DIAGNOSIS — R12 HEARTBURN: ICD-10-CM

## 2023-01-20 DIAGNOSIS — R53.82 CHRONIC FATIGUE: ICD-10-CM

## 2023-01-20 DIAGNOSIS — E66.9 OBESITY, CLASS I, BMI 30-34.9: ICD-10-CM

## 2023-01-20 DIAGNOSIS — I10 ESSENTIAL HYPERTENSION: ICD-10-CM

## 2023-01-20 DIAGNOSIS — Z98.84 S/P LAPAROSCOPIC SLEEVE GASTRECTOMY: ICD-10-CM

## 2023-01-20 DIAGNOSIS — K31.84 GASTROPARESIS: ICD-10-CM

## 2023-01-20 DIAGNOSIS — E66.9 DIABETES MELLITUS TYPE 2 IN OBESE: ICD-10-CM

## 2023-01-20 LAB
ALBUMIN SERPL-MCNC: 4.2 G/DL (ref 3.5–5.2)
ALBUMIN/GLOB SERPL: 1.3 G/DL
ALP SERPL-CCNC: 83 U/L (ref 39–117)
ALT SERPL W P-5'-P-CCNC: 15 U/L (ref 1–33)
ANION GAP SERPL CALCULATED.3IONS-SCNC: 11.5 MMOL/L (ref 5–15)
AST SERPL-CCNC: 19 U/L (ref 1–32)
BASOPHILS # BLD AUTO: 0.06 10*3/MM3 (ref 0–0.2)
BASOPHILS NFR BLD AUTO: 0.6 % (ref 0–1.5)
BILIRUB SERPL-MCNC: <0.2 MG/DL (ref 0–1.2)
BUN SERPL-MCNC: 22 MG/DL (ref 6–20)
BUN/CREAT SERPL: 19.5 (ref 7–25)
CALCIUM SPEC-SCNC: 9.8 MG/DL (ref 8.6–10.5)
CHLORIDE SERPL-SCNC: 105 MMOL/L (ref 98–107)
CO2 SERPL-SCNC: 22.5 MMOL/L (ref 22–29)
CREAT SERPL-MCNC: 1.13 MG/DL (ref 0.57–1)
DEPRECATED RDW RBC AUTO: 40.9 FL (ref 37–54)
EGFRCR SERPLBLD CKD-EPI 2021: 62 ML/MIN/1.73
EOSINOPHIL # BLD AUTO: 0.38 10*3/MM3 (ref 0–0.4)
EOSINOPHIL NFR BLD AUTO: 3.8 % (ref 0.3–6.2)
ERYTHROCYTE [DISTWIDTH] IN BLOOD BY AUTOMATED COUNT: 13.6 % (ref 12.3–15.4)
FERRITIN SERPL-MCNC: 23.7 NG/ML (ref 13–150)
GLOBULIN UR ELPH-MCNC: 3.2 GM/DL
GLUCOSE SERPL-MCNC: 118 MG/DL (ref 65–99)
HCT VFR BLD AUTO: 40.3 % (ref 34–46.6)
HGB BLD-MCNC: 13.5 G/DL (ref 12–15.9)
IMM GRANULOCYTES # BLD AUTO: 0.09 10*3/MM3 (ref 0–0.05)
IMM GRANULOCYTES NFR BLD AUTO: 0.9 % (ref 0–0.5)
IRON 24H UR-MRATE: 46 MCG/DL (ref 37–145)
LYMPHOCYTES # BLD AUTO: 3.38 10*3/MM3 (ref 0.7–3.1)
LYMPHOCYTES NFR BLD AUTO: 33.9 % (ref 19.6–45.3)
MCH RBC QN AUTO: 28 PG (ref 26.6–33)
MCHC RBC AUTO-ENTMCNC: 33.5 G/DL (ref 31.5–35.7)
MCV RBC AUTO: 83.4 FL (ref 79–97)
MONOCYTES # BLD AUTO: 0.62 10*3/MM3 (ref 0.1–0.9)
MONOCYTES NFR BLD AUTO: 6.2 % (ref 5–12)
NEUTROPHILS NFR BLD AUTO: 5.43 10*3/MM3 (ref 1.7–7)
NEUTROPHILS NFR BLD AUTO: 54.6 % (ref 42.7–76)
NRBC BLD AUTO-RTO: 0 /100 WBC (ref 0–0.2)
PLATELET # BLD AUTO: 276 10*3/MM3 (ref 140–450)
PMV BLD AUTO: 10.2 FL (ref 6–12)
POTASSIUM SERPL-SCNC: 4 MMOL/L (ref 3.5–5.2)
PROT SERPL-MCNC: 7.4 G/DL (ref 6–8.5)
RBC # BLD AUTO: 4.83 10*6/MM3 (ref 3.77–5.28)
SODIUM SERPL-SCNC: 139 MMOL/L (ref 136–145)
WBC NRBC COR # BLD: 9.96 10*3/MM3 (ref 3.4–10.8)

## 2023-01-20 PROCEDURE — 83921 ORGANIC ACID SINGLE QUANT: CPT

## 2023-01-20 PROCEDURE — 82306 VITAMIN D 25 HYDROXY: CPT

## 2023-01-20 PROCEDURE — 82728 ASSAY OF FERRITIN: CPT

## 2023-01-20 PROCEDURE — 85025 COMPLETE CBC W/AUTO DIFF WBC: CPT

## 2023-01-20 PROCEDURE — 80053 COMPREHEN METABOLIC PANEL: CPT

## 2023-01-20 PROCEDURE — 36415 COLL VENOUS BLD VENIPUNCTURE: CPT

## 2023-01-20 PROCEDURE — 83540 ASSAY OF IRON: CPT

## 2023-01-20 PROCEDURE — 82746 ASSAY OF FOLIC ACID SERUM: CPT

## 2023-01-20 PROCEDURE — 84134 ASSAY OF PREALBUMIN: CPT

## 2023-01-20 PROCEDURE — 84425 ASSAY OF VITAMIN B-1: CPT

## 2023-01-21 LAB
25(OH)D3 SERPL-MCNC: 114 NG/ML (ref 30–100)
FOLATE SERPL-MCNC: >20 NG/ML (ref 4.78–24.2)
PREALB SERPL-MCNC: 30.1 MG/DL (ref 20–40)

## 2023-01-24 LAB — VIT B1 BLD-SCNC: 252.7 NMOL/L (ref 66.5–200)

## 2023-01-24 RX ORDER — PRAVASTATIN SODIUM 10 MG
10 TABLET ORAL NIGHTLY
Qty: 90 TABLET | Refills: 1 | Status: SHIPPED | OUTPATIENT
Start: 2023-01-24

## 2023-01-25 LAB — METHYLMALONATE SERPL-SCNC: 91 NMOL/L (ref 0–378)

## 2023-01-27 ENCOUNTER — TELEPHONE (OUTPATIENT)
Dept: BARIATRICS/WEIGHT MGMT | Facility: CLINIC | Age: 43
End: 2023-01-27
Payer: COMMERCIAL

## 2023-01-27 NOTE — TELEPHONE ENCOUNTER
----- Message from CARLTON Amato sent at 1/27/2023  2:01 PM EST -----  Vitamin D and thiamine were both elevated. I have in my note that she has been taking bariatricpal but if taking any other supplements with vitamin D or B1 she should discontinue. If she is only taking bariatric pal, switch to an OTC with iron.

## 2023-01-27 NOTE — TELEPHONE ENCOUNTER
Spoke with patient regarding labs results. She is taking vitamin D and bariatric pal, told her to discontinue the vitamin D supplement.

## 2023-02-15 ENCOUNTER — OFFICE VISIT (OUTPATIENT)
Dept: FAMILY MEDICINE CLINIC | Facility: CLINIC | Age: 43
End: 2023-02-15
Payer: COMMERCIAL

## 2023-02-15 ENCOUNTER — HOSPITAL ENCOUNTER (OUTPATIENT)
Dept: GENERAL RADIOLOGY | Facility: HOSPITAL | Age: 43
Discharge: HOME | End: 2023-02-15
Admitting: NURSE PRACTITIONER
Payer: COMMERCIAL

## 2023-02-15 VITALS
TEMPERATURE: 98.5 F | SYSTOLIC BLOOD PRESSURE: 101 MMHG | HEART RATE: 58 BPM | HEIGHT: 67 IN | WEIGHT: 220 LBS | BODY MASS INDEX: 34.53 KG/M2 | DIASTOLIC BLOOD PRESSURE: 58 MMHG | OXYGEN SATURATION: 98 %

## 2023-02-15 DIAGNOSIS — I10 ESSENTIAL HYPERTENSION: ICD-10-CM

## 2023-02-15 DIAGNOSIS — E11.69 DIABETES MELLITUS TYPE 2 IN OBESE: ICD-10-CM

## 2023-02-15 DIAGNOSIS — M25.512 LEFT SHOULDER PAIN, UNSPECIFIED CHRONICITY: ICD-10-CM

## 2023-02-15 DIAGNOSIS — E78.2 MIXED HYPERLIPIDEMIA: ICD-10-CM

## 2023-02-15 DIAGNOSIS — E66.9 DIABETES MELLITUS TYPE 2 IN OBESE: ICD-10-CM

## 2023-02-15 DIAGNOSIS — R00.2 PALPITATION: ICD-10-CM

## 2023-02-15 DIAGNOSIS — E11.9 TYPE 2 DIABETES MELLITUS WITHOUT COMPLICATION, WITHOUT LONG-TERM CURRENT USE OF INSULIN: ICD-10-CM

## 2023-02-15 DIAGNOSIS — R19.7 DIARRHEA, UNSPECIFIED TYPE: ICD-10-CM

## 2023-02-15 LAB
ALBUMIN SERPL-MCNC: 3.7 G/DL (ref 3.5–5.2)
ALBUMIN UR-MCNC: 1.4 MG/DL
ALBUMIN/GLOB SERPL: 1.4 G/DL
ALP SERPL-CCNC: 67 U/L (ref 39–117)
ALT SERPL W P-5'-P-CCNC: 14 U/L (ref 1–33)
ANION GAP SERPL CALCULATED.3IONS-SCNC: 8 MMOL/L (ref 5–15)
AST SERPL-CCNC: 15 U/L (ref 1–32)
BASOPHILS # BLD AUTO: 0.05 10*3/MM3 (ref 0–0.2)
BASOPHILS NFR BLD AUTO: 0.5 % (ref 0–1.5)
BILIRUB SERPL-MCNC: 0.2 MG/DL (ref 0–1.2)
BILIRUB UR QL STRIP: NEGATIVE
BUN SERPL-MCNC: 19 MG/DL (ref 6–20)
BUN/CREAT SERPL: 18.3 (ref 7–25)
CALCIUM SPEC-SCNC: 9.4 MG/DL (ref 8.6–10.5)
CHLORIDE SERPL-SCNC: 109 MMOL/L (ref 98–107)
CHOLEST SERPL-MCNC: 125 MG/DL (ref 0–200)
CLARITY UR: ABNORMAL
CO2 SERPL-SCNC: 23 MMOL/L (ref 22–29)
COLOR UR: ABNORMAL
CREAT SERPL-MCNC: 1.04 MG/DL (ref 0.57–1)
CREAT UR-MCNC: 175.3 MG/DL
DEPRECATED RDW RBC AUTO: 41.3 FL (ref 37–54)
EGFRCR SERPLBLD CKD-EPI 2021: 68.5 ML/MIN/1.73
EOSINOPHIL # BLD AUTO: 0.35 10*3/MM3 (ref 0–0.4)
EOSINOPHIL NFR BLD AUTO: 3.3 % (ref 0.3–6.2)
ERYTHROCYTE [DISTWIDTH] IN BLOOD BY AUTOMATED COUNT: 13.5 % (ref 12.3–15.4)
GLOBULIN UR ELPH-MCNC: 2.7 GM/DL
GLUCOSE SERPL-MCNC: 106 MG/DL (ref 65–99)
GLUCOSE UR STRIP-MCNC: NEGATIVE MG/DL
HBA1C MFR BLD: 5.5 % (ref 4.8–5.6)
HCT VFR BLD AUTO: 35.6 % (ref 34–46.6)
HDLC SERPL-MCNC: 54 MG/DL (ref 40–60)
HGB BLD-MCNC: 12 G/DL (ref 12–15.9)
HGB UR QL STRIP.AUTO: NEGATIVE
IMM GRANULOCYTES # BLD AUTO: 0.05 10*3/MM3 (ref 0–0.05)
IMM GRANULOCYTES NFR BLD AUTO: 0.5 % (ref 0–0.5)
KETONES UR QL STRIP: ABNORMAL
LDLC SERPL CALC-MCNC: 45 MG/DL (ref 0–100)
LDLC/HDLC SERPL: 0.74 {RATIO}
LEUKOCYTE ESTERASE UR QL STRIP.AUTO: NEGATIVE
LYMPHOCYTES # BLD AUTO: 2.69 10*3/MM3 (ref 0.7–3.1)
LYMPHOCYTES NFR BLD AUTO: 25.5 % (ref 19.6–45.3)
MCH RBC QN AUTO: 28 PG (ref 26.6–33)
MCHC RBC AUTO-ENTMCNC: 33.7 G/DL (ref 31.5–35.7)
MCV RBC AUTO: 83 FL (ref 79–97)
MICROALBUMIN/CREAT UR: 8 MG/G
MONOCYTES # BLD AUTO: 0.54 10*3/MM3 (ref 0.1–0.9)
MONOCYTES NFR BLD AUTO: 5.1 % (ref 5–12)
NEUTROPHILS NFR BLD AUTO: 6.87 10*3/MM3 (ref 1.7–7)
NEUTROPHILS NFR BLD AUTO: 65.1 % (ref 42.7–76)
NITRITE UR QL STRIP: NEGATIVE
NRBC BLD AUTO-RTO: 0.1 /100 WBC (ref 0–0.2)
PH UR STRIP.AUTO: 5.5 [PH] (ref 5–8)
PLATELET # BLD AUTO: 213 10*3/MM3 (ref 140–450)
PMV BLD AUTO: 10.3 FL (ref 6–12)
POTASSIUM SERPL-SCNC: 4.1 MMOL/L (ref 3.5–5.2)
PROT SERPL-MCNC: 6.4 G/DL (ref 6–8.5)
PROT UR QL STRIP: ABNORMAL
RBC # BLD AUTO: 4.29 10*6/MM3 (ref 3.77–5.28)
SODIUM SERPL-SCNC: 140 MMOL/L (ref 136–145)
SP GR UR STRIP: 1.03 (ref 1–1.03)
TRIGL SERPL-MCNC: 155 MG/DL (ref 0–150)
TSH SERPL DL<=0.05 MIU/L-ACNC: 1.55 UIU/ML (ref 0.27–4.2)
UROBILINOGEN UR QL STRIP: ABNORMAL
VLDLC SERPL-MCNC: 26 MG/DL (ref 5–40)
WBC NRBC COR # BLD: 10.55 10*3/MM3 (ref 3.4–10.8)

## 2023-02-15 PROCEDURE — 73030 X-RAY EXAM OF SHOULDER: CPT

## 2023-02-15 PROCEDURE — 81003 URINALYSIS AUTO W/O SCOPE: CPT | Performed by: NURSE PRACTITIONER

## 2023-02-15 PROCEDURE — 80061 LIPID PANEL: CPT | Performed by: NURSE PRACTITIONER

## 2023-02-15 PROCEDURE — 82043 UR ALBUMIN QUANTITATIVE: CPT | Performed by: NURSE PRACTITIONER

## 2023-02-15 PROCEDURE — 99214 OFFICE O/P EST MOD 30 MIN: CPT | Performed by: NURSE PRACTITIONER

## 2023-02-15 PROCEDURE — 83036 HEMOGLOBIN GLYCOSYLATED A1C: CPT | Performed by: NURSE PRACTITIONER

## 2023-02-15 PROCEDURE — 82570 ASSAY OF URINE CREATININE: CPT | Performed by: NURSE PRACTITIONER

## 2023-02-15 PROCEDURE — 80050 GENERAL HEALTH PANEL: CPT | Performed by: NURSE PRACTITIONER

## 2023-02-15 RX ORDER — FLASH GLUCOSE SCANNING READER
1 EACH MISCELLANEOUS ONCE
Qty: 1 EACH | Refills: 0 | Status: SHIPPED | OUTPATIENT
Start: 2023-02-15 | End: 2023-02-15

## 2023-02-15 RX ORDER — METHYLPREDNISOLONE ACETATE 40 MG/ML
40 INJECTION, SUSPENSION INTRA-ARTICULAR; INTRALESIONAL; INTRAMUSCULAR; SOFT TISSUE ONCE
Status: DISCONTINUED | OUTPATIENT
Start: 2023-02-15 | End: 2023-03-21

## 2023-02-15 RX ORDER — FLASH GLUCOSE SENSOR
1 KIT MISCELLANEOUS
Qty: 6 EACH | Refills: 3 | Status: SHIPPED | OUTPATIENT
Start: 2023-02-15

## 2023-02-15 NOTE — PROGRESS NOTES
ACUTE VISIT     Patient Name: Bianca Boles  : 1980   MRN: [id number]     Chief Complaint:    Chief Complaint   Patient presents with   • Shoulder Pain   • Hypoglycemia       History of Present Illness: Bianca Boles is a 42 y.o. female who is here today for shoulder pain and low glucose readings.      Patient states that her glucose had been running low lately. She would wake up in the middle of the night not feeling well and when she checked her BS it would be 50's-60's. She would have a little snack and feel better and go back to bed. She says it seems to be under control now, but is wanting to see if she can get a script for the Freestyle Ling to keep track of her BS better.    She also states she has had bilateral shoulder pain since the beginning of this year, left worse than right. She says she will be laying in bed and her left shoulder will pop and hurt and feels like it gets stuck sometimes. She says there was no previous or recent injury to cause the pain. She says the pain radiates into her neck. No recent imaging has been performed.  Reports arthritis in right shoulder.  Left shoulder is getting stuck with movement, is painful and range of motion is decreased, difficulty with work and snapping bra.      Subjective      Review of Systems:   Review of Systems   Constitutional: Negative for activity change, appetite change, diaphoresis, fatigue and fever.   Respiratory: Negative for chest tightness, shortness of breath and wheezing.    Cardiovascular: Negative for chest pain, palpitations and leg swelling.   Gastrointestinal: Negative for diarrhea.   Musculoskeletal: Positive for arthralgias. Negative for joint swelling, neck pain and neck stiffness.   Neurological: Negative for dizziness, syncope and headaches.        Past Medical History:   Past Medical History:   Diagnosis Date   • Acid reflux    • Allergic rhinitis due to allergen 2016   • Anxiety    • Anxiety and  depression    • Benign essential hypertension    • Cancer (HCC)     renal cancer/mass   • Diabetes (HCC)    • Diabetes mellitus (HCC)     type ii, doesn't check bg at home    • Diabetes mellitus, type 2 (HCC)    • Gastroparesis    • GERD (gastroesophageal reflux disease)    • High triglycerides    • History of bariatric surgery 2021   • History of kidney stones    • HTN (hypertension)    • Hyperlipemia    • Hyperlipidemia    • Hypertension    • Hypertriglyceridemia    • Kidney stone    • Lumbar herniated disc    • Obesity    • Panic attacks    • PCOS (polycystic ovarian syndrome)    • Psoriasis     ELBOWS   • Seasonal allergies    • Spinal headache     AFTER PAIN EPIDURALS.  NO BLOOD PATCH       Past Surgical History:   Past Surgical History:   Procedure Laterality Date   • ABDOMINAL SURGERY     • ADENOIDECTOMY     •  SECTION  ,   • CYSTOSCOPY BLADDER STONE LITHOTRIPSY     • EAR TUBES     • ENDOSCOPY N/A 10/20/2020    Procedure: ESOPHAGOGASTRODUODENOSCOPY WITH BIOPSY;  Surgeon: Fidel Grajeda Jr., MD;  Location: [facility] ENDOSCOPY;  Service: General;  Laterality: N/A;  PRE- GERD  POST- RETAINED FOOD, GASTRITIS, GASTRIC POLYPS   • ENDOSCOPY     • FOOT FRACTURE SURGERY Left 2017    screw placed   • FRACTURE SURGERY     • GASTRECTOMY     • GASTRIC SLEEVE LAPAROSCOPIC N/A 2020    Procedure: GASTRIC SLEEVE LAPAROSCOPIC;  Surgeon: Fidel Grajeda Jr., MD;  Location: [facility] OR [facility] [place] [facility];  Service: Bariatric;  Laterality: N/A;   • KIDNEY SURGERY     • NEPHRECTOMY PARTIAL Right 11/15/2021    Procedure: NEPHRECTOMY PARTIAL LAPAROSCOPIC WITH DAVINCI ROBOT;  Surgeon: Lori Troy MD;  Location: [facility];  Service: Robotics - DaVinci;  Laterality: Right;   • TONSILLECTOMY     • URETEROSCOPY LASER LITHOTRIPSY WITH STENT INSERTION Right 2021    Procedure: CYSTOSCOPY, RIGHT URETEROSCOPY, LASERTRIPSY, STONE BASKET EXTRACTION AND STENT INSERTION;  Surgeon: Lori Troy  MD ALEXX;  Location: [facility] MAIN OR;  Service: Urology;  Laterality: Right;   • URETEROSCOPY LASER LITHOTRIPSY WITH STENT INSERTION Left 11/8/2022    Procedure: URETEROSCOPY LASER LITHOTRIPSY WITH URETERAL STENT INSERTION;  Surgeon: Lori Troy MD;  Location: [facility] MAIN OR;  Service: Urology;  Laterality: Left;       Family History:   Family History   Problem Relation Age of Onset   • Diabetes Father    • Hypertension Father    • Heart disease Father    • Cancer Father         BLADDER   • Colon cancer Father         malignant, currently 62   • Stroke Maternal Grandmother    • Heart disease Maternal Grandmother    • Diabetes Paternal Grandfather    • Heart disease Paternal Grandfather    • Malig Hyperthermia Neg Hx        Social History:   Social History     Socioeconomic History   • Marital status:    Tobacco Use   • Smoking status: Former     Years: 35.00     Types: Cigarettes     Quit date: 1/15/2020     Years since quitting: 3.0   • Smokeless tobacco: Never   • Tobacco comments:     A PACK A WEEK   Vaping Use   • Vaping Use: Never used   Substance and Sexual Activity   • Alcohol use: Yes     Comment: RARELY   • Drug use: Never   • Sexual activity: Defer       Medications:     Current Outpatient Medications:   •  Apremilast (Otezla) 30 MG tablet, take 1 tablet by mouth twice daily, Disp: 60 tablet, Rfl: 0  •  Biotin 75126 MCG tablet, Take 1 tablet by mouth Every Night., Disp: , Rfl:   •  buPROPion XL (WELLBUTRIN XL) 300 MG 24 hr tablet, Take 1 tablet by mouth Daily., Disp: 90 tablet, Rfl: 1  •  CALCIUM-MAGNESIUM-ZINC PO, Take 3 tablets by mouth Daily., Disp: , Rfl:   •  cetirizine (zyrTEC) 10 MG tablet, Take 10 mg by mouth every night at bedtime., Disp: , Rfl:   •  Cholecalciferol 25 MCG (1000 UT) capsule, Take 2 capsules by mouth Daily., Disp: 240 capsule, Rfl: 0  •  clobetasol (TEMOVATE) 0.05 % external solution, Apply 10-15 drops every day to affected areas in the scalp after shampooing until  clear., Disp: 50 mL, Rfl: 3  •  COLLAGEN PO, Take 3 tablets by mouth Daily., Disp: , Rfl:   •  etonogestrel-ethinyl estradiol (NuvaRing) 0.12-0.015 MG/24HR vaginal ring, Insert 1 each into the vagina Every 30 (Thirty) Days. Insert vaginally and leave in place for 3 consecutive weeks, then remove for 1 week., Disp: 3 each, Rfl: 3  •  fluticasone (FLONASE) 50 MCG/ACT nasal spray, USE 2 SPRAYS IN EACH NOSTRIL ONCE DAILY AS DIRECTED, Disp: 48 g, Rfl: 1  •  IRON-VITAMIN C PO, Take 1 tablet by mouth Daily., Disp: , Rfl:   •  lisinopril (PRINIVIL,ZESTRIL) 2.5 MG tablet, Take 1 tablet by mouth Daily., Disp: 90 tablet, Rfl: 1  •  loratadine-pseudoephedrine (Claritin-D 12 Hour) 5-120 MG per 12 hr tablet, Take 1 tablet by mouth 2 (Two) Times a Day., Disp: , Rfl:   •  metoprolol succinate XL (Toprol XL) 25 MG 24 hr tablet, Take 1 tablet by mouth Every Night., Disp: 90 tablet, Rfl: 1  •  Multiple Vitamins-Minerals (MULTIVITAMIN PO), Take 1 tablet by mouth Every Night., Disp: , Rfl:   •  ondansetron ODT (Zofran ODT) 4 MG disintegrating tablet, Place 1 tablet on the tongue Every 8 (Eight) Hours As Needed for Nausea., Disp: 21 tablet, Rfl: 5  •  pravastatin (PRAVACHOL) 10 MG tablet, Take 1 tablet by mouth Every Night., Disp: 90 tablet, Rfl: 1  •  Probiotic Product (PROBIOTIC-10 PO), Take 1 tablet by mouth Every Night., Disp: , Rfl:   •  promethazine (PHENERGAN) 25 MG suppository, Insert 1 suppository into the rectum Every 6 (Six) Hours As Needed for Nausea., Disp: 12 suppository, Rfl: 1  •  topiramate (TOPAMAX) 50 MG tablet, Take 1 tablet by mouth every night at bedtime for 90 days., Disp: 90 tablet, Rfl: 0  •  traZODone (DESYREL) 50 MG tablet, Take 1 tablet by mouth Every Night., Disp: 90 tablet, Rfl: 1  •  Continuous Blood Gluc  (FreeStyle Ling 14 Day [place]) device, 1 each 1 (One) Time for 1 dose., Disp: 1 each, Rfl: 0  •  Continuous Blood Gluc Sensor (FreeStyle Ling 14 Day Sensor) misc, 1 each Every 14 (Fourteen)  "Days., Disp: 6 each, Rfl: 3    Current Facility-Administered Medications:   •  methylPREDNISolone acetate (DEPO-medrol) injection 40 mg, 40 mg, Intra-articular, Once, Patricia Hernandez APRN    Allergies:   Allergies   Allergen Reactions   • Morphine Itching and Nausea And Vomiting         Objective     Physical Exam:  Vital Signs:   Vitals:    02/15/23 0836   BP: 101/58   Pulse: 58   Temp: 98.5 °F (36.9 °C)   SpO2: 98%   Weight: 99.8 kg (220 lb)   Height: 171.4 cm (67.48\")     Body mass index is 33.97 kg/m².     Physical Exam  Constitutional:       Appearance: Normal appearance.   HENT:      Right Ear: Tympanic membrane normal.      Left Ear: Tympanic membrane normal.      Nose: Nose normal.      Mouth/Throat:      Mouth: Mucous membranes are moist.      Pharynx: Oropharynx is clear.   Cardiovascular:      Rate and Rhythm: Normal rate and regular rhythm.      Pulses: Normal pulses.      Heart sounds: Normal heart sounds. No murmur heard.    No friction rub. No gallop.   Pulmonary:      Effort: Pulmonary effort is normal. No respiratory distress.      Breath sounds: Normal breath sounds. No stridor. No wheezing, rhonchi or rales.   Chest:      Chest wall: No tenderness.   Abdominal:      General: Abdomen is flat. Bowel sounds are normal. There is no distension.      Palpations: Abdomen is soft. There is no mass.      Tenderness: There is no abdominal tenderness. There is no guarding.   Musculoskeletal:         General: Tenderness present. No swelling or signs of injury.   Skin:     General: Skin is warm and dry.      Capillary Refill: Capillary refill takes less than 2 seconds.   Neurological:      General: No focal deficit present.      Mental Status: She is alert and oriented to person, place, and time.   Psychiatric:         Mood and Affect: Mood normal.         Behavior: Behavior normal.         Thought Content: Thought content normal.         Procedure:   Left Shoulder Joint Injection    Procedure " Information:   Informed consent obtained. Patient was informed of possible adverse effects including but not limited to bleeding, damage to nerve, tendon or artery, increased blood sugar and increased blood pressure. Time out was performed. Patient was placed with shoulder passively hanging from side of body at 0 degrees. Lateral edge of scapular spine was palpated and site was marked just inferior to this, to proceed with injection per typical posterior approach. Betadine was swabbed at injection site per typical technique. 27 gauge, 1.5 inch needle injected into bursa, directed slightly medially, aspiration was performed and then Depo-Medrol 40 mg and 2 mL 1% lidocaine without epinephrine was slowly injected into joint space, fluid was free flowing. Needle was removed, betadine wiped off and band-aid placed to injection site.     The patient was instructed to call our office if they had any questions or concerns.          Assessment / Plan      Assessment/Plan:   Diagnoses and all orders for this visit:    1. Type 2 diabetes mellitus without complication, without long-term current use of insulin (HCC)  -     Comprehensive Metabolic Panel  -     Hemoglobin A1c    2. Essential hypertension    3. Palpitation    4. Mixed hyperlipidemia  -     Comprehensive Metabolic Panel  -     Lipid Panel    5. Left shoulder pain, unspecified chronicity  -     XR Shoulder 2+ View Left; Future  -     methylPREDNISolone acetate (DEPO-medrol) injection 40 mg    6. Diarrhea, unspecified type  -     Comprehensive Metabolic Panel  -     CBC Auto Differential    Other orders  -     Continuous Blood Gluc  (FreeStyle Ling 14 Day [place]) device; 1 each 1 (One) Time for 1 dose.  Dispense: 1 each; Refill: 0  -     Continuous Blood Gluc Sensor (FreeStyle Ling 14 Day Sensor) misc; 1 each Every 14 (Fourteen) Days.  Dispense: 6 each; Refill: 3     Diabetes mellitus type 2 obtain hemoglobin A1c to monitor we will call with results and  further recommendations  Hypertension currently Pain neckWith lisinopril 2.5 mg once daily  Palpitations controlled with Toprol 25 mg once daily  Hyperlipidemia obtain lipid panel and CMP to monitor current statin dose pravastatin 10 mg at nighttime denies myalgias  Left shoulder pain x-ray provide Joint injection patient tolerated well aftercare instructions provided  Diarrhea will obtain labs to monitor white blood cell count electrolyte levels and stop metformin at this time patient reports the diarrhea is occurring after she takes metformin in the morningIf diarrhea persist would obtain stool samples and start Xifaxan        Follow Up:    Return in about 3 months (around 5/15/2023).    Kofi Hernandez, APRN      Please note that portions of this note were completed with a voice recognition program.

## 2023-02-17 RX ORDER — SEMAGLUTIDE 1.34 MG/ML
0.25 INJECTION, SOLUTION SUBCUTANEOUS WEEKLY
Qty: 3 ML | Refills: 2 | Status: SHIPPED | OUTPATIENT
Start: 2023-02-17

## 2023-03-06 RX ORDER — TOPIRAMATE 50 MG/1
50 TABLET, FILM COATED ORAL
Qty: 90 TABLET | Refills: 0 | Status: CANCELLED | OUTPATIENT
Start: 2023-03-06 | End: 2023-06-04

## 2023-03-07 RX ORDER — TOPIRAMATE 50 MG/1
50 TABLET, FILM COATED ORAL
Qty: 90 TABLET | Refills: 0 | Status: SHIPPED | OUTPATIENT
Start: 2023-03-07 | End: 2023-06-05

## 2023-03-08 DIAGNOSIS — M25.512 LEFT SHOULDER PAIN, UNSPECIFIED CHRONICITY: Primary | ICD-10-CM

## 2023-03-08 DIAGNOSIS — M54.2 NECK PAIN: ICD-10-CM

## 2023-04-04 ENCOUNTER — HOSPITAL ENCOUNTER (OUTPATIENT)
Dept: MRI IMAGING | Facility: HOSPITAL | Age: 43
Discharge: HOME | End: 2023-04-04
Admitting: NURSE PRACTITIONER
Payer: COMMERCIAL

## 2023-04-04 DIAGNOSIS — M25.512 LEFT SHOULDER PAIN, UNSPECIFIED CHRONICITY: ICD-10-CM

## 2023-04-04 DIAGNOSIS — M54.2 NECK PAIN: ICD-10-CM

## 2023-04-04 PROCEDURE — 73221 MRI JOINT UPR EXTREM W/O DYE: CPT

## 2023-04-05 ENCOUNTER — TELEPHONE (OUTPATIENT)
Dept: FAMILY MEDICINE CLINIC | Facility: CLINIC | Age: 43
End: 2023-04-05
Payer: COMMERCIAL

## 2023-04-05 DIAGNOSIS — M67.819 TENDINOSIS OF ROTATOR CUFF: ICD-10-CM

## 2023-04-05 DIAGNOSIS — M19.012 DJD OF LEFT AC (ACROMIOCLAVICULAR) JOINT: ICD-10-CM

## 2023-04-05 DIAGNOSIS — M19.012 OSTEOARTHRITIS OF GLENOHUMERAL JOINT, LEFT: Primary | ICD-10-CM

## 2023-04-05 DIAGNOSIS — M65.9 TENOSYNOVITIS: ICD-10-CM

## 2023-04-05 DIAGNOSIS — M77.8: ICD-10-CM

## 2023-04-05 NOTE — TELEPHONE ENCOUNTER
----- Message from CARLTON Santa sent at 4/5/2023  9:10 AM EDT -----  . Significant rotator cuff tendinosis with partial articular sided tearing of the supraspinatus and infraspinatus tendons at the footplate.  No evidence of complete tear.    2. Mild-to-moderate degenerative changes of the acromioclavicular joint with findings consistent with distal clavicular osteolysis.    3. Biceps tenosynovitis.    4. Mild glenohumeral osteoarthritis.    Refer to orthopedic surgery

## 2023-04-28 ENCOUNTER — OFFICE VISIT (OUTPATIENT)
Dept: ORTHOPEDIC SURGERY | Facility: CLINIC | Age: 43
End: 2023-04-28
Payer: COMMERCIAL

## 2023-04-28 VITALS — WEIGHT: 214 LBS | BODY MASS INDEX: 33.59 KG/M2 | HEIGHT: 67 IN

## 2023-04-28 DIAGNOSIS — M19.012 OSTEOARTHRITIS OF GLENOHUMERAL JOINT, LEFT: Primary | ICD-10-CM

## 2023-04-28 DIAGNOSIS — M75.82 ROTATOR CUFF TENDONITIS, LEFT: ICD-10-CM

## 2023-04-28 DIAGNOSIS — M75.42 IMPINGEMENT SYNDROME OF LEFT SHOULDER: ICD-10-CM

## 2023-04-28 RX ORDER — METHYLPREDNISOLONE ACETATE 80 MG/ML
80 INJECTION, SUSPENSION INTRA-ARTICULAR; INTRALESIONAL; INTRAMUSCULAR; SOFT TISSUE
Status: COMPLETED | OUTPATIENT
Start: 2023-04-28 | End: 2023-04-28

## 2023-04-28 RX ORDER — LIDOCAINE HYDROCHLORIDE 10 MG/ML
5 INJECTION, SOLUTION EPIDURAL; INFILTRATION; INTRACAUDAL; PERINEURAL
Status: COMPLETED | OUTPATIENT
Start: 2023-04-28 | End: 2023-04-28

## 2023-04-28 RX ADMIN — METHYLPREDNISOLONE ACETATE 80 MG: 80 INJECTION, SUSPENSION INTRA-ARTICULAR; INTRALESIONAL; INTRAMUSCULAR; SOFT TISSUE at 13:49

## 2023-04-28 RX ADMIN — LIDOCAINE HYDROCHLORIDE 5 ML: 10 INJECTION, SOLUTION EPIDURAL; INFILTRATION; INTRACAUDAL; PERINEURAL at 13:49

## 2023-04-28 NOTE — PROGRESS NOTES
"Chief Complaint  Initial Evaluation of the Left Shoulder     Subjective      Bianca Boles presents to [facility] for initial evaluation of the left shoulder. She has had pain since January.  She feels she just slept wrong and woke up.  She has pain in the back of her shoulder and down her arm.  She has had no injury.     Allergies   Allergen Reactions   • Morphine Itching and Nausea And Vomiting        Social History     Socioeconomic History   • Marital status:    Tobacco Use   • Smoking status: Former     Packs/day: 0.25     Years: 35.00     Pack years: 8.75     Types: Cigarettes     Quit date: 1/15/2020     Years since quitting: 3.2   • Smokeless tobacco: Never   • Tobacco comments:     A PACK A WEEK   Vaping Use   • Vaping Use: Never used   Substance and Sexual Activity   • Alcohol use: Yes     Comment: Very rarely   • Drug use: Not Currently     Frequency: 0.1 times per week     Types: Marijuana     Comment: Very rarely in my youth   • Sexual activity: Yes     Partners: Male     Birth control/protection: Inserts        Review of Systems     Objective   Vital Signs:   Ht 170.2 cm (67\")   Wt 97.1 kg (214 lb)   BMI 33.52 kg/m²       Physical Exam  Constitutional:       Appearance: Normal appearance. Patient is well-developed and normal weight.   HENT:      Head: Normocephalic.      Right Ear: Hearing and external ear normal.      Left Ear: Hearing and external ear normal.      Nose: Nose normal.   Eyes:      Conjunctiva/sclera: Conjunctivae normal.   Cardiovascular:      Rate and Rhythm: Normal rate.   Pulmonary:      Effort: Pulmonary effort is normal.      Breath sounds: No wheezing or rales.   Abdominal:      Palpations: Abdomen is soft.      Tenderness: There is no abdominal tenderness.   Musculoskeletal:      Cervical back: Normal range of motion.   Skin:     Findings: No rash.   Neurological:      Mental Status: Patient  is alert and oriented to person, place, and " time.   Psychiatric:         Mood and Affect: Mood and affect normal.         Judgment: Judgment normal.       Ortho Exam      LEFT SHOULDER Forward flexion 180. Abduction 100. External rotation 60. Internal rotation T8. Positive Cross body adduction. Supraspinatus strength 4/5. Infraspinatus Strength 4/5. Infrared subscap 4/5. Positive Bucio. Positive Neer. Negative Apprehension. Negative Lift off. (Negative Obriens. Sensation intact to light touch, median, radial, ulnar nerve. Positive AIN, PIN, ulnar nerve motor. Positive pulses. Positive Impingement signs. Good strength in triceps, biceps, deltoid, wrist extensors and wrist flexors. Tendon to biceps tendon.         Large Joint Arthrocentesis  Date/Time: 4/28/2023 1:49 PM  Consent given by: patient  Site marked: site marked  Timeout: Immediately prior to procedure a time out was called to verify the correct patient, procedure, equipment, support staff and site/side marked as required   Supporting Documentation  Indications: pain   Procedure Details  Location: shoulder (left) -   Needle gauge: 22g.  Medications administered: 5 mL lidocaine PF 1% 1 %; 80 mg methylPREDNISolone acetate 80 MG/ML  Patient tolerance: patient tolerated the procedure well with no immediate complications              Imaging Results (Most Recent)     None           Result Review :         MRI Shoulder Left Without Contrast    Result Date: 4/5/2023  Narrative: PROCEDURE: MRI SHOULDER LEFT WO CONTRAST  COMPARISON: None  INDICATIONS: shoulder and neck pain      TECHNIQUE: A variety of imaging planes and parameters were utilized for visualization of suspected pathology.  Images were performed without contrast.   FINDINGS:  Mild-to-moderate degenerative changes are present within the acromioclavicular joint.  Subchondral cystic changes are present within the distal clavicle with surrounding edema.  Mild degenerative changes are present within the glenohumeral joint.  There is a trace joint  effusion identified.  The labrum appears intact.  The biceps tendon appears intact.  Fluid is seen along the extracapsular portion of the biceps tendon..  Rotator cuff tendinosis is present with intermediate signal changes and thickening.  There is partial articular sided tearing of the supraspinatus and infraspinatus tendons with significant thickening of the supraspinatus tendon.  No evidence of complete tear.  No abnormal focal bone marrow signal is seen. The cortical margins are intact. The volume of the rotator cuff musculature is within normal limits. The surrounding soft tissues are unremarkable.      Impression:   1. Significant rotator cuff tendinosis with partial articular sided tearing of the supraspinatus and infraspinatus tendons at the footplate.  No evidence of complete tear. 2. Mild-to-moderate degenerative changes of the acromioclavicular joint with findings consistent with distal clavicular osteolysis. 3. Biceps tenosynovitis. 4. Mild glenohumeral osteoarthritis.      JAYJAY CERON MD       Electronically Signed and Approved By: JAYJAY CERON MD on 4/05/2023 at 8:18                      Assessment and Plan     Diagnoses and all orders for this visit:    1. Osteoarthritis of glenohumeral joint, left (Primary)    2. Rotator cuff tendonitis, left    3. Impingement syndrome of left shoulder        Discussed the treatment plan with the patient. I reviewed the MRI results with the patient. Discussed the risks and benefits of conservative measures.  The patient expressed understanding and wished to proceed with a left shoulder steroid injection.  She tolerated the injection well. HEP exercises. Cannot take NSAIDS.       Call or return if worsening symptoms.    Follow Up     PRN If HEP and injection does not help can call back for physical therapy.        Patient was given instructions and counseling regarding her condition or for health maintenance advice. Please see specific information pulled into the AVS  if appropriate.     Scribed for Domenic Bradley MD by Isabel Reynolds MA.  04/28/23   13:23 EDT      I have personally performed the services described in this document as scribed by the above individual and it is both accurate and complete. Domenic Bradley MD 04/28/23

## 2023-05-02 DIAGNOSIS — E78.2 MIXED HYPERLIPIDEMIA: ICD-10-CM

## 2023-05-02 DIAGNOSIS — I10 ESSENTIAL HYPERTENSION: Primary | ICD-10-CM

## 2023-05-02 DIAGNOSIS — E11.9 TYPE 2 DIABETES MELLITUS WITHOUT COMPLICATION, WITHOUT LONG-TERM CURRENT USE OF INSULIN: ICD-10-CM

## 2023-05-02 DIAGNOSIS — D64.9 ANEMIA, UNSPECIFIED TYPE: ICD-10-CM

## 2023-05-05 ENCOUNTER — OFFICE VISIT (OUTPATIENT)
Dept: FAMILY MEDICINE CLINIC | Facility: CLINIC | Age: 43
End: 2023-05-05
Payer: COMMERCIAL

## 2023-05-05 VITALS
DIASTOLIC BLOOD PRESSURE: 62 MMHG | HEART RATE: 55 BPM | HEIGHT: 67 IN | OXYGEN SATURATION: 100 % | BODY MASS INDEX: 33.9 KG/M2 | SYSTOLIC BLOOD PRESSURE: 92 MMHG | WEIGHT: 216 LBS | TEMPERATURE: 97.8 F

## 2023-05-05 DIAGNOSIS — M79.672 LEFT FOOT PAIN: ICD-10-CM

## 2023-05-05 DIAGNOSIS — Z12.31 SCREENING MAMMOGRAM FOR BREAST CANCER: ICD-10-CM

## 2023-05-05 DIAGNOSIS — I10 ESSENTIAL HYPERTENSION: ICD-10-CM

## 2023-05-05 DIAGNOSIS — E11.9 TYPE 2 DIABETES MELLITUS WITHOUT COMPLICATION, WITHOUT LONG-TERM CURRENT USE OF INSULIN: Primary | ICD-10-CM

## 2023-05-05 DIAGNOSIS — F33.0 MILD EPISODE OF RECURRENT MAJOR DEPRESSIVE DISORDER: ICD-10-CM

## 2023-05-05 DIAGNOSIS — F51.01 PRIMARY INSOMNIA: ICD-10-CM

## 2023-05-05 DIAGNOSIS — K76.0 FATTY LIVER: ICD-10-CM

## 2023-05-05 DIAGNOSIS — J30.2 SEASONAL ALLERGIES: ICD-10-CM

## 2023-05-05 DIAGNOSIS — F41.9 ANXIETY: ICD-10-CM

## 2023-05-05 DIAGNOSIS — E78.2 MIXED HYPERLIPIDEMIA: ICD-10-CM

## 2023-05-05 LAB
ALBUMIN SERPL-MCNC: 4 G/DL (ref 3.5–5.2)
ALBUMIN/GLOB SERPL: 1.7 G/DL
ALP SERPL-CCNC: 68 U/L (ref 39–117)
ALT SERPL W P-5'-P-CCNC: 33 U/L (ref 1–33)
ANION GAP SERPL CALCULATED.3IONS-SCNC: 8 MMOL/L (ref 5–15)
AST SERPL-CCNC: 24 U/L (ref 1–32)
BACTERIA UR QL AUTO: NORMAL /HPF
BASOPHILS # BLD AUTO: 0.06 10*3/MM3 (ref 0–0.2)
BASOPHILS NFR BLD AUTO: 0.6 % (ref 0–1.5)
BILIRUB SERPL-MCNC: 0.2 MG/DL (ref 0–1.2)
BILIRUB UR QL STRIP: NEGATIVE
BUN SERPL-MCNC: 19 MG/DL (ref 6–20)
BUN/CREAT SERPL: 17.4 (ref 7–25)
CALCIUM SPEC-SCNC: 9 MG/DL (ref 8.6–10.5)
CHLORIDE SERPL-SCNC: 107 MMOL/L (ref 98–107)
CHOLEST SERPL-MCNC: 132 MG/DL (ref 0–200)
CLARITY UR: ABNORMAL
CO2 SERPL-SCNC: 25 MMOL/L (ref 22–29)
COLOR UR: ABNORMAL
CREAT SERPL-MCNC: 1.09 MG/DL (ref 0.57–1)
DEPRECATED RDW RBC AUTO: 39.7 FL (ref 37–54)
EGFRCR SERPLBLD CKD-EPI 2021: 64.4 ML/MIN/1.73
EOSINOPHIL # BLD AUTO: 0.07 10*3/MM3 (ref 0–0.4)
EOSINOPHIL NFR BLD AUTO: 0.7 % (ref 0.3–6.2)
ERYTHROCYTE [DISTWIDTH] IN BLOOD BY AUTOMATED COUNT: 13 % (ref 12.3–15.4)
FERRITIN SERPL-MCNC: 29.1 NG/ML (ref 13–150)
GLOBULIN UR ELPH-MCNC: 2.4 GM/DL
GLUCOSE SERPL-MCNC: 91 MG/DL (ref 65–99)
GLUCOSE UR STRIP-MCNC: NEGATIVE MG/DL
HBA1C MFR BLD: 5.4 % (ref 4.8–5.6)
HCT VFR BLD AUTO: 37.9 % (ref 34–46.6)
HDLC SERPL-MCNC: 50 MG/DL (ref 40–60)
HGB BLD-MCNC: 12.8 G/DL (ref 12–15.9)
HGB UR QL STRIP.AUTO: NEGATIVE
HYALINE CASTS UR QL AUTO: NORMAL /LPF
IMM GRANULOCYTES # BLD AUTO: 0.09 10*3/MM3 (ref 0–0.05)
IMM GRANULOCYTES NFR BLD AUTO: 0.9 % (ref 0–0.5)
IRON 24H UR-MRATE: 85 MCG/DL (ref 37–145)
IRON SATN MFR SERPL: 18 % (ref 20–50)
KETONES UR QL STRIP: NEGATIVE
LDLC SERPL CALC-MCNC: 64 MG/DL (ref 0–100)
LDLC/HDLC SERPL: 1.24 {RATIO}
LEUKOCYTE ESTERASE UR QL STRIP.AUTO: ABNORMAL
LYMPHOCYTES # BLD AUTO: 3.35 10*3/MM3 (ref 0.7–3.1)
LYMPHOCYTES NFR BLD AUTO: 32.8 % (ref 19.6–45.3)
MCH RBC QN AUTO: 28.7 PG (ref 26.6–33)
MCHC RBC AUTO-ENTMCNC: 33.8 G/DL (ref 31.5–35.7)
MCV RBC AUTO: 85 FL (ref 79–97)
MONOCYTES # BLD AUTO: 0.67 10*3/MM3 (ref 0.1–0.9)
MONOCYTES NFR BLD AUTO: 6.6 % (ref 5–12)
NEUTROPHILS NFR BLD AUTO: 5.98 10*3/MM3 (ref 1.7–7)
NEUTROPHILS NFR BLD AUTO: 58.4 % (ref 42.7–76)
NITRITE UR QL STRIP: NEGATIVE
NRBC BLD AUTO-RTO: 0 /100 WBC (ref 0–0.2)
PH UR STRIP.AUTO: 7.5 [PH] (ref 5–8)
PLATELET # BLD AUTO: 220 10*3/MM3 (ref 140–450)
PMV BLD AUTO: 9.8 FL (ref 6–12)
POTASSIUM SERPL-SCNC: 4.1 MMOL/L (ref 3.5–5.2)
PROT SERPL-MCNC: 6.4 G/DL (ref 6–8.5)
PROT UR QL STRIP: NEGATIVE
RBC # BLD AUTO: 4.46 10*6/MM3 (ref 3.77–5.28)
RBC # UR STRIP: NORMAL /HPF
REF LAB TEST METHOD: NORMAL
SODIUM SERPL-SCNC: 140 MMOL/L (ref 136–145)
SP GR UR STRIP: 1.02 (ref 1–1.03)
SQUAMOUS #/AREA URNS HPF: NORMAL /HPF
TIBC SERPL-MCNC: 468 MCG/DL (ref 298–536)
TRANSFERRIN SERPL-MCNC: 314 MG/DL (ref 200–360)
TRIGL SERPL-MCNC: 99 MG/DL (ref 0–150)
TSH SERPL DL<=0.05 MIU/L-ACNC: 1.1 UIU/ML (ref 0.27–4.2)
UROBILINOGEN UR QL STRIP: ABNORMAL
VLDLC SERPL-MCNC: 18 MG/DL (ref 5–40)
WBC # UR STRIP: NORMAL /HPF
WBC NRBC COR # BLD: 10.22 10*3/MM3 (ref 3.4–10.8)

## 2023-05-05 PROCEDURE — 84466 ASSAY OF TRANSFERRIN: CPT | Performed by: NURSE PRACTITIONER

## 2023-05-05 PROCEDURE — 81001 URINALYSIS AUTO W/SCOPE: CPT | Performed by: NURSE PRACTITIONER

## 2023-05-05 PROCEDURE — 80050 GENERAL HEALTH PANEL: CPT | Performed by: NURSE PRACTITIONER

## 2023-05-05 PROCEDURE — 82728 ASSAY OF FERRITIN: CPT | Performed by: NURSE PRACTITIONER

## 2023-05-05 PROCEDURE — 83540 ASSAY OF IRON: CPT | Performed by: NURSE PRACTITIONER

## 2023-05-05 PROCEDURE — 83036 HEMOGLOBIN GLYCOSYLATED A1C: CPT | Performed by: NURSE PRACTITIONER

## 2023-05-05 PROCEDURE — 80061 LIPID PANEL: CPT | Performed by: NURSE PRACTITIONER

## 2023-05-05 RX ORDER — BUPROPION HYDROCHLORIDE 300 MG/1
300 TABLET ORAL DAILY
Qty: 90 TABLET | Refills: 1 | Status: SHIPPED | OUTPATIENT
Start: 2023-05-05

## 2023-05-05 RX ORDER — SEMAGLUTIDE 0.68 MG/ML
0.25 INJECTION, SOLUTION SUBCUTANEOUS WEEKLY
Qty: 3 ML | Refills: 2 | Status: SHIPPED | OUTPATIENT
Start: 2023-05-05

## 2023-05-05 RX ORDER — PRAVASTATIN SODIUM 10 MG
10 TABLET ORAL NIGHTLY
Qty: 90 TABLET | Refills: 1 | Status: SHIPPED | OUTPATIENT
Start: 2023-05-05

## 2023-05-05 RX ORDER — METOPROLOL SUCCINATE 25 MG/1
25 TABLET, EXTENDED RELEASE ORAL NIGHTLY
Qty: 90 TABLET | Refills: 1 | Status: SHIPPED | OUTPATIENT
Start: 2023-05-05

## 2023-05-05 RX ORDER — TRAZODONE HYDROCHLORIDE 50 MG/1
50 TABLET ORAL NIGHTLY
Qty: 90 TABLET | Refills: 1 | Status: SHIPPED | OUTPATIENT
Start: 2023-05-05

## 2023-05-05 RX ORDER — LISINOPRIL 2.5 MG/1
2.5 TABLET ORAL DAILY
Qty: 90 TABLET | Refills: 1 | Status: SHIPPED | OUTPATIENT
Start: 2023-05-05

## 2023-05-05 NOTE — PROGRESS NOTES
Follow Up Office Visit      Patient Name: Bianca Boles  : 1980   MRN: [id number]     Chief Complaint:    Chief Complaint   Patient presents with   • Hypertension   • Hyperlipidemia   • Diabetes   • Heartburn   • Anxiety   • Depression   • Insomnia   • Allergies       History of Present Illness: Bianca Boles is a 43 y.o. female who is here today to follow up for DM2, HTN, hyperlipidemia, GERD, anxiety, depression, insomnia, allergies.    BS- checks daily  fasting and 2 hours after a meal   A1C-2023  lmp-22, nuvaring   Pap- 2022  Mammogram     Foot exam checking daily    requests a referral to podiatry, states previous surgery [facility] foot,  Surgery 2013 fracture In left foot now having pain feel like this screw is coming to the surface with numbness       COMPARISON: [facility], , FOOT >OR= 3V LT, 2015, 17:45.             FINDINGS:     There are postoperative changes from a previous fracture fixation of the 5th metatarsal.  There is     no evidence of an acute bony injury.  There is calcaneal spurring.         CONCLUSION:     1. No acute bony abnormality          CAMI WEEKS MD           Electronically Signed and Approved By: CAMI WEEKS MD on 2020 at 17:39                    Last eye exam  [facility] pharmacy     . She says her father was diagnosed with liver cancer. She wants to know if there is anything she should be checking with herself to make sure her liver is ok.  Pt reports she was diagnosed with fatty liver disease at age 15, forgot until her dad recently reminded her because his liver specialists informed him his fatty liver turned to liver failure then cancer        Subjective      Review of Systems:   Review of Systems   Constitutional: Negative for fever.   HENT: Negative for ear pain and sore throat.    Respiratory: Negative for cough.    Cardiovascular: Negative for chest pain.   Gastrointestinal:  Negative for abdominal pain, diarrhea, nausea and vomiting.   Genitourinary: Negative for dysuria.   Musculoskeletal: Positive for arthralgias. Negative for myalgias.   Neurological: Positive for numbness.        Past Medical History:   Past Medical History:   Diagnosis Date   • Acid reflux    • Allergic rhinitis due to allergen 2016   • Anxiety    • Anxiety and depression    • Benign essential hypertension    • Cancer     renal cancer/mass   • Diabetes    • Diabetes mellitus     type ii, doesn't check bg at home    • Diabetes mellitus, type 2    • Fracture, foot 09/15   • Gastroparesis    • GERD (gastroesophageal reflux disease)    • High triglycerides    • History of bariatric surgery 2021   • History of kidney stones    • HTN (hypertension)    • Hyperlipemia    • Hyperlipidemia    • Hypertension    • Hypertriglyceridemia    • Kidney stone    • Lumbar herniated disc    • Lumbosacral disc disease    • Obesity    • Panic attacks    • PCOS (polycystic ovarian syndrome)    • Periarthritis of shoulder    • Psoriasis     ELBOWS   • Rotator cuff syndrome    • Seasonal allergies    • Spinal headache     AFTER PAIN EPIDURALS.  NO BLOOD PATCH       Past Surgical History:   Past Surgical History:   Procedure Laterality Date   • ABDOMINAL SURGERY     • ADENOIDECTOMY     •  SECTION  ,   • CYSTOSCOPY BLADDER STONE LITHOTRIPSY     • EAR TUBES     • ENDOSCOPY N/A 10/20/2020    Procedure: ESOPHAGOGASTRODUODENOSCOPY WITH BIOPSY;  Surgeon: Fidel Grajeda Jr., MD;  Location: [facility] ENDOSCOPY;  Service: General;  Laterality: N/A;  PRE- GERD  POST- RETAINED FOOD, GASTRITIS, GASTRIC POLYPS   • ENDOSCOPY     • FOOT FRACTURE SURGERY Left 2017    screw placed   • FRACTURE SURGERY     • GASTRECTOMY     • GASTRIC SLEEVE LAPAROSCOPIC N/A 2020    Procedure: GASTRIC SLEEVE LAPAROSCOPIC;  Surgeon: Fidel Grajeda Jr., MD;  [place] [facility] [place] [facility] [place];  Service: Bariatric;   Laterality: N/A;   • KIDNEY SURGERY     • NEPHRECTOMY PARTIAL Right 11/15/2021    Procedure: NEPHRECTOMY PARTIAL LAPAROSCOPIC WITH DAVINCI ROBOT;  Surgeon: Lori Troy MD;  Location: [facility];  Service: Robotics - DaVinci;  Laterality: Right;   • TONSILLECTOMY  1984   • URETEROSCOPY LASER LITHOTRIPSY WITH STENT INSERTION Right 09/28/2021    Procedure: CYSTOSCOPY, RIGHT URETEROSCOPY, LASERTRIPSY, STONE BASKET EXTRACTION AND STENT INSERTION;  Surgeon: Lori Troy MD;  Location[facility] OR;  Service: Urology;  Laterality: Right;   • URETEROSCOPY LASER LITHOTRIPSY WITH STENT INSERTION Left 11/08/2022    Procedure: URETEROSCOPY LASER LITHOTRIPSY WITH URETERAL STENT INSERTION;  Surgeon: Lori Troy MD;  Location: [facility];  Service: Urology;  Laterality: Left;       Family History:   Family History   Problem Relation Age of Onset   • Diabetes Father    • Hypertension Father    • Heart disease Father    • Cancer Father         Bladder prostate liver   • Colon cancer Father         malignant, currently 62   • Stroke Maternal Grandmother    • Heart disease Maternal Grandmother    • Diabetes Paternal Grandfather    • Heart disease Paternal Grandfather    • Cancer Mother         Breast   • Malig Hyperthermia Neg Hx        Social History:   Social History     Socioeconomic History   • Marital status:    Tobacco Use   • Smoking status: Former     Packs/day: 0.25     Years: 35.00     Pack years: 8.75     Types: Cigarettes     Quit date: 1/15/2020     Years since quitting: 3.3   • Smokeless tobacco: Never   • Tobacco comments:     A PACK A WEEK   Vaping Use   • Vaping Use: Never used   Substance and Sexual Activity   • Alcohol use: Yes     Comment: Very rarely   • Drug use: Not Currently     Frequency: 0.1 times per week     Types: Marijuana     Comment: Very rarely in my youth   • Sexual activity: Yes     Partners: Male     Birth control/protection: Inserts       Medications:      Current Outpatient Medications:   •  Apremilast (Otezla) 30 MG tablet, Take 30 mg by mouth 2 (Two) Times a Day., Disp: 60 tablet, Rfl: 3  •  Biotin 58141 MCG tablet, Take 1 tablet by mouth Every Night., Disp: , Rfl:   •  buPROPion XL (WELLBUTRIN XL) 300 MG 24 hr tablet, Take 1 tablet by mouth Daily., Disp: 90 tablet, Rfl: 1  •  CALCIUM-MAGNESIUM-ZINC PO, Take 3 tablets by mouth Daily., Disp: , Rfl:   •  cetirizine (zyrTEC) 10 MG tablet, Take 1 tablet by mouth every night at bedtime., Disp: , Rfl:   •  Cholecalciferol 25 MCG (1000 UT) capsule, Take 2 capsules by mouth Daily., Disp: 240 capsule, Rfl: 0  •  clobetasol (TEMOVATE) 0.05 % external solution, Apply 10-15 drops every day to affected areas in the scalp after shampooing until clear., Disp: 50 mL, Rfl: 3  •  COLLAGEN PO, Take 3 tablets by mouth Daily., Disp: , Rfl:   •  Continuous Blood Gluc Sensor (SoFiStyle Ling 14 Day Sensor) misc, 1 each Every 14 (Fourteen) Days., Disp: 6 each, Rfl: 3  •  etonogestrel-ethinyl estradiol (NuvaRing) 0.12-0.015 MG/24HR vaginal ring, Insert 1 each into the vagina Every 30 (Thirty) Days. Insert vaginally and leave in place for 3 consecutive weeks, then remove for 1 week., Disp: 3 each, Rfl: 3  •  fluticasone (FLONASE) 50 MCG/ACT nasal spray, USE 2 SPRAYS IN EACH NOSTRIL ONCE DAILY AS DIRECTED, Disp: 48 g, Rfl: 1  •  IRON-VITAMIN C PO, Take 1 tablet by mouth Daily., Disp: , Rfl:   •  lisinopril (PRINIVIL,ZESTRIL) 2.5 MG tablet, Take 1 tablet by mouth Daily., Disp: 90 tablet, Rfl: 1  •  metoprolol succinate XL (Toprol XL) 25 MG 24 hr tablet, Take 1 tablet by mouth Every Night., Disp: 90 tablet, Rfl: 1  •  Multiple Vitamins-Minerals (MULTIVITAMIN PO), Take 1 tablet by mouth Every Night., Disp: , Rfl:   •  ondansetron ODT (ZOFRAN-ODT) 4 MG disintegrating tablet, Place 1 tablet on the tongue Every 8 (Eight) Hours As Needed for Nausea., Disp: 21 tablet, Rfl: 5  •  pravastatin (PRAVACHOL) 10 MG tablet, Take 1 tablet by mouth Every  "Night., Disp: 90 tablet, Rfl: 1  •  Probiotic Product (PROBIOTIC-10 PO), Take 1 tablet by mouth Every Night., Disp: , Rfl:   •  promethazine (PHENERGAN) 25 MG suppository, Insert 1 suppository into the rectum Every 6 (Six) Hours As Needed for Nausea., Disp: 12 suppository, Rfl: 1  •  Semaglutide,0.25 or 0.5MG/DOS, (Ozempic, 0.25 or 0.5 MG/DOSE,) 2 MG/1.5ML solution pen-injector, Inject 0.25 mg under the skin into the appropriate area as directed 1 (One) Time Per Week., Disp: 3 mL, Rfl: 2  •  topiramate (TOPAMAX) 50 MG tablet, Take 1 tablet by mouth every night at bedtime for 90 days., Disp: 90 tablet, Rfl: 0  •  traZODone (DESYREL) 50 MG tablet, Take 1 tablet by mouth Every Night., Disp: 90 tablet, Rfl: 1    Allergies:   Allergies   Allergen Reactions   • Morphine Itching and Nausea And Vomiting         Objective     Physical Exam:  Vital Signs:   Vitals:    05/05/23 0815   BP: 92/62   Pulse: 55   Temp: 97.8 °F (36.6 °C)   SpO2: 100%   Weight: 98 kg (216 lb)   Height: 170.2 cm (67\")     Body mass index is 33.83 kg/m².     Physical Exam  HENT:      Right Ear: Tympanic membrane normal.      Left Ear: Tympanic membrane normal.      Nose: Nose normal.      Mouth/Throat:      Mouth: Mucous membranes are moist.   Eyes:      Conjunctiva/sclera: Conjunctivae normal.   Neck:      Vascular: No carotid bruit.   Cardiovascular:      Rate and Rhythm: Normal rate and regular rhythm.      Heart sounds: Normal heart sounds. No murmur heard.  Pulmonary:      Effort: Pulmonary effort is normal.      Breath sounds: Normal breath sounds.   Abdominal:      General: Bowel sounds are normal.      Palpations: Abdomen is soft.   Musculoskeletal:      Right lower leg: No edema.      Left lower leg: No edema.   Skin:     General: Skin is warm and dry.   Neurological:      Mental Status: She is alert.   Psychiatric:         Mood and Affect: Mood normal.         Behavior: Behavior normal.             Assessment / Plan      Assessment/Plan: "   Diagnoses and all orders for this visit:    1. Type 2 diabetes mellitus without complication, without long-term current use of insulin (Primary)    2. Mixed hyperlipidemia    3. Essential hypertension    4. Mild episode of recurrent major depressive disorder    5. Anxiety    6. Primary insomnia    7. Seasonal allergies    8. Screening mammogram for breast cancer  -     Mammo Screening Digital Tomosynthesis Bilateral With CAD; Future    9. Fatty liver  -     US Liver; Future    10. Left foot pain  -     Ambulatory Referral to Podiatry    Other orders  -     buPROPion XL (WELLBUTRIN XL) 300 MG 24 hr tablet; Take 1 tablet by mouth Daily.  Dispense: 90 tablet; Refill: 1  -     Cholecalciferol 25 MCG (1000 UT) capsule; Take 2 capsules by mouth Daily.  Dispense: 240 capsule; Refill: 0  -     lisinopril (PRINIVIL,ZESTRIL) 2.5 MG tablet; Take 1 tablet by mouth Daily.  Dispense: 90 tablet; Refill: 1  -     metoprolol succinate XL (Toprol XL) 25 MG 24 hr tablet; Take 1 tablet by mouth Every Night.  Dispense: 90 tablet; Refill: 1  -     pravastatin (PRAVACHOL) 10 MG tablet; Take 1 tablet by mouth Every Night.  Dispense: 90 tablet; Refill: 1  -     Semaglutide,0.25 or 0.5MG/DOS, (Ozempic, 0.25 or 0.5 MG/DOSE,) 2 MG/1.5ML solution pen-injector; Inject 0.25 mg under the skin into the appropriate area as directed 1 (One) Time Per Week.  Dispense: 3 mL; Refill: 2  -     traZODone (DESYREL) 50 MG tablet; Take 1 tablet by mouth Every Night.  Dispense: 90 tablet; Refill: 1       Diabetes mellitus type 2 obtain hemoglobin A1c to monitor we will continue Ozempic at this time  Hyperlipidemia we will obtain lipid panel and CMP to monitor current statin dose pravastatin 10 mg at nighttime denies myalgias  Hypertension currently controlled lisinopril 2.5 mg once daily  Anxiety depression stable on Wellbutrin  Insomnia currently controlled with trazodone  We will provide order for screening mammogram denies lumps mass tenderness blood or  "discharge or nipple  Fatty liver will obtain liver ultrasound we do not have one on file and obtain liver enzymes  Left foot pain will refer to podiatry per patient request previous left foot x-ray reviewed        Follow Up:   Return in about 6 months (around 11/5/2023).    Kofi Hernandez, APRN    \"Please note that portions of this note were completed with a voice recognition program.\"    "

## 2023-05-08 RX ORDER — DAPAGLIFLOZIN 5 MG/1
5 TABLET, FILM COATED ORAL DAILY
Qty: 90 TABLET | Refills: 1 | Status: SHIPPED | OUTPATIENT
Start: 2023-05-08

## 2023-05-09 ENCOUNTER — TELEPHONE (OUTPATIENT)
Dept: FAMILY MEDICINE CLINIC | Facility: CLINIC | Age: 43
End: 2023-05-09

## 2023-05-09 NOTE — TELEPHONE ENCOUNTER
Caller: Bianca Boles    Relationship to patient: Self    Best call back number: [phone]    Patient is needing: PATIENT STATED SHE NEEDS A PRIOR AUTHORIZATION FOR HER FREESTYLE ESTRADA MONITOR. SHE STATED THAT HER INSURANCE PREFERS THE [employer] AND SHE IS OKAY WITH THIS SWITCH AS LONG AS IT IS THE [employer] 7. PLEASE CALL AND ADVISE.

## 2023-05-10 NOTE — TELEPHONE ENCOUNTER
[facility], waiting on response

## 2023-05-19 RX ORDER — FLUTICASONE PROPIONATE 50 MCG
2 SPRAY, SUSPENSION (ML) NASAL DAILY
Qty: 48 G | Refills: 1 | Status: SHIPPED | OUTPATIENT
Start: 2023-05-19

## 2023-05-30 ENCOUNTER — HOSPITAL ENCOUNTER (OUTPATIENT)
Dept: ULTRASOUND IMAGING | Facility: HOSPITAL | Age: 43
Discharge: HOME | End: 2023-05-30
Admitting: NURSE PRACTITIONER

## 2023-05-30 DIAGNOSIS — K76.0 FATTY LIVER: ICD-10-CM

## 2023-05-30 PROCEDURE — 76705 ECHO EXAM OF ABDOMEN: CPT

## 2023-06-01 ENCOUNTER — OFFICE VISIT (OUTPATIENT)
Dept: PODIATRY | Facility: CLINIC | Age: 43
End: 2023-06-01

## 2023-06-01 VITALS
TEMPERATURE: 97.3 F | HEART RATE: 60 BPM | OXYGEN SATURATION: 99 % | BODY MASS INDEX: 34.06 KG/M2 | DIASTOLIC BLOOD PRESSURE: 64 MMHG | WEIGHT: 217 LBS | SYSTOLIC BLOOD PRESSURE: 99 MMHG | HEIGHT: 67 IN

## 2023-06-01 DIAGNOSIS — S92.352D CLOSED FRACTURE OF BASE OF FIFTH METATARSAL BONE WITH ROUTINE HEALING, LEFT: Primary | ICD-10-CM

## 2023-06-01 DIAGNOSIS — E11.65 TYPE 2 DIABETES MELLITUS WITH HYPERGLYCEMIA, WITH LONG-TERM CURRENT USE OF INSULIN: ICD-10-CM

## 2023-06-01 DIAGNOSIS — Z79.4 TYPE 2 DIABETES MELLITUS WITH HYPERGLYCEMIA, WITH LONG-TERM CURRENT USE OF INSULIN: ICD-10-CM

## 2023-06-01 DIAGNOSIS — Q66.70 PES CAVUS: ICD-10-CM

## 2023-06-01 NOTE — PROGRESS NOTES
[facility] - PODIATRY    Today's Date: 06/01/23    Patient Name: Bianca Boles  MRN: [id number]  CSN: [id number]  PCP: Patricia Hernandez APRN, Last PCP Visit: 5/5/2023  Referring Provider: Patricia Hernandez*    SUBJECTIVE     Chief Complaint   Patient presents with   • Left Foot - Establish Care, Pain     Had surgery with [place] in 2017, had a fracture   Pt has lost weight and is now having pain in foot x 6 months      HPI: Bianca Boles, a 43 y.o.female, presents to clinic.    Patient is a 43-year-old female presenting with left foot pain.  Patient states she had surgery in 2017 for fifth metatarsal fracture.  Overall she did well.  She states that she is lost a lot of weight and has increased activity due to this she is starting to have pain in that area once again.  Patient states it hurts to walk on.  Patient states that it is a soreness and it continues to bother her.  Patient has not sprained it recently or twisted her ankle.    Past Medical History:   Diagnosis Date   • Acid reflux    • Allergic rhinitis due to allergen 06/09/2016   • Anxiety    • Anxiety and depression    • Benign essential hypertension    • Cancer     renal cancer/mass   • Diabetes    • Diabetes mellitus     type ii, doesn't check bg at home    • Diabetes mellitus, type 2    • Fracture, foot 09/15   • Gastroparesis    • GERD (gastroesophageal reflux disease)    • High triglycerides    • History of bariatric surgery 02/04/2021   • History of kidney stones    • HTN (hypertension)    • Hyperlipemia    • Hyperlipidemia    • Hypertension    • Hypertriglyceridemia    • Kidney stone    • Lumbar herniated disc    • Lumbosacral disc disease 2002   • Obesity    • Panic attacks    • PCOS (polycystic ovarian syndrome)    • Periarthritis of shoulder 01/23   • Psoriasis     ELBOWS   • Rotator cuff syndrome 01/23   • Seasonal allergies    • Spinal headache     AFTER PAIN EPIDURALS.  NO BLOOD PATCH     Past Surgical  History:   Procedure Laterality Date   • ABDOMINAL SURGERY     • ADENOIDECTOMY     •  SECTION  ,   • CYSTOSCOPY BLADDER STONE LITHOTRIPSY     • EAR TUBES     • ENDOSCOPY N/A 10/20/2020    Procedure: ESOPHAGOGASTRODUODENOSCOPY WITH BIOPSY;  Surgeon: Fidel Grajeda Jr., MD;  Location: [facility] ENDOSCOPY;  Service: General;  Laterality: N/A;  PRE- GERD  POST- RETAINED FOOD, GASTRITIS, GASTRIC POLYPS   • ENDOSCOPY     • FOOT FRACTURE SURGERY Left 2017    screw placed   • FRACTURE SURGERY     • GASTRECTOMY     • GASTRIC SLEEVE LAPAROSCOPIC N/A 2020    Procedure: GASTRIC SLEEVE LAPAROSCOPIC;  Surgeon: Fidel Grajeda Jr., MD;  Location: [facility] OR OSC;  Service: Bariatric;  Laterality: N/A;   • KIDNEY SURGERY     • NEPHRECTOMY PARTIAL Right 11/15/2021    Procedure: NEPHRECTOMY PARTIAL LAPAROSCOPIC WITH DAVINCI ROBOT;  Surgeon: Lori Troy MD;  Location: [facility];  Service: Robotics - DaVinci;  Laterality: Right;   • TONSILLECTOMY     • URETEROSCOPY LASER LITHOTRIPSY WITH STENT INSERTION Right 2021    Procedure: CYSTOSCOPY, RIGHT URETEROSCOPY, LASERTRIPSY, STONE BASKET EXTRACTION AND STENT INSERTION;  Surgeon: Lori Troy MD;  Location: [facility] OR;  Service: Urology;  Laterality: Right;   • URETEROSCOPY LASER LITHOTRIPSY WITH STENT INSERTION Left 2022    Procedure: URETEROSCOPY LASER LITHOTRIPSY WITH URETERAL STENT INSERTION;  Surgeon: Lori Troy MD;  Location: [facility] OR;  Service: Urology;  Laterality: Left;     Family History   Problem Relation Age of Onset   • Diabetes Father    • Hypertension Father    • Heart disease Father    • Cancer Father         Bladder prostate liver   • Colon cancer Father         malignant, currently 62   • Stroke Maternal Grandmother    • Heart disease Maternal Grandmother    • Diabetes Paternal Grandfather    • Heart disease Paternal Grandfather    • Cancer Mother         Breast   • Malig  Hyperthermia Neg Hx      Social History     Socioeconomic History   • Marital status:    Tobacco Use   • Smoking status: Former     Packs/day: 0.25     Years: 35.00     Pack years: 8.75     Types: Cigarettes     Quit date: 1/15/2020     Years since quitting: 3.3   • Smokeless tobacco: Never   • Tobacco comments:     A PACK A WEEK   Vaping Use   • Vaping Use: Never used   Substance and Sexual Activity   • Alcohol use: Yes     Comment: Very rarely   • Drug use: Not Currently     Frequency: 0.1 times per week     Types: Marijuana     Comment: Very rarely in my youth   • Sexual activity: Yes     Partners: Male     Birth control/protection: Inserts     Allergies   Allergen Reactions   • Morphine Itching and Nausea And Vomiting     Current Outpatient Medications   Medication Sig Dispense Refill   • Apremilast (Otezla) 30 MG tablet Take 30 mg by mouth 2 (Two) Times a Day. 60 tablet 3   • Biotin 57148 MCG tablet Take 1 tablet by mouth Every Night.     • buPROPion XL (WELLBUTRIN XL) 300 MG 24 hr tablet Take 1 tablet by mouth Daily. 90 tablet 1   • CALCIUM-MAGNESIUM-ZINC PO Take 3 tablets by mouth Daily.     • cetirizine (zyrTEC) 10 MG tablet Take 1 tablet by mouth every night at bedtime.     • Cholecalciferol 25 MCG (1000 UT) capsule Take 2 capsules by mouth Daily. 240 capsule 0   • clobetasol (TEMOVATE) 0.05 % external solution Apply 10-15 drops every day to affected areas in the scalp after shampooing until clear. 50 mL 3   • COLLAGEN PO Take 3 tablets by mouth Daily.     • Continuous Blood Gluc Sensor (FreeStyle Ling 14 Day Sensor) misc 1 each Every 14 (Fourteen) Days. 6 each 3   • dapagliflozin (Farxiga) 5 MG tablet tablet Take 1 tablet by mouth Daily. 90 tablet 1   • etonogestrel-ethinyl estradiol (NuvaRing) 0.12-0.015 MG/24HR vaginal ring Insert 1 each into the vagina Every 30 (Thirty) Days. Insert vaginally and leave in place for 3 consecutive weeks, then remove for 1 week. 3 each 3   • fluticasone (FLONASE)  50 MCG/ACT nasal spray USE 2 SPRAYS IN EACH NOSTRIL ONCE DAILY AS DIRECTED 48 g 1   • IRON-VITAMIN C PO Take 1 tablet by mouth Daily.     • lisinopril (PRINIVIL,ZESTRIL) 2.5 MG tablet Take 1 tablet by mouth Daily. 90 tablet 1   • metoprolol succinate XL (Toprol XL) 25 MG 24 hr tablet Take 1 tablet by mouth Every Night. 90 tablet 1   • Multiple Vitamins-Minerals (MULTIVITAMIN PO) Take 1 tablet by mouth Every Night.     • ondansetron ODT (ZOFRAN-ODT) 4 MG disintegrating tablet Place 1 tablet on the tongue Every 8 (Eight) Hours As Needed for Nausea. 21 tablet 5   • pravastatin (PRAVACHOL) 10 MG tablet Take 1 tablet by mouth Every Night. 90 tablet 1   • Probiotic Product (PROBIOTIC-10 PO) Take 1 tablet by mouth Every Night.     • promethazine (PHENERGAN) 25 MG suppository Insert 1 suppository into the rectum Every 6 (Six) Hours As Needed for Nausea. 12 suppository 1   • Semaglutide,0.25 or 0.5MG/DOS, (Ozempic, 0.25 or 0.5 MG/DOSE,) 2 MG/3ML solution pen-injector Inject 0.25 mg under the skin into the appropriate area as directed 1 (One) Time Per Week. 3 mL 2   • topiramate (TOPAMAX) 50 MG tablet Take 1 tablet by mouth every night at bedtime for 90 days. 90 tablet 0   • traZODone (DESYREL) 50 MG tablet Take 1 tablet by mouth Every Night. 90 tablet 1     No current facility-administered medications for this visit.     Review of Systems   Constitutional: Negative.    HENT: Negative.    Eyes: Negative.    Respiratory: Negative.    Cardiovascular: Negative.    Gastrointestinal: Negative.    Endocrine: Negative.    Genitourinary: Negative.    Musculoskeletal: Negative.         Left foot pain   Skin: Negative.    Allergic/Immunologic: Negative.    Neurological: Negative.    Hematological: Negative.    Psychiatric/Behavioral: Negative.    All other systems reviewed and are negative.      OBJECTIVE     Vitals:    06/01/23 0859   BP: 99/64   Pulse: 60   Temp: 97.3 °F (36.3 °C)   SpO2: 99%       WBC   Date Value Ref Range Status    05/05/2023 10.22 3.40 - 10.80 10*3/mm3 Final     RBC   Date Value Ref Range Status   05/05/2023 4.46 3.77 - 5.28 10*6/mm3 Final     Hemoglobin   Date Value Ref Range Status   05/05/2023 12.8 12.0 - 15.9 g/dL Final     Hematocrit   Date Value Ref Range Status   05/05/2023 37.9 34.0 - 46.6 % Final     MCV   Date Value Ref Range Status   05/05/2023 85.0 79.0 - 97.0 fL Final     MCH   Date Value Ref Range Status   05/05/2023 28.7 26.6 - 33.0 pg Final     MCHC   Date Value Ref Range Status   05/05/2023 33.8 31.5 - 35.7 g/dL Final     RDW   Date Value Ref Range Status   05/05/2023 13.0 12.3 - 15.4 % Final     RDW-SD   Date Value Ref Range Status   05/05/2023 39.7 37.0 - 54.0 fl Final     MPV   Date Value Ref Range Status   05/05/2023 9.8 6.0 - 12.0 fL Final     Platelets   Date Value Ref Range Status   05/05/2023 220 140 - 450 10*3/mm3 Final     Neutrophil %   Date Value Ref Range Status   05/05/2023 58.4 42.7 - 76.0 % Final     Lymphocyte %   Date Value Ref Range Status   05/05/2023 32.8 19.6 - 45.3 % Final     Monocyte %   Date Value Ref Range Status   05/05/2023 6.6 5.0 - 12.0 % Final     Eosinophil %   Date Value Ref Range Status   05/05/2023 0.7 0.3 - 6.2 % Final     Basophil %   Date Value Ref Range Status   05/05/2023 0.6 0.0 - 1.5 % Final     Immature Grans %   Date Value Ref Range Status   05/05/2023 0.9 (H) 0.0 - 0.5 % Final     Neutrophils, Absolute   Date Value Ref Range Status   05/05/2023 5.98 1.70 - 7.00 10*3/mm3 Final     Lymphocytes, Absolute   Date Value Ref Range Status   05/05/2023 3.35 (H) 0.70 - 3.10 10*3/mm3 Final     Monocytes, Absolute   Date Value Ref Range Status   05/05/2023 0.67 0.10 - 0.90 10*3/mm3 Final     Eosinophils, Absolute   Date Value Ref Range Status   05/05/2023 0.07 0.00 - 0.40 10*3/mm3 Final     Basophils, Absolute   Date Value Ref Range Status   05/05/2023 0.06 0.00 - 0.20 10*3/mm3 Final     Immature Grans, Absolute   Date Value Ref Range Status   05/05/2023 0.09 (H) 0.00 -  0.05 10*3/mm3 Final     nRBC   Date Value Ref Range Status   05/05/2023 0.0 0.0 - 0.2 /100 WBC Final         Lab Results   Component Value Date    GLUCOSE 91 05/05/2023    BUN 19 05/05/2023    CREATININE 1.09 (H) 05/05/2023    EGFRIFNONA 61 02/07/2022    BCR 17.4 05/05/2023    K 4.1 05/05/2023    CO2 25.0 05/05/2023    CALCIUM 9.0 05/05/2023    ALBUMIN 4.0 05/05/2023    LABIL2 1.3 (L) 02/03/2021    AST 24 05/05/2023    ALT 33 05/05/2023       Patient seen in no apparent distress.      PHYSICAL EXAM:     Foot/Ankle Exam    GENERAL  Appearance:  appears stated age  Orientation:  AAOx3  Affect:  appropriate  Gait:  unimpaired  Assistance:  independent  Right shoe gear: casual shoe  Left shoe gear: casual shoe    VASCULAR     Right Foot Vascularity   Normal vascular exam    Dorsalis pedis:  2+  Posterior tibial:  2+  Skin temperature:  warm  Edema grading:  None  CFT:  < 3 seconds  Pedal hair growth:  Present  Varicosities:  none     Left Foot Vascularity   Normal vascular exam    Dorsalis pedis:  2+  Posterior tibial:  2+  Skin temperature:  warm  Edema grading:  None  CFT:  < 3 seconds  Pedal hair growth:  Present  Varicosities:  none     NEUROLOGIC     Right Foot Neurologic   Normal sensation    Light touch sensation: normal  Vibratory sensation: normal  Hot/Cold sensation: normal  Protective Sensation using Itmann-Lidya Monofilament:   Sites intact: 10  Sites tested: 10     Left Foot Neurologic   Normal sensation    Light touch sensation: normal  Vibratory sensation: normal  Hot/Cold sensation:  normal  Protective Sensation using Itmann-Lidya Monofilament:   Sites intact: 10  Sites tested: 10    MUSCULOSKELETAL     Left Foot Musculoskeletal   Tenderness:  metatarsal 5    MUSCLE STRENGTH     Right Foot Muscle Strength   Foot dorsiflexion:  4  Foot plantar flexion:  4  Foot inversion:  4  Foot eversion:  4     Left Foot Muscle Strength   Foot dorsiflexion:  4  Foot plantar flexion:  4  Foot inversion:  4  Foot  eversion:  4    RANGE OF MOTION     Right Foot Range of Motion   Foot and ankle ROM within normal limits       Left Foot Range of Motion   Foot and ankle ROM within normal limits      DERMATOLOGIC      Right Foot Dermatologic   Skin  Right foot skin is intact.      Left Foot Dermatologic   Skin  Left foot skin is intact.       RADIOLOGY:      US Liver    Result Date: 5/30/2023  Narrative: PROCEDURE: US LIVER  COMPARISON: None  INDICATIONS: fatty liver  FINDINGS:  Visualized pancreas unremarkable  Liver normal in echogenicity and texture.  No intrahepatic biliary duct dilatation.  Hepatopetal portal venous flow.  Patent interrogated hepatic veins  Gallbladder normal in size and wall thickness.  No stones demonstrated.  Common duct normal at 4 mm  Right kidney normal in echogenicity with and morphology with no hydronephrosis      Impression:    1. No evidence of cholelithiasis or biliary obstruction.   2. Normal ultrasound appearance of the liver, right kidney, and visualized pancreas      GILMAR SENIOR MD       Electronically Signed and Approved By: GILMAR SENIOR MD on 5/30/2023 at 8:34               ASSESSMENT/PLAN     Diagnoses and all orders for this visit:    1. Closed fracture of base of fifth metatarsal bone with routine healing, left (Primary)    2. Type 2 diabetes mellitus with hyperglycemia, with long-term current use of insulin    3. Pes cavus      Patient with questionable acute fracture of the left fifth metatarsal.  We will order CT scan for further evaluation.  We will have her wear the cam boot for the next 2 to 3 weeks till CT is finished.  Return to clinic at that time.    Patient to begin stretching exercises and icing in the evening as tolerated. Discussed compression therapy and resting the extremity.  Anti-inflammatory medication to begin taking if okay by PCP.    Comprehensive lower extremity examination and evaluation was performed.    Discussed findings and treatment plan including risks,  benefits, and treatment options with patient in detail. Patient agreed with treatment plan.    Medications and allergies reviewed.  Reviewed available lab values along with other pertinent labs.  These were discussed with the patient.    An After Visit Summary was printed and given to the patient at discharge, including (if requested) any available informative/educational handouts regarding diagnosis, treatment, or medications. All questions were answered to patient/family satisfaction. Should symptoms fail to improve or worsen they agree to call or return to clinic or to go to the Emergency Department. Discussed the importance of following up with any needed screening tests/labs/specialist appointments and any requested follow-up recommended by me today. Importance of maintaining follow-up discussed and patient accepts that missed appointments can delay diagnosis and potentially lead to worsening of conditions.    Return in about 2 weeks (around 6/15/2023)., or sooner if acute issues arise.    This document has been electronically signed by Maxx Short DPM on June 1, 2023 14:33 EDT

## 2023-06-02 RX ORDER — TOPIRAMATE 50 MG/1
50 TABLET, FILM COATED ORAL
Qty: 90 TABLET | Refills: 0 | Status: SHIPPED | OUTPATIENT
Start: 2023-06-02 | End: 2023-09-03

## 2023-06-12 ENCOUNTER — OFFICE VISIT (OUTPATIENT)
Dept: ORTHOPEDIC SURGERY | Facility: CLINIC | Age: 43
End: 2023-06-12
Payer: COMMERCIAL

## 2023-06-12 VITALS
HEART RATE: 66 BPM | SYSTOLIC BLOOD PRESSURE: 95 MMHG | OXYGEN SATURATION: 98 % | BODY MASS INDEX: 33.27 KG/M2 | DIASTOLIC BLOOD PRESSURE: 60 MMHG | HEIGHT: 67 IN | WEIGHT: 212 LBS

## 2023-06-12 DIAGNOSIS — M75.82 ROTATOR CUFF TENDONITIS, LEFT: ICD-10-CM

## 2023-06-12 DIAGNOSIS — M19.012 OSTEOARTHRITIS OF GLENOHUMERAL JOINT, LEFT: Primary | ICD-10-CM

## 2023-06-12 NOTE — PROGRESS NOTES
"Chief Complaint  Pain and Follow-up of the Left Shoulder    Subjective      Bianca Boles presents to [facility] for follow-up of left shoulder.  Previous MRI revealed significant rotator cuff tendinosis with partial articular sided tearing of supraspinatus and infraspinatus tendons at the footplate without evidence of complete tear, mild to moderate degenerative changes of the AC joint, biceps tenosynovitis, and mild glenohumeral osteoarthritis.  She received a left shoulder steroid injection and directions for home exercise program.  Patient presents today for follow-up reporting that she had minimal and temporary relief with the steroid injection, lasting \"only a few days\".  She feels as if home exercise program has \"made my shoulder worse\".  She reports increased soreness and pain following participation with home exercise program.  She is unable to take NSAIDs due to history of bariatric surgery.  She reports persistent difficulties with ADLs, such as fastening her bra.    Objective   Allergies   Allergen Reactions    Morphine Itching and Nausea And Vomiting       Vital Signs:   BP 95/60   Pulse 66   Ht 170.2 cm (67\")   Wt 96.2 kg (212 lb)   SpO2 98%   BMI 33.20 kg/m²       Physical Exam    Constitutional: Awake, alert. Well nourished appearance.    Integumentary: Warm, dry, intact. No obvious rashes.    HENT: Atraumatic, normocephalic.   Respiratory: Non labored respirations .   Cardiovascular: Intact peripheral pulses.    Psychiatric: Normal mood and affect. A&O X3    Ortho Exam  Left shoulder: Full active shoulder forward flexion, abduction 130 degrees, and internal rotation to sacrum.  Full flexion extension of the wrist and elbow.  Full pronation supination.  Patient is able to form a full fist.  Sensation intact light touch.  Distal neurovascular intact.    Imaging Results (Most Recent)       None                    Assessment and Plan   Problem List Items Addressed " This Visit    None  Visit Diagnoses       Osteoarthritis of glenohumeral joint, left    -  Primary    Relevant Orders    Ambulatory Referral to Physical Therapy Evaluate and treat; Heat; Stretching, ROM, Strengthening (Completed)    Rotator cuff tendonitis, left        Relevant Orders    Ambulatory Referral to Physical Therapy Evaluate and treat; Heat; Stretching, ROM, Strengthening (Completed)            Follow Up   Return in about 7 weeks (around 7/28/2023).    Tobacco Use: Medium Risk    Smoking Tobacco Use: Former    Smokeless Tobacco Use: Never    Passive Exposure: Not on file       Educated on risk of smoking. Discussed options for smoking cessation.    Patient Instructions   Patient with minimal and temporary improvement with steroid injection. Referral placed for patient to start PT. Continue home exercises. Unable to take NSAIDs due to bariatric surgery history. Rx for voltaren gel sent to pharmacy.     Follow up on/after 7/28/2023. Repeat steroid injection, if needed. Call with changes or concerns.   Patient was given instructions and counseling regarding her condition or for health maintenance advice. Please see specific information pulled into the AVS if appropriate.

## 2023-06-12 NOTE — PATIENT INSTRUCTIONS
Patient with minimal and temporary improvement with steroid injection. Referral placed for patient to start PT. Continue home exercises. Unable to take NSAIDs due to bariatric surgery history. Rx for voltaren gel sent to pharmacy.     Follow up on/after 7/28/2023. Repeat steroid injection, if needed. Call with changes or concerns.

## 2023-08-02 ENCOUNTER — OFFICE VISIT (OUTPATIENT)
Dept: ORTHOPEDIC SURGERY | Facility: CLINIC | Age: 43
End: 2023-08-02
Payer: COMMERCIAL

## 2023-08-02 ENCOUNTER — PREP FOR SURGERY (OUTPATIENT)
Dept: OTHER | Facility: HOSPITAL | Age: 43
End: 2023-08-02
Payer: COMMERCIAL

## 2023-08-02 VITALS — WEIGHT: 216 LBS | HEIGHT: 67 IN | BODY MASS INDEX: 33.9 KG/M2

## 2023-08-02 DIAGNOSIS — M75.42 IMPINGEMENT SYNDROME OF LEFT SHOULDER: ICD-10-CM

## 2023-08-02 DIAGNOSIS — M75.02 ADHESIVE CAPSULITIS OF LEFT SHOULDER: ICD-10-CM

## 2023-08-02 DIAGNOSIS — M19.012 OSTEOARTHRITIS OF GLENOHUMERAL JOINT, LEFT: Primary | ICD-10-CM

## 2023-08-02 DIAGNOSIS — M75.82 ROTATOR CUFF TENDONITIS, LEFT: ICD-10-CM

## 2023-08-02 DIAGNOSIS — M75.42 IMPINGEMENT SYNDROME OF LEFT SHOULDER: Primary | ICD-10-CM

## 2023-08-02 PROBLEM — M75.00 FROZEN SHOULDER: Status: ACTIVE | Noted: 2023-08-02

## 2023-08-02 RX ORDER — CEFAZOLIN SODIUM IN 0.9 % NACL 3 G/100 ML
3 INTRAVENOUS SOLUTION, PIGGYBACK (ML) INTRAVENOUS ONCE
OUTPATIENT
Start: 2023-08-02 | End: 2023-08-02

## 2023-08-02 RX ORDER — CEFAZOLIN SODIUM 2 G/100ML
2 INJECTION, SOLUTION INTRAVENOUS ONCE
OUTPATIENT
Start: 2023-08-02 | End: 2023-08-02

## 2023-08-09 ENCOUNTER — HOSPITAL ENCOUNTER (OUTPATIENT)
Dept: MAMMOGRAPHY | Facility: HOSPITAL | Age: 43
Discharge: HOME | End: 2023-08-09
Admitting: NURSE PRACTITIONER
Payer: COMMERCIAL

## 2023-08-09 DIAGNOSIS — Z12.31 SCREENING MAMMOGRAM FOR BREAST CANCER: ICD-10-CM

## 2023-08-09 PROCEDURE — 77063 BREAST TOMOSYNTHESIS BI: CPT

## 2023-08-09 PROCEDURE — 77067 SCR MAMMO BI INCL CAD: CPT

## 2023-08-10 NOTE — PRE-PROCEDURE INSTRUCTIONS
PRE-OP INSTRUCTIONS REVIEWED WITH PATIENT: FASTING/BATHING/ARRIVAL PROCEDURES.  INSTRUCTED TO TAKE A.M. DAY OF SURGERY: TERRELL BUPROPRION  UNDERSTANDING VERBALIZED.

## 2023-08-14 ENCOUNTER — HOSPITAL ENCOUNTER (OUTPATIENT)
Facility: HOSPITAL | Age: 43
Setting detail: HOSPITAL OUTPATIENT SURGERY
Discharge: HOME | End: 2023-08-14
Attending: ORTHOPAEDIC SURGERY | Admitting: ORTHOPAEDIC SURGERY
Payer: COMMERCIAL

## 2023-08-14 ENCOUNTER — ANESTHESIA (OUTPATIENT)
Dept: PERIOP | Facility: HOSPITAL | Age: 43
End: 2023-08-14
Payer: COMMERCIAL

## 2023-08-14 ENCOUNTER — ANESTHESIA EVENT CONVERTED (OUTPATIENT)
Dept: ANESTHESIOLOGY | Facility: HOSPITAL | Age: 43
End: 2023-08-14
Payer: COMMERCIAL

## 2023-08-14 ENCOUNTER — ANESTHESIA EVENT (OUTPATIENT)
Dept: PERIOP | Facility: HOSPITAL | Age: 43
End: 2023-08-14
Payer: COMMERCIAL

## 2023-08-14 VITALS
HEIGHT: 67 IN | DIASTOLIC BLOOD PRESSURE: 67 MMHG | HEART RATE: 60 BPM | RESPIRATION RATE: 16 BRPM | TEMPERATURE: 97 F | WEIGHT: 214.07 LBS | BODY MASS INDEX: 33.6 KG/M2 | SYSTOLIC BLOOD PRESSURE: 97 MMHG | OXYGEN SATURATION: 95 %

## 2023-08-14 DIAGNOSIS — M75.42 IMPINGEMENT SYNDROME OF LEFT SHOULDER: ICD-10-CM

## 2023-08-14 DIAGNOSIS — M75.02 ADHESIVE CAPSULITIS OF LEFT SHOULDER: Primary | ICD-10-CM

## 2023-08-14 LAB
B-HCG UR QL: NEGATIVE
GLUCOSE BLDC GLUCOMTR-MCNC: 87 MG/DL (ref 70–99)

## 2023-08-14 PROCEDURE — 25010000002 PROPOFOL 10 MG/ML EMULSION: Performed by: NURSE ANESTHETIST, CERTIFIED REGISTERED

## 2023-08-14 PROCEDURE — 81025 URINE PREGNANCY TEST: CPT | Performed by: ANESTHESIOLOGY

## 2023-08-14 PROCEDURE — 25010000002 METOCLOPRAMIDE PER 10 MG: Performed by: NURSE ANESTHETIST, CERTIFIED REGISTERED

## 2023-08-14 PROCEDURE — 25010000002 ROPIVACAINE PER 1 MG: Performed by: ANESTHESIOLOGY

## 2023-08-14 PROCEDURE — 23700 MNPJ ANES SHO JT FIXJ APRATS: CPT | Performed by: ORTHOPAEDIC SURGERY

## 2023-08-14 PROCEDURE — 25010000002 ONDANSETRON PER 1 MG: Performed by: NURSE ANESTHETIST, CERTIFIED REGISTERED

## 2023-08-14 PROCEDURE — 25010000002 DEXAMETHASONE PER 1 MG: Performed by: NURSE ANESTHETIST, CERTIFIED REGISTERED

## 2023-08-14 PROCEDURE — S0260 H&P FOR SURGERY: HCPCS | Performed by: ORTHOPAEDIC SURGERY

## 2023-08-14 PROCEDURE — 25010000002 MIDAZOLAM PER 1MG: Performed by: ANESTHESIOLOGY

## 2023-08-14 PROCEDURE — 82948 REAGENT STRIP/BLOOD GLUCOSE: CPT

## 2023-08-14 RX ORDER — KETAMINE HCL IN NACL, ISO-OSM 100MG/10ML
SYRINGE (ML) INJECTION AS NEEDED
Status: DISCONTINUED | OUTPATIENT
Start: 2023-08-14 | End: 2023-08-14 | Stop reason: SURG

## 2023-08-14 RX ORDER — MEPERIDINE HYDROCHLORIDE 25 MG/ML
12.5 INJECTION INTRAMUSCULAR; INTRAVENOUS; SUBCUTANEOUS
Status: DISCONTINUED | OUTPATIENT
Start: 2023-08-14 | End: 2023-08-14 | Stop reason: HOSPADM

## 2023-08-14 RX ORDER — PROMETHAZINE HYDROCHLORIDE 25 MG/1
25 SUPPOSITORY RECTAL ONCE AS NEEDED
Status: DISCONTINUED | OUTPATIENT
Start: 2023-08-14 | End: 2023-08-14 | Stop reason: HOSPADM

## 2023-08-14 RX ORDER — MIDAZOLAM HYDROCHLORIDE 2 MG/2ML
3 INJECTION, SOLUTION INTRAMUSCULAR; INTRAVENOUS ONCE
Status: COMPLETED | OUTPATIENT
Start: 2023-08-14 | End: 2023-08-14

## 2023-08-14 RX ORDER — ROPIVACAINE HYDROCHLORIDE 5 MG/ML
INJECTION, SOLUTION EPIDURAL; INFILTRATION; PERINEURAL
Status: COMPLETED | OUTPATIENT
Start: 2023-08-14 | End: 2023-08-14

## 2023-08-14 RX ORDER — PROMETHAZINE HYDROCHLORIDE 12.5 MG/1
25 TABLET ORAL ONCE AS NEEDED
Status: DISCONTINUED | OUTPATIENT
Start: 2023-08-14 | End: 2023-08-14 | Stop reason: HOSPADM

## 2023-08-14 RX ORDER — CEFAZOLIN SODIUM 2 G/100ML
2 INJECTION, SOLUTION INTRAVENOUS ONCE
Status: DISCONTINUED | OUTPATIENT
Start: 2023-08-14 | End: 2023-08-14 | Stop reason: HOSPADM

## 2023-08-14 RX ORDER — ONDANSETRON 2 MG/ML
INJECTION INTRAMUSCULAR; INTRAVENOUS AS NEEDED
Status: DISCONTINUED | OUTPATIENT
Start: 2023-08-14 | End: 2023-08-14 | Stop reason: SURG

## 2023-08-14 RX ORDER — OXYCODONE HCL 5 MG/5 ML
5 SOLUTION, ORAL ORAL EVERY 4 HOURS PRN
Status: DISCONTINUED | OUTPATIENT
Start: 2023-08-14 | End: 2023-08-14 | Stop reason: HOSPADM

## 2023-08-14 RX ORDER — METOCLOPRAMIDE HYDROCHLORIDE 5 MG/ML
INJECTION INTRAMUSCULAR; INTRAVENOUS AS NEEDED
Status: DISCONTINUED | OUTPATIENT
Start: 2023-08-14 | End: 2023-08-14 | Stop reason: SURG

## 2023-08-14 RX ORDER — ONDANSETRON 2 MG/ML
4 INJECTION INTRAMUSCULAR; INTRAVENOUS ONCE AS NEEDED
Status: DISCONTINUED | OUTPATIENT
Start: 2023-08-14 | End: 2023-08-14 | Stop reason: HOSPADM

## 2023-08-14 RX ORDER — SODIUM CHLORIDE, SODIUM LACTATE, POTASSIUM CHLORIDE, CALCIUM CHLORIDE 600; 310; 30; 20 MG/100ML; MG/100ML; MG/100ML; MG/100ML
9 INJECTION, SOLUTION INTRAVENOUS CONTINUOUS PRN
Status: DISCONTINUED | OUTPATIENT
Start: 2023-08-14 | End: 2023-08-14 | Stop reason: HOSPADM

## 2023-08-14 RX ORDER — PROPOFOL 10 MG/ML
VIAL (ML) INTRAVENOUS AS NEEDED
Status: DISCONTINUED | OUTPATIENT
Start: 2023-08-14 | End: 2023-08-14 | Stop reason: SURG

## 2023-08-14 RX ORDER — ACETAMINOPHEN 500 MG
1000 TABLET ORAL ONCE
Status: COMPLETED | OUTPATIENT
Start: 2023-08-14 | End: 2023-08-14

## 2023-08-14 RX ORDER — LIDOCAINE HYDROCHLORIDE 20 MG/ML
INJECTION, SOLUTION EPIDURAL; INFILTRATION; INTRACAUDAL; PERINEURAL AS NEEDED
Status: DISCONTINUED | OUTPATIENT
Start: 2023-08-14 | End: 2023-08-14 | Stop reason: SURG

## 2023-08-14 RX ORDER — DEXAMETHASONE SODIUM PHOSPHATE 4 MG/ML
INJECTION, SOLUTION INTRA-ARTICULAR; INTRALESIONAL; INTRAMUSCULAR; INTRAVENOUS; SOFT TISSUE AS NEEDED
Status: DISCONTINUED | OUTPATIENT
Start: 2023-08-14 | End: 2023-08-14 | Stop reason: SURG

## 2023-08-14 RX ORDER — OXYCODONE HYDROCHLORIDE AND ACETAMINOPHEN 5; 325 MG/1; MG/1
1 TABLET ORAL EVERY 4 HOURS PRN
Status: DISCONTINUED | OUTPATIENT
Start: 2023-08-14 | End: 2023-08-14 | Stop reason: HOSPADM

## 2023-08-14 RX ORDER — CEFAZOLIN SODIUM IN 0.9 % NACL 3 G/100 ML
3 INTRAVENOUS SOLUTION, PIGGYBACK (ML) INTRAVENOUS ONCE
Status: DISCONTINUED | OUTPATIENT
Start: 2023-08-14 | End: 2023-08-14 | Stop reason: DRUGHIGH

## 2023-08-14 RX ORDER — HYDROCODONE BITARTRATE AND ACETAMINOPHEN 7.5; 325 MG/1; MG/1
1 TABLET ORAL EVERY 4 HOURS PRN
Qty: 25 TABLET | Refills: 0 | Status: SHIPPED | OUTPATIENT
Start: 2023-08-14

## 2023-08-14 RX ADMIN — MIDAZOLAM HYDROCHLORIDE 3 MG: 1 INJECTION, SOLUTION INTRAMUSCULAR; INTRAVENOUS at 07:30

## 2023-08-14 RX ADMIN — DEXAMETHASONE SODIUM PHOSPHATE 4 MG: 4 INJECTION, SOLUTION INTRAMUSCULAR; INTRAVENOUS at 07:45

## 2023-08-14 RX ADMIN — ACETAMINOPHEN 1000 MG: 500 TABLET ORAL at 06:49

## 2023-08-14 RX ADMIN — LIDOCAINE HYDROCHLORIDE 100 MG: 20 INJECTION, SOLUTION EPIDURAL; INFILTRATION; INTRACAUDAL; PERINEURAL at 07:47

## 2023-08-14 RX ADMIN — Medication 50 MG: at 07:45

## 2023-08-14 RX ADMIN — ROPIVACAINE HYDROCHLORIDE 25 ML: 5 INJECTION EPIDURAL; INFILTRATION; PERINEURAL at 07:42

## 2023-08-14 RX ADMIN — PROPOFOL 150 MG: 10 INJECTION, EMULSION INTRAVENOUS at 07:49

## 2023-08-14 RX ADMIN — METOCLOPRAMIDE HYDROCHLORIDE 10 MG: 5 INJECTION INTRAMUSCULAR; INTRAVENOUS at 07:45

## 2023-08-14 RX ADMIN — OXYCODONE HYDROCHLORIDE 5 MG: 5 SOLUTION ORAL at 08:25

## 2023-08-14 RX ADMIN — SODIUM CHLORIDE, POTASSIUM CHLORIDE, SODIUM LACTATE AND CALCIUM CHLORIDE 9 ML/HR: 600; 310; 30; 20 INJECTION, SOLUTION INTRAVENOUS at 06:51

## 2023-08-14 RX ADMIN — ONDANSETRON 4 MG: 2 INJECTION INTRAMUSCULAR; INTRAVENOUS at 07:45

## 2023-08-14 RX ADMIN — PROPOFOL 60 MG: 10 INJECTION, EMULSION INTRAVENOUS at 07:47

## 2023-08-14 NOTE — ADDENDUM NOTE
Addendum  created 08/14/23 0906 by Caitlyn Lowe MD    Clinical Note Signed, Diagnosis association updated, Intraprocedure Blocks edited, SmartForm saved

## 2023-08-14 NOTE — DISCHARGE INSTRUCTIONS
DISCHARGE INSTRUCTIONS  ORTHOPEDICS      For your surgery you had:  Monitored anesthesia care  You may experience dizziness, drowsiness, or light-headedness for several hours following surgery  Do not stay alone today or tonight.  Limit your activity for 24 hours.  Resume your diet slowly.  Follow whatever special dietary instructions you may have been given by the doctor.  You should not drive or operate machinery or drink alcohol for 24 hours or while you are taking pain medication.  You should not sign any legally binding documents.  If you had an axillary or regional block, you will not have control of the involved limb for up to 12 hours.  Protect the arm with a sling or follow your physician's specific instructions.  Sleep with the injured part elevated on a pillow.  Medications per physician's instructions as indicated on Discharge Medication Information Sheet.  Follow verbal instructions of your doctor.  Carry the upper arm in a sling so that the hand and wrist are above the level of the heart.  Exercise fingers for 10 minutes every hour while awake. Ice bag to injured area for 72 hours.  Apply 20 minutes on - 20 minutes off.  Never place ice directly on skin or cast.    Avoid getting cast or dressing wet.  In addition to these instructions, follow the discharge instructions on postoperative arthroscopic surgery.  SPECIAL INSTRUCTIONS:           Last dose of pain medication was given at: 0825   NOTIFY THE PHYSICIAN IF YOU EXPERIENCE:  Numbness of fingers or toes.  Inability to move fingers or toes.  Extreme coldness, paleness or blue dis-coloration of fingers or toes.  Excessive swelling of affected surgical site or swelling that causes the cast to rub or cut into skin.  Pain unrelieved by pain medication  Nausea/vomiting not relieved by prescribed medication  Unable to urinate in 6 hours after surgery  Temperature greater than 101 degree Fahrenheit or chills  If unable to reach your doctor, please go to  the closest emergency room    Last pain med: Oxycodone 5mg at 0825am. Next dose of pain med after 1225pm.  Next dose Tylenol after 1049am

## 2023-08-14 NOTE — ANESTHESIA POSTPROCEDURE EVALUATION
Patient: Bianca Boles    Procedure Summary       Date: 08/14/23 Room / Location: [facility] OR  / [facility]    Anesthesia Start: 0744 Anesthesia Stop: 0757    Procedure: LEFT SHOULDER MANIPULATION (Left: Shoulder) Diagnosis:       Impingement syndrome of left shoulder      (Impingement syndrome of left shoulder [M75.42])    Surgeons: Domenic Bradley MD Provider: Caitlyn Lowe MD    Anesthesia Type: general with block ASA Status: 3            Anesthesia Type: general with block    Vitals  Vitals Value Taken Time   /64 08/14/23 0825   Temp 36.3 øC (97.3 øF) 08/14/23 0758   Pulse 61 08/14/23 0828   Resp 16 08/14/23 0820   SpO2 98 % 08/14/23 0828   Vitals shown include unvalidated device data.        Post Anesthesia Care and Evaluation    Patient location during evaluation: bedside  Patient participation: complete - patient participated  Level of consciousness: awake  Pain management: adequate    Airway patency: patent  PONV Status: none  Cardiovascular status: acceptable and stable  Respiratory status: acceptable  Hydration status: acceptable    Comments: An Anesthesiologist personally participated in the most demanding procedures (including induction and emergence if applicable) in the anesthesia plan, monitored the course of anesthesia administration at frequent intervals and remained physically present and available for immediate diagnosis and treatment of emergencies.

## 2023-08-14 NOTE — ANESTHESIA PREPROCEDURE EVALUATION
Anesthesia Evaluation     Patient summary reviewed and Nursing notes reviewed   no history of anesthetic complications:   NPO Solid Status: > 8 hours  NPO Liquid Status: > 2 hours           Airway   Mallampati: II  TM distance: >3 FB  Neck ROM: full  No difficulty expected and Small opening  Dental      Pulmonary - normal exam    breath sounds clear to auscultation  (+) a smoker Former,  Cardiovascular - normal exam  Exercise tolerance: good (4-7 METS)    Patient on routine beta blocker and Beta blocker given within 24 hours of surgery  Rhythm: regular  Rate: normal    (+) hypertensionhyperlipidemia      Neuro/Psych  (+) headaches, psychiatric history Anxiety and Depression  GI/Hepatic/Renal/Endo    (+) obesity, GERD, renal disease stones, diabetes mellitus type 2    Musculoskeletal     Abdominal    Substance History - negative use     OB/GYN negative ob/gyn ROS         Other   arthritis,   history of cancer (Renal)    ROS/Med Hx Other: Takes GLP-1 Agonist for DM, last dose 8/13/23.                 Anesthesia Plan    ASA 3     general with block     (Patient understands anesthesia not responsible for dental damage.)  intravenous induction     Anesthetic plan, risks, benefits, and alternatives have been provided, discussed and informed consent has been obtained with: patient.    Use of blood products discussed with patient .    Plan discussed with CRNA.    CODE STATUS:

## 2023-08-14 NOTE — ANESTHESIA PROCEDURE NOTES
Peripheral Block      Patient reassessed immediately prior to procedure    Patient location during procedure: pre-op  Reason for block: at surgeon's request and post-op pain management  Performed by  Anesthesiologist: Caitlyn Lowe MD  Preanesthetic Checklist  Completed: patient identified, IV checked, site marked, risks and benefits discussed, surgical consent, monitors and equipment checked, pre-op evaluation and timeout performed  Prep:  Pt Position: supine (HOB elevated)  Sterile barriers:cap, washed/disinfected hands, sterile barriers, gloves, mask, partial drape and alcohol skin prep  Prep: ChloraPrep  Patient monitoring: blood pressure monitoring, continuous pulse oximetry and EKG  Procedure    Sedation: yes  Performed under: local infiltration  Guidance:ultrasound guided    ULTRASOUND INTERPRETATION.  Using ultrasound guidance a 22 G gauge needle was placed in close proximity to the brachial plexus nerve, at which point, under ultrasound guidance anesthetic was injected in the area of the nerve and spread of the anesthesia was seen on ultrasound in close proximity thereto.  There were no abnormalities seen on ultrasound; a digital image was taken; and the patient tolerated the procedure with no complications. Images:still images obtained, printed/placed on chart    Laterality:left  Block Type:interscalene  Injection Technique:single-shot  Needle Type:echogenic  Needle Gauge:22 G (2in)  Resistance on Injection: none    Medications Used: ropivacaine (NAROPIN) 0.5 % injection - Injection   25 mL - 8/14/2023 7:42:00 AM      Medications  Comment:With epi 1:200,000    Post Assessment  Injection Assessment: negative aspiration for heme, no paresthesia on injection and incremental injection  Patient Tolerance:comfortable throughout block  Complications:no  Additional Notes  The block or continuous infusion is requested by the referring physician for management of postoperative pain, or pain related to a procedure.  Ultrasound guidance (deemed medically necessary). Painless injection, pt was awake and conversant during the procedure without complications. Needle and surrounding structures visualized throughout procedure. No adverse reactions or complications seen during this period. Post-procedure image showed no signs of complication, and anatomy was consistent with an uncomplicated nerve blockade.

## 2023-08-14 NOTE — H&P
"H and p      Chief Complaint  Pain and Follow-up of the Left Shoulder        Subjective          Bianca Boles presents to [facility] for follow up of the left shoulder. She has had shoulder pain for awhile.  She had a left shoulder injection on 4/28/23 that gave some relief.  Previous MRI revealed significant rotator cuff tendinosis with partial articular sided tearing of supraspinatus and infraspinatus tendons at the footplate without evidence of complete tear, mild to moderate degenerative changes of the AC joint, biceps tenosynovitis, and mild glenohumeral osteoarthritis.  She states she has had physical therapy that made the shoulder worse.  She has had pain since the beginning of the year.            Allergies   Allergen Reactions    Morphine Itching and Nausea And Vomiting         Social History   Social History            Socioeconomic History    Marital status:    Tobacco Use    Smoking status: Former       Packs/day: 0.25       Years: 35.00       Pack years: 8.75       Types: Cigarettes       Quit date: 1/15/2020       Years since quitting: 3.5    Smokeless tobacco: Never    Tobacco comments:       A PACK A WEEK   Vaping Use    Vaping Use: Never used   Substance and Sexual Activity    Alcohol use: Yes       Comment: Very rarely    Drug use: Not Currently       Frequency: 0.1 times per week       Types: Marijuana       Comment: Very rarely in my youth    Sexual activity: Yes       Partners: Male       Birth control/protection: Inserts            Review of Systems            Objective      Vital Signs:   Ht 170.2 cm (67\")   Wt 98 kg (216 lb)   BMI 33.83 kg/mý        Physical Exam  Constitutional:       Appearance: Normal appearance. Patient is well-developed and normal weight.   HENT:      Head: Normocephalic.      Right Ear: Hearing and external ear normal.      Left Ear: Hearing and external ear normal.      Nose: Nose normal.   Eyes:      Conjunctiva/sclera: " Conjunctivae normal.   Cardiovascular:      Rate and Rhythm: Normal rate.   Pulmonary:      Effort: Pulmonary effort is normal.      Breath sounds: No wheezing or rales.   Abdominal:      Palpations: Abdomen is soft.      Tenderness: There is no abdominal tenderness.   Musculoskeletal:      Cervical back: Normal range of motion.   Skin:     Findings: No rash.   Neurological:      Mental Status: Patient  is alert and oriented to person, place, and time.   Psychiatric:         Mood and Affect: Mood and affect normal.         Judgment: Judgment normal.         Ortho Exam       LEFT SHOULDER Forward flexion 160. Abduction 120. External rotation 50. Internal rotation SI joint. Positive Cross body adduction. Supraspinatus strength 4/5. Infraspinatus Strength 4/5. Infrared subscap 4/5. Positive Bucio. Positive Neer. Negative Apprehension. Negative Lift off. (Negative Obriens. Sensation intact to light touch, median, radial, ulnar nerve. Positive AIN, PIN, ulnar nerve motor. Positive pulses. Positive Impingement signs. Good strength in triceps, biceps, deltoid, wrist extensors and wrist flexors.      Procedures           Imaging Results (Most Recent)         None                      Result Review    :       4/4/23        Mild-to-moderate degenerative changes are present within the acromioclavicular joint.  Subchondral   cystic changes are present within the distal clavicle with surrounding edema.  Mild degenerative   changes are present within the glenohumeral joint.  There is a trace joint effusion identified.    The labrum appears intact.  The biceps tendon appears intact.  Fluid is seen along the   extracapsular portion of the biceps tendon..  Rotator cuff tendinosis is present with intermediate   signal changes and thickening.  There is partial articular sided tearing of the supraspinatus and   infraspinatus tendons with significant thickening of the supraspinatus tendon.  No evidence of   complete tear.  No abnormal  focal bone marrow signal is seen. The cortical margins are intact. The   volume of the rotator cuff musculature is within normal limits. The surrounding soft tissues are   unremarkable.     IMPRESSION:                 1. Significant rotator cuff tendinosis with partial articular sided tearing of the supraspinatus   and infraspinatus tendons at the footplate.  No evidence of complete tear.  2. Mild-to-moderate degenerative changes of the acromioclavicular joint with findings consistent   with distal clavicular osteolysis.  3. Biceps tenosynovitis.  4. Mild glenohumeral osteoarthritis.        Assessment      Assessment and Plan      Diagnoses and all orders for this visit:     1. Osteoarthritis of glenohumeral joint, left (Primary)     2. Rotator cuff tear, left     3. Impingement syndrome of left shoulder     4. Adhesive capsulitis of left shoulder           Discussed the treatment plan with the patient.      Discussed the treatment options with the patient, operative vs non-operative.      The patient expressed understanding and wished to proceed with left shoulder manipulation and possible arthroscopy.           Discussed the risks and benefits of conservative measures.  The patient expressed understanding and wished to proceed with a left shoulder steroid injection.  She tolerated the injection well.              Discussed surgery., Risks/benefits discussed with patient including, but not limited to: infection, bleeding, neurovascular damage, malunion, nonunion, aesthetic deformity, need for further surgery, and death., Surgery pamphlet given., and Call or return if worsening symptoms.     Follow Up      2 weeks postoperatively    Domenic Bradley MD  08/14/23

## 2023-08-16 NOTE — OP NOTE
SHOULDER ARTHROSCOPY  Procedure Report    Patient Name:  Bianca Boles  YOB: 1980    Date of Surgery:  8/14/2023     Indications: See H&P    Pre-op Diagnosis:   Left shoulder adhesive capsulitis       Postoperative diagnosis:  Same    Procedure/CPTr Codes:      Procedure(s):  LEFT SHOULDER MANIPULATION          Staff:  Surgeon(s):  Domenic Bradley MD         Anesthesia: Choice    Estimated Blood Loss: none    Implants:    Nothing was implanted during the procedure    Specimen:          None        Findings: See description    Complications: None    Description of Procedure: Patient was taken the operating room after interscalene blocks in the preoperative holding after general anesthesia established the shoulder was examined she had -40 degrees of forward flexion after gentle manipulation she had full forward flexion the shoulder was brought down in abduction and external rotation and posterior capsular stretching ensued and then she had full passive range of motion compared to the other side patient tolerated the procedure well was taken recovery room.                    Domenic Bradley MD     Date: 8/16/2023  Time: 07:24 EDT

## 2023-08-23 ENCOUNTER — OFFICE VISIT (OUTPATIENT)
Dept: ORTHOPEDIC SURGERY | Facility: CLINIC | Age: 43
End: 2023-08-23
Payer: COMMERCIAL

## 2023-08-23 VITALS
DIASTOLIC BLOOD PRESSURE: 69 MMHG | HEART RATE: 57 BPM | SYSTOLIC BLOOD PRESSURE: 103 MMHG | HEIGHT: 67 IN | BODY MASS INDEX: 33.56 KG/M2 | WEIGHT: 213.85 LBS | OXYGEN SATURATION: 98 %

## 2023-08-23 DIAGNOSIS — M75.02 ADHESIVE CAPSULITIS OF LEFT SHOULDER: Primary | ICD-10-CM

## 2023-08-23 NOTE — PROGRESS NOTES
"Chief Complaint  Follow-up and Pain of the Left Shoulder    Subjective      Bianca Boles presents to [facility] for follow-up of left shoulder adhesive capsulitis.  Patient underwent a left shoulder manipulation with Dr. Bradley on 8/14/2023.  Reports that she feels that she is getting better.  She is attending physical therapy at [facility] and is scheduled 3 times per week for 3 more weeks.  Reports that overall she feels her range of motion is getting better, she is still having some pain in the shoulder with some movements.  Reports that this is worse at night and after therapy.    Objective   Allergies   Allergen Reactions    Morphine Itching and Nausea And Vomiting       Vital Signs:   /69   Pulse 57   Ht 170.2 cm (67\")   Wt 97 kg (213 lb 13.5 oz)   SpO2 98%   BMI 33.49 kg/mý       Physical Exam    Constitutional: Awake, alert. Well nourished appearance.    Integumentary: Warm, dry, intact. No obvious rashes.    HENT: Atraumatic, normocephalic.   Respiratory: Non labored respirations .   Cardiovascular: Intact peripheral pulses.    Psychiatric: Normal mood and affect. A&O X3    Ortho Exam  Left shoulder: Active range of motion forward shoulder flexion to 170, abduction 170, internal rotation to back pocket, external rotation 30 to 40 degrees.  Positive impingement sign.  Sensation is intact.  Imaging Results (Most Recent)       None                      Assessment and Plan   Problem List Items Addressed This Visit          Musculoskeletal and Injuries    Frozen shoulder - Primary       Follow Up   Return in about 4 weeks (around 9/20/2023).    Patient is a non-smoker, did not discuss options for smoking cessation.     Social History     Socioeconomic History    Marital status:    Tobacco Use    Smoking status: Former     Packs/day: 0.25     Years: 35.00     Pack years: 8.75     Types: Cigarettes     Quit date: 1/15/2020     Years since quitting: 3.6    Smokeless " tobacco: Never    Tobacco comments:     A PACK A WEEK   Vaping Use    Vaping Use: Never used   Substance and Sexual Activity    Alcohol use: Yes     Comment: Very rarely    Drug use: Not Currently     Frequency: 0.1 times per week     Types: Marijuana     Comment: Very rarely in my youth    Sexual activity: Yes     Partners: Male     Birth control/protection: Inserts       Patient Instructions   Discussed plan of care with patient.    Advised to continue physical therapy and home exercises.  We discussed an IM steroid injection if her symptoms become worse or if she feels she has met a standstill with therapy.  She feels that she is making progress today and wishes to continue with physical therapy.  Follow-up in 3 to 4 weeks once completed with therapy.  Evaluate the need to continue and range of motion.  Call for questions, concerns worsening symptoms.  Patient was given instructions and counseling regarding her condition or for health maintenance advice. Please see specific information pulled into the AVS if appropriate.

## 2023-08-23 NOTE — PATIENT INSTRUCTIONS
Discussed plan of care with patient.    Advised to continue physical therapy and home exercises.  We discussed an IM steroid injection if her symptoms become worse or if she feels she has met a standstill with therapy.  She feels that she is making progress today and wishes to continue with physical therapy.  Follow-up in 3 to 4 weeks once completed with therapy.  Evaluate the need to continue and range of motion.  Call for questions, concerns worsening symptoms.

## 2023-08-31 ENCOUNTER — HOSPITAL ENCOUNTER (OUTPATIENT)
Dept: GENERAL RADIOLOGY | Facility: HOSPITAL | Age: 43
Discharge: HOME | End: 2023-08-31
Admitting: NURSE PRACTITIONER
Payer: COMMERCIAL

## 2023-08-31 ENCOUNTER — OFFICE VISIT (OUTPATIENT)
Dept: FAMILY MEDICINE CLINIC | Facility: CLINIC | Age: 43
End: 2023-08-31
Payer: COMMERCIAL

## 2023-08-31 VITALS
HEIGHT: 67 IN | WEIGHT: 215 LBS | OXYGEN SATURATION: 99 % | SYSTOLIC BLOOD PRESSURE: 107 MMHG | DIASTOLIC BLOOD PRESSURE: 68 MMHG | HEART RATE: 58 BPM | TEMPERATURE: 98.4 F | BODY MASS INDEX: 33.74 KG/M2

## 2023-08-31 DIAGNOSIS — E11.9 TYPE 2 DIABETES MELLITUS WITHOUT COMPLICATION, WITHOUT LONG-TERM CURRENT USE OF INSULIN: ICD-10-CM

## 2023-08-31 DIAGNOSIS — R39.15 URINARY URGENCY: Primary | ICD-10-CM

## 2023-08-31 DIAGNOSIS — R10.9 FLANK PAIN: ICD-10-CM

## 2023-08-31 DIAGNOSIS — R31.9 HEMATURIA, UNSPECIFIED TYPE: ICD-10-CM

## 2023-08-31 DIAGNOSIS — R30.0 DYSURIA: ICD-10-CM

## 2023-08-31 DIAGNOSIS — R81 GLUCOSE FOUND IN URINE ON EXAMINATION: ICD-10-CM

## 2023-08-31 LAB
BILIRUB BLD-MCNC: NEGATIVE MG/DL
CLARITY, POC: CLEAR
COLOR UR: YELLOW
EXPIRATION DATE: ABNORMAL
GLUCOSE UR STRIP-MCNC: ABNORMAL MG/DL
KETONES UR QL: NEGATIVE
LEUKOCYTE EST, POC: NEGATIVE
Lab: ABNORMAL
NITRITE UR-MCNC: NEGATIVE MG/ML
PH UR: 6.5 [PH] (ref 5–8)
PROT UR STRIP-MCNC: NEGATIVE MG/DL
RBC # UR STRIP: NEGATIVE /UL
SP GR UR: 1.02 (ref 1–1.03)
UROBILINOGEN UR QL: ABNORMAL

## 2023-08-31 PROCEDURE — 74018 RADEX ABDOMEN 1 VIEW: CPT

## 2023-08-31 RX ORDER — SEMAGLUTIDE 0.68 MG/ML
0.5 INJECTION, SOLUTION SUBCUTANEOUS WEEKLY
Qty: 3 ML | Refills: 5 | Status: SHIPPED | OUTPATIENT
Start: 2023-08-31

## 2023-08-31 RX ORDER — TOPIRAMATE 50 MG/1
50 TABLET, FILM COATED ORAL
Qty: 90 TABLET | Refills: 0 | Status: SHIPPED | OUTPATIENT
Start: 2023-08-31 | End: 2023-11-29

## 2023-08-31 NOTE — PROGRESS NOTES
ACUTE VISIT     Patient Name: Bianca Boles  : 1980   MRN: [id number]     Chief Complaint:    Chief Complaint   Patient presents with    Flank Pain       History of Present Illness: Bianca Boles is a 43 y.o. female who is here today for flank pain.     She says she has had flank pain and hematuria on and off since mid July. CT scan 23 was normal.  Flank pain and dysuria     She is concerned it may be Farxiga.  Also states she is burning with urination     Subjective      Review of Systems:   Review of Systems   Constitutional:  Negative for fever.   Respiratory:  Negative for shortness of breath.    Cardiovascular:  Negative for chest pain.   Gastrointestinal:  Negative for abdominal pain, constipation, diarrhea, nausea and vomiting.   Genitourinary:  Positive for dysuria, flank pain and hematuria.      Past Medical History:   Past Medical History:   Diagnosis Date    Acid reflux     Anxiety     Anxiety and depression     Benign essential hypertension     Cancer     renal cancer/mass, PARTIAL NEPH, RIGHT    Diabetes     Diabetes mellitus     type ii, doesn't check bg at home     Diabetes mellitus, type 2     Frozen shoulder 2023    GERD (gastroesophageal reflux disease)     High triglycerides     History of bariatric surgery 2021    GASTRIC SLEEVE    History of kidney stones     HTN (hypertension)     Hyperlipemia     Hyperlipidemia     Hypertriglyceridemia     Lumbar herniated disc     Obesity     Panic attacks     PCOS (polycystic ovarian syndrome)     Periarthritis of shoulder     Psoriasis     ELBOWS    Rotator cuff syndrome     Seasonal allergies     Spinal headache     AFTER PAIN EPIDURALS.  NO BLOOD PATCH       Past Surgical History:   Past Surgical History:   Procedure Laterality Date    ADENOIDECTOMY  1984     SECTION  ,    CYSTOSCOPY BLADDER STONE LITHOTRIPSY      EAR TUBES  1984    ENDOSCOPY N/A 10/20/2020    Procedure:  ESOPHAGOGASTRODUODENOSCOPY WITH BIOPSY;  Surgeon: Fidel Grajeda Jr., MD;  [facility];  Service: General;  Laterality: N/A;  PRE- GERD  POST- RETAINED FOOD, GASTRITIS, GASTRIC POLYPS    ENDOSCOPY  2014    FOOT FRACTURE SURGERY Left 2017    screw placed    GASTRIC SLEEVE LAPAROSCOPIC N/A 12/07/2020    Procedure: GASTRIC SLEEVE LAPAROSCOPIC;  Surgeon: Fidel Grajeda Jr., MD;  Location:  [facility];  Service: Bariatric;  Laterality: N/A;    NEPHRECTOMY PARTIAL Right 11/15/2021    Procedure: NEPHRECTOMY PARTIAL LAPAROSCOPIC WITH DAVINCI ROBOT;  Surgeon: Lori Troy MD;  Location: [facility] OR;  Service: Robotics - DaVinci;  Laterality: Right;    SHOULDER ARTHROSCOPY Left 8/14/2023    Procedure: LEFT SHOULDER MANIPULATION;  Surgeon: Domenic Bradley MD;  Location: [facility];  Service: Orthopedics;  Laterality: Left;    TONSILLECTOMY  1984    URETEROSCOPY LASER LITHOTRIPSY WITH STENT INSERTION Right 09/28/2021    Procedure: CYSTOSCOPY, RIGHT URETEROSCOPY, LASERTRIPSY, STONE BASKET EXTRACTION AND STENT INSERTION;  Surgeon: Lori Troy MD;  Location: [facility];  Service: Urology;  Laterality: Right;    URETEROSCOPY LASER LITHOTRIPSY WITH STENT INSERTION Left 11/08/2022    Procedure: URETEROSCOPY LASER LITHOTRIPSY WITH URETERAL STENT INSERTION;  Surgeon: Lori Troy MD;  Location: [facility] OR;  Service: Urology;  Laterality: Left;       Family History:   Family History   Problem Relation Age of Onset    Diabetes Father     Hypertension Father     Heart disease Father     Cancer Father         Bladder prostate liver    Colon cancer Father         malignant, currently 62    Stroke Maternal Grandmother     Heart disease Maternal Grandmother     Diabetes Paternal Grandfather     Heart disease Paternal Grandfather     Cancer Mother         Breast    Malig Hyperthermia Neg Hx        Social History:   Social History     Socioeconomic History    Marital status:     Tobacco Use    Smoking status: Former     Packs/day: 0.25     Years: 35.00     Pack years: 8.75     Types: Cigarettes     Quit date: 1/15/2020     Years since quitting: 3.6    Smokeless tobacco: Never    Tobacco comments:     A PACK A WEEK   Vaping Use    Vaping Use: Never used   Substance and Sexual Activity    Alcohol use: Yes     Comment: Very rarely    Drug use: Not Currently     Frequency: 0.1 times per week     Types: Marijuana     Comment: Very rarely in my youth    Sexual activity: Yes     Partners: Male     Birth control/protection: Inserts       Medications:     Current Outpatient Medications:     Apremilast (Otezla) 30 MG tablet, Take 30 mg by mouth 2 (Two) Times a Day., Disp: 60 tablet, Rfl: 2    Apremilast (Otezla) 30 MG tablet, Take 30 mg by mouth 2 (Two) Times a Day., Disp: 60 tablet, Rfl: 5    Biotin 85194 MCG tablet, Take 1 tablet by mouth Every Night., Disp: , Rfl:     buPROPion XL (WELLBUTRIN XL) 300 MG 24 hr tablet, Take 1 tablet by mouth Daily., Disp: 90 tablet, Rfl: 1    CALCIUM-MAGNESIUM-ZINC PO, Take 3 tablets by mouth Daily., Disp: , Rfl:     cetirizine (zyrTEC) 10 MG tablet, Take 1 tablet by mouth Daily As Needed., Disp: , Rfl:     Cholecalciferol 25 MCG (1000 UT) capsule, Take 2 capsules by mouth Daily., Disp: 240 capsule, Rfl: 0    clobetasol (TEMOVATE) 0.05 % external solution, Apply 10-15 drops every day to affected areas in the scalp after shampooing until clear. (Patient taking differently: Apply  topically to the appropriate area as directed Daily As Needed.), Disp: 50 mL, Rfl: 3    COLLAGEN PO, Take 3 tablets by mouth Daily., Disp: , Rfl:     Continuous Blood Gluc Sensor (FreeStyle Ling 14 Day Sensor) misc, 1 each Every 14 (Fourteen) Days., Disp: 6 each, Rfl: 3    dapagliflozin (Farxiga) 5 MG tablet tablet, Take 1 tablet by mouth Daily. (Patient taking differently: Take 1 tablet by mouth Daily. LAST DOSE 8/13/23), Disp: 90 tablet, Rfl: 1    Diclofenac Sodium (VOLTAREN) 1 % gel  gel, Apply 4 g topically to the appropriate area as directed 4 (Four) Times a Day As Needed (pain)., Disp: 200 g, Rfl: 1    etonogestrel-ethinyl estradiol (NuvaRing) 0.12-0.015 MG/24HR vaginal ring, Insert 1 each into the vagina Every 30 (Thirty) Days. Insert vaginally and leave in place for 3 consecutive weeks, then remove for 1 week., Disp: 3 each, Rfl: 3    fluticasone (FLONASE) 50 MCG/ACT nasal spray, USE 2 SPRAYS IN EACH NOSTRIL ONCE DAILY AS DIRECTED (Patient taking differently: 2 sprays into the nostril(s) as directed by provider At Night As Needed.), Disp: 48 g, Rfl: 1    HYDROcodone-acetaminophen (Norco) 7.5-325 MG per tablet, Take 1 tablet by mouth Every 4 (Four) Hours As Needed for Moderate Pain., Disp: 25 tablet, Rfl: 0    IRON-VITAMIN C PO, Take 1 tablet by mouth Daily., Disp: , Rfl:     lisinopril (PRINIVIL,ZESTRIL) 2.5 MG tablet, Take 1 tablet by mouth Daily. (Patient taking differently: Take 1 tablet by mouth Daily. INST PER ANESTHESIA PROTOCOL. LAST DOSE 8/13/23), Disp: 90 tablet, Rfl: 1    metoprolol succinate XL (Toprol XL) 25 MG 24 hr tablet, Take 1 tablet by mouth Every Night. (Patient taking differently: Take 1 tablet by mouth Every Night. INST PER ANESTHESIA PROTOCOL), Disp: 90 tablet, Rfl: 1    Multiple Vitamins-Minerals (MULTIVITAMIN PO), Take 1 tablet by mouth Every Night., Disp: , Rfl:     ondansetron ODT (ZOFRAN-ODT) 4 MG disintegrating tablet, Place 1 tablet on the tongue Every 8 (Eight) Hours As Needed for Nausea., Disp: 21 tablet, Rfl: 5    phenazopyridine (PYRIDIUM) 200 MG tablet, Take 1 tablet by mouth 3 (Three) Times a Day As Needed for Bladder Spasms., Disp: 6 tablet, Rfl: 0    pravastatin (PRAVACHOL) 10 MG tablet, Take 1 tablet by mouth Every Night., Disp: 90 tablet, Rfl: 1    Probiotic Product (PROBIOTIC-10 PO), Take 1 tablet by mouth Every Night., Disp: , Rfl:     promethazine (PHENERGAN) 25 MG suppository, Insert 1 suppository into the rectum Every 6 (Six) Hours As Needed for  "Nausea., Disp: 12 suppository, Rfl: 1    Semaglutide,0.25 or 0.5MG/DOS, (Ozempic, 0.25 or 0.5 MG/DOSE,) 2 MG/3ML solution pen-injector, Inject 0.25 mg under the skin into the appropriate area as directed 1 (One) Time Per Week. (Patient taking differently: Inject 0.25 mg under the skin into the appropriate area as directed 1 (One) Time Per Week. TAKES ON SUNDAY, LAST DOSE 8/13/23), Disp: 3 mL, Rfl: 2    topiramate (TOPAMAX) 50 MG tablet, Take 1 tablet by mouth every night at bedtime for 90 days., Disp: 90 tablet, Rfl: 0    traZODone (DESYREL) 50 MG tablet, Take 1 tablet by mouth Every Night. (Patient taking differently: Take 1 tablet by mouth At Night As Needed.), Disp: 90 tablet, Rfl: 1    Allergies:   Allergies   Allergen Reactions    Morphine Itching and Nausea And Vomiting         Objective     Physical Exam:  Vital Signs:   Vitals:    08/31/23 0820   BP: 107/68   Pulse: 58   Temp: 98.4 øF (36.9 øC)   SpO2: 99%   Weight: 97.5 kg (215 lb)   Height: 170.2 cm (67\")     Body mass index is 33.67 kg/mý.     Physical Exam  Cardiovascular:      Rate and Rhythm: Normal rate and regular rhythm.   Pulmonary:      Effort: Pulmonary effort is normal.      Breath sounds: Normal breath sounds.   Abdominal:      General: Bowel sounds are normal.      Palpations: Abdomen is soft.      Tenderness: There is right CVA tenderness.   Musculoskeletal:      Right lower leg: No edema.      Left lower leg: No edema.   Skin:     General: Skin is warm and dry.   Neurological:      Mental Status: She is alert.           Assessment / Plan      Assessment/Plan:   Diagnoses and all orders for this visit:    1. Urinary urgency (Primary)  -     POC Urinalysis Dipstick, Automated    2. Dysuria  -     POC Urinalysis Dipstick, Automated    3. Hematuria, unspecified type  -     XR Abdomen KUB; Future    4. Flank pain  -     XR Abdomen KUB; Future    5. Type 2 diabetes mellitus without complication, without long-term current use of insulin    6. " Glucose found in urine on examination       Urinary urgency dysuria hematuria UA in office positive for glucose with flank pain will obtain KUB call with results and further recommendations with history of renal stones  Diabetes mellitus type 2 without complication most recent hemoglobin A1c in May 3, 2023 was normal glucose found in the urine is likely from Farxiga if there is no renal stone causing patient's symptoms she would like to stop Farxiga we will call with results and further recommendations        Follow Up:   Return in about 2 months (around 11/3/2023).    CARLTON Solis      Please note that portions of this note were completed with a voice recognition program.

## 2023-09-07 ENCOUNTER — OFFICE VISIT (OUTPATIENT)
Dept: PODIATRY | Facility: CLINIC | Age: 43
End: 2023-09-07
Payer: COMMERCIAL

## 2023-09-07 VITALS
OXYGEN SATURATION: 97 % | BODY MASS INDEX: 33.59 KG/M2 | HEART RATE: 62 BPM | DIASTOLIC BLOOD PRESSURE: 59 MMHG | WEIGHT: 214 LBS | SYSTOLIC BLOOD PRESSURE: 93 MMHG | TEMPERATURE: 97.1 F | HEIGHT: 67 IN

## 2023-09-07 DIAGNOSIS — E11.65 TYPE 2 DIABETES MELLITUS WITH HYPERGLYCEMIA, WITH LONG-TERM CURRENT USE OF INSULIN: Primary | ICD-10-CM

## 2023-09-07 DIAGNOSIS — Q66.70 PES CAVUS: ICD-10-CM

## 2023-09-07 DIAGNOSIS — Z79.4 TYPE 2 DIABETES MELLITUS WITH HYPERGLYCEMIA, WITH LONG-TERM CURRENT USE OF INSULIN: Primary | ICD-10-CM

## 2023-09-07 NOTE — PROGRESS NOTES
[facility] - PODIATRY    Today's Date: 09/07/23    Patient Name: Bianca Boles  MRN: [id number]  CSN: [id number]  PCP: Patricia Hernandez APRN, Last PCP Visit: 5/5/2023  Referring Provider: No ref. provider found    SUBJECTIVE     Chief Complaint   Patient presents with    Left Foot - Follow-up, Pain     Pes cavus  Pain is greater when on feet longer  Pt has not gotten Power Steps yet      HPI: Bianca Boles, a 43 y.o.female, presents to clinic.    Patient is a 43-year-old female presenting with left foot pain.  Patient states she had surgery in 2017 for fifth metatarsal fracture.  Overall she did well.  She states that she is lost a lot of weight and has increased activity due to this she is starting to have pain in that area once again.  Patient states it hurts to walk on.  Patient states that it is a soreness and it continues to bother her.  Patient has not sprained it recently or twisted her ankle.    9/7/2023-patient states that when she is up walking on her foot for long periods of time to get sore.  She had a busy week at the hospital.    Past Medical History:   Diagnosis Date    Acid reflux     Anxiety     Anxiety and depression     Benign essential hypertension     Cancer     renal cancer/mass, PARTIAL NEPH, RIGHT    Diabetes     Diabetes mellitus     type ii, doesn't check bg at home     Diabetes mellitus, type 2     Frozen shoulder 08/02/2023    GERD (gastroesophageal reflux disease)     High triglycerides     History of bariatric surgery 02/04/2021    GASTRIC SLEEVE    History of kidney stones     HTN (hypertension)     Hyperlipemia     Hyperlipidemia     Hypertriglyceridemia     Lumbar herniated disc     Obesity     Panic attacks     PCOS (polycystic ovarian syndrome)     Periarthritis of shoulder 01/23    Psoriasis     ELBOWS    Rotator cuff syndrome 01/23    Seasonal allergies     Spinal headache     AFTER PAIN EPIDURALS.  NO BLOOD PATCH     Past Surgical History:    Procedure Laterality Date    ADENOIDECTOMY  1984     SECTION  ,    CYSTOSCOPY BLADDER STONE LITHOTRIPSY      EAR TUBES  1984    ENDOSCOPY N/A 10/20/2020    Procedure: ESOPHAGOGASTRODUODENOSCOPY WITH BIOPSY;  Surgeon: Fidel Grajeda Jr., MD;  Location: [facility] ENDOSCOPY;  Service: General;  Laterality: N/A;  PRE- GERD  POST- RETAINED FOOD, GASTRITIS, GASTRIC POLYPS    ENDOSCOPY      FOOT FRACTURE SURGERY Left 2017    screw placed    GASTRIC SLEEVE LAPAROSCOPIC N/A 2020    Procedure: GASTRIC SLEEVE LAPAROSCOPIC;  Surgeon: Fidel Grajeda Jr., MD;  Location: [facility] OR [facility];  Service: Bariatric;  Laterality: N/A;    NEPHRECTOMY PARTIAL Right 11/15/2021    Procedure: NEPHRECTOMY PARTIAL LAPAROSCOPIC WITH DAVINCI ROBOT;  Surgeon: Lori Troy MD;  Location: [facility];  Service: Robotics - DaVinci;  Laterality: Right;    SHOULDER ARTHROSCOPY Left 2023    Procedure: LEFT SHOULDER MANIPULATION;  Surgeon: Domenic Bradley MD;  Location: [facility] OR [facility];  Service: Orthopedics;  Laterality: Left;    TONSILLECTOMY  1984    URETEROSCOPY LASER LITHOTRIPSY WITH STENT INSERTION Right 2021    Procedure: CYSTOSCOPY, RIGHT URETEROSCOPY, LASERTRIPSY, STONE BASKET EXTRACTION AND STENT INSERTION;  Surgeon: Lori Troy MD;  Location: [facility] OR;  Service: Urology;  Laterality: Right;    URETEROSCOPY LASER LITHOTRIPSY WITH STENT INSERTION Left 2022    Procedure: URETEROSCOPY LASER LITHOTRIPSY WITH URETERAL STENT INSERTION;  Surgeon: Lori Troy MD;  Location: [facility] OR;  Service: Urology;  Laterality: Left;     Family History   Problem Relation Age of Onset    Diabetes Father     Hypertension Father     Heart disease Father     Cancer Father         Bladder prostate liver    Colon cancer Father         malignant, currently 62    Stroke Maternal Grandmother     Heart disease Maternal Grandmother     Diabetes Paternal Grandfather     Heart disease Paternal  Grandfather     Cancer Mother         Breast    Malig Hyperthermia Neg Hx      Social History     Socioeconomic History    Marital status:    Tobacco Use    Smoking status: Former     Packs/day: 0.25     Years: 35.00     Pack years: 8.75     Types: Cigarettes     Quit date: 1/15/2020     Years since quitting: 3.6    Smokeless tobacco: Never    Tobacco comments:     A PACK A WEEK   Vaping Use    Vaping Use: Never used   Substance and Sexual Activity    Alcohol use: Yes     Comment: Very rarely    Drug use: Not Currently     Frequency: 0.1 times per week     Types: Marijuana     Comment: Very rarely in my youth    Sexual activity: Yes     Partners: Male     Birth control/protection: Inserts     Allergies   Allergen Reactions    Morphine Itching and Nausea And Vomiting     Current Outpatient Medications   Medication Sig Dispense Refill    Apremilast (Otezla) 30 MG tablet Take 30 mg by mouth 2 (Two) Times a Day. 60 tablet 2    Apremilast (Otezla) 30 MG tablet Take 30 mg by mouth 2 (Two) Times a Day. 60 tablet 5    Biotin 47965 MCG tablet Take 1 tablet by mouth Every Night.      buPROPion XL (WELLBUTRIN XL) 300 MG 24 hr tablet Take 1 tablet by mouth Daily. 90 tablet 1    CALCIUM-MAGNESIUM-ZINC PO Take 3 tablets by mouth Daily.      cetirizine (zyrTEC) 10 MG tablet Take 1 tablet by mouth Daily As Needed.      Cholecalciferol 25 MCG (1000 UT) capsule Take 2 capsules by mouth Daily. 240 capsule 0    clobetasol (TEMOVATE) 0.05 % external solution Apply 10-15 drops every day to affected areas in the scalp after shampooing until clear. (Patient taking differently: Apply  topically to the appropriate area as directed Daily As Needed.) 50 mL 3    COLLAGEN PO Take 3 tablets by mouth Daily.      Continuous Blood Gluc Sensor (FreeStyle Ling 14 Day Sensor) misc 1 each Every 14 (Fourteen) Days. 6 each 3    Diclofenac Sodium (VOLTAREN) 1 % gel gel Apply 4 g topically to the appropriate area as directed 4 (Four) Times a Day As  Needed (pain). 200 g 1    etonogestrel-ethinyl estradiol (NuvaRing) 0.12-0.015 MG/24HR vaginal ring Insert 1 each into the vagina Every 30 (Thirty) Days. Insert vaginally and leave in place for 3 consecutive weeks, then remove for 1 week. 3 each 3    fluticasone (FLONASE) 50 MCG/ACT nasal spray USE 2 SPRAYS IN EACH NOSTRIL ONCE DAILY AS DIRECTED (Patient taking differently: 2 sprays into the nostril(s) as directed by provider At Night As Needed.) 48 g 1    HYDROcodone-acetaminophen (Norco) 7.5-325 MG per tablet Take 1 tablet by mouth Every 4 (Four) Hours As Needed for Moderate Pain. 25 tablet 0    IRON-VITAMIN C PO Take 1 tablet by mouth Daily.      lisinopril (PRINIVIL,ZESTRIL) 2.5 MG tablet Take 1 tablet by mouth Daily. (Patient taking differently: Take 1 tablet by mouth Daily. INST PER ANESTHESIA PROTOCOL. LAST DOSE 8/13/23) 90 tablet 1    metoprolol succinate XL (Toprol XL) 25 MG 24 hr tablet Take 1 tablet by mouth Every Night. (Patient taking differently: Take 1 tablet by mouth Every Night. INST PER ANESTHESIA PROTOCOL) 90 tablet 1    Multiple Vitamins-Minerals (MULTIVITAMIN PO) Take 1 tablet by mouth Every Night.      ondansetron ODT (ZOFRAN-ODT) 4 MG disintegrating tablet Place 1 tablet on the tongue Every 8 (Eight) Hours As Needed for Nausea. 21 tablet 5    phenazopyridine (PYRIDIUM) 200 MG tablet Take 1 tablet by mouth 3 (Three) Times a Day As Needed for Bladder Spasms. 6 tablet 0    pravastatin (PRAVACHOL) 10 MG tablet Take 1 tablet by mouth Every Night. 90 tablet 1    Probiotic Product (PROBIOTIC-10 PO) Take 1 tablet by mouth Every Night.      promethazine (PHENERGAN) 25 MG suppository Insert 1 suppository into the rectum Every 6 (Six) Hours As Needed for Nausea. 12 suppository 1    Semaglutide,0.25 or 0.5MG/DOS, (Ozempic, 0.25 or 0.5 MG/DOSE,) 2 MG/3ML solution pen-injector Inject 0.5 mg under the skin into the appropriate area as directed 1 (One) Time Per Week. 3 mL 5    topiramate (TOPAMAX) 50 MG  tablet Take 1 tablet by mouth every night at bedtime for 90 days. 90 tablet 0    traZODone (DESYREL) 50 MG tablet Take 1 tablet by mouth Every Night. (Patient taking differently: Take 1 tablet by mouth At Night As Needed.) 90 tablet 1     No current facility-administered medications for this visit.     Review of Systems   Constitutional: Negative.    HENT: Negative.     Eyes: Negative.    Respiratory: Negative.     Cardiovascular: Negative.    Gastrointestinal: Negative.    Endocrine: Negative.    Genitourinary: Negative.    Musculoskeletal: Negative.         Left foot pain   Skin: Negative.    Allergic/Immunologic: Negative.    Neurological: Negative.    Hematological: Negative.    Psychiatric/Behavioral: Negative.     All other systems reviewed and are negative.    OBJECTIVE     Vitals:    09/07/23 0758   BP: 93/59   Pulse: 62   Temp: 97.1 °F (36.2 °C)   SpO2: 97%       WBC   Date Value Ref Range Status   07/17/2023 9.26 3.40 - 10.80 10*3/mm3 Final     RBC   Date Value Ref Range Status   07/17/2023 4.56 3.77 - 5.28 10*6/mm3 Final     Hemoglobin   Date Value Ref Range Status   07/17/2023 13.2 12.0 - 15.9 g/dL Final     Hematocrit   Date Value Ref Range Status   07/17/2023 39.8 34.0 - 46.6 % Final     MCV   Date Value Ref Range Status   07/17/2023 87.3 79.0 - 97.0 fL Final     MCH   Date Value Ref Range Status   07/17/2023 28.9 26.6 - 33.0 pg Final     MCHC   Date Value Ref Range Status   07/17/2023 33.2 31.5 - 35.7 g/dL Final     RDW   Date Value Ref Range Status   07/17/2023 14.6 12.3 - 15.4 % Final     RDW-SD   Date Value Ref Range Status   07/17/2023 45.5 37.0 - 54.0 fl Final     MPV   Date Value Ref Range Status   07/17/2023 9.3 6.0 - 12.0 fL Final     Platelets   Date Value Ref Range Status   07/17/2023 237 140 - 450 10*3/mm3 Final     Neutrophil %   Date Value Ref Range Status   07/17/2023 70.0 42.7 - 76.0 % Final     Lymphocyte %   Date Value Ref Range Status   07/17/2023 24.5 19.6 - 45.3 % Final      Monocyte %   Date Value Ref Range Status   07/17/2023 4.3 (L) 5.0 - 12.0 % Final     Eosinophil %   Date Value Ref Range Status   07/17/2023 0.5 0.3 - 6.2 % Final     Basophil %   Date Value Ref Range Status   07/17/2023 0.2 0.0 - 1.5 % Final     Immature Grans %   Date Value Ref Range Status   07/17/2023 0.5 0.0 - 0.5 % Final     Neutrophils, Absolute   Date Value Ref Range Status   07/17/2023 6.47 1.70 - 7.00 10*3/mm3 Final     Lymphocytes, Absolute   Date Value Ref Range Status   07/17/2023 2.27 0.70 - 3.10 10*3/mm3 Final     Monocytes, Absolute   Date Value Ref Range Status   07/17/2023 0.40 0.10 - 0.90 10*3/mm3 Final     Eosinophils, Absolute   Date Value Ref Range Status   07/17/2023 0.05 0.00 - 0.40 10*3/mm3 Final     Basophils, Absolute   Date Value Ref Range Status   07/17/2023 0.02 0.00 - 0.20 10*3/mm3 Final     Immature Grans, Absolute   Date Value Ref Range Status   07/17/2023 0.05 0.00 - 0.05 10*3/mm3 Final     nRBC   Date Value Ref Range Status   07/17/2023 0.0 0.0 - 0.2 /100 WBC Final         Lab Results   Component Value Date    GLUCOSE 110 (H) 07/17/2023    BUN 17 07/17/2023    CREATININE 1.10 (H) 07/17/2023    EGFRIFNONA 61 02/07/2022    BCR 15.5 07/17/2023    K 4.3 07/17/2023    CO2 21.9 (L) 07/17/2023    CALCIUM 9.1 07/17/2023    ALBUMIN 4.0 07/17/2023    LABIL2 1.3 (L) 02/03/2021    AST 19 07/17/2023    ALT 16 07/17/2023       Patient seen in no apparent distress.      PHYSICAL EXAM:     Foot/Ankle Exam    GENERAL  Appearance:  appears stated age  Orientation:  AAOx3  Affect:  appropriate  Gait:  unimpaired  Assistance:  independent  Right shoe gear: casual shoe  Left shoe gear: casual shoe    VASCULAR     Right Foot Vascularity   Normal vascular exam    Dorsalis pedis:  2+  Posterior tibial:  2+  Skin temperature:  warm  Edema grading:  None  CFT:  < 3 seconds  Pedal hair growth:  Present  Varicosities:  none     Left Foot Vascularity   Normal vascular exam    Dorsalis pedis:  2+  Posterior  tibial:  2+  Skin temperature:  warm  Edema grading:  None  CFT:  < 3 seconds  Pedal hair growth:  Present  Varicosities:  none     NEUROLOGIC     Right Foot Neurologic   Normal sensation    Light touch sensation: normal  Vibratory sensation: normal  Hot/Cold sensation: normal  Protective Sensation using [place] Monofilament:   Sites intact: 10  Sites tested: 10     Left Foot Neurologic   Normal sensation    Light touch sensation: normal  Vibratory sensation: normal  Hot/Cold sensation:  normal  Protective Sensation using [place] Monofilament:   Sites intact: 10  Sites tested: 10    MUSCULOSKELETAL     Left Foot Musculoskeletal   Arch:  Pes cavus    MUSCLE STRENGTH     Right Foot Muscle Strength   Foot dorsiflexion:  4  Foot plantar flexion:  4  Foot inversion:  4  Foot eversion:  4     Left Foot Muscle Strength   Foot dorsiflexion:  4  Foot plantar flexion:  4  Foot inversion:  4  Foot eversion:  4    RANGE OF MOTION     Right Foot Range of Motion   Foot and ankle ROM within normal limits       Left Foot Range of Motion   Foot and ankle ROM within normal limits      DERMATOLOGIC      Right Foot Dermatologic   Skin  Right foot skin is intact.      Left Foot Dermatologic   Skin  Left foot skin is intact.     RADIOLOGY:      Peripheral Block    Result Date: 8/14/2023  Narrative: Caitlyn Lowe MD     8/14/2023  9:06 AM Peripheral Block Patient reassessed immediately prior to procedure Patient location during procedure: pre-op Reason for block: at surgeon's request and post-op pain management Performed by Anesthesiologist: Caitlyn Lowe MD Preanesthetic Checklist Completed: patient identified, IV checked, site marked, risks and benefits discussed, surgical consent, monitors and equipment checked, pre-op evaluation and timeout performed Prep: Pt Position: supine (HOB elevated) Sterile barriers:cap, washed/disinfected hands, sterile barriers, gloves, mask, partial drape and alcohol skin prep Prep:  ChloraPrep Patient monitoring: blood pressure monitoring, continuous pulse oximetry and EKG Procedure Sedation: yes Performed under: local infiltration Guidance:ultrasound guided ULTRASOUND INTERPRETATION.  Using ultrasound guidance a 22 G gauge needle was placed in close proximity to the brachial plexus nerve, at which point, under ultrasound guidance anesthetic was injected in the area of the nerve and spread of the anesthesia was seen on ultrasound in close proximity thereto.  There were no abnormalities seen on ultrasound; a digital image was taken; and the patient tolerated the procedure with no complications. Images:still images obtained, printed/placed on chart Laterality:left Block Type:interscalene Injection Technique:single-shot Needle Type:echogenic Needle Gauge:22 G (2in) Resistance on Injection: none Medications Used: ropivacaine (NAROPIN) 0.5 % injection - Injection  25 mL - 8/14/2023 7:42:00 AM Medications Comment:With epi 1:200,000 Post Assessment Injection Assessment: negative aspiration for heme, no paresthesia on injection and incremental injection Patient Tolerance:comfortable throughout block Complications:no Additional Notes The block or continuous infusion is requested by the referring physician for management of postoperative pain, or pain related to a procedure. Ultrasound guidance (deemed medically necessary). Painless injection, pt was awake and conversant during the procedure without complications. Needle and surrounding structures visualized throughout procedure. No adverse reactions or complications seen during this period. Post-procedure image showed no signs of complication, and anatomy was consistent with an uncomplicated nerve blockade.     XR Abdomen KUB    Result Date: 8/31/2023  Narrative: PROCEDURE: XR ABDOMEN KUB  COMPARISON: [facility], CR, ABDOMEN SINGLE AP, 12/03/2018, 14:32.  [facility], JOHN, XR ABDOMEN KUB, 9/28/2021, 9:32.  [facility], CR, XR ABDOMEN KUB, 11/08/2022, 17:39.  INDICATIONS: HEMATURIA/RIGHT SIDE FLANK PAIN, H/O KIDNEY STONES  FINDINGS:  There is a normal bowel gas pattern.  Renal silhouettes are partially obscured by bowel gas and fecal material.  No definite calculi overlying the expected position of the renal calculi or collecting systems.  Osseous structures are grossly unremarkable in appearance.  Phleboliths in the pelvis are similar to the prior study.       Impression:   1. No definite renal calculi identified     JALEN OLIVERA MD       Electronically Signed and Approved By: JALEN OLIVERA MD on 8/31/2023 at 13:22             Mammo Screening Digital Tomosynthesis Bilateral With CAD    Result Date: 8/9/2023  Narrative: PROCEDURE: MAMMO SCREENING DIGITAL TOMOSYNTHESIS BILATERAL W CAD  COMPARISON: [facility], MG, DIG SCREENING BILAT JOSE W 3D KELSEY, 9/10/2019, 8:15.  [facility], MG, DIG SCREENING BILAT JOSE W 3D KELSEY, 3/11/2021, 8:39.  [facility], MG, DIG DIAG BILAT JOSE W 3D KELSEY, 3/19/2021, 9:49.  [facility], MG, MAMMO SCREENING DIGITAL TOMOSYNTHESIS BILATERAL W CAD, 4/07/2022, 7:52.  VIEWS:  BILATERAL CC AND MLO VIEWS WERE OBTAINED UTILIZING 3D TOMOSYNTHESIS AND R2 CAD SOFTWARE  INDICATIONS: SCREENING  FINDINGS:  Decreased fatty content of both breasts. No suspicious mass, area of architectural distortion or suspicious microcalcification is identified.      Impression:  Benign mammogram. Suggest routine mammographic screening.  RECOMMENDATION(S):  ROUTINE MAMMOGRAM AND CLINICAL EVALUATION IN 12 MONTHS.   BIRADS:  DIAGNOSTIC CATEGORY 2--BENIGN FINDING   BREAST COMPOSITION: Scattered areas fibroglandular density.  PLEASE NOTE:  A NORMAL MAMMOGRAM DOES NOT EXCLUDE THE POSSIBILITY OF BREAST CANCER. ANY CLINICALLY SUSPICIOUS PALPABLE LUMP SHOULD BE BIOPSIED.      Rio Hayden M.D.       Electronically Signed and Approved  By: Rio Hayden M.D. on 8/09/2023 at 11:04                ASSESSMENT/PLAN     Diagnoses and all orders for this visit:    1. Type 2 diabetes mellitus with hyperglycemia, with long-term current use of insulin (Primary)    2. Pes cavus      Inserts to shoes  Patient to begin stretching exercises and icing in the evening as tolerated. Discussed compression therapy and resting the extremity.  Anti-inflammatory medication to begin taking if okay by PCP.  When foot becomes aggravated.    Return to clinic as needed.    Comprehensive lower extremity examination and evaluation was performed.    Discussed findings and treatment plan including risks, benefits, and treatment options with patient in detail. Patient agreed with treatment plan.    Medications and allergies reviewed.  Reviewed available lab values along with other pertinent labs.  These were discussed with the patient.    An After Visit Summary was printed and given to the patient at discharge, including (if requested) any available informative/educational handouts regarding diagnosis, treatment, or medications. All questions were answered to patient/family satisfaction. Should symptoms fail to improve or worsen they agree to call or return to clinic or to go to the Emergency Department. Discussed the importance of following up with any needed screening tests/labs/specialist appointments and any requested follow-up recommended by me today. Importance of maintaining follow-up discussed and patient accepts that missed appointments can delay diagnosis and potentially lead to worsening of conditions.    Return if symptoms worsen or fail to improve., or sooner if acute issues arise.    This document has been electronically signed by Maxx Short DPM on September 7, 2023 09:17 EDT

## 2023-09-20 ENCOUNTER — OFFICE VISIT (OUTPATIENT)
Dept: ORTHOPEDIC SURGERY | Facility: CLINIC | Age: 43
End: 2023-09-20
Payer: COMMERCIAL

## 2023-09-20 VITALS
SYSTOLIC BLOOD PRESSURE: 96 MMHG | HEART RATE: 66 BPM | BODY MASS INDEX: 33.6 KG/M2 | HEIGHT: 67 IN | OXYGEN SATURATION: 98 % | DIASTOLIC BLOOD PRESSURE: 64 MMHG | WEIGHT: 214.07 LBS

## 2023-09-20 DIAGNOSIS — M75.02 ADHESIVE CAPSULITIS OF LEFT SHOULDER: Primary | ICD-10-CM

## 2023-09-20 RX ORDER — DEXAMETHASONE SODIUM PHOSPHATE 4 MG/ML
8 INJECTION, SOLUTION INTRA-ARTICULAR; INTRALESIONAL; INTRAMUSCULAR; INTRAVENOUS; SOFT TISSUE ONCE
Status: COMPLETED | OUTPATIENT
Start: 2023-09-20 | End: 2023-09-20

## 2023-09-20 RX ADMIN — DEXAMETHASONE SODIUM PHOSPHATE 8 MG: 4 INJECTION, SOLUTION INTRA-ARTICULAR; INTRALESIONAL; INTRAMUSCULAR; INTRAVENOUS; SOFT TISSUE at 09:24

## 2023-09-27 NOTE — PROGRESS NOTES
"Chief Complaint  Pain and Follow-up of the Left Shoulder    Subjective      Bianca Boles presents to [facility] for follow-up of adhesive capsulitis of the left shoulder.  Patient had a manipulation on 8/14/2023 with Dr. Bradley.  Reports that she is now in physical therapy 2 days/week.  She is scheduled for the second week of October.  Reports that she is still having difficulty with internal rotation and external rotation.  Reports positional numbness during sleep.  Overall is doing well.    Objective   Allergies   Allergen Reactions    Morphine Itching and Nausea And Vomiting       Vital Signs:   BP 96/64   Pulse 66   Ht 170.2 cm (67\")   Wt 97.1 kg (214 lb 1.1 oz)   SpO2 98%   BMI 33.53 kg/m²       Physical Exam    Constitutional: Awake, alert. Well nourished appearance.    Integumentary: Warm, dry, intact. No obvious rashes.    HENT: Atraumatic, normocephalic.   Respiratory: Non labored respirations .   Cardiovascular: Intact peripheral pulses.    Psychiatric: Normal mood and affect. A&O X3    Ortho Exam  Left shoulder: Active range of motion forward shoulder flexion to 175, abduction 160, internal rotation to her back pocket, external rotation 20 degrees.  Full range of motion of the elbow and the wrist.  Sensation is intact throughout.    Imaging Results (Most Recent)       None                      Assessment and Plan   Problem List Items Addressed This Visit          Musculoskeletal and Injuries    Frozen shoulder - Primary    Relevant Orders    Ambulatory Referral to Physical Therapy Evaluate and treat; Stretching, ROM, Strengthening (Completed)       Follow Up   Return in about 6 weeks (around 11/1/2023).    Patient is a non-smoker, did not discuss options for smoking cessation.     Social History     Socioeconomic History    Marital status:    Tobacco Use    Smoking status: Former     Packs/day: 0.25     Years: 35.00     Pack years: 8.75     Types: Cigarettes "     Quit date: 1/15/2020     Years since quitting: 3.7    Smokeless tobacco: Never    Tobacco comments:     A PACK A WEEK   Vaping Use    Vaping Use: Never used   Substance and Sexual Activity    Alcohol use: Yes     Comment: Very rarely    Drug use: Not Currently     Frequency: 0.1 times per week     Types: Marijuana     Comment: Very rarely in my youth    Sexual activity: Yes     Partners: Male     Birth control/protection: Inserts       Patient Instructions   Discussed treatment plan of care with patient.  Advised to continue physical therapy.  Updated order to continue was placed today.  Patient to continue home exercise program as well.  Patient given an IM steroid injection.    Follow-up in 6 weeks to evaluate progress with physical therapy.  Call for questions, concerns or worsening symptoms.  Patient was given instructions and counseling regarding her condition or for health maintenance advice. Please see specific information pulled into the AVS if appropriate.

## 2023-09-27 NOTE — PATIENT INSTRUCTIONS
Discussed treatment plan of care with patient.  Advised to continue physical therapy.  Updated order to continue was placed today.  Patient to continue home exercise program as well.  Patient given an IM steroid injection.    Follow-up in 6 weeks to evaluate progress with physical therapy.  Call for questions, concerns or worsening symptoms.

## 2023-10-10 ENCOUNTER — TELEPHONE (OUTPATIENT)
Dept: UROLOGY | Facility: CLINIC | Age: 43
End: 2023-10-10

## 2023-10-10 DIAGNOSIS — N20.0 LEFT RENAL STONE: Primary | ICD-10-CM

## 2023-10-10 NOTE — TELEPHONE ENCOUNTER
Caller: Bianca Boles    Relationship: Self    Best call back number: [phone]    What orders are you requesting (i.e. lab or imaging): KUB    In what timeframe would the patient need to come in: PRIOR TO APPT SCHEDULED 1/10/24    Where will you receive your lab/imaging services: [facility]    Additional notes: PLEASE SUBMIT ORDERS FOR KUB PRIOR TO ANNUAL VISIT SCHEDULED 1/10/24

## 2023-10-18 RX ORDER — ETONOGESTREL AND ETHINYL ESTRADIOL 11.7; 2.7 MG/1; MG/1
1 INSERT, EXTENDED RELEASE VAGINAL
Qty: 3 EACH | Refills: 3 | Status: SHIPPED | OUTPATIENT
Start: 2023-10-18

## 2023-11-03 ENCOUNTER — OFFICE VISIT (OUTPATIENT)
Dept: FAMILY MEDICINE CLINIC | Facility: CLINIC | Age: 43
End: 2023-11-03
Payer: COMMERCIAL

## 2023-11-03 VITALS
DIASTOLIC BLOOD PRESSURE: 64 MMHG | BODY MASS INDEX: 34.06 KG/M2 | HEART RATE: 58 BPM | SYSTOLIC BLOOD PRESSURE: 97 MMHG | HEIGHT: 67 IN | OXYGEN SATURATION: 100 % | TEMPERATURE: 97.5 F | WEIGHT: 217 LBS

## 2023-11-03 DIAGNOSIS — E55.9 VITAMIN D DEFICIENCY: ICD-10-CM

## 2023-11-03 DIAGNOSIS — E61.1 IRON DEFICIENCY: ICD-10-CM

## 2023-11-03 DIAGNOSIS — E11.9 TYPE 2 DIABETES MELLITUS WITHOUT COMPLICATION, WITHOUT LONG-TERM CURRENT USE OF INSULIN: Primary | ICD-10-CM

## 2023-11-03 DIAGNOSIS — Z30.42 ENCOUNTER FOR SURVEILLANCE OF INJECTABLE CONTRACEPTIVE: ICD-10-CM

## 2023-11-03 DIAGNOSIS — E78.2 MIXED HYPERLIPIDEMIA: ICD-10-CM

## 2023-11-03 DIAGNOSIS — F33.0 MILD EPISODE OF RECURRENT MAJOR DEPRESSIVE DISORDER: ICD-10-CM

## 2023-11-03 DIAGNOSIS — F51.01 PRIMARY INSOMNIA: ICD-10-CM

## 2023-11-03 DIAGNOSIS — F41.9 ANXIETY: ICD-10-CM

## 2023-11-03 DIAGNOSIS — K21.9 GASTROESOPHAGEAL REFLUX DISEASE WITHOUT ESOPHAGITIS: ICD-10-CM

## 2023-11-03 DIAGNOSIS — M75.42 IMPINGEMENT SYNDROME OF LEFT SHOULDER: ICD-10-CM

## 2023-11-03 DIAGNOSIS — R00.2 PALPITATION: ICD-10-CM

## 2023-11-03 DIAGNOSIS — Z98.84 S/P LAPAROSCOPIC SLEEVE GASTRECTOMY: ICD-10-CM

## 2023-11-03 DIAGNOSIS — J30.2 SEASONAL ALLERGIES: ICD-10-CM

## 2023-11-03 DIAGNOSIS — I10 ESSENTIAL HYPERTENSION: ICD-10-CM

## 2023-11-03 LAB
25(OH)D3 SERPL-MCNC: 83.6 NG/ML (ref 30–100)
ALBUMIN SERPL-MCNC: 3.7 G/DL (ref 3.5–5.2)
ALBUMIN UR-MCNC: <1.2 MG/DL
ALBUMIN/GLOB SERPL: 1.4 G/DL
ALP SERPL-CCNC: 66 U/L (ref 39–117)
ALT SERPL W P-5'-P-CCNC: 12 U/L (ref 1–33)
ANION GAP SERPL CALCULATED.3IONS-SCNC: 10 MMOL/L (ref 5–15)
AST SERPL-CCNC: 16 U/L (ref 1–32)
B-HCG UR QL: NEGATIVE
BACTERIA UR QL AUTO: NORMAL /HPF
BASOPHILS # BLD AUTO: 0.04 10*3/MM3 (ref 0–0.2)
BASOPHILS NFR BLD AUTO: 0.4 % (ref 0–1.5)
BILIRUB SERPL-MCNC: 0.2 MG/DL (ref 0–1.2)
BILIRUB UR QL STRIP: NEGATIVE
BUN SERPL-MCNC: 14 MG/DL (ref 6–20)
BUN/CREAT SERPL: 13 (ref 7–25)
CALCIUM SPEC-SCNC: 9.1 MG/DL (ref 8.6–10.5)
CHLORIDE SERPL-SCNC: 109 MMOL/L (ref 98–107)
CHOLEST SERPL-MCNC: 142 MG/DL (ref 0–200)
CLARITY UR: CLEAR
CO2 SERPL-SCNC: 22 MMOL/L (ref 22–29)
COLOR UR: YELLOW
CREAT SERPL-MCNC: 1.08 MG/DL (ref 0.57–1)
CREAT UR-MCNC: 160.1 MG/DL
DEPRECATED RDW RBC AUTO: 41.4 FL (ref 37–54)
EGFRCR SERPLBLD CKD-EPI 2021: 65.1 ML/MIN/1.73
EOSINOPHIL # BLD AUTO: 0.1 10*3/MM3 (ref 0–0.4)
EOSINOPHIL NFR BLD AUTO: 1.1 % (ref 0.3–6.2)
ERYTHROCYTE [DISTWIDTH] IN BLOOD BY AUTOMATED COUNT: 13.5 % (ref 12.3–15.4)
EXPIRATION DATE: NORMAL
FERRITIN SERPL-MCNC: 37.6 NG/ML (ref 13–150)
GLOBULIN UR ELPH-MCNC: 2.7 GM/DL
GLUCOSE SERPL-MCNC: 94 MG/DL (ref 65–99)
GLUCOSE UR STRIP-MCNC: NEGATIVE MG/DL
HBA1C MFR BLD: 5.3 % (ref 4.8–5.6)
HCT VFR BLD AUTO: 31.5 % (ref 34–46.6)
HDLC SERPL-MCNC: 52 MG/DL (ref 40–60)
HGB BLD-MCNC: 10.9 G/DL (ref 12–15.9)
HGB UR QL STRIP.AUTO: NEGATIVE
HOLD SPECIMEN: NORMAL
HYALINE CASTS UR QL AUTO: NORMAL /LPF
IMM GRANULOCYTES # BLD AUTO: 0.08 10*3/MM3 (ref 0–0.05)
IMM GRANULOCYTES NFR BLD AUTO: 0.8 % (ref 0–0.5)
INTERNAL NEGATIVE CONTROL: NORMAL
INTERNAL POSITIVE CONTROL: NORMAL
IRON 24H UR-MRATE: 70 MCG/DL (ref 37–145)
IRON SATN MFR SERPL: 15 % (ref 20–50)
KETONES UR QL STRIP: NEGATIVE
LDLC SERPL CALC-MCNC: 65 MG/DL (ref 0–100)
LDLC/HDLC SERPL: 1.17 {RATIO}
LEUKOCYTE ESTERASE UR QL STRIP.AUTO: ABNORMAL
LYMPHOCYTES # BLD AUTO: 2.79 10*3/MM3 (ref 0.7–3.1)
LYMPHOCYTES NFR BLD AUTO: 29.5 % (ref 19.6–45.3)
Lab: NORMAL
MCH RBC QN AUTO: 29.6 PG (ref 26.6–33)
MCHC RBC AUTO-ENTMCNC: 34.6 G/DL (ref 31.5–35.7)
MCV RBC AUTO: 85.6 FL (ref 79–97)
MICROALBUMIN/CREAT UR: NORMAL MG/G{CREAT}
MONOCYTES # BLD AUTO: 0.54 10*3/MM3 (ref 0.1–0.9)
MONOCYTES NFR BLD AUTO: 5.7 % (ref 5–12)
NEUTROPHILS NFR BLD AUTO: 5.9 10*3/MM3 (ref 1.7–7)
NEUTROPHILS NFR BLD AUTO: 62.5 % (ref 42.7–76)
NITRITE UR QL STRIP: NEGATIVE
NRBC BLD AUTO-RTO: 0 /100 WBC (ref 0–0.2)
PH UR STRIP.AUTO: 6 [PH] (ref 5–8)
PLATELET # BLD AUTO: 222 10*3/MM3 (ref 140–450)
PMV BLD AUTO: 10.4 FL (ref 6–12)
POTASSIUM SERPL-SCNC: 4.1 MMOL/L (ref 3.5–5.2)
PROT SERPL-MCNC: 6.4 G/DL (ref 6–8.5)
PROT UR QL STRIP: ABNORMAL
RBC # BLD AUTO: 3.68 10*6/MM3 (ref 3.77–5.28)
RBC # UR STRIP: NORMAL /HPF
REF LAB TEST METHOD: NORMAL
SODIUM SERPL-SCNC: 141 MMOL/L (ref 136–145)
SP GR UR STRIP: 1.02 (ref 1–1.03)
SQUAMOUS #/AREA URNS HPF: NORMAL /HPF
TIBC SERPL-MCNC: 454 MCG/DL (ref 298–536)
TRANSFERRIN SERPL-MCNC: 305 MG/DL (ref 200–360)
TRIGL SERPL-MCNC: 146 MG/DL (ref 0–150)
TSH SERPL DL<=0.05 MIU/L-ACNC: 1.19 UIU/ML (ref 0.27–4.2)
UROBILINOGEN UR QL STRIP: ABNORMAL
VLDLC SERPL-MCNC: 25 MG/DL (ref 5–40)
WBC # UR STRIP: NORMAL /HPF
WBC NRBC COR # BLD: 9.45 10*3/MM3 (ref 3.4–10.8)

## 2023-11-03 PROCEDURE — 83540 ASSAY OF IRON: CPT | Performed by: NURSE PRACTITIONER

## 2023-11-03 PROCEDURE — 82043 UR ALBUMIN QUANTITATIVE: CPT | Performed by: NURSE PRACTITIONER

## 2023-11-03 PROCEDURE — 84466 ASSAY OF TRANSFERRIN: CPT | Performed by: NURSE PRACTITIONER

## 2023-11-03 PROCEDURE — 82728 ASSAY OF FERRITIN: CPT | Performed by: NURSE PRACTITIONER

## 2023-11-03 PROCEDURE — 83036 HEMOGLOBIN GLYCOSYLATED A1C: CPT | Performed by: NURSE PRACTITIONER

## 2023-11-03 PROCEDURE — 82306 VITAMIN D 25 HYDROXY: CPT | Performed by: NURSE PRACTITIONER

## 2023-11-03 PROCEDURE — 82570 ASSAY OF URINE CREATININE: CPT | Performed by: NURSE PRACTITIONER

## 2023-11-03 PROCEDURE — 80061 LIPID PANEL: CPT | Performed by: NURSE PRACTITIONER

## 2023-11-03 PROCEDURE — 81001 URINALYSIS AUTO W/SCOPE: CPT | Performed by: NURSE PRACTITIONER

## 2023-11-03 PROCEDURE — 80050 GENERAL HEALTH PANEL: CPT | Performed by: NURSE PRACTITIONER

## 2023-11-03 RX ORDER — PRAVASTATIN SODIUM 10 MG
10 TABLET ORAL NIGHTLY
Qty: 90 TABLET | Refills: 1 | Status: SHIPPED | OUTPATIENT
Start: 2023-11-03

## 2023-11-03 RX ORDER — MEDROXYPROGESTERONE ACETATE 150 MG/ML
150 INJECTION, SUSPENSION INTRAMUSCULAR ONCE
Status: COMPLETED | OUTPATIENT
Start: 2023-11-03 | End: 2023-11-03

## 2023-11-03 RX ORDER — LISINOPRIL 2.5 MG/1
2.5 TABLET ORAL DAILY
Qty: 90 TABLET | Refills: 1 | Status: CANCELLED | OUTPATIENT
Start: 2023-11-03

## 2023-11-03 RX ORDER — METOPROLOL SUCCINATE 25 MG/1
25 TABLET, EXTENDED RELEASE ORAL NIGHTLY
Qty: 90 TABLET | Refills: 1 | Status: SHIPPED | OUTPATIENT
Start: 2023-11-03

## 2023-11-03 RX ORDER — TRAZODONE HYDROCHLORIDE 100 MG/1
100 TABLET ORAL NIGHTLY
Qty: 90 TABLET | Refills: 1 | Status: SHIPPED | OUTPATIENT
Start: 2023-11-03

## 2023-11-03 RX ORDER — BUPROPION HYDROCHLORIDE 300 MG/1
300 TABLET ORAL DAILY
Qty: 90 TABLET | Refills: 1 | Status: SHIPPED | OUTPATIENT
Start: 2023-11-03

## 2023-11-03 RX ORDER — TOPIRAMATE 50 MG/1
50 TABLET, FILM COATED ORAL
Qty: 90 TABLET | Refills: 1 | Status: SHIPPED | OUTPATIENT
Start: 2023-11-03 | End: 2024-05-01

## 2023-11-03 RX ORDER — SEMAGLUTIDE 0.68 MG/ML
0.5 INJECTION, SOLUTION SUBCUTANEOUS WEEKLY
Qty: 3 ML | Refills: 5 | Status: SHIPPED | OUTPATIENT
Start: 2023-11-03

## 2023-11-03 RX ADMIN — MEDROXYPROGESTERONE ACETATE 150 MG: 150 INJECTION, SUSPENSION INTRAMUSCULAR at 09:09

## 2023-11-03 NOTE — PROGRESS NOTES
Follow Up Office Visit      Patient Name: Bianca Boles  : 1980   MRN: [id number]     Chief Complaint:    Chief Complaint   Patient presents with    Hypertension    Hyperlipidemia    Diabetes    Heartburn    Anxiety    Depression    Insomnia    Allergic Rhinitis       History of Present Illness: Bianca Boles is a 43 y.o. female who is here today to follow up for HTN, hyperlipidemia, Dm2, GERD, anxiety, depression, insomnia, allergies.    BS- checks daily  fasting   120's 2 hours after a meal   Last dental exam  Last eye exam  dr patricia arcos [place]   A1C-2023  Foot exam 2023 Dr Short  Checking feet daily   lmp-22, nuvaring   Pap- 2022  Mammogram 2023    Pt c/o insomnia, uncontrolled left shoulder pain, manipulation, injection with ortho, follow up scheduled, PT did not help     Subjective      Review of Systems:   Review of Systems   Constitutional:  Negative for fever.   HENT:  Negative for ear pain and sore throat.    Eyes:  Negative for visual disturbance.   Respiratory:  Negative for cough.    Cardiovascular:  Negative for chest pain.   Gastrointestinal:  Negative for abdominal pain, diarrhea, nausea and vomiting.   Endocrine: Negative for polydipsia and polyuria.   Genitourinary:  Negative for dysuria.   Musculoskeletal:  Negative for myalgias.   Neurological:  Negative for headaches.        Past Medical History:   Past Medical History:   Diagnosis Date    Acid reflux     Anxiety     Anxiety and depression     Benign essential hypertension     Cancer     renal cancer/mass, PARTIAL NEPH, RIGHT    Diabetes     Diabetes mellitus     type ii, doesn't check bg at home     Diabetes mellitus, type 2     Frozen shoulder 2023    GERD (gastroesophageal reflux disease)     High triglycerides     History of bariatric surgery 2021    GASTRIC SLEEVE    History of kidney stones     HTN (hypertension)     Hyperlipemia     Hyperlipidemia     Hypertriglyceridemia      Lumbar herniated disc     Obesity     Panic attacks     PCOS (polycystic ovarian syndrome)     Periarthritis of shoulder     Psoriasis     ELBOWS    Rotator cuff syndrome     Seasonal allergies     Spinal headache     AFTER PAIN EPIDURALS.  NO BLOOD PATCH       Past Surgical History:   Past Surgical History:   Procedure Laterality Date    ADENOIDECTOMY  1984     SECTION  ,    CYSTOSCOPY BLADDER STONE LITHOTRIPSY      EAR TUBES  1984    ENDOSCOPY N/A 10/20/2020    Procedure: ESOPHAGOGASTRODUODENOSCOPY WITH BIOPSY;  Surgeon: Fidel Grajeda Jr., MD;  Location: [facility] ENDOSCOPY;  Service: General;  Laterality: N/A;  PRE- GERD  POST- RETAINED FOOD, GASTRITIS, GASTRIC POLYPS    ENDOSCOPY      FOOT FRACTURE SURGERY Left 2017    screw placed    GASTRIC SLEEVE LAPAROSCOPIC N/A 2020    Procedure: GASTRIC SLEEVE LAPAROSCOPIC;  Surgeon: Fidel Grajeda Jr., MD;  Location: [facility] OR [facility];  Service: Bariatric;  Laterality: N/A;    NEPHRECTOMY PARTIAL Right 11/15/2021    Procedure: NEPHRECTOMY PARTIAL LAPAROSCOPIC WITH DAVINCI ROBOT;  Surgeon: Lori Troy MD;  Location: [facility] OR;  Service: Robotics - DaVinci;  Laterality: Right;    SHOULDER ARTHROSCOPY Left 2023    Procedure: LEFT SHOULDER MANIPULATION;  Surgeon: Domenic Bradley MD;  Location: [facility] OR [facility];  Service: Orthopedics;  Laterality: Left;    TONSILLECTOMY  1984    URETEROSCOPY LASER LITHOTRIPSY WITH STENT INSERTION Right 2021    Procedure: CYSTOSCOPY, RIGHT URETEROSCOPY, LASERTRIPSY, STONE BASKET EXTRACTION AND STENT INSERTION;  Surgeon: Lori Troy MD;  Location: [facility] OR;  Service: Urology;  Laterality: Right;    URETEROSCOPY LASER LITHOTRIPSY WITH STENT INSERTION Left 2022    Procedure: URETEROSCOPY LASER LITHOTRIPSY WITH URETERAL STENT INSERTION;  Surgeon: Lori Troy MD;  Location: [facility] MAIN OR;  Service: Urology;  Laterality: Left;       Family History:   Family  History   Problem Relation Age of Onset    Diabetes Father     Hypertension Father     Heart disease Father     Cancer Father         Bladder prostate liver    Colon cancer Father         malignant, currently 62    Stroke Maternal Grandmother     Heart disease Maternal Grandmother     Diabetes Paternal Grandfather     Heart disease Paternal Grandfather     Cancer Mother         Breast    Malig Hyperthermia Neg Hx        Social History:   Social History     Socioeconomic History    Marital status:    Tobacco Use    Smoking status: Former     Packs/day: 0.25     Years: 35.00     Additional pack years: 0.00     Total pack years: 8.75     Types: Cigarettes     Quit date: 1/15/2020     Years since quitting: 3.8    Smokeless tobacco: Never    Tobacco comments:     A PACK A WEEK   Vaping Use    Vaping Use: Never used   Substance and Sexual Activity    Alcohol use: Yes     Comment: Very rarely    Drug use: Not Currently     Frequency: 0.1 times per week     Types: Marijuana     Comment: Very rarely in my youth    Sexual activity: Yes     Partners: Male     Birth control/protection: Inserts       Medications:     Current Outpatient Medications:     Apremilast (Otezla) 30 MG tablet, Take 30 mg by mouth 2 (Two) Times a Day., Disp: 60 tablet, Rfl: 2    Apremilast (Otezla) 30 MG tablet, Take 30 mg by mouth 2 (Two) Times a Day., Disp: 60 tablet, Rfl: 5    Biotin 53203 MCG tablet, Take 1 tablet by mouth Every Night., Disp: , Rfl:     buPROPion XL (WELLBUTRIN XL) 300 MG 24 hr tablet, Take 1 tablet by mouth Daily., Disp: 90 tablet, Rfl: 1    CALCIUM-MAGNESIUM-ZINC PO, Take 3 tablets by mouth Daily., Disp: , Rfl:     cetirizine (zyrTEC) 10 MG tablet, Take 1 tablet by mouth Daily As Needed., Disp: , Rfl:     Cholecalciferol 25 MCG (1000 UT) capsule, Take 2 capsules by mouth Daily., Disp: 180 capsule, Rfl: 1    clobetasol (TEMOVATE) 0.05 % external solution, Apply 10-15 drops every day to affected areas in the scalp after  shampooing until clear. (Patient taking differently: Apply  topically to the appropriate area as directed Daily As Needed.), Disp: 50 mL, Rfl: 3    COLLAGEN PO, Take 3 tablets by mouth Daily., Disp: , Rfl:     Continuous Blood Gluc Sensor (FreeStyle Ling 14 Day Sensor) misc, 1 each Every 14 (Fourteen) Days., Disp: 6 each, Rfl: 3    Diclofenac Sodium (VOLTAREN) 1 % gel gel, Apply 4 g topically to the appropriate area as directed 4 (Four) Times a Day As Needed (pain)., Disp: 200 g, Rfl: 1    etonogestrel-ethinyl estradiol (NuvaRing) 0.12-0.015 MG/24HR vaginal ring, Insert 1 each into the vagina Every 30 (Thirty) Days. Insert vaginally and leave in place for 3 consecutive weeks, then remove for 1 week., Disp: 3 each, Rfl: 3    fluticasone (FLONASE) 50 MCG/ACT nasal spray, USE 2 SPRAYS IN EACH NOSTRIL ONCE DAILY AS DIRECTED (Patient taking differently: 2 sprays into the nostril(s) as directed by provider At Night As Needed.), Disp: 48 g, Rfl: 1    HYDROcodone-acetaminophen (Norco) 7.5-325 MG per tablet, Take 1 tablet by mouth Every 4 (Four) Hours As Needed for Moderate Pain., Disp: 25 tablet, Rfl: 0    IRON-VITAMIN C PO, Take 1 tablet by mouth Daily., Disp: , Rfl:     metoprolol succinate XL (Toprol XL) 25 MG 24 hr tablet, Take 1 tablet by mouth Every Night., Disp: 90 tablet, Rfl: 1    Multiple Vitamins-Minerals (MULTIVITAMIN PO), Take 1 tablet by mouth Every Night., Disp: , Rfl:     ondansetron ODT (ZOFRAN-ODT) 4 MG disintegrating tablet, Place 1 tablet on the tongue Every 8 (Eight) Hours As Needed for Nausea., Disp: 21 tablet, Rfl: 5    pravastatin (PRAVACHOL) 10 MG tablet, Take 1 tablet by mouth Every Night., Disp: 90 tablet, Rfl: 1    Probiotic Product (PROBIOTIC-10 PO), Take 1 tablet by mouth Every Night., Disp: , Rfl:     promethazine (PHENERGAN) 25 MG suppository, Insert 1 suppository into the rectum Every 6 (Six) Hours As Needed for Nausea., Disp: 12 suppository, Rfl: 1    Semaglutide,0.25 or 0.5MG/DOS,  "(Ozempic, 0.25 or 0.5 MG/DOSE,) 2 MG/3ML solution pen-injector, Inject 0.5 mg under the skin into the appropriate area as directed 1 (One) Time Per Week., Disp: 3 mL, Rfl: 5    topiramate (TOPAMAX) 50 MG tablet, Take 1 tablet by mouth every night at bedtime for 180 days., Disp: 90 tablet, Rfl: 1    traZODone (DESYREL) 100 MG tablet, Take 1 tablet by mouth Every Night., Disp: 90 tablet, Rfl: 1    Allergies:   Allergies   Allergen Reactions    Morphine Itching and Nausea And Vomiting               Objective     Physical Exam:  Vital Signs:   Vitals:    11/03/23 0823   BP: 97/64   Pulse: 58   Temp: 97.5 °F (36.4 °C)   SpO2: 100%   Weight: 98.4 kg (217 lb)   Height: 170.2 cm (67\")     Body mass index is 33.99 kg/m².           Physical Exam  HENT:      Right Ear: Tympanic membrane normal.      Left Ear: Tympanic membrane normal.      Nose: Nose normal.      Mouth/Throat:      Mouth: Mucous membranes are moist.   Eyes:      Conjunctiva/sclera: Conjunctivae normal.   Neck:      Vascular: No carotid bruit.   Cardiovascular:      Rate and Rhythm: Normal rate and regular rhythm.      Pulses:           Dorsalis pedis pulses are 2+ on the right side and 2+ on the left side.        Posterior tibial pulses are 2+ on the right side and 2+ on the left side.      Heart sounds: Normal heart sounds. No murmur heard.  Pulmonary:      Effort: Pulmonary effort is normal.      Breath sounds: Normal breath sounds.   Abdominal:      General: Bowel sounds are normal.      Palpations: Abdomen is soft.   Musculoskeletal:      Right lower leg: No edema.      Left lower leg: No edema.   Feet:      Right foot:      Protective Sensation: 10 sites tested.  9 sites sensed.      Skin integrity: Skin integrity normal.      Toenail Condition: Right toenails are normal.      Left foot:      Protective Sensation: 10 sites tested.  9 sites sensed.      Skin integrity: Skin integrity normal.      Toenail Condition: Left toenails are normal.   Skin:     " General: Skin is warm and dry.   Neurological:      Mental Status: She is alert.   Psychiatric:         Mood and Affect: Mood normal.         Behavior: Behavior normal.         Diabetic Foot Exam Performed and Monofilament Test Performed     Assessment / Plan      Assessment/Plan:   Diagnoses and all orders for this visit:    1. Type 2 diabetes mellitus without complication, without long-term current use of insulin (Primary)  -     Comprehensive Metabolic Panel  -     Hemoglobin A1c  -     Microalbumin / Creatinine Urine Ratio - Urine, Clean Catch  -     TSH  -     Urinalysis With Culture If Indicated -    2. Essential hypertension    3. Mixed hyperlipidemia  -     Comprehensive Metabolic Panel  -     Lipid Panel    4. Gastroesophageal reflux disease without esophagitis    5. Seasonal allergies    6. Palpitation    7. S/P laparoscopic sleeve gastrectomy    8. Anxiety    9. Mild episode of recurrent major depressive disorder    10. Primary insomnia    11. Iron deficiency  -     CBC Auto Differential  -     Iron Profile  -     Ferritin    12. Vitamin D deficiency  -     Vitamin D,25-Hydroxy    Other orders  -     buPROPion XL (WELLBUTRIN XL) 300 MG 24 hr tablet; Take 1 tablet by mouth Daily.  Dispense: 90 tablet; Refill: 1  -     Cholecalciferol 25 MCG (1000 UT) capsule; Take 2 capsules by mouth Daily.  Dispense: 180 capsule; Refill: 1  -     metoprolol succinate XL (Toprol XL) 25 MG 24 hr tablet; Take 1 tablet by mouth Every Night.  Dispense: 90 tablet; Refill: 1  -     pravastatin (PRAVACHOL) 10 MG tablet; Take 1 tablet by mouth Every Night.  Dispense: 90 tablet; Refill: 1  -     Semaglutide,0.25 or 0.5MG/DOS, (Ozempic, 0.25 or 0.5 MG/DOSE,) 2 MG/3ML solution pen-injector; Inject 0.5 mg under the skin into the appropriate area as directed 1 (One) Time Per Week.  Dispense: 3 mL; Refill: 5  -     traZODone (DESYREL) 100 MG tablet; Take 1 tablet by mouth Every Night.  Dispense: 90 tablet; Refill: 1  -     topiramate  "(TOPAMAX) 50 MG tablet; Take 1 tablet by mouth every night at bedtime for 180 days.  Dispense: 90 tablet; Refill: 1         DM2 will obtain hgb A1c will continue ozempic Home readings appear controlled  HTN low blood pressure readings we will stop lisinopril pressure check in 2 weeks  Hyperlipidemia we will obtain lipid panel to monitor pravastatin dose 10 mg at nighttime  Reflux currently controlled without occasions at this time  Seasonal allergies currently controlled with Zyrtec will provide a refill  Palpitations currently controlled with Toprol-XL 25 mg daily  Status post laparoscopic sleeve with iron deficiency and vitamin D deficiency we will obtain labs to monitor  Anxiety depression currently controlled with Wellbutrin  Insomnia currently uncontrolled we will increase trazodone to 100 mg nightly recommend good sleep hygiene follow-up with orthopedic surgery for shoulder pain    Follow Up:   Return in about 6 months (around 5/3/2024).    Kofi Hernandez, CARLTON    \"Please note that portions of this note were completed with a voice recognition program.\"    "

## 2023-11-08 ENCOUNTER — OFFICE VISIT (OUTPATIENT)
Dept: ORTHOPEDIC SURGERY | Facility: CLINIC | Age: 43
End: 2023-11-08
Payer: COMMERCIAL

## 2023-11-08 VITALS
HEIGHT: 67 IN | DIASTOLIC BLOOD PRESSURE: 63 MMHG | OXYGEN SATURATION: 98 % | HEART RATE: 66 BPM | SYSTOLIC BLOOD PRESSURE: 105 MMHG | BODY MASS INDEX: 34.05 KG/M2 | WEIGHT: 216.93 LBS

## 2023-11-08 DIAGNOSIS — M75.02 ADHESIVE CAPSULITIS OF LEFT SHOULDER: Primary | ICD-10-CM

## 2023-11-08 PROCEDURE — 20610 DRAIN/INJ JOINT/BURSA W/O US: CPT

## 2023-11-08 PROCEDURE — 99213 OFFICE O/P EST LOW 20 MIN: CPT

## 2023-11-08 RX ADMIN — TRIAMCINOLONE ACETONIDE 40 MG: 40 INJECTION, SUSPENSION INTRA-ARTICULAR; INTRAMUSCULAR at 09:05

## 2023-11-08 RX ADMIN — LIDOCAINE HYDROCHLORIDE 5 ML: 10 INJECTION, SOLUTION INFILTRATION; PERINEURAL at 09:05

## 2023-11-08 NOTE — PROGRESS NOTES
"Chief Complaint  Pain and Follow-up of the Left Shoulder    Subjective      Bianca Boles presents to [facility] for follow-up of left shoulder adhesive capsulitis.  Patient had a manipulation on 8/14/2023 with Dr. Bradley.  She has been completed with physical therapy due to meeting a plateau in her progression.  Patient reports that she is able to do overhead motions okay.  Reports that she has had increased pain that wakes her from sleep and reports that her hand will frequently go to sleep at night.    Objective   Allergies   Allergen Reactions    Morphine Itching and Nausea And Vomiting       Vital Signs:   /63   Pulse 66   Ht 170.2 cm (67\")   Wt 98.4 kg (216 lb 14.9 oz)   SpO2 98%   BMI 33.98 kg/m²       Physical Exam    Constitutional: Awake, alert. Well nourished appearance.    Integumentary: Warm, dry, intact. No obvious rashes.    HENT: Atraumatic, normocephalic.   Respiratory: Non labored respirations .   Cardiovascular: Intact peripheral pulses.    Psychiatric: Normal mood and affect. A&O X3    Ortho Exam  Left shoulder: Active range of motion forward shoulder flexion 150 degrees, abduction 120 degrees, internal rotation to back pocket, external rotation 45 degrees.  Full range of motion of the elbow and the wrist.  Sensation is intact throughout.  Negative impingement sign.    Imaging Results (Most Recent)       None             Large Joint Arthrocentesis: L subacromial bursa  Date/Time: 11/8/2023 9:05 AM  Consent given by: patient  Site marked: site marked  Timeout: Immediately prior to procedure a time out was called to verify the correct patient, procedure, equipment, support staff and site/side marked as required   Supporting Documentation  Indications: pain   Procedure Details  Location: shoulder - L subacromial bursa  Preparation: Patient was prepped and draped in the usual sterile fashion  Needle gauge: 21 G.  Approach: posterior  Medications " administered: 5 mL lidocaine 1 %; 40 mg triamcinolone acetonide 40 MG/ML  Patient tolerance: patient tolerated the procedure well with no immediate complications              Assessment and Plan   Problem List Items Addressed This Visit          Musculoskeletal and Injuries    Frozen shoulder - Primary    Relevant Orders    Large Joint Arthrocentesis: L subacromial bursa       Follow Up   Return in about 6 weeks (around 12/20/2023).    Tobacco Use: Medium Risk (11/8/2023)    Patient History     Smoking Tobacco Use: Former     Smokeless Tobacco Use: Never     Passive Exposure: Not on file     Patient is a non-smoker.    Patient Instructions   Discussed treatment plan of care with patient.  We discussed a shoulder injection and she wishes to proceed with this in office today.  This was given without difficulty.  Patient tolerated procedure well.  Educated on 3 months duration between injections.  Patient feels comfortable and continuing physical therapy exercises at home.    Patient to follow-up in 6 weeks to evaluate motion and effectiveness of injection.  Call for questions, concerns or worsening symptoms.  Patient was given instructions and counseling regarding her condition or for health maintenance advice. Please see specific information pulled into the AVS if appropriate.

## 2023-11-13 RX ORDER — LIDOCAINE HYDROCHLORIDE 10 MG/ML
5 INJECTION, SOLUTION INFILTRATION; PERINEURAL
Status: COMPLETED | OUTPATIENT
Start: 2023-11-08 | End: 2023-11-08

## 2023-11-13 RX ORDER — TRIAMCINOLONE ACETONIDE 40 MG/ML
40 INJECTION, SUSPENSION INTRA-ARTICULAR; INTRAMUSCULAR
Status: COMPLETED | OUTPATIENT
Start: 2023-11-08 | End: 2023-11-08

## 2023-11-13 NOTE — PATIENT INSTRUCTIONS
Discussed treatment plan of care with patient.  We discussed a shoulder injection and she wishes to proceed with this in office today.  This was given without difficulty.  Patient tolerated procedure well.  Educated on 3 months duration between injections.  Patient feels comfortable and continuing physical therapy exercises at home.    Patient to follow-up in 6 weeks to evaluate motion and effectiveness of injection.  Call for questions, concerns or worsening symptoms.

## 2023-11-28 ENCOUNTER — HOSPITAL ENCOUNTER (EMERGENCY)
Facility: HOSPITAL | Age: 43
Discharge: HOME | End: 2023-11-29
Attending: EMERGENCY MEDICINE | Admitting: EMERGENCY MEDICINE
Payer: COMMERCIAL

## 2023-11-28 ENCOUNTER — APPOINTMENT (OUTPATIENT)
Dept: CT IMAGING | Facility: HOSPITAL | Age: 43
End: 2023-11-28
Payer: COMMERCIAL

## 2023-11-28 DIAGNOSIS — R10.84 GENERALIZED ABDOMINAL PAIN: Primary | ICD-10-CM

## 2023-11-28 DIAGNOSIS — K59.00 CONSTIPATION, UNSPECIFIED CONSTIPATION TYPE: ICD-10-CM

## 2023-11-28 LAB
ALBUMIN SERPL-MCNC: 4.1 G/DL (ref 3.5–5.2)
ALBUMIN/GLOB SERPL: 1.6 G/DL
ALP SERPL-CCNC: 85 U/L (ref 39–117)
ALT SERPL W P-5'-P-CCNC: 23 U/L (ref 1–33)
ANION GAP SERPL CALCULATED.3IONS-SCNC: 11.9 MMOL/L (ref 5–15)
AST SERPL-CCNC: 23 U/L (ref 1–32)
BACTERIA UR QL AUTO: ABNORMAL /HPF
BASOPHILS # BLD AUTO: 0.04 10*3/MM3 (ref 0–0.2)
BASOPHILS NFR BLD AUTO: 0.4 % (ref 0–1.5)
BILIRUB SERPL-MCNC: 0.3 MG/DL (ref 0–1.2)
BILIRUB UR QL STRIP: NEGATIVE
BUN SERPL-MCNC: 12 MG/DL (ref 6–20)
BUN/CREAT SERPL: 10.3 (ref 7–25)
CALCIUM SPEC-SCNC: 9.3 MG/DL (ref 8.6–10.5)
CHLORIDE SERPL-SCNC: 104 MMOL/L (ref 98–107)
CLARITY UR: ABNORMAL
CO2 SERPL-SCNC: 22.1 MMOL/L (ref 22–29)
COD CRY URNS QL: ABNORMAL /HPF
COLOR UR: ABNORMAL
CREAT SERPL-MCNC: 1.17 MG/DL (ref 0.57–1)
DEPRECATED RDW RBC AUTO: 42.8 FL (ref 37–54)
EGFRCR SERPLBLD CKD-EPI 2021: 59.1 ML/MIN/1.73
EOSINOPHIL # BLD AUTO: 0.11 10*3/MM3 (ref 0–0.4)
EOSINOPHIL NFR BLD AUTO: 1.1 % (ref 0.3–6.2)
ERYTHROCYTE [DISTWIDTH] IN BLOOD BY AUTOMATED COUNT: 13.5 % (ref 12.3–15.4)
GLOBULIN UR ELPH-MCNC: 2.6 GM/DL
GLUCOSE SERPL-MCNC: 77 MG/DL (ref 65–99)
GLUCOSE UR STRIP-MCNC: NEGATIVE MG/DL
HCG INTACT+B SERPL-ACNC: <0.5 MIU/ML
HCT VFR BLD AUTO: 40 % (ref 34–46.6)
HGB BLD-MCNC: 13 G/DL (ref 12–15.9)
HGB UR QL STRIP.AUTO: NEGATIVE
HOLD SPECIMEN: NORMAL
HOLD SPECIMEN: NORMAL
HYALINE CASTS UR QL AUTO: ABNORMAL /LPF
IMM GRANULOCYTES # BLD AUTO: 0.1 10*3/MM3 (ref 0–0.05)
IMM GRANULOCYTES NFR BLD AUTO: 1 % (ref 0–0.5)
KETONES UR QL STRIP: ABNORMAL
LEUKOCYTE ESTERASE UR QL STRIP.AUTO: ABNORMAL
LIPASE SERPL-CCNC: 70 U/L (ref 13–60)
LYMPHOCYTES # BLD AUTO: 2.96 10*3/MM3 (ref 0.7–3.1)
LYMPHOCYTES NFR BLD AUTO: 30.1 % (ref 19.6–45.3)
MCH RBC QN AUTO: 28.3 PG (ref 26.6–33)
MCHC RBC AUTO-ENTMCNC: 32.5 G/DL (ref 31.5–35.7)
MCV RBC AUTO: 87.1 FL (ref 79–97)
MONOCYTES # BLD AUTO: 0.7 10*3/MM3 (ref 0.1–0.9)
MONOCYTES NFR BLD AUTO: 7.1 % (ref 5–12)
NEUTROPHILS NFR BLD AUTO: 5.92 10*3/MM3 (ref 1.7–7)
NEUTROPHILS NFR BLD AUTO: 60.3 % (ref 42.7–76)
NITRITE UR QL STRIP: NEGATIVE
NRBC BLD AUTO-RTO: 0 /100 WBC (ref 0–0.2)
PH UR STRIP.AUTO: 5.5 [PH] (ref 5–8)
PLATELET # BLD AUTO: 227 10*3/MM3 (ref 140–450)
PMV BLD AUTO: 9.7 FL (ref 6–12)
POTASSIUM SERPL-SCNC: 3.7 MMOL/L (ref 3.5–5.2)
PROT SERPL-MCNC: 6.7 G/DL (ref 6–8.5)
PROT UR QL STRIP: NEGATIVE
RBC # BLD AUTO: 4.59 10*6/MM3 (ref 3.77–5.28)
RBC # UR STRIP: ABNORMAL /HPF
REF LAB TEST METHOD: ABNORMAL
SODIUM SERPL-SCNC: 138 MMOL/L (ref 136–145)
SP GR UR STRIP: 1.02 (ref 1–1.03)
SQUAMOUS #/AREA URNS HPF: ABNORMAL /HPF
UROBILINOGEN UR QL STRIP: ABNORMAL
WBC # UR STRIP: ABNORMAL /HPF
WBC NRBC COR # BLD AUTO: 9.83 10*3/MM3 (ref 3.4–10.8)
WHOLE BLOOD HOLD COAG: NORMAL
WHOLE BLOOD HOLD SPECIMEN: NORMAL

## 2023-11-28 PROCEDURE — 96374 THER/PROPH/DIAG INJ IV PUSH: CPT

## 2023-11-28 PROCEDURE — 36415 COLL VENOUS BLD VENIPUNCTURE: CPT

## 2023-11-28 PROCEDURE — 99285 EMERGENCY DEPT VISIT HI MDM: CPT

## 2023-11-28 PROCEDURE — 25810000003 SODIUM CHLORIDE 0.9 % SOLUTION

## 2023-11-28 PROCEDURE — 80053 COMPREHEN METABOLIC PANEL: CPT

## 2023-11-28 PROCEDURE — 81001 URINALYSIS AUTO W/SCOPE: CPT

## 2023-11-28 PROCEDURE — 85025 COMPLETE CBC W/AUTO DIFF WBC: CPT

## 2023-11-28 PROCEDURE — 84702 CHORIONIC GONADOTROPIN TEST: CPT

## 2023-11-28 PROCEDURE — 83690 ASSAY OF LIPASE: CPT

## 2023-11-28 PROCEDURE — 25010000002 ONDANSETRON PER 1 MG

## 2023-11-28 PROCEDURE — 74177 CT ABD & PELVIS W/CONTRAST: CPT

## 2023-11-28 PROCEDURE — 25510000001 IOPAMIDOL PER 1 ML: Performed by: EMERGENCY MEDICINE

## 2023-11-28 RX ORDER — ONDANSETRON 2 MG/ML
4 INJECTION INTRAMUSCULAR; INTRAVENOUS ONCE
Status: COMPLETED | OUTPATIENT
Start: 2023-11-28 | End: 2023-11-28

## 2023-11-28 RX ORDER — SODIUM CHLORIDE 0.9 % (FLUSH) 0.9 %
10 SYRINGE (ML) INJECTION AS NEEDED
Status: DISCONTINUED | OUTPATIENT
Start: 2023-11-28 | End: 2023-11-29 | Stop reason: HOSPADM

## 2023-11-28 RX ADMIN — ONDANSETRON 4 MG: 2 INJECTION INTRAMUSCULAR; INTRAVENOUS at 21:35

## 2023-11-28 RX ADMIN — IOPAMIDOL 100 ML: 755 INJECTION, SOLUTION INTRAVENOUS at 22:11

## 2023-11-28 RX ADMIN — SODIUM CHLORIDE 1000 ML: 9 INJECTION, SOLUTION INTRAVENOUS at 21:35

## 2023-11-28 NOTE — Clinical Note
[facility] EMERGENCY ROOM  [address]  [place] [address]  Phone: [phone]    Bianca Boles was seen and treated in our emergency department on 11/28/2023.  She may return to work on 11/30/2023.         Thank you for choosing [facility].    aMxx Perez PA-C

## 2023-11-28 NOTE — Clinical Note
[facility] EMERGENCY ROOM  [address]  [place] [address]  Phone: [phone]    Bianca Boles was seen and treated in our emergency department on 11/28/2023.  She may return to work on 11/30/2023.         Thank you for choosing [facility].    Maxx Perez PA-C

## 2023-11-29 VITALS
DIASTOLIC BLOOD PRESSURE: 73 MMHG | WEIGHT: 189.82 LBS | HEART RATE: 68 BPM | SYSTOLIC BLOOD PRESSURE: 131 MMHG | OXYGEN SATURATION: 95 % | TEMPERATURE: 97.8 F | RESPIRATION RATE: 20 BRPM | HEIGHT: 67 IN | BODY MASS INDEX: 29.79 KG/M2

## 2023-11-29 RX ORDER — DICYCLOMINE HCL 20 MG
20 TABLET ORAL EVERY 8 HOURS PRN
Qty: 15 TABLET | Refills: 0 | Status: SHIPPED | OUTPATIENT
Start: 2023-11-29 | End: 2023-12-04

## 2023-12-15 ENCOUNTER — OFFICE VISIT (OUTPATIENT)
Dept: FAMILY MEDICINE CLINIC | Facility: CLINIC | Age: 43
End: 2023-12-15
Payer: COMMERCIAL

## 2023-12-15 VITALS
TEMPERATURE: 97.8 F | HEIGHT: 67 IN | DIASTOLIC BLOOD PRESSURE: 55 MMHG | SYSTOLIC BLOOD PRESSURE: 96 MMHG | OXYGEN SATURATION: 100 % | HEART RATE: 64 BPM | WEIGHT: 214 LBS | BODY MASS INDEX: 33.59 KG/M2

## 2023-12-15 DIAGNOSIS — F41.9 ANXIETY AND DEPRESSION: ICD-10-CM

## 2023-12-15 DIAGNOSIS — F32.A ANXIETY AND DEPRESSION: ICD-10-CM

## 2023-12-15 DIAGNOSIS — Z12.11 SCREENING FOR MALIGNANT NEOPLASM OF COLON: ICD-10-CM

## 2023-12-15 DIAGNOSIS — Z01.419 ENCOUNTER FOR WELL WOMAN EXAM WITH ROUTINE GYNECOLOGICAL EXAM: Primary | ICD-10-CM

## 2023-12-15 DIAGNOSIS — N89.8 VAGINAL DISCHARGE: ICD-10-CM

## 2023-12-15 DIAGNOSIS — Z12.4 SCREENING FOR MALIGNANT NEOPLASM OF CERVIX: ICD-10-CM

## 2023-12-15 LAB
CANDIDA SPECIES: POSITIVE
GARDNERELLA VAGINALIS: NEGATIVE
T VAGINALIS DNA VAG QL PROBE+SIG AMP: NEGATIVE

## 2023-12-15 PROCEDURE — G0123 SCREEN CERV/VAG THIN LAYER: HCPCS | Performed by: NURSE PRACTITIONER

## 2023-12-15 PROCEDURE — 87660 TRICHOMONAS VAGIN DIR PROBE: CPT | Performed by: NURSE PRACTITIONER

## 2023-12-15 PROCEDURE — 87480 CANDIDA DNA DIR PROBE: CPT | Performed by: NURSE PRACTITIONER

## 2023-12-15 PROCEDURE — 87624 HPV HI-RISK TYP POOLED RSLT: CPT | Performed by: NURSE PRACTITIONER

## 2023-12-15 PROCEDURE — 87510 GARDNER VAG DNA DIR PROBE: CPT | Performed by: NURSE PRACTITIONER

## 2023-12-15 RX ORDER — FLUCONAZOLE 150 MG/1
150 TABLET ORAL
Qty: 3 TABLET | Refills: 0 | Status: SHIPPED | OUTPATIENT
Start: 2023-12-15 | End: 2023-12-24

## 2023-12-15 RX ORDER — DESVENLAFAXINE 25 MG/1
25 TABLET, EXTENDED RELEASE ORAL DAILY
Qty: 30 TABLET | Refills: 1 | Status: SHIPPED | OUTPATIENT
Start: 2023-12-15

## 2023-12-15 RX ORDER — METRONIDAZOLE 500 MG/1
500 TABLET ORAL 2 TIMES DAILY
Qty: 14 TABLET | Refills: 0 | Status: SHIPPED | OUTPATIENT
Start: 2023-12-15

## 2023-12-15 NOTE — ADDENDUM NOTE
Addended by: ROEL NAVARRO on: 12/15/2023 08:44 AM     Modules accepted: Orders     1 Principal Discharge DX:	ACS (acute coronary syndrome)

## 2023-12-15 NOTE — PROGRESS NOTES
Female Physical / PAP Note      Patient Name: Bianca Boles  : 1980   MRN: [id number]     Chief Complaint:    Chief Complaint   Patient presents with    Gynecologic Exam       History of Present Illness: Bianca Boles is a 43 y.o. female who is here today for her annual health maintenance and physical.     Mammogram 2023  Lmp- 10/2023. Depo    C/o She wants to discuss medication for anxiety/depression. She currently takes Wellbutrin 300 mg for the last several years. She says the last several months have been more overwhelming for a few months  Alone and hanging on by a thread, working on counseling appt   Dad is currently undergoing treatments for liver and lung cancer     Tried in past   Prozac, celexa, effexor, zoloft       Subjective      Review of Systems:   Review of Systems   Constitutional:  Negative for fever.   Respiratory:  Negative for cough.    Cardiovascular:  Negative for chest pain.   Gastrointestinal:  Negative for abdominal pain, diarrhea, nausea and vomiting.   Genitourinary:  Negative for dysuria.   Skin:  Negative for rash.   Psychiatric/Behavioral:  The patient is nervous/anxious.         Anxious      Breast - No tenderness, lumps, discharge, or blood from nipples.      Past Medical History, Social History, Family History and Care Team were all reviewed with patient and updated as appropriate.     Medications:     Current Outpatient Medications:     Apremilast (Otezla) 30 MG tablet, Take 30 mg by mouth 2 (Two) Times a Day., Disp: 60 tablet, Rfl: 5    Biotin 41125 MCG tablet, Take 1 tablet by mouth Every Night., Disp: , Rfl:     buPROPion XL (WELLBUTRIN XL) 300 MG 24 hr tablet, Take 1 tablet by mouth Daily., Disp: 90 tablet, Rfl: 1    CALCIUM-MAGNESIUM-ZINC PO, Take 3 tablets by mouth Daily., Disp: , Rfl:     cetirizine (zyrTEC) 10 MG tablet, Take 1 tablet by mouth Daily As Needed., Disp: , Rfl:     Cholecalciferol 25 MCG (1000 UT) capsule, Take 2 capsules by mouth  Daily., Disp: 180 capsule, Rfl: 1    clobetasol (TEMOVATE) 0.05 % external solution, Apply 10-15 drops every day to affected areas in the scalp after shampooing until clear. (Patient taking differently: Apply  topically to the appropriate area as directed Daily As Needed.), Disp: 50 mL, Rfl: 3    COLLAGEN PO, Take 3 tablets by mouth Daily., Disp: , Rfl:     Continuous Blood Gluc Sensor ([employer]Style Ling 14 Day Sensor) misc, 1 each Every 14 (Fourteen) Days., Disp: 6 each, Rfl: 3    Diclofenac Sodium (VOLTAREN) 1 % gel gel, Apply 4 g topically to the appropriate area as directed 4 (Four) Times a Day As Needed (pain)., Disp: 200 g, Rfl: 1    fluticasone (FLONASE) 50 MCG/ACT nasal spray, USE 2 SPRAYS IN EACH NOSTRIL ONCE DAILY AS DIRECTED (Patient taking differently: 2 sprays into the nostril(s) as directed by provider At Night As Needed.), Disp: 48 g, Rfl: 1    IRON-VITAMIN C PO, Take 1 tablet by mouth Daily., Disp: , Rfl:     metoprolol succinate XL (Toprol XL) 25 MG 24 hr tablet, Take 1 tablet by mouth Every Night., Disp: 90 tablet, Rfl: 1    Multiple Vitamins-Minerals (MULTIVITAMIN PO), Take 1 tablet by mouth Every Night., Disp: , Rfl:     ondansetron ODT (ZOFRAN-ODT) 4 MG disintegrating tablet, Place 1 tablet on the tongue Every 8 (Eight) Hours As Needed for Nausea., Disp: 21 tablet, Rfl: 5    pravastatin (PRAVACHOL) 10 MG tablet, Take 1 tablet by mouth Every Night., Disp: 90 tablet, Rfl: 1    Probiotic Product (PROBIOTIC-10 PO), Take 1 tablet by mouth Every Night., Disp: , Rfl:     promethazine (PHENERGAN) 25 MG suppository, Insert 1 suppository into the rectum Every 6 (Six) Hours As Needed for Nausea., Disp: 12 suppository, Rfl: 1    Semaglutide,0.25 or 0.5MG/DOS, (Ozempic, 0.25 or 0.5 MG/DOSE,) 2 MG/3ML solution pen-injector, Inject 0.5 mg under the skin into the appropriate area as directed 1 (One) Time Per Week., Disp: 3 mL, Rfl: 5    topiramate (TOPAMAX) 50 MG tablet, Take 1 tablet by mouth every night at  "bedtime for 180 days., Disp: 90 tablet, Rfl: 1    traZODone (DESYREL) 100 MG tablet, Take 1 tablet by mouth Every Night., Disp: 90 tablet, Rfl: 1    Desvenlafaxine Succinate ER 25 MG tablet sustained-release 24 hour, Take 1 tablet by mouth Daily., Disp: 30 tablet, Rfl: 1    metroNIDAZOLE (Flagyl) 500 MG tablet, Take 1 tablet by mouth 2 (Two) Times a Day., Disp: 14 tablet, Rfl: 0    Allergies:   Allergies   Allergen Reactions    Morphine Itching and Nausea And Vomiting               Objective     Physical Exam:  Vital Signs:   Vitals:    12/15/23 0743   BP: 96/55   Pulse: 64   Temp: 97.8 °F (36.6 °C)   SpO2: 100%   Weight: 97.1 kg (214 lb)   Height: 170.2 cm (67\")     Body mass index is 33.52 kg/m².           Physical Exam  Cardiovascular:      Rate and Rhythm: Normal rate and regular rhythm.      Heart sounds: Normal heart sounds. No murmur heard.  Pulmonary:      Effort: Pulmonary effort is normal.      Breath sounds: Normal breath sounds.   Abdominal:      General: Bowel sounds are normal.      Palpations: Abdomen is soft.   Genitourinary:     General: Normal vulva.      Vagina: Normal. Vaginal discharge present.      Cervix: Normal.      Uterus: Normal.       Adnexa: Right adnexa normal and left adnexa normal.      Rectum: Normal.      Comments: Thin green discharge  Skin:     General: Skin is warm and dry.   Neurological:      Mental Status: She is alert.   Psychiatric:         Mood and Affect: Mood normal.             Assessment / Plan      Assessment/Plan:   Diagnoses and all orders for this visit:    1. Encounter for well woman exam with routine gynecological exam (Primary)    2. Screening for malignant neoplasm of cervix  -     IgP, Aptima HPV    3. Anxiety and depression  -     [facility] - Swab,; Future    4. Screening for malignant neoplasm of colon  -     Ambulatory Referral For Screening Colonoscopy    5. Vaginal discharge  -     Gardnerella vaginalis, Trichomonas vaginalis, Candida albicans, DNA - Swab, " "Vagina; Future  -     Gardnerella vaginalis, Trichomonas vaginalis, Candida albicans, DNA - Swab, Vagina    Other orders  -     Desvenlafaxine Succinate ER 25 MG tablet sustained-release 24 hour; Take 1 tablet by mouth Daily.  Dispense: 30 tablet; Refill: 1  -     metroNIDAZOLE (Flagyl) 500 MG tablet; Take 1 tablet by mouth 2 (Two) Times a Day.  Dispense: 14 tablet; Refill: 0           Encounter for well child exam pelvic exam completed  Screening for cervical cancer PAP collected  Anxiety and depression uncontrolled with wellbutrin will add pristiq with trying multiple medications will obtain genesight swab follow up in one month recommend counseling and exercising daily  Vaginal discharge vag screen collected with appearance on physical exam will start flagyl    Follow Up:   Return in about 1 month (around 1/15/2024).    Healthcare Maintenance:   Counseling provided on screening mammogram, screening colonoscopy, screening pap   Bianca Boles voices understanding and acceptance of this advice and will call back with any further questions or concerns. AVS with preventive healthcare tips printed for patient.     Patricia Hernandez, [facility]    \"Please note that portions of this note were completed with a voice recognition program.\"    "

## 2023-12-20 DIAGNOSIS — F41.9 ANXIETY: Primary | ICD-10-CM

## 2023-12-20 DIAGNOSIS — F33.0 MILD EPISODE OF RECURRENT MAJOR DEPRESSIVE DISORDER: ICD-10-CM

## 2023-12-20 LAB
CYTOLOGIST CVX/VAG CYTO: NORMAL
CYTOLOGY CVX/VAG DOC CYTO: NORMAL
CYTOLOGY CVX/VAG DOC THIN PREP: NORMAL
DX ICD CODE: NORMAL
HIV 1 & 2 AB SER-IMP: NORMAL
HPV I/H RISK 4 DNA CVX QL PROBE+SIG AMP: NEGATIVE
OTHER STN SPEC: NORMAL
STAT OF ADQ CVX/VAG CYTO-IMP: NORMAL

## 2023-12-20 RX ORDER — DESVENLAFAXINE SUCCINATE 50 MG/1
50 TABLET, EXTENDED RELEASE ORAL DAILY
COMMUNITY
End: 2023-12-20 | Stop reason: SDUPTHER

## 2023-12-20 RX ORDER — DESVENLAFAXINE SUCCINATE 50 MG/1
50 TABLET, EXTENDED RELEASE ORAL DAILY
Qty: 90 TABLET | Refills: 0 | Status: SHIPPED | OUTPATIENT
Start: 2023-12-20

## 2023-12-20 NOTE — TELEPHONE ENCOUNTER
Gave patient her [facility] results. Told her that Patricia wants to increase her Prisitiq to 50 mg daily. Not to stop her Bupropion at this time. Sending increased prescription of Pristiq to her pharmacy.

## 2024-01-10 ENCOUNTER — HOSPITAL ENCOUNTER (OUTPATIENT)
Dept: GENERAL RADIOLOGY | Facility: HOSPITAL | Age: 44
Discharge: HOME | End: 2024-01-10
Admitting: UROLOGY
Payer: COMMERCIAL

## 2024-01-10 ENCOUNTER — OFFICE VISIT (OUTPATIENT)
Dept: UROLOGY | Facility: CLINIC | Age: 44
End: 2024-01-10
Payer: COMMERCIAL

## 2024-01-10 VITALS
DIASTOLIC BLOOD PRESSURE: 65 MMHG | BODY MASS INDEX: 32.96 KG/M2 | SYSTOLIC BLOOD PRESSURE: 110 MMHG | HEIGHT: 67 IN | WEIGHT: 210 LBS

## 2024-01-10 DIAGNOSIS — N20.0 LEFT RENAL STONE: ICD-10-CM

## 2024-01-10 DIAGNOSIS — N20.0 LEFT RENAL STONE: Primary | ICD-10-CM

## 2024-01-10 DIAGNOSIS — C64.1 RENAL CELL CARCINOMA OF RIGHT KIDNEY: ICD-10-CM

## 2024-01-10 LAB
BILIRUB BLD-MCNC: NEGATIVE MG/DL
CLARITY, POC: CLEAR
COLOR UR: YELLOW
EXPIRATION DATE: NORMAL
GLUCOSE UR STRIP-MCNC: NEGATIVE MG/DL
KETONES UR QL: NEGATIVE
LEUKOCYTE EST, POC: NEGATIVE
Lab: NORMAL
NITRITE UR-MCNC: NEGATIVE MG/ML
PH UR: 6.5 [PH] (ref 5–8)
PROT UR STRIP-MCNC: NEGATIVE MG/DL
RBC # UR STRIP: NEGATIVE /UL
SP GR UR: 1.02 (ref 1–1.03)
UROBILINOGEN UR QL: NORMAL

## 2024-01-10 PROCEDURE — 74018 RADEX ABDOMEN 1 VIEW: CPT

## 2024-01-10 NOTE — PROGRESS NOTES
Chief Complaint  No chief complaint on file.    Subjective          Bianca Boles presents to [facility] UROLOGY  History of Present Illness  Patient has no complaints today.  She is here to review her follow-up CT scan and KUB.  No obvious stones were seen on KUB although there was a small stone on her CT scan.  No evidence of recurrence of her renal cell carcinoma.      Objective   Vital Signs:   There were no vitals taken for this visit.      Physical Exam  Vitals and nursing note reviewed.   Constitutional:       Appearance: Normal appearance. She is well-developed.   Pulmonary:      Effort: Pulmonary effort is normal.      Breath sounds: Normal air entry.   Neurological:      Mental Status: She is alert and oriented to person, place, and time.      Motor: Motor function is intact.   Psychiatric:         Mood and Affect: Mood normal.         Behavior: Behavior normal.          Result Review :   The following data was reviewed by: Lori Troy MD on 01/10/2024:    Results for orders placed or performed in visit on 12/15/23   Gardnerella vaginalis, Trichomonas vaginalis, Candida albicans, DNA - Swab, Vagina    Specimen: Vagina; Swab   Result Value Ref Range    GARDNERELLA VAGINALIS Negative Negative    TRICHOMONAS VAGINALIS Negative Negative    GRETA SPECIES Positive (A) Negative   IgP, Aptima HPV    Specimen: ThinPrep Vial   Result Value Ref Range    Diagnosis Comment     Specimen adequacy: Comment     Clinician Provided ICD-10: Comment     Performed by: Comment     . .     Note: Comment     Method: Comment     HPV Aptima Negative Negative                Study Result    Narrative & Impression   PROCEDURE:  CT ABDOMEN PELVIS W CONTRAST     COMPARISONS:           7/17/2023; 11/12/2022.     INDICATIONS:  abd. pain, left-sided; h/o right renal cell ca     TECHNIQUE:    After obtaining the patient's consent, 923 CT images were created with non-ionic   intravenous contrast material.  No  oral contrast agent was administered for the study.     PROTOCOL:     Standard CT imaging protocol performed.                 RADIATION:      Total DLP: 1,335 mGy*cm.               Automated exposure control was utilized to minimize radiation dose.   CONTRAST:      100 mL Isovue 370 I.V.     FINDINGS:          No acute findings are seen.  There is no convincing CT evidence for residual recurrent tumor.  No   enlarged lymph nodes are seen.  There is incidental nonobstructing left nephrolithiasis.  No   ureterolithiasis.  No hydronephrosis.  There is an incidental 3.4 cm right adnexal cyst.  It   probably represents a normal functional right ovarian cyst.  There are severe degenerative changes   of the imaged spine.  The patient has undergone sleeve gastrectomy, partial right nephrectomy, and   appendectomy.  There is a stable noncalcified non-cavitating 1.5 cm subpleural anterolateral,   inferior right middle lobe pulmonary nodule, as seen on image 29 of series 204 and adjacent images.    No significant interval change since prior abdominal/pelvic CT studies.     IMPRESSION:                 No acute findings are appreciated.            Please note that portions of this note were completed with a voice recognition program.     MARIE SAUNDERS JR, MD         Electronically Signed and Approved By: MARIE SAUNDERS JR, MD on 11/29/2023 at 0:19               Assessment and Plan    Diagnoses and all orders for this visit:    1. Left renal stone (Primary)  -     CT Abdomen Pelvis With & Without Contrast; Future    2. Renal cell carcinoma of right kidney  -     CT Abdomen Pelvis With & Without Contrast; Future    A follow-up CT scan reveals no evidence of recurrence of her tumor.  She will follow-up in 1 year with a scan prior.  Call sooner if there are any other issues.  She does have a few tiny stones.        Follow Up       No follow-ups on file.  Patient was given instructions and counseling regarding her condition or for  health maintenance advice. Please see specific information pulled into the AVS if appropriate.

## 2024-01-18 ENCOUNTER — CLINICAL SUPPORT (OUTPATIENT)
Dept: FAMILY MEDICINE CLINIC | Facility: CLINIC | Age: 44
End: 2024-01-18
Payer: COMMERCIAL

## 2024-01-18 ENCOUNTER — OFFICE VISIT (OUTPATIENT)
Dept: BARIATRICS/WEIGHT MGMT | Facility: CLINIC | Age: 44
End: 2024-01-18
Payer: COMMERCIAL

## 2024-01-18 VITALS
HEIGHT: 67 IN | SYSTOLIC BLOOD PRESSURE: 113 MMHG | TEMPERATURE: 97.8 F | DIASTOLIC BLOOD PRESSURE: 71 MMHG | HEART RATE: 62 BPM | WEIGHT: 212 LBS | BODY MASS INDEX: 33.27 KG/M2

## 2024-01-18 DIAGNOSIS — R12 HEARTBURN: ICD-10-CM

## 2024-01-18 DIAGNOSIS — E11.9 TYPE 2 DIABETES MELLITUS WITHOUT COMPLICATION, WITHOUT LONG-TERM CURRENT USE OF INSULIN: ICD-10-CM

## 2024-01-18 DIAGNOSIS — F33.0 MILD EPISODE OF RECURRENT MAJOR DEPRESSIVE DISORDER: ICD-10-CM

## 2024-01-18 DIAGNOSIS — R53.82 CHRONIC FATIGUE: ICD-10-CM

## 2024-01-18 DIAGNOSIS — E66.9 OBESITY, CLASS I, BMI 30-34.9: Primary | ICD-10-CM

## 2024-01-18 DIAGNOSIS — Z30.42 ENCOUNTER FOR SURVEILLANCE OF INJECTABLE CONTRACEPTIVE: Primary | ICD-10-CM

## 2024-01-18 DIAGNOSIS — I10 ESSENTIAL HYPERTENSION: ICD-10-CM

## 2024-01-18 DIAGNOSIS — K21.9 GASTROESOPHAGEAL REFLUX DISEASE WITHOUT ESOPHAGITIS: ICD-10-CM

## 2024-01-18 LAB
B-HCG UR QL: NEGATIVE
EXPIRATION DATE: NORMAL
INTERNAL NEGATIVE CONTROL: NORMAL
INTERNAL POSITIVE CONTROL: NORMAL
Lab: NORMAL

## 2024-01-18 RX ORDER — MEDROXYPROGESTERONE ACETATE 150 MG/ML
150 INJECTION, SUSPENSION INTRAMUSCULAR
Status: SHIPPED | OUTPATIENT
Start: 2024-01-18

## 2024-01-18 RX ADMIN — MEDROXYPROGESTERONE ACETATE 150 MG: 150 INJECTION, SUSPENSION INTRAMUSCULAR at 14:00

## 2024-02-02 ENCOUNTER — OFFICE VISIT (OUTPATIENT)
Dept: FAMILY MEDICINE CLINIC | Facility: CLINIC | Age: 44
End: 2024-02-02
Payer: COMMERCIAL

## 2024-02-02 VITALS
HEIGHT: 67 IN | TEMPERATURE: 97.8 F | HEART RATE: 68 BPM | DIASTOLIC BLOOD PRESSURE: 63 MMHG | SYSTOLIC BLOOD PRESSURE: 108 MMHG | BODY MASS INDEX: 34.06 KG/M2 | OXYGEN SATURATION: 99 % | WEIGHT: 217 LBS

## 2024-02-02 DIAGNOSIS — E78.2 MIXED HYPERLIPIDEMIA: ICD-10-CM

## 2024-02-02 DIAGNOSIS — E11.9 TYPE 2 DIABETES MELLITUS WITHOUT COMPLICATION, WITHOUT LONG-TERM CURRENT USE OF INSULIN: Primary | ICD-10-CM

## 2024-02-02 DIAGNOSIS — F33.0 MILD EPISODE OF RECURRENT MAJOR DEPRESSIVE DISORDER: ICD-10-CM

## 2024-02-02 DIAGNOSIS — R68.82 LOW LIBIDO: ICD-10-CM

## 2024-02-02 DIAGNOSIS — F51.01 PRIMARY INSOMNIA: ICD-10-CM

## 2024-02-02 DIAGNOSIS — F41.9 ANXIETY: ICD-10-CM

## 2024-02-02 DIAGNOSIS — R00.2 PALPITATION: ICD-10-CM

## 2024-02-02 DIAGNOSIS — I10 ESSENTIAL HYPERTENSION: ICD-10-CM

## 2024-02-02 LAB
FSH SERPL-ACNC: 3.35 MIU/ML
LH SERPL-ACNC: 2.9 MIU/ML
PROGEST SERPL-MCNC: 0.24 NG/ML
PROLACTIN SERPL-MCNC: 15.1 NG/ML (ref 4.79–23.3)

## 2024-02-02 PROCEDURE — 84402 ASSAY OF FREE TESTOSTERONE: CPT | Performed by: NURSE PRACTITIONER

## 2024-02-02 PROCEDURE — 84144 ASSAY OF PROGESTERONE: CPT | Performed by: NURSE PRACTITIONER

## 2024-02-02 PROCEDURE — 84146 ASSAY OF PROLACTIN: CPT | Performed by: NURSE PRACTITIONER

## 2024-02-02 PROCEDURE — 82672 ASSAY OF ESTROGEN: CPT | Performed by: NURSE PRACTITIONER

## 2024-02-02 PROCEDURE — 83002 ASSAY OF GONADOTROPIN (LH): CPT | Performed by: NURSE PRACTITIONER

## 2024-02-02 PROCEDURE — 84403 ASSAY OF TOTAL TESTOSTERONE: CPT | Performed by: NURSE PRACTITIONER

## 2024-02-02 PROCEDURE — 83001 ASSAY OF GONADOTROPIN (FSH): CPT | Performed by: NURSE PRACTITIONER

## 2024-02-02 RX ORDER — ACYCLOVIR 400 MG/1
1 TABLET ORAL
Qty: 1 EACH | Refills: 2 | Status: SHIPPED | OUTPATIENT
Start: 2024-02-02

## 2024-02-02 RX ORDER — ACYCLOVIR 400 MG/1
1 TABLET ORAL
COMMUNITY
End: 2024-02-02 | Stop reason: SDUPTHER

## 2024-02-02 RX ORDER — BUSPIRONE HYDROCHLORIDE 5 MG/1
5 TABLET ORAL 3 TIMES DAILY
Qty: 90 TABLET | Refills: 5 | Status: SHIPPED | OUTPATIENT
Start: 2024-02-02

## 2024-02-02 NOTE — PROGRESS NOTES
Follow Up Office Visit      Patient Name: Bianca Boles  : 1980   MRN: [id number]     Chief Complaint:    Chief Complaint   Patient presents with    Hypertension    Hyperlipidemia    Depression    Heartburn    Anxiety    Insomnia    Diabetes       History of Present Illness: Bianca Boles is a 43 y.o. female who is here today to follow up for HTN, hyperlipidemia, depression, GERD, anxiety, insomnia, DM2.    A1C-2023  150's after meals, feels ozempic is not controlling her glucose  Eye exam- 2023 [facility]  foot exam- checks at home.  Mammogram-2023  Pap-2023    C/o She says she is doing pretty good on the 50 mg Pristiq. She says the depression and sadness is gone, but she still has some anxiety at work with new promotion       She wants to see about getting a Dexcom 7      She also wants to have her hormone levels checked, she thinks she is erika-menopause and thinks maybe that is some of her issues, low libido  LMP 2023 depo provera at this time     Also follow up bariatric surgery 24 per progress note  Plan:   We did discuss using mounjaro but as we don't regularly prescribe this medication for diabetes she was advised to discuss this with her PCP.   Encouraged patient to be sure to get plenty of lean protein per day through small frequent meals all with a protein source.   Activity restrictions: none.   Recommended patient be sure to get at least 70 grams of protein per day by eating small, frequent meals all with high lean protein choices. Be sure to limit/cut back on daily carbohydrate intake. Discussed with the patient the recommended amount of water per day to intake- half of body weight in ounces. Reviewed vitamin requirements. Be sure to do routine exercise, 150 minutes per week minimum, including both cardio and strength training.      Instructions / Recommendations: dietary counseling recommended, recommended a daily protein intake of  grams,  vitamin supplement(s) recommended, recommended exercising at least 150 minutes per week, behavior modifications recommended and instructed to call the office for concerns, questions, or problems.          Subjective      Review of Systems:   Review of Systems   Constitutional:  Negative for fever.   HENT:  Negative for ear pain and sore throat.    Respiratory:  Negative for cough.    Cardiovascular:  Negative for chest pain.   Gastrointestinal:  Negative for abdominal pain, diarrhea and vomiting.   Genitourinary:  Negative for dysuria.   Musculoskeletal:  Negative for myalgias.   Psychiatric/Behavioral:  The patient is nervous/anxious.         Past Medical History:   Past Medical History:   Diagnosis Date    Acid reflux     Anxiety     Anxiety and depression     Benign essential hypertension     Cancer     renal cancer/mass, PARTIAL NEPH, RIGHT    Diabetes     Diabetes mellitus     type ii, doesn't check bg at home     Diabetes mellitus, type 2     Frozen shoulder 2023    GERD (gastroesophageal reflux disease)     High triglycerides     History of bariatric surgery 2021    GASTRIC SLEEVE    History of kidney stones     HTN (hypertension)     Hyperlipemia     Hyperlipidemia     Hypertriglyceridemia     Lumbar herniated disc     Obesity     Panic attacks     PCOS (polycystic ovarian syndrome)     Periarthritis of shoulder     Psoriasis     ELBOWS    Rotator cuff syndrome     Seasonal allergies     Spinal headache     AFTER PAIN EPIDURALS.  NO BLOOD PATCH       Past Surgical History:   Past Surgical History:   Procedure Laterality Date    ADENOIDECTOMY  1984     SECTION  ,    CYSTOSCOPY BLADDER STONE LITHOTRIPSY      EAR TUBES  1984    ENDOSCOPY N/A 10/20/2020    Procedure: ESOPHAGOGASTRODUODENOSCOPY WITH BIOPSY;  Surgeon: Fidel Grajeda Jr., MD;  Location: [facility];  Service: General;  Laterality: N/A;  PRE- GERD  POST- RETAINED FOOD, GASTRITIS, GASTRIC POLYPS     ENDOSCOPY  2014    FOOT FRACTURE SURGERY Left 2017    screw placed    GASTRIC SLEEVE LAPAROSCOPIC N/A 12/07/2020    Procedure: GASTRIC SLEEVE LAPAROSCOPIC;  Surgeon: Fidel Grajeda Jr., MD;  Location: [facility] OR [facility];  Service: Bariatric;  Laterality: N/A;    NEPHRECTOMY PARTIAL Right 11/15/2021    Procedure: NEPHRECTOMY PARTIAL LAPAROSCOPIC WITH DAVINCI ROBOT;  Surgeon: Lori Troy MD;  Location: [facility] OR;  Service: Robotics - DaVinci;  Laterality: Right;    SHOULDER ARTHROSCOPY Left 8/14/2023    Procedure: LEFT SHOULDER MANIPULATION;  Surgeon: Domenic Bradley MD;  Location: [facility] OR [facility];  Service: Orthopedics;  Laterality: Left;    TONSILLECTOMY  1984    URETEROSCOPY LASER LITHOTRIPSY WITH STENT INSERTION Right 09/28/2021    Procedure: CYSTOSCOPY, RIGHT URETEROSCOPY, LASERTRIPSY, STONE BASKET EXTRACTION AND STENT INSERTION;  Surgeon: Lori Troy MD;  Location: [facility] OR;  Service: Urology;  Laterality: Right;    URETEROSCOPY LASER LITHOTRIPSY WITH STENT INSERTION Left 11/08/2022    Procedure: URETEROSCOPY LASER LITHOTRIPSY WITH URETERAL STENT INSERTION;  Surgeon: Lori Troy MD;  Location: [facility] MAIN OR;  Service: Urology;  Laterality: Left;       Family History:   Family History   Problem Relation Age of Onset    Diabetes Father     Hypertension Father     Heart disease Father     Cancer Father         Bladder prostate liver    Colon cancer Father         malignant, currently 62    Stroke Maternal Grandmother     Heart disease Maternal Grandmother     Diabetes Paternal Grandfather     Heart disease Paternal Grandfather     Cancer Mother         Breast    Malig Hyperthermia Neg Hx        Social History:   Social History     Socioeconomic History    Marital status:    Tobacco Use    Smoking status: Former     Packs/day: 0.25     Years: 35.00     Additional pack years: 0.00     Total pack years: 8.75     Types: Cigarettes     Quit date: 1/15/2020     Years since quitting:  4.0     Passive exposure: Never    Smokeless tobacco: Never    Tobacco comments:     A PACK A WEEK   Vaping Use    Vaping Use: Never used   Substance and Sexual Activity    Alcohol use: Not Currently     Comment: Very rarely    Drug use: Not Currently     Frequency: 0.1 times per week     Types: Marijuana     Comment: Very rarely in my youth    Sexual activity: Yes     Partners: Male     Birth control/protection: Inserts       Medications:     Current Outpatient Medications:     Apremilast (Otezla) 30 MG tablet, Take 30 mg by mouth 2 (Two) Times a Day., Disp: 60 tablet, Rfl: 5    Biotin 47850 MCG tablet, Take 1 tablet by mouth Every Night., Disp: , Rfl:     buPROPion XL (WELLBUTRIN XL) 300 MG 24 hr tablet, Take 1 tablet by mouth Daily., Disp: 90 tablet, Rfl: 1    CALCIUM-MAGNESIUM-ZINC PO, Take 3 tablets by mouth Daily., Disp: , Rfl:     cetirizine (zyrTEC) 10 MG tablet, Take 1 tablet by mouth Daily As Needed., Disp: , Rfl:     Cholecalciferol 25 MCG (1000 UT) capsule, Take 2 capsules by mouth Daily., Disp: 180 capsule, Rfl: 1    clobetasol (TEMOVATE) 0.05 % external solution, Apply 10-15 drops every day to affected areas in the scalp after shampooing until clear. (Patient taking differently: Apply  topically to the appropriate area as directed Daily As Needed.), Disp: 50 mL, Rfl: 3    COLLAGEN PO, Take 3 tablets by mouth Daily., Disp: , Rfl:     desvenlafaxine (PRISTIQ) 50 MG 24 hr tablet, Take 1 tablet by mouth Daily., Disp: 90 tablet, Rfl: 0    Diclofenac Sodium (VOLTAREN) 1 % gel gel, Apply 4 g topically to the appropriate area as directed 4 (Four) Times a Day As Needed (pain)., Disp: 200 g, Rfl: 1    fluticasone (FLONASE) 50 MCG/ACT nasal spray, USE 2 SPRAYS IN EACH NOSTRIL ONCE DAILY AS DIRECTED (Patient taking differently: 2 sprays into the nostril(s) as directed by provider At Night As Needed.), Disp: 48 g, Rfl: 1    IRON-VITAMIN C PO, Take 1 tablet by mouth Daily., Disp: , Rfl:     metoprolol succinate XL  "(Toprol XL) 25 MG 24 hr tablet, Take 1 tablet by mouth Every Night., Disp: 90 tablet, Rfl: 1    Multiple Vitamins-Minerals (MULTIVITAMIN PO), Take 1 tablet by mouth Every Night., Disp: , Rfl:     ondansetron ODT (ZOFRAN-ODT) 4 MG disintegrating tablet, Place 1 tablet on the tongue Every 8 (Eight) Hours As Needed for Nausea., Disp: 21 tablet, Rfl: 5    pravastatin (PRAVACHOL) 10 MG tablet, Take 1 tablet by mouth Every Night., Disp: 90 tablet, Rfl: 1    Probiotic Product (PROBIOTIC-10 PO), Take 1 tablet by mouth Every Night., Disp: , Rfl:     promethazine (PHENERGAN) 25 MG suppository, Insert 1 suppository into the rectum Every 6 (Six) Hours As Needed for Nausea., Disp: 12 suppository, Rfl: 1    topiramate (TOPAMAX) 50 MG tablet, Take 1 tablet by mouth every night at bedtime for 180 days., Disp: 90 tablet, Rfl: 1    traZODone (DESYREL) 100 MG tablet, Take 1 tablet by mouth Every Night., Disp: 90 tablet, Rfl: 1    busPIRone (BUSPAR) 5 MG tablet, Take 1 tablet by mouth 3 (Three) Times a Day., Disp: 90 tablet, Rfl: 5    Tirzepatide (MOUNJARO) 2.5 MG/0.5ML solution pen-injector pen, Inject 0.5 mL under the skin into the appropriate area as directed 1 (One) Time Per Week., Disp: 2 mL, Rfl: 0    Current Facility-Administered Medications:     MedroxyPROGESTERone Acetate (DEPO-PROVERA) injection 150 mg, 150 mg, Intramuscular, Q3 Months, Colasanti, Chrysalis Breann, APRN, 150 mg at 01/18/24 1400    Allergies:   Allergies   Allergen Reactions    Morphine Itching and Nausea And Vomiting               Objective     Physical Exam:  Vital Signs:   Vitals:    02/02/24 0910   BP: 108/63   Pulse: 68   Temp: 97.8 °F (36.6 °C)   SpO2: 99%   Weight: 98.4 kg (217 lb)   Height: 171.4 cm (67.48\")     Body mass index is 33.51 kg/m².           Physical Exam  HENT:      Right Ear: Tympanic membrane normal.      Left Ear: Tympanic membrane normal.      Nose: Nose normal.      Mouth/Throat:      Mouth: Mucous membranes are moist.   Eyes:      " Conjunctiva/sclera: Conjunctivae normal.   Neck:      Vascular: No carotid bruit.   Cardiovascular:      Rate and Rhythm: Normal rate and regular rhythm.      Heart sounds: Normal heart sounds. No murmur heard.  Pulmonary:      Effort: Pulmonary effort is normal.      Breath sounds: Normal breath sounds.   Abdominal:      General: Bowel sounds are normal.      Palpations: Abdomen is soft.   Musculoskeletal:      Right lower leg: No edema.      Left lower leg: No edema.   Skin:     General: Skin is warm and dry.   Neurological:      Mental Status: She is alert.   Psychiatric:         Mood and Affect: Mood normal.         Behavior: Behavior normal.             Assessment / Plan      Assessment/Plan:   Diagnoses and all orders for this visit:    1. Type 2 diabetes mellitus without complication, without long-term current use of insulin (Primary)  -     Comprehensive Metabolic Panel; Future  -     Microalbumin / Creatinine Urine Ratio - Urine, Clean Catch; Future  -     Hemoglobin A1c; Future  -     TSH; Future  -     Urinalysis With Culture If Indicated -; Future    2. Essential hypertension  -     CBC Auto Differential; Future    3. Mixed hyperlipidemia  -     Comprehensive Metabolic Panel; Future  -     Lipid Panel; Future    4. Palpitation    5. S/P laparoscopic sleeve gastrectomy    6. Primary insomnia    7. Anxiety    8. Mild episode of recurrent major depressive disorder    9. Low libido  -     Testosterone, Free, Total  -     Follicle Stimulating Hormone  -     Luteinizing Hormone  -     Prolactin  -     Estrogens, Total  -     Progesterone    Other orders  -     busPIRone (BUSPAR) 5 MG tablet; Take 1 tablet by mouth 3 (Three) Times a Day.  Dispense: 90 tablet; Refill: 5  -     Tirzepatide (MOUNJARO) 2.5 MG/0.5ML solution pen-injector pen; Inject 0.5 mL under the skin into the appropriate area as directed 1 (One) Time Per Week.  Dispense: 2 mL; Refill: 0       Diabetes mellitus type 2 blood sugars uncontrolled on  "Ozempic we will stop Ozempic start Mounjaro we will follow-up in 90 days obtaining hemoglobin A1c prior to follow-up  Hypertension currently controlled at this time with Toprol-XL 25 mg daily  Hyperlipidemia will obtain lipid panel and CMP to monitor current statin dose pravastatin 10 mg at nighttime  Palpitations currently controlled with Toprol-XL 25 mg daily  Insomnia currently controlled with trazodone  Anxiety depression improving anxiety still present at work when she feels overwhelmed will start BuSpar continue Pristiq 50 mg daily and Wellbutrin 300 mg daily will follow-up in 90 days if this does not help aid in anxiety control we will increase Pristiq to 100 mg daily  Low libido we will obtain labs per patient request call with results and further recommendations Pap smears up-to-date mammograms up-to-date        Follow Up:   Return in about 3 months (around 5/2/2024).    Kofi Hernandez, APRN    \"Please note that portions of this note were completed with a voice recognition program.\"    "

## 2024-02-08 LAB — ESTROGEN SERPL-MCNC: 234 PG/ML

## 2024-02-13 ENCOUNTER — PATIENT MESSAGE (OUTPATIENT)
Dept: FAMILY MEDICINE CLINIC | Facility: CLINIC | Age: 44
End: 2024-02-13
Payer: COMMERCIAL

## 2024-02-13 DIAGNOSIS — R68.82 LOW LIBIDO: ICD-10-CM

## 2024-02-13 DIAGNOSIS — F41.9 ANXIETY: Primary | ICD-10-CM

## 2024-02-13 DIAGNOSIS — R79.89 LOW TESTOSTERONE LEVEL IN FEMALE: ICD-10-CM

## 2024-02-13 DIAGNOSIS — F41.9 ANXIETY AND DEPRESSION: ICD-10-CM

## 2024-02-13 DIAGNOSIS — F32.A ANXIETY AND DEPRESSION: ICD-10-CM

## 2024-02-13 DIAGNOSIS — Z79.899 MEDICATION MANAGEMENT: Primary | ICD-10-CM

## 2024-02-13 LAB
TESTOST FREE SERPL-MCNC: <0.2 PG/ML (ref 0–4.2)
TESTOST SERPL-MCNC: <3 NG/DL (ref 4–50)

## 2024-02-13 RX ORDER — TESTOSTERONE MICRONIZED 100 %
POWDER (GRAM) MISCELLANEOUS
Qty: 28 G | Refills: 0 | Status: SHIPPED | OUTPATIENT
Start: 2024-02-13

## 2024-02-16 ENCOUNTER — PATIENT MESSAGE (OUTPATIENT)
Dept: FAMILY MEDICINE CLINIC | Facility: CLINIC | Age: 44
End: 2024-02-16

## 2024-02-16 ENCOUNTER — CLINICAL SUPPORT (OUTPATIENT)
Dept: FAMILY MEDICINE CLINIC | Facility: CLINIC | Age: 44
End: 2024-02-16
Payer: COMMERCIAL

## 2024-02-16 DIAGNOSIS — Z79.899 MEDICATION MANAGEMENT: ICD-10-CM

## 2024-02-16 LAB
AMPHET+METHAMPHET UR QL: NEGATIVE
BARBITURATES UR QL SCN: NEGATIVE
BENZODIAZ UR QL SCN: NEGATIVE
CANNABINOIDS SERPL QL: NEGATIVE
COCAINE UR QL: NEGATIVE
FENTANYL UR-MCNC: NEGATIVE NG/ML
METHADONE UR QL SCN: NEGATIVE
OPIATES UR QL: NEGATIVE
OXYCODONE UR QL SCN: NEGATIVE

## 2024-02-16 PROCEDURE — 80307 DRUG TEST PRSMV CHEM ANLYZR: CPT | Performed by: NURSE PRACTITIONER

## 2024-03-04 DIAGNOSIS — R68.82 LOW LIBIDO: ICD-10-CM

## 2024-03-04 DIAGNOSIS — R79.89 LOW TESTOSTERONE LEVEL IN FEMALE: ICD-10-CM

## 2024-03-04 RX ORDER — TESTOSTERONE MICRONIZED 100 %
POWDER (GRAM) MISCELLANEOUS
Qty: 28 G | Refills: 0 | Status: SHIPPED | OUTPATIENT
Start: 2024-03-04

## 2024-03-12 DIAGNOSIS — F33.0 MILD EPISODE OF RECURRENT MAJOR DEPRESSIVE DISORDER: ICD-10-CM

## 2024-03-12 DIAGNOSIS — F41.9 ANXIETY: ICD-10-CM

## 2024-03-12 RX ORDER — DESVENLAFAXINE SUCCINATE 50 MG/1
50 TABLET, EXTENDED RELEASE ORAL DAILY
Qty: 90 TABLET | Refills: 0 | Status: SHIPPED | OUTPATIENT
Start: 2024-03-12

## 2024-03-26 NOTE — PROGRESS NOTES
"[facility]  Initial Evaluation    Referring Provider:  Patricia Hernandez, APRN  [address] [place] [address]    Chief Complaint: \"I want to get into some counseling\"    History of Present Illness: Bianca Boles is a 43 y.o. female who presents today for initial evaluation regarding psychotherapy.  Bianca presents unaccompanied in no acute distress and engages with me appropriately. Psychotropic regimen with which patient presents is described as  bupropion   mg po daily, buspirone 5 mg po TID, trazodone 100 mg po q HS, desvenlafaxine 50 mg po q day.     History is positive for signs/symptoms suggestive of low mood, low energy, anhedonia, changes in sleep, changes in appetite, guilt, poor concentration, psychomotor changes, thoughts of being better off dead-endorses occasional passive suicidal thoughts, and daydreams about \"not being here.\" Psychiatric screening is negative for pathognomonic history of: TBI, kathryn, psychosis.     Ms. Boles has symptoms of history of significant trauma for which there are related intrusion symptoms related to the traumatic event (distressing memories, flashbacks, nightmares, intense distress associated with triggering stimuli, marked physiological reactions to triggering stimuli), persistent avoidance of triggering stimuli, negative alterations in cognition and mood (memory lapses, negative schemas, distorted cognitions about the event, social withdrawal, feelings of detachment/estrangement, persistent anhedonia), and marked alterations in arousal and reactivity (irritability, reckless behavior, hypervigilance, exaggerated startle, sleep disturbances). She also has a history a history of early exposure to enduring trauma associated with re-experiencing trauma, avoidance, hyperarousal (PTSD) and difficulty managing emotions, negative self-view, relationship difficulties, dissociative symptoms, and " demoralization (complex PTSD). Patient has family history of ADHD, and possible ASD, will plan to screen for both for informational gathering purposes and not necessarily diagnosing.   Ms. Boles also voices current body image disturbances related to her bariatric procedure and intense fear of gaining the 98 lbs back, patient admits to the irrational fear, but has put on 10 lbs, I recommended sharing these concerns with her PCP.    I have counseled the patient with regard to diagnoses and the recommended treatment regimen as documented below: I will assume prescriptive responsibility (if PCP desires) for CONTINUE bupropion   mg po daily (in am), CONTINUE buspirone 5 mg po TID (patient to take more regularly for greatest efficacy) CONTINUE trazodone  mg po q HS, CONTINUE desvenlafaxine 50 mg po q day . Patient acknowledges the diagnoses per my rendered interpretation. Patient demonstrates awareness/understanding of viable alternatives for treatment as well as potential risks, benefits, and side effects associated with this regimen and is amenable to proceed in this fashion.     Recommended lifestyle changes: 30 minutes of activity to increase HR 2-3 days weekly.    Psychiatric History:  Diagnoses: depression, anxiety  Outpatient history: 14-14 yo  Inpatient history: none  Medication trials: fluoxetine, Zoloft, Effexor, citalopram, bupropion, pristiq, hx of GENESIGHT  Other treatment modalities: Psychotherapy  Self harm: cutting  Suicide attempts: Yes, OD  Abuse or neglect: emotional    Substance Abuse History:   Types/methods/frequency: marijuana  Transtheoretical stage: Maintenence    Social History:  Residence: lives house, my , 2 daughters  Vocation: yes  Source of income: Employed  Last grade completed: high  Pertinent developmental history: ADHD, will screen at next visit  Pertinent legal history: No history   Hobbies/interests: DND, and video games, entertain  Congregation: Catholicism  "-\"Yazidi light\"  Exercise: walking, but scared to get obsessed  Dietary habits:  history of binge eating , type 2 DM  Sleep hygiene: wakes up at 3 am  Social habits: none  Sunlight: There are no concern for under-exposure.  Caffeine intake: no pertinent issues   Hydration habits: no pertinent issues    history: No, grew up in     Social History     Socioeconomic History    Marital status:    Tobacco Use    Smoking status: Former     Current packs/day: 0.00     Average packs/day: 0.3 packs/day for 35.0 years (8.8 ttl pk-yrs)     Types: Cigarettes     Start date: 1/15/1985     Quit date: 1/15/2020     Years since quittin.2     Passive exposure: Never    Smokeless tobacco: Never    Tobacco comments:     A PACK A WEEK   Vaping Use    Vaping status: Never Used   Substance and Sexual Activity    Alcohol use: Yes     Comment: OCCASIONAL/SOCIAL    Drug use: Not Currently     Frequency: 0.1 times per week     Types: Marijuana     Comment: OTC THC    Sexual activity: Yes     Partners: Male     Birth control/protection: Depo-provera     Access to Firearms: yes    Tobacco use counseling/intervention: N/A, patient does not use tobacco    PHQ-9 Depression Screening  PHQ-9 Total Score: 13    Little interest or pleasure in doing things? 1-->several days   Feeling down, depressed, or hopeless? 1-->several days   Trouble falling or staying asleep, or sleeping too much? 1-->several days   Feeling tired or having little energy? 1-->several days   Poor appetite or overeating? 3-->nearly every day   Feeling bad about yourself - or that you are a failure or have let yourself or your family down? 2-->more than half the days   Trouble concentrating on things, such as reading the newspaper or watching television? 2-->more than half the days   Moving or speaking so slowly that other people could have noticed? Or the opposite - being so fidgety or restless that you have been moving around a lot more than usual? " 1-->several days   Thoughts that you would be better off dead, or of hurting yourself in some way? 1-->several days   PHQ-9 Total Score 13     INDIRA-7  Feeling nervous, anxious or on edge: Nearly every day  Not being able to stop or control worrying: More than half the days  Worrying too much about different things: More than half the days  Trouble Relaxing: More than half the days  Being so restless that it is hard to sit still: Several days  Feeling afraid as if something awful might happen: Several days  Becoming easily annoyed or irritable: More than half the days  INDIRA 7 Total Score: 13  If you checked any problems, how difficult have these problems made it for you to do your work, take care of things at home, or get along with other people: Very difficult    Problem List:  Patient Active Problem List   Diagnosis    Chronic fatigue    Essential hypertension    Hyperlipidemia    Heartburn    Multiple joint pain    Depression    Gastroparesis    Gastroesophageal reflux disease    Gastric polyps    S/P laparoscopic sleeve gastrectomy    Obesity, Class I, BMI 30-34.9    Anxiety    Urinary tract infection with hematuria    Leukocytosis    Insomnia    Left ureteral stone    Left renal stone    Hematuria    Acute cystitis with hematuria    Right renal mass    Seasonal allergies    Urinary urgency    Renal cell carcinoma of right kidney    Psoriasis    Palpitation    Post-COVID syndrome    Nausea    Type 2 diabetes mellitus without complication, without long-term current use of insulin    Left shoulder pain    Frozen shoulder    Impingement syndrome of left shoulder    Dysuria    Glucose found in urine on examination     Allergy:   Allergies   Allergen Reactions    Morphine Itching and Nausea And Vomiting        Discontinued Medications:  Medications Discontinued During This Encounter   Medication Reason    Apremilast (Otezla) 30 MG tablet *Therapy completed    Continuous Blood Gluc Sensor (Dexcom G7 Sensor) misc *Therapy  completed       Current Medications:   Current Outpatient Medications   Medication Sig Dispense Refill    Apremilast (Otezla) 30 MG tablet Take 30 mg by mouth 2 (Two) Times a Day. 60 tablet 5    Biotin 22325 MCG tablet Take 1 tablet by mouth Every Night.      buPROPion XL (WELLBUTRIN XL) 300 MG 24 hr tablet Take 1 tablet by mouth Daily. 90 tablet 1    busPIRone (BUSPAR) 5 MG tablet Take 1 tablet by mouth 3 (Three) Times a Day. 90 tablet 5    CALCIUM-MAGNESIUM-ZINC PO Take 3 tablets by mouth Daily.      cetirizine (zyrTEC) 10 MG tablet Take 1 tablet by mouth Daily As Needed.      Cholecalciferol 25 MCG (1000 UT) capsule Take 2 capsules by mouth Daily. 180 capsule 1    clobetasol (TEMOVATE) 0.05 % external solution Apply 10-15 drops every day to affected areas in the scalp after shampooing until clear. (Patient taking differently: Apply  topically to the appropriate area as directed Daily As Needed.) 50 mL 3    COLLAGEN PO Take 3 tablets by mouth Daily.      desvenlafaxine (PRISTIQ) 50 MG 24 hr tablet Take 1 tablet by mouth Daily. 90 tablet 0    Diclofenac Sodium (VOLTAREN) 1 % gel gel Apply 4 g topically to the appropriate area as directed 4 (Four) Times a Day As Needed (pain). 200 g 1    fluticasone (FLONASE) 50 MCG/ACT nasal spray USE 2 SPRAYS IN EACH NOSTRIL ONCE DAILY AS DIRECTED (Patient taking differently: 2 sprays into the nostril(s) as directed by provider At Night As Needed.) 48 g 1    IRON-VITAMIN C PO Take 1 tablet by mouth Daily.      metoprolol succinate XL (Toprol XL) 25 MG 24 hr tablet Take 1 tablet by mouth Every Night. 90 tablet 1    Multiple Vitamins-Minerals (MULTIVITAMIN PO) Take 1 tablet by mouth Every Night.      ondansetron ODT (ZOFRAN-ODT) 4 MG disintegrating tablet Place 1 tablet on the tongue Every 8 (Eight) Hours As Needed for Nausea. 21 tablet 5    pravastatin (PRAVACHOL) 10 MG tablet Take 1 tablet by mouth Every Night. 90 tablet 1    Probiotic Product (PROBIOTIC-10 PO) Take 1 tablet by  mouth Every Night.      promethazine (PHENERGAN) 25 MG suppository Insert 1 suppository into the rectum Every 6 (Six) Hours As Needed for Nausea. 12 suppository 1    testosterone micronized powder Testosterone 1% cream transdermal SIG appyl 0.5 mL (2 clicks) to skin daily 28 g 0    Tirzepatide (MOUNJARO) 10 MG/0.5ML solution pen-injector pen Inject 0.5 mL under the skin into the appropriate area as directed 1 (One) Time Per Week. 2 mL 5    topiramate (TOPAMAX) 50 MG tablet Take 1 tablet by mouth every night at bedtime for 180 days. 90 tablet 1    traZODone (DESYREL) 100 MG tablet Take 1 tablet by mouth Every Night. 90 tablet 1     Current Facility-Administered Medications   Medication Dose Route Frequency Provider Last Rate Last Admin    MedroxyPROGESTERone Acetate (DEPO-PROVERA) injection 150 mg  150 mg Intramuscular Q3 Months Colapolinari, Patricia Breann, APRN   150 mg at 01/18/24 1400     Past Medical History:  Past Medical History:   Diagnosis Date    Acid reflux     Anxiety     Anxiety and depression     Benign essential hypertension     Cancer     renal cancer/mass, PARTIAL NEPH, RIGHT    Diabetes     Diabetes mellitus     type ii, doesn't check bg at home     Diabetes mellitus, type 2     Frozen shoulder 08/02/2023    GERD (gastroesophageal reflux disease)     High triglycerides     History of bariatric surgery 02/04/2021    GASTRIC SLEEVE    History of kidney stones     HTN (hypertension)     Hyperlipemia     Hyperlipidemia     Hypertriglyceridemia     Lumbar herniated disc     Obesity     Panic attacks     PCOS (polycystic ovarian syndrome)     Periarthritis of shoulder 01/23    Psoriasis     ELBOWS    Rotator cuff syndrome 01/23    Seasonal allergies     Self-injurious behavior 1994    Spinal headache     AFTER PAIN EPIDURALS.  NO BLOOD PATCH    Suicide attempt 1995     Past Surgical History:  Past Surgical History:   Procedure Laterality Date    ABDOMINAL SURGERY  12/2020    Gastric sleeve    ADENOIDECTOMY   1984     SECTION  ,    CYSTOSCOPY BLADDER STONE LITHOTRIPSY      EAR TUBES  1984    ENDOSCOPY N/A 10/20/2020    Procedure: ESOPHAGOGASTRODUODENOSCOPY WITH BIOPSY;  Surgeon: Fidel Grajeda Jr., MD;  Location: [facility] ENDOSCOPY;  Service: General;  Laterality: N/A;  PRE- GERD  POST- RETAINED FOOD, GASTRITIS, GASTRIC POLYPS    ENDOSCOPY      FOOT FRACTURE SURGERY Left 2017    screw placed    GASTRECTOMY  2020    GASTRIC SLEEVE LAPAROSCOPIC N/A 2020    Procedure: GASTRIC SLEEVE LAPAROSCOPIC;  Surgeon: Fidel Grajeda Jr., MD;  Location: [facility] OR [facility];  Service: Bariatric;  Laterality: N/A;    NEPHRECTOMY PARTIAL Right 11/15/2021    Procedure: NEPHRECTOMY PARTIAL LAPAROSCOPIC WITH DAVINCI ROBOT;  Surgeon: Lori Troy MD;  Location: [facility] OR;  Service: Robotics - DaVinci;  Laterality: Right;    SHOULDER ARTHROSCOPY Left 2023    Procedure: LEFT SHOULDER MANIPULATION;  Surgeon: Domenic Bradley MD;  Location: [facility] OR [facility];  Service: Orthopedics;  Laterality: Left;    TONSILLECTOMY  1984    URETEROSCOPY LASER LITHOTRIPSY WITH STENT INSERTION Right 2021    Procedure: CYSTOSCOPY, RIGHT URETEROSCOPY, LASERTRIPSY, STONE BASKET EXTRACTION AND STENT INSERTION;  Surgeon: Lori Troy MD;  Location: [facility] OR;  Service: Urology;  Laterality: Right;    URETEROSCOPY LASER LITHOTRIPSY WITH STENT INSERTION Left 2022    Procedure: URETEROSCOPY LASER LITHOTRIPSY WITH URETERAL STENT INSERTION;  Surgeon: Lori Troy MD;  Location: [facility] OR;  Service: Urology;  Laterality: Left;     Family History:   Family History   Problem Relation Age of Onset    Cancer Mother         Breast    Diabetes Father     Hypertension Father     Heart disease Father     Cancer Father         Bladder prostate liver    Colon cancer Father         malignant, currently 62    No Known Problems Sister     No Known Problems Brother     No Known Problems Maternal Aunt     No  "Known Problems Paternal Aunt     No Known Problems Maternal Uncle     No Known Problems Paternal Uncle     No Known Problems Maternal Grandfather     Stroke Maternal Grandmother     Heart disease Maternal Grandmother     Diabetes Paternal Grandfather     Heart disease Paternal Grandfather     No Known Problems Paternal Grandmother     No Known Problems Cousin     Malig Hyperthermia Neg Hx     ADD / ADHD Neg Hx     Alcohol abuse Neg Hx     Anxiety disorder Neg Hx     Bipolar disorder Neg Hx     Dementia Neg Hx     Depression Neg Hx     Drug abuse Neg Hx     OCD Neg Hx     Paranoid behavior Neg Hx     Schizophrenia Neg Hx     Seizures Neg Hx     Self-Injurious Behavior  Neg Hx     Suicide Attempts Neg Hx        Mental Status Exam:   Observations:  Appearance: Neat  Speech: Normal  Eye Contact: Normal  Motor Activity: Normal  Affect:Full  Comments:  Mood:Mood: Euthymic  Cognition  Orientation Impairment: None  Memory Impairment: None  Attention: Normal  Comments:  Perception  Hallucinations:None  Other:   Comments:  Thoughts:  Suicidality:None  Homicidality:None  Delusions:  None  Comments:  Behavior:Behavior: Cooperative  Insight: Insight: Good  Judgement:Insight: Good    Vital Signs:   /61   Pulse 73   Ht 170.2 cm (67\")   Wt 102 kg (224 lb 12.8 oz)   BMI 35.21 kg/m²    Lab Results:   Clinical Support on 02/16/2024   Component Date Value Ref Range Status    Amphet/Methamphet, Screen 02/16/2024 Negative  Negative Final    Barbiturates Screen, Urine 02/16/2024 Negative  Negative Final    Benzodiazepine Screen, Urine 02/16/2024 Negative  Negative Final    Cocaine Screen, Urine 02/16/2024 Negative  Negative Final    Opiate Screen 02/16/2024 Negative  Negative Final    THC, Screen, Urine 02/16/2024 Negative  Negative Final    Methadone Screen, Urine 02/16/2024 Negative  Negative Final    Oxycodone Screen, Urine 02/16/2024 Negative  Negative Final    Fentanyl, Urine 02/16/2024 Negative  Negative Final   Office " Visit on 02/02/2024   Component Date Value Ref Range Status    Testosterone, Total 02/02/2024 <3 (L)  4 - 50 ng/dL Final    Testosterone, Free 02/02/2024 <0.2  0.0 - 4.2 pg/mL Final    FSH 02/02/2024 3.35  mIU/mL Final    LH 02/02/2024 2.90  mIU/mL Final    Prolactin 02/02/2024 15.10  4.79 - 23.30 ng/mL Final    Estrogen 02/02/2024 234  pg/mL Final                             Prepubertal             < 40                           Female Cycle:                             1-10 Days         16 - 328                             11-20 Days        34 - 501                             21-30 Days        48 - 350                             Post-Menopausal   40 - 244    Progesterone 02/02/2024 0.24  ng/mL Final   Clinical Support on 01/18/2024   Component Date Value Ref Range Status    HCG, Urine, QL 01/18/2024 Negative  Negative Final    Lot Number 01/18/2024 [id number]   Final    Internal Positive Control 01/18/2024 Passed  Positive, Passed Final    Internal Negative Control 01/18/2024 Passed  Negative, Passed Final    Expiration Date 01/18/2024 01/03/2025   Final   Office Visit on 01/10/2024   Component Date Value Ref Range Status    Color 01/10/2024 Yellow  Yellow, Straw, Dark Yellow, Sheri Final    Clarity, UA 01/10/2024 Clear  Clear Final    Specific Gravity  01/10/2024 1.025  1.005 - 1.030 Final    pH, Urine 01/10/2024 6.5  5.0 - 8.0 Final    Leukocytes 01/10/2024 Negative  Negative Final    Nitrite, UA 01/10/2024 Negative  Negative Final    Protein, POC 01/10/2024 Negative  Negative mg/dL Final    Glucose, UA 01/10/2024 Negative  Negative mg/dL Final    Ketones, UA 01/10/2024 Negative  Negative Final    Urobilinogen, UA 01/10/2024 0.2 E.U./dL  Normal, 0.2 E.U./dL Final    Bilirubin 01/10/2024 Negative  Negative Final    Blood, UA 01/10/2024 Negative  Negative Final    Lot Number 01/10/2024 [id number]   Final    Expiration Date 01/10/2024 12/31/24   Final   Office Visit on 12/15/2023   Component Date Value Ref Range Status     Diagnosis 12/15/2023 Comment   Final    NEGATIVE FOR INTRAEPITHELIAL LESION OR MALIGNANCY.  SPECIMEN REPROCESSED FOR INTERPRETATION USING GLACIAL ACETIC ACID  (GAA).    Specimen adequacy: 12/15/2023 Comment   Final    Satisfactory for evaluation.  Endocervical and/or squamous metaplastic  cells (endocervical component) are present.  Areas of partially obscuring blood are present.    Clinician Provided ICD-10: 12/15/2023 Comment   Final    Z12.4    Performed by: 12/15/2023 Comment   Final    Shannon Guevara, Cytotechnologist (ASCP)    . 12/15/2023 .   Final    Note: 12/15/2023 Comment   Final    The Pap smear is a screening test designed to aid in the detection of  premalignant and malignant conditions of the uterine cervix.  It is not a  diagnostic procedure and should not be used as the sole means of detecting  cervical cancer.  Both false-positive and false-negative reports do occur.    Method: 12/15/2023 Comment   Final    This liquid based ThinPrep(R) pap test was screened with the  use of an image guided system.    HPV Aptima 12/15/2023 Negative  Negative Final    This nucleic acid amplification test detects fourteen high-risk  HPV types (16,18,31,33,35,39,45,51,52,56,58,59,66,68) without  differentiation.    GARDNERELLA VAGINALIS 12/15/2023 Negative  Negative Final    TRICHOMONAS VAGINALIS 12/15/2023 Negative  Negative Final    GRETA SPECIES 12/15/2023 Positive (A)  Negative Final   Admission on 11/28/2023, Discharged on 11/29/2023   Component Date Value Ref Range Status    Glucose 11/28/2023 77  65 - 99 mg/dL Final    BUN 11/28/2023 12  6 - 20 mg/dL Final    Creatinine 11/28/2023 1.17 (H)  0.57 - 1.00 mg/dL Final    Sodium 11/28/2023 138  136 - 145 mmol/L Final    Potassium 11/28/2023 3.7  3.5 - 5.2 mmol/L Final    Chloride 11/28/2023 104  98 - 107 mmol/L Final    CO2 11/28/2023 22.1  22.0 - 29.0 mmol/L Final    Calcium 11/28/2023 9.3  8.6 - 10.5 mg/dL Final    Total Protein 11/28/2023 6.7  6.0 - 8.5  g/dL Final    Albumin 11/28/2023 4.1  3.5 - 5.2 g/dL Final    ALT (SGPT) 11/28/2023 23  1 - 33 U/L Final    AST (SGOT) 11/28/2023 23  1 - 32 U/L Final    Alkaline Phosphatase 11/28/2023 85  39 - 117 U/L Final    Total Bilirubin 11/28/2023 0.3  0.0 - 1.2 mg/dL Final    Globulin 11/28/2023 2.6  gm/dL Final    A/G Ratio 11/28/2023 1.6  g/dL Final    BUN/Creatinine Ratio 11/28/2023 10.3  7.0 - 25.0 Final    Anion Gap 11/28/2023 11.9  5.0 - 15.0 mmol/L Final    eGFR 11/28/2023 59.1 (L)  >60.0 mL/min/1.73 Final    Lipase 11/28/2023 70 (H)  13 - 60 U/L Final    Color, UA 11/28/2023 Dark Yellow (A)  Yellow, Straw Final    Appearance, UA 11/28/2023 Cloudy (A)  Clear Final    pH, UA 11/28/2023 5.5  5.0 - 8.0 Final    Specific Worden, UA 11/28/2023 1.023  1.005 - 1.030 Final    Glucose, UA 11/28/2023 Negative  Negative Final    Ketones, UA 11/28/2023 15 mg/dL (1+) (A)  Negative Final    Bilirubin, UA 11/28/2023 Negative  Negative Final    Blood, UA 11/28/2023 Negative  Negative Final    Protein, UA 11/28/2023 Negative  Negative Final    Leuk Esterase, UA 11/28/2023 Trace (A)  Negative Final    Nitrite, UA 11/28/2023 Negative  Negative Final    Urobilinogen, UA 11/28/2023 1.0 E.U./dL  0.2 - 1.0 E.U./dL Final    HCG Quantitative 11/28/2023 <0.50  mIU/mL Final    Extra Tube 11/28/2023 Hold for add-ons.   Final    Auto resulted.    Extra Tube 11/28/2023 hold for add-on   Final    Auto resulted    Extra Tube 11/28/2023 Hold for add-ons.   Final    Auto resulted.    Extra Tube 11/28/2023 Hold for add-ons.   Final    Auto resulted    WBC 11/28/2023 9.83  3.40 - 10.80 10*3/mm3 Final    RBC 11/28/2023 4.59  3.77 - 5.28 10*6/mm3 Final    Hemoglobin 11/28/2023 13.0  12.0 - 15.9 g/dL Final    Hematocrit 11/28/2023 40.0  34.0 - 46.6 % Final    MCV 11/28/2023 87.1  79.0 - 97.0 fL Final    MCH 11/28/2023 28.3  26.6 - 33.0 pg Final    MCHC 11/28/2023 32.5  31.5 - 35.7 g/dL Final    RDW 11/28/2023 13.5  12.3 - 15.4 % Final    RDW-SD  11/28/2023 42.8  37.0 - 54.0 fl Final    MPV 11/28/2023 9.7  6.0 - 12.0 fL Final    Platelets 11/28/2023 227  140 - 450 10*3/mm3 Final    Neutrophil % 11/28/2023 60.3  42.7 - 76.0 % Final    Lymphocyte % 11/28/2023 30.1  19.6 - 45.3 % Final    Monocyte % 11/28/2023 7.1  5.0 - 12.0 % Final    Eosinophil % 11/28/2023 1.1  0.3 - 6.2 % Final    Basophil % 11/28/2023 0.4  0.0 - 1.5 % Final    Immature Grans % 11/28/2023 1.0 (H)  0.0 - 0.5 % Final    Neutrophils, Absolute 11/28/2023 5.92  1.70 - 7.00 10*3/mm3 Final    Lymphocytes, Absolute 11/28/2023 2.96  0.70 - 3.10 10*3/mm3 Final    Monocytes, Absolute 11/28/2023 0.70  0.10 - 0.90 10*3/mm3 Final    Eosinophils, Absolute 11/28/2023 0.11  0.00 - 0.40 10*3/mm3 Final    Basophils, Absolute 11/28/2023 0.04  0.00 - 0.20 10*3/mm3 Final    Immature Grans, Absolute 11/28/2023 0.10 (H)  0.00 - 0.05 10*3/mm3 Final    nRBC 11/28/2023 0.0  0.0 - 0.2 /100 WBC Final    RBC, UA 11/28/2023 3-5 (A)  None Seen, 0-2 /HPF Final    WBC, UA 11/28/2023 0-2  None Seen, 0-2 /HPF Final    Bacteria, UA 11/28/2023 None Seen  None Seen /HPF Final    Squamous Epithelial Cells, UA 11/28/2023 0-2  None Seen, 0-2 /HPF Final    Hyaline Casts, UA 11/28/2023 None Seen  None Seen /LPF Final    Calcium Oxalate Crystals, UA 11/28/2023 Moderate/2+  None Seen /HPF Final    Methodology 11/28/2023 Manual Light Microscopy   Final   Admission on 11/28/2023, Discharged on 11/28/2023   Component Date Value Ref Range Status    Color 11/28/2023 Dark Yellow   Final    Clarity, UA 11/28/2023 Clear   Final    Glucose, UA 11/28/2023 Negative  mg/dL Final    Bilirubin 11/28/2023 Negative   Final    Ketones, UA 11/28/2023 Trace (A)   Final    Specific Gravity  11/28/2023 1.025  1.005 - 1.030 Final    Blood, UA 11/28/2023 Negative   Final    pH, Urine 11/28/2023 6.0  5.0 - 8.0 Final    Protein, POC 11/28/2023 Negative  mg/dL Final    Urobilinogen, UA 11/28/2023 1 E.U./dL (A)   Final    Nitrite, UA 11/28/2023 Negative   Final     Leukocytes 11/28/2023 Negative   Final    HCG, Urine, QL 11/28/2023 Negative   Final    Lot Number 11/28/2023 #[id number]   Final    Internal Positive Control 11/28/2023 Passed   Final    Internal Negative Control 11/28/2023 Passed   Final    Expiration Date 11/28/2023 10,325   Final   Office Visit on 11/03/2023   Component Date Value Ref Range Status    WBC 11/03/2023 9.45  3.40 - 10.80 10*3/mm3 Final    RBC 11/03/2023 3.68 (L)  3.77 - 5.28 10*6/mm3 Final    Hemoglobin 11/03/2023 10.9 (L)  12.0 - 15.9 g/dL Final    Hematocrit 11/03/2023 31.5 (L)  34.0 - 46.6 % Final    MCV 11/03/2023 85.6  79.0 - 97.0 fL Final    MCH 11/03/2023 29.6  26.6 - 33.0 pg Final    MCHC 11/03/2023 34.6  31.5 - 35.7 g/dL Final    RDW 11/03/2023 13.5  12.3 - 15.4 % Final    RDW-SD 11/03/2023 41.4  37.0 - 54.0 fl Final    MPV 11/03/2023 10.4  6.0 - 12.0 fL Final    Platelets 11/03/2023 222  140 - 450 10*3/mm3 Final    Neutrophil % 11/03/2023 62.5  42.7 - 76.0 % Final    Lymphocyte % 11/03/2023 29.5  19.6 - 45.3 % Final    Monocyte % 11/03/2023 5.7  5.0 - 12.0 % Final    Eosinophil % 11/03/2023 1.1  0.3 - 6.2 % Final    Basophil % 11/03/2023 0.4  0.0 - 1.5 % Final    Immature Grans % 11/03/2023 0.8 (H)  0.0 - 0.5 % Final    Neutrophils, Absolute 11/03/2023 5.90  1.70 - 7.00 10*3/mm3 Final    Lymphocytes, Absolute 11/03/2023 2.79  0.70 - 3.10 10*3/mm3 Final    Monocytes, Absolute 11/03/2023 0.54  0.10 - 0.90 10*3/mm3 Final    Eosinophils, Absolute 11/03/2023 0.10  0.00 - 0.40 10*3/mm3 Final    Basophils, Absolute 11/03/2023 0.04  0.00 - 0.20 10*3/mm3 Final    Immature Grans, Absolute 11/03/2023 0.08 (H)  0.00 - 0.05 10*3/mm3 Final    nRBC 11/03/2023 0.0  0.0 - 0.2 /100 WBC Final    Glucose 11/03/2023 94  65 - 99 mg/dL Final    BUN 11/03/2023 14  6 - 20 mg/dL Final    Creatinine 11/03/2023 1.08 (H)  0.57 - 1.00 mg/dL Final    Sodium 11/03/2023 141  136 - 145 mmol/L Final    Potassium 11/03/2023 4.1  3.5 - 5.2 mmol/L Final    Chloride  11/03/2023 109 (H)  98 - 107 mmol/L Final    CO2 11/03/2023 22.0  22.0 - 29.0 mmol/L Final    Calcium 11/03/2023 9.1  8.6 - 10.5 mg/dL Final    Total Protein 11/03/2023 6.4  6.0 - 8.5 g/dL Final    Albumin 11/03/2023 3.7  3.5 - 5.2 g/dL Final    ALT (SGPT) 11/03/2023 12  1 - 33 U/L Final    AST (SGOT) 11/03/2023 16  1 - 32 U/L Final    Alkaline Phosphatase 11/03/2023 66  39 - 117 U/L Final    Total Bilirubin 11/03/2023 0.2  0.0 - 1.2 mg/dL Final    Globulin 11/03/2023 2.7  gm/dL Final    A/G Ratio 11/03/2023 1.4  g/dL Final    BUN/Creatinine Ratio 11/03/2023 13.0  7.0 - 25.0 Final    Anion Gap 11/03/2023 10.0  5.0 - 15.0 mmol/L Final    eGFR 11/03/2023 65.1  >60.0 mL/min/1.73 Final    Hemoglobin A1C 11/03/2023 5.30  4.80 - 5.60 % Final    Microalbumin/Creatinine Ratio 11/03/2023    Final    Unable to calculate    Creatinine, Urine 11/03/2023 160.1  mg/dL Final    Microalbumin, Urine 11/03/2023 <1.2  mg/dL Final    Total Cholesterol 11/03/2023 142  0 - 200 mg/dL Final    Triglycerides 11/03/2023 146  0 - 150 mg/dL Final    HDL Cholesterol 11/03/2023 52  40 - 60 mg/dL Final    LDL Cholesterol  11/03/2023 65  0 - 100 mg/dL Final    VLDL Cholesterol 11/03/2023 25  5 - 40 mg/dL Final    LDL/HDL Ratio 11/03/2023 1.17   Final    TSH 11/03/2023 1.190  0.270 - 4.200 uIU/mL Final    Color, UA 11/03/2023 Yellow  Yellow, Straw Final    Appearance, UA 11/03/2023 Clear  Clear Final    pH, UA 11/03/2023 6.0  5.0 - 8.0 Final    Specific Gravity, UA 11/03/2023 1.022  1.005 - 1.030 Final    Glucose, UA 11/03/2023 Negative  Negative Final    Ketones, UA 11/03/2023 Negative  Negative Final    Bilirubin, UA 11/03/2023 Negative  Negative Final    Blood, UA 11/03/2023 Negative  Negative Final    Protein, UA 11/03/2023 Trace (A)  Negative Final    Leuk Esterase, UA 11/03/2023 Trace (A)  Negative Final    Nitrite, UA 11/03/2023 Negative  Negative Final    Urobilinogen, UA 11/03/2023 0.2 E.U./dL  0.2 - 1.0 E.U./dL Final    25 Hydroxy,  Vitamin D 11/03/2023 83.6  30.0 - 100.0 ng/ml Final    Iron 11/03/2023 70  37 - 145 mcg/dL Final    Iron Saturation (TSAT) 11/03/2023 15 (L)  20 - 50 % Final    Transferrin 11/03/2023 305  200 - 360 mg/dL Final    TIBC 11/03/2023 454  298 - 536 mcg/dL Final    Ferritin 11/03/2023 37.60  13.00 - 150.00 ng/mL Final    HCG, Urine, QL 11/03/2023 Negative  Negative Final    Lot Number 11/03/2023 [id number]   Final    Internal Positive Control 11/03/2023 Passed  Positive, Passed Final    Internal Negative Control 11/03/2023 Passed  Negative, Passed Final    Expiration Date 11/03/2023 01/03/2025   Final    Extra Tube 11/03/2023 Hold for add-ons.   Final    Auto resulted.    RBC, UA 11/03/2023 0-2  None Seen, 0-2 /HPF Final    WBC, UA 11/03/2023 0-2  None Seen, 0-2 /HPF Final    Bacteria, UA 11/03/2023 None Seen  None Seen /HPF Final    Squamous Epithelial Cells, UA 11/03/2023 0-2  None Seen, 0-2 /HPF Final    Hyaline Casts, UA 11/03/2023 None Seen  None Seen /LPF Final    Methodology 11/03/2023 Automated Microscopy   Final       ASSESSMENT AND PLAN:    ICD-10-CM ICD-9-CM   1. Post traumatic stress disorder (PTSD)  F43.10 309.81   2. MDD (major depressive disorder), recurrent episode, moderate  F33.1 296.32   3. Body image disturbance  F45.22 300.7   4. Irritability and anger  R45.4 799.22       Bianca who presents today for initial evaluation regarding referral for psychotherapy. We have discussed the history and interpreted diagnoses as above as well as the treatment plan below, including potential R/B/SE of the recommended regimen of which the patient demonstrates understanding. Patient is agreeable to call 911 or go to the nearest ER should she become concerned for her own safety and/or the safety of those around her. There are not overt indices of acute kathryn/psychosis on evaluation today.     Medication regimen: CONTINUE bupropion   mg po daily (in am), CONTINUE buspirone 5 mg po TID (patient to take more regularly  for greatest efficacy) CONTINUE trazodone  mg po q HS, CONTINUE desvenlafaxine 50 mg po q day ; patient is advised not to misuse prescribed medications or to use any exogenous substances that aren't disclosed to this provider as they may interact with the regimen to her detriment.   Risk Assessment: protracted risk is moderate, imminent risk is moderate-severe.  Risk factors include: psychotic disorder, anxiety disorder, mood disorder, and recent/ongoing psychosocial stressors. Protective factors include: no known family history of suicidality, intact reality testing, no substance use disorder, no access to firearms, patient's exhibited future-orientation, strong social support, and patient's cooperation with care. Do note that this is subject to change with the Buddhism of new stressors, treatment non-adherence, use of substances, and/or new medical ails.  Monitoring: reviewed labs/imaging as populated above, PHQ-9 today is PHQ-9 Total Score: 13 /27, INDIRA-7 today is 13/21  Therapy: Loretta Bradshaw trauma-focused therapist  Follow-up: one month  Communications: N/A    TREATMENT PLAN/GOALS: challenge patterns of living conducive to symptom burden, implement recommended regimen as above with augmentative, intermittent supportive psychotherapy to reduce symptom burden. Patient acknowledged and verbally consented to begin treatment as above. The importance of adherence to the recommended treatment and interval follow-up appointments was emphasized today. Patient was today advised to limit daily caffeine intake, hydrate appropriately, eat healthy and nutritious foods, engage sleep hygiene measures, engage appropriate exposure to sunlight, engage with hobbies in balance with life necessities, and exercise appropriate to their capacity to do so.     Billing: I have seen the patient today and considered her psychiatric complaints, rendered a diagnosis, and discussed treatment with the patient as above with which she  consents.    Parts of this note are electronic transcriptions/translations of spoken language to printed text using the Dragon Dictation system.    Electronically signed by CARLTON Carbajal, 03/26/24,

## 2024-03-29 ENCOUNTER — OFFICE VISIT (OUTPATIENT)
Dept: PSYCHIATRY | Facility: CLINIC | Age: 44
End: 2024-03-29
Payer: COMMERCIAL

## 2024-03-29 VITALS
WEIGHT: 224.8 LBS | HEART RATE: 73 BPM | SYSTOLIC BLOOD PRESSURE: 119 MMHG | DIASTOLIC BLOOD PRESSURE: 61 MMHG | BODY MASS INDEX: 35.28 KG/M2 | HEIGHT: 67 IN

## 2024-03-29 DIAGNOSIS — F43.10 POST TRAUMATIC STRESS DISORDER (PTSD): Primary | ICD-10-CM

## 2024-03-29 DIAGNOSIS — F33.1 MDD (MAJOR DEPRESSIVE DISORDER), RECURRENT EPISODE, MODERATE: ICD-10-CM

## 2024-03-29 DIAGNOSIS — R45.4 IRRITABILITY AND ANGER: ICD-10-CM

## 2024-03-29 DIAGNOSIS — F45.22 BODY IMAGE DISTURBANCE: ICD-10-CM

## 2024-04-01 ENCOUNTER — TELEMEDICINE (OUTPATIENT)
Dept: FAMILY MEDICINE CLINIC | Facility: TELEHEALTH | Age: 44
End: 2024-04-01
Payer: COMMERCIAL

## 2024-04-01 VITALS — HEIGHT: 67 IN | BODY MASS INDEX: 35.16 KG/M2 | WEIGHT: 224 LBS

## 2024-04-01 DIAGNOSIS — N39.0 URINARY TRACT INFECTION WITHOUT HEMATURIA, SITE UNSPECIFIED: Primary | ICD-10-CM

## 2024-04-01 RX ORDER — NITROFURANTOIN 25; 75 MG/1; MG/1
100 CAPSULE ORAL 2 TIMES DAILY
Qty: 14 CAPSULE | Refills: 0 | Status: SHIPPED | OUTPATIENT
Start: 2024-04-01 | End: 2024-04-08

## 2024-04-01 NOTE — PROGRESS NOTES
You have chosen to receive care through a telehealth visit.  Do you consent to use a video/audio connection for your medical care today? Yes     HPI  Bianca Boles is a 43 y.o. female  presents with complaint of urinary problem. She reports burning with urination, urinary frequency and urinary urgency. Additional symptoms she is having are noted in the ROS portion of this visit. She does report a history of both urinary tract infections and kidney stones but this does not feel like a kidney stone to her as she has no back pain. She also has a history of renal carcinoma. Her symptoms started yesterday. She increased her water intake but her symptoms have worsened today.     Review of Systems   Constitutional:  Positive for fatigue (baseline). Negative for chills and fever.   Gastrointestinal:  Positive for nausea. Negative for diarrhea.   Genitourinary:  Positive for dysuria, frequency and urgency. Negative for flank pain, genital sores, hematuria and vaginal discharge.       Past Medical History:   Diagnosis Date    Acid reflux     Anxiety     Anxiety and depression     Benign essential hypertension     Cancer     renal cancer/mass, PARTIAL NEPH, RIGHT    Diabetes     Diabetes mellitus     type ii, doesn't check bg at home     Diabetes mellitus, type 2     Frozen shoulder 08/02/2023    GERD (gastroesophageal reflux disease)     High triglycerides     History of bariatric surgery 02/04/2021    GASTRIC SLEEVE    History of kidney stones     HTN (hypertension)     Hyperlipemia     Hyperlipidemia     Hypertriglyceridemia     Lumbar herniated disc     Obesity     Panic attacks     PCOS (polycystic ovarian syndrome)     Periarthritis of shoulder 01/23    Psoriasis     ELBOWS    Rotator cuff syndrome 01/23    Seasonal allergies     Self-injurious behavior 1994    Spinal headache     AFTER PAIN EPIDURALS.  NO BLOOD PATCH    Suicide attempt 1995       Family History   Problem Relation Age of Onset    Cancer Mother          Breast    Diabetes Father     Hypertension Father     Heart disease Father     Cancer Father         Bladder prostate liver    Colon cancer Father         malignant, currently 62    No Known Problems Sister     No Known Problems Brother     No Known Problems Maternal Aunt     No Known Problems Paternal Aunt     No Known Problems Maternal Uncle     No Known Problems Paternal Uncle     No Known Problems Maternal Grandfather     Stroke Maternal Grandmother     Heart disease Maternal Grandmother     Diabetes Paternal Grandfather     Heart disease Paternal Grandfather     No Known Problems Paternal Grandmother     No Known Problems Cousin     Malig Hyperthermia Neg Hx     ADD / ADHD Neg Hx     Alcohol abuse Neg Hx     Anxiety disorder Neg Hx     Bipolar disorder Neg Hx     Dementia Neg Hx     Depression Neg Hx     Drug abuse Neg Hx     OCD Neg Hx     Paranoid behavior Neg Hx     Schizophrenia Neg Hx     Seizures Neg Hx     Self-Injurious Behavior  Neg Hx     Suicide Attempts Neg Hx        Social History     Socioeconomic History    Marital status:    Tobacco Use    Smoking status: Former     Current packs/day: 0.00     Average packs/day: 0.3 packs/day for 35.0 years (8.8 ttl pk-yrs)     Types: Cigarettes     Start date: 1/15/1985     Quit date: 1/15/2020     Years since quittin.2     Passive exposure: Never    Smokeless tobacco: Never    Tobacco comments:     A PACK A WEEK   Vaping Use    Vaping status: Never Used   Substance and Sexual Activity    Alcohol use: Yes     Comment: OCCASIONAL/SOCIAL    Drug use: Not Currently     Frequency: 0.1 times per week     Types: Marijuana     Comment: OTC THC    Sexual activity: Yes     Partners: Male     Birth control/protection: Depo-provera       Bianca Boles  reports that she quit smoking about 4 years ago. Her smoking use included cigarettes. She started smoking about 39 years ago. She has a 8.8 pack-year smoking history. She has never been exposed to tobacco  "smoke. She has never used smokeless tobacco.       Ht 170.2 cm (67\")   Wt 102 kg (224 lb)   Breastfeeding No   BMI 35.08 kg/m²     PHYSICAL EXAM  Physical Exam   Constitutional: She is oriented to person, place, and time. She appears well-developed.   HENT:   Head: Normocephalic and atraumatic.   Nose: Nose normal.   Eyes: Lids are normal. Right eye exhibits no discharge. Left eye exhibits no discharge. Right conjunctiva is not injected. Left conjunctiva is not injected.   Pulmonary/Chest:  No respiratory distress.  Abdominal:  in the suprapubic area There is no CVA tenderness.   Neurological: She is alert and oriented to person, place, and time. No cranial nerve deficit.   Psychiatric: She has a normal mood and affect. Her speech is normal and behavior is normal. Judgment and thought content normal.       Results for orders placed or performed in visit on 02/16/24   Urine Drug Screen - Urine, Clean Catch    Specimen: Urine, Clean Catch   Result Value Ref Range    Amphet/Methamphet, Screen Negative Negative    Barbiturates Screen, Urine Negative Negative    Benzodiazepine Screen, Urine Negative Negative    Cocaine Screen, Urine Negative Negative    Opiate Screen Negative Negative    THC, Screen, Urine Negative Negative    Methadone Screen, Urine Negative Negative    Oxycodone Screen, Urine Negative Negative    Fentanyl, Urine Negative Negative       Diagnoses and all orders for this visit:    1. Acute URI (Primary)    Continue probiotics   Drink plenty of of clear decaffeinated fluids  Wipe front to back    FOLLOW-UP  If symptoms worsen or persist follow up with PCP or Urgent Care for in person evaluation    Patient verbalizes understanding of medication dosage, comfort measures, instructions for treatment and follow-up.    Juana Haider, CARLTON  04/01/2024  17:22 EDT    The use of a video visit has been reviewed with the patient and verbal informed consent has been obtained. Myself and Bianca Boles " participated in this visit. The patient is located in [address] [place] [address].    I am located in [place], [place]. [employer] and [employer] were utilized. I spent 24 minutes in the patient's chart for this visit.

## 2024-04-04 DIAGNOSIS — F33.0 MILD EPISODE OF RECURRENT MAJOR DEPRESSIVE DISORDER: ICD-10-CM

## 2024-04-04 DIAGNOSIS — F41.9 ANXIETY: ICD-10-CM

## 2024-04-04 RX ORDER — DESVENLAFAXINE SUCCINATE 50 MG/1
50 TABLET, EXTENDED RELEASE ORAL DAILY
Qty: 90 TABLET | Refills: 0 | Status: SHIPPED | OUTPATIENT
Start: 2024-04-04

## 2024-04-10 PROCEDURE — 87086 URINE CULTURE/COLONY COUNT: CPT | Performed by: PHYSICIAN ASSISTANT

## 2024-04-10 PROCEDURE — 87077 CULTURE AEROBIC IDENTIFY: CPT | Performed by: PHYSICIAN ASSISTANT

## 2024-04-10 PROCEDURE — 87186 SC STD MICRODIL/AGAR DIL: CPT | Performed by: PHYSICIAN ASSISTANT

## 2024-04-11 ENCOUNTER — OFFICE VISIT (OUTPATIENT)
Dept: GASTROENTEROLOGY | Facility: CLINIC | Age: 44
End: 2024-04-11
Payer: COMMERCIAL

## 2024-04-11 VITALS
OXYGEN SATURATION: 100 % | DIASTOLIC BLOOD PRESSURE: 61 MMHG | HEART RATE: 67 BPM | SYSTOLIC BLOOD PRESSURE: 98 MMHG | HEIGHT: 67 IN | BODY MASS INDEX: 35.47 KG/M2 | WEIGHT: 226 LBS

## 2024-04-11 DIAGNOSIS — Z80.0 FAMILY HISTORY OF COLON CANCER: ICD-10-CM

## 2024-04-11 DIAGNOSIS — Z12.11 ENCOUNTER FOR SCREENING FOR MALIGNANT NEOPLASM OF COLON: ICD-10-CM

## 2024-04-11 DIAGNOSIS — K59.00 CONSTIPATION, UNSPECIFIED CONSTIPATION TYPE: Primary | ICD-10-CM

## 2024-04-11 RX ORDER — SODIUM, POTASSIUM,MAG SULFATES 17.5-3.13G
2 SOLUTION, RECONSTITUTED, ORAL ORAL TAKE AS DIRECTED
Qty: 354 ML | Refills: 0 | Status: SHIPPED | OUTPATIENT
Start: 2024-04-11

## 2024-04-11 NOTE — PROGRESS NOTES
Chief Complaint  Constipation    Bianca Boles is a 43 y.o. female who presents to [facility] GASTROENTEROLOGY- [facility] on referral from No ref. provider found for a gastroenterology evaluation of constipation.      History of Present Illness  New patient presents to the office for constipation with a history of gastroparesis and gastric sleeve (2020). She has a bowel movement every 4-5 days. She has failed OTC laxative, stool softeners, and Miralax. She previously struggled with constipation but this improved while taking Metformin, discontinued Metformin about 1.5 years ago. Denies melena and hematochezia. Denies upper GI symptoms.     CT abdomen/pelvis with contrast 11/28/2023-no acute findings.    EGD 10/20/2020 by Dr. Grajeda - normal esophagus, food in stomach, small polyps in stomach, mild gastritis, and normal duodenum. Stomach biopsies - focal polypoid foveolar hyperplasia and mild chronic inflammation.     Past Medical History:   Diagnosis Date    Acid reflux     Anxiety     Anxiety and depression     Benign essential hypertension     Cancer     renal cancer/mass, PARTIAL NEPH, RIGHT    Diabetes     Diabetes mellitus     type ii, doesn't check bg at home     Diabetes mellitus, type 2     Frozen shoulder 08/02/2023    GERD (gastroesophageal reflux disease)     High triglycerides     History of bariatric surgery 02/04/2021    GASTRIC SLEEVE    History of kidney stones     HTN (hypertension)     Hyperlipemia     Hyperlipidemia     Hypertriglyceridemia     Lumbar herniated disc     Obesity     Panic attacks     PCOS (polycystic ovarian syndrome)     Periarthritis of shoulder 01/23    Psoriasis     ELBOWS    Rotator cuff syndrome 01/23    Seasonal allergies     Self-injurious behavior 1994    Spinal headache     AFTER PAIN EPIDURALS.  NO BLOOD PATCH    Suicide attempt 1995       Past Surgical History:   Procedure Laterality Date    ABDOMINAL SURGERY  12/2020    Gastric sleeve     ADENOIDECTOMY  1984     SECTION  ,    CYSTOSCOPY BLADDER STONE LITHOTRIPSY      EAR TUBES  1984    ENDOSCOPY N/A 10/20/2020    Procedure: ESOPHAGOGASTRODUODENOSCOPY WITH BIOPSY;  Surgeon: Fidel Grajeda Jr., MD;  Location: [facility] ENDOSCOPY;  Service: General;  Laterality: N/A;  PRE- GERD  POST- RETAINED FOOD, GASTRITIS, GASTRIC POLYPS    ENDOSCOPY      FOOT FRACTURE SURGERY Left 2017    screw placed    GASTRECTOMY  2020    GASTRIC SLEEVE LAPAROSCOPIC N/A 2020    Procedure: GASTRIC SLEEVE LAPAROSCOPIC;  Surgeon: Fidel Grajeda Jr., MD;  [facility];  Service: Bariatric;  Laterality: N/A;    NEPHRECTOMY PARTIAL Right 11/15/2021    Procedure: NEPHRECTOMY PARTIAL LAPAROSCOPIC WITH DAVINCI ROBOT;  Surgeon: Lori Troy MD;  Location: [facility] OR;  Service: Robotics - DaVinci;  Laterality: Right;    SHOULDER ARTHROSCOPY Left 2023    Procedure: LEFT SHOULDER MANIPULATION;  Surgeon: Domenic Bradley MD;  Location: [facility] OR [facility];  Service: Orthopedics;  Laterality: Left;    TONSILLECTOMY      UPPER GASTROINTESTINAL ENDOSCOPY      URETEROSCOPY LASER LITHOTRIPSY WITH STENT INSERTION Right 2021    Procedure: CYSTOSCOPY, RIGHT URETEROSCOPY, LASERTRIPSY, STONE BASKET EXTRACTION AND STENT INSERTION;  Surgeon: Lori Troy MD;  Location: [facility] OR;  Service: Urology;  Laterality: Right;    URETEROSCOPY LASER LITHOTRIPSY WITH STENT INSERTION Left 2022    Procedure: URETEROSCOPY LASER LITHOTRIPSY WITH URETERAL STENT INSERTION;  Surgeon: Lori Troy MD;  Location: [facility] OR;  Service: Urology;  Laterality: Left;         Current Outpatient Medications:     Apremilast (Otezla) 30 MG tablet, Take 30 mg by mouth 2 (Two) Times a Day., Disp: 60 tablet, Rfl: 5    Biotin 29957 MCG tablet, Take 1 tablet by mouth Every Night., Disp: , Rfl:     buPROPion XL (WELLBUTRIN XL) 300 MG 24 hr tablet, Take 1 tablet by mouth Daily., Disp: 90 tablet,  Rfl: 1    busPIRone (BUSPAR) 5 MG tablet, Take 1 tablet by mouth 3 (Three) Times a Day., Disp: 90 tablet, Rfl: 5    CALCIUM-MAGNESIUM-ZINC PO, Take 3 tablets by mouth Daily., Disp: , Rfl:     cefdinir (OMNICEF) 300 MG capsule, Take 1 capsule by mouth 2 (Two) Times a Day for 7 days., Disp: 14 capsule, Rfl: 0    cetirizine (zyrTEC) 10 MG tablet, Take 1 tablet by mouth Daily As Needed., Disp: , Rfl:     Cholecalciferol 25 MCG (1000 UT) capsule, Take 2 capsules by mouth Daily., Disp: 180 capsule, Rfl: 1    clobetasol (TEMOVATE) 0.05 % external solution, Apply 10-15 drops every day to affected areas in the scalp after shampooing until clear. (Patient taking differently: Apply  topically to the appropriate area as directed Daily As Needed.), Disp: 50 mL, Rfl: 3    COLLAGEN PO, Take 3 tablets by mouth Daily., Disp: , Rfl:     desvenlafaxine (PRISTIQ) 50 MG 24 hr tablet, Take 1 tablet by mouth Daily., Disp: 90 tablet, Rfl: 0    Diclofenac Sodium (VOLTAREN) 1 % gel gel, Apply 4 g topically to the appropriate area as directed 4 (Four) Times a Day As Needed (pain)., Disp: 200 g, Rfl: 1    fluticasone (FLONASE) 50 MCG/ACT nasal spray, USE 2 SPRAYS IN EACH NOSTRIL ONCE DAILY AS DIRECTED (Patient taking differently: 2 sprays into the nostril(s) as directed by provider At Night As Needed.), Disp: 48 g, Rfl: 1    IRON-VITAMIN C PO, Take 1 tablet by mouth Daily., Disp: , Rfl:     metoprolol succinate XL (Toprol XL) 25 MG 24 hr tablet, Take 1 tablet by mouth Every Night., Disp: 90 tablet, Rfl: 1    Multiple Vitamins-Minerals (MULTIVITAMIN PO), Take 1 tablet by mouth Every Night., Disp: , Rfl:     ondansetron ODT (ZOFRAN-ODT) 4 MG disintegrating tablet, Place 1 tablet on the tongue Every 8 (Eight) Hours As Needed for Nausea., Disp: 21 tablet, Rfl: 5    pravastatin (PRAVACHOL) 10 MG tablet, Take 1 tablet by mouth Every Night., Disp: 90 tablet, Rfl: 1    Probiotic Product (PROBIOTIC-10 PO), Take 1 tablet by mouth Every Night., Disp: ,  Rfl:     testosterone micronized powder, Testosterone 1% cream transdermal SIG appyl 0.5 mL (2 clicks) to skin daily, Disp: 28 g, Rfl: 0    Tirzepatide (MOUNJARO) 10 MG/0.5ML solution pen-injector pen, Inject 0.5 mL under the skin into the appropriate area as directed 1 (One) Time Per Week., Disp: 2 mL, Rfl: 5    topiramate (TOPAMAX) 50 MG tablet, Take 1 tablet by mouth every night at bedtime for 180 days., Disp: 90 tablet, Rfl: 1    traZODone (DESYREL) 100 MG tablet, Take 1 tablet by mouth Every Night., Disp: 90 tablet, Rfl: 1    linaclotide (Linzess) 72 MCG capsule capsule, Take 1 capsule by mouth Every Morning Before Breakfast for 90 days., Disp: 90 capsule, Rfl: 1    promethazine (PHENERGAN) 25 MG suppository, Insert 1 suppository into the rectum Every 6 (Six) Hours As Needed for Nausea. (Patient not taking: Reported on 4/11/2024), Disp: 12 suppository, Rfl: 1    sodium-potassium-magnesium sulfates (Suprep Bowel Prep Kit) 17.5-3.13-1.6 GM/177ML solution oral solution, Take 2 bottles by mouth Take As Directed., Disp: 177 mL, Rfl: 0    Current Facility-Administered Medications:     MedroxyPROGESTERone Acetate (DEPO-PROVERA) injection 150 mg, 150 mg, Intramuscular, Q3 Months, Coljules, Nidaalis Breann, APRN, 150 mg at 01/18/24 1400     Allergies   Allergen Reactions    Morphine Itching and Nausea And Vomiting       Family History   Problem Relation Age of Onset    Cancer Mother         Breast    Diabetes Father     Hypertension Father     Heart disease Father     Cancer Father         Bladder prostate liver    Colon cancer Father         malignant, currently 62    No Known Problems Sister     No Known Problems Brother     No Known Problems Maternal Aunt     No Known Problems Paternal Aunt     No Known Problems Maternal Uncle     No Known Problems Paternal Uncle     No Known Problems Maternal Grandfather     Stroke Maternal Grandmother     Heart disease Maternal Grandmother     Diabetes Paternal Grandfather     Heart  "disease Paternal Grandfather     No Known Problems Paternal Grandmother     No Known Problems Cousin     Stepan Hyperthermia Neg Hx     ADD / ADHD Neg Hx     Alcohol abuse Neg Hx     Anxiety disorder Neg Hx     Bipolar disorder Neg Hx     Dementia Neg Hx     Depression Neg Hx     Drug abuse Neg Hx     OCD Neg Hx     Paranoid behavior Neg Hx     Schizophrenia Neg Hx     Seizures Neg Hx     Self-Injurious Behavior  Neg Hx     Suicide Attempts Neg Hx         Social History     Social History Narrative    Not on file       Immunization:  Immunization History   Administered Date(s) Administered    COVID-19 (MODERNA) 1st,2nd,3rd Dose Monovalent 01/18/2021, 01/18/2021, 02/22/2021, 02/22/2021    COVID-19 (PFIZER) BIVALENT 12+YRS 11/20/2022    COVID-19 (PFIZER) Purple Cap Monovalent 10/26/2021    COVID-19 F23 (MODERNA) 12YRS+ (SPIKEVAX) 10/18/2023    Flu Vaccine Quad PF >36MO 08/18/2020    Flu Vaccine Split Quad 08/09/2016, 08/03/2018, 09/21/2019    Fluad Quad 65+ 09/14/2021    Fluzone (or Fluarix & Flulaval for VFC) >6mos 09/21/2019, 08/18/2020, 10/18/2023    Fluzone Quad >6mos (Multi-dose) 08/03/2018, 11/01/2022    Hepatitis A 03/29/2018, 03/29/2018, 10/02/2018, 10/02/2018    Hepatitis B Adult/Adolescent IM 06/21/2016, 07/28/2016, 07/28/2016, 12/29/2016, 12/29/2016    Influenza Quad Vaccine (Inpatient) 08/04/2017, 08/04/2017    Influenza TIV (IM) 09/07/2015    Influenza, Unspecified 10/19/2023    Pneumococcal Conjugate 20-Valent (PCV20) 04/23/2022    Pneumococcal Polysaccharide (PPSV23) 04/14/2015, 08/18/2020, 08/18/2020    Tdap 11/07/2016, 02/11/2022        Objective     Vital Signs:   BP 98/61 (BP Location: Right arm, Patient Position: Sitting, Cuff Size: Adult)   Pulse 67   Ht 170.2 cm (67.01\")   Wt 103 kg (226 lb)   SpO2 100%   BMI 35.39 kg/m²       Physical Exam  Constitutional:       Appearance: Normal appearance. She is normal weight.   HENT:      Head: Normocephalic and atraumatic.      Nose: Nose normal. "   Pulmonary:      Effort: Pulmonary effort is normal.   Skin:     General: Skin is warm and dry.   Neurological:      Mental Status: She is alert and oriented to person, place, and time. Mental status is at baseline.   Psychiatric:         Mood and Affect: Mood normal.         Behavior: Behavior normal.         Thought Content: Thought content normal.         Judgment: Judgment normal.         Result Review :     CBC w/diff          7/17/2023    12:09 11/3/2023    09:09 11/28/2023    18:36   CBC w/Diff   WBC 9.26  9.45  9.83    RBC 4.56  3.68  4.59    Hemoglobin 13.2  10.9  13.0    Hematocrit 39.8  31.5  40.0    MCV 87.3  85.6  87.1    MCH 28.9  29.6  28.3    MCHC 33.2  34.6  32.5    RDW 14.6  13.5  13.5    Platelets 237  222  227    Neutrophil Rel % 70.0  62.5  60.3    Immature Granulocyte Rel % 0.5  0.8  1.0    Lymphocyte Rel % 24.5  29.5  30.1    Monocyte Rel % 4.3  5.7  7.1    Eosinophil Rel % 0.5  1.1  1.1    Basophil Rel % 0.2  0.4  0.4      CMP          7/17/2023    12:09 11/3/2023    09:09 11/28/2023    18:36   CMP   Glucose 110  94  77    BUN 17  14  12    Creatinine 1.10  1.08  1.17    EGFR 63.7  65.1  59.1    Sodium 137  141  138    Potassium 4.3  4.1  3.7    Chloride 105  109  104    Calcium 9.1  9.1  9.3    Total Protein 6.9  6.4  6.7    Albumin 4.0  3.7  4.1    Globulin 2.9  2.7  2.6    Total Bilirubin <0.2  0.2  0.3    Alkaline Phosphatase 72  66  85    AST (SGOT) 19  16  23    ALT (SGPT) 16  12  23    Albumin/Globulin Ratio 1.4  1.4  1.6    BUN/Creatinine Ratio 15.5  13.0  10.3    Anion Gap 10.1  10.0  11.9              Assessment and Plan    Diagnoses and all orders for this visit:    1. Constipation, unspecified constipation type (Primary)    2. Encounter for screening for malignant neoplasm of colon  -     Case Request; Standing  -     Follow Anesthesia Guidelines / Protocol; Standing  -     Verify NPO; Standing  -     Verify Bowel Prep Was Successful; Standing  -     Give Tap Water Enema If Bowel  Prep Insufficient; Standing  -     Obtain Informed Consent; Standing  -     Case Request    3. Family history of colon cancer  -     Case Request; Standing  -     Follow Anesthesia Guidelines / Protocol; Standing  -     Verify NPO; Standing  -     Verify Bowel Prep Was Successful; Standing  -     Give Tap Water Enema If Bowel Prep Insufficient; Standing  -     Obtain Informed Consent; Standing  -     Case Request    Other orders  -     sodium-potassium-magnesium sulfates (Suprep Bowel Prep Kit) 17.5-3.13-1.6 GM/177ML solution oral solution; Take 2 bottles by mouth Take As Directed.  Dispense: 177 mL; Refill: 0  -     linaclotide (Linzess) 72 MCG capsule capsule; Take 1 capsule by mouth Every Morning Before Breakfast for 90 days.  Dispense: 90 capsule; Refill: 1    43-year-old new patient presents to the office for long history of constipation, family history of colon cancer, and gastric sleeve (2020).  Patient has failed all over-the-counter products for management of constipation therefore we will proceed with trial of Linzess 72, samples provided to patient in office today.  Patient will proceed with screening colonoscopy due to family history of colon cancer in her father at age 60. Denies cardiopulmonary history.  Patient is agreeable to plan call office any questions or concerns.    COLONOSCOPY Surgical Risk and Benefits: Possible risk/complications, benefits, and alternatives to surgical or invasive procedure have been explained to patient and/or legal guardian. Risks include bleeding, infection, and perforation. Patient has been evaluated and can tolerate anesthesia and/or sedation. Risk, benefits, and alternatives to anesthesia and sedation have been explained to patient and/or legal guardian.     Follow Up   No follow-ups on file.  Patient was given instructions and counseling regarding her condition or for health maintenance advice. Please see specific information pulled into the AVS if appropriate.

## 2024-04-23 DIAGNOSIS — R79.89 LOW TESTOSTERONE LEVEL IN FEMALE: ICD-10-CM

## 2024-04-23 DIAGNOSIS — R68.82 LOW LIBIDO: ICD-10-CM

## 2024-04-23 RX ORDER — TESTOSTERONE MICRONIZED 100 %
POWDER (GRAM) MISCELLANEOUS
Qty: 15 G | Refills: 0 | Status: SHIPPED | OUTPATIENT
Start: 2024-04-23

## 2024-04-24 ENCOUNTER — CLINICAL SUPPORT (OUTPATIENT)
Dept: FAMILY MEDICINE CLINIC | Facility: CLINIC | Age: 44
End: 2024-04-24
Payer: COMMERCIAL

## 2024-04-24 DIAGNOSIS — Z30.42 ENCOUNTER FOR SURVEILLANCE OF INJECTABLE CONTRACEPTIVE: Primary | ICD-10-CM

## 2024-04-24 LAB
B-HCG UR QL: NEGATIVE
EXPIRATION DATE: ABNORMAL
INTERNAL NEGATIVE CONTROL: ABNORMAL
INTERNAL POSITIVE CONTROL: ABNORMAL
Lab: ABNORMAL

## 2024-04-24 PROCEDURE — 96372 THER/PROPH/DIAG INJ SC/IM: CPT | Performed by: NURSE PRACTITIONER

## 2024-04-24 PROCEDURE — 81025 URINE PREGNANCY TEST: CPT | Performed by: NURSE PRACTITIONER

## 2024-04-24 RX ADMIN — MEDROXYPROGESTERONE ACETATE 150 MG: 150 INJECTION, SUSPENSION INTRAMUSCULAR at 10:19

## 2024-04-24 NOTE — PROGRESS NOTES
Patient came in today for her depo-provera injection. She acknowledged that she was late in receiving this injection, so we did a urine pregnancy test- that was negative. Patient received 1 ml in her right ventrogluteal and tolerated the injection well. We provided the depo-provera injection medication.

## 2024-04-29 PROCEDURE — 87077 CULTURE AEROBIC IDENTIFY: CPT | Performed by: NURSE PRACTITIONER

## 2024-04-29 PROCEDURE — 87086 URINE CULTURE/COLONY COUNT: CPT | Performed by: NURSE PRACTITIONER

## 2024-04-29 PROCEDURE — 87186 SC STD MICRODIL/AGAR DIL: CPT | Performed by: NURSE PRACTITIONER

## 2024-04-30 ENCOUNTER — TELEPHONE (OUTPATIENT)
Dept: GASTROENTEROLOGY | Facility: CLINIC | Age: 44
End: 2024-04-30
Payer: COMMERCIAL

## 2024-04-30 NOTE — TELEPHONE ENCOUNTER
Bianca Boles  1980    Patient requested to Reschedule their Colonoscopy. I have offered to reschedule this patient and patient has agreed. Patient has been rescheduled to 5/23/24 with an estimated arrival time of 12pm.    Reason for cancelling/rescheduling: pt adam solo    This procedure was ordered by CARLTON Balderas for an important reason. We want to inform you that there are risks associated with not proceeding with the procedure at this time such as a delay in diagnosis, risk of incurable disease, or cancer.    Patient plans to call us back to reschedule: No    Updated clearance needed?: No    If yes, clearance request has been submitted to: N/A    Is the patient currently on any injectable medications for weight loss or diabetes? Yes    If yes, are they aware that they will need to discontinue this 7 days prior to their procedure? N/A    Patient verbalized understanding for all of the above information.

## 2024-05-02 ENCOUNTER — LAB (OUTPATIENT)
Dept: LAB | Facility: HOSPITAL | Age: 44
End: 2024-05-02
Payer: COMMERCIAL

## 2024-05-02 DIAGNOSIS — E11.9 TYPE 2 DIABETES MELLITUS WITHOUT COMPLICATION, WITHOUT LONG-TERM CURRENT USE OF INSULIN: ICD-10-CM

## 2024-05-02 DIAGNOSIS — E78.2 MIXED HYPERLIPIDEMIA: ICD-10-CM

## 2024-05-02 DIAGNOSIS — I10 ESSENTIAL HYPERTENSION: ICD-10-CM

## 2024-05-02 LAB
ALBUMIN SERPL-MCNC: 4.3 G/DL (ref 3.5–5.2)
ALBUMIN UR-MCNC: 1.4 MG/DL
ALBUMIN/GLOB SERPL: 2 G/DL
ALP SERPL-CCNC: 80 U/L (ref 39–117)
ALT SERPL W P-5'-P-CCNC: 20 U/L (ref 1–33)
ANION GAP SERPL CALCULATED.3IONS-SCNC: 8 MMOL/L (ref 5–15)
AST SERPL-CCNC: 17 U/L (ref 1–32)
BASOPHILS # BLD AUTO: 0.05 10*3/MM3 (ref 0–0.2)
BASOPHILS NFR BLD AUTO: 0.6 % (ref 0–1.5)
BILIRUB SERPL-MCNC: 0.2 MG/DL (ref 0–1.2)
BILIRUB UR QL STRIP: NEGATIVE
BUN SERPL-MCNC: 17 MG/DL (ref 6–20)
BUN/CREAT SERPL: 15.2 (ref 7–25)
CALCIUM SPEC-SCNC: 9 MG/DL (ref 8.6–10.5)
CHLORIDE SERPL-SCNC: 110 MMOL/L (ref 98–107)
CHOLEST SERPL-MCNC: 164 MG/DL (ref 0–200)
CLARITY UR: CLEAR
CO2 SERPL-SCNC: 22 MMOL/L (ref 22–29)
COLOR UR: YELLOW
CREAT SERPL-MCNC: 1.12 MG/DL (ref 0.57–1)
CREAT UR-MCNC: 141.6 MG/DL
DEPRECATED RDW RBC AUTO: 40.6 FL (ref 37–54)
EGFRCR SERPLBLD CKD-EPI 2021: 61.9 ML/MIN/1.73
EOSINOPHIL # BLD AUTO: 0.15 10*3/MM3 (ref 0–0.4)
EOSINOPHIL NFR BLD AUTO: 1.8 % (ref 0.3–6.2)
ERYTHROCYTE [DISTWIDTH] IN BLOOD BY AUTOMATED COUNT: 12.8 % (ref 12.3–15.4)
GLOBULIN UR ELPH-MCNC: 2.1 GM/DL
GLUCOSE SERPL-MCNC: 115 MG/DL (ref 65–99)
GLUCOSE UR STRIP-MCNC: NEGATIVE MG/DL
HBA1C MFR BLD: 5 % (ref 4.8–5.6)
HCT VFR BLD AUTO: 38.9 % (ref 34–46.6)
HDLC SERPL-MCNC: 46 MG/DL (ref 40–60)
HGB BLD-MCNC: 12.9 G/DL (ref 12–15.9)
HGB UR QL STRIP.AUTO: NEGATIVE
IMM GRANULOCYTES # BLD AUTO: 0.09 10*3/MM3 (ref 0–0.05)
IMM GRANULOCYTES NFR BLD AUTO: 1.1 % (ref 0–0.5)
KETONES UR QL STRIP: NEGATIVE
LDLC SERPL CALC-MCNC: 102 MG/DL (ref 0–100)
LDLC/HDLC SERPL: 2.19 {RATIO}
LEUKOCYTE ESTERASE UR QL STRIP.AUTO: NEGATIVE
LYMPHOCYTES # BLD AUTO: 2.5 10*3/MM3 (ref 0.7–3.1)
LYMPHOCYTES NFR BLD AUTO: 30.7 % (ref 19.6–45.3)
MCH RBC QN AUTO: 28.9 PG (ref 26.6–33)
MCHC RBC AUTO-ENTMCNC: 33.2 G/DL (ref 31.5–35.7)
MCV RBC AUTO: 87 FL (ref 79–97)
MICROALBUMIN/CREAT UR: 9.9 MG/G (ref 0–29)
MONOCYTES # BLD AUTO: 0.6 10*3/MM3 (ref 0.1–0.9)
MONOCYTES NFR BLD AUTO: 7.4 % (ref 5–12)
NEUTROPHILS NFR BLD AUTO: 4.75 10*3/MM3 (ref 1.7–7)
NEUTROPHILS NFR BLD AUTO: 58.4 % (ref 42.7–76)
NITRITE UR QL STRIP: NEGATIVE
NRBC BLD AUTO-RTO: 0 /100 WBC (ref 0–0.2)
PH UR STRIP.AUTO: 6.5 [PH] (ref 5–8)
PLATELET # BLD AUTO: 221 10*3/MM3 (ref 140–450)
PMV BLD AUTO: 10.1 FL (ref 6–12)
POTASSIUM SERPL-SCNC: 4.3 MMOL/L (ref 3.5–5.2)
PROT SERPL-MCNC: 6.4 G/DL (ref 6–8.5)
PROT UR QL STRIP: NEGATIVE
RBC # BLD AUTO: 4.47 10*6/MM3 (ref 3.77–5.28)
SODIUM SERPL-SCNC: 140 MMOL/L (ref 136–145)
SP GR UR STRIP: 1.02 (ref 1–1.03)
TRIGL SERPL-MCNC: 87 MG/DL (ref 0–150)
TSH SERPL DL<=0.05 MIU/L-ACNC: 0.78 UIU/ML (ref 0.27–4.2)
UROBILINOGEN UR QL STRIP: NORMAL
VLDLC SERPL-MCNC: 16 MG/DL (ref 5–40)
WBC NRBC COR # BLD AUTO: 8.14 10*3/MM3 (ref 3.4–10.8)

## 2024-05-02 PROCEDURE — 82043 UR ALBUMIN QUANTITATIVE: CPT

## 2024-05-02 PROCEDURE — 83036 HEMOGLOBIN GLYCOSYLATED A1C: CPT

## 2024-05-02 PROCEDURE — 80050 GENERAL HEALTH PANEL: CPT

## 2024-05-02 PROCEDURE — 82570 ASSAY OF URINE CREATININE: CPT

## 2024-05-02 PROCEDURE — 80061 LIPID PANEL: CPT

## 2024-05-02 PROCEDURE — 81003 URINALYSIS AUTO W/O SCOPE: CPT

## 2024-05-03 ENCOUNTER — OFFICE VISIT (OUTPATIENT)
Dept: FAMILY MEDICINE CLINIC | Facility: CLINIC | Age: 44
End: 2024-05-03
Payer: COMMERCIAL

## 2024-05-03 VITALS
OXYGEN SATURATION: 97 % | HEART RATE: 67 BPM | BODY MASS INDEX: 34.53 KG/M2 | SYSTOLIC BLOOD PRESSURE: 95 MMHG | TEMPERATURE: 98.8 F | HEIGHT: 67 IN | DIASTOLIC BLOOD PRESSURE: 64 MMHG | WEIGHT: 220 LBS

## 2024-05-03 DIAGNOSIS — K59.00 CONSTIPATION, UNSPECIFIED CONSTIPATION TYPE: ICD-10-CM

## 2024-05-03 DIAGNOSIS — E11.9 TYPE 2 DIABETES MELLITUS WITHOUT COMPLICATION, WITHOUT LONG-TERM CURRENT USE OF INSULIN: Primary | ICD-10-CM

## 2024-05-03 DIAGNOSIS — I10 ESSENTIAL HYPERTENSION: ICD-10-CM

## 2024-05-03 DIAGNOSIS — F51.01 PRIMARY INSOMNIA: ICD-10-CM

## 2024-05-03 DIAGNOSIS — E55.9 VITAMIN D DEFICIENCY: ICD-10-CM

## 2024-05-03 DIAGNOSIS — E78.2 MIXED HYPERLIPIDEMIA: ICD-10-CM

## 2024-05-03 DIAGNOSIS — Z12.31 SCREENING MAMMOGRAM FOR BREAST CANCER: ICD-10-CM

## 2024-05-03 DIAGNOSIS — J30.2 SEASONAL ALLERGIES: ICD-10-CM

## 2024-05-03 DIAGNOSIS — Z98.84 S/P LAPAROSCOPIC SLEEVE GASTRECTOMY: ICD-10-CM

## 2024-05-03 DIAGNOSIS — F33.0 MILD EPISODE OF RECURRENT MAJOR DEPRESSIVE DISORDER: ICD-10-CM

## 2024-05-03 DIAGNOSIS — K21.9 GASTROESOPHAGEAL REFLUX DISEASE WITHOUT ESOPHAGITIS: ICD-10-CM

## 2024-05-03 DIAGNOSIS — F41.9 ANXIETY: ICD-10-CM

## 2024-05-03 DIAGNOSIS — R00.2 PALPITATION: ICD-10-CM

## 2024-05-03 PROCEDURE — 99214 OFFICE O/P EST MOD 30 MIN: CPT | Performed by: NURSE PRACTITIONER

## 2024-05-03 RX ORDER — TOPIRAMATE 50 MG/1
50 TABLET, FILM COATED ORAL
Qty: 90 TABLET | Refills: 1 | Status: SHIPPED | OUTPATIENT
Start: 2024-05-03 | End: 2024-10-30

## 2024-05-03 RX ORDER — PRAVASTATIN SODIUM 20 MG
20 TABLET ORAL NIGHTLY
Qty: 90 TABLET | Refills: 1 | Status: SHIPPED | OUTPATIENT
Start: 2024-05-03

## 2024-05-03 RX ORDER — METOPROLOL SUCCINATE 25 MG/1
25 TABLET, EXTENDED RELEASE ORAL NIGHTLY
Qty: 90 TABLET | Refills: 1 | Status: SHIPPED | OUTPATIENT
Start: 2024-05-03

## 2024-05-03 RX ORDER — BUSPIRONE HYDROCHLORIDE 5 MG/1
5 TABLET ORAL 3 TIMES DAILY
Qty: 270 TABLET | Refills: 1 | Status: SHIPPED | OUTPATIENT
Start: 2024-05-03

## 2024-05-03 RX ORDER — DAPAGLIFLOZIN 5 MG/1
5 TABLET, FILM COATED ORAL DAILY
Qty: 90 TABLET | Refills: 1 | Status: SHIPPED | OUTPATIENT
Start: 2024-05-03

## 2024-05-03 RX ORDER — BUPROPION HYDROCHLORIDE 300 MG/1
300 TABLET ORAL DAILY
Qty: 90 TABLET | Refills: 1 | Status: SHIPPED | OUTPATIENT
Start: 2024-05-03

## 2024-05-03 RX ORDER — DESVENLAFAXINE SUCCINATE 50 MG/1
50 TABLET, EXTENDED RELEASE ORAL DAILY
Qty: 90 TABLET | Refills: 1 | Status: SHIPPED | OUTPATIENT
Start: 2024-05-03

## 2024-05-03 RX ORDER — TRAZODONE HYDROCHLORIDE 100 MG/1
100 TABLET ORAL NIGHTLY
Qty: 90 TABLET | Refills: 1 | Status: SHIPPED | OUTPATIENT
Start: 2024-05-03

## 2024-05-03 NOTE — PROGRESS NOTES
Follow Up Office Visit      Patient Name: Bianca Boles  : 1980   MRN: [id number]     Chief Complaint:    Chief Complaint   Patient presents with    Hypertension    Diabetes    Hyperlipidemia    Palpitations    Anxiety    Depression    Insomnia    Allergies    Heartburn       History of Present Illness: Bianca Boles is a 44 y.o. female who is here today to follow up for  HTN, hyperlipidemia, depression, GERD, anxiety, insomnia, DM2.  Review lab results      A1C-2024  Mounjaro 0.5ml weekly   Eye exam- 2023 [facility] eye care  foot exam- checks at home.  Mammogram-2023  Pap-2023    C/o She would like to have the next dose of Mounjaro.  Loss of 6 lbs in the past month     Subjective      Review of Systems:   Review of Systems   Constitutional:  Negative for fever.   HENT:  Negative for ear pain and sore throat.    Respiratory:  Negative for cough.    Cardiovascular:  Negative for chest pain.   Gastrointestinal:  Negative for abdominal pain, constipation, diarrhea, nausea and vomiting.   Genitourinary:  Negative for dysuria.   Musculoskeletal:  Negative for myalgias.        Past Medical History:   Past Medical History:   Diagnosis Date    Acid reflux     Anxiety     Anxiety and depression     Benign essential hypertension     Cancer     renal cancer/mass, PARTIAL NEPH, RIGHT    Diabetes     Diabetes mellitus     type ii, doesn't check bg at home     Diabetes mellitus, type 2     Frozen shoulder 2023    GERD (gastroesophageal reflux disease)     High triglycerides     History of bariatric surgery 2021    GASTRIC SLEEVE    History of kidney stones     HTN (hypertension)     Hyperlipemia     Hyperlipidemia     Hypertriglyceridemia     Lumbar herniated disc     Obesity     Panic attacks     PCOS (polycystic ovarian syndrome)     Periarthritis of shoulder     Psoriasis     ELBOWS    Rotator cuff syndrome     Seasonal allergies     Self-injurious behavior      Spinal headache     AFTER PAIN EPIDURALS.  NO BLOOD PATCH    Suicide attempt        Past Surgical History:   Past Surgical History:   Procedure Laterality Date    ABDOMINAL SURGERY  2020    Gastric sleeve    ADENOIDECTOMY  1984     SECTION  ,    CYSTOSCOPY BLADDER STONE LITHOTRIPSY      EAR TUBES  1984    ENDOSCOPY N/A 10/20/2020    Procedure: ESOPHAGOGASTRODUODENOSCOPY WITH BIOPSY;  Surgeon: Fidel Grajeda Jr., MD;  Location: [facility] ENDOSCOPY;  Service: General;  Laterality: N/A;  PRE- GERD  POST- RETAINED FOOD, GASTRITIS, GASTRIC POLYPS    ENDOSCOPY      FOOT FRACTURE SURGERY Left 2017    screw placed    GASTRECTOMY  2020    GASTRIC SLEEVE LAPAROSCOPIC N/A 2020    Procedure: GASTRIC SLEEVE LAPAROSCOPIC;  Surgeon: Fidel Grajeda Jr., MD;  Location: [facility] OR [facility];  Service: Bariatric;  Laterality: N/A;    NEPHRECTOMY PARTIAL Right 11/15/2021    Procedure: NEPHRECTOMY PARTIAL LAPAROSCOPIC WITH DAVINCI ROBOT;  Surgeon: Lori Troy MD;  Location: [facility] OR;  Service: Robotics - DaVinci;  Laterality: Right;    SHOULDER ARTHROSCOPY Left 2023    Procedure: LEFT SHOULDER MANIPULATION;  Surgeon: Domenic Bradley MD;  Location: [facility] OR [facility];  Service: Orthopedics;  Laterality: Left;    TONSILLECTOMY      UPPER GASTROINTESTINAL ENDOSCOPY      URETEROSCOPY LASER LITHOTRIPSY WITH STENT INSERTION Right 2021    Procedure: CYSTOSCOPY, RIGHT URETEROSCOPY, LASERTRIPSY, STONE BASKET EXTRACTION AND STENT INSERTION;  Surgeon: Lori Troy MD;  Location: [facility] OR;  Service: Urology;  Laterality: Right;    URETEROSCOPY LASER LITHOTRIPSY WITH STENT INSERTION Left 2022    Procedure: URETEROSCOPY LASER LITHOTRIPSY WITH URETERAL STENT INSERTION;  Surgeon: Lori Troy MD;  Location: [facility] OR;  Service: Urology;  Laterality: Left;       Family History:   Family History   Problem Relation Age of Onset    Cancer Mother         Breast     Diabetes Father     Hypertension Father     Heart disease Father     Cancer Father         Bladder prostate liver    Colon cancer Father         malignant, currently 62    No Known Problems Sister     No Known Problems Brother     No Known Problems Maternal Aunt     No Known Problems Paternal Aunt     No Known Problems Maternal Uncle     No Known Problems Paternal Uncle     No Known Problems Maternal Grandfather     Stroke Maternal Grandmother     Heart disease Maternal Grandmother     Diabetes Paternal Grandfather     Heart disease Paternal Grandfather     No Known Problems Paternal Grandmother     No Known Problems Cousin     Malig Hyperthermia Neg Hx     ADD / ADHD Neg Hx     Alcohol abuse Neg Hx     Anxiety disorder Neg Hx     Bipolar disorder Neg Hx     Dementia Neg Hx     Depression Neg Hx     Drug abuse Neg Hx     OCD Neg Hx     Paranoid behavior Neg Hx     Schizophrenia Neg Hx     Seizures Neg Hx     Self-Injurious Behavior  Neg Hx     Suicide Attempts Neg Hx        Social History:   Social History     Socioeconomic History    Marital status:    Tobacco Use    Smoking status: Former     Current packs/day: 0.00     Average packs/day: 0.3 packs/day for 35.0 years (8.8 ttl pk-yrs)     Types: Cigarettes     Start date: 1/15/1985     Quit date: 1/15/2020     Years since quittin.3     Passive exposure: Never    Smokeless tobacco: Never    Tobacco comments:     A PACK A WEEK   Vaping Use    Vaping status: Never Used   Substance and Sexual Activity    Alcohol use: Yes     Comment: OCCASIONAL/SOCIAL    Drug use: Not Currently     Frequency: 0.1 times per week     Types: Marijuana     Comment: OTC THC    Sexual activity: Yes     Partners: Male     Birth control/protection: Depo-provera       Medications:     Current Outpatient Medications:     Apremilast (Otezla) 30 MG tablet, Take 30 mg by mouth 2 (Two) Times a Day., Disp: 60 tablet, Rfl: 5    Biotin 83050 MCG tablet, Take 1 tablet by mouth Every Night.,  Disp: , Rfl:     buPROPion XL (WELLBUTRIN XL) 300 MG 24 hr tablet, Take 1 tablet by mouth Daily., Disp: 90 tablet, Rfl: 1    busPIRone (BUSPAR) 5 MG tablet, Take 1 tablet by mouth 3 (Three) Times a Day., Disp: 270 tablet, Rfl: 1    CALCIUM-MAGNESIUM-ZINC PO, Take 3 tablets by mouth Daily., Disp: , Rfl:     cefdinir (OMNICEF) 300 MG capsule, Take 1 capsule by mouth 2 (Two) Times a Day for 10 days., Disp: 20 capsule, Rfl: 0    cetirizine (zyrTEC) 10 MG tablet, Take 1 tablet by mouth Daily As Needed., Disp: , Rfl:     Cholecalciferol 25 MCG (1000 UT) capsule, Take 2 capsules by mouth Daily., Disp: 180 capsule, Rfl: 1    clobetasol (TEMOVATE) 0.05 % external solution, Apply 10-15 drops every day to affected areas in the scalp after shampooing until clear. (Patient taking differently: Apply  topically to the appropriate area as directed Daily As Needed.), Disp: 50 mL, Rfl: 3    COLLAGEN PO, Take 3 tablets by mouth Daily., Disp: , Rfl:     desvenlafaxine (PRISTIQ) 50 MG 24 hr tablet, Take 1 tablet by mouth Daily., Disp: 90 tablet, Rfl: 1    Diclofenac Sodium (VOLTAREN) 1 % gel gel, Apply 4 g topically to the appropriate area as directed 4 (Four) Times a Day As Needed (pain)., Disp: 200 g, Rfl: 1    fluticasone (FLONASE) 50 MCG/ACT nasal spray, USE 2 SPRAYS IN EACH NOSTRIL ONCE DAILY AS DIRECTED (Patient taking differently: 2 sprays into the nostril(s) as directed by provider At Night As Needed.), Disp: 48 g, Rfl: 1    IRON-VITAMIN C PO, Take 1 tablet by mouth Daily., Disp: , Rfl:     linaclotide (Linzess) 72 MCG capsule capsule, Take 1 capsule by mouth Every Morning Before Breakfast for 90 days., Disp: 90 capsule, Rfl: 1    linaclotide (Linzess) 72 MCG capsule capsule, Take 1 capsule by mouth Every Morning Before Breakfast., Disp: 8 capsule, Rfl: 0    metoprolol succinate XL (Toprol XL) 25 MG 24 hr tablet, Take 1 tablet by mouth Every Night., Disp: 90 tablet, Rfl: 1    Multiple Vitamins-Minerals (MULTIVITAMIN PO), Take 1  "tablet by mouth Every Night., Disp: , Rfl:     ondansetron ODT (ZOFRAN-ODT) 4 MG disintegrating tablet, Place 1 tablet on the tongue Every 8 (Eight) Hours As Needed for Nausea., Disp: 21 tablet, Rfl: 5    pravastatin (PRAVACHOL) 20 MG tablet, Take 1 tablet by mouth Every Night., Disp: 90 tablet, Rfl: 1    Probiotic Product (PROBIOTIC-10 PO), Take 1 tablet by mouth Every Night., Disp: , Rfl:     sodium-potassium-magnesium sulfates (Suprep Bowel Prep Kit) 17.5-3.13-1.6 GM/177ML solution oral solution, Take 2 bottles by mouth Take As Directed., Disp: 354 mL, Rfl: 0    testosterone micronized powder, apply 2 clicks (0.5ml) topically EVERY DAY, Disp: 15 g, Rfl: 0    topiramate (TOPAMAX) 50 MG tablet, Take 1 tablet by mouth every night at bedtime for 180 days., Disp: 90 tablet, Rfl: 1    traZODone (DESYREL) 100 MG tablet, Take 1 tablet by mouth Every Night., Disp: 90 tablet, Rfl: 1    dapagliflozin (Farxiga) 5 MG tablet tablet, Take 1 tablet by mouth Daily., Disp: 90 tablet, Rfl: 1    Tirzepatide (MOUNJARO) 12.5 MG/0.5ML solution pen-injector pen, Inject 0.5 mL under the skin into the appropriate area as directed 1 (One) Time Per Week., Disp: 2 mL, Rfl: 6    Current Facility-Administered Medications:     MedroxyPROGESTERone Acetate (DEPO-PROVERA) injection 150 mg, 150 mg, Intramuscular, Q3 Months, Colasanti, Nidaalis Breann, APRN, 150 mg at 04/24/24 1019    Allergies:   Allergies   Allergen Reactions    Morphine Itching and Nausea And Vomiting               Objective     Physical Exam:  Vital Signs:   Vitals:    05/03/24 0821   BP: 95/64   Pulse: 67   Temp: 98.8 °F (37.1 °C)   SpO2: 97%   Weight: 99.8 kg (220 lb)   Height: 170.2 cm (67.01\")     Body mass index is 34.45 kg/m².           Physical Exam  HENT:      Right Ear: Tympanic membrane normal.      Left Ear: Tympanic membrane normal.      Nose: Nose normal.      Mouth/Throat:      Mouth: Mucous membranes are moist.   Eyes:      Conjunctiva/sclera: Conjunctivae normal. "   Neck:      Vascular: No carotid bruit.   Cardiovascular:      Rate and Rhythm: Normal rate and regular rhythm.      Heart sounds: Normal heart sounds. No murmur heard.  Pulmonary:      Effort: Pulmonary effort is normal.      Breath sounds: Normal breath sounds.   Abdominal:      General: Bowel sounds are normal.      Palpations: Abdomen is soft.   Musculoskeletal:      Right lower leg: No edema.      Left lower leg: No edema.   Skin:     General: Skin is warm and dry.   Neurological:      Mental Status: She is alert.   Psychiatric:         Mood and Affect: Mood normal.         Behavior: Behavior normal.           Assessment / Plan      Assessment/Plan:   Diagnoses and all orders for this visit:    1. Type 2 diabetes mellitus without complication, without long-term current use of insulin (Primary)  -     CBC Auto Differential; Future  -     Comprehensive Metabolic Panel; Future  -     Microalbumin / Creatinine Urine Ratio - Urine, Clean Catch; Future  -     Hemoglobin A1c; Future  -     TSH; Future  -     Urinalysis With Culture If Indicated -; Future    2. Essential hypertension    3. Mixed hyperlipidemia  -     Lipid Panel; Future  -     Comprehensive Metabolic Panel; Future  -     Comprehensive Metabolic Panel; Future  -     Lipid Panel; Future    4. Palpitation    5. S/P laparoscopic sleeve gastrectomy    6. Gastroesophageal reflux disease without esophagitis    7. Anxiety  -     desvenlafaxine (PRISTIQ) 50 MG 24 hr tablet; Take 1 tablet by mouth Daily.  Dispense: 90 tablet; Refill: 1    8. Mild episode of recurrent major depressive disorder  -     desvenlafaxine (PRISTIQ) 50 MG 24 hr tablet; Take 1 tablet by mouth Daily.  Dispense: 90 tablet; Refill: 1    9. Primary insomnia    10. Seasonal allergies    11. Screening mammogram for breast cancer  -     Mammo Screening Digital Tomosynthesis Bilateral With CAD; Future    12. Vitamin D deficiency  -     Vitamin D,25-Hydroxy; Future    Other orders  -      buPROPion XL (WELLBUTRIN XL) 300 MG 24 hr tablet; Take 1 tablet by mouth Daily.  Dispense: 90 tablet; Refill: 1  -     busPIRone (BUSPAR) 5 MG tablet; Take 1 tablet by mouth 3 (Three) Times a Day.  Dispense: 270 tablet; Refill: 1  -     metoprolol succinate XL (Toprol XL) 25 MG 24 hr tablet; Take 1 tablet by mouth Every Night.  Dispense: 90 tablet; Refill: 1  -     pravastatin (PRAVACHOL) 20 MG tablet; Take 1 tablet by mouth Every Night.  Dispense: 90 tablet; Refill: 1  -     topiramate (TOPAMAX) 50 MG tablet; Take 1 tablet by mouth every night at bedtime for 180 days.  Dispense: 90 tablet; Refill: 1  -     traZODone (DESYREL) 100 MG tablet; Take 1 tablet by mouth Every Night.  Dispense: 90 tablet; Refill: 1  -     Tirzepatide (MOUNJARO) 12.5 MG/0.5ML solution pen-injector pen; Inject 0.5 mL under the skin into the appropriate area as directed 1 (One) Time Per Week.  Dispense: 2 mL; Refill: 6  -     dapagliflozin (Farxiga) 5 MG tablet tablet; Take 1 tablet by mouth Daily.  Dispense: 90 tablet; Refill: 1         Diabetes mellitus type 2 hemoglobin A1c improving will increase Mounjaro to 12.5 mg once weekly continue to aid in appetite portion control weight loss reduce added carbs sugars exercise 30 minutes daily  Hypertension palpitations currently controlled Toprol XL 25 mg daily  Hyperlipidemia LDL above goal will increase pravastatin to 20 mg nightly rechecking CMP and lipid panel in 30 days  Patient is status post gastric sleeve vitamin D deficiency recommend 2000 units supplementation daily  Anxiety depression currently controlled Pristiq and Wellbutrin  Insomnia stable on trazodone denies morning sedation  Seasonal allergies currently controlled with Zyrtec and Flonase  Will provide order for screening mammogram denies lumps mass tenderness blood or discharge from nipple  Reflux currently controlled without medication at this time with diet  Constipation currently controlled with Linzess      Follow Up:  "  Return in about 6 months (around 11/3/2024).    Kofi Hernandez, APRN    \"Please note that portions of this note were completed with a voice recognition program.\"    "

## 2024-05-16 NOTE — PROGRESS NOTES
"[facility]  Follow-up Visit    Chief Complaint:  \"I want to get into some counseling\"     Initial History of Present Illness: Bianca Boles is a 43 y.o. female who presents today for initial evaluation regarding psychotherapy.  Bianca presents unaccompanied in no acute distress and engages with me appropriately. Psychotropic regimen with which patient presents is described as  bupropion   mg po daily, buspirone 5 mg po TID, trazodone 100 mg po q HS, desvenlafaxine 50 mg po q day.      History is positive for signs/symptoms suggestive of low mood, low energy, anhedonia, changes in sleep, changes in appetite, guilt, poor concentration, psychomotor changes, thoughts of being better off dead-endorses occasional passive suicidal thoughts, and daydreams about \"not being here.\" Psychiatric screening is negative for pathognomonic history of: TBI, kathryn, psychosis.      Ms. Boles has symptoms of history of significant trauma for which there are related intrusion symptoms related to the traumatic event (distressing memories, flashbacks, nightmares, intense distress associated with triggering stimuli, marked physiological reactions to triggering stimuli), persistent avoidance of triggering stimuli, negative alterations in cognition and mood (memory lapses, negative schemas, distorted cognitions about the event, social withdrawal, feelings of detachment/estrangement, persistent anhedonia), and marked alterations in arousal and reactivity (irritability, reckless behavior, hypervigilance, exaggerated startle, sleep disturbances). She also has a history a history of early exposure to enduring trauma associated with re-experiencing trauma, avoidance, hyperarousal (PTSD) and difficulty managing emotions, negative self-view, relationship difficulties, dissociative symptoms, and demoralization (complex PTSD). Patient has family history of ADHD, and possible ASD, will plan to screen " "for both for informational gathering purposes and not necessarily diagnosing.   Ms. Boles also voices current body image disturbances related to her bariatric procedure and intense fear of gaining the 98 lbs back, patient admits to the irrational fear, but has put on 10 lbs, I recommended sharing these concerns with her PCP.     I have counseled the patient with regard to diagnoses and the recommended treatment regimen as documented below: I will assume prescriptive responsibility (if PCP desires) for CONTINUE bupropion   mg po daily (in am), CONTINUE buspirone 5 mg po TID (patient to take more regularly for greatest efficacy) CONTINUE trazodone  mg po q HS, CONTINUE desvenlafaxine 50 mg po q day . Patient acknowledges the diagnoses per my rendered interpretation. Patient demonstrates awareness/understanding of viable alternatives for treatment as well as potential risks, benefits, and side effects associated with this regimen and is amenable to proceed in this fashion.      Recommended lifestyle changes: 30 minutes of activity to increase HR 2-3 days weekly.     Psychiatric History:  Diagnoses: depression, anxiety  Outpatient history: 14-14 yo  Inpatient history: none  Medication trials: fluoxetine, Zoloft, Effexor, citalopram, bupropion, pristiq, hx of GENESIGHT  Other treatment modalities: Psychotherapy  Self harm: cutting  Suicide attempts: Yes, OD  Abuse or neglect: emotional     Substance Abuse History:   Types/methods/frequency: marijuana  Transtheoretical stage: Maintenence     Social History:  Residence: lives house, my , 2 daughters  Vocation: yes  Source of income: Employed  Last grade completed: high  Pertinent developmental history: ADHD, will screen at next visit  Pertinent legal history: No history   Hobbies/interests: DND, and video games, entertain  Orthodox: Catholicism -\"[employer]\"  Exercise: walking, but scared to get obsessed  Dietary habits:  history of binge eating , " "type 2 DM  Sleep hygiene: wakes up at 3 am  Social habits: none  Sunlight: There are no concern for under-exposure.  Caffeine intake: no pertinent issues   Hydration habits: no pertinent issues    history: No, grew up in             Interval History Bianca is a 44 y.o. female who presents today for follow-up regarding medication management.  Bianca presents unaccompanied in no acute distress and engages with me appropriately.     Current treatment regimen includes:   - bupropion 300 mg po daily  -buspirone 5 mg po TID  -desvenlafaxine 50 mg po q day  -trazodone 100 mg po q HS  Side-effects per given history: none.      Today the patient feels hopeful but nervous. She met with therapist and therapist suggested she has borderline personality.  We discussed that current research has indicated that there is a link  to  C-PTSD , and the development of personality disorders, like BPD. Bianca endorses a history of generational trauma, being \"parentified\", and a \"latch key kid\" at age 8.  Bianca experiences the following personality traits: unstable/intense interpersonal relationships across the lifespan, excessive fear of abandonment, impulsivity, history of recurrent self-harm and/or suicidal ideation, history of abuse and neglect, unstable self-image (eating disorder, and BDD) immature object relations with splitting behaviors, intense bouts of anger that are difficult to control, and dissociative/paranoid symptoms with increased stress.    Today her thought process and content are devoid of overt aberration suggestive of acute kathryn/psychosis. The patient denies SI/HI/AVH. There are changes on exam today compared to most recent evaluation.    - sleep: no concerns, mostly better but dog wakes her up on occasion  - appetite: moderately controlled, on Mounjaro    I have counseled the patient with regard to diagnoses and the recommended treatment regimen as documented below. Patient acknowledges the diagnoses " per my rendered interpretation. Patient demonstrates understanding of potential risks/benefits/side effects associated with this regimen and is amenable to proceed in this fashion.     Assignment: begin journaling instances of overwhelm, response thereto, ideal response to cultivate insight and begin breaking a pattern of stimulus/response.      Social History     Socioeconomic History    Marital status:    Tobacco Use    Smoking status: Former     Current packs/day: 0.00     Average packs/day: 0.3 packs/day for 35.0 years (8.8 ttl pk-yrs)     Types: Cigarettes     Start date: 1/15/1985     Quit date: 1/15/2020     Years since quittin.3     Passive exposure: Never    Smokeless tobacco: Never    Tobacco comments:     A PACK A WEEK   Vaping Use    Vaping status: Never Used   Substance and Sexual Activity    Alcohol use: Yes     Comment: OCCASIONAL/SOCIAL    Drug use: Yes     Frequency: 0.1 times per week     Types: Marijuana     Comment: OTC THC    Sexual activity: Yes     Partners: Male     Birth control/protection: Depo-provera       Tobacco use counseling/intervention: N/A. Problem List:  Patient Active Problem List   Diagnosis    Chronic fatigue    Essential hypertension    Hyperlipidemia    Heartburn    Multiple joint pain    Depression    Gastroparesis    Gastroesophageal reflux disease    Gastric polyps    S/P laparoscopic sleeve gastrectomy    Obesity, Class I, BMI 30-34.9    Anxiety    Urinary tract infection with hematuria    Leukocytosis    Insomnia    Left ureteral stone    Left renal stone    Hematuria    Acute cystitis with hematuria    Right renal mass    Seasonal allergies    Urinary urgency    Renal cell carcinoma of right kidney    Psoriasis    Palpitation    Post-COVID syndrome    Nausea    Type 2 diabetes mellitus without complication, without long-term current use of insulin    Left shoulder pain    Frozen shoulder    Impingement syndrome of left shoulder    Dysuria    Glucose found in  urine on examination    Encounter for screening for malignant neoplasm of colon    Family history of colon cancer    Constipation    Vitamin D deficiency     Allergy:   Allergies   Allergen Reactions    Morphine Itching and Nausea And Vomiting        Discontinued Medications:  There are no discontinued medications.    Current Medications:   Current Outpatient Medications   Medication Sig Dispense Refill    Apremilast (Otezla) 30 MG tablet Take 30 mg by mouth 2 (Two) Times a Day. 60 tablet 5    Biotin 84278 MCG tablet Take 1 tablet by mouth Every Night.      buPROPion XL (WELLBUTRIN XL) 300 MG 24 hr tablet Take 1 tablet by mouth Daily. 90 tablet 1    busPIRone (BUSPAR) 5 MG tablet Take 1 tablet by mouth 3 (Three) Times a Day. 270 tablet 1    CALCIUM-MAGNESIUM-ZINC PO Take 3 tablets by mouth Daily.      cetirizine (zyrTEC) 10 MG tablet Take 1 tablet by mouth Daily As Needed.      Cholecalciferol 25 MCG (1000 UT) capsule Take 2 capsules by mouth Daily. 180 capsule 1    clobetasol (TEMOVATE) 0.05 % external solution Apply 10-15 drops every day to affected areas in the scalp after shampooing until clear. (Patient taking differently: Apply  topically to the appropriate area as directed Daily As Needed.) 50 mL 3    COLLAGEN PO Take 3 tablets by mouth Daily.      dapagliflozin (Farxiga) 5 MG tablet tablet Take 1 tablet by mouth Daily. 90 tablet 1    desvenlafaxine (PRISTIQ) 50 MG 24 hr tablet Take 1 tablet by mouth Daily. 90 tablet 1    Diclofenac Sodium (VOLTAREN) 1 % gel gel Apply 4 g topically to the appropriate area as directed 4 (Four) Times a Day As Needed (pain). 200 g 1    fluticasone (FLONASE) 50 MCG/ACT nasal spray USE 2 SPRAYS IN EACH NOSTRIL ONCE DAILY AS DIRECTED (Patient taking differently: 2 sprays into the nostril(s) as directed by provider At Night As Needed.) 48 g 1    IRON-VITAMIN C PO Take 1 tablet by mouth Daily.      linaclotide (Linzess) 72 MCG capsule capsule Take 1 capsule by mouth Every Morning  Before Breakfast for 90 days. 90 capsule 1    linaclotide (Linzess) 72 MCG capsule capsule Take 1 capsule by mouth Every Morning Before Breakfast. 8 capsule 0    metoprolol succinate XL (Toprol XL) 25 MG 24 hr tablet Take 1 tablet by mouth Every Night. 90 tablet 1    Multiple Vitamins-Minerals (MULTIVITAMIN PO) Take 1 tablet by mouth Every Night.      ondansetron ODT (ZOFRAN-ODT) 4 MG disintegrating tablet Place 1 tablet on the tongue Every 8 (Eight) Hours As Needed for Nausea. 21 tablet 5    pravastatin (PRAVACHOL) 20 MG tablet Take 1 tablet by mouth Every Night. 90 tablet 1    Probiotic Product (PROBIOTIC-10 PO) Take 1 tablet by mouth Every Night.      sodium-potassium-magnesium sulfates (Suprep Bowel Prep Kit) 17.5-3.13-1.6 GM/177ML solution oral solution Take 2 bottles by mouth Take As Directed. 354 mL 0    testosterone micronized powder apply 2 clicks (0.5ml) topically EVERY DAY 15 g 0    Tirzepatide (MOUNJARO) 12.5 MG/0.5ML solution pen-injector pen Inject 0.5 mL under the skin into the appropriate area as directed 1 (One) Time Per Week. 2 mL 6    topiramate (TOPAMAX) 50 MG tablet Take 1 tablet by mouth every night at bedtime for 180 days. 90 tablet 1    traZODone (DESYREL) 100 MG tablet Take 1 tablet by mouth Every Night. 90 tablet 1     Current Facility-Administered Medications   Medication Dose Route Frequency Provider Last Rate Last Admin    MedroxyPROGESTERone Acetate (DEPO-PROVERA) injection 150 mg  150 mg Intramuscular Q3 Months Patricia Hernandez APRN   150 mg at 04/24/24 1019     Past Medical History:  Past Medical History:   Diagnosis Date    Acid reflux     Anxiety     Anxiety and depression     Benign essential hypertension     Borderline personality disorder     Cancer     renal cancer/mass, PARTIAL NEPH, RIGHT    Diabetes     Diabetes mellitus     type ii, doesn't check bg at home     Diabetes mellitus, type 2     Frozen shoulder 08/02/2023    GERD (gastroesophageal reflux disease)      High triglycerides     History of bariatric surgery 2021    GASTRIC SLEEVE    History of kidney stones     HTN (hypertension)     Hyperlipemia     Hyperlipidemia     Hypertriglyceridemia     Lumbar herniated disc     Obesity     Panic attacks     PCOS (polycystic ovarian syndrome)     Periarthritis of shoulder     Psoriasis     ELBOWS    Rotator cuff syndrome     Seasonal allergies     Self-injurious behavior     Spinal headache     AFTER PAIN EPIDURALS.  NO BLOOD PATCH    Suicide attempt      Past Surgical History:  Past Surgical History:   Procedure Laterality Date    ABDOMINAL SURGERY  2020    Gastric sleeve    ADENOIDECTOMY  1984     SECTION  ,    CYSTOSCOPY BLADDER STONE LITHOTRIPSY      EAR TUBES  1984    ENDOSCOPY N/A 10/20/2020    Procedure: ESOPHAGOGASTRODUODENOSCOPY WITH BIOPSY;  Surgeon: Fidel Grajeda Jr., MD;  Location: [facility] ENDOSCOPY;  Service: General;  Laterality: N/A;  PRE- GERD  POST- RETAINED FOOD, GASTRITIS, GASTRIC POLYPS    ENDOSCOPY  2014    FOOT FRACTURE SURGERY Left 2017    screw placed    GASTRECTOMY  2020    GASTRIC SLEEVE LAPAROSCOPIC N/A 2020    Procedure: GASTRIC SLEEVE LAPAROSCOPIC;  Surgeon: Fidel Grajeda Jr., MD;  Location: [facility] OR OSC;  Service: Bariatric;  Laterality: N/A;    NEPHRECTOMY PARTIAL Right 11/15/2021    Procedure: NEPHRECTOMY PARTIAL LAPAROSCOPIC WITH DAVINCI ROBOT;  Surgeon: Lori Troy MD;  Location: [facility] MAIN OR;  Service: Robotics - DaVinci;  Laterality: Right;    SHOULDER ARTHROSCOPY Left 2023    Procedure: LEFT SHOULDER MANIPULATION;  Surgeon: Domenic Bradley MD;  Location: [facility] OR [facility];  Service: Orthopedics;  Laterality: Left;    TONSILLECTOMY      UPPER GASTROINTESTINAL ENDOSCOPY      URETEROSCOPY LASER LITHOTRIPSY WITH STENT INSERTION Right 2021    Procedure: CYSTOSCOPY, RIGHT URETEROSCOPY, LASERTRIPSY, STONE BASKET EXTRACTION AND STENT INSERTION;  Surgeon: Woodrow  "Lori COFFEY MD;  Location: [facility];  Service: Urology;  Laterality: Right;    URETEROSCOPY LASER LITHOTRIPSY WITH STENT INSERTION Left 11/08/2022    Procedure: URETEROSCOPY LASER LITHOTRIPSY WITH URETERAL STENT INSERTION;  Surgeon: Lori Troy MD;  Location: [facility];  Service: Urology;  Laterality: Left;       MENTAL STATUS EXAM   General Appearance:  Cleanly groomed and dressed  Eye Contact:  Good eye contact  Attitude:  Cooperative, polite and candid  Motor Activity:  Normal gait, posture and fidgety  Muscle Strength:  Normal  Speech:  Normal rate, tone, volume  Mood and affect:  Normal, pleasant  Thought Process:  Logical and goal-directed  Associations/ Thought Content:  No delusions  Hallucinations:  None  Suicidal Ideations:  Not present  Homicidal Ideation:  Not present  Sensorium:  Alert and clear  Orientation:  Person, place, time and situation  Immediate Recall, Recent, and Remote Memory:  Intact  Attention Span/ Concentration:  Good  Fund of Knowledge:  Appropriate for age and educational level  Intellectual Functioning:  Above average  Insight:  Good  Judgement:  Good  Reliability:  Good  Impulse Control:  Good      Vital Signs:   /63   Pulse 74   Ht 170.2 cm (67.01\")   Wt 100 kg (221 lb)   BMI 34.61 kg/m²    Lab Results:   Lab on 05/02/2024   Component Date Value Ref Range Status    Glucose 05/02/2024 115 (H)  65 - 99 mg/dL Final    BUN 05/02/2024 17  6 - 20 mg/dL Final    Creatinine 05/02/2024 1.12 (H)  0.57 - 1.00 mg/dL Final    Sodium 05/02/2024 140  136 - 145 mmol/L Final    Potassium 05/02/2024 4.3  3.5 - 5.2 mmol/L Final    Chloride 05/02/2024 110 (H)  98 - 107 mmol/L Final    CO2 05/02/2024 22.0  22.0 - 29.0 mmol/L Final    Calcium 05/02/2024 9.0  8.6 - 10.5 mg/dL Final    Total Protein 05/02/2024 6.4  6.0 - 8.5 g/dL Final    Albumin 05/02/2024 4.3  3.5 - 5.2 g/dL Final    ALT (SGPT) 05/02/2024 20  1 - 33 U/L Final    AST (SGOT) 05/02/2024 17  1 - 32 U/L Final    " Alkaline Phosphatase 05/02/2024 80  39 - 117 U/L Final    Total Bilirubin 05/02/2024 0.2  0.0 - 1.2 mg/dL Final    Globulin 05/02/2024 2.1  gm/dL Final    A/G Ratio 05/02/2024 2.0  g/dL Final    BUN/Creatinine Ratio 05/02/2024 15.2  7.0 - 25.0 Final    Anion Gap 05/02/2024 8.0  5.0 - 15.0 mmol/L Final    eGFR 05/02/2024 61.9  >60.0 mL/min/1.73 Final    WBC 05/02/2024 8.14  3.40 - 10.80 10*3/mm3 Final    RBC 05/02/2024 4.47  3.77 - 5.28 10*6/mm3 Final    Hemoglobin 05/02/2024 12.9  12.0 - 15.9 g/dL Final    Hematocrit 05/02/2024 38.9  34.0 - 46.6 % Final    MCV 05/02/2024 87.0  79.0 - 97.0 fL Final    MCH 05/02/2024 28.9  26.6 - 33.0 pg Final    MCHC 05/02/2024 33.2  31.5 - 35.7 g/dL Final    RDW 05/02/2024 12.8  12.3 - 15.4 % Final    RDW-SD 05/02/2024 40.6  37.0 - 54.0 fl Final    MPV 05/02/2024 10.1  6.0 - 12.0 fL Final    Platelets 05/02/2024 221  140 - 450 10*3/mm3 Final    Neutrophil % 05/02/2024 58.4  42.7 - 76.0 % Final    Lymphocyte % 05/02/2024 30.7  19.6 - 45.3 % Final    Monocyte % 05/02/2024 7.4  5.0 - 12.0 % Final    Eosinophil % 05/02/2024 1.8  0.3 - 6.2 % Final    Basophil % 05/02/2024 0.6  0.0 - 1.5 % Final    Immature Grans % 05/02/2024 1.1 (H)  0.0 - 0.5 % Final    Neutrophils, Absolute 05/02/2024 4.75  1.70 - 7.00 10*3/mm3 Final    Lymphocytes, Absolute 05/02/2024 2.50  0.70 - 3.10 10*3/mm3 Final    Monocytes, Absolute 05/02/2024 0.60  0.10 - 0.90 10*3/mm3 Final    Eosinophils, Absolute 05/02/2024 0.15  0.00 - 0.40 10*3/mm3 Final    Basophils, Absolute 05/02/2024 0.05  0.00 - 0.20 10*3/mm3 Final    Immature Grans, Absolute 05/02/2024 0.09 (H)  0.00 - 0.05 10*3/mm3 Final    nRBC 05/02/2024 0.0  0.0 - 0.2 /100 WBC Final    Microalbumin/Creatinine Ratio 05/02/2024 9.9  0.0 - 29.0 mg/g Final    Creatinine, Urine 05/02/2024 141.6  mg/dL Final    Microalbumin, Urine 05/02/2024 1.4  mg/dL Final    Hemoglobin A1C 05/02/2024 5.00  4.80 - 5.60 % Final    Total Cholesterol 05/02/2024 164  0 - 200 mg/dL Final     Triglycerides 05/02/2024 87  0 - 150 mg/dL Final    HDL Cholesterol 05/02/2024 46  40 - 60 mg/dL Final    LDL Cholesterol  05/02/2024 102 (H)  0 - 100 mg/dL Final    VLDL Cholesterol 05/02/2024 16  5 - 40 mg/dL Final    LDL/HDL Ratio 05/02/2024 2.19   Final    TSH 05/02/2024 0.784  0.270 - 4.200 uIU/mL Final    Color, UA 05/02/2024 Yellow  Yellow, Straw Final    Appearance, UA 05/02/2024 Clear  Clear Final    pH, UA 05/02/2024 6.5  5.0 - 8.0 Final    Specific Gravity, UA 05/02/2024 1.018  1.005 - 1.030 Final    Glucose, UA 05/02/2024 Negative  Negative Final    Ketones, UA 05/02/2024 Negative  Negative Final    Bilirubin, UA 05/02/2024 Negative  Negative Final    Blood, UA 05/02/2024 Negative  Negative Final    Protein, UA 05/02/2024 Negative  Negative Final    Leuk Esterase, UA 05/02/2024 Negative  Negative Final    Nitrite, UA 05/02/2024 Negative  Negative Final    Urobilinogen, UA 05/02/2024 1.0 E.U./dL  0.2 - 1.0 E.U./dL Final   Admission on 04/29/2024, Discharged on 04/29/2024   Component Date Value Ref Range Status    Color 04/29/2024 Dark Yellow   Final    Clarity, UA 04/29/2024 Cloudy (A)   Final    Glucose, UA 04/29/2024 Negative  mg/dL Final    Bilirubin 04/29/2024 Negative   Final    Ketones, UA 04/29/2024 Trace (A)   Final    Specific Gravity  04/29/2024 1.030  1.005 - 1.030 Final    Blood, UA 04/29/2024 Moderate (A)   Final    pH, Urine 04/29/2024 5.5  5.0 - 8.0 Final    Protein, POC 04/29/2024 100 mg/dL (A)  mg/dL Final    Urobilinogen, UA 04/29/2024 0.2 E.U./dL   Final    Nitrite, UA 04/29/2024 Positive (A)   Final    Leukocytes 04/29/2024 Small (1+) (A)   Final    Urine Culture 04/29/2024 >100,000 CFU/mL Escherichia coli (A)   Final   Clinical Support on 04/24/2024   Component Date Value Ref Range Status    HCG, Urine, QL 04/24/2024 Negative (NEGATIVE)  Negative Final    Lot Number 04/24/2024 [id number]   Final    Internal Positive Control 04/24/2024 Passed  Positive, Passed Final    Internal  Negative Control 04/24/2024 Passed  Negative, Passed Final    Expiration Date 04/24/2024 03/11/2025   Final   Admission on 04/10/2024, Discharged on 04/10/2024   Component Date Value Ref Range Status    Color 04/10/2024 Dark Yellow  Yellow, Straw, Dark Yellow, Sheri Final    Clarity, UA 04/10/2024 Slightly Cloudy (A)  Clear Final    Glucose, UA 04/10/2024 Negative  Negative mg/dL Final    Bilirubin 04/10/2024 Negative  Negative Final    Ketones, UA 04/10/2024 Negative  Negative Final    Specific Gravity  04/10/2024 1.030  1.005 - 1.030 Final    Blood, UA 04/10/2024 Negative  Negative Final    pH, Urine 04/10/2024 6.5  5.0 - 8.0 Final    Protein, POC 04/10/2024 30 mg/dL (A)  Negative mg/dL Final    Urobilinogen, UA 04/10/2024 0.2 E.U./dL  Normal, 0.2 E.U./dL Final    Nitrite, UA 04/10/2024 Positive (A)  Negative Final    Leukocytes 04/10/2024 Trace (A)  Negative Final    Urine Culture 04/10/2024 >100,000 CFU/mL Escherichia coli (A)   Final   Clinical Support on 02/16/2024   Component Date Value Ref Range Status    Amphet/Methamphet, Screen 02/16/2024 Negative  Negative Final    Barbiturates Screen, Urine 02/16/2024 Negative  Negative Final    Benzodiazepine Screen, Urine 02/16/2024 Negative  Negative Final    Cocaine Screen, Urine 02/16/2024 Negative  Negative Final    Opiate Screen 02/16/2024 Negative  Negative Final    THC, Screen, Urine 02/16/2024 Negative  Negative Final    Methadone Screen, Urine 02/16/2024 Negative  Negative Final    Oxycodone Screen, Urine 02/16/2024 Negative  Negative Final    Fentanyl, Urine 02/16/2024 Negative  Negative Final   Office Visit on 02/02/2024   Component Date Value Ref Range Status    Testosterone, Total 02/02/2024 <3 (L)  4 - 50 ng/dL Final    Testosterone, Free 02/02/2024 <0.2  0.0 - 4.2 pg/mL Final    FSH 02/02/2024 3.35  mIU/mL Final    LH 02/02/2024 2.90  mIU/mL Final    Prolactin 02/02/2024 15.10  4.79 - 23.30 ng/mL Final    Estrogen 02/02/2024 234  pg/mL Final                              Prepubertal             < 40                           Female Cycle:                             1-10 Days         16 - 328                             11-20 Days        34 - 501                             21-30 Days        48 - 350                             Post-Menopausal   40 - 244    Progesterone 02/02/2024 0.24  ng/mL Final   Clinical Support on 01/18/2024   Component Date Value Ref Range Status    HCG, Urine, QL 01/18/2024 Negative  Negative Final    Lot Number 01/18/2024 [id number]   Final    Internal Positive Control 01/18/2024 Passed  Positive, Passed Final    Internal Negative Control 01/18/2024 Passed  Negative, Passed Final    Expiration Date 01/18/2024 01/03/2025   Final   Office Visit on 01/10/2024   Component Date Value Ref Range Status    Color 01/10/2024 Yellow  Yellow, Straw, Dark Yellow, Sheri Final    Clarity, UA 01/10/2024 Clear  Clear Final    Specific Gravity  01/10/2024 1.025  1.005 - 1.030 Final    pH, Urine 01/10/2024 6.5  5.0 - 8.0 Final    Leukocytes 01/10/2024 Negative  Negative Final    Nitrite, UA 01/10/2024 Negative  Negative Final    Protein, POC 01/10/2024 Negative  Negative mg/dL Final    Glucose, UA 01/10/2024 Negative  Negative mg/dL Final    Ketones, UA 01/10/2024 Negative  Negative Final    Urobilinogen, UA 01/10/2024 0.2 E.U./dL  Normal, 0.2 E.U./dL Final    Bilirubin 01/10/2024 Negative  Negative Final    Blood, UA 01/10/2024 Negative  Negative Final    Lot Number 01/10/2024 [id number]   Final    Expiration Date 01/10/2024 12/31/24   Final   Office Visit on 12/15/2023   Component Date Value Ref Range Status    Diagnosis 12/15/2023 Comment   Final    NEGATIVE FOR INTRAEPITHELIAL LESION OR MALIGNANCY.  SPECIMEN REPROCESSED FOR INTERPRETATION USING GLACIAL ACETIC ACID  (GAA).    Specimen adequacy: 12/15/2023 Comment   Final    Satisfactory for evaluation.  Endocervical and/or squamous metaplastic  cells (endocervical component) are present.  Areas of partially  obscuring blood are present.    Clinician Provided ICD-10: 12/15/2023 Comment   Final    Z12.4    Performed by: 12/15/2023 Comment   Final    Shannon Guevara, Cytotechnologist (ASCP)    . 12/15/2023 .   Final    Note: 12/15/2023 Comment   Final    The Pap smear is a screening test designed to aid in the detection of  premalignant and malignant conditions of the uterine cervix.  It is not a  diagnostic procedure and should not be used as the sole means of detecting  cervical cancer.  Both false-positive and false-negative reports do occur.    Method: 12/15/2023 Comment   Final    This liquid based ThinPrep(R) pap test was screened with the  use of an image guided system.    HPV Aptima 12/15/2023 Negative  Negative Final    This nucleic acid amplification test detects fourteen high-risk  HPV types (16,18,31,33,35,39,45,51,52,56,58,59,66,68) without  differentiation.    GARDNERELLA VAGINALIS 12/15/2023 Negative  Negative Final    TRICHOMONAS VAGINALIS 12/15/2023 Negative  Negative Final    GRETA SPECIES 12/15/2023 Positive (A)  Negative Final   Admission on 11/28/2023, Discharged on 11/29/2023   Component Date Value Ref Range Status    Glucose 11/28/2023 77  65 - 99 mg/dL Final    BUN 11/28/2023 12  6 - 20 mg/dL Final    Creatinine 11/28/2023 1.17 (H)  0.57 - 1.00 mg/dL Final    Sodium 11/28/2023 138  136 - 145 mmol/L Final    Potassium 11/28/2023 3.7  3.5 - 5.2 mmol/L Final    Chloride 11/28/2023 104  98 - 107 mmol/L Final    CO2 11/28/2023 22.1  22.0 - 29.0 mmol/L Final    Calcium 11/28/2023 9.3  8.6 - 10.5 mg/dL Final    Total Protein 11/28/2023 6.7  6.0 - 8.5 g/dL Final    Albumin 11/28/2023 4.1  3.5 - 5.2 g/dL Final    ALT (SGPT) 11/28/2023 23  1 - 33 U/L Final    AST (SGOT) 11/28/2023 23  1 - 32 U/L Final    Alkaline Phosphatase 11/28/2023 85  39 - 117 U/L Final    Total Bilirubin 11/28/2023 0.3  0.0 - 1.2 mg/dL Final    Globulin 11/28/2023 2.6  gm/dL Final    A/G Ratio 11/28/2023 1.6  g/dL Final     BUN/Creatinine Ratio 11/28/2023 10.3  7.0 - 25.0 Final    Anion Gap 11/28/2023 11.9  5.0 - 15.0 mmol/L Final    eGFR 11/28/2023 59.1 (L)  >60.0 mL/min/1.73 Final    Lipase 11/28/2023 70 (H)  13 - 60 U/L Final    Color, UA 11/28/2023 Dark Yellow (A)  Yellow, Straw Final    Appearance, UA 11/28/2023 Cloudy (A)  Clear Final    pH, UA 11/28/2023 5.5  5.0 - 8.0 Final    Specific Randolph, UA 11/28/2023 1.023  1.005 - 1.030 Final    Glucose, UA 11/28/2023 Negative  Negative Final    Ketones, UA 11/28/2023 15 mg/dL (1+) (A)  Negative Final    Bilirubin, UA 11/28/2023 Negative  Negative Final    Blood, UA 11/28/2023 Negative  Negative Final    Protein, UA 11/28/2023 Negative  Negative Final    Leuk Esterase, UA 11/28/2023 Trace (A)  Negative Final    Nitrite, UA 11/28/2023 Negative  Negative Final    Urobilinogen, UA 11/28/2023 1.0 E.U./dL  0.2 - 1.0 E.U./dL Final    HCG Quantitative 11/28/2023 <0.50  mIU/mL Final    Extra Tube 11/28/2023 Hold for add-ons.   Final    Auto resulted.    Extra Tube 11/28/2023 hold for add-on   Final    Auto resulted    Extra Tube 11/28/2023 Hold for add-ons.   Final    Auto resulted.    Extra Tube 11/28/2023 Hold for add-ons.   Final    Auto resulted    WBC 11/28/2023 9.83  3.40 - 10.80 10*3/mm3 Final    RBC 11/28/2023 4.59  3.77 - 5.28 10*6/mm3 Final    Hemoglobin 11/28/2023 13.0  12.0 - 15.9 g/dL Final    Hematocrit 11/28/2023 40.0  34.0 - 46.6 % Final    MCV 11/28/2023 87.1  79.0 - 97.0 fL Final    MCH 11/28/2023 28.3  26.6 - 33.0 pg Final    MCHC 11/28/2023 32.5  31.5 - 35.7 g/dL Final    RDW 11/28/2023 13.5  12.3 - 15.4 % Final    RDW-SD 11/28/2023 42.8  37.0 - 54.0 fl Final    MPV 11/28/2023 9.7  6.0 - 12.0 fL Final    Platelets 11/28/2023 227  140 - 450 10*3/mm3 Final    Neutrophil % 11/28/2023 60.3  42.7 - 76.0 % Final    Lymphocyte % 11/28/2023 30.1  19.6 - 45.3 % Final    Monocyte % 11/28/2023 7.1  5.0 - 12.0 % Final    Eosinophil % 11/28/2023 1.1  0.3 - 6.2 % Final    Basophil %  11/28/2023 0.4  0.0 - 1.5 % Final    Immature Grans % 11/28/2023 1.0 (H)  0.0 - 0.5 % Final    Neutrophils, Absolute 11/28/2023 5.92  1.70 - 7.00 10*3/mm3 Final    Lymphocytes, Absolute 11/28/2023 2.96  0.70 - 3.10 10*3/mm3 Final    Monocytes, Absolute 11/28/2023 0.70  0.10 - 0.90 10*3/mm3 Final    Eosinophils, Absolute 11/28/2023 0.11  0.00 - 0.40 10*3/mm3 Final    Basophils, Absolute 11/28/2023 0.04  0.00 - 0.20 10*3/mm3 Final    Immature Grans, Absolute 11/28/2023 0.10 (H)  0.00 - 0.05 10*3/mm3 Final    nRBC 11/28/2023 0.0  0.0 - 0.2 /100 WBC Final    RBC, UA 11/28/2023 3-5 (A)  None Seen, 0-2 /HPF Final    WBC, UA 11/28/2023 0-2  None Seen, 0-2 /HPF Final    Bacteria, UA 11/28/2023 None Seen  None Seen /HPF Final    Squamous Epithelial Cells, UA 11/28/2023 0-2  None Seen, 0-2 /HPF Final    Hyaline Casts, UA 11/28/2023 None Seen  None Seen /LPF Final    Calcium Oxalate Crystals, UA 11/28/2023 Moderate/2+  None Seen /HPF Final    Methodology 11/28/2023 Manual Light Microscopy   Final   There may be more visits with results that are not included.       ASSESSMENT AND PLAN:    ICD-10-CM ICD-9-CM   1. Post traumatic stress disorder (PTSD)  F43.10 309.81   2. MDD (major depressive disorder), recurrent episode, moderate  F33.1 296.32   3. Body image disturbance  F45.22 300.7   4. Irritability and anger  R45.4 799.22       Bianca is a 44 y.o. female who presents today for follow-up regarding medication management. We have discussed the interval history and the treatment plan below, including potential R/B/SE of the recommended regimen of which the patient demonstrates understanding. Patient is agreeable to call 911 or go to the nearest ER should she become concerned for her own safety and/or the safety of those around her. There are are overt indices of acute kathryn/psychosis on exam today. NELSON reviewed and is as expected.    Medication regimen: Continue bupropion  mg p.o. daily continue buspirone 5 mg p.o. 3  times daily continue desvenlafaxine 50 mg p.o. daily continue trazodone 100 mg nightly; patient is advised not to misuse prescribed medications or to use them with any exogenous substances that aren't disclosed to this provider as they may interact with the regimen to the patient's detriment.   Risk Assessment: protracted risk is moderate, imminent risk is moderate do note that this is subject to change with the Latter day of new stressors, treatment non-adherence, use of substances, and/or new medical ails.   Monitoring: reviewed labs/imaging as populated above; ordered  Therapy: Loretta Bradshaw  Follow-up: as needed, June 24, 2024  Communications: N/A    TREATMENT PLAN/GOALS: challenge patterns of living conducive to symptom burden, implement recommended regimen as above with augmentative, intermittent supportive psychotherapy to reduce symptom burden. Patient acknowledged and verbally consented to continue treatment. The importance of adherence to the recommended treatment and interval follow-up appointments was again emphasized today: patient has good treatment adherence per given history. Patient was today reminded to limit daily caffeine intake, hydrate appropriately, eat healthy and nutritious foods, engage sleep hygiene measures, engage appropriate exposure to sunlight, engage with hobbies in balance with life necessities, and exercise appropriate to their capacity to do so.       Parts of this note are electronic transcriptions/translations of spoken language to printed text using the Dragon Dictation system.    Electronically signed by CARLTON Carbajal, 05/16/24

## 2024-05-17 ENCOUNTER — OFFICE VISIT (OUTPATIENT)
Dept: PSYCHIATRY | Facility: CLINIC | Age: 44
End: 2024-05-17
Payer: COMMERCIAL

## 2024-05-17 VITALS
HEIGHT: 67 IN | BODY MASS INDEX: 34.69 KG/M2 | SYSTOLIC BLOOD PRESSURE: 110 MMHG | DIASTOLIC BLOOD PRESSURE: 63 MMHG | WEIGHT: 221 LBS | HEART RATE: 74 BPM

## 2024-05-17 DIAGNOSIS — F45.22 BODY IMAGE DISTURBANCE: ICD-10-CM

## 2024-05-17 DIAGNOSIS — F43.10 POST TRAUMATIC STRESS DISORDER (PTSD): Primary | ICD-10-CM

## 2024-05-17 DIAGNOSIS — F33.1 MDD (MAJOR DEPRESSIVE DISORDER), RECURRENT EPISODE, MODERATE: ICD-10-CM

## 2024-05-17 DIAGNOSIS — R45.4 IRRITABILITY AND ANGER: ICD-10-CM

## 2024-05-17 NOTE — PRE-PROCEDURE INSTRUCTIONS
"Instructed on date and arrival time of 1200. Come to entrance \"C\". Must have  over age 18 to drive home.  May have two visitors; however, children under 12 must stay in waiting room.  Discussed clear liquid diet (no red or purple), bowel prep, and NPO.  May take medications as usual except for blood thinners, diabetic medications, and weight loss medications.  Verbalized understanding of instructions given.  Instructed to call for questions or concerns.  Hold Mounjaro for one week prior to procedure.  "

## 2024-05-22 ENCOUNTER — ANESTHESIA EVENT (OUTPATIENT)
Dept: GASTROENTEROLOGY | Facility: HOSPITAL | Age: 44
End: 2024-05-22
Payer: COMMERCIAL

## 2024-05-22 NOTE — ANESTHESIA PREPROCEDURE EVALUATION
Anesthesia Evaluation     Patient summary reviewed and Nursing notes reviewed   no history of anesthetic complications:   NPO Solid Status: > 8 hours  NPO Liquid Status: > 2 hours           Airway   Mallampati: II  TM distance: >3 FB  Neck ROM: full  No difficulty expected and Small opening  Dental - normal exam     Pulmonary - normal exam    breath sounds clear to auscultation  (+) a smoker (quit 5 yrs prior) Former,  Cardiovascular - normal exam  Exercise tolerance: good (4-7 METS)    Patient on routine beta blocker and Beta blocker given within 24 hours of surgery  Rhythm: regular  Rate: normal    (+) hypertension well controlled, hyperlipidemia      Neuro/Psych  (+) headaches, psychiatric history Anxiety and Depression  GI/Hepatic/Renal/Endo    (+) obesity, GERD well controlled, renal disease- stones, diabetes mellitus type 2 well controlled    Musculoskeletal     Abdominal  - normal exam    Abdomen: soft.  Bowel sounds: normal.   Substance History - negative use     OB/GYN negative ob/gyn ROS         Other   arthritis,   history of cancer (Renal cell carcinoma right kidney s/p partial nephrectomy)    ROS/Med Hx Other: Takes GLP-1 Agonist last dose 5/12/24              Anesthesia Plan    ASA 3     general   total IV anesthesia  (Patient understands anesthesia not responsible for dental damage.)  intravenous induction     Anesthetic plan, risks, benefits, and alternatives have been provided, discussed and informed consent has been obtained with: patient.  Pre-procedure education provided  Use of blood products discussed with patient .    Plan discussed with CRNA.    CODE STATUS:

## 2024-05-23 ENCOUNTER — HOSPITAL ENCOUNTER (OUTPATIENT)
Facility: HOSPITAL | Age: 44
Setting detail: HOSPITAL OUTPATIENT SURGERY
Discharge: HOME | End: 2024-05-23
Attending: INTERNAL MEDICINE | Admitting: INTERNAL MEDICINE
Payer: COMMERCIAL

## 2024-05-23 ENCOUNTER — ANESTHESIA (OUTPATIENT)
Dept: GASTROENTEROLOGY | Facility: HOSPITAL | Age: 44
End: 2024-05-23
Payer: COMMERCIAL

## 2024-05-23 VITALS
SYSTOLIC BLOOD PRESSURE: 104 MMHG | OXYGEN SATURATION: 99 % | HEART RATE: 75 BPM | TEMPERATURE: 97.5 F | WEIGHT: 220.9 LBS | DIASTOLIC BLOOD PRESSURE: 66 MMHG | RESPIRATION RATE: 15 BRPM | BODY MASS INDEX: 34.59 KG/M2

## 2024-05-23 DIAGNOSIS — Z12.11 ENCOUNTER FOR SCREENING FOR MALIGNANT NEOPLASM OF COLON: ICD-10-CM

## 2024-05-23 DIAGNOSIS — Z80.0 FAMILY HISTORY OF COLON CANCER: ICD-10-CM

## 2024-05-23 LAB
B-HCG UR QL: NEGATIVE
GLUCOSE BLDC GLUCOMTR-MCNC: 80 MG/DL (ref 70–99)

## 2024-05-23 PROCEDURE — 82948 REAGENT STRIP/BLOOD GLUCOSE: CPT | Performed by: NURSE ANESTHETIST, CERTIFIED REGISTERED

## 2024-05-23 PROCEDURE — 25810000003 LACTATED RINGERS PER 1000 ML: Performed by: NURSE ANESTHETIST, CERTIFIED REGISTERED

## 2024-05-23 PROCEDURE — 25010000002 PROPOFOL 10 MG/ML EMULSION: Performed by: NURSE ANESTHETIST, CERTIFIED REGISTERED

## 2024-05-23 PROCEDURE — 45378 DIAGNOSTIC COLONOSCOPY: CPT | Performed by: INTERNAL MEDICINE

## 2024-05-23 PROCEDURE — 81025 URINE PREGNANCY TEST: CPT | Performed by: INTERNAL MEDICINE

## 2024-05-23 RX ORDER — PROPOFOL 10 MG/ML
VIAL (ML) INTRAVENOUS AS NEEDED
Status: DISCONTINUED | OUTPATIENT
Start: 2024-05-23 | End: 2024-05-23 | Stop reason: SURG

## 2024-05-23 RX ORDER — LIDOCAINE HYDROCHLORIDE 20 MG/ML
INJECTION, SOLUTION EPIDURAL; INFILTRATION; INTRACAUDAL; PERINEURAL AS NEEDED
Status: DISCONTINUED | OUTPATIENT
Start: 2024-05-23 | End: 2024-05-23 | Stop reason: SURG

## 2024-05-23 RX ORDER — SODIUM CHLORIDE, SODIUM LACTATE, POTASSIUM CHLORIDE, CALCIUM CHLORIDE 600; 310; 30; 20 MG/100ML; MG/100ML; MG/100ML; MG/100ML
INJECTION, SOLUTION INTRAVENOUS CONTINUOUS PRN
Status: DISCONTINUED | OUTPATIENT
Start: 2024-05-23 | End: 2024-05-23 | Stop reason: SURG

## 2024-05-23 RX ORDER — SODIUM CHLORIDE, SODIUM LACTATE, POTASSIUM CHLORIDE, CALCIUM CHLORIDE 600; 310; 30; 20 MG/100ML; MG/100ML; MG/100ML; MG/100ML
30 INJECTION, SOLUTION INTRAVENOUS CONTINUOUS
Status: DISCONTINUED | OUTPATIENT
Start: 2024-05-23 | End: 2024-05-23 | Stop reason: HOSPADM

## 2024-05-23 RX ADMIN — SODIUM CHLORIDE, POTASSIUM CHLORIDE, SODIUM LACTATE AND CALCIUM CHLORIDE: 600; 310; 30; 20 INJECTION, SOLUTION INTRAVENOUS at 13:57

## 2024-05-23 RX ADMIN — PROPOFOL 250 MCG/KG/MIN: 10 INJECTION, EMULSION INTRAVENOUS at 13:58

## 2024-05-23 RX ADMIN — LIDOCAINE HYDROCHLORIDE 100 MG: 20 INJECTION, SOLUTION INTRAVENOUS at 13:58

## 2024-05-23 RX ADMIN — PROPOFOL 50 MG: 10 INJECTION, EMULSION INTRAVENOUS at 13:58

## 2024-05-23 RX ADMIN — SODIUM CHLORIDE, POTASSIUM CHLORIDE, SODIUM LACTATE AND CALCIUM CHLORIDE 30 ML/HR: 600; 310; 30; 20 INJECTION, SOLUTION INTRAVENOUS at 13:31

## 2024-05-23 NOTE — ANESTHESIA POSTPROCEDURE EVALUATION
Patient: Bianca Boles    Procedure Summary       Date: 05/23/24 Room / Location: [facility] ENDOSCOPY 2 / [facility] ENDOSCOPY    Anesthesia Start: 1357 Anesthesia Stop: 1435    Procedure: COLONOSCOPY Diagnosis:       Encounter for screening for malignant neoplasm of colon      Family history of colon cancer      (Encounter for screening for malignant neoplasm of colon [Z12.11])      (Family history of colon cancer [Z80.0])    Surgeons: Terrence Fry MD Provider: Paulina Mcfarland CRNA    Anesthesia Type: general ASA Status: 3            Anesthesia Type: general    Vitals  Vitals Value Taken Time   BP     Temp     Pulse 68 05/23/24 1439   Resp     SpO2 98 % 05/23/24 1438   Vitals shown include unfiled device data.        Post Anesthesia Care and Evaluation    Patient location during evaluation: bedside  Patient participation: complete - patient participated  Level of consciousness: awake  Pain management: adequate    Airway patency: patent  Anesthetic complications: No anesthetic complications  PONV Status: controlled  Cardiovascular status: acceptable and stable  Respiratory status: acceptable

## 2024-05-23 NOTE — H&P
Pre Procedure History & Physical    Chief Complaint:   constipation    Subjective     HPI:   As above    Past Medical History:   Past Medical History:   Diagnosis Date    Acid reflux     Anxiety     Anxiety and depression     Benign essential hypertension     Borderline personality disorder     Cancer     renal cancer/mass, PARTIAL NEPH, RIGHT    Diabetes     Diabetes mellitus     type ii, doesn't check bg at home     Diabetes mellitus, type 2     Elevated cholesterol     Frozen shoulder 2023    GERD (gastroesophageal reflux disease)     High triglycerides     History of bariatric surgery 2021    GASTRIC SLEEVE    History of kidney stones     HTN (hypertension)     Hyperlipemia     Hyperlipidemia     Hypertriglyceridemia     Lumbar herniated disc     Obesity     Panic attacks     PCOS (polycystic ovarian syndrome)     Periarthritis of shoulder     Psoriasis     ELBOWS    Rotator cuff syndrome     Seasonal allergies     Self-injurious behavior     Spinal headache     AFTER PAIN EPIDURALS.  NO BLOOD PATCH    Suicide attempt        Past Surgical History:  Past Surgical History:   Procedure Laterality Date    ABDOMINAL SURGERY  2020    Gastric sleeve    ADENOIDECTOMY  1984     SECTION  ,    CYSTOSCOPY BLADDER STONE LITHOTRIPSY      EAR TUBES  1984    ENDOSCOPY N/A 10/20/2020    Procedure: ESOPHAGOGASTRODUODENOSCOPY WITH BIOPSY;  Surgeon: Fidel Grajeda Jr., MD;  Location: [facility] ENDOSCOPY;  Service: General;  Laterality: N/A;  PRE- GERD  POST- RETAINED FOOD, GASTRITIS, GASTRIC POLYPS    ENDOSCOPY  2014    FOOT FRACTURE SURGERY Left 2017    screw placed    GASTRECTOMY  2020    GASTRIC SLEEVE LAPAROSCOPIC N/A 2020    Procedure: GASTRIC SLEEVE LAPAROSCOPIC;  Surgeon: Fidel Grajeda Jr., MD;  Location[place] [facility] [place] [facility];  Service: Bariatric;  Laterality: N/A;    NEPHRECTOMY PARTIAL Right 11/15/2021    Procedure: NEPHRECTOMY PARTIAL LAPAROSCOPIC WITH DAVINCI  ROBOT;  Surgeon: Lori Troy MD;  Location: [facility] OR;  Service: Robotics - DaVinci;  Laterality: Right;    SHOULDER ARTHROSCOPY Left 08/14/2023    Procedure: LEFT SHOULDER MANIPULATION;  Surgeon: Domenic Bradley MD;  Location: [facility];  Service: Orthopedics;  Laterality: Left;    TONSILLECTOMY  1984    UPPER GASTROINTESTINAL ENDOSCOPY      URETEROSCOPY LASER LITHOTRIPSY WITH STENT INSERTION Right 09/28/2021    Procedure: CYSTOSCOPY, RIGHT URETEROSCOPY, LASERTRIPSY, STONE BASKET EXTRACTION AND STENT INSERTION;  Surgeon: Lori Troy MD;  Location: [facility] OR;  Service: Urology;  Laterality: Right;    URETEROSCOPY LASER LITHOTRIPSY WITH STENT INSERTION Left 11/08/2022    Procedure: URETEROSCOPY LASER LITHOTRIPSY WITH URETERAL STENT INSERTION;  Surgeon: Lori Troy MD;  Location: [facility] OR;  Service: Urology;  Laterality: Left;       Family History:  Family History   Problem Relation Age of Onset    Cancer Mother         Breast    Diabetes Father     Hypertension Father     Heart disease Father     Cancer Father         Bladder prostate liver    Colon cancer Father         malignant, currently 62    No Known Problems Sister     No Known Problems Brother     No Known Problems Maternal Aunt     No Known Problems Paternal Aunt     No Known Problems Maternal Uncle     No Known Problems Paternal Uncle     No Known Problems Maternal Grandfather     Stroke Maternal Grandmother     Heart disease Maternal Grandmother     Diabetes Paternal Grandfather     Heart disease Paternal Grandfather     No Known Problems Paternal Grandmother     No Known Problems Cousin     Malig Hyperthermia Neg Hx     ADD / ADHD Neg Hx     Alcohol abuse Neg Hx     Anxiety disorder Neg Hx     Bipolar disorder Neg Hx     Dementia Neg Hx     Depression Neg Hx     Drug abuse Neg Hx     OCD Neg Hx     Paranoid behavior Neg Hx     Schizophrenia Neg Hx     Seizures Neg Hx     Self-Injurious Behavior  Neg Hx     Suicide  Attempts Neg Hx        Social History:   reports that she quit smoking about 4 years ago. Her smoking use included cigarettes. She started smoking about 39 years ago. She has a 8.8 pack-year smoking history. She has never been exposed to tobacco smoke. She has never used smokeless tobacco. She reports current alcohol use. She reports that she does not currently use drugs after having used the following drugs: Marijuana. Frequency: 0.10 times per week.    Medications:   Facility-Administered Medications Prior to Admission   Medication Dose Route Frequency Provider Last Rate Last Admin    MedroxyPROGESTERone Acetate (DEPO-PROVERA) injection 150 mg  150 mg Intramuscular Q3 Months Coljules, Patricia Breann, APRN   150 mg at 04/24/24 1019     Medications Prior to Admission   Medication Sig Dispense Refill Last Dose    Apremilast (Otezla) 30 MG tablet Take 30 mg by mouth 2 (Two) Times a Day. 60 tablet 5 5/22/2024    Biotin 54837 MCG tablet Take 1 tablet by mouth Every Night.   5/22/2024    buPROPion XL (WELLBUTRIN XL) 300 MG 24 hr tablet Take 1 tablet by mouth Daily. 90 tablet 1 5/22/2024    busPIRone (BUSPAR) 5 MG tablet Take 1 tablet by mouth 3 (Three) Times a Day. 270 tablet 1 5/22/2024    CALCIUM-MAGNESIUM-ZINC PO Take 3 tablets by mouth Daily.   5/22/2024    cetirizine (zyrTEC) 10 MG tablet Take 1 tablet by mouth Daily As Needed.   5/22/2024    Cholecalciferol 25 MCG (1000 UT) capsule Take 2 capsules by mouth Daily. 180 capsule 1 5/22/2024    clobetasol (TEMOVATE) 0.05 % external solution Apply 10-15 drops every day to affected areas in the scalp after shampooing until clear. (Patient taking differently: Apply  topically to the appropriate area as directed Daily As Needed.) 50 mL 3 Past Month    COLLAGEN PO Take 3 tablets by mouth Daily.   5/22/2024    dapagliflozin (Farxiga) 5 MG tablet tablet Take 1 tablet by mouth Daily. 90 tablet 1 5/22/2024    desvenlafaxine (PRISTIQ) 50 MG 24 hr tablet Take 1 tablet by mouth  Daily. 90 tablet 1 5/22/2024    Diclofenac Sodium (VOLTAREN) 1 % gel gel Apply 4 g topically to the appropriate area as directed 4 (Four) Times a Day As Needed (pain). 200 g 1 Past Month    fluticasone (FLONASE) 50 MCG/ACT nasal spray USE 2 SPRAYS IN EACH NOSTRIL ONCE DAILY AS DIRECTED (Patient taking differently: 2 sprays into the nostril(s) as directed by provider At Night As Needed.) 48 g 1 Past Week    IRON-VITAMIN C PO Take 1 tablet by mouth Daily.   5/22/2024    linaclotide (Linzess) 72 MCG capsule capsule Take 1 capsule by mouth Every Morning Before Breakfast for 90 days. 90 capsule 1 5/22/2024    metoprolol succinate XL (Toprol XL) 25 MG 24 hr tablet Take 1 tablet by mouth Every Night. 90 tablet 1 5/22/2024    Multiple Vitamins-Minerals (MULTIVITAMIN PO) Take 1 tablet by mouth Every Night.   5/22/2024    ondansetron ODT (ZOFRAN-ODT) 4 MG disintegrating tablet Place 1 tablet on the tongue Every 8 (Eight) Hours As Needed for Nausea. 21 tablet 5 Past Month    pravastatin (PRAVACHOL) 20 MG tablet Take 1 tablet by mouth Every Night. 90 tablet 1 5/22/2024    Probiotic Product (PROBIOTIC-10 PO) Take 1 tablet by mouth Every Night.   5/22/2024    testosterone micronized powder apply 2 clicks (0.5ml) topically EVERY DAY 15 g 0 5/22/2024    topiramate (TOPAMAX) 50 MG tablet Take 1 tablet by mouth every night at bedtime for 180 days. 90 tablet 1 5/22/2024    traZODone (DESYREL) 100 MG tablet Take 1 tablet by mouth Every Night. 90 tablet 1 Past Week    Tirzepatide (MOUNJARO) 12.5 MG/0.5ML solution pen-injector pen Inject 0.5 mL under the skin into the appropriate area as directed 1 (One) Time Per Week. 2 mL 6 5/12/2024       Allergies:  Morphine        Objective     Blood pressure 112/76, pulse 62, temperature 98.5 °F (36.9 °C), temperature source Temporal, resp. rate 16, weight 100 kg (220 lb 14.4 oz), SpO2 98%, not currently breastfeeding.    Physical Exam   Constitutional: Pt is oriented to person, place, and time and  well-developed, well-nourished, and in no distress.   Mouth/Throat: Oropharynx is clear and moist.   Neck: Normal range of motion.   Cardiovascular: Normal rate, regular rhythm and normal heart sounds.    Pulmonary/Chest: Effort normal and breath sounds normal.   Abdominal: Soft. Nontender  Skin: Skin is warm and dry.   Psychiatric: Mood, memory, affect and judgment normal.     Assessment & Plan     Diagnosis:  Constipation      Anticipated Surgical Procedure:  colonoscopy    The risks, benefits, and alternatives of this procedure have been discussed with the patient or the responsible party- the patient understands and agrees to proceed.

## 2024-05-23 NOTE — ANESTHESIA POSTPROCEDURE EVALUATION
Patient: Bianca Boles    Procedure Summary       Date: 05/23/24 Room / Location: [facility] ENDOSCOPY 2 / [facility] ENDOSCOPY    Anesthesia Start: 1357 Anesthesia Stop: 1435    Procedure: COLONOSCOPY Diagnosis:       Encounter for screening for malignant neoplasm of colon      Family history of colon cancer      (Encounter for screening for malignant neoplasm of colon [Z12.11])      (Family history of colon cancer [Z80.0])    Surgeons: Terrence Fry MD Provider: Paulina Mcfarland CRNA    Anesthesia Type: general ASA Status: 3            Anesthesia Type: general    Vitals  No vitals data found for the desired time range.          Post Anesthesia Care and Evaluation    Post-procedure mental status: acceptable.  Pain management: satisfactory to patient    Airway patency: patent  Anesthetic complications: No anesthetic complications    Cardiovascular status: acceptable  Respiratory status: acceptable    Comments: Per chart review

## 2024-05-24 ENCOUNTER — TELEPHONE (OUTPATIENT)
Dept: GASTROENTEROLOGY | Facility: CLINIC | Age: 44
End: 2024-05-24
Payer: COMMERCIAL

## 2024-05-24 DIAGNOSIS — R68.82 LOW LIBIDO: ICD-10-CM

## 2024-05-24 DIAGNOSIS — R79.89 LOW TESTOSTERONE LEVEL IN FEMALE: ICD-10-CM

## 2024-05-24 RX ORDER — TESTOSTERONE MICRONIZED 100 %
POWDER (GRAM) MISCELLANEOUS
Qty: 15 G | Refills: 0 | Status: SHIPPED | OUTPATIENT
Start: 2024-05-24

## 2024-06-26 DIAGNOSIS — R79.89 LOW TESTOSTERONE LEVEL IN FEMALE: ICD-10-CM

## 2024-06-26 DIAGNOSIS — R68.82 LOW LIBIDO: ICD-10-CM

## 2024-06-26 RX ORDER — TESTOSTERONE MICRONIZED 100 %
POWDER (GRAM) MISCELLANEOUS
Qty: 15 G | Refills: 0 | Status: SHIPPED | OUTPATIENT
Start: 2024-06-26

## 2024-07-05 ENCOUNTER — LAB (OUTPATIENT)
Dept: LAB | Facility: HOSPITAL | Age: 44
End: 2024-07-05
Payer: COMMERCIAL

## 2024-07-05 DIAGNOSIS — E55.9 VITAMIN D DEFICIENCY: ICD-10-CM

## 2024-07-05 DIAGNOSIS — E11.9 TYPE 2 DIABETES MELLITUS WITHOUT COMPLICATION, WITHOUT LONG-TERM CURRENT USE OF INSULIN: ICD-10-CM

## 2024-07-05 DIAGNOSIS — E78.2 MIXED HYPERLIPIDEMIA: ICD-10-CM

## 2024-07-05 LAB
25(OH)D3 SERPL-MCNC: 84.5 NG/ML (ref 30–100)
ALBUMIN SERPL-MCNC: 4.5 G/DL (ref 3.5–5.2)
ALBUMIN UR-MCNC: 7.1 MG/DL
ALBUMIN/GLOB SERPL: 1.7 G/DL
ALP SERPL-CCNC: 88 U/L (ref 39–117)
ALT SERPL W P-5'-P-CCNC: 21 U/L (ref 1–33)
ANION GAP SERPL CALCULATED.3IONS-SCNC: 13 MMOL/L (ref 5–15)
AST SERPL-CCNC: 21 U/L (ref 1–32)
BACTERIA UR QL AUTO: ABNORMAL /HPF
BASOPHILS # BLD AUTO: 0.06 10*3/MM3 (ref 0–0.2)
BASOPHILS NFR BLD AUTO: 0.6 % (ref 0–1.5)
BILIRUB SERPL-MCNC: 0.3 MG/DL (ref 0–1.2)
BILIRUB UR QL STRIP: NEGATIVE
BUN SERPL-MCNC: 14 MG/DL (ref 6–20)
BUN/CREAT SERPL: 10.5 (ref 7–25)
CALCIUM SPEC-SCNC: 9.2 MG/DL (ref 8.6–10.5)
CHLORIDE SERPL-SCNC: 107 MMOL/L (ref 98–107)
CHOLEST SERPL-MCNC: 152 MG/DL (ref 0–200)
CLARITY UR: ABNORMAL
CO2 SERPL-SCNC: 20 MMOL/L (ref 22–29)
COLOR UR: ABNORMAL
CREAT SERPL-MCNC: 1.33 MG/DL (ref 0.57–1)
CREAT UR-MCNC: 248.9 MG/DL
DEPRECATED RDW RBC AUTO: 38 FL (ref 37–54)
EGFRCR SERPLBLD CKD-EPI 2021: 50.4 ML/MIN/1.73
EOSINOPHIL # BLD AUTO: 0.09 10*3/MM3 (ref 0–0.4)
EOSINOPHIL NFR BLD AUTO: 0.9 % (ref 0.3–6.2)
ERYTHROCYTE [DISTWIDTH] IN BLOOD BY AUTOMATED COUNT: 12.7 % (ref 12.3–15.4)
GLOBULIN UR ELPH-MCNC: 2.7 GM/DL
GLUCOSE SERPL-MCNC: 96 MG/DL (ref 65–99)
GLUCOSE UR STRIP-MCNC: ABNORMAL MG/DL
HBA1C MFR BLD: 5.2 % (ref 4.8–5.6)
HCT VFR BLD AUTO: 44.6 % (ref 34–46.6)
HDLC SERPL-MCNC: 46 MG/DL (ref 40–60)
HGB BLD-MCNC: 15.2 G/DL (ref 12–15.9)
HGB UR QL STRIP.AUTO: NEGATIVE
HOLD SPECIMEN: NORMAL
HYALINE CASTS UR QL AUTO: ABNORMAL /LPF
IMM GRANULOCYTES # BLD AUTO: 0.07 10*3/MM3 (ref 0–0.05)
IMM GRANULOCYTES NFR BLD AUTO: 0.7 % (ref 0–0.5)
KETONES UR QL STRIP: ABNORMAL
LDLC SERPL CALC-MCNC: 88 MG/DL (ref 0–100)
LDLC/HDLC SERPL: 1.88 {RATIO}
LEUKOCYTE ESTERASE UR QL STRIP.AUTO: ABNORMAL
LYMPHOCYTES # BLD AUTO: 2.61 10*3/MM3 (ref 0.7–3.1)
LYMPHOCYTES NFR BLD AUTO: 25 % (ref 19.6–45.3)
MCH RBC QN AUTO: 28.6 PG (ref 26.6–33)
MCHC RBC AUTO-ENTMCNC: 34.1 G/DL (ref 31.5–35.7)
MCV RBC AUTO: 83.8 FL (ref 79–97)
MICROALBUMIN/CREAT UR: 28.5 MG/G (ref 0–29)
MONOCYTES # BLD AUTO: 0.62 10*3/MM3 (ref 0.1–0.9)
MONOCYTES NFR BLD AUTO: 5.9 % (ref 5–12)
NEUTROPHILS NFR BLD AUTO: 6.98 10*3/MM3 (ref 1.7–7)
NEUTROPHILS NFR BLD AUTO: 66.9 % (ref 42.7–76)
NITRITE UR QL STRIP: POSITIVE
NRBC BLD AUTO-RTO: 0 /100 WBC (ref 0–0.2)
PH UR STRIP.AUTO: 6.5 [PH] (ref 5–8)
PLATELET # BLD AUTO: 238 10*3/MM3 (ref 140–450)
PMV BLD AUTO: 10.2 FL (ref 6–12)
POTASSIUM SERPL-SCNC: 4.6 MMOL/L (ref 3.5–5.2)
PROT SERPL-MCNC: 7.2 G/DL (ref 6–8.5)
PROT UR QL STRIP: ABNORMAL
RBC # BLD AUTO: 5.32 10*6/MM3 (ref 3.77–5.28)
RBC # UR STRIP: ABNORMAL /HPF
REF LAB TEST METHOD: ABNORMAL
SODIUM SERPL-SCNC: 140 MMOL/L (ref 136–145)
SP GR UR STRIP: 1.03 (ref 1–1.03)
SQUAMOUS #/AREA URNS HPF: ABNORMAL /HPF
TRIGL SERPL-MCNC: 98 MG/DL (ref 0–150)
TSH SERPL DL<=0.05 MIU/L-ACNC: 0.81 UIU/ML (ref 0.27–4.2)
UROBILINOGEN UR QL STRIP: ABNORMAL
VLDLC SERPL-MCNC: 18 MG/DL (ref 5–40)
WBC # UR STRIP: ABNORMAL /HPF
WBC NRBC COR # BLD AUTO: 10.43 10*3/MM3 (ref 3.4–10.8)

## 2024-07-05 PROCEDURE — 87186 SC STD MICRODIL/AGAR DIL: CPT

## 2024-07-05 PROCEDURE — 83036 HEMOGLOBIN GLYCOSYLATED A1C: CPT

## 2024-07-05 PROCEDURE — 82570 ASSAY OF URINE CREATININE: CPT

## 2024-07-05 PROCEDURE — 87086 URINE CULTURE/COLONY COUNT: CPT

## 2024-07-05 PROCEDURE — 80061 LIPID PANEL: CPT

## 2024-07-05 PROCEDURE — 82043 UR ALBUMIN QUANTITATIVE: CPT

## 2024-07-05 PROCEDURE — 81001 URINALYSIS AUTO W/SCOPE: CPT

## 2024-07-05 PROCEDURE — 87088 URINE BACTERIA CULTURE: CPT

## 2024-07-05 PROCEDURE — 80050 GENERAL HEALTH PANEL: CPT

## 2024-07-05 PROCEDURE — 82306 VITAMIN D 25 HYDROXY: CPT

## 2024-07-07 RX ORDER — SULFAMETHOXAZOLE AND TRIMETHOPRIM 800; 160 MG/1; MG/1
1 TABLET ORAL 2 TIMES DAILY
Qty: 20 TABLET | Refills: 0 | Status: SHIPPED | OUTPATIENT
Start: 2024-07-07

## 2024-07-08 LAB — BACTERIA SPEC AEROBE CULT: ABNORMAL

## 2024-07-17 DIAGNOSIS — N30.00 ACUTE CYSTITIS WITHOUT HEMATURIA: ICD-10-CM

## 2024-07-17 DIAGNOSIS — N28.9 RENAL INSUFFICIENCY: Primary | ICD-10-CM

## 2024-08-01 ENCOUNTER — LAB (OUTPATIENT)
Dept: LAB | Facility: HOSPITAL | Age: 44
End: 2024-08-01
Payer: COMMERCIAL

## 2024-08-01 DIAGNOSIS — R79.89 LOW TESTOSTERONE LEVEL IN FEMALE: ICD-10-CM

## 2024-08-01 DIAGNOSIS — R68.82 LOW LIBIDO: ICD-10-CM

## 2024-08-01 LAB
ALBUMIN SERPL-MCNC: 4.1 G/DL (ref 3.5–5.2)
ALBUMIN/GLOB SERPL: 1.7 G/DL
ALP SERPL-CCNC: 72 U/L (ref 39–117)
ALT SERPL W P-5'-P-CCNC: 27 U/L (ref 1–33)
ANION GAP SERPL CALCULATED.3IONS-SCNC: 7 MMOL/L (ref 5–15)
AST SERPL-CCNC: 25 U/L (ref 1–32)
BACTERIA UR QL AUTO: ABNORMAL /HPF
BILIRUB SERPL-MCNC: 0.2 MG/DL (ref 0–1.2)
BILIRUB UR QL STRIP: NEGATIVE
BUN SERPL-MCNC: 14 MG/DL (ref 6–20)
BUN/CREAT SERPL: 12 (ref 7–25)
CALCIUM SPEC-SCNC: 9.3 MG/DL (ref 8.6–10.5)
CHLORIDE SERPL-SCNC: 108 MMOL/L (ref 98–107)
CLARITY UR: CLEAR
CO2 SERPL-SCNC: 24 MMOL/L (ref 22–29)
COLOR UR: YELLOW
CREAT SERPL-MCNC: 1.17 MG/DL (ref 0.57–1)
EGFRCR SERPLBLD CKD-EPI 2021: 58.8 ML/MIN/1.73
GLOBULIN UR ELPH-MCNC: 2.4 GM/DL
GLUCOSE SERPL-MCNC: 90 MG/DL (ref 65–99)
GLUCOSE UR STRIP-MCNC: ABNORMAL MG/DL
HGB UR QL STRIP.AUTO: NEGATIVE
HOLD SPECIMEN: NORMAL
HYALINE CASTS UR QL AUTO: ABNORMAL /LPF
KETONES UR QL STRIP: NEGATIVE
LEUKOCYTE ESTERASE UR QL STRIP.AUTO: ABNORMAL
NITRITE UR QL STRIP: POSITIVE
PH UR STRIP.AUTO: 8 [PH] (ref 5–8)
POTASSIUM SERPL-SCNC: 4.4 MMOL/L (ref 3.5–5.2)
PROT SERPL-MCNC: 6.5 G/DL (ref 6–8.5)
PROT UR QL STRIP: ABNORMAL
RBC # UR STRIP: ABNORMAL /HPF
REF LAB TEST METHOD: ABNORMAL
SODIUM SERPL-SCNC: 139 MMOL/L (ref 136–145)
SP GR UR STRIP: 1.02 (ref 1–1.03)
SQUAMOUS #/AREA URNS HPF: ABNORMAL /HPF
UROBILINOGEN UR QL STRIP: ABNORMAL
WBC # UR STRIP: ABNORMAL /HPF

## 2024-08-01 PROCEDURE — 80053 COMPREHEN METABOLIC PANEL: CPT | Performed by: NURSE PRACTITIONER

## 2024-08-01 PROCEDURE — 87088 URINE BACTERIA CULTURE: CPT | Performed by: NURSE PRACTITIONER

## 2024-08-01 PROCEDURE — 87086 URINE CULTURE/COLONY COUNT: CPT | Performed by: NURSE PRACTITIONER

## 2024-08-01 PROCEDURE — 81001 URINALYSIS AUTO W/SCOPE: CPT | Performed by: NURSE PRACTITIONER

## 2024-08-01 RX ORDER — TESTOSTERONE MICRONIZED 100 %
POWDER (GRAM) MISCELLANEOUS
Qty: 25 G | Refills: 0 | Status: SHIPPED | OUTPATIENT
Start: 2024-08-01

## 2024-08-03 LAB — BACTERIA SPEC AEROBE CULT: ABNORMAL

## 2024-08-03 RX ORDER — NITROFURANTOIN 25; 75 MG/1; MG/1
100 CAPSULE ORAL 2 TIMES DAILY
Qty: 14 CAPSULE | Refills: 0 | Status: SHIPPED | OUTPATIENT
Start: 2024-08-03

## 2024-08-08 ENCOUNTER — CLINICAL SUPPORT (OUTPATIENT)
Dept: FAMILY MEDICINE CLINIC | Facility: CLINIC | Age: 44
End: 2024-08-08
Payer: COMMERCIAL

## 2024-08-08 DIAGNOSIS — Z79.899 MEDICATION MANAGEMENT: Primary | ICD-10-CM

## 2024-08-08 LAB
B-HCG UR QL: NEGATIVE
EXPIRATION DATE: NORMAL
INTERNAL NEGATIVE CONTROL: NEGATIVE
INTERNAL POSITIVE CONTROL: POSITIVE
Lab: NORMAL

## 2024-08-08 PROCEDURE — 81025 URINE PREGNANCY TEST: CPT | Performed by: NURSE PRACTITIONER

## 2024-08-08 PROCEDURE — 96372 THER/PROPH/DIAG INJ SC/IM: CPT | Performed by: NURSE PRACTITIONER

## 2024-08-08 RX ORDER — MEDROXYPROGESTERONE ACETATE 150 MG/ML
150 INJECTION, SUSPENSION INTRAMUSCULAR ONCE
Status: DISCONTINUED | OUTPATIENT
Start: 2024-08-08 | End: 2024-08-08

## 2024-08-08 RX ADMIN — MEDROXYPROGESTERONE ACETATE 150 MG: 150 INJECTION, SUSPENSION INTRAMUSCULAR at 07:56

## 2024-08-13 ENCOUNTER — HOSPITAL ENCOUNTER (EMERGENCY)
Facility: HOSPITAL | Age: 44
Discharge: HOME | End: 2024-08-13
Attending: EMERGENCY MEDICINE
Payer: COMMERCIAL

## 2024-08-13 ENCOUNTER — APPOINTMENT (OUTPATIENT)
Dept: GENERAL RADIOLOGY | Facility: HOSPITAL | Age: 44
End: 2024-08-13
Payer: COMMERCIAL

## 2024-08-13 VITALS
RESPIRATION RATE: 18 BRPM | DIASTOLIC BLOOD PRESSURE: 70 MMHG | TEMPERATURE: 97.8 F | OXYGEN SATURATION: 100 % | WEIGHT: 219.14 LBS | HEIGHT: 67 IN | HEART RATE: 76 BPM | BODY MASS INDEX: 34.39 KG/M2 | SYSTOLIC BLOOD PRESSURE: 118 MMHG

## 2024-08-13 DIAGNOSIS — S20.212A RIB CONTUSION, LEFT, INITIAL ENCOUNTER: Primary | ICD-10-CM

## 2024-08-13 PROCEDURE — 73080 X-RAY EXAM OF ELBOW: CPT

## 2024-08-13 PROCEDURE — 71101 X-RAY EXAM UNILAT RIBS/CHEST: CPT

## 2024-08-13 PROCEDURE — 96372 THER/PROPH/DIAG INJ SC/IM: CPT

## 2024-08-13 PROCEDURE — 99283 EMERGENCY DEPT VISIT LOW MDM: CPT

## 2024-08-13 PROCEDURE — 25010000002 KETOROLAC TROMETHAMINE PER 15 MG

## 2024-08-13 RX ORDER — KETOROLAC TROMETHAMINE 30 MG/ML
30 INJECTION, SOLUTION INTRAMUSCULAR; INTRAVENOUS ONCE
Status: COMPLETED | OUTPATIENT
Start: 2024-08-13 | End: 2024-08-13

## 2024-08-13 RX ORDER — IBUPROFEN 600 MG/1
600 TABLET ORAL EVERY 6 HOURS PRN
Qty: 20 TABLET | Refills: 0 | Status: SHIPPED | OUTPATIENT
Start: 2024-08-13 | End: 2024-08-18

## 2024-08-13 RX ADMIN — KETOROLAC TROMETHAMINE 30 MG: 30 INJECTION, SOLUTION INTRAMUSCULAR; INTRAVENOUS at 12:22

## 2024-08-13 NOTE — DISCHARGE INSTRUCTIONS
Home.  A prescription for ibuprofen has been sent to the pharmacy.  Please  and take as needed for pain relief.  You may also take Tylenol for pain relief.  You can use a heating pad to the affected area for muscle relaxation and pain relief.    Call your primary care provider to schedule appointment for follow-up as needed.    If your symptoms worsen, change presentation, or you develop new symptoms such as fever, cough, or shortness of breath please seek medical attention or return to the ED for further evaluation.

## 2024-08-13 NOTE — Clinical Note
[facility] EMERGENCY ROOM  [address] [place] [address]  Phone: [phone]  Fax: [phone]    Bianca Boles was seen and treated in our emergency department on 8/13/2024.  She may return to work on 08/13/2024.         Thank you for choosing [facility].    Fang Carvalho APRN

## 2024-08-13 NOTE — ED PROVIDER NOTES
Time: 11:59 AM EDT  Date of encounter:  2024  Independent Historian/Clinical History and Information was obtained by:   Patient    History is limited by: N/A    Chief Complaint   Patient presents with    Fall         History of Present Illness:  Patient is a 44 y.o. year old female who presents to the emergency department for evaluation of left rib pain.  Patient states she was walking with her hands full in did not see a stool in front of her.  She tripped and fell to her left toe with direct impact to her left arm and ribs.  She states she was unable to put her arms out because she was caring a load of insulin to put in the refrigerator.  Denies head injury or LOC.    Patient Care Team  Primary Care Provider: Patricia Hernandez APRN    Past Medical History:     Allergies   Allergen Reactions    Morphine Itching and Nausea And Vomiting     Past Medical History:   Diagnosis Date    Acid reflux     Anxiety     Anxiety and depression     Benign essential hypertension     Borderline personality disorder     Cancer     renal cancer/mass, PARTIAL NEPH, RIGHT    Diabetes     Diabetes mellitus     type ii, doesn't check bg at home     Diabetes mellitus, type 2     Elevated cholesterol     Frozen shoulder 2023    GERD (gastroesophageal reflux disease)     High triglycerides     History of bariatric surgery 2021    GASTRIC SLEEVE    History of kidney stones     HTN (hypertension)     Hyperlipemia     Hyperlipidemia     Hypertriglyceridemia     Lumbar herniated disc     Obesity     Panic attacks     PCOS (polycystic ovarian syndrome)     Periarthritis of shoulder     Psoriasis     ELBOWS    Rotator cuff syndrome     Seasonal allergies     Self-injurious behavior     Spinal headache     AFTER PAIN EPIDURALS.  NO BLOOD PATCH    Suicide attempt      Past Surgical History:   Procedure Laterality Date    ABDOMINAL SURGERY  2020    Gastric sleeve    ADENOIDECTOMY  1984      SECTION  2006,2010    COLONOSCOPY N/A 5/23/2024    Procedure: COLONOSCOPY;  Surgeon: Terrence Fry MD;  Location: [facility] ENDOSCOPY;  Service: Gastroenterology;  Laterality: N/A;  NORMAL COLONOSCOPY    CYSTOSCOPY BLADDER STONE LITHOTRIPSY      EAR TUBES  1984    ENDOSCOPY N/A 10/20/2020    Procedure: ESOPHAGOGASTRODUODENOSCOPY WITH BIOPSY;  Surgeon: Fidel Grajeda Jr., MD;  Location: [facility] ENDOSCOPY;  Service: General;  Laterality: N/A;  PRE- GERD  POST- RETAINED FOOD, GASTRITIS, GASTRIC POLYPS    ENDOSCOPY  2014    FOOT FRACTURE SURGERY Left 2017    screw placed    GASTRECTOMY  12/2020    GASTRIC SLEEVE LAPAROSCOPIC N/A 12/07/2020    Procedure: GASTRIC SLEEVE LAPAROSCOPIC;  Surgeon: Fidel Grajeda Jr., MD;  Location: [facility] OR OSC;  Service: Bariatric;  Laterality: N/A;    NEPHRECTOMY PARTIAL Right 11/15/2021    Procedure: NEPHRECTOMY PARTIAL LAPAROSCOPIC WITH DAVINCI ROBOT;  Surgeon: Lori Troy MD;  Location: [facility] MAIN OR;  Service: Robotics - DaVinci;  Laterality: Right;    SHOULDER ARTHROSCOPY Left 08/14/2023    Procedure: LEFT SHOULDER MANIPULATION;  Surgeon: Domenic Bradley MD;  Location: [facility] OR [facility];  Service: Orthopedics;  Laterality: Left;    TONSILLECTOMY  1984    UPPER GASTROINTESTINAL ENDOSCOPY      URETEROSCOPY LASER LITHOTRIPSY WITH STENT INSERTION Right 09/28/2021    Procedure: CYSTOSCOPY, RIGHT URETEROSCOPY, LASERTRIPSY, STONE BASKET EXTRACTION AND STENT INSERTION;  Surgeon: Lori Troy MD;  Location: [facility] MAIN OR;  Service: Urology;  Laterality: Right;    URETEROSCOPY LASER LITHOTRIPSY WITH STENT INSERTION Left 11/08/2022    Procedure: URETEROSCOPY LASER LITHOTRIPSY WITH URETERAL STENT INSERTION;  Surgeon: Lori Troy MD;  Location: [facility] MAIN OR;  Service: Urology;  Laterality: Left;     Family History   Problem Relation Age of Onset    Cancer Mother         Breast    Diabetes Father     Hypertension Father     Heart disease Father     Cancer Father          Bladder prostate liver    Colon cancer Father         malignant, currently 62    No Known Problems Sister     No Known Problems Brother     No Known Problems Maternal Aunt     No Known Problems Paternal Aunt     No Known Problems Maternal Uncle     No Known Problems Paternal Uncle     No Known Problems Maternal Grandfather     Stroke Maternal Grandmother     Heart disease Maternal Grandmother     Diabetes Paternal Grandfather     Heart disease Paternal Grandfather     No Known Problems Paternal Grandmother     No Known Problems Cousin     [place] Hyperthermia Neg Hx     ADD / ADHD Neg Hx     Alcohol abuse Neg Hx     Anxiety disorder Neg Hx     Bipolar disorder Neg Hx     Dementia Neg Hx     Depression Neg Hx     Drug abuse Neg Hx     OCD Neg Hx     Paranoid behavior Neg Hx     Schizophrenia Neg Hx     Seizures Neg Hx     Self-Injurious Behavior  Neg Hx     Suicide Attempts Neg Hx        Home Medications:  Prior to Admission medications    Medication Sig Start Date End Date Taking? Authorizing Provider   Apremilast (Otezla) 30 MG tablet Take 30 mg by mouth 2 (Two) Times a Day. 2/5/24      Biotin 20552 MCG tablet Take 1 tablet by mouth Every Night.    Heron Campuzano MD   buPROPion XL (WELLBUTRIN XL) 300 MG 24 hr tablet Take 1 tablet by mouth Daily. 5/3/24   Patricia Hernandez APRN   busPIRone (BUSPAR) 5 MG tablet Take 1 tablet by mouth 3 (Three) Times a Day. 5/3/24   Patricia Hernandez APRN   CALCIUM-MAGNESIUM-ZINC PO Take 3 tablets by mouth Daily.    Heron Campuzano MD   cetirizine (zyrTEC) 10 MG tablet Take 1 tablet by mouth Daily As Needed. 5/31/22   Heron Campuzano MD   Cholecalciferol 25 MCG (1000 UT) capsule Take 2 capsules by mouth Daily. 11/3/23   Patricia Hernandez APRN   clobetasol (TEMOVATE) 0.05 % external solution Apply 10-15 drops every day to affected areas in the scalp after shampooing until clear.  Patient taking differently: Apply  topically to the  appropriate area as directed Daily As Needed. 8/16/22      COLLAGEN PO Take 3 tablets by mouth Daily.    ProviderHeron MD   dapagliflozin (Farxiga) 5 MG tablet tablet Take 1 tablet by mouth Daily. 5/3/24   Patricia Hernandez APRN   desvenlafaxine (PRISTIQ) 50 MG 24 hr tablet Take 1 tablet by mouth Daily. 5/3/24   Patricia Hernandez APRN   Diclofenac Sodium (VOLTAREN) 1 % gel gel Apply 4 g topically to the appropriate area as directed 4 (Four) Times a Day As Needed (pain). 6/12/23   Nicolasa Meade PA-C   fluticasone (FLONASE) 50 MCG/ACT nasal spray USE 2 SPRAYS IN EACH NOSTRIL ONCE DAILY AS DIRECTED  Patient taking differently: 2 sprays into the nostril(s) as directed by provider At Night As Needed. 5/19/23   Patricia Hernandez APRN   IRON-VITAMIN C PO Take 1 tablet by mouth Daily.    Provider, MD Heron   linaclotide (Linzess) 72 MCG capsule capsule Take 1 capsule by mouth Every Morning Before Breakfast for 90 days. 4/11/24 10/8/24  Michell Horan APRN   metoprolol succinate XL (Toprol XL) 25 MG 24 hr tablet Take 1 tablet by mouth Every Night. 5/3/24   Patricia Hernandez APRN   Multiple Vitamins-Minerals (MULTIVITAMIN PO) Take 1 tablet by mouth Every Night.    ProviderHeron MD   nitrofurantoin, macrocrystal-monohydrate, (Macrobid) 100 MG capsule Take 1 capsule by mouth 2 (Two) Times a Day. 8/3/24   Patricia Hernandez APRN   ondansetron ODT (ZOFRAN-ODT) 4 MG disintegrating tablet Place 1 tablet on the tongue Every 8 (Eight) Hours As Needed for Nausea. 11/4/22   Patricia Hernandez APRN   pravastatin (PRAVACHOL) 20 MG tablet Take 1 tablet by mouth Every Night. 5/3/24   Patricia Hernandez APRN   Probiotic Product (PROBIOTIC-10 PO) Take 1 tablet by mouth Every Night.    ProviderHeron MD   sulfamethoxazole-trimethoprim (Bactrim DS) 800-160 MG per tablet Take 1 tablet by mouth 2 (Two) Times a Day. 7/7/24   Patricia Hernandez, APRN  "  testosterone micronized powder APPLY 2 CLICKS (0.5 ML) TOPICALLY EVERY DAY 24   Patricia Hernandez APRN   Tirzepatide (MOUNJARO) 10 MG/0.5ML solution pen-injector pen Inject 0.5 mL under the skin into the appropriate area as directed 1 (One) Time Per Week. 24   Patricia Hernandez APRN   topiramate (TOPAMAX) 50 MG tablet Take 1 tablet by mouth every night at bedtime for 180 days. 5/3/24 10/30/24  Patricia Hernandez APRN   traZODone (DESYREL) 100 MG tablet Take 1 tablet by mouth Every Night. 5/3/24   Patricia Hernandez APRN        Social History:   Social History     Tobacco Use    Smoking status: Former     Current packs/day: 0.00     Average packs/day: 0.3 packs/day for 35.0 years (8.8 ttl pk-yrs)     Types: Cigarettes     Start date: 1/15/1985     Quit date: 1/15/2020     Years since quittin.5     Passive exposure: Never    Smokeless tobacco: Never    Tobacco comments:     A PACK A WEEK   Vaping Use    Vaping status: Never Used   Substance Use Topics    Alcohol use: Yes     Comment: OCCASIONAL/SOCIAL    Drug use: Not Currently     Frequency: 0.1 times per week     Types: Marijuana     Comment: OTC THC         Review of Systems:  Review of Systems   Constitutional: Negative.    HENT: Negative.     Eyes: Negative.    Respiratory: Negative.     Cardiovascular: Negative.    Gastrointestinal: Negative.    Endocrine: Negative.    Genitourinary: Negative.    Musculoskeletal:  Positive for arthralgias and myalgias.   Skin: Negative.    Allergic/Immunologic: Negative.    Neurological: Negative.    Hematological: Negative.    Psychiatric/Behavioral: Negative.          Physical Exam:  /70 (BP Location: Right arm, Patient Position: Sitting)   Pulse 76   Temp 97.8 °F (36.6 °C) (Oral)   Resp 18   Ht 170.2 cm (67\")   Wt 99.4 kg (219 lb 2.2 oz)   LMP  (LMP Unknown)   SpO2 100%   BMI 34.32 kg/m²         Physical Exam  Vitals and nursing note reviewed.   Constitutional:       " General: She is not in acute distress.     Appearance: Normal appearance. She is not toxic-appearing.   HENT:      Head: Normocephalic and atraumatic.      Jaw: There is normal jaw occlusion.      Right Ear: Tympanic membrane normal.      Left Ear: Tympanic membrane normal.      Nose: Nose normal.      Mouth/Throat:      Mouth: Mucous membranes are moist.      Pharynx: Oropharynx is clear.   Eyes:      General: Lids are normal.      Extraocular Movements: Extraocular movements intact.      Conjunctiva/sclera: Conjunctivae normal.      Pupils: Pupils are equal, round, and reactive to light.   Cardiovascular:      Rate and Rhythm: Normal rate and regular rhythm.      Pulses: Normal pulses.      Heart sounds: Normal heart sounds.   Pulmonary:      Effort: Pulmonary effort is normal. No respiratory distress.      Breath sounds: Normal breath sounds. No stridor. No wheezing, rhonchi or rales.   Chest:      Chest wall: Tenderness (Left rib) present.   Abdominal:      General: Abdomen is flat. Bowel sounds are normal.      Palpations: Abdomen is soft.      Tenderness: There is no abdominal tenderness. There is no right CVA tenderness, left CVA tenderness, guarding or rebound.   Musculoskeletal:         General: Normal range of motion.      Left elbow: No swelling. Normal range of motion. Tenderness present in medial epicondyle.      Cervical back: Normal range of motion and neck supple.      Right lower leg: No edema.      Left lower leg: No edema.   Skin:     General: Skin is warm and dry.   Neurological:      Mental Status: She is alert and oriented to person, place, and time. Mental status is at baseline.   Psychiatric:         Mood and Affect: Mood normal.                Procedures:  Procedures      Medical Decision Making:      Comorbidities that affect care:    Panic attacks, PCOS, hyperlipidemia, GERD, diabetes, obesity, anxiety, self injures behavior    External Notes reviewed:    Hospital Discharge Summary:  Patient was last discharged from the hospital after having a colonoscopy on 5/23/2024.      The following orders were placed and all results were independently analyzed by me:  Orders Placed This Encounter   Procedures    XR Ribs Left With PA Chest    XR Elbow 3+ View Left       Medications Given in the Emergency Department:  Medications   ketorolac (TORADOL) injection 30 mg (30 mg Intramuscular Given 8/13/24 1222)        ED Course:    The patient was initially evaluated in the triage area where orders were placed. The patient was later dispositioned by CARLTON Raphael.      The patient was advised to stay for completion of workup which includes but is not limited to communication of labs and radiological results, reassessment and plan. The patient was advised that leaving prior to disposition by a provider could result in critical findings that are not communicated to the patient.          Labs:    Lab Results (last 24 hours)       ** No results found for the last 24 hours. **             Imaging:    XR Ribs Left With PA Chest    Result Date: 8/13/2024  XR RIBS LEFT W PA CHEST Date of Exam: 8/13/2024 1:20 PM EDT Indication: Had fall onto chest and left rib area today after tripping on a stool. Comparison: Chest radiograph dated 11/8/2021 Findings: The cardiomediastinal silhouette is within normal limits. Pulmonary vascularity appears normal. There is no focal airspace consolidation, pleural effusion, or pneumothorax. No visible acute displaced rib fracture. There is linear chain suture within the left upper quadrant likely related to prior stomach surgery. There are degenerative changes of the thoracic spine.     Impression: 1. No acute cardiopulmonary abnormality. 2. No acute displaced rib fractures. Electronically Signed: Gamaliel Rojo  8/13/2024 1:36 PM EDT  Workstation ID: [id number]    XR Elbow 3+ View Left    Result Date: 8/13/2024  XR ELBOW 3+ VW LEFT Date of Exam: 8/13/2024 1:21 PM EDT Indication: Fall  with injury. Comparison: None available Findings: There is no acute fracture or dislocation. The joint spaces appear normally aligned and well maintained. There is no joint effusion. Bone mineralization is normal. There is degenerative spurring at the coronoid process.     Impression: 1. No acute fracture or dislocation of the left elbow. No elbow joint effusion. Electronically Signed: Gamaliel Mckeonelor  8/13/2024 1:32 PM EDT  Workstation ID: [id number]       Differential Diagnosis and Discussion:      Extremity Pain: Differential diagnosis includes but is not limited to soft tissue sprain, tendonitis, tendon injury, dislocation, fracture, deep vein thrombosis, arterial insufficiency, osteoarthritis, bursitis, and ligamentous damage.    All X-rays impressions were independently interpreted by me.    MDM  Number of Diagnoses or Management Options  Rib contusion, left, initial encounter  Diagnosis management comments: The patient´s symptoms are consistent with musculoskeletal pain. The patient is now resting comfortably, feels better, is alert, talkative, interactive and in no distress. The repeat examination is unremarkable and benign. The patient has circulatory, motor, and sensory intact. The patient is otherwise alert and well appearing. The history, physical exam, and diagnostics (if any) do not suggest the presence of soft tissue sprain, tendonitis, tendon injury, dislocation, fracture, deep vein thrombosis, arterial insufficiency, osteoarthritis, bursitis, ligamentous damage or other process requiring further testing, treatment or consultation in the emergency department. The vital signs have been stable. The patient's condition is stable and appropriate for discharge. The patient will pursue further outpatient evaluation with the primary care physician or other designated for consulting position as indicated in the discharge instructions.         Patient Care Considerations:    NARCOTICS: I considered prescribing  opiate pain medication as an outpatient, however patient refused narcotic medication and pain was improved with NSAID administration in the ED.      Consultants/Shared Management Plan:    None    Social Determinants of Health:    Patient is independent, reliable, and has access to care.       Disposition and Care Coordination:    Discharged: The patient is suitable and stable for discharge with no need for consideration of admission.    I have explained the patient´s condition, diagnoses and treatment plan based on the information available to me at this time. I have answered questions and addressed any concerns. The patient has a good  understanding of the patient´s diagnosis, condition, and treatment plan as can be expected at this point. The vital signs have been stable. The patient´s condition is stable and appropriate for discharge from the emergency department.      The patient will pursue further outpatient evaluation with the primary care physician or other designated or consulting physician as outlined in the discharge instructions. They are agreeable to this plan of care and follow-up instructions have been explained in detail. The patient has received these instructions in written format and has expressed an understanding of the discharge instructions. The patient is aware that any significant change in condition or worsening of symptoms should prompt an immediate return to this or the closest emergency department or call to 911.  I have explained discharge medications and the need for follow up with the patient/caretakers. This was also printed in the discharge instructions. Patient was discharged with the following medications and follow up:      Medication List        New Prescriptions      ibuprofen 600 MG tablet  Commonly known as: ADVIL,MOTRIN  Take 1 tablet by mouth Every 6 (Six) Hours As Needed for Mild Pain for up to 5 days.            Changed      clobetasol 0.05 % external solution  Commonly  known as: TEMOVATE  Apply 10-15 drops every day to affected areas in the scalp after shampooing until clear.  What changed:   when to take this  reasons to take this     fluticasone 50 MCG/ACT nasal spray  Commonly known as: FLONASE  USE 2 SPRAYS IN EACH NOSTRIL ONCE DAILY AS DIRECTED  What changed:   when to take this  reasons to take this               Where to Get Your Medications        These medications were sent to [facility] [place]  [place] [address]      Hours: Monday to Friday 9 AM to 7:30 PM, Saturday 9 AM to 2 PM Phone: [phone]   ibuprofen 600 MG tablet      Patricia Hernandez, APRN  [address] [facility] [place] [address]  [phone]    Call   As needed       Final diagnoses:   Rib contusion, left, initial encounter        ED Disposition       ED Disposition   Discharge    Condition   Stable    Comment   --               This medical record created using voice recognition software.             Fang Carvalho, CARLTON  08/13/24 154

## 2024-08-22 NOTE — PROGRESS NOTES
[facility] BARIATRIC [facility] BARIATRIC SURGERY  [address] [facility] [address]  [facility] [address]  [phone]  [address] [id number] [phone]  Dept: [phone]  1/18/2024      Bianca Boles.  [id number]  [id number]  1980  female      Chief Complaint   Patient presents with    Follow-up     Follow up sleeve 3yr 1 mo       BH Post-Op Bariatric Surgery:   Bianca Boles is status post Laparoscopic Sleeve procedure, performed on 12/7/2020     HPI:     Today's weight is 96.2 kg (212 lb) pounds, today's BMI is Body mass index is 32.73 kg/m²., she has a loss of 6 pounds since the last visit and her weight loss since surgery is 89 pounds. The patient reports a decreased portion size and loss of appetite.    Bianca Boles denies nausea, vomiting, reflux and reports constipation since coming off metformin. She hasn't had as much of an issue since having to go to the ER for it. She has been trying to increase more dietary fiber and getting more water in. She does feel that this has helped somewhat. She does have a follow up with GI coming up to discuss her symptoms. She has been taking ozempic since the summer. She has been taking 0.5mg of ozempic weekly.      Diet and Exercise: Diet history reviewed and discussed with the patient. Weight loss/gains to date discussed with the patient. The patient states they are eating 60 grams of protein per day. She reports eating 3 meals per day, a typical portion size of 1/2 cup, eating 1-2 snacks per day, drinking 5-6 or more 8-oz. glasses of water per day, no carbonated beverage consumption and exercising regularly.     Supplements: bariatrric specific MTV with iron and calcium, supplemental iron, zinc.     Review of Systems   Constitutional:  Positive for appetite change. Negative for fatigue and unexpected weight change.   HENT: Negative.     Eyes: Negative.    Respiratory: Negative.     Cardiovascular:  Please call the patient and schedule AWV with me, which patient is due at this time, to further take care of the medical conditions and medications.OK to use same day slot / double book near another virtual slot in 2-3 weeks.    Thank you,  Michaela Sharma MD on 8/21/2024      Negative.  Negative for leg swelling.   Gastrointestinal:  Negative for abdominal distention, abdominal pain, constipation, diarrhea, nausea and vomiting.   Genitourinary:  Negative for difficulty urinating, frequency and urgency.   Musculoskeletal:  Negative for back pain.   Skin: Negative.    Psychiatric/Behavioral: Negative.     All other systems reviewed and are negative.      Patient Active Problem List   Diagnosis    Chronic fatigue    Essential hypertension    Hyperlipidemia    Heartburn    Multiple joint pain    Depression    Gastroparesis    Gastroesophageal reflux disease    Gastric polyps    S/P laparoscopic sleeve gastrectomy    Obesity, Class I, BMI 30-34.9    Anxiety    Urinary tract infection with hematuria    Leukocytosis    Insomnia    Left ureteral stone    Left renal stone    Hematuria    Acute cystitis with hematuria    Right renal mass    Seasonal allergies    Urinary urgency    Renal cell carcinoma of right kidney    Psoriasis    Palpitation    Post-COVID syndrome    Nausea    Type 2 diabetes mellitus without complication, without long-term current use of insulin    Left shoulder pain    Frozen shoulder    Impingement syndrome of left shoulder    Dysuria    Glucose found in urine on examination       Past Medical History:   Diagnosis Date    Acid reflux     Anxiety     Anxiety and depression     Benign essential hypertension     Cancer     renal cancer/mass, PARTIAL NEPH, RIGHT    Diabetes     Diabetes mellitus     type ii, doesn't check bg at home     Diabetes mellitus, type 2     Frozen shoulder 08/02/2023    GERD (gastroesophageal reflux disease)     High triglycerides     History of bariatric surgery 02/04/2021    GASTRIC SLEEVE    History of kidney stones     HTN (hypertension)     Hyperlipemia     Hyperlipidemia     Hypertriglyceridemia     Lumbar herniated disc     Obesity     Panic attacks     PCOS (polycystic ovarian syndrome)     Periarthritis of shoulder 01/23    Psoriasis     ELBOWS     Rotator cuff syndrome 01/23    Seasonal allergies     Spinal headache     AFTER PAIN EPIDURALS.  NO BLOOD PATCH       The following portions of the patient's history were reviewed and updated as appropriate: allergies, current medications, past family history, past medical history, past social history, past surgical history, and problem list.    Vitals:    01/18/24 1132   BP: 113/71   Pulse: 62   Temp: 97.8 °F (36.6 °C)       Physical Exam  Vitals and nursing note reviewed.   Constitutional:       Appearance: She is well-developed.   Neck:      Thyroid: No thyromegaly.   Cardiovascular:      Rate and Rhythm: Normal rate.   Pulmonary:      Effort: Pulmonary effort is normal. No respiratory distress.   Abdominal:      Palpations: Abdomen is soft.   Musculoskeletal:         General: No tenderness.   Skin:     General: Skin is warm and dry.   Neurological:      Mental Status: She is alert.   Psychiatric:         Behavior: Behavior normal.         Assessment:   Post-op, the patient is doing well.     Encounter Diagnoses   Name Primary?    Obesity, Class I, BMI 30-34.9 Yes    Type 2 diabetes mellitus without complication, without long-term current use of insulin     Essential hypertension     Gastroesophageal reflux disease without esophagitis     Mild episode of recurrent major depressive disorder     Chronic fatigue        Plan:   We did discuss using mounjaro but as we don't regularly prescribe this medication for diabetes she was advised to discuss this with her PCP.   Encouraged patient to be sure to get plenty of lean protein per day through small frequent meals all with a protein source.   Activity restrictions: none.   Recommended patient be sure to get at least 70 grams of protein per day by eating small, frequent meals all with high lean protein choices. Be sure to limit/cut back on daily carbohydrate intake. Discussed with the patient the recommended amount of water per day to intake- half of body weight in  junior. Reviewed vitamin requirements. Be sure to do routine exercise, 150 minutes per week minimum, including both cardio and strength training.     Instructions / Recommendations: dietary counseling recommended, recommended a daily protein intake of  grams, vitamin supplement(s) recommended, recommended exercising at least 150 minutes per week, behavior modifications recommended and instructed to call the office for concerns, questions, or problems.     The patient was instructed to follow up in 1 year .     Total time spent during this encounter today was 25 minutes

## 2024-08-23 ENCOUNTER — HOSPITAL ENCOUNTER (EMERGENCY)
Facility: HOSPITAL | Age: 44
Discharge: HOME | End: 2024-08-23
Attending: EMERGENCY MEDICINE
Payer: COMMERCIAL

## 2024-08-23 ENCOUNTER — APPOINTMENT (OUTPATIENT)
Dept: GENERAL RADIOLOGY | Facility: HOSPITAL | Age: 44
End: 2024-08-23
Payer: COMMERCIAL

## 2024-08-23 VITALS
HEART RATE: 74 BPM | DIASTOLIC BLOOD PRESSURE: 73 MMHG | HEIGHT: 67 IN | BODY MASS INDEX: 33.63 KG/M2 | SYSTOLIC BLOOD PRESSURE: 122 MMHG | TEMPERATURE: 98.2 F | RESPIRATION RATE: 18 BRPM | OXYGEN SATURATION: 98 % | WEIGHT: 214.29 LBS

## 2024-08-23 DIAGNOSIS — R07.9 CHEST PAIN, UNSPECIFIED TYPE: Primary | ICD-10-CM

## 2024-08-23 LAB
ALBUMIN SERPL-MCNC: 4 G/DL (ref 3.5–5.2)
ALBUMIN/GLOB SERPL: 1.5 G/DL
ALP SERPL-CCNC: 86 U/L (ref 39–117)
ALT SERPL W P-5'-P-CCNC: 19 U/L (ref 1–33)
ANION GAP SERPL CALCULATED.3IONS-SCNC: 10.9 MMOL/L (ref 5–15)
AST SERPL-CCNC: 23 U/L (ref 1–32)
BASOPHILS # BLD AUTO: 0.05 10*3/MM3 (ref 0–0.2)
BASOPHILS NFR BLD AUTO: 0.5 % (ref 0–1.5)
BILIRUB SERPL-MCNC: 0.3 MG/DL (ref 0–1.2)
BUN SERPL-MCNC: 20 MG/DL (ref 6–20)
BUN/CREAT SERPL: 14.6 (ref 7–25)
CALCIUM SPEC-SCNC: 9.4 MG/DL (ref 8.6–10.5)
CHLORIDE SERPL-SCNC: 106 MMOL/L (ref 98–107)
CO2 SERPL-SCNC: 23.1 MMOL/L (ref 22–29)
CREAT SERPL-MCNC: 1.37 MG/DL (ref 0.57–1)
DEPRECATED RDW RBC AUTO: 42.5 FL (ref 37–54)
EGFRCR SERPLBLD CKD-EPI 2021: 48.6 ML/MIN/1.73
EOSINOPHIL # BLD AUTO: 0.09 10*3/MM3 (ref 0–0.4)
EOSINOPHIL NFR BLD AUTO: 0.9 % (ref 0.3–6.2)
ERYTHROCYTE [DISTWIDTH] IN BLOOD BY AUTOMATED COUNT: 13.6 % (ref 12.3–15.4)
GEN 5 2HR TROPONIN T REFLEX: 9 NG/L
GLOBULIN UR ELPH-MCNC: 2.7 GM/DL
GLUCOSE SERPL-MCNC: 92 MG/DL (ref 65–99)
HCT VFR BLD AUTO: 40 % (ref 34–46.6)
HGB BLD-MCNC: 13.1 G/DL (ref 12–15.9)
HOLD SPECIMEN: NORMAL
HOLD SPECIMEN: NORMAL
IMM GRANULOCYTES # BLD AUTO: 0.06 10*3/MM3 (ref 0–0.05)
IMM GRANULOCYTES NFR BLD AUTO: 0.6 % (ref 0–0.5)
LIPASE SERPL-CCNC: 82 U/L (ref 13–60)
LYMPHOCYTES # BLD AUTO: 2.47 10*3/MM3 (ref 0.7–3.1)
LYMPHOCYTES NFR BLD AUTO: 25.3 % (ref 19.6–45.3)
MAGNESIUM SERPL-MCNC: 2.3 MG/DL (ref 1.6–2.6)
MCH RBC QN AUTO: 28.4 PG (ref 26.6–33)
MCHC RBC AUTO-ENTMCNC: 32.8 G/DL (ref 31.5–35.7)
MCV RBC AUTO: 86.6 FL (ref 79–97)
MONOCYTES # BLD AUTO: 0.69 10*3/MM3 (ref 0.1–0.9)
MONOCYTES NFR BLD AUTO: 7.1 % (ref 5–12)
NEUTROPHILS NFR BLD AUTO: 6.39 10*3/MM3 (ref 1.7–7)
NEUTROPHILS NFR BLD AUTO: 65.6 % (ref 42.7–76)
NRBC BLD AUTO-RTO: 0 /100 WBC (ref 0–0.2)
NT-PROBNP SERPL-MCNC: 116.6 PG/ML (ref 0–450)
PLATELET # BLD AUTO: 202 10*3/MM3 (ref 140–450)
PMV BLD AUTO: 9.9 FL (ref 6–12)
POTASSIUM SERPL-SCNC: 4 MMOL/L (ref 3.5–5.2)
PROT SERPL-MCNC: 6.7 G/DL (ref 6–8.5)
QT INTERVAL: 389 MS
QT INTERVAL: 414 MS
QTC INTERVAL: 412 MS
QTC INTERVAL: 425 MS
RBC # BLD AUTO: 4.62 10*6/MM3 (ref 3.77–5.28)
SODIUM SERPL-SCNC: 140 MMOL/L (ref 136–145)
TROPONIN T DELTA: -1 NG/L
TROPONIN T SERPL HS-MCNC: 10 NG/L
WBC NRBC COR # BLD AUTO: 9.75 10*3/MM3 (ref 3.4–10.8)
WHOLE BLOOD HOLD COAG: NORMAL
WHOLE BLOOD HOLD SPECIMEN: NORMAL

## 2024-08-23 PROCEDURE — 25010000002 ONDANSETRON PER 1 MG: Performed by: EMERGENCY MEDICINE

## 2024-08-23 PROCEDURE — 71045 X-RAY EXAM CHEST 1 VIEW: CPT

## 2024-08-23 PROCEDURE — 85025 COMPLETE CBC W/AUTO DIFF WBC: CPT

## 2024-08-23 PROCEDURE — 25010000002 KETOROLAC TROMETHAMINE PER 15 MG: Performed by: EMERGENCY MEDICINE

## 2024-08-23 PROCEDURE — 96374 THER/PROPH/DIAG INJ IV PUSH: CPT

## 2024-08-23 PROCEDURE — 93005 ELECTROCARDIOGRAM TRACING: CPT

## 2024-08-23 PROCEDURE — 25810000003 SODIUM CHLORIDE 0.9 % SOLUTION: Performed by: EMERGENCY MEDICINE

## 2024-08-23 PROCEDURE — 99284 EMERGENCY DEPT VISIT MOD MDM: CPT

## 2024-08-23 PROCEDURE — 84484 ASSAY OF TROPONIN QUANT: CPT | Performed by: EMERGENCY MEDICINE

## 2024-08-23 PROCEDURE — 36415 COLL VENOUS BLD VENIPUNCTURE: CPT

## 2024-08-23 PROCEDURE — 80053 COMPREHEN METABOLIC PANEL: CPT

## 2024-08-23 PROCEDURE — 83735 ASSAY OF MAGNESIUM: CPT

## 2024-08-23 PROCEDURE — 83690 ASSAY OF LIPASE: CPT

## 2024-08-23 PROCEDURE — 96375 TX/PRO/DX INJ NEW DRUG ADDON: CPT

## 2024-08-23 PROCEDURE — 84484 ASSAY OF TROPONIN QUANT: CPT

## 2024-08-23 PROCEDURE — 93005 ELECTROCARDIOGRAM TRACING: CPT | Performed by: EMERGENCY MEDICINE

## 2024-08-23 PROCEDURE — 83880 ASSAY OF NATRIURETIC PEPTIDE: CPT

## 2024-08-23 RX ORDER — SODIUM CHLORIDE 0.9 % (FLUSH) 0.9 %
10 SYRINGE (ML) INJECTION AS NEEDED
Status: DISCONTINUED | OUTPATIENT
Start: 2024-08-23 | End: 2024-08-23 | Stop reason: HOSPADM

## 2024-08-23 RX ORDER — KETOROLAC TROMETHAMINE 30 MG/ML
30 INJECTION, SOLUTION INTRAMUSCULAR; INTRAVENOUS ONCE
Status: COMPLETED | OUTPATIENT
Start: 2024-08-23 | End: 2024-08-23

## 2024-08-23 RX ORDER — ONDANSETRON 2 MG/ML
4 INJECTION INTRAMUSCULAR; INTRAVENOUS ONCE
Status: COMPLETED | OUTPATIENT
Start: 2024-08-23 | End: 2024-08-23

## 2024-08-23 RX ORDER — ASPIRIN 81 MG/1
324 TABLET, CHEWABLE ORAL ONCE
Status: DISCONTINUED | OUTPATIENT
Start: 2024-08-23 | End: 2024-08-23 | Stop reason: HOSPADM

## 2024-08-23 RX ADMIN — ONDANSETRON 4 MG: 2 INJECTION INTRAMUSCULAR; INTRAVENOUS at 17:25

## 2024-08-23 RX ADMIN — SODIUM CHLORIDE 1000 ML: 9 INJECTION, SOLUTION INTRAVENOUS at 17:23

## 2024-08-23 RX ADMIN — KETOROLAC TROMETHAMINE 30 MG: 30 INJECTION, SOLUTION INTRAMUSCULAR; INTRAVENOUS at 18:08

## 2024-08-23 NOTE — ED PROVIDER NOTES
Time: 4:45 PM EDT  Date of encounter:  2024  Independent Historian/Clinical History and Information was obtained by:   Patient    History is limited by: N/A    Chief Complaint: Chest pain      History of Present Illness:  Patient is a 44 y.o. year old female who presents to the emergency department for evaluation of chest pain.  Patient reports that on the  of this month she fell onto her chest and has had some chest pains.  Today she had pain in her chest little higher than before and it radiated into her left arm which was new.  She felt a little nauseated and lightheaded at that time evaluated.  On arrival nausea, upper chest pain and left arm pain have resolved.      Patient Care Team  Primary Care Provider: Patricia Hernandez APRN    Past Medical History:     Allergies   Allergen Reactions    Morphine Itching and Nausea And Vomiting     Past Medical History:   Diagnosis Date    Acid reflux     Anxiety     Anxiety and depression     Benign essential hypertension     Borderline personality disorder     Cancer     renal cancer/mass, PARTIAL NEPH, RIGHT    Diabetes     Diabetes mellitus     type ii, doesn't check bg at home     Diabetes mellitus, type 2     Elevated cholesterol     Frozen shoulder 2023    GERD (gastroesophageal reflux disease)     High triglycerides     History of bariatric surgery 2021    GASTRIC SLEEVE    History of kidney stones     HTN (hypertension)     Hyperlipemia     Hyperlipidemia     Hypertriglyceridemia     Lumbar herniated disc     Obesity     Panic attacks     PCOS (polycystic ovarian syndrome)     Periarthritis of shoulder     Psoriasis     ELBOWS    Rotator cuff syndrome     Seasonal allergies     Self-injurious behavior     Spinal headache     AFTER PAIN EPIDURALS.  NO BLOOD PATCH    Suicide attempt      Past Surgical History:   Procedure Laterality Date    ABDOMINAL SURGERY  2020    Gastric sleeve    ADENOIDECTOMY  1984      SECTION  2006,2010    COLONOSCOPY N/A 5/23/2024    Procedure: COLONOSCOPY;  Surgeon: Terrence Fry MD;  Location: [facility] ENDOSCOPY;  Service: Gastroenterology;  Laterality: N/A;  NORMAL COLONOSCOPY    CYSTOSCOPY BLADDER STONE LITHOTRIPSY      EAR TUBES  1984    ENDOSCOPY N/A 10/20/2020    Procedure: ESOPHAGOGASTRODUODENOSCOPY WITH BIOPSY;  Surgeon: Fidel Grajeda Jr., MD;  Location: [facility] ENDOSCOPY;  Service: General;  Laterality: N/A;  PRE- GERD  POST- RETAINED FOOD, GASTRITIS, GASTRIC POLYPS    ENDOSCOPY  2014    FOOT FRACTURE SURGERY Left 2017    screw placed    GASTRECTOMY  12/2020    GASTRIC SLEEVE LAPAROSCOPIC N/A 12/07/2020    Procedure: GASTRIC SLEEVE LAPAROSCOPIC;  Surgeon: Fidel Grajeda Jr., MD;  Location: [facility] OR [facility];  Service: Bariatric;  Laterality: N/A;    NEPHRECTOMY PARTIAL Right 11/15/2021    Procedure: NEPHRECTOMY PARTIAL LAPAROSCOPIC WITH DAVINCI ROBOT;  Surgeon: Lori Troy MD;  Location: [facility] MAIN OR;  Service: Robotics - DaVinci;  Laterality: Right;    SHOULDER ARTHROSCOPY Left 08/14/2023    Procedure: LEFT SHOULDER MANIPULATION;  Surgeon: Domenic Bradley MD;  Location: [facility] OR [facility];  Service: Orthopedics;  Laterality: Left;    TONSILLECTOMY  1984    UPPER GASTROINTESTINAL ENDOSCOPY      URETEROSCOPY LASER LITHOTRIPSY WITH STENT INSERTION Right 09/28/2021    Procedure: CYSTOSCOPY, RIGHT URETEROSCOPY, LASERTRIPSY, STONE BASKET EXTRACTION AND STENT INSERTION;  Surgeon: Lori Troy MD;  Location: [facility] MAIN OR;  Service: Urology;  Laterality: Right;    URETEROSCOPY LASER LITHOTRIPSY WITH STENT INSERTION Left 11/08/2022    Procedure: URETEROSCOPY LASER LITHOTRIPSY WITH URETERAL STENT INSERTION;  Surgeon: Lori Troy MD;  Location: [facility] MAIN OR;  Service: Urology;  Laterality: Left;     Family History   Problem Relation Age of Onset    Cancer Mother         Breast    Diabetes Father     Hypertension Father     Heart disease Father     Cancer Father          Bladder prostate liver    Colon cancer Father         malignant, currently 62    No Known Problems Sister     No Known Problems Brother     No Known Problems Maternal Aunt     No Known Problems Paternal Aunt     No Known Problems Maternal Uncle     No Known Problems Paternal Uncle     No Known Problems Maternal Grandfather     Stroke Maternal Grandmother     Heart disease Maternal Grandmother     Diabetes Paternal Grandfather     Heart disease Paternal Grandfather     No Known Problems Paternal Grandmother     No Known Problems Cousin     [place] Hyperthermia Neg Hx     ADD / ADHD Neg Hx     Alcohol abuse Neg Hx     Anxiety disorder Neg Hx     Bipolar disorder Neg Hx     Dementia Neg Hx     Depression Neg Hx     Drug abuse Neg Hx     OCD Neg Hx     Paranoid behavior Neg Hx     Schizophrenia Neg Hx     Seizures Neg Hx     Self-Injurious Behavior  Neg Hx     Suicide Attempts Neg Hx        Home Medications:  Prior to Admission medications    Medication Sig Start Date End Date Taking? Authorizing Provider   Apremilast (Otezla) 30 MG tablet Take 30 mg by mouth 2 (Two) Times a Day. 2/5/24      Biotin 39859 MCG tablet Take 1 tablet by mouth Every Night.    Heron Campuzano MD   buPROPion XL (WELLBUTRIN XL) 300 MG 24 hr tablet Take 1 tablet by mouth Daily. 5/3/24   Patricia Hernandez APRN   busPIRone (BUSPAR) 5 MG tablet Take 1 tablet by mouth 3 (Three) Times a Day. 5/3/24   Patricia Hernandez APRN   CALCIUM-MAGNESIUM-ZINC PO Take 3 tablets by mouth Daily.    Heron Campuzano MD   cetirizine (zyrTEC) 10 MG tablet Take 1 tablet by mouth Daily As Needed. 5/31/22   Heron Campuzano MD   Cholecalciferol 25 MCG (1000 UT) capsule Take 2 capsules by mouth Daily. 11/3/23   Patricia Hernandez APRN   clobetasol (TEMOVATE) 0.05 % external solution Apply 10-15 drops every day to affected areas in the scalp after shampooing until clear.  Patient taking differently: Apply  topically to the  appropriate area as directed Daily As Needed. 8/16/22      COLLAGEN PO Take 3 tablets by mouth Daily.    ProviderHeron MD   dapagliflozin (Farxiga) 5 MG tablet tablet Take 1 tablet by mouth Daily. 5/3/24   Patricia Hernandez APRN   desvenlafaxine (PRISTIQ) 50 MG 24 hr tablet Take 1 tablet by mouth Daily. 5/3/24   Patricia Hernandez APRN   Diclofenac Sodium (VOLTAREN) 1 % gel gel Apply 4 g topically to the appropriate area as directed 4 (Four) Times a Day As Needed (pain). 6/12/23   Nicolasa Meade PA-C   fluticasone (FLONASE) 50 MCG/ACT nasal spray USE 2 SPRAYS IN EACH NOSTRIL ONCE DAILY AS DIRECTED  Patient taking differently: 2 sprays into the nostril(s) as directed by provider At Night As Needed. 5/19/23   Patricia Hernandez APRN   IRON-VITAMIN C PO Take 1 tablet by mouth Daily.    Provider, MD Heron   linaclotide (Linzess) 72 MCG capsule capsule Take 1 capsule by mouth Every Morning Before Breakfast for 90 days. 4/11/24 10/8/24  Michell Horan APRN   metoprolol succinate XL (Toprol XL) 25 MG 24 hr tablet Take 1 tablet by mouth Every Night. 5/3/24   Patricia Hernandez APRN   Multiple Vitamins-Minerals (MULTIVITAMIN PO) Take 1 tablet by mouth Every Night.    ProviderHeron MD   nitrofurantoin, macrocrystal-monohydrate, (Macrobid) 100 MG capsule Take 1 capsule by mouth 2 (Two) Times a Day. 8/3/24   Patricia Hernandez APRN   ondansetron ODT (ZOFRAN-ODT) 4 MG disintegrating tablet Place 1 tablet on the tongue Every 8 (Eight) Hours As Needed for Nausea. 11/4/22   Patricia Hernandez APRN   pravastatin (PRAVACHOL) 20 MG tablet Take 1 tablet by mouth Every Night. 5/3/24   Patricia Hernandez APRN   Probiotic Product (PROBIOTIC-10 PO) Take 1 tablet by mouth Every Night.    ProviderHeron MD   sulfamethoxazole-trimethoprim (Bactrim DS) 800-160 MG per tablet Take 1 tablet by mouth 2 (Two) Times a Day. 7/7/24   Patricia Hernandez, APRN  "  testosterone micronized powder APPLY 2 CLICKS (0.5 ML) TOPICALLY EVERY DAY 24   Patricia Hernandez APRN   Tirzepatide (MOUNJARO) 10 MG/0.5ML solution pen-injector pen Inject 0.5 mL under the skin into the appropriate area as directed 1 (One) Time Per Week. 24   Patricia Hernandez APRN   topiramate (TOPAMAX) 50 MG tablet Take 1 tablet by mouth every night at bedtime for 180 days. 5/3/24 10/30/24  Patricia Hernandez APRN   traZODone (DESYREL) 100 MG tablet Take 1 tablet by mouth Every Night. 5/3/24   Patricia Hernandez APRN        Social History:   Social History     Tobacco Use    Smoking status: Former     Current packs/day: 0.00     Average packs/day: 0.3 packs/day for 35.0 years (8.8 ttl pk-yrs)     Types: Cigarettes     Start date: 1/15/1985     Quit date: 1/15/2020     Years since quittin.6     Passive exposure: Never    Smokeless tobacco: Never    Tobacco comments:     A PACK A WEEK   Vaping Use    Vaping status: Never Used   Substance Use Topics    Alcohol use: Yes     Comment: OCCASIONAL/SOCIAL    Drug use: Not Currently     Frequency: 0.1 times per week     Types: Marijuana     Comment: OTC THC         Review of Systems:  Review of Systems   Cardiovascular:  Positive for chest pain.   Musculoskeletal:  Positive for arthralgias and myalgias.        Physical Exam:  /73 (BP Location: Right arm, Patient Position: Lying)   Pulse 63   Temp 98.1 °F (36.7 °C) (Oral)   Resp 16   Ht 170.2 cm (67\")   Wt 97.2 kg (214 lb 4.6 oz)   LMP  (LMP Unknown)   SpO2 100%   BMI 33.56 kg/m²     Physical Exam  Vitals and nursing note reviewed.   Constitutional:       General: She is not in acute distress.  HENT:      Head: Normocephalic.   Cardiovascular:      Rate and Rhythm: Normal rate and regular rhythm.      Heart sounds: Normal heart sounds.   Pulmonary:      Effort: Pulmonary effort is normal. No respiratory distress.      Breath sounds: Normal breath sounds. "   Abdominal:      General: Abdomen is flat.      Palpations: Abdomen is soft.      Tenderness: There is no abdominal tenderness.   Musculoskeletal:         General: Normal range of motion.      Cervical back: Normal range of motion and neck supple.   Skin:     General: Skin is warm and dry.   Neurological:      Mental Status: She is alert and oriented to person, place, and time. Mental status is at baseline.                  Procedures:  Procedures      Medical Decision Making:      Comorbidities that affect care:    Diabetes, Hypertension    External Notes reviewed:    Previous ED Note: Patient seen in this ED on 8/13/2024 after a fall, patient had negative rib series      The following orders were placed and all results were independently analyzed by me:  Orders Placed This Encounter   Procedures    XR Chest 1 View    [place] Draw    High Sensitivity Troponin T    Comprehensive Metabolic Panel    Lipase    BNP    Magnesium    CBC Auto Differential    High Sensitivity Troponin T 2Hr    NPO Diet NPO Type: Strict NPO    Undress & Gown    Continuous Pulse Oximetry    Oxygen Therapy- Nasal Cannula; Titrate 1-6 LPM Per SpO2; 90 - 95%    ECG 12 Lead ED Triage Standing Order; Chest Pain    ECG 12 Lead ED Triage Standing Order; Chest Pain    Insert Peripheral IV    CBC & Differential    Green Top (Gel)    Lavender Top    Gold Top - SST    Light Blue Top       Medications Given in the Emergency Department:  Medications   sodium chloride 0.9 % flush 10 mL (has no administration in time range)   aspirin chewable tablet 324 mg (324 mg Oral Not Given 8/23/24 1729)   sodium chloride 0.9 % bolus 1,000 mL (1,000 mL Intravenous New Bag 8/23/24 1723)   ondansetron (ZOFRAN) injection 4 mg (4 mg Intravenous Given 8/23/24 1725)   ketorolac (TORADOL) injection 30 mg (30 mg Intravenous Given 8/23/24 1808)        ED Course:    ED Course as of 08/23/24 2020   Fri Aug 23, 2024   1529 EKG:    Rhythm: Normal sinus rhythm  Rate: 72  Intervals:  72  T-wave: Normal  ST Segment: Normal    EKG Comparison: Not available    Interpreted by me   [NL]      ED Course User Index  [NL] Efren Guerra DO       Labs:    Lab Results (last 24 hours)       Procedure Component Value Units Date/Time    High Sensitivity Troponin T [[id number]]  (Normal) Collected: 08/23/24 1533    Specimen: Blood from Arm, Right Updated: 08/23/24 1609     HS Troponin T 10 ng/L     Narrative:      High Sensitive Troponin T Reference Range:  <14.0 ng/L- Negative Female for AMI  <22.0 ng/L- Negative Male for AMI  >=14 - Abnormal Female indicating possible myocardial injury.  >=22 - Abnormal Male indicating possible myocardial injury.   Clinicians would have to utilize clinical acumen, EKG, Troponin, and serial changes to determine if it is an Acute Myocardial Infarction or myocardial injury due to an underlying chronic condition.         CBC & Differential [[id number]]  (Abnormal) Collected: 08/23/24 1533    Specimen: Blood from Arm, Right Updated: 08/23/24 1542    Narrative:      The following orders were created for panel order CBC & Differential.  Procedure                               Abnormality         Status                     ---------                               -----------         ------                     CBC Auto Differential[[id number]]        Abnormal            Final result                 Please view results for these tests on the individual orders.    Comprehensive Metabolic Panel [[id number]]  (Abnormal) Collected: 08/23/24 1533    Specimen: Blood from Arm, Right Updated: 08/23/24 1609     Glucose 92 mg/dL      BUN 20 mg/dL      Creatinine 1.37 mg/dL      Sodium 140 mmol/L      Potassium 4.0 mmol/L      Chloride 106 mmol/L      CO2 23.1 mmol/L      Calcium 9.4 mg/dL      Total Protein 6.7 g/dL      Albumin 4.0 g/dL      ALT (SGPT) 19 U/L      AST (SGOT) 23 U/L      Alkaline Phosphatase 86 U/L      Total Bilirubin 0.3 mg/dL      Globulin 2.7 gm/dL      A/G Ratio 1.5 g/dL       BUN/Creatinine Ratio 14.6     Anion Gap 10.9 mmol/L      eGFR 48.6 mL/min/1.73     Narrative:      GFR Normal >60  Chronic Kidney Disease <60  Kidney Failure <15      Lipase [[id number]]  (Abnormal) Collected: 08/23/24 1533    Specimen: Blood from Arm, Right Updated: 08/23/24 1609     Lipase 82 U/L     BNP [[id number]]  (Normal) Collected: 08/23/24 1533    Specimen: Blood from Arm, Right Updated: 08/23/24 1606     proBNP 116.6 pg/mL     Narrative:      This assay is used as an aid in the diagnosis of individuals suspected of having heart failure. It can be used as an aid in the diagnosis of acute decompensated heart failure (ADHF) in patients presenting with signs and symptoms of ADHF to the emergency department (ED). In addition, NT-proBNP of <300 pg/mL indicates ADHF is not likely.    Age Range Result Interpretation  NT-proBNP Concentration (pg/mL:      <50             Positive            >450                   Gray                 300-450                    Negative             <300    50-75           Positive            >900                  Gray                300-900                  Negative            <300      >75             Positive            >1800                  Gray                300-1800                  Negative            <300    Magnesium [[id number]]  (Normal) Collected: 08/23/24 1533    Specimen: Blood from Arm, Right Updated: 08/23/24 1609     Magnesium 2.3 mg/dL     CBC Auto Differential [[id number]]  (Abnormal) Collected: 08/23/24 1533    Specimen: Blood from Arm, Right Updated: 08/23/24 1542     WBC 9.75 10*3/mm3      RBC 4.62 10*6/mm3      Hemoglobin 13.1 g/dL      Hematocrit 40.0 %      MCV 86.6 fL      MCH 28.4 pg      MCHC 32.8 g/dL      RDW 13.6 %      RDW-SD 42.5 fl      MPV 9.9 fL      Platelets 202 10*3/mm3      Neutrophil % 65.6 %      Lymphocyte % 25.3 %      Monocyte % 7.1 %      Eosinophil % 0.9 %      Basophil % 0.5 %      Immature Grans % 0.6 %      Neutrophils, Absolute 6.39  10*3/mm3      Lymphocytes, Absolute 2.47 10*3/mm3      Monocytes, Absolute 0.69 10*3/mm3      Eosinophils, Absolute 0.09 10*3/mm3      Basophils, Absolute 0.05 10*3/mm3      Immature Grans, Absolute 0.06 10*3/mm3      nRBC 0.0 /100 WBC     High Sensitivity Troponin T 2Hr [[id number]]  (Normal) Collected: 08/23/24 1922    Specimen: Blood Updated: 08/23/24 1954     HS Troponin T 9 ng/L      Troponin T Delta -1 ng/L     Narrative:      High Sensitive Troponin T Reference Range:  <14.0 ng/L- Negative Female for AMI  <22.0 ng/L- Negative Male for AMI  >=14 - Abnormal Female indicating possible myocardial injury.  >=22 - Abnormal Male indicating possible myocardial injury.   Clinicians would have to utilize clinical acumen, EKG, Troponin, and serial changes to determine if it is an Acute Myocardial Infarction or myocardial injury due to an underlying chronic condition.                  Imaging:    XR Chest 1 View    Result Date: 8/23/2024  XR CHEST 1 VW Date of Exam: 8/23/2024 3:37 PM EDT Indication: Chest pain Comparison: 8/13/2024. Findings: The heart size is normal. The pulmonary vascular markings are normal. The lungs and pleural spaces are clear of active disease. The trachea is midline. There is degenerative spondylosis in the thoracic spine.     Impression: No active disease. Electronically Signed: Lance Vasquez MD  8/23/2024 3:41 PM EDT  Workstation ID: [id number]       Differential Diagnosis and Discussion:    Chest Pain:  Based on the patient's signs and symptoms, I considered aortic dissection, myocardial infaction, pulmonary embolism, cardiac tamponade, pericarditis, pneumothorax, musculoskeletal chest pain and other differential diagnosis as an etiology of the patient's chest pain.     All labs were reviewed and interpreted by me.  All X-rays impressions were independently interpreted by me.  EKG was interpreted by me.    MDM  The patient is resting comfortably and feels better, is alert and in no distress. The  repeat examination is unremarkable and benign. Electrocardiogram shows no signs of acute ischemia and the history, exam, diagnostic testing and current condition did not suggest that this patient is having an acute myocardial infarction, significant arrhythmia, unstable angina, esophageal perforation, pulmonary embolism, aortic dissection, severe pneumonia, sepsis for other significant pathology that would warrant further testing, continued ED treatment, admission, cardiology or other specialist consultation at this point. The vital signs have been stable. The patient's condition is stable and appropriate for discharge. The patient will pursue further outpatient evaluation with the primary care physician, or designated physician or cardiologist. The patient has expressed a clear and thorough understanding and agreed to follow-up as instructed.      Patient Care Considerations:    ANTIBIOTICS: I considered prescribing antibiotics as an outpatient however no bacterial focus of infection was found.      Consultants/Shared Management Plan:    None    Social Determinants of Health:    Patient is independent, reliable, and has access to care.       Disposition and Care Coordination:    Discharged: The patient is suitable and stable for discharge with no need for consideration of admission.    I have explained the patient´s condition, diagnoses and treatment plan based on the information available to me at this time. I have answered questions and addressed any concerns. The patient has a good  understanding of the patient´s diagnosis, condition, and treatment plan as can be expected at this point. The vital signs have been stable. The patient´s condition is stable and appropriate for discharge from the emergency department.      The patient will pursue further outpatient evaluation with the primary care physician or other designated or consulting physician as outlined in the discharge instructions. They are agreeable to  this plan of care and follow-up instructions have been explained in detail. The patient has received these instructions in written format and has expressed an understanding of the discharge instructions. The patient is aware that any significant change in condition or worsening of symptoms should prompt an immediate return to this or the closest emergency department or call to 911.    Final diagnoses:   Chest pain, unspecified type        ED Disposition       ED Disposition   Discharge    Condition   Stable    Comment   --               This medical record created using voice recognition software.             Efren Guerra DO  08/23/24 2020

## 2024-09-01 ENCOUNTER — APPOINTMENT (OUTPATIENT)
Dept: CT IMAGING | Facility: HOSPITAL | Age: 44
DRG: 322 | End: 2024-09-01
Payer: COMMERCIAL

## 2024-09-01 ENCOUNTER — HOSPITAL ENCOUNTER (INPATIENT)
Facility: HOSPITAL | Age: 44
LOS: 1 days | Discharge: HOME | DRG: 322 | End: 2024-09-05
Attending: EMERGENCY MEDICINE | Admitting: HOSPITALIST
Payer: COMMERCIAL

## 2024-09-01 ENCOUNTER — APPOINTMENT (OUTPATIENT)
Dept: GENERAL RADIOLOGY | Facility: HOSPITAL | Age: 44
DRG: 322 | End: 2024-09-01
Payer: COMMERCIAL

## 2024-09-01 DIAGNOSIS — I25.9 CHEST PAIN DUE TO MYOCARDIAL ISCHEMIA, UNSPECIFIED ISCHEMIC CHEST PAIN TYPE: ICD-10-CM

## 2024-09-01 DIAGNOSIS — I21.4 NSTEMI (NON-ST ELEVATED MYOCARDIAL INFARCTION): ICD-10-CM

## 2024-09-01 DIAGNOSIS — R07.9 CHEST PAIN, UNSPECIFIED TYPE: Primary | ICD-10-CM

## 2024-09-01 LAB
ALBUMIN SERPL-MCNC: 4.1 G/DL (ref 3.5–5.2)
ALBUMIN/GLOB SERPL: 1.5 G/DL
ALP SERPL-CCNC: 98 U/L (ref 39–117)
ALT SERPL W P-5'-P-CCNC: 22 U/L (ref 1–33)
ANION GAP SERPL CALCULATED.3IONS-SCNC: 10.9 MMOL/L (ref 5–15)
AST SERPL-CCNC: 23 U/L (ref 1–32)
BASOPHILS # BLD AUTO: 0.03 10*3/MM3 (ref 0–0.2)
BASOPHILS NFR BLD AUTO: 0.3 % (ref 0–1.5)
BILIRUB SERPL-MCNC: 0.4 MG/DL (ref 0–1.2)
BUN SERPL-MCNC: 15 MG/DL (ref 6–20)
BUN/CREAT SERPL: 11.5 (ref 7–25)
CALCIUM SPEC-SCNC: 9.3 MG/DL (ref 8.6–10.5)
CHLORIDE SERPL-SCNC: 105 MMOL/L (ref 98–107)
CO2 SERPL-SCNC: 20.1 MMOL/L (ref 22–29)
CREAT SERPL-MCNC: 1.3 MG/DL (ref 0.57–1)
CRP SERPL-MCNC: <0.3 MG/DL (ref 0–0.5)
DEPRECATED RDW RBC AUTO: 42.1 FL (ref 37–54)
EGFRCR SERPLBLD CKD-EPI 2021: 51.8 ML/MIN/1.73
EOSINOPHIL # BLD AUTO: 0.09 10*3/MM3 (ref 0–0.4)
EOSINOPHIL NFR BLD AUTO: 1 % (ref 0.3–6.2)
ERYTHROCYTE [DISTWIDTH] IN BLOOD BY AUTOMATED COUNT: 13.7 % (ref 12.3–15.4)
ERYTHROCYTE [SEDIMENTATION RATE] IN BLOOD: 6 MM/HR (ref 0–20)
FLUAV SUBTYP SPEC NAA+PROBE: NOT DETECTED
FLUBV RNA ISLT QL NAA+PROBE: NOT DETECTED
GEN 5 2HR TROPONIN T REFLEX: 24 NG/L
GLOBULIN UR ELPH-MCNC: 2.8 GM/DL
GLUCOSE SERPL-MCNC: 89 MG/DL (ref 65–99)
HCT VFR BLD AUTO: 43.3 % (ref 34–46.6)
HGB BLD-MCNC: 14.5 G/DL (ref 12–15.9)
HOLD SPECIMEN: NORMAL
HOLD SPECIMEN: NORMAL
IMM GRANULOCYTES # BLD AUTO: 0.06 10*3/MM3 (ref 0–0.05)
IMM GRANULOCYTES NFR BLD AUTO: 0.6 % (ref 0–0.5)
LIPASE SERPL-CCNC: 103 U/L (ref 13–60)
LYMPHOCYTES # BLD AUTO: 2.74 10*3/MM3 (ref 0.7–3.1)
LYMPHOCYTES NFR BLD AUTO: 28.9 % (ref 19.6–45.3)
MAGNESIUM SERPL-MCNC: 2 MG/DL (ref 1.6–2.6)
MCH RBC QN AUTO: 28.4 PG (ref 26.6–33)
MCHC RBC AUTO-ENTMCNC: 33.5 G/DL (ref 31.5–35.7)
MCV RBC AUTO: 84.9 FL (ref 79–97)
MONOCYTES # BLD AUTO: 0.63 10*3/MM3 (ref 0.1–0.9)
MONOCYTES NFR BLD AUTO: 6.7 % (ref 5–12)
NEUTROPHILS NFR BLD AUTO: 5.92 10*3/MM3 (ref 1.7–7)
NEUTROPHILS NFR BLD AUTO: 62.5 % (ref 42.7–76)
NRBC BLD AUTO-RTO: 0 /100 WBC (ref 0–0.2)
NT-PROBNP SERPL-MCNC: 134.5 PG/ML (ref 0–450)
PLATELET # BLD AUTO: 206 10*3/MM3 (ref 140–450)
PMV BLD AUTO: 9.5 FL (ref 6–12)
POTASSIUM SERPL-SCNC: 4.1 MMOL/L (ref 3.5–5.2)
PROT SERPL-MCNC: 6.9 G/DL (ref 6–8.5)
RBC # BLD AUTO: 5.1 10*6/MM3 (ref 3.77–5.28)
RSV RNA NPH QL NAA+NON-PROBE: NOT DETECTED
SARS-COV-2 RNA RESP QL NAA+PROBE: NOT DETECTED
SODIUM SERPL-SCNC: 136 MMOL/L (ref 136–145)
TROPONIN T DELTA: 4 NG/L
TROPONIN T SERPL HS-MCNC: 20 NG/L
WBC NRBC COR # BLD AUTO: 9.47 10*3/MM3 (ref 3.4–10.8)
WHOLE BLOOD HOLD COAG: NORMAL
WHOLE BLOOD HOLD SPECIMEN: NORMAL

## 2024-09-01 PROCEDURE — 99222 1ST HOSP IP/OBS MODERATE 55: CPT | Performed by: HOSPITALIST

## 2024-09-01 PROCEDURE — 36415 COLL VENOUS BLD VENIPUNCTURE: CPT | Performed by: HOSPITALIST

## 2024-09-01 PROCEDURE — G0378 HOSPITAL OBSERVATION PER HR: HCPCS

## 2024-09-01 PROCEDURE — 84484 ASSAY OF TROPONIN QUANT: CPT

## 2024-09-01 PROCEDURE — 93005 ELECTROCARDIOGRAM TRACING: CPT

## 2024-09-01 PROCEDURE — 83690 ASSAY OF LIPASE: CPT

## 2024-09-01 PROCEDURE — 25510000001 IOPAMIDOL PER 1 ML: Performed by: EMERGENCY MEDICINE

## 2024-09-01 PROCEDURE — 85652 RBC SED RATE AUTOMATED: CPT | Performed by: HOSPITALIST

## 2024-09-01 PROCEDURE — 83880 ASSAY OF NATRIURETIC PEPTIDE: CPT

## 2024-09-01 PROCEDURE — 84484 ASSAY OF TROPONIN QUANT: CPT | Performed by: EMERGENCY MEDICINE

## 2024-09-01 PROCEDURE — 25010000002 KETOROLAC TROMETHAMINE PER 15 MG: Performed by: HOSPITALIST

## 2024-09-01 PROCEDURE — 93005 ELECTROCARDIOGRAM TRACING: CPT | Performed by: EMERGENCY MEDICINE

## 2024-09-01 PROCEDURE — 25010000002 KETOROLAC TROMETHAMINE PER 15 MG: Performed by: EMERGENCY MEDICINE

## 2024-09-01 PROCEDURE — 85025 COMPLETE CBC W/AUTO DIFF WBC: CPT

## 2024-09-01 PROCEDURE — 99291 CRITICAL CARE FIRST HOUR: CPT

## 2024-09-01 PROCEDURE — 87637 SARSCOV2&INF A&B&RSV AMP PRB: CPT

## 2024-09-01 PROCEDURE — 25810000003 SODIUM CHLORIDE 0.9 % SOLUTION: Performed by: HOSPITALIST

## 2024-09-01 PROCEDURE — 80053 COMPREHEN METABOLIC PANEL: CPT

## 2024-09-01 PROCEDURE — 71260 CT THORAX DX C+: CPT

## 2024-09-01 PROCEDURE — 86140 C-REACTIVE PROTEIN: CPT | Performed by: HOSPITALIST

## 2024-09-01 PROCEDURE — 71045 X-RAY EXAM CHEST 1 VIEW: CPT

## 2024-09-01 PROCEDURE — 83735 ASSAY OF MAGNESIUM: CPT

## 2024-09-01 RX ORDER — KETOROLAC TROMETHAMINE 30 MG/ML
30 INJECTION, SOLUTION INTRAMUSCULAR; INTRAVENOUS ONCE
Status: COMPLETED | OUTPATIENT
Start: 2024-09-01 | End: 2024-09-01

## 2024-09-01 RX ORDER — BUPROPION HYDROCHLORIDE 150 MG/1
300 TABLET ORAL DAILY
Status: DISCONTINUED | OUTPATIENT
Start: 2024-09-01 | End: 2024-09-05 | Stop reason: HOSPADM

## 2024-09-01 RX ORDER — SODIUM CHLORIDE 9 MG/ML
40 INJECTION, SOLUTION INTRAVENOUS AS NEEDED
Status: DISCONTINUED | OUTPATIENT
Start: 2024-09-01 | End: 2024-09-05 | Stop reason: HOSPADM

## 2024-09-01 RX ORDER — BUSPIRONE HYDROCHLORIDE 5 MG/1
5 TABLET ORAL 3 TIMES DAILY
Status: DISCONTINUED | OUTPATIENT
Start: 2024-09-01 | End: 2024-09-05 | Stop reason: HOSPADM

## 2024-09-01 RX ORDER — BISACODYL 5 MG/1
5 TABLET, DELAYED RELEASE ORAL DAILY PRN
Status: DISCONTINUED | OUTPATIENT
Start: 2024-09-01 | End: 2024-09-01

## 2024-09-01 RX ORDER — IOPAMIDOL 755 MG/ML
100 INJECTION, SOLUTION INTRAVASCULAR
Status: COMPLETED | OUTPATIENT
Start: 2024-09-01 | End: 2024-09-01

## 2024-09-01 RX ORDER — FLUTICASONE PROPIONATE 50 MCG
2 SPRAY, SUSPENSION (ML) NASAL NIGHTLY PRN
Status: DISCONTINUED | OUTPATIENT
Start: 2024-09-01 | End: 2024-09-04

## 2024-09-01 RX ORDER — POLYETHYLENE GLYCOL 3350 17 G/17G
17 POWDER, FOR SOLUTION ORAL DAILY PRN
Status: DISCONTINUED | OUTPATIENT
Start: 2024-09-01 | End: 2024-09-01

## 2024-09-01 RX ORDER — ACETAMINOPHEN 160 MG/5ML
650 SOLUTION ORAL EVERY 4 HOURS PRN
Status: DISCONTINUED | OUTPATIENT
Start: 2024-09-01 | End: 2024-09-05 | Stop reason: HOSPADM

## 2024-09-01 RX ORDER — ASPIRIN 81 MG/1
324 TABLET, CHEWABLE ORAL ONCE
Status: COMPLETED | OUTPATIENT
Start: 2024-09-01 | End: 2024-09-01

## 2024-09-01 RX ORDER — SODIUM CHLORIDE 0.9 % (FLUSH) 0.9 %
10 SYRINGE (ML) INJECTION AS NEEDED
Status: DISCONTINUED | OUTPATIENT
Start: 2024-09-01 | End: 2024-09-05 | Stop reason: HOSPADM

## 2024-09-01 RX ORDER — TOPIRAMATE 25 MG/1
50 TABLET, FILM COATED ORAL DAILY
Status: DISCONTINUED | OUTPATIENT
Start: 2024-09-01 | End: 2024-09-05 | Stop reason: HOSPADM

## 2024-09-01 RX ORDER — CETIRIZINE HYDROCHLORIDE 10 MG/1
10 TABLET ORAL DAILY PRN
Status: DISCONTINUED | OUTPATIENT
Start: 2024-09-01 | End: 2024-09-04

## 2024-09-01 RX ORDER — BISACODYL 10 MG
10 SUPPOSITORY, RECTAL RECTAL DAILY PRN
Status: DISCONTINUED | OUTPATIENT
Start: 2024-09-01 | End: 2024-09-01 | Stop reason: SDUPTHER

## 2024-09-01 RX ORDER — KETOROLAC TROMETHAMINE 15 MG/ML
15 INJECTION, SOLUTION INTRAMUSCULAR; INTRAVENOUS EVERY 6 HOURS PRN
Status: DISCONTINUED | OUTPATIENT
Start: 2024-09-01 | End: 2024-09-02

## 2024-09-01 RX ORDER — AMOXICILLIN 250 MG
2 CAPSULE ORAL 2 TIMES DAILY
Status: DISCONTINUED | OUTPATIENT
Start: 2024-09-01 | End: 2024-09-01

## 2024-09-01 RX ORDER — BISACODYL 10 MG
10 SUPPOSITORY, RECTAL RECTAL DAILY PRN
Status: DISCONTINUED | OUTPATIENT
Start: 2024-09-01 | End: 2024-09-05 | Stop reason: HOSPADM

## 2024-09-01 RX ORDER — ACETAMINOPHEN 325 MG/1
650 TABLET ORAL EVERY 4 HOURS PRN
Status: DISCONTINUED | OUTPATIENT
Start: 2024-09-01 | End: 2024-09-04 | Stop reason: SDUPTHER

## 2024-09-01 RX ORDER — ONDANSETRON 2 MG/ML
4 INJECTION INTRAMUSCULAR; INTRAVENOUS EVERY 6 HOURS PRN
Status: DISCONTINUED | OUTPATIENT
Start: 2024-09-01 | End: 2024-09-01 | Stop reason: SDUPTHER

## 2024-09-01 RX ORDER — LUBIPROSTONE 8 UG/1
8 CAPSULE ORAL 2 TIMES DAILY
Status: DISCONTINUED | OUTPATIENT
Start: 2024-09-01 | End: 2024-09-05 | Stop reason: HOSPADM

## 2024-09-01 RX ORDER — IBUPROFEN 800 MG/1
800 TABLET, FILM COATED ORAL EVERY 6 HOURS PRN
COMMUNITY
End: 2024-09-05 | Stop reason: SDUPTHER

## 2024-09-01 RX ORDER — AMOXICILLIN 250 MG
2 CAPSULE ORAL 2 TIMES DAILY
Status: DISCONTINUED | OUTPATIENT
Start: 2024-09-01 | End: 2024-09-05 | Stop reason: HOSPADM

## 2024-09-01 RX ORDER — SODIUM CHLORIDE 0.9 % (FLUSH) 0.9 %
10 SYRINGE (ML) INJECTION EVERY 12 HOURS SCHEDULED
Status: DISCONTINUED | OUTPATIENT
Start: 2024-09-01 | End: 2024-09-05 | Stop reason: HOSPADM

## 2024-09-01 RX ORDER — METOPROLOL SUCCINATE 25 MG/1
25 TABLET, EXTENDED RELEASE ORAL NIGHTLY
Status: DISCONTINUED | OUTPATIENT
Start: 2024-09-01 | End: 2024-09-05 | Stop reason: HOSPADM

## 2024-09-01 RX ORDER — ONDANSETRON 4 MG/1
4 TABLET, ORALLY DISINTEGRATING ORAL EVERY 6 HOURS PRN
Status: DISCONTINUED | OUTPATIENT
Start: 2024-09-01 | End: 2024-09-04

## 2024-09-01 RX ORDER — ACETAMINOPHEN 650 MG/1
650 SUPPOSITORY RECTAL EVERY 4 HOURS PRN
Status: DISCONTINUED | OUTPATIENT
Start: 2024-09-01 | End: 2024-09-05 | Stop reason: HOSPADM

## 2024-09-01 RX ORDER — TRAZODONE HYDROCHLORIDE 100 MG/1
100 TABLET ORAL NIGHTLY PRN
Status: DISCONTINUED | OUTPATIENT
Start: 2024-09-01 | End: 2024-09-05 | Stop reason: HOSPADM

## 2024-09-01 RX ORDER — BISACODYL 5 MG/1
5 TABLET, DELAYED RELEASE ORAL DAILY PRN
Status: DISCONTINUED | OUTPATIENT
Start: 2024-09-01 | End: 2024-09-05 | Stop reason: HOSPADM

## 2024-09-01 RX ORDER — PRAVASTATIN SODIUM 20 MG
20 TABLET ORAL NIGHTLY
Status: DISCONTINUED | OUTPATIENT
Start: 2024-09-01 | End: 2024-09-04

## 2024-09-01 RX ORDER — POLYETHYLENE GLYCOL 3350 17 G/17G
17 POWDER, FOR SOLUTION ORAL DAILY PRN
Status: DISCONTINUED | OUTPATIENT
Start: 2024-09-01 | End: 2024-09-05 | Stop reason: HOSPADM

## 2024-09-01 RX ORDER — SODIUM CHLORIDE 9 MG/ML
75 INJECTION, SOLUTION INTRAVENOUS CONTINUOUS
Status: DISCONTINUED | OUTPATIENT
Start: 2024-09-01 | End: 2024-09-02

## 2024-09-01 RX ORDER — ONDANSETRON 2 MG/ML
4 INJECTION INTRAMUSCULAR; INTRAVENOUS EVERY 6 HOURS PRN
Status: DISCONTINUED | OUTPATIENT
Start: 2024-09-01 | End: 2024-09-04

## 2024-09-01 RX ORDER — ONDANSETRON 4 MG/1
4 TABLET, ORALLY DISINTEGRATING ORAL EVERY 6 HOURS PRN
Status: DISCONTINUED | OUTPATIENT
Start: 2024-09-01 | End: 2024-09-01 | Stop reason: SDUPTHER

## 2024-09-01 RX ADMIN — SENNOSIDES AND DOCUSATE SODIUM 2 TABLET: 50; 8.6 TABLET ORAL at 21:26

## 2024-09-01 RX ADMIN — BUSPIRONE HYDROCHLORIDE 5 MG: 5 TABLET ORAL at 21:26

## 2024-09-01 RX ADMIN — IOPAMIDOL 100 ML: 755 INJECTION, SOLUTION INTRAVENOUS at 14:09

## 2024-09-01 RX ADMIN — PRAVASTATIN SODIUM 20 MG: 20 TABLET ORAL at 21:26

## 2024-09-01 RX ADMIN — TOPIRAMATE 50 MG: 25 TABLET, FILM COATED ORAL at 21:26

## 2024-09-01 RX ADMIN — SODIUM CHLORIDE 75 ML/HR: 9 INJECTION, SOLUTION INTRAVENOUS at 18:33

## 2024-09-01 RX ADMIN — Medication 10 ML: at 21:50

## 2024-09-01 RX ADMIN — METOPROLOL SUCCINATE 25 MG: 25 TABLET, EXTENDED RELEASE ORAL at 21:27

## 2024-09-01 RX ADMIN — ASPIRIN 324 MG: 81 TABLET, CHEWABLE ORAL at 12:00

## 2024-09-01 RX ADMIN — BUPROPION HYDROCHLORIDE 300 MG: 150 TABLET, EXTENDED RELEASE ORAL at 21:25

## 2024-09-01 RX ADMIN — KETOROLAC TROMETHAMINE 30 MG: 30 INJECTION, SOLUTION INTRAMUSCULAR; INTRAVENOUS at 15:19

## 2024-09-01 RX ADMIN — KETOROLAC TROMETHAMINE 15 MG: 15 INJECTION, SOLUTION INTRAMUSCULAR; INTRAVENOUS at 21:28

## 2024-09-01 RX ADMIN — TRAZODONE HYDROCHLORIDE 100 MG: 100 TABLET ORAL at 21:28

## 2024-09-01 NOTE — Clinical Note
First balloon inflation max pressure = 12 ni. First balloon inflation duration = 10 seconds. Second inflation of balloon - Max pressure = 14 ni. 2nd Inflation of balloon - Duration = 10 seconds. Third inflation of balloon - Max pressure = 18 ni. 3rd Inflation of balloon - Duration = 10 seconds. Fourth inflation of balloon - Max pressure = 18 ni. 4th Inflation of balloon - Duration = 10 seconds.

## 2024-09-01 NOTE — LETTER
September 5, 2024     Patient: Bianca Boles   YOB: 1980   Date of Visit: 9/1/2024       To Whom It May Concern:    It is my medical opinion that Bianca Boles may return to work on 09/09/2024 .           Sincerely,        João Carvalho  ICU Registered Nurse

## 2024-09-01 NOTE — Clinical Note
First balloon inflation max pressure = 8 ni. First balloon inflation duration = 10 seconds. Second inflation of balloon - Max pressure = 12 ni. 2nd Inflation of balloon - Duration = 14 seconds. Third inflation of balloon - Max pressure = 16 ni. 3rd Inflation of balloon - Duration = 20 seconds. Fourth inflation of balloon - Max pressure = 20 ni. 4th Inflation of balloon - Duration = 14 seconds.

## 2024-09-01 NOTE — ED PROVIDER NOTES
Time: 1:48 PM EDT  Date of encounter:  2024  Independent Historian/Clinical History and Information was obtained by:   Patient    History is limited by: N/A    Chief Complaint: Chest pain      History of Present Illness:  Patient is a 44 y.o. year old female who presents to the emergency department for evaluation of chest pain for the past several days.  The patient reports that on the  she did have a fall and landed on her chest.  Patient denies shortness of breath.  Patient has no cough or hemoptysis.  Patient denies nausea, vomiting, and diarrhea.  Patient does report that she takes oral contraceptives.  Patient has no fever or chills.      Patient Care Team  Primary Care Provider: Patricia Hernandez APRN    Past Medical History:     Allergies   Allergen Reactions    Morphine Itching and Nausea And Vomiting     Past Medical History:   Diagnosis Date    Acid reflux     Anxiety     Anxiety and depression     Benign essential hypertension     Borderline personality disorder     Cancer     renal cancer/mass, PARTIAL NEPH, RIGHT    Diabetes     Diabetes mellitus     type ii, doesn't check bg at home     Diabetes mellitus, type 2     Elevated cholesterol     Frozen shoulder 2023    GERD (gastroesophageal reflux disease)     High triglycerides     History of bariatric surgery 2021    GASTRIC SLEEVE    History of kidney stones     HTN (hypertension)     Hyperlipemia     Hyperlipidemia     Hypertriglyceridemia     Lumbar herniated disc     Obesity     Panic attacks     PCOS (polycystic ovarian syndrome)     Periarthritis of shoulder     Psoriasis     ELBOWS    Rotator cuff syndrome     Seasonal allergies     Self-injurious behavior     Spinal headache     AFTER PAIN EPIDURALS.  NO BLOOD PATCH    Suicide attempt      Past Surgical History:   Procedure Laterality Date    ABDOMINAL SURGERY  2020    Gastric sleeve    ADENOIDECTOMY  1984     SECTION  ,     COLONOSCOPY N/A 5/23/2024    Procedure: COLONOSCOPY;  Surgeon: Terrence Fry MD;  Location: [facility] ENDOSCOPY;  Service: Gastroenterology;  Laterality: N/A;  NORMAL COLONOSCOPY    CYSTOSCOPY BLADDER STONE LITHOTRIPSY      EAR TUBES  1984    ENDOSCOPY N/A 10/20/2020    Procedure: ESOPHAGOGASTRODUODENOSCOPY WITH BIOPSY;  Surgeon: Fidel Grajeda Jr., MD;  Location: [facility] ENDOSCOPY;  Service: General;  Laterality: N/A;  PRE- GERD  POST- RETAINED FOOD, GASTRITIS, GASTRIC POLYPS    ENDOSCOPY  2014    FOOT FRACTURE SURGERY Left 2017    screw placed    GASTRECTOMY  12/2020    GASTRIC SLEEVE LAPAROSCOPIC N/A 12/07/2020    Procedure: GASTRIC SLEEVE LAPAROSCOPIC;  Surgeon: Fidel Grajeda Jr., MD;  Location: [facility] OR [facility];  Service: Bariatric;  Laterality: N/A;    NEPHRECTOMY PARTIAL Right 11/15/2021    Procedure: NEPHRECTOMY PARTIAL LAPAROSCOPIC WITH DAVINCI ROBOT;  Surgeon: Lori Troy MD;  Location: [facility] MAIN OR;  Service: Robotics - DaVinci;  Laterality: Right;    SHOULDER ARTHROSCOPY Left 08/14/2023    Procedure: LEFT SHOULDER MANIPULATION;  Surgeon: Domenic Bradley MD;  Location: [facility] OR [facility];  Service: Orthopedics;  Laterality: Left;    TONSILLECTOMY  1984    UPPER GASTROINTESTINAL ENDOSCOPY      URETEROSCOPY LASER LITHOTRIPSY WITH STENT INSERTION Right 09/28/2021    Procedure: CYSTOSCOPY, RIGHT URETEROSCOPY, LASERTRIPSY, STONE BASKET EXTRACTION AND STENT INSERTION;  Surgeon: Lori Troy MD;  Location: [facility] MAIN OR;  Service: Urology;  Laterality: Right;    URETEROSCOPY LASER LITHOTRIPSY WITH STENT INSERTION Left 11/08/2022    Procedure: URETEROSCOPY LASER LITHOTRIPSY WITH URETERAL STENT INSERTION;  Surgeon: Lori Troy MD;  Location: [facility] MAIN OR;  Service: Urology;  Laterality: Left;     Family History   Problem Relation Age of Onset    Cancer Mother         Breast    Diabetes Father     Hypertension Father     Heart disease Father     Cancer Father         Bladder  prostate liver    Colon cancer Father         malignant, currently 62    No Known Problems Sister     No Known Problems Brother     No Known Problems Maternal Aunt     No Known Problems Paternal Aunt     No Known Problems Maternal Uncle     No Known Problems Paternal Uncle     No Known Problems Maternal Grandfather     Stroke Maternal Grandmother     Heart disease Maternal Grandmother     Diabetes Paternal Grandfather     Heart disease Paternal Grandfather     No Known Problems Paternal Grandmother     No Known Problems Cousin     Malig Hyperthermia Neg Hx     ADD / ADHD Neg Hx     Alcohol abuse Neg Hx     Anxiety disorder Neg Hx     Bipolar disorder Neg Hx     Dementia Neg Hx     Depression Neg Hx     Drug abuse Neg Hx     OCD Neg Hx     Paranoid behavior Neg Hx     Schizophrenia Neg Hx     Seizures Neg Hx     Self-Injurious Behavior  Neg Hx     Suicide Attempts Neg Hx        Home Medications:  Prior to Admission medications    Medication Sig Start Date End Date Taking? Authorizing Provider   Apremilast (Otezla) 30 MG tablet Take 30 mg by mouth 2 (Two) Times a Day. 2/5/24      Biotin 20816 MCG tablet Take 1 tablet by mouth Every Night.    Heron Campuzano MD   buPROPion XL (WELLBUTRIN XL) 300 MG 24 hr tablet Take 1 tablet by mouth Daily. 5/3/24   Patricia Hernandez APRN   busPIRone (BUSPAR) 5 MG tablet Take 1 tablet by mouth 3 (Three) Times a Day. 5/3/24   Patricia Hernandez APRN   CALCIUM-MAGNESIUM-ZINC PO Take 3 tablets by mouth Daily.    Heron Campuzano MD   cetirizine (zyrTEC) 10 MG tablet Take 1 tablet by mouth Daily As Needed. 5/31/22   Heron Campuzano MD   Cholecalciferol 25 MCG (1000 UT) capsule Take 2 capsules by mouth Daily. 11/3/23   Patricia Hernandez APRN   clobetasol (TEMOVATE) 0.05 % external solution Apply 10-15 drops every day to affected areas in the scalp after shampooing until clear.  Patient taking differently: Apply  topically to the appropriate area as  directed Daily As Needed. 8/16/22      COLLAGEN PO Take 3 tablets by mouth Daily.    Provider, MD Heron   dapagliflozin (Farxiga) 5 MG tablet tablet Take 1 tablet by mouth Daily. 5/3/24   Patricia Hernandez APRN   desvenlafaxine (PRISTIQ) 50 MG 24 hr tablet Take 1 tablet by mouth Daily. 5/3/24   Patricia Hernandez APRN   Diclofenac Sodium (VOLTAREN) 1 % gel gel Apply 4 g topically to the appropriate area as directed 4 (Four) Times a Day As Needed (pain). 6/12/23   Nicolasa Meade PA-C   fluconazole (Diflucan) 150 MG tablet Take 1 tablet by mouth 1 (One) Time for 1 dose. 8/31/24 8/31/24  Vicky Robledo APRN   fluticasone (FLONASE) 50 MCG/ACT nasal spray USE 2 SPRAYS IN EACH NOSTRIL ONCE DAILY AS DIRECTED  Patient taking differently: 2 sprays into the nostril(s) as directed by provider At Night As Needed. 5/19/23   Patricia Hernandez APRN   IRON-VITAMIN C PO Take 1 tablet by mouth Daily.    Provider, MD Heron   linaclotide (Linzess) 72 MCG capsule capsule Take 1 capsule by mouth Every Morning Before Breakfast for 90 days. 4/11/24 10/8/24  Michell Horan APRN   metoprolol succinate XL (Toprol XL) 25 MG 24 hr tablet Take 1 tablet by mouth Every Night. 5/3/24   Patricia Hernandez APRN   Multiple Vitamins-Minerals (MULTIVITAMIN PO) Take 1 tablet by mouth Every Night.    Provider, MD Heron   nitrofurantoin, macrocrystal-monohydrate, (Macrobid) 100 MG capsule Take 1 capsule by mouth 2 (Two) Times a Day. 8/3/24   Patricia Hernandez APRN   ondansetron ODT (ZOFRAN-ODT) 4 MG disintegrating tablet Place 1 tablet on the tongue Every 8 (Eight) Hours As Needed for Nausea. 11/4/22   Patricia Hernandez APRN   ondansetron ODT (ZOFRAN-ODT) 4 MG disintegrating tablet Place 1 tablet on the tongue Every 8 (Eight) Hours As Needed for Nausea or Vomiting. 8/31/24   Vicky Robledo APRN   pravastatin (PRAVACHOL) 20 MG tablet Take 1 tablet by mouth Every Night.  5/3/24   Patricia Hernandez APRN   Probiotic Product (PROBIOTIC-10 PO) Take 1 tablet by mouth Every Night.    Provider, MD Heron   sulfamethoxazole-trimethoprim (Bactrim DS) 800-160 MG per tablet Take 1 tablet by mouth 2 (Two) Times a Day. 24   Patricia Hernandez APRN   testosterone micronized powder APPLY 2 CLICKS (0.5 ML) TOPICALLY EVERY DAY 24   Patricia Hernandez APRN   Tirzepatide (MOUNJARO) 10 MG/0.5ML solution pen-injector pen Inject 0.5 mL under the skin into the appropriate area as directed 1 (One) Time Per Week. 24   Patricia Hernandez APRN   topiramate (TOPAMAX) 50 MG tablet Take 1 tablet by mouth every night at bedtime for 180 days. 5/3/24 10/30/24  Patricia Hernandez APRN   traZODone (DESYREL) 100 MG tablet Take 1 tablet by mouth Every Night. 5/3/24   Patricia Hernandez APRN        Social History:   Social History     Tobacco Use    Smoking status: Former     Current packs/day: 0.00     Average packs/day: 0.3 packs/day for 35.0 years (8.8 ttl pk-yrs)     Types: Cigarettes     Start date: 1/15/1985     Quit date: 1/15/2020     Years since quittin.6     Passive exposure: Never    Smokeless tobacco: Never    Tobacco comments:     A PACK A WEEK   Vaping Use    Vaping status: Never Used   Substance Use Topics    Alcohol use: Yes     Comment: OCCASIONAL/SOCIAL    Drug use: Not Currently     Frequency: 0.1 times per week     Types: Marijuana     Comment: OTC THC         Review of Systems:  Review of Systems   Constitutional:  Negative for chills and fever.   HENT:  Negative for congestion, rhinorrhea and sore throat.    Eyes:  Negative for pain and visual disturbance.   Respiratory:  Negative for apnea, cough, chest tightness and shortness of breath.    Cardiovascular:  Positive for chest pain. Negative for palpitations.   Gastrointestinal:  Negative for abdominal pain, diarrhea, nausea and vomiting.   Genitourinary:  Negative for difficulty  "urinating and dysuria.   Musculoskeletal:  Negative for joint swelling and myalgias.   Skin:  Negative for color change.   Neurological:  Negative for seizures and headaches.   Psychiatric/Behavioral: Negative.     All other systems reviewed and are negative.       Physical Exam:  /79 (BP Location: Right arm, Patient Position: Lying)   Pulse 67   Temp 97.8 °F (36.6 °C) (Oral)   Resp 18   Ht 170.2 cm (67\")   Wt 96.1 kg (211 lb 13.8 oz)   LMP  (LMP Unknown)   SpO2 99%   BMI 33.18 kg/m²     Physical Exam  Vitals and nursing note reviewed.   Constitutional:       General: She is not in acute distress.     Appearance: Normal appearance. She is not toxic-appearing.   HENT:      Head: Normocephalic and atraumatic.      Jaw: There is normal jaw occlusion.   Eyes:      General: Lids are normal.      Extraocular Movements: Extraocular movements intact.      Conjunctiva/sclera: Conjunctivae normal.      Pupils: Pupils are equal, round, and reactive to light.   Cardiovascular:      Rate and Rhythm: Normal rate and regular rhythm.      Pulses: Normal pulses.      Heart sounds: Normal heart sounds.   Pulmonary:      Effort: Pulmonary effort is normal. No respiratory distress.      Breath sounds: Normal breath sounds. No wheezing or rhonchi.   Abdominal:      General: Abdomen is flat.      Palpations: Abdomen is soft.      Tenderness: There is no abdominal tenderness. There is no guarding or rebound.   Musculoskeletal:         General: Normal range of motion.      Cervical back: Normal range of motion and neck supple.      Right lower leg: No edema.      Left lower leg: No edema.   Skin:     General: Skin is warm and dry.   Neurological:      Mental Status: She is alert and oriented to person, place, and time. Mental status is at baseline.   Psychiatric:         Mood and Affect: Mood normal.                  Procedures:  Procedures      Medical Decision Making:      Comorbidities that affect " care:    Hypertension    External Notes reviewed:    Previous ED Note: Patient was last seen in the ED for chest pain.      The following orders were placed and all results were independently analyzed by me:  Orders Placed This Encounter   Procedures    COVID-19, FLU A/B, RSV PCR 1 HR TAT - Swab, Nasopharynx    XR Chest 1 View    CT Chest With Contrast Diagnostic    [place] Draw    High Sensitivity Troponin T    Comprehensive Metabolic Panel    Lipase    BNP    Magnesium    CBC Auto Differential    High Sensitivity Troponin T 2Hr    Basic Metabolic Panel    Magnesium    Phosphorus    Sedimentation Rate    C-reactive Protein    Diet: Regular/House; Texture: Regular (IDDSI 7); Fluid Consistency: Thin (IDDSI 0)    Undress & Gown    Continuous Pulse Oximetry    Vital Signs    Intake & Output    Weigh Patient    Oral Care    Saline Lock & Maintain IV Access    Place Sequential Compression Device    Maintain Sequential Compression Device    Code Status and Medical Interventions: CPR (Attempt to Resuscitate); Full Support    Inpatient Cardiology Consult    Inpatient Hospitalist Consult    Oxygen Therapy- Nasal Cannula; Titrate 1-6 LPM Per SpO2; 90 - 95%    ECG 12 Lead ED Triage Standing Order; Chest Pain    ECG 12 Lead ED Triage Standing Order; Chest Pain    Adult Transthoracic Echo Complete w/ Color, Spectral and Contrast if necessary per protocol    Insert Peripheral IV    Insert Peripheral IV    Initiate Observation Status    CBC & Differential    Green Top (Gel)    Lavender Top    Gold Top - SST    Light Blue Top    CBC & Differential       Medications Given in the Emergency Department:  Medications   sodium chloride 0.9 % flush 10 mL (has no administration in time range)   sodium chloride 0.9 % flush 10 mL (has no administration in time range)   sodium chloride 0.9 % flush 10 mL (has no administration in time range)   sodium chloride 0.9 % infusion 40 mL (has no administration in time range)   ondansetron ODT  (ZOFRAN-ODT) disintegrating tablet 4 mg (has no administration in time range)     Or   ondansetron (ZOFRAN) injection 4 mg (has no administration in time range)   sodium chloride 0.9 % infusion (has no administration in time range)   acetaminophen (TYLENOL) tablet 650 mg (has no administration in time range)     Or   acetaminophen (TYLENOL) 160 MG/5ML oral solution 650 mg (has no administration in time range)     Or   acetaminophen (TYLENOL) suppository 650 mg (has no administration in time range)   bisacodyl (DULCOLAX) suppository 10 mg (has no administration in time range)   ketorolac (TORADOL) injection 15 mg (has no administration in time range)   aspirin chewable tablet 324 mg (324 mg Oral Given 9/1/24 1200)   ketorolac (TORADOL) injection 30 mg (30 mg Intravenous Given 9/1/24 1519)   iopamidol (ISOVUE-370) 76 % injection 100 mL (100 mL Intravenous Given 9/1/24 1409)        ED Course:         Labs:    Lab Results (last 24 hours)       Procedure Component Value Units Date/Time    COVID-19, FLU A/B, RSV PCR 1 HR TAT - Swab, Nasopharynx [[id number]]  (Normal) Collected: 09/01/24 1203    Specimen: Swab from Nasopharynx Updated: 09/01/24 1304     COVID19 Not Detected     Influenza A PCR Not Detected     Influenza B PCR Not Detected     RSV, PCR Not Detected    Narrative:      Fact sheet for providers: https://www.fda.gov/media/031611/download    Fact sheet for patients: https://www.fda.gov/media/794529/download    Test performed by PCR.    High Sensitivity Troponin T [[id number]]  (Abnormal) Collected: 09/01/24 1208    Specimen: Blood Updated: 09/01/24 1242     HS Troponin T 20 ng/L     Narrative:      High Sensitive Troponin T Reference Range:  <14.0 ng/L- Negative Female for AMI  <22.0 ng/L- Negative Male for AMI  >=14 - Abnormal Female indicating possible myocardial injury.  >=22 - Abnormal Male indicating possible myocardial injury.   Clinicians would have to utilize clinical acumen, EKG, Troponin, and serial  changes to determine if it is an Acute Myocardial Infarction or myocardial injury due to an underlying chronic condition.         CBC & Differential [[id number]]  (Abnormal) Collected: 09/01/24 1208    Specimen: Blood Updated: 09/01/24 1218    Narrative:      The following orders were created for panel order CBC & Differential.  Procedure                               Abnormality         Status                     ---------                               -----------         ------                     CBC Auto Differential[[id number]]        Abnormal            Final result                 Please view results for these tests on the individual orders.    Comprehensive Metabolic Panel [[id number]]  (Abnormal) Collected: 09/01/24 1208    Specimen: Blood Updated: 09/01/24 1242     Glucose 89 mg/dL      BUN 15 mg/dL      Creatinine 1.30 mg/dL      Sodium 136 mmol/L      Potassium 4.1 mmol/L      Chloride 105 mmol/L      CO2 20.1 mmol/L      Calcium 9.3 mg/dL      Total Protein 6.9 g/dL      Albumin 4.1 g/dL      ALT (SGPT) 22 U/L      AST (SGOT) 23 U/L      Alkaline Phosphatase 98 U/L      Total Bilirubin 0.4 mg/dL      Globulin 2.8 gm/dL      A/G Ratio 1.5 g/dL      BUN/Creatinine Ratio 11.5     Anion Gap 10.9 mmol/L      eGFR 51.8 mL/min/1.73     Narrative:      GFR Normal >60  Chronic Kidney Disease <60  Kidney Failure <15      Lipase [[id number]]  (Abnormal) Collected: 09/01/24 1208    Specimen: Blood Updated: 09/01/24 1242     Lipase 103 U/L     BNP [[id number]]  (Normal) Collected: 09/01/24 1208    Specimen: Blood Updated: 09/01/24 1238     proBNP 134.5 pg/mL     Narrative:      This assay is used as an aid in the diagnosis of individuals suspected of having heart failure. It can be used as an aid in the diagnosis of acute decompensated heart failure (ADHF) in patients presenting with signs and symptoms of ADHF to the emergency department (ED). In addition, NT-proBNP of <300 pg/mL indicates ADHF is not likely.    Age  Range Result Interpretation  NT-proBNP Concentration (pg/mL:      <50             Positive            >450                   Gray                 300-450                    Negative             <300    50-75           Positive            >900                  Gray                300-900                  Negative            <300      >75             Positive            >1800                  Gray                300-1800                  Negative            <300    Magnesium [[id number]]  (Normal) Collected: 09/01/24 1208    Specimen: Blood Updated: 09/01/24 1242     Magnesium 2.0 mg/dL     CBC Auto Differential [[id number]]  (Abnormal) Collected: 09/01/24 1208    Specimen: Blood Updated: 09/01/24 1218     WBC 9.47 10*3/mm3      RBC 5.10 10*6/mm3      Hemoglobin 14.5 g/dL      Hematocrit 43.3 %      MCV 84.9 fL      MCH 28.4 pg      MCHC 33.5 g/dL      RDW 13.7 %      RDW-SD 42.1 fl      MPV 9.5 fL      Platelets 206 10*3/mm3      Neutrophil % 62.5 %      Lymphocyte % 28.9 %      Monocyte % 6.7 %      Eosinophil % 1.0 %      Basophil % 0.3 %      Immature Grans % 0.6 %      Neutrophils, Absolute 5.92 10*3/mm3      Lymphocytes, Absolute 2.74 10*3/mm3      Monocytes, Absolute 0.63 10*3/mm3      Eosinophils, Absolute 0.09 10*3/mm3      Basophils, Absolute 0.03 10*3/mm3      Immature Grans, Absolute 0.06 10*3/mm3      nRBC 0.0 /100 WBC     High Sensitivity Troponin T 2Hr [[id number]]  (Abnormal) Collected: 09/01/24 1519    Specimen: Blood Updated: 09/01/24 1610     HS Troponin T 24 ng/L      Troponin T Delta 4 ng/L     Narrative:      High Sensitive Troponin T Reference Range:  <14.0 ng/L- Negative Female for AMI  <22.0 ng/L- Negative Male for AMI  >=14 - Abnormal Female indicating possible myocardial injury.  >=22 - Abnormal Male indicating possible myocardial injury.   Clinicians would have to utilize clinical acumen, EKG, Troponin, and serial changes to determine if it is an Acute Myocardial Infarction or myocardial  injury due to an underlying chronic condition.         C-reactive Protein [[id number]] Collected: 09/01/24 1519    Specimen: Blood Updated: 09/01/24 1732             Imaging:    CT Chest With Contrast Diagnostic    Result Date: 9/1/2024  CT CHEST W CONTRAST DIAGNOSTIC Date of Exam: 9/1/2024 2:01 PM EDT Indication: Shortness of breath shortness of breath. Comparison: Same day chest radiograph Technique: Axial CT images were obtained of the chest after the uneventful intravenous administration of iodinated contrast.  Reconstructed coronal and sagittal images were also obtained. Automated exposure control and iterative construction methods were  used. Findings: There is no evidence of pulmonary embolism. Normal pulmonary artery caliber. No right heart strain. No evidence of pericardial effusion. A few coronary artery calcifications are present. There are mitral annular calcifications. No evidence of thoracic aortic aneurysm. No evidence of mediastinal mass or threshold lymphadenopathy. Normal appearance of the thoracic esophagus. Central airways are patent. No significant bronchial wall thickening or bronchiectasis. There is no focal airspace consolidation, pleural effusion, or pneumothorax. There is a well-circumscribed 1.4 cm pulmonary nodule along the pleural surface of the right middle lobe as seen on series 404 image 102. This demonstrates punctate internal calcification and is consistent with a benign partially calcified granuloma. No additional suspicious pulmonary nodule or mass. Chest wall soft tissues show no acute abnormality. Surgical changes of gastric sleeve. No acute abnormality of the upper abdomen. No evidence of acute fracture or suspicious osseous lesion.     Impression: No evidence of pulmonary embolism. No acute chest abnormality. Electronically Signed: Kaden Mcclure MD  9/1/2024 2:23 PM EDT  Workstation ID: [id number]    XR Chest 1 View    Result Date: 9/1/2024  XR CHEST 1 VW Date of Exam: 9/1/2024  11:57 AM EDT Indication: Chest Pain Triage Protocol Comparison: 8/23/2024 Findings: Heart size and pulmonary vasculature within normal limits. Lungs clear. Costophrenic angle sharp. No pneumothorax     Impression: No active cardiopulmonary disease Electronically Signed: Boy Tee  9/1/2024 12:16 PM EDT  Workstation ID: [id number]       Differential Diagnosis and Discussion:    Chest Pain:  Based on the patient's signs and symptoms, I considered aortic dissection, myocardial infaction, pulmonary embolism, cardiac tamponade, pericarditis, pneumothorax, musculoskeletal chest pain and other differential diagnosis as an etiology of the patient's chest pain.     All labs were reviewed and interpreted by me.  All X-rays impressions were independently interpreted by me.  EKG was interpreted by me.  CT scan radiology impression was interpreted by me.    MDM     Amount and/or Complexity of Data Reviewed  Decide to obtain previous medical records or to obtain history from someone other than the patient: yes       The patient´s CBC that was reviewed and interpreted by me shows no abnormalities of critical concern. Of note, there is no anemia requiring a blood transfusion and the platelet count is acceptable.  The patient´s CMP that was reviewed and interpretted by me shows no abnormalities of critical concern. Of note, the patient´s sodium and potassium are acceptable. The patient´s liver enzymes are unremarkable. The patient´s renal function (creatinine) is preserved. The patient has a normal anion gap.  Initial troponin is 20 and the repeat is 24.      The patient presents with chest pain.  The patient has a troponin that is elevated. EKG shows no ST elevations.  CT scan to rule out other causes of cardiac chest pain was considered and was negative for PE. The patient was was placed on a cardiac monitor and continuously monitored for ventricular ectopy, arrhythmia, tachycardia, hypoxia, and changes in blood pressure.   Continuous pulse oximetry was placed in waveform with corresponding oxygen saturation was assessed throughout their stay in the emergency department.     Total Critical Care time of 40 minutes. Total critical care time documented does not include time spent on separately billed procedures for services of nurses or physician assistants. I personally saw and examined the patient. I have reviewed all diagnostic interpretations and treatment plans as written. I was present for the key portions of any procedures performed and the inclusive time noted in any critical care statement. Critical care time includes patient management by me, time spent at the patients bedside,  time to review lab and imaging results, discussing patient care, documentation in the medical record, and time spent with family or caregiver.          Patient Care Considerations:    None      Consultants/Shared Management Plan:    Case was discussed with Dr. Bee who recommends admission.  Case was discussed with Dr. Murry who agrees to admit the patient.    Social Determinants of Health:    Patient is independent, reliable, and has access to care.       Disposition and Care Coordination:    Admit:   Through independent evaluation of the patient's history, physical, and imperical data, the patient meets criteria for inpatient admission to the hospital.        Final diagnoses:   Chest pain, unspecified type        ED Disposition       ED Disposition   Decision to Admit    Condition   --    Comment   Level of Care: Telemetry [5]   Diagnosis: Chest pain [183995]   Admitting Physician: BUSHRA MURRY [[id number]]   Attending Physician: BUSHRA MURRY [[id number]]                 This medical record created using voice recognition software.             Colton Dawkins MD  09/01/24 [id number]

## 2024-09-01 NOTE — Clinical Note
First balloon inflation max pressure = 18 ni. First balloon inflation duration = 20 seconds. Second inflation of balloon - Max pressure = 18 ni. 2nd Inflation of balloon - Duration = 20 seconds. Third inflation of balloon - Max pressure = 18 ni. 3rd Inflation of balloon - Duration = 20 seconds.

## 2024-09-01 NOTE — H&P
[facility]  Date: 2024   Patient Name: Bianca Boles  : 1980  MRN: [id number]  Primary Care Physician:  Patricia Hernandez, [place]  Date of admission: 2024    Subjective chest pain  Subjective     Chief Complaint: Chest pain    HPI: Patient is a 44-year-old female who presents to the emergency room for evaluation of chest pain.  This is the second in 1 week that she has come to the ED for chest pain.  She fell on her chest on .  She denies any shortness of breath, cough or hemoptysis.  She denies any nausea vomiting or diarrhea.  She does take oral contraceptives.    On arrival to the ED, patient's temperature 97.8, pulse of 66, respiratory rate 19, blood pressure 137/81, and saturating 100% on room air.  Chest CT shows no signs of pulmonary embolism.  No acute chest abnormality.    On labs, troponin is 22nd troponin is 24 of 4.  Creatinine is 1.30, lipase is 103.  White blood cell count is 9.47.      Pain is worse when she lays flat and takes deep breaths.  Personal History     Past Medical History:  Past Medical History:   Diagnosis Date    Acid reflux     Anxiety     Anxiety and depression     Benign essential hypertension     Borderline personality disorder     Cancer     renal cancer/mass, PARTIAL NEPH, RIGHT    Diabetes     Diabetes mellitus     type ii, doesn't check bg at home     Diabetes mellitus, type 2     Elevated cholesterol     Frozen shoulder 2023    GERD (gastroesophageal reflux disease)     High triglycerides     History of bariatric surgery 2021    GASTRIC SLEEVE    History of kidney stones     HTN (hypertension)     Hyperlipemia     Hyperlipidemia     Hypertriglyceridemia     Lumbar herniated disc     Obesity     Panic attacks     PCOS (polycystic ovarian syndrome)     Periarthritis of shoulder     Psoriasis     ELBOWS    Rotator cuff syndrome     Seasonal allergies     Self-injurious behavior      Spinal headache     AFTER PAIN EPIDURALS.  NO BLOOD PATCH    Suicide attempt        Past Surgical History:  Past Surgical History:   Procedure Laterality Date    ABDOMINAL SURGERY  2020    Gastric sleeve    ADENOIDECTOMY  1984     SECTION  ,    COLONOSCOPY N/A 2024    Procedure: COLONOSCOPY;  Surgeon: Terrence Fry MD;  Location: [facility] ENDOSCOPY;  Service: Gastroenterology;  Laterality: N/A;  NORMAL COLONOSCOPY    CYSTOSCOPY BLADDER STONE LITHOTRIPSY      EAR TUBES  1984    ENDOSCOPY N/A 10/20/2020    Procedure: ESOPHAGOGASTRODUODENOSCOPY WITH BIOPSY;  Surgeon: Fidel Grajeda Jr., MD;  Location: [facility] ENDOSCOPY;  Service: General;  Laterality: N/A;  PRE- GERD  POST- RETAINED FOOD, GASTRITIS, GASTRIC POLYPS    ENDOSCOPY      FOOT FRACTURE SURGERY Left 2017    screw placed    GASTRECTOMY  2020    GASTRIC SLEEVE LAPAROSCOPIC N/A 2020    Procedure: GASTRIC SLEEVE LAPAROSCOPIC;  Surgeon: Fidel Grajeda Jr., MD;  Location: [facility] OR [facility];  Service: Bariatric;  Laterality: N/A;    NEPHRECTOMY PARTIAL Right 11/15/2021    Procedure: NEPHRECTOMY PARTIAL LAPAROSCOPIC WITH DAVINCI ROBOT;  Surgeon: Lori Troy MD;  Location: [facility] MAIN OR;  Service: Robotics - DaVinci;  Laterality: Right;    SHOULDER ARTHROSCOPY Left 2023    Procedure: LEFT SHOULDER MANIPULATION;  Surgeon: Domenic Bradley MD;  Location: [facility] OR [facility];  Service: Orthopedics;  Laterality: Left;    TONSILLECTOMY  1984    UPPER GASTROINTESTINAL ENDOSCOPY      URETEROSCOPY LASER LITHOTRIPSY WITH STENT INSERTION Right 2021    Procedure: CYSTOSCOPY, RIGHT URETEROSCOPY, LASERTRIPSY, STONE BASKET EXTRACTION AND STENT INSERTION;  Surgeon: Lori Troy MD;  Location: [facility] MAIN OR;  Service: Urology;  Laterality: Right;    URETEROSCOPY LASER LITHOTRIPSY WITH STENT INSERTION Left 2022    Procedure: URETEROSCOPY LASER LITHOTRIPSY WITH URETERAL STENT INSERTION;  Surgeon: Woodrow  Lori COFFEY MD;  Location: [facility] MAIN OR;  Service: Urology;  Laterality: Left;       Family History:   Family History   Problem Relation Age of Onset    Cancer Mother         Breast    Diabetes Father     Hypertension Father     Heart disease Father     Cancer Father         Bladder prostate liver    Colon cancer Father         malignant, currently 62    No Known Problems Sister     No Known Problems Brother     No Known Problems Maternal Aunt     No Known Problems Paternal Aunt     No Known Problems Maternal Uncle     No Known Problems Paternal Uncle     No Known Problems Maternal Grandfather     Stroke Maternal Grandmother     Heart disease Maternal Grandmother     Diabetes Paternal Grandfather     Heart disease Paternal Grandfather     No Known Problems Paternal Grandmother     No Known Problems Cousin     Malig Hyperthermia Neg Hx     ADD / ADHD Neg Hx     Alcohol abuse Neg Hx     Anxiety disorder Neg Hx     Bipolar disorder Neg Hx     Dementia Neg Hx     Depression Neg Hx     Drug abuse Neg Hx     OCD Neg Hx     Paranoid behavior Neg Hx     Schizophrenia Neg Hx     Seizures Neg Hx     Self-Injurious Behavior  Neg Hx     Suicide Attempts Neg Hx        Social History:   Social History     Socioeconomic History    Marital status:    Tobacco Use    Smoking status: Former     Current packs/day: 0.00     Average packs/day: 0.3 packs/day for 35.0 years (8.8 ttl pk-yrs)     Types: Cigarettes     Start date: 1/15/1985     Quit date: 1/15/2020     Years since quittin.6     Passive exposure: Never    Smokeless tobacco: Never    Tobacco comments:     A PACK A WEEK   Vaping Use    Vaping status: Never Used   Substance and Sexual Activity    Alcohol use: Yes     Comment: OCCASIONAL/SOCIAL    Drug use: Not Currently     Frequency: 0.1 times per week     Types: Marijuana     Comment: OTC THC    Sexual activity: Yes     Partners: Male     Birth control/protection: Depo-provera       Home Medications:  Apremilast,  Biotin, Calcium-Magnesium-Zinc, Cholecalciferol, Collagen, Diclofenac Sodium, Iron-Vitamin C, Probiotic Product, Tirzepatide, buPROPion XL, busPIRone, cetirizine, clobetasol, dapagliflozin, desvenlafaxine, fluconazole, fluticasone, ibuprofen, linaclotide, metoprolol succinate XL, multivitamin, ondansetron ODT, pravastatin, testosterone micronized, topiramate, and traZODone    Allergies:  Allergies   Allergen Reactions    Morphine Itching and Nausea And Vomiting       Review of Systems   All systems were reviewed and negative except for: Chest pain    Objective   Objective     Vitals:   Temp:  [97.8 °F (36.6 °C)] 97.8 °F (36.6 °C)  Heart Rate:  [66-67] 67  Resp:  [18-19] 18  BP: (131-137)/(79-81) 131/79    Physical Exam    Constitutional: Awake, alert, no acute distress   Eyes: Pupils equal, sclerae anicteric, no conjunctival injection   HENT: NCAT, mucous membranes moist   Neck: Supple, no thyromegaly, no lymphadenopathy, trachea midline   Respiratory: Clear to auscultation bilaterally, nonlabored respirations    Cardiovascular: RRR, no murmurs, rubs, or gallops, palpable pedal pulses bilaterally   Gastrointestinal: Positive bowel sounds, soft, nontender, nondistended   Musculoskeletal: No bilateral ankle edema, no clubbing or cyanosis to extremities   Psychiatric: Appropriate affect, cooperative   Neurologic: Oriented x 3, strength symmetric in all extremities, Cranial Nerves grossly intact to confrontation, speech clear   Skin: No rashes     Result Review    Result Review:  I have personally reviewed the results from the time of this admission to 9/1/2024 17:27 EDT and agree with these findings:  [x]  Laboratory  [x]  Microbiology  [x]  Radiology  [x]  EKG/Telemetry   [x]  Cardiology/Vascular   [x]  Pathology  [x]  Old records  [x]  Other:      Assessment & Plan   Assessment / Plan   #1 concern for pericarditis  -Echo ordered, check C-reactive protein, sed rate  -Will use NSAIDs.  Consider adding colchicine  depending on findings.  -Cardiology consulted    #2 depression and anxiety continue BuSpar, Wellbutrin, trazodone    #3 non-insulin-dependent diabetes cover with insulin sliding scale.  Patient on Farxiga hold.    #4 elevated lipase-on Mounjaro.     #5 psoriasis patient on Otezla    #6 irritable bowel patient on Linzess    #7 hyperlipidemia Pravachol    #9 allergic rhinitis continue home regimen    #10 patient has partial nephrectomy.  Patient's baseline is 1.12.  Gentle hydration.    VTE Prophylaxis:  Mechanical VTE prophylaxis orders are present.        CODE STATUS:    Code Status (Patient has no pulse and is not breathing): CPR (Attempt to Resuscitate)  Medical Interventions (Patient has pulse or is breathing): Full Support      Admission Status:  I believe this patient meets observation status.    Electronically signed by Mel Licona DO, 09/01/24, 5:27 PM EDT.

## 2024-09-01 NOTE — PLAN OF CARE
Goal Outcome Evaluation:  Plan of Care Reviewed With: patient      Admit from ER. Continuous IV fluids. Patient has chest pressure 5 out 10, not radiating. No other complaints from patient during shift.

## 2024-09-01 NOTE — Clinical Note
First balloon inflation max pressure = 12 ni. First balloon inflation duration = 10 seconds. Second inflation of balloon - Max pressure = 12 ni. 2nd Inflation of balloon - Duration = 10 seconds. Third inflation of balloon - Max pressure = 14 ni. 3rd Inflation of balloon - Duration = 10 seconds.

## 2024-09-02 ENCOUNTER — APPOINTMENT (OUTPATIENT)
Dept: CARDIOLOGY | Facility: HOSPITAL | Age: 44
DRG: 322 | End: 2024-09-02
Payer: COMMERCIAL

## 2024-09-02 LAB
ANION GAP SERPL CALCULATED.3IONS-SCNC: 11 MMOL/L (ref 5–15)
ASCENDING AORTA: 3.1 CM
BASOPHILS # BLD AUTO: 0.06 10*3/MM3 (ref 0–0.2)
BASOPHILS NFR BLD AUTO: 0.6 % (ref 0–1.5)
BH CV ECHO MEAS - ACS: 1.67 CM
BH CV ECHO MEAS - AO MAX PG: 7.4 MMHG
BH CV ECHO MEAS - AO MEAN PG: 3.8 MMHG
BH CV ECHO MEAS - AO ROOT DIAM: 3 CM
BH CV ECHO MEAS - AO V2 MAX: 135.8 CM/SEC
BH CV ECHO MEAS - AO V2 VTI: 26.2 CM
BH CV ECHO MEAS - EDV(MOD-SP2): 60 ML
BH CV ECHO MEAS - EDV(MOD-SP4): 91.9 ML
BH CV ECHO MEAS - EF(MOD-BP): 54 %
BH CV ECHO MEAS - EF(MOD-SP2): 54 %
BH CV ECHO MEAS - EF(MOD-SP4): 64.2 %
BH CV ECHO MEAS - ESV(MOD-SP2): 27.6 ML
BH CV ECHO MEAS - ESV(MOD-SP4): 32.9 ML
BH CV ECHO MEAS - IVS/LVPW: 1.15 CM
BH CV ECHO MEAS - IVSD: 1.5 CM
BH CV ECHO MEAS - LA DIMENSION: 3.1 CM
BH CV ECHO MEAS - LAT PEAK E' VEL: 10.7 CM/SEC
BH CV ECHO MEAS - LV DIASTOLIC VOL/BSA (35-75): 44.5 CM2
BH CV ECHO MEAS - LV MAX PG: 3.2 MMHG
BH CV ECHO MEAS - LV MEAN PG: 1.8 MMHG
BH CV ECHO MEAS - LV SYSTOLIC VOL/BSA (12-30): 15.9 CM2
BH CV ECHO MEAS - LV V1 MAX: 90 CM/SEC
BH CV ECHO MEAS - LV V1 VTI: 20.1 CM
BH CV ECHO MEAS - LVIDD: 4.6 CM
BH CV ECHO MEAS - LVIDS: 2.7 CM
BH CV ECHO MEAS - LVOT DIAM: 2 CM
BH CV ECHO MEAS - LVPWD: 1.3 CM
BH CV ECHO MEAS - MED PEAK E' VEL: 7.8 CM/SEC
BH CV ECHO MEAS - MV A MAX VEL: 94.9 CM/SEC
BH CV ECHO MEAS - MV E MAX VEL: 76.1 CM/SEC
BH CV ECHO MEAS - MV E/A: 0.8
BH CV ECHO MEAS - MV MAX PG: 4.5 MMHG
BH CV ECHO MEAS - MV MEAN PG: 1.5 MMHG
BH CV ECHO MEAS - MV V2 VTI: 25.5 CM
BH CV ECHO MEAS - PA ACC TIME: 0.12 SEC
BH CV ECHO MEAS - PA V2 MAX: 90 CM/SEC
BH CV ECHO MEAS - RV MAX PG: 1 MMHG
BH CV ECHO MEAS - RV V1 MAX: 50 CM/SEC
BH CV ECHO MEAS - RV V1 VTI: 12.6 CM
BH CV ECHO MEAS - RVDD: 2.7 CM
BH CV ECHO MEAS - SV(MOD-SP2): 32.4 ML
BH CV ECHO MEAS - SV(MOD-SP4): 59 ML
BH CV ECHO MEAS - SVI(MOD-SP2): 15.7 ML/M2
BH CV ECHO MEAS - SVI(MOD-SP4): 28.6 ML/M2
BH CV ECHO MEAS - TAPSE (>1.6): 2.6 CM
BH CV ECHO MEAS - TR MAX PG: 14.9 MMHG
BH CV ECHO MEAS - TR MAX VEL: 192.9 CM/SEC
BH CV ECHO MEASUREMENTS AVERAGE E/E' RATIO: 8.23
BH CV XLRA - TDI S': 14 CM/SEC
BUN SERPL-MCNC: 17 MG/DL (ref 6–20)
BUN/CREAT SERPL: 13.8 (ref 7–25)
CALCIUM SPEC-SCNC: 9.1 MG/DL (ref 8.6–10.5)
CHLORIDE SERPL-SCNC: 107 MMOL/L (ref 98–107)
CO2 SERPL-SCNC: 20 MMOL/L (ref 22–29)
CREAT SERPL-MCNC: 1.23 MG/DL (ref 0.57–1)
CRP SERPL-MCNC: <0.3 MG/DL (ref 0–0.5)
DEPRECATED RDW RBC AUTO: 42.1 FL (ref 37–54)
EGFRCR SERPLBLD CKD-EPI 2021: 55.3 ML/MIN/1.73
EOSINOPHIL # BLD AUTO: 0.1 10*3/MM3 (ref 0–0.4)
EOSINOPHIL NFR BLD AUTO: 1 % (ref 0.3–6.2)
ERYTHROCYTE [DISTWIDTH] IN BLOOD BY AUTOMATED COUNT: 13.6 % (ref 12.3–15.4)
GLUCOSE BLDC GLUCOMTR-MCNC: 106 MG/DL (ref 70–99)
GLUCOSE BLDC GLUCOMTR-MCNC: 82 MG/DL (ref 70–99)
GLUCOSE SERPL-MCNC: 85 MG/DL (ref 65–99)
HBA1C MFR BLD: 5.1 % (ref 4.8–5.6)
HCT VFR BLD AUTO: 41.4 % (ref 34–46.6)
HGB BLD-MCNC: 14 G/DL (ref 12–15.9)
IMM GRANULOCYTES # BLD AUTO: 0.07 10*3/MM3 (ref 0–0.05)
IMM GRANULOCYTES NFR BLD AUTO: 0.7 % (ref 0–0.5)
LYMPHOCYTES # BLD AUTO: 2.82 10*3/MM3 (ref 0.7–3.1)
LYMPHOCYTES NFR BLD AUTO: 29 % (ref 19.6–45.3)
MAGNESIUM SERPL-MCNC: 2.1 MG/DL (ref 1.6–2.6)
MCH RBC QN AUTO: 28.9 PG (ref 26.6–33)
MCHC RBC AUTO-ENTMCNC: 33.8 G/DL (ref 31.5–35.7)
MCV RBC AUTO: 85.4 FL (ref 79–97)
MONOCYTES # BLD AUTO: 0.71 10*3/MM3 (ref 0.1–0.9)
MONOCYTES NFR BLD AUTO: 7.3 % (ref 5–12)
NEUTROPHILS NFR BLD AUTO: 5.96 10*3/MM3 (ref 1.7–7)
NEUTROPHILS NFR BLD AUTO: 61.4 % (ref 42.7–76)
NRBC BLD AUTO-RTO: 0 /100 WBC (ref 0–0.2)
PHOSPHATE SERPL-MCNC: 3.3 MG/DL (ref 2.5–4.5)
PLATELET # BLD AUTO: 193 10*3/MM3 (ref 140–450)
PMV BLD AUTO: 9.8 FL (ref 6–12)
POTASSIUM SERPL-SCNC: 4.5 MMOL/L (ref 3.5–5.2)
RBC # BLD AUTO: 4.85 10*6/MM3 (ref 3.77–5.28)
SODIUM SERPL-SCNC: 138 MMOL/L (ref 136–145)
TROPONIN T SERPL HS-MCNC: 119 NG/L
WBC NRBC COR # BLD AUTO: 9.72 10*3/MM3 (ref 3.4–10.8)

## 2024-09-02 PROCEDURE — 85025 COMPLETE CBC W/AUTO DIFF WBC: CPT | Performed by: HOSPITALIST

## 2024-09-02 PROCEDURE — 93005 ELECTROCARDIOGRAM TRACING: CPT | Performed by: STUDENT IN AN ORGANIZED HEALTH CARE EDUCATION/TRAINING PROGRAM

## 2024-09-02 PROCEDURE — 84484 ASSAY OF TROPONIN QUANT: CPT | Performed by: STUDENT IN AN ORGANIZED HEALTH CARE EDUCATION/TRAINING PROGRAM

## 2024-09-02 PROCEDURE — G0378 HOSPITAL OBSERVATION PER HR: HCPCS

## 2024-09-02 PROCEDURE — 99232 SBSQ HOSP IP/OBS MODERATE 35: CPT | Performed by: STUDENT IN AN ORGANIZED HEALTH CARE EDUCATION/TRAINING PROGRAM

## 2024-09-02 PROCEDURE — 84100 ASSAY OF PHOSPHORUS: CPT | Performed by: HOSPITALIST

## 2024-09-02 PROCEDURE — 94761 N-INVAS EAR/PLS OXIMETRY MLT: CPT

## 2024-09-02 PROCEDURE — 93306 TTE W/DOPPLER COMPLETE: CPT

## 2024-09-02 PROCEDURE — 94799 UNLISTED PULMONARY SVC/PX: CPT

## 2024-09-02 PROCEDURE — 83036 HEMOGLOBIN GLYCOSYLATED A1C: CPT | Performed by: STUDENT IN AN ORGANIZED HEALTH CARE EDUCATION/TRAINING PROGRAM

## 2024-09-02 PROCEDURE — 93306 TTE W/DOPPLER COMPLETE: CPT | Performed by: STUDENT IN AN ORGANIZED HEALTH CARE EDUCATION/TRAINING PROGRAM

## 2024-09-02 PROCEDURE — 25010000002 HYDROMORPHONE 1 MG/ML SOLUTION: Performed by: STUDENT IN AN ORGANIZED HEALTH CARE EDUCATION/TRAINING PROGRAM

## 2024-09-02 PROCEDURE — 86140 C-REACTIVE PROTEIN: CPT | Performed by: STUDENT IN AN ORGANIZED HEALTH CARE EDUCATION/TRAINING PROGRAM

## 2024-09-02 PROCEDURE — 83735 ASSAY OF MAGNESIUM: CPT | Performed by: HOSPITALIST

## 2024-09-02 PROCEDURE — 94760 N-INVAS EAR/PLS OXIMETRY 1: CPT

## 2024-09-02 PROCEDURE — 82948 REAGENT STRIP/BLOOD GLUCOSE: CPT

## 2024-09-02 PROCEDURE — 80048 BASIC METABOLIC PNL TOTAL CA: CPT | Performed by: HOSPITALIST

## 2024-09-02 PROCEDURE — 25010000002 KETOROLAC TROMETHAMINE PER 15 MG: Performed by: FAMILY MEDICINE

## 2024-09-02 RX ORDER — KETOROLAC TROMETHAMINE 30 MG/ML
15 INJECTION, SOLUTION INTRAMUSCULAR; INTRAVENOUS ONCE
Status: COMPLETED | OUTPATIENT
Start: 2024-09-02 | End: 2024-09-02

## 2024-09-02 RX ORDER — KETOROLAC TROMETHAMINE 30 MG/ML
30 INJECTION, SOLUTION INTRAMUSCULAR; INTRAVENOUS EVERY 6 HOURS PRN
Status: DISCONTINUED | OUTPATIENT
Start: 2024-09-02 | End: 2024-09-02

## 2024-09-02 RX ORDER — IBUPROFEN 600 MG/1
1 TABLET ORAL
Status: DISCONTINUED | OUTPATIENT
Start: 2024-09-02 | End: 2024-09-04

## 2024-09-02 RX ORDER — DEXTROSE MONOHYDRATE 25 G/50ML
25 INJECTION, SOLUTION INTRAVENOUS
Status: DISCONTINUED | OUTPATIENT
Start: 2024-09-02 | End: 2024-09-04

## 2024-09-02 RX ORDER — NICOTINE POLACRILEX 4 MG
15 LOZENGE BUCCAL
Status: DISCONTINUED | OUTPATIENT
Start: 2024-09-02 | End: 2024-09-04

## 2024-09-02 RX ORDER — INSULIN LISPRO 100 [IU]/ML
2-9 INJECTION, SOLUTION INTRAVENOUS; SUBCUTANEOUS
Status: DISCONTINUED | OUTPATIENT
Start: 2024-09-02 | End: 2024-09-05 | Stop reason: HOSPADM

## 2024-09-02 RX ADMIN — BUPROPION HYDROCHLORIDE 300 MG: 150 TABLET, EXTENDED RELEASE ORAL at 09:27

## 2024-09-02 RX ADMIN — Medication 10 ML: at 09:29

## 2024-09-02 RX ADMIN — BUSPIRONE HYDROCHLORIDE 5 MG: 5 TABLET ORAL at 21:06

## 2024-09-02 RX ADMIN — Medication 10 ML: at 21:07

## 2024-09-02 RX ADMIN — METOPROLOL SUCCINATE 25 MG: 25 TABLET, EXTENDED RELEASE ORAL at 21:07

## 2024-09-02 RX ADMIN — HYDROMORPHONE HYDROCHLORIDE 0.5 MG: 1 INJECTION, SOLUTION INTRAMUSCULAR; INTRAVENOUS; SUBCUTANEOUS at 19:53

## 2024-09-02 RX ADMIN — PRAVASTATIN SODIUM 20 MG: 20 TABLET ORAL at 21:07

## 2024-09-02 RX ADMIN — BUSPIRONE HYDROCHLORIDE 5 MG: 5 TABLET ORAL at 17:03

## 2024-09-02 RX ADMIN — TOPIRAMATE 50 MG: 25 TABLET, FILM COATED ORAL at 09:27

## 2024-09-02 RX ADMIN — BUSPIRONE HYDROCHLORIDE 5 MG: 5 TABLET ORAL at 09:27

## 2024-09-02 RX ADMIN — KETOROLAC TROMETHAMINE 15 MG: 30 INJECTION, SOLUTION INTRAMUSCULAR; INTRAVENOUS at 22:50

## 2024-09-02 NOTE — PLAN OF CARE
Goal Outcome Evaluation:  Plan of Care Reviewed With: patient        Progress: improving  Outcome Evaluation: Patient was admitted due to chest pain for the past several days. Patient stated that she had fallen and hit her chest and has hurt ever since. Patient was admitted for obs. Patient has had a few episodes of anxiety. Chest pain is controlled with PRN Toradol. Will continue to monitor.

## 2024-09-02 NOTE — PROGRESS NOTES
[facility]   Hospitalist Progress Note  Date: 2024  Patient Name: Bianca Boles  : 1980  MRN: [id number]  Date of admission: 2024  Room/Bed: 263/1      Subjective   Subjective     Chief Complaint: Chest pain     Summary:Bianca Boles is a 44 y.o. female with history of hypertension, type 2 diabetes, hyperlipidemia, s/p laparoscopic sleeve gastrectomy, GERD, anxiety, MDD, insomnia and vitamin D deficiency who presented with chest pain.  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  The insulin was in between her chest and floor.  Started having chest pain since then.  Had been to ER for 3 times prior to this hospitalization for pain in her mid chest.  Fractures were ruled out, it was thought to be noncardiac in origin and patient was discharged on pain medications.  Patient stated the pain medication will take edge off but now she is having pain in her central chest radiating to her bilateral neck and shoulder.  It is worse when she is laying down.  Given the chest pain was persistent, patient and her family got concerned and presented to ER for further evaluation and management.  Denies any fever, chills, rigors, recent flulike symptoms or shortness of breath.  Mentioned she would feel like she is catching her breath because of the pain but denied actual shortness of air.  EKG on presentation was nonsignificant.  Troponin 20 with repeat troponin 24.  On as needed Toradol which is taking the edge off.  Cardiology was consulted and patient was admitted for further evaluation and management.    Interval Followup: No acute events overnight.  Patient stated her chest pain is slightly better with as needed Toradol, takes the edge off.  But still having chest pain which is worse while lying down.  Denied any fever, chills, rigors, difficulty breathing or abdominal pain.  Obtaining transthoracic echo today.    CT chest: There is  a well-circumscribed 1.4 cm pulmonary nodule along the pleural surface of the   right middle lobe.    Review of Systems    All systems reviewed and negative except for what is outlined above.      Objective   Objective     Vitals:   Temp:  [97.5 °F (36.4 °C)-98.2 °F (36.8 °C)] 98.1 °F (36.7 °C)  Heart Rate:  [66-75] 71  Resp:  [16-20] 18  BP: (105-153)/(74-94) 105/74    Physical Exam   General: Awake, alert, NAD  Cardiovascular: RRR, no murmurs   Pulmonary: CTA bilaterally; no wheezes; no conversational dyspnea  Gastrointestinal: S/ND/NT, +BS  Neuro: Alert, awake, oriented x 3; speech clear; no tremor      Result Review    Result Review:  I have personally reviewed these results:  [x]  Laboratory      Lab 09/02/24 0358 09/01/24  1833 09/01/24  1519 09/01/24  1208   WBC 9.72  --   --  9.47   HEMOGLOBIN 14.0  --   --  14.5   HEMATOCRIT 41.4  --   --  43.3   PLATELETS 193  --   --  206   NEUTROS ABS 5.96  --   --  5.92   IMMATURE GRANS (ABS) 0.07*  --   --  0.06*   LYMPHS ABS 2.82  --   --  2.74   MONOS ABS 0.71  --   --  0.63   EOS ABS 0.10  --   --  0.09   MCV 85.4  --   --  84.9   SED RATE  --  6  --   --    CRP  --   --  <0.30  --          Lab 09/02/24  0358 09/01/24  1208   SODIUM 138 136   POTASSIUM 4.5 4.1   CHLORIDE 107 105   CO2 20.0* 20.1*   ANION GAP 11.0 10.9   BUN 17 15   CREATININE 1.23* 1.30*   EGFR 55.3* 51.8*   GLUCOSE 85 89   CALCIUM 9.1 9.3   MAGNESIUM 2.1 2.0   PHOSPHORUS 3.3  --          Lab 09/01/24  1208   TOTAL PROTEIN 6.9   ALBUMIN 4.1   GLOBULIN 2.8   ALT (SGPT) 22   AST (SGOT) 23   BILIRUBIN 0.4   ALK PHOS 98   LIPASE 103*         Lab 09/01/24  1519 09/01/24  1208   PROBNP  --  134.5   HSTROP T 24* 20*                 Brief Urine Lab Results  (Last result in the past 365 days)        Color   Clarity   Blood   Leuk Est   Nitrite   Protein   CREAT   Urine HCG        08/31/24 1640 Yellow   Clear   Trace   Negative   Negative   Negative                 [x]  Microbiology   Microbiology Results  (last 10 days)       Procedure Component Value - Date/Time    COVID-19, FLU A/B, RSV PCR 1 HR TAT - Swab, Nasopharynx [[id number]]  (Normal) Collected: 09/01/24 1203    Lab Status: Final result Specimen: Swab from Nasopharynx Updated: 09/01/24 1304     COVID19 Not Detected     Influenza A PCR Not Detected     Influenza B PCR Not Detected     RSV, PCR Not Detected    Narrative:      Fact sheet for providers: https://www.fda.gov/media/758145/download    Fact sheet for patients: https://www.fda.gov/media/379448/download    Test performed by PCR.          [x]  Radiology  CT Chest With Contrast Diagnostic    Result Date: 9/1/2024  Impression: No evidence of pulmonary embolism. No acute chest abnormality. Electronically Signed: Kaden Mcclure MD  9/1/2024 2:23 PM EDT  Workstation ID: [id number]    XR Chest 1 View    Result Date: 9/1/2024  Impression: No active cardiopulmonary disease Electronically Signed: Boy Tee  9/1/2024 12:16 PM EDT  Workstation ID: [id number]   []  EKG/Telemetry   []  Cardiology/Vascular   []  Pathology  []  Old records  []  Other:    Assessment & Plan   Assessment / Plan     Assessment:  Chest pain, could be cardiac related versus costochondritis  S/p fall on 08/13 after which chest pain started  Elevated troponin  1.4 cm pulmonary nodule along the pleural surface of right middle lobe, outpatient follow-up  Elevated lipase, likely from Mounjaro  History of hypertension  Type 2 diabetes  Hyperlipidemia  S/p laparoscopic sleeve gastrectomy  GERD  Anxiety/MDD    Plan:  Patient currently being managed in medicine service.  Presented with chest pain, could be cardiac related versus costochondritis.  Troponin uptrending, 20 ->24-> 119.  Transthoracic echo ordered, reading pending.  On as needed Toradol for the pain.  Will switch to Dilaudid given her allergy to morphine; as needed, for the pain and discontinue Toradol.  Obtain a CRP and ESR to rule out pericarditis.  Currently EKG not suggestive of  pericarditis.  Restarted on home dose of Toprol 25 mg nightly, pravastatin 20 mg nightly, Wellbutrin 300 mg daily and Topamax 50 mg daily.  On insulin sliding scale for diabetes.  Continue to monitor on cardiac telemetry.  Cardiology planning on stress test for stress classification if needed.  Continue rest of current management.  Will obtain CBC, BMP, calcium, phosphorus and magnesium in AM.  Clinical course to determine disposition.  Discussed with RN.    VTE Prophylaxis:  Mechanical VTE prophylaxis orders are present.        CODE STATUS:   Code Status (Patient has no pulse and is not breathing): CPR (Attempt to Resuscitate)  Medical Interventions (Patient has pulse or is breathing): Full Support      Electronically signed by Roseann García MD, 09/02/24, 9:29 AM EDT.

## 2024-09-02 NOTE — CONSULTS
Cardiology Consult Note  [address]          Patient Identification:  Bianca Boles      [id number]  44 y.o.        female  1980     Reason for Consultation:  Chest Pain      PCP: Patricia Hernandez APRN    History of Present Illness:  Ms. Boles is a 44-year-old female who has a history of HTN, DM2, HLD, history of gastric sleeve, anxiety disorder who works as a pharmacy tech at [employer] presents to ER with a complaint of chest pain.  Cardiology team was consulted for further evaluation.    Ms. Boles recently had a fall after tripping as a result of which she landed straight onto her chest.  She was holding insulin in her hands which landed between the chest and the floor.  She has had a blunt trauma to her chest from this fall.  Intermittently she has been experiencing chest pain since.  She has been to ER for evaluation for this pain.  Fractures were ruled out and she was sent home by my establishing the pain to be noncardiac in nature.    In past week, patient has intermittently experienced dull ache in the center of the chest radiating up to her neck and shoulders.  The pain worsens when she lays down and improves with pain medications.  She has experienced similar chest pain events while walking upstairs.  She does not admit to any recent illness.  She has been noticing difficulty with breathing during episodes of chest pain but outside of that she feels fine she does not endorse peripheral edema, orthopnea, PND, presyncope and syncope.    She denies smoking, drinking alcohol or doing illicit drug use.    Past History:  Past Medical History:   Diagnosis Date    Acid reflux     Anxiety     Anxiety and depression     Benign essential hypertension     Borderline personality disorder     Cancer     renal cancer/mass, PARTIAL NEPH, RIGHT    Diabetes     Diabetes mellitus     type ii, doesn't check bg at home     Diabetes mellitus, type 2     Elevated cholesterol      Frozen shoulder 2023    GERD (gastroesophageal reflux disease)     High triglycerides     History of bariatric surgery 2021    GASTRIC SLEEVE    History of kidney stones     HTN (hypertension)     Hyperlipemia     Hyperlipidemia     Hypertriglyceridemia     Lumbar herniated disc     Obesity     Panic attacks     PCOS (polycystic ovarian syndrome)     Periarthritis of shoulder     Psoriasis     ELBOWS    Rotator cuff syndrome     Seasonal allergies     Self-injurious behavior     Spinal headache     AFTER PAIN EPIDURALS.  NO BLOOD PATCH    Suicide attempt      Past Surgical History:   Procedure Laterality Date    ABDOMINAL SURGERY  2020    Gastric sleeve    ADENOIDECTOMY  1984     SECTION  ,    COLONOSCOPY N/A 2024    Procedure: COLONOSCOPY;  Surgeon: Terrence Fry MD;  Location: [facility] ENDOSCOPY;  Service: Gastroenterology;  Laterality: N/A;  NORMAL COLONOSCOPY    CYSTOSCOPY BLADDER STONE LITHOTRIPSY      EAR TUBES  1984    ENDOSCOPY N/A 10/20/2020    Procedure: ESOPHAGOGASTRODUODENOSCOPY WITH BIOPSY;  Surgeon: Fidel Grajeda Jr., MD;  Location: [facility] ENDOSCOPY;  Service: General;  Laterality: N/A;  PRE- GERD  POST- RETAINED FOOD, GASTRITIS, GASTRIC POLYPS    ENDOSCOPY  2014    FOOT FRACTURE SURGERY Left 2017    screw placed    GASTRECTOMY  2020    GASTRIC SLEEVE LAPAROSCOPIC N/A 2020    Procedure: GASTRIC SLEEVE LAPAROSCOPIC;  Surgeon: Fidel Grajeda Jr., MD;  Location: [facility] OR [facility];  Service: Bariatric;  Laterality: N/A;    NEPHRECTOMY PARTIAL Right 11/15/2021    Procedure: NEPHRECTOMY PARTIAL LAPAROSCOPIC WITH DAVINCI ROBOT;  Surgeon: Lori Troy MD;  Location: [facility] MAIN OR;  Service: Robotics - DaVinci;  Laterality: Right;    SHOULDER ARTHROSCOPY Left 2023    Procedure: LEFT SHOULDER MANIPULATION;  Surgeon: Domenic Bradley MD;  Location: [facility] OR [facility];  Service: Orthopedics;  Laterality: Left;    TONSILLECTOMY   1984    UPPER GASTROINTESTINAL ENDOSCOPY      URETEROSCOPY LASER LITHOTRIPSY WITH STENT INSERTION Right 2021    Procedure: CYSTOSCOPY, RIGHT URETEROSCOPY, LASERTRIPSY, STONE BASKET EXTRACTION AND STENT INSERTION;  Surgeon: Lori Troy MD;  Location: [facility];  Service: Urology;  Laterality: Right;    URETEROSCOPY LASER LITHOTRIPSY WITH STENT INSERTION Left 2022    Procedure: URETEROSCOPY LASER LITHOTRIPSY WITH URETERAL STENT INSERTION;  Surgeon: Lori Troy MD;  Location: [facility] OR;  Service: Urology;  Laterality: Left;     Allergies   Allergen Reactions    Morphine Itching and Nausea And Vomiting     Social History     Socioeconomic History    Marital status:    Tobacco Use    Smoking status: Former     Current packs/day: 0.00     Average packs/day: 0.3 packs/day for 35.0 years (8.8 ttl pk-yrs)     Types: Cigarettes     Start date: 1/15/1985     Quit date: 1/15/2020     Years since quittin.6     Passive exposure: Never    Smokeless tobacco: Never    Tobacco comments:     A PACK A WEEK   Vaping Use    Vaping status: Never Used   Substance and Sexual Activity    Alcohol use: Yes     Comment: OCCASIONAL/SOCIAL    Drug use: Not Currently     Frequency: 0.1 times per week     Types: Marijuana     Comment: OTC THC    Sexual activity: Yes     Partners: Male     Birth control/protection: Depo-provera     Family History   Problem Relation Age of Onset    Cancer Mother         Breast    Diabetes Father     Hypertension Father     Heart disease Father     Cancer Father         Bladder prostate liver    Colon cancer Father         malignant, currently 62    No Known Problems Sister     No Known Problems Brother     No Known Problems Maternal Aunt     No Known Problems Paternal Aunt     No Known Problems Maternal Uncle     No Known Problems Paternal Uncle     No Known Problems Maternal Grandfather     Stroke Maternal Grandmother     Heart disease Maternal Grandmother     Diabetes  Paternal Grandfather     Heart disease Paternal Grandfather     No Known Problems Paternal Grandmother     No Known Problems Cousin     Stepan Hyperthermia Neg Hx     ADD / ADHD Neg Hx     Alcohol abuse Neg Hx     Anxiety disorder Neg Hx     Bipolar disorder Neg Hx     Dementia Neg Hx     Depression Neg Hx     Drug abuse Neg Hx     OCD Neg Hx     Paranoid behavior Neg Hx     Schizophrenia Neg Hx     Seizures Neg Hx     Self-Injurious Behavior  Neg Hx     Suicide Attempts Neg Hx        Medications:  Prior to Admission medications    Medication Sig Start Date End Date Taking? Authorizing Provider   Apremilast (Otezla) 30 MG tablet Take 30 mg by mouth 2 (Two) Times a Day. 2/5/24  Yes    Biotin 81487 MCG tablet Take 1 tablet by mouth Every Night.   Yes ProviderHeron MD   buPROPion XL (WELLBUTRIN XL) 300 MG 24 hr tablet Take 1 tablet by mouth Daily. 5/3/24  Yes Patricia Hernandez APRN   busPIRone (BUSPAR) 5 MG tablet Take 1 tablet by mouth 3 (Three) Times a Day. 5/3/24  Yes Patricia Hernandez APRN   CALCIUM-MAGNESIUM-ZINC PO Take 3 tablets by mouth Daily.   Yes ProviderHeron MD   cetirizine (zyrTEC) 10 MG tablet Take 1 tablet by mouth Daily As Needed. 5/31/22  Yes Heron Campuzano MD   Cholecalciferol 25 MCG (1000 UT) capsule Take 2 capsules by mouth Daily. 11/3/23  Yes Patricia Hernandez APRN   clobetasol (TEMOVATE) 0.05 % external solution Apply 10-15 drops every day to affected areas in the scalp after shampooing until clear.  Patient taking differently: Apply 1 Application topically to the appropriate area as directed Daily As Needed. 8/16/22  Yes    COLLAGEN PO Take 3 tablets by mouth Daily.   Yes ProviderHeron MD   dapagliflozin (Farxiga) 5 MG tablet tablet Take 1 tablet by mouth Daily. 5/3/24  Yes Patricia Hernandez APRN   desvenlafaxine (PRISTIQ) 50 MG 24 hr tablet Take 1 tablet by mouth Daily. 5/3/24  Yes Patricia Hernandez APRN   Diclofenac Sodium  (VOLTAREN) 1 % gel gel Apply 4 g topically to the appropriate area as directed 4 (Four) Times a Day As Needed (pain). 6/12/23  Yes Nicolasa Meade PA-C   fluconazole (Diflucan) 150 MG tablet Take 1 tablet by mouth 1 (One) Time for 1 dose. 8/31/24 9/1/24 Yes Vicky Robledo APRN   fluticasone (FLONASE) 50 MCG/ACT nasal spray USE 2 SPRAYS IN EACH NOSTRIL ONCE DAILY AS DIRECTED  Patient taking differently: 2 sprays into the nostril(s) as directed by provider At Night As Needed. 5/19/23  Yes Patricia Hernandez APRN   ibuprofen (ADVIL,MOTRIN) 800 MG tablet Take 1 tablet by mouth Every 6 (Six) Hours As Needed for Mild Pain.   Yes Heron Campuzano MD   IRON-VITAMIN C PO Take 1 tablet by mouth Daily.   Yes Heron Campuzano MD   linaclotide (Linzess) 72 MCG capsule capsule Take 1 capsule by mouth Every Morning Before Breakfast for 90 days. 4/11/24 10/8/24 Yes Michell Horan APRN   metoprolol succinate XL (Toprol XL) 25 MG 24 hr tablet Take 1 tablet by mouth Every Night. 5/3/24  Yes Patricia Hernandez APRN   Multiple Vitamins-Minerals (MULTIVITAMIN PO) Take 1 tablet by mouth Every Night.   Yes Heron Campuzano MD   ondansetron ODT (ZOFRAN-ODT) 4 MG disintegrating tablet Place 1 tablet on the tongue Every 8 (Eight) Hours As Needed for Nausea.  Patient taking differently: Place 1 tablet on the tongue Every 8 (Eight) Hours As Needed for Nausea. 11/4/22  Yes Patricia Hernandez APRN   pravastatin (PRAVACHOL) 20 MG tablet Take 1 tablet by mouth Every Night. 5/3/24  Yes Patricia Hernandez APRN   Probiotic Product (PROBIOTIC-10 PO) Take 1 tablet by mouth Every Night.   Yes Heron Campuzano MD   testosterone micronized powder APPLY 2 CLICKS (0.5 ML) TOPICALLY EVERY DAY  Patient taking differently: Apply 1 Application topically to the appropriate area as directed Daily. APPLY 2 CLICKS (0.5 ML) TOPICALLY EVERY DAY 8/1/24  Yes Patricia Hernandez APRN Tirzepatide  "(MOUNJARO) 10 MG/0.5ML solution pen-injector pen Inject 0.5 mL under the skin into the appropriate area as directed 1 (One) Time Per Week. 6/25/24  Yes Patricia Hernandez APRN   topiramate (TOPAMAX) 50 MG tablet Take 1 tablet by mouth every night at bedtime for 180 days. 5/3/24 10/30/24 Yes Patricia Hernandez APRN   traZODone (DESYREL) 100 MG tablet Take 1 tablet by mouth Every Night.  Patient taking differently: Take 1 tablet by mouth At Night As Needed. 5/3/24  Yes Patricia Hernandez APRN      Current medications:  buPROPion XL, 300 mg, Oral, Daily  busPIRone, 5 mg, Oral, TID  lubiprostone, 8 mcg, Oral, BID  metoprolol succinate XL, 25 mg, Oral, Nightly  pravastatin, 20 mg, Oral, Nightly  senna-docusate sodium, 2 tablet, Oral, BID  sodium chloride, 10 mL, Intravenous, Q12H  sodium chloride, 10 mL, Intravenous, Q12H  topiramate, 50 mg, Oral, Daily      Current IV drips:  sodium chloride, 75 mL/hr, Last Rate: 75 mL/hr (09/01/24 1833)            Physical Exam    /69 (BP Location: Right arm, Patient Position: Lying)   Pulse 68   Temp 98.2 °F (36.8 °C) (Oral)   Resp 18   Ht 170.2 cm (67\")   Wt 95.6 kg (210 lb 12.2 oz)   LMP  (LMP Unknown)   SpO2 95%   BMI 33.01 kg/m²  Body mass index is 33.01 kg/m².   Oxygen saturation   @FLOWAN(10::1)@ SpO2  Min: 95 %  Max: 100 %    General Appearance:   no acute distress  Alert and oriented x3  HENT:   lips not cyanotic  Atraumatic  Neck:  thyroid not enlarged  supple  Respiratory:  no respiratory distress  normal breath sounds  no rales  Cardiovascular:  no jugular venous distention  regular rhythm  apical impulse normal  S1 normal, S2 normal  lower extremity edema: none    Skin:   warm, dry  No rashes  Neuro/Psychiatric:  normal mood and affect  judgement and insight appropriate      Cardiographics:     ECG  (personally reviewed) normal sinus rhythm   Telemetry:  (personally reviewed) sinus tachycardia with rates in 140s   Results for orders " placed in visit on 04/17/20    Adult Transthoracic Echo Complete W/ Cont if Necessary Per Protocol    Interpretation Summary  · Calculated EF = 65.2%  · There is no evidence of pericardial effusion.     Results for orders placed in visit on 04/17/20    Stress Test With Myocardial Perfusion One Day    Interpretation Summary  · Patient BMI is 47.2 kg/m2..  · Findings consistent with an equivocal ECG stress test.  · Left ventricular ejection fraction is normal (Calculated EF = 68%).  · Myocardial perfusion imaging indicates a normal myocardial perfusion study with no evidence of ischemia.  · Impressions are consistent with a low risk study.    Asymptomatic for chest pain. Specificity of study reduced secondary to Significant motion artifact due to poor exercise tolerance.  ECG is  equivocal for  suggestion of ischemia  Ectopy: rare PVC in recovery  Blood pressure response:  Baseline 90/48, Peak 150/60  Recovery:  1 min 158/60  3 min  140/68  4 min  130/58  5 min  112/58  6 min  112/58  Exercise tolerance is poor, reached 85% MPH within 3 minutes  Reached 18 of 20 Evans Scale.    Supervised by:  Elisabet VINCENT.      No results found for this or any previous visit.      Cardiolite (Tc-99m Sestamibi) stress test   Lab Review:       CBC          8/23/2024    15:33 9/1/2024    12:08 9/2/2024    03:58   CBC   WBC 9.75  9.47  9.72    RBC 4.62  5.10  4.85    Hemoglobin 13.1  14.5  14.0    Hematocrit 40.0  43.3  41.4    MCV 86.6  84.9  85.4    MCH 28.4  28.4  28.9    MCHC 32.8  33.5  33.8    RDW 13.6  13.7  13.6    Platelets 202  206  193        CMP          8/23/2024    15:33 9/1/2024    12:08 9/2/2024    03:58   CMP   Glucose 92  89  85    BUN 20  15  17    Creatinine 1.37  1.30  1.23    EGFR 48.6  51.8  55.3    Sodium 140  136  138    Potassium 4.0  4.1  4.5    Chloride 106  105  107    Calcium 9.4  9.3  9.1    Total Protein 6.7  6.9     Albumin 4.0  4.1     Globulin 2.7  2.8     Total Bilirubin 0.3  0.4    "  Alkaline Phosphatase 86  98     AST (SGOT) 23  23     ALT (SGPT) 19  22     Albumin/Globulin Ratio 1.5  1.5     BUN/Creatinine Ratio 14.6  11.5  13.8    Anion Gap 10.9  10.9  11.0         CARDIAC LABS:      Lab 09/02/24  0358 09/01/24  1519 09/01/24  1208   PROBNP  --   --  134.5   HSTROP T 119* 24* 20*      No results found for: \"DIGOXIN\"   Lab Results   Component Value Date    TSH 0.810 07/05/2024           Invalid input(s): \"LDLCALC\"  Lab Results   Component Value Date    POCTROP 0.00 04/20/2020     No results found for: \"DDIMERQUAN\"  Lab Results   Component Value Date    MG 2.1 09/02/2024             CARDIAC LABS:      Lab 09/02/24  0358 09/01/24  1519 09/01/24  1208   PROBNP  --   --  134.5   HSTROP T 119* 24* 20*         Assessment:  Atypical chest pain  Hypertension  Diabetes mellitus type 2  Hyperlipidemia  Anxiety disorder    Recommendations  Patient's chest pain has resulted after having a blunt trauma to the chest.  I suspect that patient has some pericardial irritation from the trauma.  Her troponin elevation is likely related to palpitations .EKG is not suggestive of pericarditis.    Due to risk factors, it is reasonable to perform stress testing for risk stratification.  If the results are abnormal we will proceed with coronary angiogram.  Until then, continue to monitor patient on telemetry  Episodes of sinus tachycardia were noted on telemetry today with heart rate exceeding to 140s.    Perform echocardiogram for evaluation of pericardial effusion.  If needed, will start on ibuprofen and colchicine.  Check for inflammatory markers CRP and ESR    Resume home medications for hypertension and diabetes.  Continue moderate intensity statin for dyslipidemia associated with diabetes    Thank you for allowing us to share in [facility]. Please call with any questions or concerns.             Toño Rodriguez MD   9/2/2024    14:44 EDT    "

## 2024-09-03 ENCOUNTER — APPOINTMENT (OUTPATIENT)
Dept: NUCLEAR MEDICINE | Facility: HOSPITAL | Age: 44
DRG: 322 | End: 2024-09-03
Payer: COMMERCIAL

## 2024-09-03 LAB
ANION GAP SERPL CALCULATED.3IONS-SCNC: 11.2 MMOL/L (ref 5–15)
BASOPHILS # BLD AUTO: 0.05 10*3/MM3 (ref 0–0.2)
BASOPHILS NFR BLD AUTO: 0.5 % (ref 0–1.5)
BH CV IMMEDIATE POST TECH DATA BLOOD PRESSURE: NORMAL MMHG
BH CV IMMEDIATE POST TECH DATA HEART RATE: 98 BPM
BH CV IMMEDIATE POST TECH DATA OXYGEN SATS: 98 %
BH CV REST NUCLEAR ISOTOPE DOSE: 9 MCI
BH CV SIX MINUTE RECOVERY TECH DATA BLOOD PRESSURE: NORMAL
BH CV SIX MINUTE RECOVERY TECH DATA HEART RATE: 86 BPM
BH CV SIX MINUTE RECOVERY TECH DATA OXYGEN SATURATION: 99 %
BH CV STRESS BP STAGE 1: NORMAL
BH CV STRESS COMMENTS STAGE 1: NORMAL
BH CV STRESS DOSE REGADENOSON STAGE 1: 0.4
BH CV STRESS DURATION MIN STAGE 1: 0
BH CV STRESS DURATION SEC STAGE 1: 10
BH CV STRESS HR STAGE 1: 82
BH CV STRESS NUCLEAR ISOTOPE DOSE: 34.5 MCI
BH CV STRESS O2 STAGE 1: 96
BH CV STRESS PROTOCOL 1: NORMAL
BH CV STRESS RECOVERY BP: NORMAL MMHG
BH CV STRESS RECOVERY HR: 86 BPM
BH CV STRESS RECOVERY O2: 99 %
BH CV STRESS STAGE 1: 1
BH CV THREE MINUTE POST TECH DATA BLOOD PRESSURE: NORMAL MMHG
BH CV THREE MINUTE POST TECH DATA HEART RATE: 92 BPM
BH CV THREE MINUTE POST TECH DATA OXYGEN SATURATION: 99 %
BUN SERPL-MCNC: 17 MG/DL (ref 6–20)
BUN/CREAT SERPL: 14 (ref 7–25)
CALCIUM SPEC-SCNC: 9 MG/DL (ref 8.6–10.5)
CHLORIDE SERPL-SCNC: 109 MMOL/L (ref 98–107)
CO2 SERPL-SCNC: 20.8 MMOL/L (ref 22–29)
CREAT SERPL-MCNC: 1.21 MG/DL (ref 0.57–1)
DEPRECATED RDW RBC AUTO: 43.5 FL (ref 37–54)
EGFRCR SERPLBLD CKD-EPI 2021: 56.4 ML/MIN/1.73
EOSINOPHIL # BLD AUTO: 0.15 10*3/MM3 (ref 0–0.4)
EOSINOPHIL NFR BLD AUTO: 1.6 % (ref 0.3–6.2)
ERYTHROCYTE [DISTWIDTH] IN BLOOD BY AUTOMATED COUNT: 13.8 % (ref 12.3–15.4)
GLUCOSE BLDC GLUCOMTR-MCNC: 107 MG/DL (ref 70–99)
GLUCOSE BLDC GLUCOMTR-MCNC: 79 MG/DL (ref 70–99)
GLUCOSE BLDC GLUCOMTR-MCNC: 99 MG/DL (ref 70–99)
GLUCOSE SERPL-MCNC: 90 MG/DL (ref 65–99)
HCT VFR BLD AUTO: 40.1 % (ref 34–46.6)
HGB BLD-MCNC: 13.3 G/DL (ref 12–15.9)
IMM GRANULOCYTES # BLD AUTO: 0.09 10*3/MM3 (ref 0–0.05)
IMM GRANULOCYTES NFR BLD AUTO: 0.9 % (ref 0–0.5)
LV EF NUC BP: 45 %
LYMPHOCYTES # BLD AUTO: 2.83 10*3/MM3 (ref 0.7–3.1)
LYMPHOCYTES NFR BLD AUTO: 29.3 % (ref 19.6–45.3)
MAGNESIUM SERPL-MCNC: 2 MG/DL (ref 1.6–2.6)
MAXIMAL PREDICTED HEART RATE: 175 BPM
MCH RBC QN AUTO: 28.7 PG (ref 26.6–33)
MCHC RBC AUTO-ENTMCNC: 33.2 G/DL (ref 31.5–35.7)
MCV RBC AUTO: 86.6 FL (ref 79–97)
MONOCYTES # BLD AUTO: 0.75 10*3/MM3 (ref 0.1–0.9)
MONOCYTES NFR BLD AUTO: 7.8 % (ref 5–12)
NEUTROPHILS NFR BLD AUTO: 5.79 10*3/MM3 (ref 1.7–7)
NEUTROPHILS NFR BLD AUTO: 59.9 % (ref 42.7–76)
NRBC BLD AUTO-RTO: 0 /100 WBC (ref 0–0.2)
PERCENT MAX PREDICTED HR: 56 %
PHOSPHATE SERPL-MCNC: 3 MG/DL (ref 2.5–4.5)
PLATELET # BLD AUTO: 165 10*3/MM3 (ref 140–450)
PMV BLD AUTO: 9.8 FL (ref 6–12)
POTASSIUM SERPL-SCNC: 4.5 MMOL/L (ref 3.5–5.2)
QT INTERVAL: 387 MS
QT INTERVAL: 397 MS
QTC INTERVAL: 410 MS
QTC INTERVAL: 430 MS
RBC # BLD AUTO: 4.63 10*6/MM3 (ref 3.77–5.28)
SODIUM SERPL-SCNC: 141 MMOL/L (ref 136–145)
STRESS BASELINE BP: NORMAL MMHG
STRESS BASELINE HR: 66 BPM
STRESS O2 SAT REST: 97 %
STRESS PERCENT HR: 66 %
STRESS POST O2 SAT PEAK: 98 %
STRESS POST PEAK BP: NORMAL MMHG
STRESS POST PEAK HR: 98 BPM
STRESS TARGET HR: 149 BPM
WBC NRBC COR # BLD AUTO: 9.66 10*3/MM3 (ref 3.4–10.8)

## 2024-09-03 PROCEDURE — 82948 REAGENT STRIP/BLOOD GLUCOSE: CPT

## 2024-09-03 PROCEDURE — 93018 CV STRESS TEST I&R ONLY: CPT | Performed by: STUDENT IN AN ORGANIZED HEALTH CARE EDUCATION/TRAINING PROGRAM

## 2024-09-03 PROCEDURE — 25010000002 REGADENOSON 0.4 MG/5ML SOLUTION: Performed by: INTERNAL MEDICINE

## 2024-09-03 PROCEDURE — A9502 TC99M TETROFOSMIN: HCPCS | Performed by: INTERNAL MEDICINE

## 2024-09-03 PROCEDURE — G0378 HOSPITAL OBSERVATION PER HR: HCPCS

## 2024-09-03 PROCEDURE — 78451 HT MUSCLE IMAGE SPECT SING: CPT

## 2024-09-03 PROCEDURE — 0 TECHNETIUM TETROFOSMIN KIT: Performed by: INTERNAL MEDICINE

## 2024-09-03 PROCEDURE — 80048 BASIC METABOLIC PNL TOTAL CA: CPT | Performed by: STUDENT IN AN ORGANIZED HEALTH CARE EDUCATION/TRAINING PROGRAM

## 2024-09-03 PROCEDURE — 93017 CV STRESS TEST TRACING ONLY: CPT

## 2024-09-03 PROCEDURE — 85025 COMPLETE CBC W/AUTO DIFF WBC: CPT | Performed by: STUDENT IN AN ORGANIZED HEALTH CARE EDUCATION/TRAINING PROGRAM

## 2024-09-03 PROCEDURE — 84100 ASSAY OF PHOSPHORUS: CPT | Performed by: STUDENT IN AN ORGANIZED HEALTH CARE EDUCATION/TRAINING PROGRAM

## 2024-09-03 PROCEDURE — 83735 ASSAY OF MAGNESIUM: CPT | Performed by: STUDENT IN AN ORGANIZED HEALTH CARE EDUCATION/TRAINING PROGRAM

## 2024-09-03 PROCEDURE — 78451 HT MUSCLE IMAGE SPECT SING: CPT | Performed by: STUDENT IN AN ORGANIZED HEALTH CARE EDUCATION/TRAINING PROGRAM

## 2024-09-03 PROCEDURE — 99232 SBSQ HOSP IP/OBS MODERATE 35: CPT | Performed by: INTERNAL MEDICINE

## 2024-09-03 PROCEDURE — 93016 CV STRESS TEST SUPVJ ONLY: CPT | Performed by: NURSE PRACTITIONER

## 2024-09-03 RX ORDER — REGADENOSON 0.08 MG/ML
0.4 INJECTION, SOLUTION INTRAVENOUS
Status: COMPLETED | OUTPATIENT
Start: 2024-09-03 | End: 2024-09-03

## 2024-09-03 RX ORDER — SPIRONOLACTONE 25 MG/1
25 TABLET ORAL DAILY
Status: DISCONTINUED | OUTPATIENT
Start: 2024-09-04 | End: 2024-09-05 | Stop reason: HOSPADM

## 2024-09-03 RX ADMIN — TOPIRAMATE 50 MG: 25 TABLET, FILM COATED ORAL at 09:31

## 2024-09-03 RX ADMIN — BUSPIRONE HYDROCHLORIDE 5 MG: 5 TABLET ORAL at 20:13

## 2024-09-03 RX ADMIN — REGADENOSON 0.4 MG: 0.08 INJECTION, SOLUTION INTRAVENOUS at 12:14

## 2024-09-03 RX ADMIN — TETROFOSMIN 1 DOSE: 1.38 INJECTION, POWDER, LYOPHILIZED, FOR SOLUTION INTRAVENOUS at 12:15

## 2024-09-03 RX ADMIN — BUPROPION HYDROCHLORIDE 300 MG: 150 TABLET, EXTENDED RELEASE ORAL at 09:30

## 2024-09-03 RX ADMIN — Medication 10 ML: at 20:14

## 2024-09-03 RX ADMIN — TETROFOSMIN 1 DOSE: 1.38 INJECTION, POWDER, LYOPHILIZED, FOR SOLUTION INTRAVENOUS at 10:58

## 2024-09-03 RX ADMIN — BUSPIRONE HYDROCHLORIDE 5 MG: 5 TABLET ORAL at 09:31

## 2024-09-03 RX ADMIN — METOPROLOL SUCCINATE 25 MG: 25 TABLET, EXTENDED RELEASE ORAL at 20:14

## 2024-09-03 RX ADMIN — Medication 10 ML: at 20:15

## 2024-09-03 RX ADMIN — Medication 10 ML: at 09:32

## 2024-09-03 RX ADMIN — Medication 10 ML: at 09:31

## 2024-09-03 RX ADMIN — PRAVASTATIN SODIUM 20 MG: 20 TABLET ORAL at 20:14

## 2024-09-03 NOTE — PROGRESS NOTES
[facility]   Hospitalist Progress Note  Date: 9/3/2024  Patient Name: Bianca Boles  : 1980  MRN: [id number]  Date of admission: 2024  Room/Bed: 263/1      Subjective   Subjective     Chief Complaint: Chest pain     Summary:Bianca Boles is a 44 y.o. female with history of hypertension, type 2 diabetes, hyperlipidemia, s/p laparoscopic sleeve gastrectomy, GERD, anxiety, MDD, insomnia and vitamin D deficiency who presented with chest pain.  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  The insulin was in between her chest and floor.  Started having chest pain since then.  Had been to ER for 3 times prior to this hospitalization for pain in her mid chest.  Fractures were ruled out, it was thought to be noncardiac in origin and patient was discharged on pain medications.  Patient stated the pain medication will take edge off but now she is having pain in her central chest radiating to her bilateral neck and shoulder.  It is worse when she is laying down.  Given the chest pain was persistent, patient and her family got concerned and presented to ER for further evaluation and management.  Denies any fever, chills, rigors, recent flulike symptoms or shortness of breath.  Mentioned she would feel like she is catching her breath because of the pain but denied actual shortness of air.  EKG on presentation was nonsignificant.  Troponin 20 with repeat troponin 24.  On as needed Toradol which is taking the edge off.  Cardiology was consulted and patient was admitted for further evaluation and management.    Interval Followup: No acute events overnight.  Patient forts having had some chest pain last night after having been a straightening up from some.  Denied any fever, chills, rigors, difficulty breathing or abdominal pain.  Obtaining transthoracic echo today.    CT chest: There is a well-circumscribed 1.4 cm pulmonary nodule along the  pleural surface of the   right middle lobe.    Review of Systems    All systems reviewed and negative except for what is outlined above.      Objective   Objective     Vitals:   Temp:  [98.1 °F (36.7 °C)-98.2 °F (36.8 °C)] 98.2 °F (36.8 °C)  Heart Rate:  [68-76] 71  Resp:  [18] 18  BP: (102-123)/(74-79) 111/74    Physical Exam   General: Awake, alert, NAD  Cardiovascular: RRR, no murmurs   Pulmonary: CTA bilaterally; no wheezes; no conversational dyspnea  Gastrointestinal: S/ND/NT, +BS  Neuro: Alert, awake, oriented x 3; speech clear; no tremor  Musculoskeletal: Reducible tenderness to palpation across the patient's chest wall    Result Review    Result Review:  I have personally reviewed these results:  [x]  Laboratory      Lab 09/03/24  0301 09/02/24 0358 09/01/24  1833 09/01/24  1519 09/01/24  1208   WBC 9.66 9.72  --   --  9.47   HEMOGLOBIN 13.3 14.0  --   --  14.5   HEMATOCRIT 40.1 41.4  --   --  43.3   PLATELETS 165 193  --   --  206   NEUTROS ABS 5.79 5.96  --   --  5.92   IMMATURE GRANS (ABS) 0.09* 0.07*  --   --  0.06*   LYMPHS ABS 2.83 2.82  --   --  2.74   MONOS ABS 0.75 0.71  --   --  0.63   EOS ABS 0.15 0.10  --   --  0.09   MCV 86.6 85.4  --   --  84.9   SED RATE  --   --  6  --   --    CRP  --  <0.30  --  <0.30  --          Lab 09/03/24  0301 09/02/24  0358 09/01/24  1208   SODIUM 141 138 136   POTASSIUM 4.5 4.5 4.1   CHLORIDE 109* 107 105   CO2 20.8* 20.0* 20.1*   ANION GAP 11.2 11.0 10.9   BUN 17 17 15   CREATININE 1.21* 1.23* 1.30*   EGFR 56.4* 55.3* 51.8*   GLUCOSE 90 85 89   CALCIUM 9.0 9.1 9.3   MAGNESIUM 2.0 2.1 2.0   PHOSPHORUS 3.0 3.3  --    HEMOGLOBIN A1C  --  5.10  --          Lab 09/01/24  1208   TOTAL PROTEIN 6.9   ALBUMIN 4.1   GLOBULIN 2.8   ALT (SGPT) 22   AST (SGOT) 23   BILIRUBIN 0.4   ALK PHOS 98   LIPASE 103*         Lab 09/02/24  0358 09/01/24  1519 09/01/24  1208   PROBNP  --   --  134.5   HSTROP T 119* 24* 20*                 Brief Urine Lab Results  (Last result in the past  365 days)        Color   Clarity   Blood   Leuk Est   Nitrite   Protein   CREAT   Urine HCG        08/31/24 1640 Yellow   Clear   Trace   Negative   Negative   Negative                 [x]  Microbiology   Microbiology Results (last 10 days)       Procedure Component Value - Date/Time    COVID-19, FLU A/B, RSV PCR 1 HR TAT - Swab, Nasopharynx [[id number]]  (Normal) Collected: 09/01/24 1203    Lab Status: Final result Specimen: Swab from Nasopharynx Updated: 09/01/24 1304     COVID19 Not Detected     Influenza A PCR Not Detected     Influenza B PCR Not Detected     RSV, PCR Not Detected    Narrative:      Fact sheet for providers: https://www.fda.gov/media/967429/download    Fact sheet for patients: https://www.fda.gov/media/796287/download    Test performed by PCR.          [x]  Radiology  CT Chest With Contrast Diagnostic    Result Date: 9/1/2024  Impression: No evidence of pulmonary embolism. No acute chest abnormality. Electronically Signed: Kaden Mcclure MD  9/1/2024 2:23 PM EDT  Workstation ID: [id number]    XR Chest 1 View    Result Date: 9/1/2024  Impression: No active cardiopulmonary disease Electronically Signed: Boy Tee  9/1/2024 12:16 PM EDT  Workstation ID: [id number]   []  EKG/Telemetry   []  Cardiology/Vascular   []  Pathology  []  Old records  []  Other:    Assessment & Plan   Assessment / Plan     Assessment:  Chest pain, could be cardiac related versus costochondritis  S/p fall on 08/13 after which chest pain started  Elevated troponin  1.4 cm pulmonary nodule along the pleural surface of right middle lobe, outpatient follow-up  Elevated lipase, likely from Mounjaro  History of hypertension  Type 2 diabetes  Hyperlipidemia  S/p laparoscopic sleeve gastrectomy  GERD  Anxiety/MDD    Plan:  Patient currently being managed  on medicine service.  Presented with chest pain, could be cardiac related versus costochondritis.  Troponin uptrending, 20 ->24-> 119.  Transthoracic echo port noted with EF 45 to 50%  .    Continue Dilaudid as needed for pain  CRP and sed rate within normal limits.    Restarted on home dose of Toprol 25 mg nightly, pravastatin 20 mg nightly, Wellbutrin 300 mg daily and Topamax 50 mg daily.  On insulin sliding scale for diabetes.  Continue to monitor on cardiac telemetry.  Awaiting stress test results.  Continue rest of current management.  Will obtain CBC, BMP, calcium, phosphorus and magnesium in AM.  Clinical course to determine disposition.  Discussed with RN.    VTE Prophylaxis:  Mechanical VTE prophylaxis orders are present.        CODE STATUS:   Code Status (Patient has no pulse and is not breathing): CPR (Attempt to Resuscitate)  Medical Interventions (Patient has pulse or is breathing): Full Support      Electronically signed by Dana Xiong MD, 09/03/24, 5:33 PM EDT.

## 2024-09-03 NOTE — PLAN OF CARE
Goal Outcome Evaluation:  Plan of Care Reviewed With: patient        Progress: no change  Outcome Evaluation: Patient complained of chest pain x2 during shift. PRN pain medication was given, see MAR. EKG 12 lead ordered and reviewed. Patient rested through night. No complaints at this time. Call light remains in reach.

## 2024-09-04 PROBLEM — I21.4 NSTEMI (NON-ST ELEVATED MYOCARDIAL INFARCTION): Status: ACTIVE | Noted: 2024-09-04

## 2024-09-04 LAB
ACT BLD: 262 SECONDS (ref 89–137)
ACT BLD: 287 SECONDS (ref 89–137)
ANION GAP SERPL CALCULATED.3IONS-SCNC: 9 MMOL/L (ref 5–15)
APTT PPP: 27.7 SECONDS (ref 78–95.9)
BASOPHILS # BLD AUTO: 0.06 10*3/MM3 (ref 0–0.2)
BASOPHILS NFR BLD AUTO: 0.7 % (ref 0–1.5)
BUN SERPL-MCNC: 15 MG/DL (ref 6–20)
BUN/CREAT SERPL: 13.8 (ref 7–25)
CALCIUM SPEC-SCNC: 9.2 MG/DL (ref 8.6–10.5)
CHLORIDE SERPL-SCNC: 109 MMOL/L (ref 98–107)
CO2 SERPL-SCNC: 21 MMOL/L (ref 22–29)
CREAT SERPL-MCNC: 1.09 MG/DL (ref 0.57–1)
DEPRECATED RDW RBC AUTO: 42.5 FL (ref 37–54)
EGFRCR SERPLBLD CKD-EPI 2021: 64 ML/MIN/1.73
EOSINOPHIL # BLD AUTO: 0.24 10*3/MM3 (ref 0–0.4)
EOSINOPHIL NFR BLD AUTO: 2.8 % (ref 0.3–6.2)
ERYTHROCYTE [DISTWIDTH] IN BLOOD BY AUTOMATED COUNT: 13.7 % (ref 12.3–15.4)
GEN 5 2HR TROPONIN T REFLEX: 245 NG/L
GLUCOSE BLDC GLUCOMTR-MCNC: 106 MG/DL (ref 70–99)
GLUCOSE BLDC GLUCOMTR-MCNC: 116 MG/DL (ref 70–99)
GLUCOSE BLDC GLUCOMTR-MCNC: 90 MG/DL (ref 70–99)
GLUCOSE BLDC GLUCOMTR-MCNC: 96 MG/DL (ref 70–99)
GLUCOSE SERPL-MCNC: 85 MG/DL (ref 65–99)
HCT VFR BLD AUTO: 41.4 % (ref 34–46.6)
HGB BLD-MCNC: 14 G/DL (ref 12–15.9)
IMM GRANULOCYTES # BLD AUTO: 0.07 10*3/MM3 (ref 0–0.05)
IMM GRANULOCYTES NFR BLD AUTO: 0.8 % (ref 0–0.5)
INR PPP: 1.11 (ref 0.86–1.15)
LYMPHOCYTES # BLD AUTO: 2.99 10*3/MM3 (ref 0.7–3.1)
LYMPHOCYTES NFR BLD AUTO: 35.1 % (ref 19.6–45.3)
MAGNESIUM SERPL-MCNC: 1.8 MG/DL (ref 1.6–2.6)
MCH RBC QN AUTO: 29.2 PG (ref 26.6–33)
MCHC RBC AUTO-ENTMCNC: 33.8 G/DL (ref 31.5–35.7)
MCV RBC AUTO: 86.3 FL (ref 79–97)
MONOCYTES # BLD AUTO: 0.79 10*3/MM3 (ref 0.1–0.9)
MONOCYTES NFR BLD AUTO: 9.3 % (ref 5–12)
NEUTROPHILS NFR BLD AUTO: 4.37 10*3/MM3 (ref 1.7–7)
NEUTROPHILS NFR BLD AUTO: 51.3 % (ref 42.7–76)
NRBC BLD AUTO-RTO: 0 /100 WBC (ref 0–0.2)
PHOSPHATE SERPL-MCNC: 3.7 MG/DL (ref 2.5–4.5)
PLATELET # BLD AUTO: 192 10*3/MM3 (ref 140–450)
PMV BLD AUTO: 10 FL (ref 6–12)
POTASSIUM SERPL-SCNC: 4.4 MMOL/L (ref 3.5–5.2)
PROTHROMBIN TIME: 14.5 SECONDS (ref 11.8–14.9)
QT INTERVAL: 403 MS
QTC INTERVAL: 447 MS
RBC # BLD AUTO: 4.8 10*6/MM3 (ref 3.77–5.28)
SODIUM SERPL-SCNC: 139 MMOL/L (ref 136–145)
TROPONIN T DELTA: 0 NG/L
TROPONIN T SERPL HS-MCNC: 245 NG/L
WBC NRBC COR # BLD AUTO: 8.52 10*3/MM3 (ref 3.4–10.8)

## 2024-09-04 PROCEDURE — 99152 MOD SED SAME PHYS/QHP 5/>YRS: CPT | Performed by: STUDENT IN AN ORGANIZED HEALTH CARE EDUCATION/TRAINING PROGRAM

## 2024-09-04 PROCEDURE — C1753 CATH, INTRAVAS ULTRASOUND: HCPCS | Performed by: STUDENT IN AN ORGANIZED HEALTH CARE EDUCATION/TRAINING PROGRAM

## 2024-09-04 PROCEDURE — 84100 ASSAY OF PHOSPHORUS: CPT | Performed by: STUDENT IN AN ORGANIZED HEALTH CARE EDUCATION/TRAINING PROGRAM

## 2024-09-04 PROCEDURE — C1725 CATH, TRANSLUMIN NON-LASER: HCPCS | Performed by: STUDENT IN AN ORGANIZED HEALTH CARE EDUCATION/TRAINING PROGRAM

## 2024-09-04 PROCEDURE — 25010000002 CANGRELOR TETRASODIUM 50 MG RECONSTITUTED SOLUTION 1 EACH VIAL: Performed by: STUDENT IN AN ORGANIZED HEALTH CARE EDUCATION/TRAINING PROGRAM

## 2024-09-04 PROCEDURE — 25010000002 FENTANYL CITRATE (PF) 100 MCG/2ML SOLUTION: Performed by: STUDENT IN AN ORGANIZED HEALTH CARE EDUCATION/TRAINING PROGRAM

## 2024-09-04 PROCEDURE — 25010000002 HEPARIN (PORCINE) 25000-0.45 UT/250ML-% SOLUTION: Performed by: STUDENT IN AN ORGANIZED HEALTH CARE EDUCATION/TRAINING PROGRAM

## 2024-09-04 PROCEDURE — 25510000001 IOPAMIDOL PER 1 ML: Performed by: STUDENT IN AN ORGANIZED HEALTH CARE EDUCATION/TRAINING PROGRAM

## 2024-09-04 PROCEDURE — B241ZZ3 ULTRASONOGRAPHY OF MULTIPLE CORONARY ARTERIES, INTRAVASCULAR: ICD-10-PCS | Performed by: STUDENT IN AN ORGANIZED HEALTH CARE EDUCATION/TRAINING PROGRAM

## 2024-09-04 PROCEDURE — 99232 SBSQ HOSP IP/OBS MODERATE 35: CPT | Performed by: INTERNAL MEDICINE

## 2024-09-04 PROCEDURE — 85347 COAGULATION TIME ACTIVATED: CPT

## 2024-09-04 PROCEDURE — C1887 CATHETER, GUIDING: HCPCS | Performed by: STUDENT IN AN ORGANIZED HEALTH CARE EDUCATION/TRAINING PROGRAM

## 2024-09-04 PROCEDURE — 82948 REAGENT STRIP/BLOOD GLUCOSE: CPT | Performed by: STUDENT IN AN ORGANIZED HEALTH CARE EDUCATION/TRAINING PROGRAM

## 2024-09-04 PROCEDURE — 85610 PROTHROMBIN TIME: CPT | Performed by: STUDENT IN AN ORGANIZED HEALTH CARE EDUCATION/TRAINING PROGRAM

## 2024-09-04 PROCEDURE — C1894 INTRO/SHEATH, NON-LASER: HCPCS | Performed by: STUDENT IN AN ORGANIZED HEALTH CARE EDUCATION/TRAINING PROGRAM

## 2024-09-04 PROCEDURE — 94761 N-INVAS EAR/PLS OXIMETRY MLT: CPT

## 2024-09-04 PROCEDURE — 94799 UNLISTED PULMONARY SVC/PX: CPT

## 2024-09-04 PROCEDURE — C9600 PERC DRUG-EL COR STENT SING: HCPCS | Performed by: STUDENT IN AN ORGANIZED HEALTH CARE EDUCATION/TRAINING PROGRAM

## 2024-09-04 PROCEDURE — 027034Z DILATION OF CORONARY ARTERY, ONE ARTERY WITH DRUG-ELUTING INTRALUMINAL DEVICE, PERCUTANEOUS APPROACH: ICD-10-PCS | Performed by: STUDENT IN AN ORGANIZED HEALTH CARE EDUCATION/TRAINING PROGRAM

## 2024-09-04 PROCEDURE — 82948 REAGENT STRIP/BLOOD GLUCOSE: CPT

## 2024-09-04 PROCEDURE — 92978 ENDOLUMINL IVUS OCT C 1ST: CPT | Performed by: STUDENT IN AN ORGANIZED HEALTH CARE EDUCATION/TRAINING PROGRAM

## 2024-09-04 PROCEDURE — C1769 GUIDE WIRE: HCPCS | Performed by: STUDENT IN AN ORGANIZED HEALTH CARE EDUCATION/TRAINING PROGRAM

## 2024-09-04 PROCEDURE — 84484 ASSAY OF TROPONIN QUANT: CPT | Performed by: FAMILY MEDICINE

## 2024-09-04 PROCEDURE — 80048 BASIC METABOLIC PNL TOTAL CA: CPT | Performed by: STUDENT IN AN ORGANIZED HEALTH CARE EDUCATION/TRAINING PROGRAM

## 2024-09-04 PROCEDURE — B2111ZZ FLUOROSCOPY OF MULTIPLE CORONARY ARTERIES USING LOW OSMOLAR CONTRAST: ICD-10-PCS | Performed by: STUDENT IN AN ORGANIZED HEALTH CARE EDUCATION/TRAINING PROGRAM

## 2024-09-04 PROCEDURE — 25010000002 HEPARIN (PORCINE) PER 1000 UNITS: Performed by: STUDENT IN AN ORGANIZED HEALTH CARE EDUCATION/TRAINING PROGRAM

## 2024-09-04 PROCEDURE — 85025 COMPLETE CBC W/AUTO DIFF WBC: CPT | Performed by: STUDENT IN AN ORGANIZED HEALTH CARE EDUCATION/TRAINING PROGRAM

## 2024-09-04 PROCEDURE — 93458 L HRT ARTERY/VENTRICLE ANGIO: CPT | Performed by: STUDENT IN AN ORGANIZED HEALTH CARE EDUCATION/TRAINING PROGRAM

## 2024-09-04 PROCEDURE — 4A023N7 MEASUREMENT OF CARDIAC SAMPLING AND PRESSURE, LEFT HEART, PERCUTANEOUS APPROACH: ICD-10-PCS | Performed by: STUDENT IN AN ORGANIZED HEALTH CARE EDUCATION/TRAINING PROGRAM

## 2024-09-04 PROCEDURE — 83735 ASSAY OF MAGNESIUM: CPT | Performed by: STUDENT IN AN ORGANIZED HEALTH CARE EDUCATION/TRAINING PROGRAM

## 2024-09-04 PROCEDURE — 85730 THROMBOPLASTIN TIME PARTIAL: CPT | Performed by: STUDENT IN AN ORGANIZED HEALTH CARE EDUCATION/TRAINING PROGRAM

## 2024-09-04 PROCEDURE — C9460 INJECTION, CANGRELOR: HCPCS | Performed by: STUDENT IN AN ORGANIZED HEALTH CARE EDUCATION/TRAINING PROGRAM

## 2024-09-04 PROCEDURE — 25010000002 MIDAZOLAM PER 1MG: Performed by: STUDENT IN AN ORGANIZED HEALTH CARE EDUCATION/TRAINING PROGRAM

## 2024-09-04 PROCEDURE — B2151ZZ FLUOROSCOPY OF LEFT HEART USING LOW OSMOLAR CONTRAST: ICD-10-PCS | Performed by: STUDENT IN AN ORGANIZED HEALTH CARE EDUCATION/TRAINING PROGRAM

## 2024-09-04 PROCEDURE — C1874 STENT, COATED/COV W/DEL SYS: HCPCS | Performed by: STUDENT IN AN ORGANIZED HEALTH CARE EDUCATION/TRAINING PROGRAM

## 2024-09-04 PROCEDURE — 93005 ELECTROCARDIOGRAM TRACING: CPT | Performed by: FAMILY MEDICINE

## 2024-09-04 PROCEDURE — 99153 MOD SED SAME PHYS/QHP EA: CPT | Performed by: STUDENT IN AN ORGANIZED HEALTH CARE EDUCATION/TRAINING PROGRAM

## 2024-09-04 PROCEDURE — C1760 CLOSURE DEV, VASC: HCPCS | Performed by: STUDENT IN AN ORGANIZED HEALTH CARE EDUCATION/TRAINING PROGRAM

## 2024-09-04 PROCEDURE — 25810000003 SODIUM CHLORIDE 0.9 % SOLUTION: Performed by: INTERNAL MEDICINE

## 2024-09-04 DEVICE — XIENCE SKYPOINT™ EVEROLIMUS ELUTING CORONARY STENT SYSTEM 3.50 MM X 48 MM / RAPID-EXCHANGE
Type: IMPLANTABLE DEVICE | Status: FUNCTIONAL
Brand: XIENCE SKYPOINT™

## 2024-09-04 RX ORDER — IOPAMIDOL 755 MG/ML
INJECTION, SOLUTION INTRAVASCULAR
Status: DISCONTINUED | OUTPATIENT
Start: 2024-09-04 | End: 2024-09-04 | Stop reason: HOSPADM

## 2024-09-04 RX ORDER — ASPIRIN 81 MG/1
81 TABLET ORAL DAILY
Status: DISCONTINUED | OUTPATIENT
Start: 2024-09-05 | End: 2024-09-05 | Stop reason: HOSPADM

## 2024-09-04 RX ORDER — NITROGLYCERIN 0.4 MG/1
TABLET SUBLINGUAL
Status: COMPLETED
Start: 2024-09-04 | End: 2024-09-04

## 2024-09-04 RX ORDER — NITROGLYCERIN 0.4 MG/1
0.4 TABLET SUBLINGUAL
Status: DISCONTINUED | OUTPATIENT
Start: 2024-09-04 | End: 2024-09-05 | Stop reason: HOSPADM

## 2024-09-04 RX ORDER — ACETAMINOPHEN 325 MG/1
650 TABLET ORAL EVERY 4 HOURS PRN
Status: DISCONTINUED | OUTPATIENT
Start: 2024-09-04 | End: 2024-09-05 | Stop reason: HOSPADM

## 2024-09-04 RX ORDER — HEPARIN SODIUM 1000 [USP'U]/ML
INJECTION, SOLUTION INTRAVENOUS; SUBCUTANEOUS
Status: DISCONTINUED | OUTPATIENT
Start: 2024-09-04 | End: 2024-09-04 | Stop reason: HOSPADM

## 2024-09-04 RX ORDER — MIDAZOLAM HYDROCHLORIDE 2 MG/2ML
INJECTION, SOLUTION INTRAMUSCULAR; INTRAVENOUS
Status: DISCONTINUED | OUTPATIENT
Start: 2024-09-04 | End: 2024-09-04 | Stop reason: HOSPADM

## 2024-09-04 RX ORDER — NITROGLYCERIN 0.4 MG/1
0.4 TABLET SUBLINGUAL
Status: DISCONTINUED | OUTPATIENT
Start: 2024-09-04 | End: 2024-09-04

## 2024-09-04 RX ORDER — FENTANYL CITRATE 50 UG/ML
INJECTION, SOLUTION INTRAMUSCULAR; INTRAVENOUS
Status: DISCONTINUED | OUTPATIENT
Start: 2024-09-04 | End: 2024-09-04 | Stop reason: HOSPADM

## 2024-09-04 RX ORDER — SODIUM CHLORIDE 9 MG/ML
75 INJECTION, SOLUTION INTRAVENOUS CONTINUOUS
Status: DISCONTINUED | OUTPATIENT
Start: 2024-09-05 | End: 2024-09-04

## 2024-09-04 RX ORDER — LIDOCAINE HYDROCHLORIDE 20 MG/ML
INJECTION, SOLUTION INFILTRATION; PERINEURAL
Status: DISCONTINUED | OUTPATIENT
Start: 2024-09-04 | End: 2024-09-04 | Stop reason: HOSPADM

## 2024-09-04 RX ORDER — ASPIRIN 81 MG/1
TABLET, CHEWABLE ORAL
Status: DISCONTINUED | OUTPATIENT
Start: 2024-09-04 | End: 2024-09-04 | Stop reason: HOSPADM

## 2024-09-04 RX ORDER — HEPARIN SODIUM 10000 [USP'U]/100ML
12 INJECTION, SOLUTION INTRAVENOUS
Status: DISCONTINUED | OUTPATIENT
Start: 2024-09-04 | End: 2024-09-04

## 2024-09-04 RX ORDER — SODIUM CHLORIDE 9 MG/ML
75 INJECTION, SOLUTION INTRAVENOUS CONTINUOUS
Status: DISCONTINUED | OUTPATIENT
Start: 2024-09-04 | End: 2024-09-05 | Stop reason: HOSPADM

## 2024-09-04 RX ORDER — PRAVASTATIN SODIUM 20 MG
80 TABLET ORAL NIGHTLY
Status: DISCONTINUED | OUTPATIENT
Start: 2024-09-04 | End: 2024-09-05 | Stop reason: HOSPADM

## 2024-09-04 RX ORDER — SODIUM CHLORIDE 9 MG/ML
75 INJECTION, SOLUTION INTRAVENOUS CONTINUOUS
Status: DISCONTINUED | OUTPATIENT
Start: 2024-09-04 | End: 2024-09-04

## 2024-09-04 RX ADMIN — METOPROLOL SUCCINATE 25 MG: 25 TABLET, EXTENDED RELEASE ORAL at 20:11

## 2024-09-04 RX ADMIN — PRAVASTATIN SODIUM 80 MG: 20 TABLET ORAL at 20:11

## 2024-09-04 RX ADMIN — TOPIRAMATE 50 MG: 25 TABLET, FILM COATED ORAL at 16:22

## 2024-09-04 RX ADMIN — EMPAGLIFLOZIN 25 MG: 25 TABLET, FILM COATED ORAL at 16:22

## 2024-09-04 RX ADMIN — TICAGRELOR 90 MG: 90 TABLET ORAL at 20:12

## 2024-09-04 RX ADMIN — BUSPIRONE HYDROCHLORIDE 5 MG: 5 TABLET ORAL at 20:12

## 2024-09-04 RX ADMIN — SPIRONOLACTONE 25 MG: 25 TABLET, FILM COATED ORAL at 16:22

## 2024-09-04 RX ADMIN — BUSPIRONE HYDROCHLORIDE 5 MG: 5 TABLET ORAL at 16:22

## 2024-09-04 RX ADMIN — Medication 10 ML: at 20:13

## 2024-09-04 RX ADMIN — NITROGLYCERIN 0.4 MG: 0.4 TABLET SUBLINGUAL at 06:58

## 2024-09-04 RX ADMIN — NITROGLYCERIN: 0.4 TABLET SUBLINGUAL at 06:39

## 2024-09-04 RX ADMIN — HEPARIN SODIUM 12 UNITS/KG/HR: 10000 INJECTION, SOLUTION INTRAVENOUS at 10:14

## 2024-09-04 RX ADMIN — BUPROPION HYDROCHLORIDE 300 MG: 150 TABLET, EXTENDED RELEASE ORAL at 16:21

## 2024-09-04 RX ADMIN — SODIUM CHLORIDE 75 ML/HR: 9 INJECTION, SOLUTION INTRAVENOUS at 16:22

## 2024-09-04 NOTE — PLAN OF CARE
Problem: Adult Inpatient Plan of Care  Goal: Plan of Care Review  Outcome: Ongoing, Progressing  Flowsheets (Taken 9/4/2024 0329)  Progress: no change  Plan of Care Reviewed With: ceci)  Outcome Evaluation: Patient with no c/o of chest pain during my shift, NPO since midnight, EKG 12 lead reviewed, VSS, patient able to sleep in between care, pending procedure for left heart cath this am, no s/s of distress, care ongoing   Goal Outcome Evaluation:  Plan of Care Reviewed With: ceci)        Progress: no change  Outcome Evaluation: Patient with no c/o of chest pain during my shift, NPO since midnight, EKG 12 lead reviewed, VSS, patient able to sleep in between care, pending procedure for left heart cath this am, no s/s of distress, care ongoing

## 2024-09-04 NOTE — PLAN OF CARE
Goal Outcome Evaluation:  Plan of Care Reviewed With: patient      Patient had chest pain radiating to shoulder, nitro x1 given with relief noted. Trops: 245, Dr. Rodriguez notified, cardiac cath scheduled. Patient's site started bleeding from cath site, Dr. Rodriguez notified and pressure held. No other complaints from patient during shift.

## 2024-09-04 NOTE — PAYOR COMM NOTE
"PATIENT INFORMATION  Name:  Bianca Boles  MRN#:     [id number]  :  1980         ADMISSION INFORMATION  CLASS: Inpatient   OBS DOS: 2024    IP DOS: 24         CURRENT ATTENDING PROVIDER INFORMATION  Name/NPI: Dana Xiong MD [[id number]]  Phone:  Phone: [phone]  Fax:  [phone]        REQUESTING PROVIDER and RENDERING FACILITY  Name:  [facility]   NPI:  [id number]  TID:  [id number]  Address:      [address] [place] [address]  Phone:               [phone]  Fax:  [phone]        UTILIZATION REVIEW CONTACT INFORMATION  Phone:      [phone]  Fax:           [phone]        ADMISSION DIAGNOSIS  Chest pain [R07.9]  Chest pain, unspecified type [R07.9]  NSTEMI (non-ST elevated myocardial infarction) [I21.4]        ++++++++++++++++++++++++++++++++++++++++++++++++++++++++++++++++++++++++++++++++          Bianca Boles (44 y.o. Female)       Date of Birth   1980    Social Security Number       Address   [address] [address]    Home Phone   [phone]    MRN   [id number]       Hindu   Mandaeism    Marital Status                               Admission Date   24    Admission Type   Emergency    Admitting Provider   Mel Licona DO    Attending Provider   Dana Xiong MD    Department, Room/Bed   [facility]  [facility]       Discharge Date       Discharge Disposition       Discharge Destination                                 Attending Provider: Dana Xiong MD    Allergies: Morphine    Isolation: None   Infection: None   Code Status: CPR    Ht: 170.2 cm (67\")   Wt: 95.6 kg (210 lb 12.2 oz)    Admission Cmt: None   Principal Problem: Chest pain [R07.9]                   Active Insurance as of 2024       Primary Coverage       Payor Plan Insurance Group Employer/Plan Group    [employer] [id number]       Payor Plan Address " Payor Plan Phone Number Payor Plan Fax Number Effective Dates    [address] [phone]  2022 - None Entered    [facility] [id number]         Subscriber Name Subscriber Birth Date Member ID       BIANCA BOLES 1980 [id number]                     Emergency Contacts        (Rel.) Home Phone Work Phone Mobile Phone    MARIE BOLES (Spouse) [phone] -- [phone]    KEYSHAWN ELLER (Mother) [phone] -- [phone]                 History & Physical        LivanMel DO at 24 1727           [facility] HISTORY AND PHYSICAL  Date: 2024   Patient Name: Bianca Boles  : 1980  MRN: [id number]  Primary Care Physician:  Patricia Hernandez, APRDEBBIE  Date of admission: 2024    Subjectivechest pain  Subjective     Chief Complaint: Chest pain    HPI: Patient is a 44-year-old female who presents to the emergency room for evaluation of chest pain.  This is the second in 1 week that she has come to the ED for chest pain.  She fell on her chest on .  She denies any shortness of breath, cough or hemoptysis.  She denies any nausea vomiting or diarrhea.  She does take oral contraceptives.    On arrival to the ED, patient's temperature 97.8, pulse of 66, respiratory rate 19, blood pressure 137/81, and saturating 100% on room air.  Chest CT shows no signs of pulmonary embolism.  No acute chest abnormality.    On labs, troponin is 22nd troponin is 24 of 4.  Creatinine is 1.30, lipase is 103.  White blood cell count is 9.47.      Pain is worse when she lays flat and takes deep breaths.  Personal History     Past Medical History:  Past Medical History:   Diagnosis Date    Acid reflux     Anxiety     Anxiety and depression     Benign essential hypertension     Borderline personality disorder     Cancer     renal cancer/mass, PARTIAL NEPH, RIGHT    Diabetes     Diabetes mellitus     type ii, doesn't check bg at home     Diabetes mellitus, type 2      Elevated cholesterol     Frozen shoulder 2023    GERD (gastroesophageal reflux disease)     High triglycerides     History of bariatric surgery 2021    GASTRIC SLEEVE    History of kidney stones     HTN (hypertension)     Hyperlipemia     Hyperlipidemia     Hypertriglyceridemia     Lumbar herniated disc     Obesity     Panic attacks     PCOS (polycystic ovarian syndrome)     Periarthritis of shoulder     Psoriasis     ELBOWS    Rotator cuff syndrome     Seasonal allergies     Self-injurious behavior     Spinal headache     AFTER PAIN EPIDURALS.  NO BLOOD PATCH    Suicide attempt        Past Surgical History:  Past Surgical History:   Procedure Laterality Date    ABDOMINAL SURGERY  2020    Gastric sleeve    ADENOIDECTOMY  1984     SECTION  ,    COLONOSCOPY N/A 2024    Procedure: COLONOSCOPY;  Surgeon: Terrence Fry MD;  Location: [facility] ENDOSCOPY;  Service: Gastroenterology;  Laterality: N/A;  NORMAL COLONOSCOPY    CYSTOSCOPY BLADDER STONE LITHOTRIPSY      EAR TUBES  1984    ENDOSCOPY N/A 10/20/2020    Procedure: ESOPHAGOGASTRODUODENOSCOPY WITH BIOPSY;  Surgeon: Fiedl Grajeda Jr., MD;  Location: [facility] ENDOSCOPY;  Service: General;  Laterality: N/A;  PRE- GERD  POST- RETAINED FOOD, GASTRITIS, GASTRIC POLYPS    ENDOSCOPY  2014    FOOT FRACTURE SURGERY Left 2017    screw placed    GASTRECTOMY  2020    GASTRIC SLEEVE LAPAROSCOPIC N/A 2020    Procedure: GASTRIC SLEEVE LAPAROSCOPIC;  Surgeon: Fidel Grajeda Jr., MD;  Location: [facility] OR [facility];  Service: Bariatric;  Laterality: N/A;    NEPHRECTOMY PARTIAL Right 11/15/2021    Procedure: NEPHRECTOMY PARTIAL LAPAROSCOPIC WITH DAVINCI ROBOT;  Surgeon: Lori Troy MD;  Location: [facility] MAIN OR;  Service: Robotics - DaVinci;  Laterality: Right;    SHOULDER ARTHROSCOPY Left 2023    Procedure: LEFT SHOULDER MANIPULATION;  Surgeon: Domenic Bradley MD;  Location: [facility] OR [facility];   Service: Orthopedics;  Laterality: Left;    TONSILLECTOMY  1984    UPPER GASTROINTESTINAL ENDOSCOPY      URETEROSCOPY LASER LITHOTRIPSY WITH STENT INSERTION Right 09/28/2021    Procedure: CYSTOSCOPY, RIGHT URETEROSCOPY, LASERTRIPSY, STONE BASKET EXTRACTION AND STENT INSERTION;  Surgeon: Lori Troy MD;  Location: [facility];  Service: Urology;  Laterality: Right;    URETEROSCOPY LASER LITHOTRIPSY WITH STENT INSERTION Left 11/08/2022    Procedure: URETEROSCOPY LASER LITHOTRIPSY WITH URETERAL STENT INSERTION;  Surgeon: Lori Troy MD;  Location: [facility] OR;  Service: Urology;  Laterality: Left;       Family History:   Family History   Problem Relation Age of Onset    Cancer Mother         Breast    Diabetes Father     Hypertension Father     Heart disease Father     Cancer Father         Bladder prostate liver    Colon cancer Father         malignant, currently 62    No Known Problems Sister     No Known Problems Brother     No Known Problems Maternal Aunt     No Known Problems Paternal Aunt     No Known Problems Maternal Uncle     No Known Problems Paternal Uncle     No Known Problems Maternal Grandfather     Stroke Maternal Grandmother     Heart disease Maternal Grandmother     Diabetes Paternal Grandfather     Heart disease Paternal Grandfather     No Known Problems Paternal Grandmother     No Known Problems Cousin     Malig Hyperthermia Neg Hx     ADD / ADHD Neg Hx     Alcohol abuse Neg Hx     Anxiety disorder Neg Hx     Bipolar disorder Neg Hx     Dementia Neg Hx     Depression Neg Hx     Drug abuse Neg Hx     OCD Neg Hx     Paranoid behavior Neg Hx     Schizophrenia Neg Hx     Seizures Neg Hx     Self-Injurious Behavior  Neg Hx     Suicide Attempts Neg Hx        Social History:   Social History     Socioeconomic History    Marital status:    Tobacco Use    Smoking status: Former     Current packs/day: 0.00     Average packs/day: 0.3 packs/day for 35.0 years (8.8 ttl pk-yrs)      Types: Cigarettes     Start date: 1/15/1985     Quit date: 1/15/2020     Years since quittin.6     Passive exposure: Never    Smokeless tobacco: Never    Tobacco comments:     A PACK A WEEK   Vaping Use    Vaping status: Never Used   Substance and Sexual Activity    Alcohol use: Yes     Comment: OCCASIONAL/SOCIAL    Drug use: Not Currently     Frequency: 0.1 times per week     Types: Marijuana     Comment: OTC THC    Sexual activity: Yes     Partners: Male     Birth control/protection: Depo-provera       Home Medications:  Apremilast, Biotin, Calcium-Magnesium-Zinc, Cholecalciferol, Collagen, Diclofenac Sodium, Iron-Vitamin C, Probiotic Product, Tirzepatide, buPROPion XL, busPIRone, cetirizine, clobetasol, dapagliflozin, desvenlafaxine, fluconazole, fluticasone, ibuprofen, linaclotide, metoprolol succinate XL, multivitamin, ondansetron ODT, pravastatin, testosterone micronized, topiramate, and traZODone    Allergies:  Allergies   Allergen Reactions    Morphine Itching and Nausea And Vomiting       Review of Systems   All systems were reviewed and negative except for: Chest pain    Objective  Objective     Vitals:   Temp:  [97.8 °F (36.6 °C)] 97.8 °F (36.6 °C)  Heart Rate:  [66-67] 67  Resp:  [18-19] 18  BP: (131-137)/(79-81) 131/79    Physical Exam    Constitutional: Awake, alert, no acute distress   Eyes: Pupils equal, sclerae anicteric, no conjunctival injection   HENT: NCAT, mucous membranes moist   Neck: Supple, no thyromegaly, no lymphadenopathy, trachea midline   Respiratory: Clear to auscultation bilaterally, nonlabored respirations    Cardiovascular: RRR, no murmurs, rubs, or gallops, palpable pedal pulses bilaterally   Gastrointestinal: Positive bowel sounds, soft, nontender, nondistended   Musculoskeletal: No bilateral ankle edema, no clubbing or cyanosis to extremities   Psychiatric: Appropriate affect, cooperative   Neurologic: Oriented x 3, strength symmetric in all extremities, Cranial Nerves  grossly intact to confrontation, speech clear   Skin: No rashes     Result Review   Result Review:  I have personally reviewed the results from the time of this admission to 9/1/2024 17:27 EDT and agree with these findings:  [x]  Laboratory  [x]  Microbiology  [x]  Radiology  [x]  EKG/Telemetry   [x]  Cardiology/Vascular   [x]  Pathology  [x]  Old records  [x]  Other:      Assessment & Plan  Assessment / Plan   #1 concern for pericarditis  -Echo ordered, check C-reactive protein, sed rate  -Will use NSAIDs.  Consider adding colchicine depending on findings.  -Cardiology consulted    #2 depression and anxiety continue BuSpar, Wellbutrin, trazodone    #3 non-insulin-dependent diabetes cover with insulin sliding scale.  Patient on Farxiga hold.    #4 elevated lipase-on Mounjaro.     #5 psoriasis patient on Otezla    #6 irritable bowel patient on Linzess    #7 hyperlipidemia Pravachol    #9 allergic rhinitis continue home regimen    #10 patient has partial nephrectomy.  Patient's baseline is 1.12.  Gentle hydration.    VTE Prophylaxis:  Mechanical VTE prophylaxis orders are present.        CODE STATUS:    Code Status (Patient has no pulse and is not breathing): CPR (Attempt to Resuscitate)  Medical Interventions (Patient has pulse or is breathing): Full Support      Admission Status:  I believe this patient meets observation status.    Electronically signed by Mel Licona DO, 09/01/24, 5:27 PM EDT.             Electronically signed by Mel Licona DO at 09/01/24 2117          Emergency Department Notes        Colton Dawkins MD at 09/01/24 1348          Time: 1:48 PM EDT  Date of encounter:  9/1/2024  Independent Historian/Clinical History and Information was obtained by:   Patient    History is limited by: N/A    Chief Complaint: Chest pain      History of Present Illness:  Patient is a 44 y.o. year old female who presents to the emergency department for evaluation of chest pain for the past several days.  The  patient reports that on the  she did have a fall and landed on her chest.  Patient denies shortness of breath.  Patient has no cough or hemoptysis.  Patient denies nausea, vomiting, and diarrhea.  Patient does report that she takes oral contraceptives.  Patient has no fever or chills.      Patient Care Team  Primary Care Provider: Patricia Hernandez APRN    Past Medical History:     Allergies   Allergen Reactions    Morphine Itching and Nausea And Vomiting     Past Medical History:   Diagnosis Date    Acid reflux     Anxiety     Anxiety and depression     Benign essential hypertension     Borderline personality disorder     Cancer     renal cancer/mass, PARTIAL NEPH, RIGHT    Diabetes     Diabetes mellitus     type ii, doesn't check bg at home     Diabetes mellitus, type 2     Elevated cholesterol     Frozen shoulder 2023    GERD (gastroesophageal reflux disease)     High triglycerides     History of bariatric surgery 2021    GASTRIC SLEEVE    History of kidney stones     HTN (hypertension)     Hyperlipemia     Hyperlipidemia     Hypertriglyceridemia     Lumbar herniated disc     Obesity     Panic attacks     PCOS (polycystic ovarian syndrome)     Periarthritis of shoulder     Psoriasis     ELBOWS    Rotator cuff syndrome     Seasonal allergies     Self-injurious behavior     Spinal headache     AFTER PAIN EPIDURALS.  NO BLOOD PATCH    Suicide attempt      Past Surgical History:   Procedure Laterality Date    ABDOMINAL SURGERY  2020    Gastric sleeve    ADENOIDECTOMY  1984     SECTION  ,    COLONOSCOPY N/A 2024    Procedure: COLONOSCOPY;  Surgeon: Terrence Fry MD;  Location: [facility] ENDOSCOPY;  Service: Gastroenterology;  Laterality: N/A;  NORMAL COLONOSCOPY    CYSTOSCOPY BLADDER STONE LITHOTRIPSY      EAR TUBES      ENDOSCOPY N/A 10/20/2020    Procedure: ESOPHAGOGASTRODUODENOSCOPY WITH BIOPSY;  Surgeon: Fidel Grajeda Jr., MD;   [facility];  Service: General;  Laterality: N/A;  PRE- GERD  POST- RETAINED FOOD, GASTRITIS, GASTRIC POLYPS    ENDOSCOPY  2014    FOOT FRACTURE SURGERY Left 2017    screw placed    GASTRECTOMY  12/2020    GASTRIC SLEEVE LAPAROSCOPIC N/A 12/07/2020    Procedure: GASTRIC SLEEVE LAPAROSCOPIC;  Surgeon: Fidel Grajeda Jr., MD;  Location:  [facility];  Service: Bariatric;  Laterality: N/A;    NEPHRECTOMY PARTIAL Right 11/15/2021    Procedure: NEPHRECTOMY PARTIAL LAPAROSCOPIC WITH DAVINCI ROBOT;  Surgeon: Lori Troy MD;  Location: [facility] OR;  Service: Robotics - DaVinci;  Laterality: Right;    SHOULDER ARTHROSCOPY Left 08/14/2023    Procedure: LEFT SHOULDER MANIPULATION;  Surgeon: Domenic Bradley MD;  [facility]: [facility];  Service: Orthopedics;  Laterality: Left;    TONSILLECTOMY  1984    UPPER GASTROINTESTINAL ENDOSCOPY      URETEROSCOPY LASER LITHOTRIPSY WITH STENT INSERTION Right 09/28/2021    Procedure: CYSTOSCOPY, RIGHT URETEROSCOPY, LASERTRIPSY, STONE BASKET EXTRACTION AND STENT INSERTION;  Surgeon: Lori Troy MD;  Location: [facility] OR;  Service: Urology;  Laterality: Right;    URETEROSCOPY LASER LITHOTRIPSY WITH STENT INSERTION Left 11/08/2022    Procedure: URETEROSCOPY LASER LITHOTRIPSY WITH URETERAL STENT INSERTION;  Surgeon: Lori Troy MD;  Location: [facility] OR;  Service: Urology;  Laterality: Left;     Family History   Problem Relation Age of Onset    Cancer Mother         Breast    Diabetes Father     Hypertension Father     Heart disease Father     Cancer Father         Bladder prostate liver    Colon cancer Father         malignant, currently 62    No Known Problems Sister     No Known Problems Brother     No Known Problems Maternal Aunt     No Known Problems Paternal Aunt     No Known Problems Maternal Uncle     No Known Problems Paternal Uncle     No Known Problems Maternal Grandfather     Stroke Maternal Grandmother     Heart disease  Maternal Grandmother     Diabetes Paternal Grandfather     Heart disease Paternal Grandfather     No Known Problems Paternal Grandmother     No Known Problems Cousin     Malig Hyperthermia Neg Hx     ADD / ADHD Neg Hx     Alcohol abuse Neg Hx     Anxiety disorder Neg Hx     Bipolar disorder Neg Hx     Dementia Neg Hx     Depression Neg Hx     Drug abuse Neg Hx     OCD Neg Hx     Paranoid behavior Neg Hx     Schizophrenia Neg Hx     Seizures Neg Hx     Self-Injurious Behavior  Neg Hx     Suicide Attempts Neg Hx        Home Medications:  Prior to Admission medications    Medication Sig Start Date End Date Taking? Authorizing Provider   Apremilast (Otezla) 30 MG tablet Take 30 mg by mouth 2 (Two) Times a Day. 2/5/24      Biotin 77093 MCG tablet Take 1 tablet by mouth Every Night.    Heron Campuzano MD   buPROPion XL (WELLBUTRIN XL) 300 MG 24 hr tablet Take 1 tablet by mouth Daily. 5/3/24   Patricia Hernandez APRN   busPIRone (BUSPAR) 5 MG tablet Take 1 tablet by mouth 3 (Three) Times a Day. 5/3/24   Patricia Hernandez APRN   CALCIUM-MAGNESIUM-ZINC PO Take 3 tablets by mouth Daily.    Heron Campuzano MD   cetirizine (zyrTEC) 10 MG tablet Take 1 tablet by mouth Daily As Needed. 5/31/22   Heron Campuzano MD   Cholecalciferol 25 MCG (1000 UT) capsule Take 2 capsules by mouth Daily. 11/3/23   Patricia Hernandez APRN   clobetasol (TEMOVATE) 0.05 % external solution Apply 10-15 drops every day to affected areas in the scalp after shampooing until clear.  Patient taking differently: Apply  topically to the appropriate area as directed Daily As Needed. 8/16/22      COLLAGEN PO Take 3 tablets by mouth Daily.    Heron Campuzano MD   dapagliflozin (Farxiga) 5 MG tablet tablet Take 1 tablet by mouth Daily. 5/3/24   Patricia Hernandez APRN   desvenlafaxine (PRISTIQ) 50 MG 24 hr tablet Take 1 tablet by mouth Daily. 5/3/24   Patricia Hernandez APRN   Diclofenac Sodium  (VOLTAREN) 1 % gel gel Apply 4 g topically to the appropriate area as directed 4 (Four) Times a Day As Needed (pain). 6/12/23   Nicolasa Meade PA-C   fluconazole (Diflucan) 150 MG tablet Take 1 tablet by mouth 1 (One) Time for 1 dose. 8/31/24 8/31/24  Vicky Robledo APRN   fluticasone (FLONASE) 50 MCG/ACT nasal spray USE 2 SPRAYS IN EACH NOSTRIL ONCE DAILY AS DIRECTED  Patient taking differently: 2 sprays into the nostril(s) as directed by provider At Night As Needed. 5/19/23   Patricia Hernandez APRN   IRON-VITAMIN C PO Take 1 tablet by mouth Daily.    Provider, MD Heron   linaclotide (Linzess) 72 MCG capsule capsule Take 1 capsule by mouth Every Morning Before Breakfast for 90 days. 4/11/24 10/8/24  Michell Horan APRN   metoprolol succinate XL (Toprol XL) 25 MG 24 hr tablet Take 1 tablet by mouth Every Night. 5/3/24   Patricia Hernandez APRN   Multiple Vitamins-Minerals (MULTIVITAMIN PO) Take 1 tablet by mouth Every Night.    Provider, MD Heron   nitrofurantoin, macrocrystal-monohydrate, (Macrobid) 100 MG capsule Take 1 capsule by mouth 2 (Two) Times a Day. 8/3/24   Patricia Hernandez APRN   ondansetron ODT (ZOFRAN-ODT) 4 MG disintegrating tablet Place 1 tablet on the tongue Every 8 (Eight) Hours As Needed for Nausea. 11/4/22   Patricia Hernandez APRN   ondansetron ODT (ZOFRAN-ODT) 4 MG disintegrating tablet Place 1 tablet on the tongue Every 8 (Eight) Hours As Needed for Nausea or Vomiting. 8/31/24   Vicky Robledo APRN   pravastatin (PRAVACHOL) 20 MG tablet Take 1 tablet by mouth Every Night. 5/3/24   Patricia Hernandez APRN   Probiotic Product (PROBIOTIC-10 PO) Take 1 tablet by mouth Every Night.    ProviderHeron MD   sulfamethoxazole-trimethoprim (Bactrim DS) 800-160 MG per tablet Take 1 tablet by mouth 2 (Two) Times a Day. 7/7/24   Patricia Hernandez APRN   testosterone micronized powder APPLY 2 CLICKS (0.5 ML) TOPICALLY  EVERY DAY 24   Patricia Hernandez APRN   Tirzepatide (MOUNJARO) 10 MG/0.5ML solution pen-injector pen Inject 0.5 mL under the skin into the appropriate area as directed 1 (One) Time Per Week. 24   Patricia Hernandez APRN   topiramate (TOPAMAX) 50 MG tablet Take 1 tablet by mouth every night at bedtime for 180 days. 5/3/24 10/30/24  Patricia Hernandez APRN   traZODone (DESYREL) 100 MG tablet Take 1 tablet by mouth Every Night. 5/3/24   Patricia Hernandez APRN        Social History:   Social History     Tobacco Use    Smoking status: Former     Current packs/day: 0.00     Average packs/day: 0.3 packs/day for 35.0 years (8.8 ttl pk-yrs)     Types: Cigarettes     Start date: 1/15/1985     Quit date: 1/15/2020     Years since quittin.6     Passive exposure: Never    Smokeless tobacco: Never    Tobacco comments:     A PACK A WEEK   Vaping Use    Vaping status: Never Used   Substance Use Topics    Alcohol use: Yes     Comment: OCCASIONAL/SOCIAL    Drug use: Not Currently     Frequency: 0.1 times per week     Types: Marijuana     Comment: OTC THC         Review of Systems:  Review of Systems   Constitutional:  Negative for chills and fever.   HENT:  Negative for congestion, rhinorrhea and sore throat.    Eyes:  Negative for pain and visual disturbance.   Respiratory:  Negative for apnea, cough, chest tightness and shortness of breath.    Cardiovascular:  Positive for chest pain. Negative for palpitations.   Gastrointestinal:  Negative for abdominal pain, diarrhea, nausea and vomiting.   Genitourinary:  Negative for difficulty urinating and dysuria.   Musculoskeletal:  Negative for joint swelling and myalgias.   Skin:  Negative for color change.   Neurological:  Negative for seizures and headaches.   Psychiatric/Behavioral: Negative.     All other systems reviewed and are negative.       Physical Exam:  /79 (BP Location: Right arm, Patient Position: Lying)   Pulse 67   Temp  "97.8 °F (36.6 °C) (Oral)   Resp 18   Ht 170.2 cm (67\")   Wt 96.1 kg (211 lb 13.8 oz)   LMP  (LMP Unknown)   SpO2 99%   BMI 33.18 kg/m²     Physical Exam  Vitals and nursing note reviewed.   Constitutional:       General: She is not in acute distress.     Appearance: Normal appearance. She is not toxic-appearing.   HENT:      Head: Normocephalic and atraumatic.      Jaw: There is normal jaw occlusion.   Eyes:      General: Lids are normal.      Extraocular Movements: Extraocular movements intact.      Conjunctiva/sclera: Conjunctivae normal.      Pupils: Pupils are equal, round, and reactive to light.   Cardiovascular:      Rate and Rhythm: Normal rate and regular rhythm.      Pulses: Normal pulses.      Heart sounds: Normal heart sounds.   Pulmonary:      Effort: Pulmonary effort is normal. No respiratory distress.      Breath sounds: Normal breath sounds. No wheezing or rhonchi.   Abdominal:      General: Abdomen is flat.      Palpations: Abdomen is soft.      Tenderness: There is no abdominal tenderness. There is no guarding or rebound.   Musculoskeletal:         General: Normal range of motion.      Cervical back: Normal range of motion and neck supple.      Right lower leg: No edema.      Left lower leg: No edema.   Skin:     General: Skin is warm and dry.   Neurological:      Mental Status: She is alert and oriented to person, place, and time. Mental status is at baseline.   Psychiatric:         Mood and Affect: Mood normal.                  Procedures:  Procedures      Medical Decision Making:      Comorbidities that affect care:    Hypertension    External Notes reviewed:    Previous ED Note: Patient was last seen in the ED for chest pain.      The following orders were placed and all results were independently analyzed by me:  Orders Placed This Encounter   Procedures    COVID-19, FLU A/B, RSV PCR 1 HR TAT - Swab, Nasopharynx    XR Chest 1 View    CT Chest With Contrast Diagnostic    [place] Draw    " High Sensitivity Troponin T    Comprehensive Metabolic Panel    Lipase    BNP    Magnesium    CBC Auto Differential    High Sensitivity Troponin T 2Hr    Basic Metabolic Panel    Magnesium    Phosphorus    Sedimentation Rate    C-reactive Protein    Diet: Regular/House; Texture: Regular (IDDSI 7); Fluid Consistency: Thin (IDDSI 0)    Undress & Gown    Continuous Pulse Oximetry    Vital Signs    Intake & Output    Weigh Patient    Oral Care    Saline Lock & Maintain IV Access    Place Sequential Compression Device    Maintain Sequential Compression Device    Code Status and Medical Interventions: CPR (Attempt to Resuscitate); Full Support    Inpatient Cardiology Consult    Inpatient Hospitalist Consult    Oxygen Therapy- Nasal Cannula; Titrate 1-6 LPM Per SpO2; 90 - 95%    ECG 12 Lead ED Triage Standing Order; Chest Pain    ECG 12 Lead ED Triage Standing Order; Chest Pain    Adult Transthoracic Echo Complete w/ Color, Spectral and Contrast if necessary per protocol    Insert Peripheral IV    Insert Peripheral IV    Initiate Observation Status    CBC & Differential    Green Top (Gel)    Lavender Top    Gold Top - SST    Light Blue Top    CBC & Differential       Medications Given in the Emergency Department:  Medications   sodium chloride 0.9 % flush 10 mL (has no administration in time range)   sodium chloride 0.9 % flush 10 mL (has no administration in time range)   sodium chloride 0.9 % flush 10 mL (has no administration in time range)   sodium chloride 0.9 % infusion 40 mL (has no administration in time range)   ondansetron ODT (ZOFRAN-ODT) disintegrating tablet 4 mg (has no administration in time range)     Or   ondansetron (ZOFRAN) injection 4 mg (has no administration in time range)   sodium chloride 0.9 % infusion (has no administration in time range)   acetaminophen (TYLENOL) tablet 650 mg (has no administration in time range)     Or   acetaminophen (TYLENOL) 160 MG/5ML oral solution 650 mg (has no  administration in time range)     Or   acetaminophen (TYLENOL) suppository 650 mg (has no administration in time range)   bisacodyl (DULCOLAX) suppository 10 mg (has no administration in time range)   ketorolac (TORADOL) injection 15 mg (has no administration in time range)   aspirin chewable tablet 324 mg (324 mg Oral Given 9/1/24 1200)   ketorolac (TORADOL) injection 30 mg (30 mg Intravenous Given 9/1/24 1519)   iopamidol (ISOVUE-370) 76 % injection 100 mL (100 mL Intravenous Given 9/1/24 1409)        ED Course:         Labs:    Lab Results (last 24 hours)       Procedure Component Value Units Date/Time    COVID-19, FLU A/B, RSV PCR 1 HR TAT - Swab, Nasopharynx [[id number]]  (Normal) Collected: 09/01/24 1203    Specimen: Swab from Nasopharynx Updated: 09/01/24 1304     COVID19 Not Detected     Influenza A PCR Not Detected     Influenza B PCR Not Detected     RSV, PCR Not Detected    Narrative:      Fact sheet for providers: https://www.fda.gov/media/197395/download    Fact sheet for patients: https://www.fda.gov/media/474895/download    Test performed by PCR.    High Sensitivity Troponin T [[id number]]  (Abnormal) Collected: 09/01/24 1208    Specimen: Blood Updated: 09/01/24 1242     HS Troponin T 20 ng/L     Narrative:      High Sensitive Troponin T Reference Range:  <14.0 ng/L- Negative Female for AMI  <22.0 ng/L- Negative Male for AMI  >=14 - Abnormal Female indicating possible myocardial injury.  >=22 - Abnormal Male indicating possible myocardial injury.   Clinicians would have to utilize clinical acumen, EKG, Troponin, and serial changes to determine if it is an Acute Myocardial Infarction or myocardial injury due to an underlying chronic condition.         CBC & Differential [[id number]]  (Abnormal) Collected: 09/01/24 1208    Specimen: Blood Updated: 09/01/24 1218    Narrative:      The following orders were created for panel order CBC & Differential.  Procedure                               Abnormality          Status                     ---------                               -----------         ------                     CBC Auto Differential[[id number]]        Abnormal            Final result                 Please view results for these tests on the individual orders.    Comprehensive Metabolic Panel [[id number]]  (Abnormal) Collected: 09/01/24 1208    Specimen: Blood Updated: 09/01/24 1242     Glucose 89 mg/dL      BUN 15 mg/dL      Creatinine 1.30 mg/dL      Sodium 136 mmol/L      Potassium 4.1 mmol/L      Chloride 105 mmol/L      CO2 20.1 mmol/L      Calcium 9.3 mg/dL      Total Protein 6.9 g/dL      Albumin 4.1 g/dL      ALT (SGPT) 22 U/L      AST (SGOT) 23 U/L      Alkaline Phosphatase 98 U/L      Total Bilirubin 0.4 mg/dL      Globulin 2.8 gm/dL      A/G Ratio 1.5 g/dL      BUN/Creatinine Ratio 11.5     Anion Gap 10.9 mmol/L      eGFR 51.8 mL/min/1.73     Narrative:      GFR Normal >60  Chronic Kidney Disease <60  Kidney Failure <15      Lipase [[id number]]  (Abnormal) Collected: 09/01/24 1208    Specimen: Blood Updated: 09/01/24 1242     Lipase 103 U/L     BNP [[id number]]  (Normal) Collected: 09/01/24 1208    Specimen: Blood Updated: 09/01/24 1238     proBNP 134.5 pg/mL     Narrative:      This assay is used as an aid in the diagnosis of individuals suspected of having heart failure. It can be used as an aid in the diagnosis of acute decompensated heart failure (ADHF) in patients presenting with signs and symptoms of ADHF to the emergency department (ED). In addition, NT-proBNP of <300 pg/mL indicates ADHF is not likely.    Age Range Result Interpretation  NT-proBNP Concentration (pg/mL:      <50             Positive            >450                   Gray                 300-450                    Negative             <300    50-75           Positive            >900                  Gray                300-900                  Negative            <300      >75             Positive            >1800                   Garcia                300-1800                  Negative            <300    Magnesium [[id number]]  (Normal) Collected: 09/01/24 1208    Specimen: Blood Updated: 09/01/24 1242     Magnesium 2.0 mg/dL     CBC Auto Differential [[id number]]  (Abnormal) Collected: 09/01/24 1208    Specimen: Blood Updated: 09/01/24 1218     WBC 9.47 10*3/mm3      RBC 5.10 10*6/mm3      Hemoglobin 14.5 g/dL      Hematocrit 43.3 %      MCV 84.9 fL      MCH 28.4 pg      MCHC 33.5 g/dL      RDW 13.7 %      RDW-SD 42.1 fl      MPV 9.5 fL      Platelets 206 10*3/mm3      Neutrophil % 62.5 %      Lymphocyte % 28.9 %      Monocyte % 6.7 %      Eosinophil % 1.0 %      Basophil % 0.3 %      Immature Grans % 0.6 %      Neutrophils, Absolute 5.92 10*3/mm3      Lymphocytes, Absolute 2.74 10*3/mm3      Monocytes, Absolute 0.63 10*3/mm3      Eosinophils, Absolute 0.09 10*3/mm3      Basophils, Absolute 0.03 10*3/mm3      Immature Grans, Absolute 0.06 10*3/mm3      nRBC 0.0 /100 WBC     High Sensitivity Troponin T 2Hr [[id number]]  (Abnormal) Collected: 09/01/24 1519    Specimen: Blood Updated: 09/01/24 1610     HS Troponin T 24 ng/L      Troponin T Delta 4 ng/L     Narrative:      High Sensitive Troponin T Reference Range:  <14.0 ng/L- Negative Female for AMI  <22.0 ng/L- Negative Male for AMI  >=14 - Abnormal Female indicating possible myocardial injury.  >=22 - Abnormal Male indicating possible myocardial injury.   Clinicians would have to utilize clinical acumen, EKG, Troponin, and serial changes to determine if it is an Acute Myocardial Infarction or myocardial injury due to an underlying chronic condition.         C-reactive Protein [[id number]] Collected: 09/01/24 1519    Specimen: Blood Updated: 09/01/24 1732             Imaging:    CT Chest With Contrast Diagnostic    Result Date: 9/1/2024  CT CHEST W CONTRAST DIAGNOSTIC Date of Exam: 9/1/2024 2:01 PM EDT Indication: Shortness of breath shortness of breath. Comparison: Same day chest radiograph  Technique: Axial CT images were obtained of the chest after the uneventful intravenous administration of iodinated contrast.  Reconstructed coronal and sagittal images were also obtained. Automated exposure control and iterative construction methods were  used. Findings: There is no evidence of pulmonary embolism. Normal pulmonary artery caliber. No right heart strain. No evidence of pericardial effusion. A few coronary artery calcifications are present. There are mitral annular calcifications. No evidence of thoracic aortic aneurysm. No evidence of mediastinal mass or threshold lymphadenopathy. Normal appearance of the thoracic esophagus. Central airways are patent. No significant bronchial wall thickening or bronchiectasis. There is no focal airspace consolidation, pleural effusion, or pneumothorax. There is a well-circumscribed 1.4 cm pulmonary nodule along the pleural surface of the right middle lobe as seen on series 404 image 102. This demonstrates punctate internal calcification and is consistent with a benign partially calcified granuloma. No additional suspicious pulmonary nodule or mass. Chest wall soft tissues show no acute abnormality. Surgical changes of gastric sleeve. No acute abnormality of the upper abdomen. No evidence of acute fracture or suspicious osseous lesion.     Impression: No evidence of pulmonary embolism. No acute chest abnormality. Electronically Signed: Kaden Mcclure MD  9/1/2024 2:23 PM EDT  Workstation ID: [id number]    XR Chest 1 View    Result Date: 9/1/2024  XR CHEST 1 VW Date of Exam: 9/1/2024 11:57 AM EDT Indication: Chest Pain Triage Protocol Comparison: 8/23/2024 Findings: Heart size and pulmonary vasculature within normal limits. Lungs clear. Costophrenic angle sharp. No pneumothorax     Impression: No active cardiopulmonary disease Electronically Signed: Boy Tee  9/1/2024 12:16 PM EDT  Workstation ID: [id number]       Differential Diagnosis and Discussion:    Chest Pain:   Based on the patient's signs and symptoms, I considered aortic dissection, myocardial infaction, pulmonary embolism, cardiac tamponade, pericarditis, pneumothorax, musculoskeletal chest pain and other differential diagnosis as an etiology of the patient's chest pain.     All labs were reviewed and interpreted by me.  All X-rays impressions were independently interpreted by me.  EKG was interpreted by me.  CT scan radiology impression was interpreted by me.    MDM     Amount and/or Complexity of Data Reviewed  Decide to obtain previous medical records or to obtain history from someone other than the patient: yes       The patient´s CBC that was reviewed and interpreted by me shows no abnormalities of critical concern. Of note, there is no anemia requiring a blood transfusion and the platelet count is acceptable.  The patient´s CMP that was reviewed and interpretted by me shows no abnormalities of critical concern. Of note, the patient´s sodium and potassium are acceptable. The patient´s liver enzymes are unremarkable. The patient´s renal function (creatinine) is preserved. The patient has a normal anion gap.  Initial troponin is 20 and the repeat is 24.      The patient presents with chest pain.  The patient has a troponin that is elevated. EKG shows no ST elevations.  CT scan to rule out other causes of cardiac chest pain was considered and was negative for PE. The patient was was placed on a cardiac monitor and continuously monitored for ventricular ectopy, arrhythmia, tachycardia, hypoxia, and changes in blood pressure.  Continuous pulse oximetry was placed in waveform with corresponding oxygen saturation was assessed throughout their stay in the emergency department.     Total Critical Care time of 40 minutes. Total critical care time documented does not include time spent on separately billed procedures for services of nurses or physician assistants. I personally saw and examined the patient. I have reviewed all  diagnostic interpretations and treatment plans as written. I was present for the key portions of any procedures performed and the inclusive time noted in any critical care statement. Critical care time includes patient management by me, time spent at the patients bedside,  time to review lab and imaging results, discussing patient care, documentation in the medical record, and time spent with family or caregiver.          Patient Care Considerations:    None      Consultants/Shared Management Plan:    Case was discussed with Dr. Bee who recommends admission.  Case was discussed with Dr. Licona who agrees to admit the patient.    Social Determinants of Health:    Patient is independent, reliable, and has access to care.       Disposition and Care Coordination:    Admit:   Through independent evaluation of the patient's history, physical, and imperical data, the patient meets criteria for inpatient admission to the hospital.        Final diagnoses:   Chest pain, unspecified type        ED Disposition       ED Disposition   Decision to Admit    Condition   --    Comment   Level of Care: Telemetry [5]   Diagnosis: Chest pain [145626]   Admitting Physician: BUSHRA LICONA [[id number]]   Attending Physician: BUSHRA LICONA [[id number]]                 This medical record created using voice recognition software.             Colton Dawkins MD  24 1733      Electronically signed by Colton Dawkins MD at 24 1733          Physician Progress Notes (last 4 days)        Dana Xiong MD at 24 0826           [facility]  Date: 2024  Patient Name: Bianca Boles  : 1980  MRN: [id number]  Date of admission: 2024  Room/Bed: [facility]      Subjective   Subjective     Chief Complaint: Chest pain     Summary:Bianca Boles is a 44 y.o. female with history of hypertension, type 2 diabetes, hyperlipidemia, s/p laparoscopic sleeve gastrectomy, GERD, anxiety,  MDD, insomnia and vitamin D deficiency who presented with chest pain.  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  The insulin was in between her chest and floor.  Started having chest pain since then.  Had been to ER for 3 times prior to this hospitalization for pain in her mid chest.  Fractures were ruled out, it was thought to be noncardiac in origin and patient was discharged on pain medications.  Patient stated the pain medication will take edge off but now she is having pain in her central chest radiating to her bilateral neck and shoulder.  It is worse when she is laying down.  Given the chest pain was persistent, patient and her family got concerned and presented to ER for further evaluation and management.  Denies any fever, chills, rigors, recent flulike symptoms or shortness of breath.  Mentioned she would feel like she is catching her breath because of the pain but denied actual shortness of air.  EKG on presentation was nonsignificant.  Troponin 20 with repeat troponin 24.  On as needed Toradol which is taking the edge off.  Cardiology was consulted and patient was admitted for further evaluation and management.    Interval Followup: Patient with chest pain overnight.  Was treated successfully with nitro.  Test was markedly abnormal patient was started on heparin drip this morning as she did have a worsening troponin with morning as well.  She denies any chest pain at time of my evaluation.  .    Review of Systems    All systems reviewed and negative except for what is outlined above.      Objective   Objective     Vitals:   Temp:  [98.2 °F (36.8 °C)] 98.2 °F (36.8 °C)  Heart Rate:  [60-81] 69  Resp:  [16-18] 16  BP: (101-117)/(65-87) 117/87    Physical Exam   General: Awake, alert, NAD  Cardiovascular: RRR, no murmurs   Pulmonary: CTA bilaterally; no wheezes; no conversational dyspnea  Gastrointestinal: S/ND/NT, +BS  Neuro: Alert,  awake, oriented x 3; speech clear; no tremor  Musculoskeletal: Reducible tenderness to palpation across the patient's chest wall    Result Review    Result Review:  I have personally reviewed these results:  [x]  Laboratory      Lab 09/04/24 0421 09/03/24  0301 09/02/24 0358 09/01/24  1833 09/01/24  1519   WBC 8.52 9.66 9.72  --   --    HEMOGLOBIN 14.0 13.3 14.0  --   --    HEMATOCRIT 41.4 40.1 41.4  --   --    PLATELETS 192 165 193  --   --    NEUTROS ABS 4.37 5.79 5.96  --   --    IMMATURE GRANS (ABS) 0.07* 0.09* 0.07*  --   --    LYMPHS ABS 2.99 2.83 2.82  --   --    MONOS ABS 0.79 0.75 0.71  --   --    EOS ABS 0.24 0.15 0.10  --   --    MCV 86.3 86.6 85.4  --   --    SED RATE  --   --   --  6  --    CRP  --   --  <0.30  --  <0.30         Lab 09/04/24 0421 09/03/24 0301 09/02/24  0358   SODIUM 139 141 138   POTASSIUM 4.4 4.5 4.5   CHLORIDE 109* 109* 107   CO2 21.0* 20.8* 20.0*   ANION GAP 9.0 11.2 11.0   BUN 15 17 17   CREATININE 1.09* 1.21* 1.23*   EGFR 64.0 56.4* 55.3*   GLUCOSE 85 90 85   CALCIUM 9.2 9.0 9.1   MAGNESIUM 1.8 2.0 2.1   PHOSPHORUS 3.7 3.0 3.3   HEMOGLOBIN A1C  --   --  5.10         Lab 09/01/24  1208   TOTAL PROTEIN 6.9   ALBUMIN 4.1   GLOBULIN 2.8   ALT (SGPT) 22   AST (SGOT) 23   BILIRUBIN 0.4   ALK PHOS 98   LIPASE 103*         Lab 09/04/24  0740 09/02/24  0358 09/01/24  1519 09/01/24  1208   PROBNP  --   --   --  134.5   HSTROP T 245* 119* 24* 20*                 Brief Urine Lab Results  (Last result in the past 365 days)        Color   Clarity   Blood   Leuk Est   Nitrite   Protein   CREAT   Urine HCG        08/31/24 1640 Yellow   Clear   Trace   Negative   Negative   Negative                 [x]  Microbiology   Microbiology Results (last 10 days)       Procedure Component Value - Date/Time    COVID-19, FLU A/B, RSV PCR 1 HR TAT - Swab, Nasopharynx [[id number]]  (Normal) Collected: 09/01/24 1203    Lab Status: Final result Specimen: Swab from Nasopharynx Updated: 09/01/24 1304     COVID19  Not Detected     Influenza A PCR Not Detected     Influenza B PCR Not Detected     RSV, PCR Not Detected    Narrative:      Fact sheet for providers: https://www.fda.gov/media/953159/download    Fact sheet for patients: https://www.fda.gov/media/259171/download    Test performed by PCR.          [x]  Radiology  CT Chest With Contrast Diagnostic    Result Date: 9/1/2024  Impression: No evidence of pulmonary embolism. No acute chest abnormality. Electronically Signed: Kaden Mcclure MD  9/1/2024 2:23 PM EDT  Workstation ID: [id number]    XR Chest 1 View    Result Date: 9/1/2024  Impression: No active cardiopulmonary disease Electronically Signed: Boy Tee  9/1/2024 12:16 PM EDT  Workstation ID: [id number]   []  EKG/Telemetry   []  Cardiology/Vascular   []  Pathology  []  Old records  []  Other:    Assessment & Plan   Assessment / Plan     Assessment:  Non-STEMI  S/p fall on 08/13 after which chest pain started  1.4 cm pulmonary nodule along the pleural surface of right middle lobe, outpatient follow-up  Elevated lipase, likely from Mounjaro  History of hypertension  Type 2 diabetes  Hyperlipidemia  S/p laparoscopic sleeve gastrectomy  GERD  Anxiety/MDD    Plan:  Patient currently being managed  on medicine service.  Presented with chest pain, could be cardiac related versus costochondritis.  Troponin uptrending, 20 ->24-> 119-> 245  Transthoracic echo report noted with EF 45 to 50% .    Continue Dilaudid as needed for pain  CRP and sed rate within normal limits.    Continue Toprol XL 25 mg nightly, pravastatin creased to 80 mg nightly, Jardiance added as well..  On insulin sliding scale for diabetes.  Continue to monitor on cardiac telemetry.  Stress test results noted, plans for left heart catheterization on today per cardiology.    Continue rest of current management.  Will obtain CBC, BMP, calcium, phosphorus and magnesium in AM.  Clinical course to determine disposition.    Discussed with RN and Dr. Rodriguez.    VTE  Prophylaxis:  Pharmacologic & mechanical VTE prophylaxis orders are present.        CODE STATUS:   Code Status (Patient has no pulse and is not breathing): CPR (Attempt to Resuscitate)  Medical Interventions (Patient has pulse or is breathing): Full Support      Electronically signed by Dana Xiong MD, 09/04/24, 1:50 PM EDT.                           Electronically signed by Dana Xiong MD at 09/04/24 1351       Toño Rodriguez MD at 09/03/24 2138          CARDIOLOY  INPATIENT PROGRESS NOTE         [address]    9/3/2024      PATIENT IDENTIFICATION:   Name:  Bianca Boles      MRN:  [id number]     44 y.o.  female                 SUBJECTIVE:   Doing well, scheduled for stress testing today.  Denies shortness of breath or chest pain tolerating p.o.  OBJECTIVE:  Vitals:    09/02/24 2232 09/03/24 0525 09/03/24 0719 09/03/24 1500   BP: 123/79 106/75 111/74    BP Location: Left arm Left arm Left arm    Patient Position: Lying Lying Lying    Pulse: 68 75 71    Resp: 18 18 18 18   Temp: 98.2 °F (36.8 °C) 98.1 °F (36.7 °C) 98.2 °F (36.8 °C)    TempSrc: Oral Oral Oral Oral   SpO2: 100% 99% 98%    Weight:       Height:               Body mass index is 33.01 kg/m².  No intake or output data in the 24 hours ending 09/03/24 2138    Telemetry: Normal sinus rhythm    Physical Exam  Constitutional:  Awake. Not in acute distress. Normal appearance.   Neck: No carotid bruit, hepatojugular reflux or JVD.   Cardiovascular:      Rate and Rhythm: Normal rate and regular rhythm.      Chest Wall: PMI is not displaced.      Heart sounds: Normal heart sounds, S1 normal and S2 normal. No murmur heard.       No friction rub. No gallop. No S3 or S4 sounds.    Pulmonary: Pulmonary effort is normal. Normal breath sounds. No wheezing, rhonchi or rales.   Extremities: No Bilateral edema is noted.   Skin: Warm and dry. Non cyanotic, No petechiae or rash.   Neurological: Alert and oriented x  "3        Allergies   Allergen Reactions    Morphine Itching and Nausea And Vomiting     Scheduled meds:  buPROPion XL, 300 mg, Oral, Daily  busPIRone, 5 mg, Oral, TID  insulin lispro, 2-9 Units, Subcutaneous, 4x Daily AC & at Bedtime  lubiprostone, 8 mcg, Oral, BID  metoprolol succinate XL, 25 mg, Oral, Nightly  pravastatin, 20 mg, Oral, Nightly  senna-docusate sodium, 2 tablet, Oral, BID  sodium chloride, 10 mL, Intravenous, Q12H  sodium chloride, 10 mL, Intravenous, Q12H  topiramate, 50 mg, Oral, Daily      IV meds:                         Data Review:  CBC          9/1/2024    12:08 9/2/2024    03:58 9/3/2024    03:01   CBC   WBC 9.47  9.72  9.66    RBC 5.10  4.85  4.63    Hemoglobin 14.5  14.0  13.3    Hematocrit 43.3  41.4  40.1    MCV 84.9  85.4  86.6    MCH 28.4  28.9  28.7    MCHC 33.5  33.8  33.2    RDW 13.7  13.6  13.8    Platelets 206  193  165      CMP          9/1/2024    12:08 9/2/2024    03:58 9/3/2024    03:01   CMP   Glucose 89  85  90    BUN 15  17  17    Creatinine 1.30  1.23  1.21    EGFR 51.8  55.3  56.4    Sodium 136  138  141    Potassium 4.1  4.5  4.5    Chloride 105  107  109    Calcium 9.3  9.1  9.0    Total Protein 6.9      Albumin 4.1      Globulin 2.8      Total Bilirubin 0.4      Alkaline Phosphatase 98      AST (SGOT) 23      ALT (SGPT) 22      Albumin/Globulin Ratio 1.5      BUN/Creatinine Ratio 11.5  13.8  14.0    Anion Gap 10.9  11.0  11.2       CARDIAC LABS:      Lab 09/02/24  0358 09/01/24  1519 09/01/24  1208   PROBNP  --   --  134.5   HSTROP T 119* 24* 20*        No results found for: \"DIGOXIN\"   Lab Results   Component Value Date    TSH 0.810 07/05/2024           Invalid input(s): \"LDLCALC\"  Lab Results   Component Value Date    POCTROP 0.00 04/20/2020     Lab Results   Component Value Date    TROPONINT 119 (C) 09/02/2024   (  Lab Results   Component Value Date    MG 2.0 09/03/2024     Results for orders placed during the hospital encounter of 09/01/24    Adult Transthoracic " Echo Complete w/ Color, Spectral and Contrast if necessary per protocol    Interpretation Summary  Grossly normal chamber sizes.  LV has concentric remodeling hypertrophy.  Wall motion abnormalities are present as described below.  Systolic function is mildly reduced estimated LVEF is 45 to 50%.  Diastolic function appears to be normal.  RV systolic function is normal.  Aortic valve has sclerotic changes.  There is no aortic valve stenosis.  Mitral valve is thickened leaflets.  MAC is present.  There is trace MR noted by Doppler.  Mild TR and NM are noted via Doppler flow.  Aortic root and ascending aorta has normal dimensions.  No pericardial effusion is noted.  IVC has normal size corresponding to a right atrial pressure of 0 to 5 mmHg.    No prior studies available for comparison.           ASSESSMENT:  NSTEMI  Hypertension  Hyperlipidemia  Diabetes mellitus  Heart failure with midrange EF.      PLAN:  Patient's presentation of symptoms was very atypical.  Troponin elevation was initially thought to be secondary to palpitations however after reviewing stress testing result it can be concluded that apical segment and distal anterior wall did not show tracer uptake.  Regional wall motion abnormalities will noted on echocardiogram which coincide with the ones noted on stress testing.  Estimated LVEF is 40 to 45%.    Patient's symptoms have started after her having a fall leading to blunt trauma of the chest.    While we start patient on guideline directed medical therapy.  She is outside of window for heparin therapy.  Will start aspirin 81 mg p.o. daily with a moderate intensity statin.  Start Jardiance 10 mg p.o. daily along with spironolactone 25 mg p.o. daily.  Patient is scheduled for a coronary angiogram left heart cath on Thursday.  N.p.o. after midnight tomorrow.  Patient's blood pressure is otherwise optimally controlled.      Toño Rodriguez MD  9/3/2024    21:38 EDT             Electronically signed by  Toño Rodriguez MD at 24 2144       Dana Xiong MD at 24 1728           [facility]   Hospitalist Progress Note  Date: 9/3/2024  Patient Name: Bianca Boles  : 1980  MRN: [id number]  Date of admission: 2024  Room/Bed: 263/1      Subjective   Subjective     Chief Complaint: Chest pain     Summary:Bianca Boles is a 44 y.o. female with history of hypertension, type 2 diabetes, hyperlipidemia, s/p laparoscopic sleeve gastrectomy, GERD, anxiety, MDD, insomnia and vitamin D deficiency who presented with chest pain.  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  The insulin was in between her chest and floor.  Started having chest pain since then.  Had been to ER for 3 times prior to this hospitalization for pain in her mid chest.  Fractures were ruled out, it was thought to be noncardiac in origin and patient was discharged on pain medications.  Patient stated the pain medication will take edge off but now she is having pain in her central chest radiating to her bilateral neck and shoulder.  It is worse when she is laying down.  Given the chest pain was persistent, patient and her family got concerned and presented to ER for further evaluation and management.  Denies any fever, chills, rigors, recent flulike symptoms or shortness of breath.  Mentioned she would feel like she is catching her breath because of the pain but denied actual shortness of air.  EKG on presentation was nonsignificant.  Troponin 20 with repeat troponin 24.  On as needed Toradol which is taking the edge off.  Cardiology was consulted and patient was admitted for further evaluation and management.    Interval Followup: No acute events overnight.  Patient forts having had some chest pain last night after having been a straightening up from some.  Denied any fever, chills, rigors, difficulty breathing or abdominal pain.  Obtaining  transthoracic echo today.    CT chest: There is a well-circumscribed 1.4 cm pulmonary nodule along the pleural surface of the   right middle lobe.    Review of Systems    All systems reviewed and negative except for what is outlined above.      Objective   Objective     Vitals:   Temp:  [98.1 °F (36.7 °C)-98.2 °F (36.8 °C)] 98.2 °F (36.8 °C)  Heart Rate:  [68-76] 71  Resp:  [18] 18  BP: (102-123)/(74-79) 111/74    Physical Exam   General: Awake, alert, NAD  Cardiovascular: RRR, no murmurs   Pulmonary: CTA bilaterally; no wheezes; no conversational dyspnea  Gastrointestinal: S/ND/NT, +BS  Neuro: Alert, awake, oriented x 3; speech clear; no tremor  Musculoskeletal: Reducible tenderness to palpation across the patient's chest wall    Result Review    Result Review:  I have personally reviewed these results:  [x]  Laboratory      Lab 09/03/24 0301 09/02/24 0358 09/01/24  1833 09/01/24  1519 09/01/24  1208   WBC 9.66 9.72  --   --  9.47   HEMOGLOBIN 13.3 14.0  --   --  14.5   HEMATOCRIT 40.1 41.4  --   --  43.3   PLATELETS 165 193  --   --  206   NEUTROS ABS 5.79 5.96  --   --  5.92   IMMATURE GRANS (ABS) 0.09* 0.07*  --   --  0.06*   LYMPHS ABS 2.83 2.82  --   --  2.74   MONOS ABS 0.75 0.71  --   --  0.63   EOS ABS 0.15 0.10  --   --  0.09   MCV 86.6 85.4  --   --  84.9   SED RATE  --   --  6  --   --    CRP  --  <0.30  --  <0.30  --          Lab 09/03/24  0301 09/02/24  0358 09/01/24  1208   SODIUM 141 138 136   POTASSIUM 4.5 4.5 4.1   CHLORIDE 109* 107 105   CO2 20.8* 20.0* 20.1*   ANION GAP 11.2 11.0 10.9   BUN 17 17 15   CREATININE 1.21* 1.23* 1.30*   EGFR 56.4* 55.3* 51.8*   GLUCOSE 90 85 89   CALCIUM 9.0 9.1 9.3   MAGNESIUM 2.0 2.1 2.0   PHOSPHORUS 3.0 3.3  --    HEMOGLOBIN A1C  --  5.10  --          Lab 09/01/24  1208   TOTAL PROTEIN 6.9   ALBUMIN 4.1   GLOBULIN 2.8   ALT (SGPT) 22   AST (SGOT) 23   BILIRUBIN 0.4   ALK PHOS 98   LIPASE 103*         Lab 09/02/24  0358 09/01/24  1519 09/01/24  1208   PROBNP  --    --  134.5   HSTROP T 119* 24* 20*                 Brief Urine Lab Results  (Last result in the past 365 days)        Color   Clarity   Blood   Leuk Est   Nitrite   Protein   CREAT   Urine HCG        08/31/24 1640 Yellow   Clear   Trace   Negative   Negative   Negative                 [x]  Microbiology   Microbiology Results (last 10 days)       Procedure Component Value - Date/Time    COVID-19, FLU A/B, RSV PCR 1 HR TAT - Swab, Nasopharynx [[id number]]  (Normal) Collected: 09/01/24 1203    Lab Status: Final result Specimen: Swab from Nasopharynx Updated: 09/01/24 1304     COVID19 Not Detected     Influenza A PCR Not Detected     Influenza B PCR Not Detected     RSV, PCR Not Detected    Narrative:      Fact sheet for providers: https://www.fda.gov/media/811556/download    Fact sheet for patients: https://www.fda.gov/media/539594/download    Test performed by PCR.          [x]  Radiology  CT Chest With Contrast Diagnostic    Result Date: 9/1/2024  Impression: No evidence of pulmonary embolism. No acute chest abnormality. Electronically Signed: Kaden Mcclure MD  9/1/2024 2:23 PM EDT  Workstation ID: [id number]    XR Chest 1 View    Result Date: 9/1/2024  Impression: No active cardiopulmonary disease Electronically Signed: Boy Tee  9/1/2024 12:16 PM EDT  Workstation ID: [id number]   []  EKG/Telemetry   []  Cardiology/Vascular   []  Pathology  []  Old records  []  Other:    Assessment & Plan   Assessment / Plan     Assessment:  Chest pain, could be cardiac related versus costochondritis  S/p fall on 08/13 after which chest pain started  Elevated troponin  1.4 cm pulmonary nodule along the pleural surface of right middle lobe, outpatient follow-up  Elevated lipase, likely from Mounjaro  History of hypertension  Type 2 diabetes  Hyperlipidemia  S/p laparoscopic sleeve gastrectomy  GERD  Anxiety/MDD    Plan:  Patient currently being managed  on medicine service.  Presented with chest pain, could be cardiac related versus  costochondritis.  Troponin uptrending, 20 ->24-> 119.  Transthoracic echo port noted with EF 45 to 50% .    Continue Dilaudid as needed for pain  CRP and sed rate within normal limits.    Restarted on home dose of Toprol 25 mg nightly, pravastatin 20 mg nightly, Wellbutrin 300 mg daily and Topamax 50 mg daily.  On insulin sliding scale for diabetes.  Continue to monitor on cardiac telemetry.  Awaiting stress test results.  Continue rest of current management.  Will obtain CBC, BMP, calcium, phosphorus and magnesium in AM.  Clinical course to determine disposition.  Discussed with RN.    VTE Prophylaxis:  Mechanical VTE prophylaxis orders are present.        CODE STATUS:   Code Status (Patient has no pulse and is not breathing): CPR (Attempt to Resuscitate)  Medical Interventions (Patient has pulse or is breathing): Full Support      Electronically signed by Dana Xiong MD, 24, 5:33 PM EDT.                         Electronically signed by Dana Xiong MD at 24 1733       Roseann García MD at 24 0928           [facility]  [facility]: [facility]  Patient Name: Bianca Boles  : 1980  MRN: [id number]  Date of admission: 2024  Room/Bed: [facility]/      Subjective   Subjective     Chief Complaint: Chest pain     Summary:Bianca Boles is a 44 y.o. female with history of hypertension, type 2 diabetes, hyperlipidemia, s/p laparoscopic sleeve gastrectomy, GERD, anxiety, MDD, insomnia and vitamin D deficiency who presented with chest pain.  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  The insulin was in between her chest and floor.  Started having chest pain since then.  Had been to ER for 3 times prior to this hospitalization for pain in her mid chest.  Fractures were ruled out, it was thought to be noncardiac in origin and patient was discharged on pain medications.  Patient  stated the pain medication will take edge off but now she is having pain in her central chest radiating to her bilateral neck and shoulder.  It is worse when she is laying down.  Given the chest pain was persistent, patient and her family got concerned and presented to ER for further evaluation and management.  Denies any fever, chills, rigors, recent flulike symptoms or shortness of breath.  Mentioned she would feel like she is catching her breath because of the pain but denied actual shortness of air.  EKG on presentation was nonsignificant.  Troponin 20 with repeat troponin 24.  On as needed Toradol which is taking the edge off.  Cardiology was consulted and patient was admitted for further evaluation and management.    Interval Followup: No acute events overnight.  Patient stated her chest pain is slightly better with as needed Toradol, takes the edge off.  But still having chest pain which is worse while lying down.  Denied any fever, chills, rigors, difficulty breathing or abdominal pain.  Obtaining transthoracic echo today.    CT chest: There is a well-circumscribed 1.4 cm pulmonary nodule along the pleural surface of the   right middle lobe.    Review of Systems    All systems reviewed and negative except for what is outlined above.      Objective   Objective     Vitals:   Temp:  [97.5 °F (36.4 °C)-98.2 °F (36.8 °C)] 98.1 °F (36.7 °C)  Heart Rate:  [66-75] 71  Resp:  [16-20] 18  BP: (105-153)/(74-94) 105/74    Physical Exam   General: Awake, alert, NAD  Cardiovascular: RRR, no murmurs   Pulmonary: CTA bilaterally; no wheezes; no conversational dyspnea  Gastrointestinal: S/ND/NT, +BS  Neuro: Alert, awake, oriented x 3; speech clear; no tremor      Result Review    Result Review:  I have personally reviewed these results:  [x]  Laboratory      Lab 09/02/24  0358 09/01/24  1833 09/01/24  1519 09/01/24  1208   WBC 9.72  --   --  9.47   HEMOGLOBIN 14.0  --   --  14.5   HEMATOCRIT 41.4  --   --  43.3   PLATELETS  193  --   --  206   NEUTROS ABS 5.96  --   --  5.92   IMMATURE GRANS (ABS) 0.07*  --   --  0.06*   LYMPHS ABS 2.82  --   --  2.74   MONOS ABS 0.71  --   --  0.63   EOS ABS 0.10  --   --  0.09   MCV 85.4  --   --  84.9   SED RATE  --  6  --   --    CRP  --   --  <0.30  --          Lab 09/02/24  0358 09/01/24  1208   SODIUM 138 136   POTASSIUM 4.5 4.1   CHLORIDE 107 105   CO2 20.0* 20.1*   ANION GAP 11.0 10.9   BUN 17 15   CREATININE 1.23* 1.30*   EGFR 55.3* 51.8*   GLUCOSE 85 89   CALCIUM 9.1 9.3   MAGNESIUM 2.1 2.0   PHOSPHORUS 3.3  --          Lab 09/01/24  1208   TOTAL PROTEIN 6.9   ALBUMIN 4.1   GLOBULIN 2.8   ALT (SGPT) 22   AST (SGOT) 23   BILIRUBIN 0.4   ALK PHOS 98   LIPASE 103*         Lab 09/01/24  1519 09/01/24  1208   PROBNP  --  134.5   HSTROP T 24* 20*                 Brief Urine Lab Results  (Last result in the past 365 days)        Color   Clarity   Blood   Leuk Est   Nitrite   Protein   CREAT   Urine HCG        08/31/24 1640 Yellow   Clear   Trace   Negative   Negative   Negative                 [x]  Microbiology   Microbiology Results (last 10 days)       Procedure Component Value - Date/Time    COVID-19, FLU A/B, RSV PCR 1 HR TAT - Swab, Nasopharynx [[id number]]  (Normal) Collected: 09/01/24 1203    Lab Status: Final result Specimen: Swab from Nasopharynx Updated: 09/01/24 1304     COVID19 Not Detected     Influenza A PCR Not Detected     Influenza B PCR Not Detected     RSV, PCR Not Detected    Narrative:      Fact sheet for providers: https://www.fda.gov/media/706423/download    Fact sheet for patients: https://www.fda.gov/media/113464/download    Test performed by PCR.          [x]  Radiology  CT Chest With Contrast Diagnostic    Result Date: 9/1/2024  Impression: No evidence of pulmonary embolism. No acute chest abnormality. Electronically Signed: Kaden Mcclure MD  9/1/2024 2:23 PM EDT  Workstation ID: [id number]    XR Chest 1 View    Result Date: 9/1/2024  Impression: No active cardiopulmonary  disease Electronically Signed: Boy Tee  9/1/2024 12:16 PM EDT  Workstation ID: [id number]   []  EKG/Telemetry   []  Cardiology/Vascular   []  Pathology  []  Old records  []  Other:    Assessment & Plan   Assessment / Plan     Assessment:  Chest pain, could be cardiac related versus costochondritis  S/p fall on 08/13 after which chest pain started  Elevated troponin  1.4 cm pulmonary nodule along the pleural surface of right middle lobe, outpatient follow-up  Elevated lipase, likely from Mounjaro  History of hypertension  Type 2 diabetes  Hyperlipidemia  S/p laparoscopic sleeve gastrectomy  GERD  Anxiety/MDD    Plan:  Patient currently being managed in medicine service.  Presented with chest pain, could be cardiac related versus costochondritis.  Troponin uptrending, 20 ->24-> 119.  Transthoracic echo ordered, reading pending.  On as needed Toradol for the pain.  Will switch to Dilaudid given her allergy to morphine; as needed, for the pain and discontinue Toradol.  Obtain a CRP and ESR to rule out pericarditis.  Currently EKG not suggestive of pericarditis.  Restarted on home dose of Toprol 25 mg nightly, pravastatin 20 mg nightly, Wellbutrin 300 mg daily and Topamax 50 mg daily.  On insulin sliding scale for diabetes.  Continue to monitor on cardiac telemetry.  Cardiology planning on stress test for stress classification if needed.  Continue rest of current management.  Will obtain CBC, BMP, calcium, phosphorus and magnesium in AM.  Clinical course to determine disposition.  Discussed with RN.    VTE Prophylaxis:  Mechanical VTE prophylaxis orders are present.        CODE STATUS:   Code Status (Patient has no pulse and is not breathing): CPR (Attempt to Resuscitate)  Medical Interventions (Patient has pulse or is breathing): Full Support      Electronically signed by Roseann García MD, 09/02/24, 9:29 AM EDT.                       Electronically signed by Roseann García MD at 09/02/24 0332          Consult  Notes (last 4 days)        Toño Rodriguez MD at 09/02/24 1444          Cardiology Consult Note  [address]          Patient Identification:  Bianca Boles      [id number]  44 y.o.        female  1980     Reason for Consultation:  Chest Pain      PCP: Patricia Hernandez APRN    History of Present Illness:  Ms. 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  Cardiology team was consulted for further evaluation.    Ms. Boles recently had a fall after tripping as a result of which she landed straight onto her chest.  She was holding insulin in her hands which landed between the chest and the floor.  She has had a blunt trauma to her chest from this fall.  Intermittently she has been experiencing chest pain since.  She has been to ER for evaluation for this pain.  Fractures were ruled out and she was sent home by my establishing the pain to be noncardiac in nature.    In past week, patient has intermittently experienced dull ache in the center of the chest radiating up to her neck and shoulders.  The pain worsens when she lays down and improves with pain medications.  She has experienced similar chest pain events while walking upstairs.  She does not admit to any recent illness.  She has been noticing difficulty with breathing during episodes of chest pain but outside of that she feels fine she does not endorse peripheral edema, orthopnea, PND, presyncope and syncope.    She denies smoking, drinking alcohol or doing illicit drug use.    Past History:  Past Medical History:   Diagnosis Date    Acid reflux     Anxiety     Anxiety and depression     Benign essential hypertension     Borderline personality disorder     Cancer     renal cancer/mass, PARTIAL NEPH, RIGHT    Diabetes     Diabetes mellitus     type ii, doesn't check  bg at home     Diabetes mellitus, type 2     Elevated cholesterol     Frozen shoulder 2023    GERD (gastroesophageal reflux disease)     High triglycerides     History of bariatric surgery 2021    GASTRIC SLEEVE    History of kidney stones     HTN (hypertension)     Hyperlipemia     Hyperlipidemia     Hypertriglyceridemia     Lumbar herniated disc     Obesity     Panic attacks     PCOS (polycystic ovarian syndrome)     Periarthritis of shoulder     Psoriasis     ELBOWS    Rotator cuff syndrome     Seasonal allergies     Self-injurious behavior     Spinal headache     AFTER PAIN EPIDURALS.  NO BLOOD PATCH    Suicide attempt      Past Surgical History:   Procedure Laterality Date    ABDOMINAL SURGERY  2020    Gastric sleeve    ADENOIDECTOMY  1984     SECTION  ,    COLONOSCOPY N/A 2024    Procedure: COLONOSCOPY;  Surgeon: Terrence Fry MD;  Location: [facility] ENDOSCOPY;  Service: Gastroenterology;  Laterality: N/A;  NORMAL COLONOSCOPY    CYSTOSCOPY BLADDER STONE LITHOTRIPSY      EAR TUBES  1984    ENDOSCOPY N/A 10/20/2020    Procedure: ESOPHAGOGASTRODUODENOSCOPY WITH BIOPSY;  Surgeon: Fidel Grajeda Jr., MD;  Location: [facility] ENDOSCOPY;  Service: General;  Laterality: N/A;  PRE- GERD  POST- RETAINED FOOD, GASTRITIS, GASTRIC POLYPS    ENDOSCOPY      FOOT FRACTURE SURGERY Left 2017    screw placed    GASTRECTOMY  2020    GASTRIC SLEEVE LAPAROSCOPIC N/A 2020    Procedure: GASTRIC SLEEVE LAPAROSCOPIC;  Surgeon: Fidel Grajeda Jr., MD;  Location: [facility] OR OSC;  Service: Bariatric;  Laterality: N/A;    NEPHRECTOMY PARTIAL Right 11/15/2021    Procedure: NEPHRECTOMY PARTIAL LAPAROSCOPIC WITH DAVINCI ROBOT;  Surgeon: Lori Troy MD;  Location: [facility] MAIN OR;  Service: Robotics - DaVinci;  Laterality: Right;    SHOULDER ARTHROSCOPY Left 2023    Procedure: LEFT SHOULDER MANIPULATION;  Surgeon: Domenic Bradley MD;  Location: [facility]  OR [facility];  Service: Orthopedics;  Laterality: Left;    TONSILLECTOMY  1984    UPPER GASTROINTESTINAL ENDOSCOPY      URETEROSCOPY LASER LITHOTRIPSY WITH STENT INSERTION Right 2021    Procedure: CYSTOSCOPY, RIGHT URETEROSCOPY, LASERTRIPSY, STONE BASKET EXTRACTION AND STENT INSERTION;  Surgeon: Lori Troy MD;  Location: [facility] OR;  Service: Urology;  Laterality: Right;    URETEROSCOPY LASER LITHOTRIPSY WITH STENT INSERTION Left 2022    Procedure: URETEROSCOPY LASER LITHOTRIPSY WITH URETERAL STENT INSERTION;  Surgeon: Lori Troy MD;  Location: [facility] MAIN OR;  Service: Urology;  Laterality: Left;     Allergies   Allergen Reactions    Morphine Itching and Nausea And Vomiting     Social History     Socioeconomic History    Marital status:    Tobacco Use    Smoking status: Former     Current packs/day: 0.00     Average packs/day: 0.3 packs/day for 35.0 years (8.8 ttl pk-yrs)     Types: Cigarettes     Start date: 1/15/1985     Quit date: 1/15/2020     Years since quittin.6     Passive exposure: Never    Smokeless tobacco: Never    Tobacco comments:     A PACK A WEEK   Vaping Use    Vaping status: Never Used   Substance and Sexual Activity    Alcohol use: Yes     Comment: OCCASIONAL/SOCIAL    Drug use: Not Currently     Frequency: 0.1 times per week     Types: Marijuana     Comment: OTC THC    Sexual activity: Yes     Partners: Male     Birth control/protection: Depo-provera     Family History   Problem Relation Age of Onset    Cancer Mother         Breast    Diabetes Father     Hypertension Father     Heart disease Father     Cancer Father         Bladder prostate liver    Colon cancer Father         malignant, currently 62    No Known Problems Sister     No Known Problems Brother     No Known Problems Maternal Aunt     No Known Problems Paternal Aunt     No Known Problems Maternal Uncle     No Known Problems Paternal Uncle     No Known Problems Maternal Grandfather     Stroke  Maternal Grandmother     Heart disease Maternal Grandmother     Diabetes Paternal Grandfather     Heart disease Paternal Grandfather     No Known Problems Paternal Grandmother     No Known Problems Cousin     [place] Hyperthermia Neg Hx     ADD / ADHD Neg Hx     Alcohol abuse Neg Hx     Anxiety disorder Neg Hx     Bipolar disorder Neg Hx     Dementia Neg Hx     Depression Neg Hx     Drug abuse Neg Hx     OCD Neg Hx     Paranoid behavior Neg Hx     Schizophrenia Neg Hx     Seizures Neg Hx     Self-Injurious Behavior  Neg Hx     Suicide Attempts Neg Hx        Medications:  Prior to Admission medications    Medication Sig Start Date End Date Taking? Authorizing Provider   Apremilast (Otezla) 30 MG tablet Take 30 mg by mouth 2 (Two) Times a Day. 2/5/24  Yes    Biotin 27057 MCG tablet Take 1 tablet by mouth Every Night.   Yes Heron Campuzano MD   buPROPion XL (WELLBUTRIN XL) 300 MG 24 hr tablet Take 1 tablet by mouth Daily. 5/3/24  Yes Patricia Hernandez APRN   busPIRone (BUSPAR) 5 MG tablet Take 1 tablet by mouth 3 (Three) Times a Day. 5/3/24  Yes Patricia Hernandez APRN   CALCIUM-MAGNESIUM-ZINC PO Take 3 tablets by mouth Daily.   Yes ProviderHeron MD   cetirizine (zyrTEC) 10 MG tablet Take 1 tablet by mouth Daily As Needed. 5/31/22  Yes Heron Campuzano MD   Cholecalciferol 25 MCG (1000 UT) capsule Take 2 capsules by mouth Daily. 11/3/23  Yes Patricia Hernandez APRN   clobetasol (TEMOVATE) 0.05 % external solution Apply 10-15 drops every day to affected areas in the scalp after shampooing until clear.  Patient taking differently: Apply 1 Application topically to the appropriate area as directed Daily As Needed. 8/16/22  Yes    COLLAGEN PO Take 3 tablets by mouth Daily.   Yes Heron Campuzano MD   dapagliflozin (Farxiga) 5 MG tablet tablet Take 1 tablet by mouth Daily. 5/3/24  Yes Patricia Hernandez APRN   desvenlafaxine (PRISTIQ) 50 MG 24 hr tablet Take 1 tablet by  mouth Daily. 5/3/24  Yes Patricia Hernandez APRN   Diclofenac Sodium (VOLTAREN) 1 % gel gel Apply 4 g topically to the appropriate area as directed 4 (Four) Times a Day As Needed (pain). 6/12/23  Yes Nicolasa Meade PA-C   fluconazole (Diflucan) 150 MG tablet Take 1 tablet by mouth 1 (One) Time for 1 dose. 8/31/24 9/1/24 Yes Vicky Robledo APRN   fluticasone (FLONASE) 50 MCG/ACT nasal spray USE 2 SPRAYS IN EACH NOSTRIL ONCE DAILY AS DIRECTED  Patient taking differently: 2 sprays into the nostril(s) as directed by provider At Night As Needed. 5/19/23  Yes Patricia Hernandez APRN   ibuprofen (ADVIL,MOTRIN) 800 MG tablet Take 1 tablet by mouth Every 6 (Six) Hours As Needed for Mild Pain.   Yes ProviderHeron MD   IRON-VITAMIN C PO Take 1 tablet by mouth Daily.   Yes ProvidereHron MD   linaclotide (Linzess) 72 MCG capsule capsule Take 1 capsule by mouth Every Morning Before Breakfast for 90 days. 4/11/24 10/8/24 Yes Michell Horan APRN   metoprolol succinate XL (Toprol XL) 25 MG 24 hr tablet Take 1 tablet by mouth Every Night. 5/3/24  Yes Patricia Hernandez APRN   Multiple Vitamins-Minerals (MULTIVITAMIN PO) Take 1 tablet by mouth Every Night.   Yes ProviderHeron MD   ondansetron ODT (ZOFRAN-ODT) 4 MG disintegrating tablet Place 1 tablet on the tongue Every 8 (Eight) Hours As Needed for Nausea.  Patient taking differently: Place 1 tablet on the tongue Every 8 (Eight) Hours As Needed for Nausea. 11/4/22  Yes Patricia Hernandez APRN   pravastatin (PRAVACHOL) 20 MG tablet Take 1 tablet by mouth Every Night. 5/3/24  Yes Patricia Hernandez APRN   Probiotic Product (PROBIOTIC-10 PO) Take 1 tablet by mouth Every Night.   Yes ProviderHeron MD   testosterone micronized powder APPLY 2 CLICKS (0.5 ML) TOPICALLY EVERY DAY  Patient taking differently: Apply 1 Application topically to the appropriate area as directed Daily. APPLY 2 CLICKS (0.5 ML) TOPICALLY  "EVERY DAY 8/1/24  Yes Patricia Hernandez APRN   Tirzepatide (MOUNJARO) 10 MG/0.5ML solution pen-injector pen Inject 0.5 mL under the skin into the appropriate area as directed 1 (One) Time Per Week. 6/25/24  Yes Patricia Hernandez APRN   topiramate (TOPAMAX) 50 MG tablet Take 1 tablet by mouth every night at bedtime for 180 days. 5/3/24 10/30/24 Yes Patricia Hernandez APRN   traZODone (DESYREL) 100 MG tablet Take 1 tablet by mouth Every Night.  Patient taking differently: Take 1 tablet by mouth At Night As Needed. 5/3/24  Yes Patricia Hernandez APRN      Current medications:  buPROPion XL, 300 mg, Oral, Daily  busPIRone, 5 mg, Oral, TID  lubiprostone, 8 mcg, Oral, BID  metoprolol succinate XL, 25 mg, Oral, Nightly  pravastatin, 20 mg, Oral, Nightly  senna-docusate sodium, 2 tablet, Oral, BID  sodium chloride, 10 mL, Intravenous, Q12H  sodium chloride, 10 mL, Intravenous, Q12H  topiramate, 50 mg, Oral, Daily      Current IV drips:  sodium chloride, 75 mL/hr, Last Rate: 75 mL/hr (09/01/24 1833)            Physical Exam    /69 (BP Location: Right arm, Patient Position: Lying)   Pulse 68   Temp 98.2 °F (36.8 °C) (Oral)   Resp 18   Ht 170.2 cm (67\")   Wt 95.6 kg (210 lb 12.2 oz)   LMP  (LMP Unknown)   SpO2 95%   BMI 33.01 kg/m²  Body mass index is 33.01 kg/m².   Oxygen saturation   @FLOWAN(10::1)@ SpO2  Min: 95 %  Max: 100 %    General Appearance:   no acute distress  Alert and oriented x3  HENT:   lips not cyanotic  Atraumatic  Neck:  thyroid not enlarged  supple  Respiratory:  no respiratory distress  normal breath sounds  no rales  Cardiovascular:  no jugular venous distention  regular rhythm  apical impulse normal  S1 normal, S2 normal  lower extremity edema: none    Skin:   warm, dry  No rashes  Neuro/Psychiatric:  normal mood and affect  judgement and insight appropriate      Cardiographics:     ECG  (personally reviewed) normal sinus rhythm   Telemetry:  (personally " reviewed) sinus tachycardia with rates in 140s   Results for orders placed in visit on 04/17/20    Adult Transthoracic Echo Complete W/ Cont if Necessary Per Protocol    Interpretation Summary  · Calculated EF = 65.2%  · There is no evidence of pericardial effusion.     Results for orders placed in visit on 04/17/20    Stress Test With Myocardial Perfusion One Day    Interpretation Summary  · Patient BMI is 47.2 kg/m2..  · Findings consistent with an equivocal ECG stress test.  · Left ventricular ejection fraction is normal (Calculated EF = 68%).  · Myocardial perfusion imaging indicates a normal myocardial perfusion study with no evidence of ischemia.  · Impressions are consistent with a low risk study.    Asymptomatic for chest pain. Specificity of study reduced secondary to Significant motion artifact due to poor exercise tolerance.  ECG is  equivocal for  suggestion of ischemia  Ectopy: rare PVC in recovery  Blood pressure response:  Baseline 90/48, Peak 150/60  Recovery:  1 min 158/60  3 min  140/68  4 min  130/58  5 min  112/58  6 min  112/58  Exercise tolerance is poor, reached 85% MPH within 3 minutes  Reached 18 of 20 Evans Scale.    Supervised by:  Elisabet VINCENT.      No results found for this or any previous visit.      Cardiolite (Tc-99m Sestamibi) stress test   Lab Review:       CBC          8/23/2024    15:33 9/1/2024    12:08 9/2/2024    03:58   CBC   WBC 9.75  9.47  9.72    RBC 4.62  5.10  4.85    Hemoglobin 13.1  14.5  14.0    Hematocrit 40.0  43.3  41.4    MCV 86.6  84.9  85.4    MCH 28.4  28.4  28.9    MCHC 32.8  33.5  33.8    RDW 13.6  13.7  13.6    Platelets 202  206  193        CMP          8/23/2024    15:33 9/1/2024    12:08 9/2/2024    03:58   CMP   Glucose 92  89  85    BUN 20  15  17    Creatinine 1.37  1.30  1.23    EGFR 48.6  51.8  55.3    Sodium 140  136  138    Potassium 4.0  4.1  4.5    Chloride 106  105  107    Calcium 9.4  9.3  9.1    Total Protein 6.7  6.9     Albumin 4.0  " 4.1     Globulin 2.7  2.8     Total Bilirubin 0.3  0.4     Alkaline Phosphatase 86  98     AST (SGOT) 23  23     ALT (SGPT) 19  22     Albumin/Globulin Ratio 1.5  1.5     BUN/Creatinine Ratio 14.6  11.5  13.8    Anion Gap 10.9  10.9  11.0         CARDIAC LABS:      Lab 09/02/24  0358 09/01/24  1519 09/01/24  1208   PROBNP  --   --  134.5   HSTROP T 119* 24* 20*      No results found for: \"DIGOXIN\"   Lab Results   Component Value Date    TSH 0.810 07/05/2024           Invalid input(s): \"LDLCALC\"  Lab Results   Component Value Date    POCTROP 0.00 04/20/2020     No results found for: \"DDIMERQUAN\"  Lab Results   Component Value Date    MG 2.1 09/02/2024             CARDIAC LABS:      Lab 09/02/24  0358 09/01/24  1519 09/01/24  1208   PROBNP  --   --  134.5   HSTROP T 119* 24* 20*         Assessment:  Atypical chest pain  Hypertension  Diabetes mellitus type 2  Hyperlipidemia  Anxiety disorder    Recommendations  Patient's chest pain has resulted after having a blunt trauma to the chest.  I suspect that patient has some pericardial irritation from the trauma.  Her troponin elevation is likely related to palpitations .EKG is not suggestive of pericarditis.    Due to risk factors, it is reasonable to perform stress testing for risk stratification.  If the results are abnormal we will proceed with coronary angiogram.  Until then, continue to monitor patient on telemetry  Episodes of sinus tachycardia were noted on telemetry today with heart rate exceeding to 140s.    Perform echocardiogram for evaluation of pericardial effusion.  If needed, will start on ibuprofen and colchicine.  Check for inflammatory markers CRP and ESR    Resume home medications for hypertension and diabetes.  Continue moderate intensity statin for dyslipidemia associated with diabetes    Thank you for allowing us to share in [facility]. Please call with any questions or concerns.             Toño Rodriguez MD   9/2/2024    14:44 " EDT      Electronically signed by Toño Rodriguez MD at 09/02/24 [id number]

## 2024-09-04 NOTE — PROGRESS NOTES
[facility]   Hospitalist Progress Note  Date: 2024  Patient Name: Bianca Boles  : 1980  MRN: [id number]  Date of admission: 2024  Room/Bed: 263/1      Subjective   Subjective     Chief Complaint: Chest pain     Summary:Bianca Boles is a 44 y.o. female with history of hypertension, type 2 diabetes, hyperlipidemia, s/p laparoscopic sleeve gastrectomy, GERD, anxiety, MDD, insomnia and vitamin D deficiency who presented with chest pain.  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  The insulin was in between her chest and floor.  Started having chest pain since then.  Had been to ER for 3 times prior to this hospitalization for pain in her mid chest.  Fractures were ruled out, it was thought to be noncardiac in origin and patient was discharged on pain medications.  Patient stated the pain medication will take edge off but now she is having pain in her central chest radiating to her bilateral neck and shoulder.  It is worse when she is laying down.  Given the chest pain was persistent, patient and her family got concerned and presented to ER for further evaluation and management.  Denies any fever, chills, rigors, recent flulike symptoms or shortness of breath.  Mentioned she would feel like she is catching her breath because of the pain but denied actual shortness of air.  EKG on presentation was nonsignificant.  Troponin 20 with repeat troponin 24.  On as needed Toradol which is taking the edge off.  Cardiology was consulted and patient was admitted for further evaluation and management.    Interval Followup: Patient with chest pain overnight.  Was treated successfully with nitro.  Test was markedly abnormal patient was started on heparin drip this morning as she did have a worsening troponin with morning as well.  She denies any chest pain at time of my evaluation.  .    Review of Systems    All systems reviewed and  negative except for what is outlined above.      Objective   Objective     Vitals:   Temp:  [98.2 °F (36.8 °C)] 98.2 °F (36.8 °C)  Heart Rate:  [60-81] 69  Resp:  [16-18] 16  BP: (101-117)/(65-87) 117/87    Physical Exam   General: Awake, alert, NAD  Cardiovascular: RRR, no murmurs   Pulmonary: CTA bilaterally; no wheezes; no conversational dyspnea  Gastrointestinal: S/ND/NT, +BS  Neuro: Alert, awake, oriented x 3; speech clear; no tremor  Musculoskeletal: Reducible tenderness to palpation across the patient's chest wall    Result Review    Result Review:  I have personally reviewed these results:  [x]  Laboratory      Lab 09/04/24 0421 09/03/24 0301 09/02/24 0358 09/01/24  1833 09/01/24  1519   WBC 8.52 9.66 9.72  --   --    HEMOGLOBIN 14.0 13.3 14.0  --   --    HEMATOCRIT 41.4 40.1 41.4  --   --    PLATELETS 192 165 193  --   --    NEUTROS ABS 4.37 5.79 5.96  --   --    IMMATURE GRANS (ABS) 0.07* 0.09* 0.07*  --   --    LYMPHS ABS 2.99 2.83 2.82  --   --    MONOS ABS 0.79 0.75 0.71  --   --    EOS ABS 0.24 0.15 0.10  --   --    MCV 86.3 86.6 85.4  --   --    SED RATE  --   --   --  6  --    CRP  --   --  <0.30  --  <0.30         Lab 09/04/24 0421 09/03/24 0301 09/02/24 0358   SODIUM 139 141 138   POTASSIUM 4.4 4.5 4.5   CHLORIDE 109* 109* 107   CO2 21.0* 20.8* 20.0*   ANION GAP 9.0 11.2 11.0   BUN 15 17 17   CREATININE 1.09* 1.21* 1.23*   EGFR 64.0 56.4* 55.3*   GLUCOSE 85 90 85   CALCIUM 9.2 9.0 9.1   MAGNESIUM 1.8 2.0 2.1   PHOSPHORUS 3.7 3.0 3.3   HEMOGLOBIN A1C  --   --  5.10         Lab 09/01/24  1208   TOTAL PROTEIN 6.9   ALBUMIN 4.1   GLOBULIN 2.8   ALT (SGPT) 22   AST (SGOT) 23   BILIRUBIN 0.4   ALK PHOS 98   LIPASE 103*         Lab 09/04/24  0740 09/02/24  0358 09/01/24  1519 09/01/24  1208   PROBNP  --   --   --  134.5   HSTROP T 245* 119* 24* 20*                 Brief Urine Lab Results  (Last result in the past 365 days)        Color   Clarity   Blood   Leuk Est   Nitrite   Protein   CREAT   Urine  HCG        08/31/24 1640 Yellow   Clear   Trace   Negative   Negative   Negative                 [x]  Microbiology   Microbiology Results (last 10 days)       Procedure Component Value - Date/Time    COVID-19, FLU A/B, RSV PCR 1 HR TAT - Swab, Nasopharynx [[id number]]  (Normal) Collected: 09/01/24 1203    Lab Status: Final result Specimen: Swab from Nasopharynx Updated: 09/01/24 1304     COVID19 Not Detected     Influenza A PCR Not Detected     Influenza B PCR Not Detected     RSV, PCR Not Detected    Narrative:      Fact sheet for providers: https://www.fda.gov/media/080939/download    Fact sheet for patients: https://www.fda.gov/media/401797/download    Test performed by PCR.          [x]  Radiology  CT Chest With Contrast Diagnostic    Result Date: 9/1/2024  Impression: No evidence of pulmonary embolism. No acute chest abnormality. Electronically Signed: Kaden Mcclure MD  9/1/2024 2:23 PM EDT  Workstation ID: [id number]    XR Chest 1 View    Result Date: 9/1/2024  Impression: No active cardiopulmonary disease Electronically Signed: Boy Tee  9/1/2024 12:16 PM EDT  Workstation ID: [id number]   []  EKG/Telemetry   []  Cardiology/Vascular   []  Pathology  []  Old records  []  Other:    Assessment & Plan   Assessment / Plan     Assessment:  Non-STEMI  S/p fall on 08/13 after which chest pain started  1.4 cm pulmonary nodule along the pleural surface of right middle lobe, outpatient follow-up  Elevated lipase, likely from Mounjaro  History of hypertension  Type 2 diabetes  Hyperlipidemia  S/p laparoscopic sleeve gastrectomy  GERD  Anxiety/MDD    Plan:  Patient currently being managed  on medicine service.  Presented with chest pain, could be cardiac related versus costochondritis.  Troponin uptrending, 20 ->24-> 119-> 245  Transthoracic echo report noted with EF 45 to 50% .    Continue Dilaudid as needed for pain  CRP and sed rate within normal limits.    Continue Toprol XL 25 mg nightly, pravastatin creased to 80 mg  nightly, Jardiance added as well..  On insulin sliding scale for diabetes.  Continue to monitor on cardiac telemetry.  Stress test results noted, plans for left heart catheterization on today per cardiology.    Continue rest of current management.  Will obtain CBC, BMP, calcium, phosphorus and magnesium in AM.  Clinical course to determine disposition.    Discussed with RN and Dr. Rodriguez.    VTE Prophylaxis:  Pharmacologic & mechanical VTE prophylaxis orders are present.        CODE STATUS:   Code Status (Patient has no pulse and is not breathing): CPR (Attempt to Resuscitate)  Medical Interventions (Patient has pulse or is breathing): Full Support      Electronically signed by Dana Xiong MD, 09/04/24, 1:50 PM EDT.

## 2024-09-04 NOTE — PROGRESS NOTES
[facility]  INPATIENT PROGRESS NOTE         [address]    9/3/2024      PATIENT IDENTIFICATION:   Name:  Bianca Boles      MRN:  [id number]     44 y.o.  female                 SUBJECTIVE:   Doing well, scheduled for stress testing today.  Denies shortness of breath or chest pain tolerating p.o.  OBJECTIVE:  Vitals:    09/02/24 2232 09/03/24 0525 09/03/24 0719 09/03/24 1500   BP: 123/79 106/75 111/74    BP Location: Left arm Left arm Left arm    Patient Position: Lying Lying Lying    Pulse: 68 75 71    Resp: 18 18 18 18   Temp: 98.2 °F (36.8 °C) 98.1 °F (36.7 °C) 98.2 °F (36.8 °C)    TempSrc: Oral Oral Oral Oral   SpO2: 100% 99% 98%    Weight:       Height:               Body mass index is 33.01 kg/m².  No intake or output data in the 24 hours ending 09/03/24 2138    Telemetry: Normal sinus rhythm    Physical Exam  Constitutional:  Awake. Not in acute distress. Normal appearance.   Neck: No carotid bruit, hepatojugular reflux or JVD.   Cardiovascular:      Rate and Rhythm: Normal rate and regular rhythm.      Chest Wall: PMI is not displaced.      Heart sounds: Normal heart sounds, S1 normal and S2 normal. No murmur heard.       No friction rub. No gallop. No S3 or S4 sounds.    Pulmonary: Pulmonary effort is normal. Normal breath sounds. No wheezing, rhonchi or rales.   Extremities: No Bilateral edema is noted.   Skin: Warm and dry. Non cyanotic, No petechiae or rash.   Neurological: Alert and oriented x 3        Allergies   Allergen Reactions    Morphine Itching and Nausea And Vomiting     Scheduled meds:  buPROPion XL, 300 mg, Oral, Daily  busPIRone, 5 mg, Oral, TID  insulin lispro, 2-9 Units, Subcutaneous, 4x Daily AC & at Bedtime  lubiprostone, 8 mcg, Oral, BID  metoprolol succinate XL, 25 mg, Oral, Nightly  pravastatin, 20 mg, Oral, Nightly  senna-docusate sodium, 2 tablet, Oral, BID  sodium chloride, 10 mL, Intravenous, Q12H  sodium chloride, 10 mL, Intravenous, Q12H  topiramate, 50 mg,  "Oral, Daily      IV meds:                         Data Review:  CBC          9/1/2024    12:08 9/2/2024    03:58 9/3/2024    03:01   CBC   WBC 9.47  9.72  9.66    RBC 5.10  4.85  4.63    Hemoglobin 14.5  14.0  13.3    Hematocrit 43.3  41.4  40.1    MCV 84.9  85.4  86.6    MCH 28.4  28.9  28.7    MCHC 33.5  33.8  33.2    RDW 13.7  13.6  13.8    Platelets 206  193  165      CMP          9/1/2024    12:08 9/2/2024    03:58 9/3/2024    03:01   CMP   Glucose 89  85  90    BUN 15  17  17    Creatinine 1.30  1.23  1.21    EGFR 51.8  55.3  56.4    Sodium 136  138  141    Potassium 4.1  4.5  4.5    Chloride 105  107  109    Calcium 9.3  9.1  9.0    Total Protein 6.9      Albumin 4.1      Globulin 2.8      Total Bilirubin 0.4      Alkaline Phosphatase 98      AST (SGOT) 23      ALT (SGPT) 22      Albumin/Globulin Ratio 1.5      BUN/Creatinine Ratio 11.5  13.8  14.0    Anion Gap 10.9  11.0  11.2       CARDIAC LABS:      Lab 09/02/24  0358 09/01/24  1519 09/01/24  1208   PROBNP  --   --  134.5   HSTROP T 119* 24* 20*        No results found for: \"DIGOXIN\"   Lab Results   Component Value Date    TSH 0.810 07/05/2024           Invalid input(s): \"LDLCALC\"  Lab Results   Component Value Date    POCTROP 0.00 04/20/2020     Lab Results   Component Value Date    TROPONINT 119 (C) 09/02/2024   (  Lab Results   Component Value Date    MG 2.0 09/03/2024     Results for orders placed during the hospital encounter of 09/01/24    Adult Transthoracic Echo Complete w/ Color, Spectral and Contrast if necessary per protocol    Interpretation Summary  Grossly normal chamber sizes.  LV has concentric remodeling hypertrophy.  Wall motion abnormalities are present as described below.  Systolic function is mildly reduced estimated LVEF is 45 to 50%.  Diastolic function appears to be normal.  RV systolic function is normal.  Aortic valve has sclerotic changes.  There is no aortic valve stenosis.  Mitral valve is thickened leaflets.  MAC is present.  " There is trace MR noted by Doppler.  Mild TR and MI are noted via Doppler flow.  Aortic root and ascending aorta has normal dimensions.  No pericardial effusion is noted.  IVC has normal size corresponding to a right atrial pressure of 0 to 5 mmHg.    No prior studies available for comparison.           ASSESSMENT:  NSTEMI  Hypertension  Hyperlipidemia  Diabetes mellitus  Heart failure with midrange EF.      PLAN:  Patient's presentation of symptoms was very atypical.  Troponin elevation was initially thought to be secondary to palpitations however after reviewing stress testing result it can be concluded that apical segment and distal anterior wall did not show tracer uptake.  Regional wall motion abnormalities will noted on echocardiogram which coincide with the ones noted on stress testing.  Estimated LVEF is 40 to 45%.    Patient's symptoms have started after her having a fall leading to blunt trauma of the chest.    While we start patient on guideline directed medical therapy.  She is outside of window for heparin therapy.  Will start aspirin 81 mg p.o. daily with a moderate intensity statin.  Start Jardiance 10 mg p.o. daily along with spironolactone 25 mg p.o. daily.  Patient is scheduled for a coronary angiogram left heart cath on Thursday.  N.p.o. after midnight tomorrow.  Patient's blood pressure is otherwise optimally controlled.      Toño Rodriguez MD  9/3/2024    21:38 EDT

## 2024-09-05 ENCOUNTER — READMISSION MANAGEMENT (OUTPATIENT)
Dept: CALL CENTER | Facility: HOSPITAL | Age: 44
End: 2024-09-05
Payer: COMMERCIAL

## 2024-09-05 VITALS
RESPIRATION RATE: 18 BRPM | BODY MASS INDEX: 33.08 KG/M2 | OXYGEN SATURATION: 98 % | HEIGHT: 67 IN | SYSTOLIC BLOOD PRESSURE: 108 MMHG | HEART RATE: 63 BPM | WEIGHT: 210.76 LBS | TEMPERATURE: 97.9 F | DIASTOLIC BLOOD PRESSURE: 68 MMHG

## 2024-09-05 PROBLEM — I21.4 NSTEMI (NON-ST ELEVATED MYOCARDIAL INFARCTION): Status: RESOLVED | Noted: 2024-09-04 | Resolved: 2024-09-05

## 2024-09-05 PROBLEM — R07.9 CHEST PAIN: Status: RESOLVED | Noted: 2024-09-01 | Resolved: 2024-09-05

## 2024-09-05 LAB
ANION GAP SERPL CALCULATED.3IONS-SCNC: 8.3 MMOL/L (ref 5–15)
BASOPHILS # BLD AUTO: 0.04 10*3/MM3 (ref 0–0.2)
BASOPHILS NFR BLD AUTO: 0.5 % (ref 0–1.5)
BUN SERPL-MCNC: 15 MG/DL (ref 6–20)
BUN/CREAT SERPL: 14.3 (ref 7–25)
CALCIUM SPEC-SCNC: 8.8 MG/DL (ref 8.6–10.5)
CHLORIDE SERPL-SCNC: 114 MMOL/L (ref 98–107)
CHOLEST SERPL-MCNC: 114 MG/DL (ref 0–200)
CO2 SERPL-SCNC: 16.7 MMOL/L (ref 22–29)
CREAT SERPL-MCNC: 1.05 MG/DL (ref 0.57–1)
DEPRECATED RDW RBC AUTO: 44.1 FL (ref 37–54)
EGFRCR SERPLBLD CKD-EPI 2021: 66.9 ML/MIN/1.73
EOSINOPHIL # BLD AUTO: 0.18 10*3/MM3 (ref 0–0.4)
EOSINOPHIL NFR BLD AUTO: 2 % (ref 0.3–6.2)
ERYTHROCYTE [DISTWIDTH] IN BLOOD BY AUTOMATED COUNT: 14.1 % (ref 12.3–15.4)
GLUCOSE BLDC GLUCOMTR-MCNC: 80 MG/DL (ref 70–99)
GLUCOSE BLDC GLUCOMTR-MCNC: 91 MG/DL (ref 70–99)
GLUCOSE SERPL-MCNC: 88 MG/DL (ref 65–99)
HCT VFR BLD AUTO: 39 % (ref 34–46.6)
HDLC SERPL-MCNC: 34 MG/DL (ref 40–60)
HGB BLD-MCNC: 12.8 G/DL (ref 12–15.9)
IMM GRANULOCYTES # BLD AUTO: 0.05 10*3/MM3 (ref 0–0.05)
IMM GRANULOCYTES NFR BLD AUTO: 0.6 % (ref 0–0.5)
LDLC SERPL CALC-MCNC: 64 MG/DL (ref 0–100)
LDLC/HDLC SERPL: 1.87 {RATIO}
LYMPHOCYTES # BLD AUTO: 2.15 10*3/MM3 (ref 0.7–3.1)
LYMPHOCYTES NFR BLD AUTO: 24.4 % (ref 19.6–45.3)
MAGNESIUM SERPL-MCNC: 1.7 MG/DL (ref 1.6–2.6)
MCH RBC QN AUTO: 28.3 PG (ref 26.6–33)
MCHC RBC AUTO-ENTMCNC: 32.8 G/DL (ref 31.5–35.7)
MCV RBC AUTO: 86.3 FL (ref 79–97)
MONOCYTES # BLD AUTO: 0.7 10*3/MM3 (ref 0.1–0.9)
MONOCYTES NFR BLD AUTO: 7.9 % (ref 5–12)
NEUTROPHILS NFR BLD AUTO: 5.7 10*3/MM3 (ref 1.7–7)
NEUTROPHILS NFR BLD AUTO: 64.6 % (ref 42.7–76)
NRBC BLD AUTO-RTO: 0 /100 WBC (ref 0–0.2)
PHOSPHATE SERPL-MCNC: 3.5 MG/DL (ref 2.5–4.5)
PLATELET # BLD AUTO: 166 10*3/MM3 (ref 140–450)
PMV BLD AUTO: 9.8 FL (ref 6–12)
POTASSIUM SERPL-SCNC: 4 MMOL/L (ref 3.5–5.2)
QT INTERVAL: 382 MS
QT INTERVAL: 388 MS
QT INTERVAL: 397 MS
QTC INTERVAL: 400 MS
QTC INTERVAL: 411 MS
QTC INTERVAL: 411 MS
RBC # BLD AUTO: 4.52 10*6/MM3 (ref 3.77–5.28)
SODIUM SERPL-SCNC: 139 MMOL/L (ref 136–145)
TRIGL SERPL-MCNC: 82 MG/DL (ref 0–150)
VLDLC SERPL-MCNC: 16 MG/DL (ref 5–40)
WBC NRBC COR # BLD AUTO: 8.82 10*3/MM3 (ref 3.4–10.8)

## 2024-09-05 PROCEDURE — 82948 REAGENT STRIP/BLOOD GLUCOSE: CPT

## 2024-09-05 PROCEDURE — 83735 ASSAY OF MAGNESIUM: CPT | Performed by: STUDENT IN AN ORGANIZED HEALTH CARE EDUCATION/TRAINING PROGRAM

## 2024-09-05 PROCEDURE — 94799 UNLISTED PULMONARY SVC/PX: CPT

## 2024-09-05 PROCEDURE — 25810000003 SODIUM CHLORIDE 0.9 % SOLUTION: Performed by: INTERNAL MEDICINE

## 2024-09-05 PROCEDURE — 85025 COMPLETE CBC W/AUTO DIFF WBC: CPT | Performed by: STUDENT IN AN ORGANIZED HEALTH CARE EDUCATION/TRAINING PROGRAM

## 2024-09-05 PROCEDURE — 82948 REAGENT STRIP/BLOOD GLUCOSE: CPT | Performed by: STUDENT IN AN ORGANIZED HEALTH CARE EDUCATION/TRAINING PROGRAM

## 2024-09-05 PROCEDURE — 99239 HOSP IP/OBS DSCHRG MGMT >30: CPT | Performed by: INTERNAL MEDICINE

## 2024-09-05 PROCEDURE — 80061 LIPID PANEL: CPT | Performed by: STUDENT IN AN ORGANIZED HEALTH CARE EDUCATION/TRAINING PROGRAM

## 2024-09-05 PROCEDURE — 80048 BASIC METABOLIC PNL TOTAL CA: CPT | Performed by: STUDENT IN AN ORGANIZED HEALTH CARE EDUCATION/TRAINING PROGRAM

## 2024-09-05 PROCEDURE — 84100 ASSAY OF PHOSPHORUS: CPT | Performed by: STUDENT IN AN ORGANIZED HEALTH CARE EDUCATION/TRAINING PROGRAM

## 2024-09-05 PROCEDURE — 94761 N-INVAS EAR/PLS OXIMETRY MLT: CPT

## 2024-09-05 PROCEDURE — 93005 ELECTROCARDIOGRAM TRACING: CPT | Performed by: STUDENT IN AN ORGANIZED HEALTH CARE EDUCATION/TRAINING PROGRAM

## 2024-09-05 RX ORDER — NITROGLYCERIN 0.4 MG/1
0.4 TABLET SUBLINGUAL
Qty: 60 TABLET | Refills: 2 | Status: SHIPPED | OUTPATIENT
Start: 2024-09-05

## 2024-09-05 RX ORDER — ASPIRIN 81 MG/1
81 TABLET ORAL DAILY
Qty: 90 TABLET | Refills: 5 | Status: SHIPPED | OUTPATIENT
Start: 2024-09-06

## 2024-09-05 RX ORDER — ONDANSETRON 4 MG/1
4 TABLET, ORALLY DISINTEGRATING ORAL EVERY 8 HOURS PRN
Start: 2024-09-05

## 2024-09-05 RX ORDER — PRAVASTATIN SODIUM 80 MG/1
80 TABLET ORAL NIGHTLY
Qty: 90 TABLET | Refills: 1 | Status: SHIPPED | OUTPATIENT
Start: 2024-09-05

## 2024-09-05 RX ORDER — DAPAGLIFLOZIN 10 MG/1
10 TABLET, FILM COATED ORAL DAILY
Qty: 90 TABLET | Refills: 1 | Status: SHIPPED | OUTPATIENT
Start: 2024-09-05

## 2024-09-05 RX ORDER — SPIRONOLACTONE 25 MG/1
25 TABLET ORAL DAILY
Qty: 60 TABLET | Refills: 3 | Status: SHIPPED | OUTPATIENT
Start: 2024-09-06

## 2024-09-05 RX ADMIN — SODIUM CHLORIDE 75 ML/HR: 9 INJECTION, SOLUTION INTRAVENOUS at 03:07

## 2024-09-05 RX ADMIN — EMPAGLIFLOZIN 25 MG: 25 TABLET, FILM COATED ORAL at 09:25

## 2024-09-05 RX ADMIN — TOPIRAMATE 50 MG: 25 TABLET, FILM COATED ORAL at 09:25

## 2024-09-05 RX ADMIN — SPIRONOLACTONE 25 MG: 25 TABLET, FILM COATED ORAL at 09:25

## 2024-09-05 RX ADMIN — TICAGRELOR 90 MG: 90 TABLET ORAL at 09:25

## 2024-09-05 RX ADMIN — BUPROPION HYDROCHLORIDE 300 MG: 150 TABLET, EXTENDED RELEASE ORAL at 09:25

## 2024-09-05 RX ADMIN — Medication 10 ML: at 09:26

## 2024-09-05 RX ADMIN — BUSPIRONE HYDROCHLORIDE 5 MG: 5 TABLET ORAL at 09:25

## 2024-09-05 RX ADMIN — ASPIRIN 81 MG: 81 TABLET, COATED ORAL at 09:25

## 2024-09-05 NOTE — OUTREACH NOTE
Prep Survey      Flowsheet Row Responses   Religion [facility] patient discharged from? [facility]   Is LACE score < 7 ? No   Eligibility [facility]   Date of Admission 09/01/24   Date of Discharge 09/05/24   Discharge Disposition Home or Self Care   Discharge diagnosis Chest pain, NSTEMI, heart cath with stent   Does the patient have one of the following disease processes/diagnoses(primary or secondary)? Acute MI (STEMI,NSTEMI)   Does the patient have Home health ordered? No   Is there a DME ordered? No   Prep survey completed? Yes            Lexis Mcdonough Registered Nurse

## 2024-09-05 NOTE — PROGRESS NOTES
[facility]  INPATIENT PROGRESS NOTE         [address]    9/5/2024      PATIENT IDENTIFICATION:   Name:  Bianca Boles      MRN:  [id number]     44 y.o.  female                 SUBJECTIVE: Complains of mild groin discomfort.  No bleeding recorded.  Denies chest pain or shortness of breath.  Feels great and wants to go home today     OBJECTIVE:  Vitals:    09/05/24 0305 09/05/24 0800 09/05/24 0900 09/05/24 1205   BP: 105/62 113/73  108/68   BP Location: Left arm Left arm  Left arm   Patient Position: Lying Lying  Lying   Pulse: 69 61 71 63   Resp: 18 18  18   Temp: 98.2 °F (36.8 °C) 97.9 °F (36.6 °C)  97.9 °F (36.6 °C)   TempSrc: Oral Oral  Oral   SpO2: 97% 100%  98%   Weight:       Height:               Body mass index is 33.01 kg/m².    Intake/Output Summary (Last 24 hours) at 9/5/2024 1802  Last data filed at 9/5/2024 1400  Gross per 24 hour   Intake 3055.5 ml   Output 600 ml   Net 2455.5 ml       Telemetry: Normal sinus rhythm    Physical Exam  Constitutional:  Awake. Not in acute distress. Normal appearance.   Neck: No carotid bruit, hepatojugular reflux or JVD.   Cardiovascular:      Rate and Rhythm: Normal rate and regular rhythm.      Chest Wall: PMI is not displaced.      Heart sounds: Normal heart sounds, S1 normal and S2 normal. No murmur heard.       No friction rub. No gallop. No S3 or S4 sounds.    Pulmonary: Pulmonary effort is normal. Normal breath sounds. No wheezing, rhonchi or rales.   Extremities: No Bilateral edema is noted.   Skin: Warm and dry. Non cyanotic, No petechiae or rash.   Neurological: Alert and oriented x 3        Allergies   Allergen Reactions    Morphine Itching and Nausea And Vomiting     Scheduled meds:  MedroxyPROGESTERone Acetate, 150 mg, Intramuscular, Q3 Months      IV meds:                           Data Review:  CBC          9/3/2024    03:01 9/4/2024    04:21 9/5/2024    06:40   CBC   WBC 9.66  8.52  8.82    RBC 4.63  4.80  4.52    Hemoglobin 13.3   "14.0  12.8    Hematocrit 40.1  41.4  39.0    MCV 86.6  86.3  86.3    MCH 28.7  29.2  28.3    MCHC 33.2  33.8  32.8    RDW 13.8  13.7  14.1    Platelets 165  192  166      CMP          9/3/2024    03:01 9/4/2024    04:21 9/5/2024    06:40   CMP   Glucose 90  85  88    BUN 17  15  15    Creatinine 1.21  1.09  1.05    EGFR 56.4  64.0  66.9    Sodium 141  139  139    Potassium 4.5  4.4  4.0    Chloride 109  109  114    Calcium 9.0  9.2  8.8    BUN/Creatinine Ratio 14.0  13.8  14.3    Anion Gap 11.2  9.0  8.3       CARDIAC LABS:      Lab 09/04/24  0959 09/04/24  0740 09/02/24  0358 09/01/24  1519 09/01/24  1208   PROBNP  --   --   --   --  134.5   HSTROP T 245* 245* 119* 24* 20*   PROTIME 14.5  --   --   --   --    INR 1.11  --   --   --   --         No results found for: \"DIGOXIN\"   Lab Results   Component Value Date    TSH 0.810 07/05/2024     Results from last 7 days   Lab Units 09/05/24  0640   CHOLESTEROL mg/dL 114   HDL CHOL mg/dL 34*     Lab Results   Component Value Date    POCTROP 0.00 04/20/2020     Lab Results   Component Value Date    TROPONINT 245 (C) 09/04/2024   (  Lab Results   Component Value Date    MG 1.7 09/05/2024     Results for orders placed during the hospital encounter of 09/01/24    Adult Transthoracic Echo Complete w/ Color, Spectral and Contrast if necessary per protocol    Interpretation Summary  Grossly normal chamber sizes.  LV has concentric remodeling hypertrophy.  Wall motion abnormalities are present as described below.  Systolic function is mildly reduced estimated LVEF is 45 to 50%.  Diastolic function appears to be normal.  RV systolic function is normal.  Aortic valve has sclerotic changes.  There is no aortic valve stenosis.  Mitral valve is thickened leaflets.  MAC is present.  There is trace MR noted by Doppler.  Mild TR and HI are noted via Doppler flow.  Aortic root and ascending aorta has normal dimensions.  No pericardial effusion is noted.  IVC has normal size corresponding " to a right atrial pressure of 0 to 5 mmHg.    No prior studies available for comparison.           ASSESSMENT:  NSTEMI  Hypertension  Hyperlipidemia  Diabetes mellitus  Heart failure with midrange EF.      PLAN:  Patient's presentation of symptoms was very atypical.  Troponin elevation was initially thought to be secondary to palpitations however after reviewing stress testing result it can be concluded that apical segment and distal anterior wall did not show tracer uptake.  Regional wall motion abnormalities will noted on echocardiogram which coincide with the ones noted on stress testing.  Estimated LVEF is 40 to 45%.    Coronary angiogram was completed and LAD mid segment 99% stenosis with DESTINY I flow was noted.  Successfully revascularized with a 3.5 x 48 mm drug-eluting stent.    Continue aspirin 81 mg p.o. daily along with Brilinta 90 mg p.o. twice daily uninterrupted for at least 1 year  Continue high intensity statin  Continue Jardiance 10 mg p.o. daily along with spironolactone 25 mg p.o. daily.  Patient's blood pressure is otherwise optimally controlled.  Cardiac rehab referral  Can start low-dose losartan 12.5 mg p.o. daily today    Outpatient follow-up in cardiology clinic for continuation of care no concerns for groin bleed..     Thank you for letting me participate in patient's care.  Please feel free to reach out with questions cardiology team will now sign off.        Toño Rodriguze MD  9/5/2024    18:02 EDT

## 2024-09-05 NOTE — PLAN OF CARE
Problem: Adult Inpatient Plan of Care  Goal: Plan of Care Review  Outcome: Ongoing, Progressing  Flowsheets (Taken 9/5/2024 1892)  Progress: no change  Plan of Care Reviewed With: patient  Outcome Evaluation: Patient s/p left heart cath and stent placement, cath site with dry drainage that is marked, bandage dry and intact, one bm during shift, adequate urine output, tolerating diet, IVF continued, tolerated first dose of Brilinta, no c/o of chest pain, VSS, possible discharge home today, care ongoing   Goal Outcome Evaluation:  Plan of Care Reviewed With: patient        Progress: no change  Outcome Evaluation: Patient s/p left heart cath and stent placement, cath site with dry drainage that is marked, bandage dry and intact, one bm during shift, adequate urine output, tolerating diet, IVF continued, tolerated first dose of Brilinta, no c/o of chest pain, VSS, possible discharge home today, care ongoing

## 2024-09-05 NOTE — PROGRESS NOTES
[facility]  INPATIENT PROGRESS NOTE         [address]    9/4/2024      PATIENT IDENTIFICATION:   Name:  Bianca Boles      MRN:  [id number]     44 y.o.  female                 SUBJECTIVE: Had an episode of chest pain this morning.  Otherwise doing well denies any complaints hemodynamically stable.  OBJECTIVE:  Vitals:    09/04/24 1530 09/04/24 1552 09/04/24 1600 09/04/24 1630   BP: 105/65 100/65 112/82 110/66   BP Location:  Left arm     Patient Position:  Lying     Pulse: 69 66 75 72   Resp:  18     Temp:  98.3 °F (36.8 °C)     TempSrc:  Oral     SpO2:  100%     Weight:       Height:               Body mass index is 33.01 kg/m².    Intake/Output Summary (Last 24 hours) at 9/4/2024 2152  Last data filed at 9/4/2024 1755  Gross per 24 hour   Intake 240 ml   Output --   Net 240 ml       Telemetry: Normal sinus rhythm    Physical Exam  Constitutional:  Awake. Not in acute distress. Normal appearance.   Neck: No carotid bruit, hepatojugular reflux or JVD.   Cardiovascular:      Rate and Rhythm: Normal rate and regular rhythm.      Chest Wall: PMI is not displaced.      Heart sounds: Normal heart sounds, S1 normal and S2 normal. No murmur heard.       No friction rub. No gallop. No S3 or S4 sounds.    Pulmonary: Pulmonary effort is normal. Normal breath sounds. No wheezing, rhonchi or rales.   Extremities: No Bilateral edema is noted.   Skin: Warm and dry. Non cyanotic, No petechiae or rash.   Neurological: Alert and oriented x 3        Allergies   Allergen Reactions    Morphine Itching and Nausea And Vomiting     Scheduled meds:  [START ON 9/5/2024] aspirin, 81 mg, Oral, Daily  buPROPion XL, 300 mg, Oral, Daily  busPIRone, 5 mg, Oral, TID  empagliflozin, 25 mg, Oral, Daily  insulin lispro, 2-9 Units, Subcutaneous, 4x Daily AC & at Bedtime  lubiprostone, 8 mcg, Oral, BID  metoprolol succinate XL, 25 mg, Oral, Nightly  pravastatin, 80 mg, Oral, Nightly  senna-docusate sodium, 2 tablet, Oral,  "BID  sodium chloride, 10 mL, Intravenous, Q12H  sodium chloride, 10 mL, Intravenous, Q12H  spironolactone, 25 mg, Oral, Daily  ticagrelor, 90 mg, Oral, BID  topiramate, 50 mg, Oral, Daily      IV meds:                      sodium chloride, 75 mL/hr, Last Rate: 75 mL/hr (09/04/24 1622)      Data Review:  CBC          9/2/2024    03:58 9/3/2024    03:01 9/4/2024    04:21   CBC   WBC 9.72  9.66  8.52    RBC 4.85  4.63  4.80    Hemoglobin 14.0  13.3  14.0    Hematocrit 41.4  40.1  41.4    MCV 85.4  86.6  86.3    MCH 28.9  28.7  29.2    MCHC 33.8  33.2  33.8    RDW 13.6  13.8  13.7    Platelets 193  165  192      CMP          9/2/2024    03:58 9/3/2024    03:01 9/4/2024    04:21   CMP   Glucose 85  90  85    BUN 17  17  15    Creatinine 1.23  1.21  1.09    EGFR 55.3  56.4  64.0    Sodium 138  141  139    Potassium 4.5  4.5  4.4    Chloride 107  109  109    Calcium 9.1  9.0  9.2    BUN/Creatinine Ratio 13.8  14.0  13.8    Anion Gap 11.0  11.2  9.0       CARDIAC LABS:      Lab 09/04/24  0959 09/04/24  0740 09/02/24  0358 09/01/24  1519 09/01/24  1208   PROBNP  --   --   --   --  134.5   HSTROP T 245* 245* 119* 24* 20*   PROTIME 14.5  --   --   --   --    INR 1.11  --   --   --   --         No results found for: \"DIGOXIN\"   Lab Results   Component Value Date    TSH 0.810 07/05/2024           Invalid input(s): \"LDLCALC\"  Lab Results   Component Value Date    POCTROP 0.00 04/20/2020     Lab Results   Component Value Date    TROPONINT 245 (C) 09/04/2024   (  Lab Results   Component Value Date    MG 1.8 09/04/2024     Results for orders placed during the hospital encounter of 09/01/24    Adult Transthoracic Echo Complete w/ Color, Spectral and Contrast if necessary per protocol    Interpretation Summary  Grossly normal chamber sizes.  LV has concentric remodeling hypertrophy.  Wall motion abnormalities are present as described below.  Systolic function is mildly reduced estimated LVEF is 45 to 50%.  Diastolic function appears " to be normal.  RV systolic function is normal.  Aortic valve has sclerotic changes.  There is no aortic valve stenosis.  Mitral valve is thickened leaflets.  MAC is present.  There is trace MR noted by Doppler.  Mild TR and NV are noted via Doppler flow.  Aortic root and ascending aorta has normal dimensions.  No pericardial effusion is noted.  IVC has normal size corresponding to a right atrial pressure of 0 to 5 mmHg.    No prior studies available for comparison.           ASSESSMENT:  NSTEMI  Hypertension  Hyperlipidemia  Diabetes mellitus  Heart failure with midrange EF.      PLAN:  Patient's presentation of symptoms was very atypical.  Troponin elevation was initially thought to be secondary to palpitations however after reviewing stress testing result it can be concluded that apical segment and distal anterior wall did not show tracer uptake.  Regional wall motion abnormalities will noted on echocardiogram which coincide with the ones noted on stress testing.  Estimated LVEF is 40 to 45%.    Coronary angiogram was completed today.  LAD mid segment 99% stenosis with DESTINY I flow noted.  Successfully revascularized with a 3.5 x 48 mm drug-eluting stent.    Continue aspirin 81 mg p.o. daily along with Brilinta 90 mg p.o. twice daily  Continue high intensity statin  Continue Jardiance 10 mg p.o. daily along with spironolactone 25 mg p.o. daily.  Patient's blood pressure is otherwise optimally controlled.  Cardiac rehab referral  Can start low-dose losartan 12.5 mg p.o. daily from tomorrow.        Toño Rodriguez MD  9/4/2024    21:52 EDT

## 2024-09-06 ENCOUNTER — TRANSITIONAL CARE MANAGEMENT TELEPHONE ENCOUNTER (OUTPATIENT)
Dept: CALL CENTER | Facility: HOSPITAL | Age: 44
End: 2024-09-06
Payer: COMMERCIAL

## 2024-09-06 RX ORDER — IBUPROFEN 800 MG/1
800 TABLET, FILM COATED ORAL EVERY 6 HOURS PRN
Qty: 90 TABLET | Refills: 1 | Status: SHIPPED | OUTPATIENT
Start: 2024-09-06

## 2024-09-06 NOTE — OUTREACH NOTE
Call Center TCM Note      Flowsheet Row Responses   [facility] patient discharged from? [facility]   Does the patient have one of the following disease processes/diagnoses(primary or secondary)? Acute MI (STEMI,NSTEMI)   TCM attempt successful? Yes   Call start time 1409   Call end time 1413   Discharge diagnosis Chest pain, NSTEMI, heart cath with stent   Person spoke with today (if not patient) and relationship patient   Meds reviewed with patient/caregiver? Yes   Is the patient having any side effects they believe may be caused by any medication additions or changes? No   Does the patient have all prescriptions related to this admission filled (includes statins,anticoagulants,HTN meds,anti-arrhythmia meds) Yes   Is the patient taking all medications as directed (includes completed medication regime)? Yes   Comments Patient has a hospital followup scheduled on 9/11/2024 with cardiology and PCP on 9/17/2024   Does the patient have an appointment with their PCP within 7-14 days of discharge? Yes   Psychosocial issues? No   Did the patient receive a copy of their discharge instructions? Yes   Nursing interventions Reviewed instructions with patient   What is the patient's perception of their health status since discharge? Improving   Nursing interventions Nurse provided patient education   Is the patient/caregiver able to teach back signs and symptoms of when to call for help immediately: Sudden chest discomfort, Shortness of breath at any time   Nursing interventions Nurse provided patient education   Is the patient/caregiver able to teach back ways to prevent a second heart attack: Take medications, Follow up with MD   If the patient is a current smoker, are they able to teach back resources for cessation? Not a smoker   Is the patient/caregiver able to teach back the hierarchy of who to call/visit for symptoms/problems? PCP, Specialist, Home health nurse, Urgent Care, ED, 911 Yes   TCM call completed? Yes   Call  end time 1413   Would this patient benefit from a Referral to [facility] Social Work? No   Is the patient interested in additional calls from an ambulatory ? No            Doc Estrada RN    9/6/2024, 14:14 EDT

## 2024-09-11 ENCOUNTER — OFFICE VISIT (OUTPATIENT)
Dept: CARDIOLOGY | Facility: CLINIC | Age: 44
End: 2024-09-11
Payer: COMMERCIAL

## 2024-09-11 VITALS
SYSTOLIC BLOOD PRESSURE: 104 MMHG | DIASTOLIC BLOOD PRESSURE: 68 MMHG | BODY MASS INDEX: 32.49 KG/M2 | HEART RATE: 69 BPM | WEIGHT: 207 LBS | HEIGHT: 67 IN

## 2024-09-11 DIAGNOSIS — I10 ESSENTIAL HYPERTENSION: ICD-10-CM

## 2024-09-11 DIAGNOSIS — R00.2 PALPITATION: ICD-10-CM

## 2024-09-11 DIAGNOSIS — E78.5 HYPERLIPIDEMIA LDL GOAL <70: Primary | ICD-10-CM

## 2024-09-11 DIAGNOSIS — E78.2 MIXED HYPERLIPIDEMIA: ICD-10-CM

## 2024-09-11 PROBLEM — Z98.61 CAD S/P PERCUTANEOUS CORONARY ANGIOPLASTY: Status: ACTIVE | Noted: 2024-09-11

## 2024-09-11 PROBLEM — I25.10 CAD S/P PERCUTANEOUS CORONARY ANGIOPLASTY: Status: ACTIVE | Noted: 2024-09-11

## 2024-09-11 NOTE — PROGRESS NOTES
[facility]  Interventional Cardiology Patient Visit Note      Referring Provider:  Toño Rodriguez MD  [address]  [address]  [place] [address]    Reason for Referral:   Establishment of care  Posthospital visit.    History of Presenting Illness:  History of Present Illness  The patient is here today for a posthospitalization follow-up. She underwent LAD, mid-segment stenting for NSTEMI.    She reports experiencing dizziness and lightheadedness when transitioning from a lying to standing position too quickly, almost to the point of fainting.  However no fainting has occurred.  She is on metoprolol 25 mg p.o. daily.  This symptom has shown slight improvement over time. She also mentions feeling the sensation of dyspnea.  Her chest heaviness has been relieved.  Her palpitations have resolved.    Her current medication regimen includes aspirin, Brilinta, Farxiga (prescribed for kidney protection), pravastatin, and Aldactone 25 mg. She used nitroglycerin once at home to alleviate chest pain. She does not experience shortness of breath but does feel winded and tired. She recalls having difficulty breathing during her chest pain episode, even though she was receiving oxygen.    She no longer experiences chest heaviness or pain but does report occasional twinges in the mid-axillary line. She has noticed two other blockages on her cath results and is curious if these could cause future complications. She reports no leg swelling or palpitations, orthopnea or PND.    SOCIAL HISTORY  The patient denies smoking.    Past Medical History  Past Medical History:   Diagnosis Date    Acid reflux     Anxiety     Anxiety and depression     Benign essential hypertension     Borderline personality disorder     Cancer     renal cancer/mass, PARTIAL NEPH, RIGHT    Diabetes     Diabetes mellitus     type ii, doesn't check bg at home     Diabetes mellitus, type 2     Elevated cholesterol      Frozen shoulder 08/02/2023    GERD (gastroesophageal reflux disease)     High triglycerides     History of bariatric surgery 02/04/2021    GASTRIC SLEEVE    History of kidney stones     HTN (hypertension)     Hyperlipemia     Hyperlipidemia     Hypertriglyceridemia     Lumbar herniated disc     Obesity     Panic attacks     PCOS (polycystic ovarian syndrome)     Periarthritis of shoulder 01/23    Psoriasis     ELBOWS    Rotator cuff syndrome 01/23    Seasonal allergies     Self-injurious behavior 1994    Spinal headache     AFTER PAIN EPIDURALS.  NO BLOOD PATCH    Suicide attempt 1995         Current Outpatient Medications:     Apremilast (Otezla) 30 MG tablet, Take 30 mg by mouth 2 (Two) Times a Day., Disp: 60 tablet, Rfl: 5    aspirin 81 MG EC tablet, Take 1 tablet by mouth Daily., Disp: 90 tablet, Rfl: 5    Biotin 45357 MCG tablet, Take 1 tablet by mouth Every Night., Disp: , Rfl:     buPROPion XL (WELLBUTRIN XL) 300 MG 24 hr tablet, Take 1 tablet by mouth Daily., Disp: 90 tablet, Rfl: 1    busPIRone (BUSPAR) 5 MG tablet, Take 1 tablet by mouth 3 (Three) Times a Day., Disp: 270 tablet, Rfl: 1    CALCIUM-MAGNESIUM-ZINC PO, Take 3 tablets by mouth Daily., Disp: , Rfl:     cetirizine (zyrTEC) 10 MG tablet, Take 1 tablet by mouth Daily As Needed., Disp: , Rfl:     Cholecalciferol 25 MCG (1000 UT) capsule, Take 2 capsules by mouth Daily., Disp: 180 capsule, Rfl: 1    COLLAGEN PO, Take 3 tablets by mouth Daily., Disp: , Rfl:     dapagliflozin Propanediol (Farxiga) 10 MG tablet, Take 10 mg by mouth Daily., Disp: 90 tablet, Rfl: 1    desvenlafaxine (PRISTIQ) 50 MG 24 hr tablet, Take 1 tablet by mouth Daily., Disp: 90 tablet, Rfl: 1    Diclofenac Sodium (VOLTAREN) 1 % gel gel, Apply 4 g topically to the appropriate area as directed 4 (Four) Times a Day As Needed (pain)., Disp: 200 g, Rfl: 1    fluticasone (FLONASE) 50 MCG/ACT nasal spray, USE 2 SPRAYS IN EACH NOSTRIL ONCE DAILY AS DIRECTED, Disp: 48 g, Rfl: 1    ibuprofen  (ADVIL,MOTRIN) 800 MG tablet, Take 1 tablet by mouth Every 6 (Six) Hours As Needed for Mild Pain., Disp: 90 tablet, Rfl: 1    IRON-VITAMIN C PO, Take 1 tablet by mouth Daily., Disp: , Rfl:     linaclotide (Linzess) 72 MCG capsule capsule, Take 1 capsule by mouth Every Morning Before Breakfast for 90 days., Disp: 90 capsule, Rfl: 1    metoprolol succinate XL (Toprol XL) 25 MG 24 hr tablet, Take 1 tablet by mouth Every Night., Disp: 90 tablet, Rfl: 1    Multiple Vitamins-Minerals (MULTIVITAMIN PO), Take 1 tablet by mouth Every Night., Disp: , Rfl:     nitroglycerin (NITROSTAT) 0.4 MG SL tablet, Place 1 tablet under the tongue Every 5 (Five) Minutes As Needed for Chest Pain (Systolic BP Greater Than 100). Take no more than 3 doses in 15 minutes., Disp: 60 tablet, Rfl: 2    ondansetron ODT (ZOFRAN-ODT) 4 MG disintegrating tablet, Place 1 tablet on the tongue Every 8 (Eight) Hours As Needed for Nausea., Disp: , Rfl:     pravastatin (PRAVACHOL) 80 MG tablet, Take 1 tablet by mouth Every Night., Disp: 90 tablet, Rfl: 1    Probiotic Product (PROBIOTIC-10 PO), Take 1 tablet by mouth Every Night., Disp: , Rfl:     spironolactone (ALDACTONE) 25 MG tablet, Take 1 tablet by mouth Daily., Disp: 60 tablet, Rfl: 3    testosterone micronized powder, APPLY 2 CLICKS (0.5 ML) TOPICALLY EVERY DAY (Patient taking differently: Apply 1 Application topically to the appropriate area as directed Daily. APPLY 2 CLICKS (0.5 ML) TOPICALLY EVERY DAY), Disp: 25 g, Rfl: 0    ticagrelor (BRILINTA) 90 MG tablet tablet, Take 1 tablet by mouth 2 (Two) Times a Day., Disp: 60 tablet, Rfl: 6    Tirzepatide (MOUNJARO) 10 MG/0.5ML solution pen-injector pen, Inject 0.5 mL under the skin into the appropriate area as directed 1 (One) Time Per Week., Disp: 6 mL, Rfl: 3    topiramate (TOPAMAX) 50 MG tablet, Take 1 tablet by mouth every night at bedtime for 180 days., Disp: 90 tablet, Rfl: 1    traZODone (DESYREL) 100 MG tablet, Take 1 tablet by mouth Every  Night., Disp: 90 tablet, Rfl: 1    Current Facility-Administered Medications:     MedroxyPROGESTERone Acetate (DEPO-PROVERA) injection 150 mg, 150 mg, Intramuscular, Q3 Months, Patricia Hernandez, APRN, 150 mg at 24 0756  Current outpatient and discharge medications have been reconciled for the patient.  Reviewed by: Toño Rodriguez MD     Medications Discontinued During This Encounter   Medication Reason    clobetasol (TEMOVATE) 0.05 % external solution *Therapy completed     Allergies   Allergen Reactions    Morphine Itching and Nausea And Vomiting      Social History     Tobacco Use    Smoking status: Former     Current packs/day: 0.00     Average packs/day: 0.3 packs/day for 35.0 years (8.8 ttl pk-yrs)     Types: Cigarettes     Start date: 1/15/1985     Quit date: 1/15/2020     Years since quittin.6     Passive exposure: Never    Smokeless tobacco: Never    Tobacco comments:     A PACK A WEEK   Vaping Use    Vaping status: Never Used   Substance Use Topics    Alcohol use: Yes     Comment: OCCASIONAL/SOCIAL    Drug use: Not Currently     Frequency: 0.1 times per week     Types: Marijuana     Comment: OTC THC     Family History   Problem Relation Age of Onset    Cancer Mother         Breast    Diabetes Father     Hypertension Father     Heart disease Father     Cancer Father         Bladder prostate liver    Colon cancer Father         malignant, currently 62    No Known Problems Sister     No Known Problems Brother     No Known Problems Maternal Aunt     No Known Problems Paternal Aunt     No Known Problems Maternal Uncle     No Known Problems Paternal Uncle     No Known Problems Maternal Grandfather     Stroke Maternal Grandmother     Heart disease Maternal Grandmother     Diabetes Paternal Grandfather     Heart disease Paternal Grandfather     No Known Problems Paternal Grandmother     No Known Problems Cousin     Malig Hyperthermia Neg Hx     ADD / ADHD Neg Hx     Alcohol abuse Neg Hx     Anxiety  "disorder Neg Hx     Bipolar disorder Neg Hx     Dementia Neg Hx     Depression Neg Hx     Drug abuse Neg Hx     OCD Neg Hx     Paranoid behavior Neg Hx     Schizophrenia Neg Hx     Seizures Neg Hx     Self-Injurious Behavior  Neg Hx     Suicide Attempts Neg Hx           Objective   /68 (BP Location: Right arm, Patient Position: Sitting, Cuff Size: Adult)   Pulse 69   Ht 170.2 cm (67\")   Wt 93.9 kg (207 lb)   BMI 32.42 kg/m²     Wt Readings from Last 3 Encounters:   09/11/24 93.9 kg (207 lb)   09/01/24 95.6 kg (210 lb 12.2 oz)   08/23/24 97.2 kg (214 lb 4.6 oz)     BP Readings from Last 3 Encounters:   09/11/24 104/68   09/05/24 108/68   08/31/24 115/72       Physical Exam  Constitutional:  Awake. Not in acute distress. Normal appearance.   Neck: No carotid bruit, hepatojugular reflux or JVD.   Cardiovascular:      Rate and Rhythm: Normal rate and regular rhythm.      Chest Wall: PMI is not displaced.      Heart sounds: Normal heart sounds, S1 normal and S2 normal. No murmur heard.       No friction rub. No gallop. No S3 or S4 sounds.    Pulmonary: Pulmonary effort is normal. Normal breath sounds. No wheezing, rhonchi or rales.   Extremities: No Bilateral edema is noted.   Skin: Warm and dry. Non cyanotic, No petechiae or rash.   Neurological: Alert and oriented x 3  Psychiatric:  Behavior is cooperative.       Result Review :   The following data was reviewed by Toño Rodriguez MD on 09/11/2024   proBNP   Date Value Ref Range Status   09/01/2024 134.5 0.0 - 450.0 pg/mL Final     CMP          9/3/2024    03:01 9/4/2024    04:21 9/5/2024    06:40   CMP   Glucose 90  85  88    BUN 17  15  15    Creatinine 1.21  1.09  1.05    EGFR 56.4  64.0  66.9    Sodium 141  139  139    Potassium 4.5  4.4  4.0    Chloride 109  109  114    Calcium 9.0  9.2  8.8    BUN/Creatinine Ratio 14.0  13.8  14.3    Anion Gap 11.2  9.0  8.3      CBC w/diff          9/3/2024    03:01 9/4/2024    04:21 9/5/2024    06:40   CBC w/Diff   WBC " "9.66  8.52  8.82    RBC 4.63  4.80  4.52    Hemoglobin 13.3  14.0  12.8    Hematocrit 40.1  41.4  39.0    MCV 86.6  86.3  86.3    MCH 28.7  29.2  28.3    MCHC 33.2  33.8  32.8    RDW 13.8  13.7  14.1    Platelets 165  192  166    Neutrophil Rel % 59.9  51.3  64.6    Immature Granulocyte Rel % 0.9  0.8  0.6    Lymphocyte Rel % 29.3  35.1  24.4    Monocyte Rel % 7.8  9.3  7.9    Eosinophil Rel % 1.6  2.8  2.0    Basophil Rel % 0.5  0.7  0.5       Lab Results   Component Value Date    TSH 0.810 07/05/2024      Lab Results   Component Value Date    FREET4 1.21 09/29/2022      No results found for: \"DDIMERQUANT\"  Magnesium   Date Value Ref Range Status   09/05/2024 1.7 1.6 - 2.6 mg/dL Final      No results found for: \"DIGOXIN\"   Lab Results   Component Value Date    TROPONINT 245 (C) 09/04/2024      Lab Results   Component Value Date    POCTROP 0.00 04/20/2020          A1C Last 3 Results          5/2/2024    08:07 7/5/2024    12:03 9/2/2024    03:58   HGBA1C Last 3 Results   Hemoglobin A1C 5.00  5.20  5.10      Lipid Panel          5/2/2024    08:07 7/5/2024    12:03 9/5/2024    06:40   Lipid Panel   Total Cholesterol 164  152  114    Triglycerides 87  98  82    HDL Cholesterol 46  46  34    VLDL Cholesterol 16  18  16    LDL Cholesterol  102  88  64    LDL/HDL Ratio 2.19  1.88  1.87      Results for orders placed during the hospital encounter of 09/01/24    Adult Transthoracic Echo Complete w/ Color, Spectral and Contrast if necessary per protocol    Interpretation Summary  Grossly normal chamber sizes.  LV has concentric remodeling hypertrophy.  Wall motion abnormalities are present as described below.  Systolic function is mildly reduced estimated LVEF is 45 to 50%.  Diastolic function appears to be normal.  RV systolic function is normal.  Aortic valve has sclerotic changes.  There is no aortic valve stenosis.  Mitral valve is thickened leaflets.  MAC is present.  There is trace MR noted by Doppler.  Mild TR and AL " are noted via Doppler flow.  Aortic root and ascending aorta has normal dimensions.  No pericardial effusion is noted.  IVC has normal size corresponding to a right atrial pressure of 0 to 5 mmHg.    No prior studies available for comparison.     Results for orders placed during the hospital encounter of 24    Adult Transthoracic Echo Complete w/ Color, Spectral and Contrast if necessary per protocol    Interpretation Summary  Grossly normal chamber sizes.  LV has concentric remodeling hypertrophy.  Wall motion abnormalities are present as described below.  Systolic function is mildly reduced estimated LVEF is 45 to 50%.  Diastolic function appears to be normal.  RV systolic function is normal.  Aortic valve has sclerotic changes.  There is no aortic valve stenosis.  Mitral valve is thickened leaflets.  MAC is present.  There is trace MR noted by Doppler.  Mild TR and AL are noted via Doppler flow.  Aortic root and ascending aorta has normal dimensions.  No pericardial effusion is noted.  IVC has normal size corresponding to a right atrial pressure of 0 to 5 mmHg.    No prior studies available for comparison.     Results for orders placed during the hospital encounter of 24    Cardiac Catheterization/Vascular Study    Conclusion    Prox RCA lesion is 20% stenosed.    1st Diag lesion is 40% stenosed.    Mid LAD lesion is 99% stenosed.    [facility]  CARDIAC CATHETERIZATION PROCEDURE REPORT    Patient: Bianca Boles  : 1980  MRN: [id number]    Procedure Date:  24    Interventional Cardiologist:  Toño Rodriguez MD    Indication:  NSTEMI type I      Procedure performed:  Left heart catheterization  Selective left and right coronary angiography  Left ventriculography  PCI of mid LAD  Pre and post PCI IVUS    Access Sites:  Right femoral artery    Findings:  1. Coronary Artery Anatomy:  Left main is a large-caliber vessel with subdivides into left circumflex and LAD.   Angiographically normal.  Left circumflex is a moderate caliber vessel which gives rise to 2 moderate-sized OM branches.  LAD is a large-caliber vessel which gives rise to 1 large diagonal branch.  Mid segment has tandem lesions with 99% stenosis DESTINY I flow.  Ostial diagonal branch has 30 to 40% stenosis.  RCA is a large-caliber vessel which gives rise to PDA and PL branches.  Proximal segment has 20 to 30% stenosis.  Distal segment has lumpy bumpy lesions 20% stenosis.  PDA and PL branches are angiographically normal.    2. Hemodynamics:  Left Ventricle: 102/1 mmHg  LVEDP: 3 mmHg  Aorta: 103/58 mmHg    3. Left Ventriculogram:  Ejection Fraction: 45 to 50% %  Wall Motion: Apical wall motion abnormalities  Mitral Regurgitation: No significant MR is noted      Procedure Details:  Informed consent was obtained with an explanation of the risks, benefits and alternatives of the procedure. The patient was brought to the Cardiac Catheterization Laboratory and was prepped and draped in a standard sterile fashion. Moderate sedation with Fentanyl and Versed was administered by the circulating nurse. Lidocaine 2% was used to anesthetize the right groin area.  Under ultrasound guidance using micropuncture needle right femoral artery was accessed and a 6 [place] 11 cm sheath was advanced into right femoral artery.    6 [place] JL 4 and JR4 diagnostic catheters were used to cannulate LCA and RCA.  Coronary angiograms were completed in multiple orthogonal views.  6 [place] pigtail catheter was used for LV cannulation to perform LV gram and to determine gradient across the aortic valve.  After analysis of angiogram decision was taken to proceed with PCI of mid LAD.    We took a 6 [place] XB LAD 3.5 guide to engage the left main.  Run-through wire was used to successfully cross the lesion in first attempt.  Initial predilation was performed with a compliant 2.5 x 15 mm balloon.  We then delivered IVUS catheter for vessel sizing for  distal reference and proximal reference.  The distal reference was 3.3 x 3.4 mm proximal reference was 4.8 x 4.9 mm.  Significant positive remodeling was noted.  No calcification was identified.    We then delivered a 3.5 x 48 mm drug-eluting stent, positioned it under fluoroscopy at the desired location and then the stent was inflated.  Postdilatation was performed using a three 4 x 15 mm NC balloon followed by a 4.5 x 15 mm NC balloon proximally.  Subsequent angiogram showed excellent result with 0% residual stenosis and DESTINY-3 flow.  There was no concern for coronary perforation.  At this point interventional wires were retracted back into the guide.  Cath was removed over the wire.  Femoral angiogram was completed.  Perclose sutures were deployed for successful hemostasis.      Cumulative fluoroscopy time: 17 minutes    Cumulative air kerma: 2 Gy    Total amount of contrast used: 120 cc    Complications:  None.    Estimated Blood Loss:  Minimal.    Conclusions:  NSTEMI type I  Successful PCI of LAD mid segment with 3.5 x 48 mm drug-eluting stent optimized with 3 0% residual stenosis and DESTINY-3 flow.      Recommendations:  Continue uninterrupted dual antiplatelet therapy for at least 1 year  Continue medical management for secondary prevention of coronary artery disease.  Education regarding medication compliance, dietary changes and lifestyle modification.        Toño Rodriguez MD    09/04/24  21:40 EDT     Results for orders placed during the hospital encounter of 09/01/24    Stress Test With Myocardial Perfusion One Day    Interpretation Summary    Left ventricular ejection fraction is mildly reduced (Calculated EF = 45%).    Abnormal, high risk myocardial perfusion imaging study  Perfusion defects are noted.  There is a partially reversible perfusion defect of the distal anterior wall segment suggestive of erika-infarct ischemia.  There is absent uptake of tracer noted in the apical segments distal inferior wall  segment distal anterior segment anterolateral and improved septal segments more pronounced on stress images than rest images.    Wall motion abnormalities are noted involving distal anterior and distal inferior and apical segments.  Overall EF is estimated to be around 45%  No transient ischemic dilatation was noted    Constellation of findings are suggestive of mid to distal LAD occlusion which tends to wraparound apex.  Coronary angiogram is recommended for further evaluation     The ASCVD Risk score (Arturo DK, et al., 2019) failed to calculate for the following reasons:    The patient has a prior MI or stroke diagnosis        Assessment  Assessment & Plan  1. Posthospitalization follow-up.  2. Coronary artery disease status post PCI LAD mid segment  3. History of NSTEMI  4.  Hypertension  5.  Hyperlipidemia    Her reported symptoms of lightheadedness, dizziness, and dyspnea sensation are likely side effects of her current medications, including metoprolol and Brilinta.   Metoprolol, even at the small dose of 25 mg, can blunt the heart rate response, causing dizziness when standing up quickly.   Brilinta is known to cause dyspnea, a common side effect.   Her heart function, previously between 40 to 45 percent, is expected to improve following cardiac rehabilitation.   Her blood pressure readings are within the normal range, and she has minor plaques that do not necessitate stent placement. These can be managed medically.   Given her borderline low blood pressure, an ACE inhibitor is not recommended at this time.   However, Farxiga and spironolactone should provide similar benefits.   She was advised to continue metoprolol for the time being, with the possibility of discontinuation in the future.   Cardiac rehabilitation was strongly recommended.   An echocardiogram will be repeated in 3 months.  A lipid panel will be ordered today, but it is expected to be completed at the end of next month.  Target LDL is less  than 55 and target triglycerides less than 125  She was advised to take her time when standing up from a seated or lying position due to the potential dizziness caused by metoprolol.   If her symptoms persist after cardiac rehabilitation, consideration will be given to discontinuing some of her medications.   If the dyspnea becomes intolerable, a switch from Brilinta to Plavix may be considered.    Follow-up  A follow-up visit is scheduled in 3 months.              Diagnoses and all orders for this visit:    1. Hyperlipidemia LDL goal <70 (Primary)  -     Lipid Panel; Future    2. Essential hypertension    3. Palpitation    4. Mixed hyperlipidemia      Follow Up     Return in about 3 months (around 12/11/2024).      Toño Rodriguez MD  Interventional Cardiology  09/11/2024  10:46 EDT      Patient was given instructions and counseling regarding her condition or for health maintenance advice. Please see specific information pulled into the AVS if appropriate.     Please note that portions of this document were completed using a voice recognition program.     Patient or patient representative verbalized consent for the use of Ambient Listening during the visit with  Toño Rodriguez MD for chart documentation. 9/11/2024  10:46 EDT

## 2024-09-12 ENCOUNTER — TELEPHONE (OUTPATIENT)
Dept: CARDIOLOGY | Facility: CLINIC | Age: 44
End: 2024-09-12
Payer: COMMERCIAL

## 2024-09-12 NOTE — TELEPHONE ENCOUNTER
The [facility] received a fax that requires your attention. The document has been indexed to the patient’s chart for your review.      Reason for sending: EXTERNAL MEDICAL RECORD NOTIFICATION     Documents Description: [facility] [employer]-9.12.24    Name of Sender: SELF     Date Indexed: 9.12.24

## 2024-09-13 LAB
QT INTERVAL: 389 MS
QT INTERVAL: 414 MS
QTC INTERVAL: 412 MS
QTC INTERVAL: 425 MS

## 2024-09-16 PROBLEM — N28.9 RENAL INSUFFICIENCY: Status: ACTIVE | Noted: 2024-09-16

## 2024-09-16 PROBLEM — N18.30 STAGE 3 CHRONIC KIDNEY DISEASE: Status: ACTIVE | Noted: 2024-09-16

## 2024-09-18 ENCOUNTER — TELEPHONE (OUTPATIENT)
Dept: CARDIOLOGY | Facility: CLINIC | Age: 44
End: 2024-09-18

## 2024-09-18 NOTE — TELEPHONE ENCOUNTER
Caller: Bianca Boles    Relationship: Self    Best call back number: [phone]     What is the medical concern/diagnosis: HEART CATH AND STENT PLACEMENT     What specialty or service is being requested: CARDIAC REHAB    What is the provider, practice or medical service name: [facility]     What is the office location: [place]    Any additional details: PLEASE CALL PATIENT ONCE REFERRAL HAS BEEN SENT

## 2024-09-19 ENCOUNTER — OFFICE VISIT (OUTPATIENT)
Dept: FAMILY MEDICINE CLINIC | Facility: CLINIC | Age: 44
End: 2024-09-19
Payer: COMMERCIAL

## 2024-09-19 ENCOUNTER — TELEPHONE (OUTPATIENT)
Dept: CARDIOLOGY | Facility: CLINIC | Age: 44
End: 2024-09-19
Payer: COMMERCIAL

## 2024-09-19 VITALS
HEART RATE: 69 BPM | SYSTOLIC BLOOD PRESSURE: 95 MMHG | OXYGEN SATURATION: 100 % | BODY MASS INDEX: 32.96 KG/M2 | WEIGHT: 210 LBS | DIASTOLIC BLOOD PRESSURE: 64 MMHG | HEIGHT: 67 IN | TEMPERATURE: 97.2 F

## 2024-09-19 DIAGNOSIS — Z23 NEED FOR INFLUENZA VACCINATION: ICD-10-CM

## 2024-09-19 DIAGNOSIS — E11.9 TYPE 2 DIABETES MELLITUS WITHOUT COMPLICATION, WITHOUT LONG-TERM CURRENT USE OF INSULIN: ICD-10-CM

## 2024-09-19 DIAGNOSIS — N18.30 STAGE 3 CHRONIC KIDNEY DISEASE, UNSPECIFIED WHETHER STAGE 3A OR 3B CKD: ICD-10-CM

## 2024-09-19 DIAGNOSIS — Z95.5 S/P CORONARY ARTERY STENT PLACEMENT: Primary | ICD-10-CM

## 2024-09-19 DIAGNOSIS — Z98.61 CAD S/P PERCUTANEOUS CORONARY ANGIOPLASTY: Primary | ICD-10-CM

## 2024-09-19 DIAGNOSIS — E78.2 MIXED HYPERLIPIDEMIA: ICD-10-CM

## 2024-09-19 DIAGNOSIS — I25.10 CAD S/P PERCUTANEOUS CORONARY ANGIOPLASTY: Primary | ICD-10-CM

## 2024-09-19 DIAGNOSIS — I10 ESSENTIAL HYPERTENSION: ICD-10-CM

## 2024-09-19 PROCEDURE — 99495 TRANSJ CARE MGMT MOD F2F 14D: CPT | Performed by: NURSE PRACTITIONER

## 2024-09-19 PROCEDURE — 90471 IMMUNIZATION ADMIN: CPT | Performed by: NURSE PRACTITIONER

## 2024-09-19 PROCEDURE — 90656 IIV3 VACC NO PRSV 0.5 ML IM: CPT | Performed by: NURSE PRACTITIONER

## 2024-09-26 ENCOUNTER — APPOINTMENT (OUTPATIENT)
Dept: GENERAL RADIOLOGY | Facility: HOSPITAL | Age: 44
End: 2024-09-26
Payer: COMMERCIAL

## 2024-09-26 ENCOUNTER — HOSPITAL ENCOUNTER (OUTPATIENT)
Facility: HOSPITAL | Age: 44
Setting detail: OBSERVATION
LOS: 1 days | Discharge: HOME | End: 2024-09-27
Attending: EMERGENCY MEDICINE | Admitting: HOSPITALIST
Payer: COMMERCIAL

## 2024-09-26 DIAGNOSIS — R07.89 OTHER CHEST PAIN: ICD-10-CM

## 2024-09-26 DIAGNOSIS — R07.9 CHEST PAIN, UNSPECIFIED TYPE: Primary | ICD-10-CM

## 2024-09-26 LAB
ALBUMIN SERPL-MCNC: 3.9 G/DL (ref 3.5–5.2)
ALBUMIN/GLOB SERPL: 1.4 G/DL
ALP SERPL-CCNC: 75 U/L (ref 39–117)
ALT SERPL W P-5'-P-CCNC: 21 U/L (ref 1–33)
ANION GAP SERPL CALCULATED.3IONS-SCNC: 9.5 MMOL/L (ref 5–15)
AST SERPL-CCNC: 33 U/L (ref 1–32)
BASOPHILS # BLD AUTO: 0.07 10*3/MM3 (ref 0–0.2)
BASOPHILS NFR BLD AUTO: 0.8 % (ref 0–1.5)
BILIRUB SERPL-MCNC: 0.2 MG/DL (ref 0–1.2)
BUN SERPL-MCNC: 16 MG/DL (ref 6–20)
BUN/CREAT SERPL: 13.8 (ref 7–25)
CALCIUM SPEC-SCNC: 9.5 MG/DL (ref 8.6–10.5)
CHLORIDE SERPL-SCNC: 105 MMOL/L (ref 98–107)
CO2 SERPL-SCNC: 21.5 MMOL/L (ref 22–29)
CREAT SERPL-MCNC: 1.16 MG/DL (ref 0.57–1)
DEPRECATED RDW RBC AUTO: 45 FL (ref 37–54)
EGFRCR SERPLBLD CKD-EPI 2021: 59.4 ML/MIN/1.73
EOSINOPHIL # BLD AUTO: 0.1 10*3/MM3 (ref 0–0.4)
EOSINOPHIL NFR BLD AUTO: 1.1 % (ref 0.3–6.2)
ERYTHROCYTE [DISTWIDTH] IN BLOOD BY AUTOMATED COUNT: 14.1 % (ref 12.3–15.4)
GEN 5 2HR TROPONIN T REFLEX: 9 NG/L
GLOBULIN UR ELPH-MCNC: 2.8 GM/DL
GLUCOSE BLDC GLUCOMTR-MCNC: 88 MG/DL (ref 70–99)
GLUCOSE SERPL-MCNC: 78 MG/DL (ref 65–99)
HCT VFR BLD AUTO: 41 % (ref 34–46.6)
HGB BLD-MCNC: 13.6 G/DL (ref 12–15.9)
HOLD SPECIMEN: NORMAL
HOLD SPECIMEN: NORMAL
IMM GRANULOCYTES # BLD AUTO: 0.06 10*3/MM3 (ref 0–0.05)
IMM GRANULOCYTES NFR BLD AUTO: 0.7 % (ref 0–0.5)
LIPASE SERPL-CCNC: 84 U/L (ref 13–60)
LYMPHOCYTES # BLD AUTO: 2.41 10*3/MM3 (ref 0.7–3.1)
LYMPHOCYTES NFR BLD AUTO: 26.2 % (ref 19.6–45.3)
MAGNESIUM SERPL-MCNC: 2.3 MG/DL (ref 1.6–2.6)
MCH RBC QN AUTO: 29.2 PG (ref 26.6–33)
MCHC RBC AUTO-ENTMCNC: 33.2 G/DL (ref 31.5–35.7)
MCV RBC AUTO: 88 FL (ref 79–97)
MONOCYTES # BLD AUTO: 0.66 10*3/MM3 (ref 0.1–0.9)
MONOCYTES NFR BLD AUTO: 7.2 % (ref 5–12)
NEUTROPHILS NFR BLD AUTO: 5.89 10*3/MM3 (ref 1.7–7)
NEUTROPHILS NFR BLD AUTO: 64 % (ref 42.7–76)
NRBC BLD AUTO-RTO: 0 /100 WBC (ref 0–0.2)
NT-PROBNP SERPL-MCNC: 316.1 PG/ML (ref 0–450)
PHOSPHATE SERPL-MCNC: 3.2 MG/DL (ref 2.5–4.5)
PLATELET # BLD AUTO: 213 10*3/MM3 (ref 140–450)
PMV BLD AUTO: 9.9 FL (ref 6–12)
POTASSIUM SERPL-SCNC: 4.9 MMOL/L (ref 3.5–5.2)
PROT SERPL-MCNC: 6.7 G/DL (ref 6–8.5)
RBC # BLD AUTO: 4.66 10*6/MM3 (ref 3.77–5.28)
SODIUM SERPL-SCNC: 136 MMOL/L (ref 136–145)
TROPONIN T DELTA: 0 NG/L
TROPONIN T SERPL HS-MCNC: 9 NG/L
WBC NRBC COR # BLD AUTO: 9.19 10*3/MM3 (ref 3.4–10.8)
WHOLE BLOOD HOLD COAG: NORMAL
WHOLE BLOOD HOLD SPECIMEN: NORMAL

## 2024-09-26 PROCEDURE — 93005 ELECTROCARDIOGRAM TRACING: CPT

## 2024-09-26 PROCEDURE — 25810000003 SODIUM CHLORIDE 0.9 % SOLUTION: Performed by: EMERGENCY MEDICINE

## 2024-09-26 PROCEDURE — 99285 EMERGENCY DEPT VISIT HI MDM: CPT

## 2024-09-26 PROCEDURE — 99223 1ST HOSP IP/OBS HIGH 75: CPT | Performed by: HOSPITALIST

## 2024-09-26 PROCEDURE — G0378 HOSPITAL OBSERVATION PER HR: HCPCS

## 2024-09-26 PROCEDURE — 83880 ASSAY OF NATRIURETIC PEPTIDE: CPT | Performed by: EMERGENCY MEDICINE

## 2024-09-26 PROCEDURE — 83735 ASSAY OF MAGNESIUM: CPT | Performed by: EMERGENCY MEDICINE

## 2024-09-26 PROCEDURE — 25810000003 SODIUM CHLORIDE 0.9 % SOLUTION: Performed by: HOSPITALIST

## 2024-09-26 PROCEDURE — 25010000002 KETOROLAC TROMETHAMINE PER 15 MG: Performed by: EMERGENCY MEDICINE

## 2024-09-26 PROCEDURE — 36415 COLL VENOUS BLD VENIPUNCTURE: CPT | Performed by: EMERGENCY MEDICINE

## 2024-09-26 PROCEDURE — 96374 THER/PROPH/DIAG INJ IV PUSH: CPT

## 2024-09-26 PROCEDURE — 84100 ASSAY OF PHOSPHORUS: CPT | Performed by: HOSPITALIST

## 2024-09-26 PROCEDURE — 82948 REAGENT STRIP/BLOOD GLUCOSE: CPT

## 2024-09-26 PROCEDURE — 84484 ASSAY OF TROPONIN QUANT: CPT | Performed by: EMERGENCY MEDICINE

## 2024-09-26 PROCEDURE — 83690 ASSAY OF LIPASE: CPT | Performed by: EMERGENCY MEDICINE

## 2024-09-26 PROCEDURE — 93010 ELECTROCARDIOGRAM REPORT: CPT | Performed by: INTERNAL MEDICINE

## 2024-09-26 PROCEDURE — 71045 X-RAY EXAM CHEST 1 VIEW: CPT

## 2024-09-26 PROCEDURE — 85025 COMPLETE CBC W/AUTO DIFF WBC: CPT | Performed by: EMERGENCY MEDICINE

## 2024-09-26 PROCEDURE — 93005 ELECTROCARDIOGRAM TRACING: CPT | Performed by: EMERGENCY MEDICINE

## 2024-09-26 PROCEDURE — 80053 COMPREHEN METABOLIC PANEL: CPT | Performed by: EMERGENCY MEDICINE

## 2024-09-26 RX ORDER — ACETAMINOPHEN 160 MG/5ML
650 SOLUTION ORAL EVERY 4 HOURS PRN
Status: DISCONTINUED | OUTPATIENT
Start: 2024-09-26 | End: 2024-09-27 | Stop reason: HOSPADM

## 2024-09-26 RX ORDER — SODIUM CHLORIDE 9 MG/ML
40 INJECTION, SOLUTION INTRAVENOUS AS NEEDED
Status: DISCONTINUED | OUTPATIENT
Start: 2024-09-26 | End: 2024-09-27 | Stop reason: HOSPADM

## 2024-09-26 RX ORDER — BUPROPION HYDROCHLORIDE 150 MG/1
300 TABLET ORAL DAILY
Status: DISCONTINUED | OUTPATIENT
Start: 2024-09-26 | End: 2024-09-27 | Stop reason: HOSPADM

## 2024-09-26 RX ORDER — BISACODYL 5 MG/1
5 TABLET, DELAYED RELEASE ORAL DAILY PRN
Status: DISCONTINUED | OUTPATIENT
Start: 2024-09-26 | End: 2024-09-27 | Stop reason: HOSPADM

## 2024-09-26 RX ORDER — FLUTICASONE PROPIONATE 50 UG/1
2 SPRAY, METERED NASAL DAILY
Status: DISCONTINUED | OUTPATIENT
Start: 2024-09-26 | End: 2024-09-27 | Stop reason: HOSPADM

## 2024-09-26 RX ORDER — POLYETHYLENE GLYCOL 3350 17 G/17G
17 POWDER, FOR SOLUTION ORAL DAILY PRN
Status: DISCONTINUED | OUTPATIENT
Start: 2024-09-26 | End: 2024-09-27 | Stop reason: HOSPADM

## 2024-09-26 RX ORDER — NITROGLYCERIN 0.4 MG/1
0.4 TABLET SUBLINGUAL
Status: DISCONTINUED | OUTPATIENT
Start: 2024-09-26 | End: 2024-09-27 | Stop reason: HOSPADM

## 2024-09-26 RX ORDER — CHOLECALCIFEROL (VITAMIN D3) 25 MCG
1000 TABLET ORAL DAILY
Status: DISCONTINUED | OUTPATIENT
Start: 2024-09-26 | End: 2024-09-27 | Stop reason: HOSPADM

## 2024-09-26 RX ORDER — ASPIRIN 81 MG/1
81 TABLET ORAL DAILY
Status: DISCONTINUED | OUTPATIENT
Start: 2024-09-27 | End: 2024-09-27 | Stop reason: HOSPADM

## 2024-09-26 RX ORDER — NICOTINE POLACRILEX 4 MG
15 LOZENGE BUCCAL
Status: DISCONTINUED | OUTPATIENT
Start: 2024-09-26 | End: 2024-09-27 | Stop reason: HOSPADM

## 2024-09-26 RX ORDER — SODIUM CHLORIDE 0.9 % (FLUSH) 0.9 %
10 SYRINGE (ML) INJECTION AS NEEDED
Status: DISCONTINUED | OUTPATIENT
Start: 2024-09-26 | End: 2024-09-27 | Stop reason: HOSPADM

## 2024-09-26 RX ORDER — BISACODYL 10 MG
10 SUPPOSITORY, RECTAL RECTAL DAILY PRN
Status: DISCONTINUED | OUTPATIENT
Start: 2024-09-26 | End: 2024-09-27 | Stop reason: HOSPADM

## 2024-09-26 RX ORDER — ASPIRIN 81 MG/1
324 TABLET, CHEWABLE ORAL ONCE
Status: COMPLETED | OUTPATIENT
Start: 2024-09-26 | End: 2024-09-26

## 2024-09-26 RX ORDER — SODIUM CHLORIDE 9 MG/ML
75 INJECTION, SOLUTION INTRAVENOUS CONTINUOUS
Status: DISCONTINUED | OUTPATIENT
Start: 2024-09-26 | End: 2024-09-27

## 2024-09-26 RX ORDER — INSULIN LISPRO 100 [IU]/ML
2-9 INJECTION, SOLUTION INTRAVENOUS; SUBCUTANEOUS
Status: DISCONTINUED | OUTPATIENT
Start: 2024-09-26 | End: 2024-09-27 | Stop reason: HOSPADM

## 2024-09-26 RX ORDER — ASPIRIN 81 MG/1
81 TABLET ORAL DAILY
Status: DISCONTINUED | OUTPATIENT
Start: 2024-09-26 | End: 2024-09-26

## 2024-09-26 RX ORDER — KETOROLAC TROMETHAMINE 30 MG/ML
30 INJECTION, SOLUTION INTRAMUSCULAR; INTRAVENOUS ONCE
Status: COMPLETED | OUTPATIENT
Start: 2024-09-26 | End: 2024-09-26

## 2024-09-26 RX ORDER — TRAZODONE HYDROCHLORIDE 100 MG/1
100 TABLET ORAL NIGHTLY
Status: DISCONTINUED | OUTPATIENT
Start: 2024-09-26 | End: 2024-09-27 | Stop reason: HOSPADM

## 2024-09-26 RX ORDER — ACETAMINOPHEN 650 MG/1
650 SUPPOSITORY RECTAL EVERY 4 HOURS PRN
Status: DISCONTINUED | OUTPATIENT
Start: 2024-09-26 | End: 2024-09-27 | Stop reason: HOSPADM

## 2024-09-26 RX ORDER — ACETAMINOPHEN 325 MG/1
650 TABLET ORAL EVERY 4 HOURS PRN
Status: DISCONTINUED | OUTPATIENT
Start: 2024-09-26 | End: 2024-09-27 | Stop reason: HOSPADM

## 2024-09-26 RX ORDER — TOPIRAMATE 25 MG/1
50 TABLET, FILM COATED ORAL NIGHTLY
Status: DISCONTINUED | OUTPATIENT
Start: 2024-09-26 | End: 2024-09-27 | Stop reason: HOSPADM

## 2024-09-26 RX ORDER — ONDANSETRON 2 MG/ML
4 INJECTION INTRAMUSCULAR; INTRAVENOUS EVERY 6 HOURS PRN
Status: DISCONTINUED | OUTPATIENT
Start: 2024-09-26 | End: 2024-09-27 | Stop reason: HOSPADM

## 2024-09-26 RX ORDER — SODIUM CHLORIDE 0.9 % (FLUSH) 0.9 %
10 SYRINGE (ML) INJECTION EVERY 12 HOURS SCHEDULED
Status: DISCONTINUED | OUTPATIENT
Start: 2024-09-26 | End: 2024-09-27 | Stop reason: HOSPADM

## 2024-09-26 RX ORDER — SPIRONOLACTONE 25 MG/1
25 TABLET ORAL DAILY
Status: DISCONTINUED | OUTPATIENT
Start: 2024-09-26 | End: 2024-09-27

## 2024-09-26 RX ORDER — METOPROLOL SUCCINATE 25 MG/1
25 TABLET, EXTENDED RELEASE ORAL NIGHTLY
Status: DISCONTINUED | OUTPATIENT
Start: 2024-09-26 | End: 2024-09-27 | Stop reason: HOSPADM

## 2024-09-26 RX ORDER — AMOXICILLIN 250 MG
2 CAPSULE ORAL 2 TIMES DAILY
Status: DISCONTINUED | OUTPATIENT
Start: 2024-09-26 | End: 2024-09-27 | Stop reason: HOSPADM

## 2024-09-26 RX ORDER — DEXTROSE MONOHYDRATE 25 G/50ML
25 INJECTION, SOLUTION INTRAVENOUS
Status: DISCONTINUED | OUTPATIENT
Start: 2024-09-26 | End: 2024-09-27 | Stop reason: HOSPADM

## 2024-09-26 RX ORDER — MULTIPLE VITAMINS W/ MINERALS TAB 9MG-400MCG
1 TAB ORAL DAILY
Status: DISCONTINUED | OUTPATIENT
Start: 2024-09-26 | End: 2024-09-27 | Stop reason: HOSPADM

## 2024-09-26 RX ORDER — ONDANSETRON 4 MG/1
4 TABLET, ORALLY DISINTEGRATING ORAL EVERY 6 HOURS PRN
Status: DISCONTINUED | OUTPATIENT
Start: 2024-09-26 | End: 2024-09-27 | Stop reason: HOSPADM

## 2024-09-26 RX ORDER — MULTIVIT AND MINERALS-FERROUS GLUCONATE 9 MG IRON/15 ML ORAL LIQUID 9 MG/15 ML
15 LIQUID (ML) ORAL DAILY
Status: DISCONTINUED | OUTPATIENT
Start: 2024-09-26 | End: 2024-09-26 | Stop reason: CLARIF

## 2024-09-26 RX ORDER — IBUPROFEN 600 MG/1
1 TABLET ORAL
Status: DISCONTINUED | OUTPATIENT
Start: 2024-09-26 | End: 2024-09-27 | Stop reason: HOSPADM

## 2024-09-26 RX ORDER — PRAVASTATIN SODIUM 20 MG
80 TABLET ORAL NIGHTLY
Status: DISCONTINUED | OUTPATIENT
Start: 2024-09-26 | End: 2024-09-27 | Stop reason: HOSPADM

## 2024-09-26 RX ORDER — BUSPIRONE HYDROCHLORIDE 5 MG/1
5 TABLET ORAL 3 TIMES DAILY
Status: DISCONTINUED | OUTPATIENT
Start: 2024-09-26 | End: 2024-09-27 | Stop reason: HOSPADM

## 2024-09-26 RX ORDER — IBUPROFEN 400 MG/1
800 TABLET, FILM COATED ORAL EVERY 6 HOURS PRN
Status: DISCONTINUED | OUTPATIENT
Start: 2024-09-26 | End: 2024-09-27 | Stop reason: HOSPADM

## 2024-09-26 RX ADMIN — Medication 1 TABLET: at 21:27

## 2024-09-26 RX ADMIN — TICAGRELOR 90 MG: 90 TABLET ORAL at 21:27

## 2024-09-26 RX ADMIN — BUSPIRONE HYDROCHLORIDE 5 MG: 5 TABLET ORAL at 21:28

## 2024-09-26 RX ADMIN — ASPIRIN 324 MG: 81 TABLET, CHEWABLE ORAL at 21:26

## 2024-09-26 RX ADMIN — KETOROLAC TROMETHAMINE 30 MG: 30 INJECTION, SOLUTION INTRAMUSCULAR; INTRAVENOUS at 18:22

## 2024-09-26 RX ADMIN — TOPIRAMATE 50 MG: 25 TABLET, FILM COATED ORAL at 21:27

## 2024-09-26 RX ADMIN — BUPROPION HYDROCHLORIDE 300 MG: 150 TABLET, EXTENDED RELEASE ORAL at 21:28

## 2024-09-26 RX ADMIN — Medication 10 ML: at 21:28

## 2024-09-26 RX ADMIN — PRAVASTATIN SODIUM 80 MG: 20 TABLET ORAL at 21:27

## 2024-09-26 RX ADMIN — SODIUM CHLORIDE 75 ML/HR: 9 INJECTION, SOLUTION INTRAVENOUS at 21:41

## 2024-09-26 RX ADMIN — TRAZODONE HYDROCHLORIDE 100 MG: 100 TABLET ORAL at 21:27

## 2024-09-26 RX ADMIN — SODIUM CHLORIDE 1000 ML: 9 INJECTION, SOLUTION INTRAVENOUS at 18:22

## 2024-09-26 RX ADMIN — METOPROLOL SUCCINATE 25 MG: 25 TABLET, EXTENDED RELEASE ORAL at 21:28

## 2024-09-26 NOTE — ED PROVIDER NOTES
Time: 5:27 PM EDT  Date of encounter:  9/26/2024  Independent Historian/Clinical History and Information was obtained by:   Patient    History is limited by: N/A    Chief Complaint:       History of Present Illness:  Patient is a 44 y.o. year old female who presents to the emergency department for evaluation of chest pain.  Patient patient had a cardiac stent placed on 9/5/2024 by  at this hospital for a nearly occluded LAD.  Patient reports she has had some episodes of chest pressure since having the stent placed but they were primarily lateral left chest wall.  Today she states she started having tightness more centrally at about 4:00 PM patient states it radiates up into her neck and she took some sublingual nitroglycerin. The sublingual nitro has caused a headache but the patient states her chest pain has improved.      Patient Care Team  Primary Care Provider: Patricia Hernandez APRN    Past Medical History:     Allergies   Allergen Reactions    Morphine Itching and Nausea And Vomiting     Past Medical History:   Diagnosis Date    Acid reflux     Anxiety     Anxiety and depression     Benign essential hypertension     Borderline personality disorder     Cancer     renal cancer/mass, PARTIAL NEPH, RIGHT    Diabetes     Diabetes mellitus     type ii, doesn't check bg at home     Diabetes mellitus, type 2     Elevated cholesterol     Frozen shoulder 08/02/2023    GERD (gastroesophageal reflux disease)     High triglycerides     History of bariatric surgery 02/04/2021    GASTRIC SLEEVE    History of kidney stones     HTN (hypertension)     Hyperlipemia     Hyperlipidemia     Hypertriglyceridemia     Lumbar herniated disc     Obesity     Panic attacks     PCOS (polycystic ovarian syndrome)     Periarthritis of shoulder 01/23    Psoriasis     ELBOWS    Rotator cuff syndrome 01/23    Seasonal allergies     Self-injurious behavior 1994    Spinal headache     AFTER PAIN EPIDURALS.  NO BLOOD PATCH     Suicide attempt      Past Surgical History:   Procedure Laterality Date    ABDOMINAL SURGERY  2020    Gastric sleeve    ADENOIDECTOMY  1984    CARDIAC CATHETERIZATION N/A 2024    Procedure: Left Heart Cath;  Surgeon: Toño Rodriguez MD;  Location: [facility] CATH INVASIVE LOCATION;  Service: Cardiovascular;  Laterality: N/A;    CARDIAC CATHETERIZATION N/A 2024    Procedure: Coronary angiography;  Surgeon: Toño Rodriguez MD;  Location: [facility] CATH INVASIVE LOCATION;  Service: Cardiovascular;  Laterality: N/A;    CARDIAC CATHETERIZATION N/A 2024    Procedure: Angioplasty-coronary;  Surgeon: Toño Rodriguez MD;  Location: [facility] CATH INVASIVE LOCATION;  Service: Cardiovascular;  Laterality: N/A;     SECTION  ,    COLONOSCOPY N/A 2024    Procedure: COLONOSCOPY;  Surgeon: Terrence Fry MD;  Location: [facility] ENDOSCOPY;  Service: Gastroenterology;  Laterality: N/A;  NORMAL COLONOSCOPY    CYSTOSCOPY BLADDER STONE LITHOTRIPSY      EAR TUBES      ENDOSCOPY N/A 10/20/2020    Procedure: ESOPHAGOGASTRODUODENOSCOPY WITH BIOPSY;  Surgeon: Fidel Grajeda Jr., MD;  Location: [facility] ENDOSCOPY;  Service: General;  Laterality: N/A;  PRE- GERD  POST- RETAINED FOOD, GASTRITIS, GASTRIC POLYPS    ENDOSCOPY      FOOT FRACTURE SURGERY Left 2017    screw placed    GASTRECTOMY  2020    GASTRIC SLEEVE LAPAROSCOPIC N/A 2020    Procedure: GASTRIC SLEEVE LAPAROSCOPIC;  Surgeon: Fidel Grajeda Jr., MD;  Location: [facility] OR [facility]SC;  Service: Bariatric;  Laterality: N/A;    NEPHRECTOMY PARTIAL Right 11/15/2021    Procedure: NEPHRECTOMY PARTIAL LAPAROSCOPIC WITH DAVINCI ROBOT;  Surgeon: Lori Troy MD;  Location: [facility] MAIN OR;  Service: Robotics - DaVinci;  Laterality: Right;    SHOULDER ARTHROSCOPY Left 2023    Procedure: LEFT SHOULDER MANIPULATION;  Surgeon: Domenic Bradley MD;  Location: [facility] OR [facility];  Service: Orthopedics;  Laterality: Left;    TONSILLECTOMY       UPPER GASTROINTESTINAL ENDOSCOPY      URETEROSCOPY LASER LITHOTRIPSY WITH STENT INSERTION Right 09/28/2021    Procedure: CYSTOSCOPY, RIGHT URETEROSCOPY, LASERTRIPSY, STONE BASKET EXTRACTION AND STENT INSERTION;  Surgeon: Lori Troy MD;  Location: [facility] MAIN OR;  Service: Urology;  Laterality: Right;    URETEROSCOPY LASER LITHOTRIPSY WITH STENT INSERTION Left 11/08/2022    Procedure: URETEROSCOPY LASER LITHOTRIPSY WITH URETERAL STENT INSERTION;  Surgeon: Lori Troy MD;  Location: [facility] MAIN OR;  Service: Urology;  Laterality: Left;     Family History   Problem Relation Age of Onset    Cancer Mother         Breast    Diabetes Father     Hypertension Father     Heart disease Father     Cancer Father         Bladder prostate liver    Colon cancer Father         malignant, currently 62    No Known Problems Sister     No Known Problems Brother     No Known Problems Maternal Aunt     No Known Problems Paternal Aunt     No Known Problems Maternal Uncle     No Known Problems Paternal Uncle     No Known Problems Maternal Grandfather     Stroke Maternal Grandmother     Heart disease Maternal Grandmother     Diabetes Paternal Grandfather     Heart disease Paternal Grandfather     No Known Problems Paternal Grandmother     No Known Problems Cousin     Malig Hyperthermia Neg Hx     ADD / ADHD Neg Hx     Alcohol abuse Neg Hx     Anxiety disorder Neg Hx     Bipolar disorder Neg Hx     Dementia Neg Hx     Depression Neg Hx     Drug abuse Neg Hx     OCD Neg Hx     Paranoid behavior Neg Hx     Schizophrenia Neg Hx     Seizures Neg Hx     Self-Injurious Behavior  Neg Hx     Suicide Attempts Neg Hx        Home Medications:  Prior to Admission medications    Medication Sig Start Date End Date Taking? Authorizing Provider   Apremilast (Otezla) 30 MG tablet Take 30 mg by mouth 2 (Two) Times a Day. 9/17/24      aspirin 81 MG EC tablet Take 1 tablet by mouth Daily. 9/6/24   Toño Rodriguez MD   Biotin 91324 MCG tablet  Take 1 tablet by mouth Every Night.    Heron Campuzano MD   buPROPion XL (WELLBUTRIN XL) 300 MG 24 hr tablet Take 1 tablet by mouth Daily. 5/3/24   Patricia Hernandez APRN   busPIRone (BUSPAR) 5 MG tablet Take 1 tablet by mouth 3 (Three) Times a Day. 5/3/24   Patricia Hernandez APRN   CALCIUM-MAGNESIUM-ZINC PO Take 3 tablets by mouth Daily.    Heron Campuzano MD   cetirizine (zyrTEC) 10 MG tablet Take 1 tablet by mouth Daily As Needed. 5/31/22   Heron Campuzano MD   Cholecalciferol 25 MCG (1000 UT) capsule Take 2 capsules by mouth Daily. 11/3/23   Patricia Hernandez APRN   COLLAGEN PO Take 3 tablets by mouth Daily.    Heron Campuzano MD   dapagliflozin Propanediol (Farxiga) 10 MG tablet Take 10 mg by mouth Daily. 9/5/24   Toño Rodriguez MD   desvenlafaxine (PRISTIQ) 50 MG 24 hr tablet Take 1 tablet by mouth Daily. 5/3/24   Patricia Hernandez APRN   Diclofenac Sodium (VOLTAREN) 1 % gel gel Apply 4 g topically to the appropriate area as directed 4 (Four) Times a Day As Needed (pain). 6/12/23   Nicolasa Meade PA-C   fluticasone (FLONASE) 50 MCG/ACT nasal spray USE 2 SPRAYS IN EACH NOSTRIL ONCE DAILY AS DIRECTED 5/19/23   Patricia Hernandez APRN   ibuprofen (ADVIL,MOTRIN) 800 MG tablet Take 1 tablet by mouth Every 6 (Six) Hours As Needed for Mild Pain. 9/6/24   Patricia Hernandez APRN   IRON-VITAMIN C PO Take 1 tablet by mouth Daily.    Heron Campuzano MD   linaclotide (Linzess) 72 MCG capsule capsule Take 1 capsule by mouth Every Morning Before Breakfast for 90 days. 4/11/24 10/8/24  Michell Horan APRN   metoprolol succinate XL (Toprol XL) 25 MG 24 hr tablet Take 1 tablet by mouth Every Night. 5/3/24   Patricia Hernandez APRN   Multiple Vitamins-Minerals (MULTIVITAMIN PO) Take 1 tablet by mouth Every Night.    Provider, MD Heron   nitroglycerin (NITROSTAT) 0.4 MG SL tablet Place 1 tablet under the tongue Every 5 (Five) Minutes As  Needed for Chest Pain (Systolic BP Greater Than 100). Take no more than 3 doses in 15 minutes. 24   Toño Rodriguez MD   ondansetron ODT (ZOFRAN-ODT) 4 MG disintegrating tablet Place 1 tablet on the tongue Every 8 (Eight) Hours As Needed for Nausea. 24   Dana Xiong MD   pravastatin (PRAVACHOL) 80 MG tablet Take 1 tablet by mouth Every Night. 24   Toño Rodriguez MD   Probiotic Product (PROBIOTIC-10 PO) Take 1 tablet by mouth Every Night.    Provider, MD Heron   spironolactone (ALDACTONE) 25 MG tablet Take 1 tablet by mouth Daily. 24   Toño Rodriguez MD   testosterone micronized powder APPLY 2 CLICKS (0.5 ML) TOPICALLY EVERY DAY  Patient taking differently: Apply 1 Application topically to the appropriate area as directed Daily. APPLY 2 CLICKS (0.5 ML) TOPICALLY EVERY DAY 24   Patricia Hernandez APRN   ticagrelor (BRILINTA) 90 MG tablet tablet Take 1 tablet by mouth 2 (Two) Times a Day. 24   Toño Rodriguez MD   Tirzepatide (MOUNJARO) 10 MG/0.5ML solution pen-injector pen Inject 0.5 mL under the skin into the appropriate area as directed 1 (One) Time Per Week. 24   Patricia Hernandez APRN   topiramate (TOPAMAX) 50 MG tablet Take 1 tablet by mouth every night at bedtime for 180 days. 5/3/24 12/17/24  Patricia Hernandez APRN   traZODone (DESYREL) 100 MG tablet Take 1 tablet by mouth Every Night. 5/3/24   Patricia Hernandez APRN        Social History:   Social History     Tobacco Use    Smoking status: Former     Current packs/day: 0.00     Average packs/day: 0.3 packs/day for 35.0 years (8.8 ttl pk-yrs)     Types: Cigarettes     Start date: 1/15/1985     Quit date: 1/15/2020     Years since quittin.7     Passive exposure: Never    Smokeless tobacco: Never    Tobacco comments:     A PACK A WEEK   Vaping Use    Vaping status: Never Used   Substance Use Topics    Alcohol use: Yes     Comment: OCCASIONAL/SOCIAL    Drug use: Not Currently      "Frequency: 0.1 times per week     Types: Marijuana     Comment: OTC THC         Review of Systems:  Review of Systems   Cardiovascular:  Positive for chest pain.        Physical Exam:  /66   Pulse 74   Temp 97.6 °F (36.4 °C) (Oral)   Resp 20   Ht 170.2 cm (67\")   Wt 88.6 kg (195 lb 5.2 oz)   SpO2 98%   BMI 30.59 kg/m²     Physical Exam  Vitals and nursing note reviewed.   Constitutional:       General: She is not in acute distress.  Cardiovascular:      Rate and Rhythm: Normal rate and regular rhythm.      Heart sounds: Normal heart sounds.   Pulmonary:      Effort: Pulmonary effort is normal. No respiratory distress.      Breath sounds: Normal breath sounds.   Abdominal:      General: Abdomen is flat.      Palpations: Abdomen is soft.      Tenderness: There is no abdominal tenderness.   Musculoskeletal:         General: Normal range of motion.      Cervical back: Normal range of motion.   Skin:     General: Skin is warm and dry.   Neurological:      Mental Status: She is alert and oriented to person, place, and time. Mental status is at baseline.                  Procedures:  Procedures      Medical Decision Making:      Comorbidities that affect care:    Coronary Artery Disease, Diabetes    External Notes reviewed:    Hospital Discharge Summary: Patient discharged 9/5/2020 4:04 day admission for chest pain.  Patient had a non-STEMI and underwent cardiac catheterization was found to have a nearly occluded LAD and a stent was placed.      The following orders were placed and all results were independently analyzed by me:  Orders Placed This Encounter   Procedures    XR Chest 1 View    [place] Draw    High Sensitivity Troponin T    Comprehensive Metabolic Panel    Lipase    BNP    Magnesium    CBC Auto Differential    High Sensitivity Troponin T 2Hr    NPO Diet NPO Type: Strict NPO    Undress & Gown    Continuous Pulse Oximetry    Inpatient Cardiology Consult    Inpatient Hospitalist Consult    Oxygen " Therapy- Nasal Cannula; Titrate 1-6 LPM Per SpO2; 90 - 95%    ECG 12 Lead ED Triage Standing Order; Chest Pain    ECG 12 Lead ED Triage Standing Order; Chest Pain    Insert Peripheral IV    Inpatient Admission    CBC & Differential    Green Top (Gel)    Lavender Top    Gold Top - SST    Light Blue Top       Medications Given in the Emergency Department:  Medications   sodium chloride 0.9 % flush 10 mL (has no administration in time range)   aspirin chewable tablet 324 mg (has no administration in time range)   ketorolac (TORADOL) injection 30 mg (30 mg Intravenous Given 9/26/24 1822)   sodium chloride 0.9 % bolus 1,000 mL (1,000 mL Intravenous New Bag 9/26/24 1822)        ED Course:         Labs:    Lab Results (last 24 hours)       Procedure Component Value Units Date/Time    High Sensitivity Troponin T [[id number]]  (Normal) Collected: 09/26/24 1720    Specimen: Blood Updated: 09/26/24 1745     HS Troponin T 9 ng/L     Narrative:      High Sensitive Troponin T Reference Range:  <14.0 ng/L- Negative Female for AMI  <22.0 ng/L- Negative Male for AMI  >=14 - Abnormal Female indicating possible myocardial injury.  >=22 - Abnormal Male indicating possible myocardial injury.   Clinicians would have to utilize clinical acumen, EKG, Troponin, and serial changes to determine if it is an Acute Myocardial Infarction or myocardial injury due to an underlying chronic condition.         CBC & Differential [[id number]]  (Abnormal) Collected: 09/26/24 1720    Specimen: Blood Updated: 09/26/24 1726    Narrative:      The following orders were created for panel order CBC & Differential.  Procedure                               Abnormality         Status                     ---------                               -----------         ------                     CBC Auto Differential[[id number]]        Abnormal            Final result                 Please view results for these tests on the individual orders.    Comprehensive Metabolic  Panel [[id number]]  (Abnormal) Collected: 09/26/24 1720    Specimen: Blood Updated: 09/26/24 1754     Glucose 78 mg/dL      BUN 16 mg/dL      Creatinine 1.16 mg/dL      Sodium 136 mmol/L      Potassium 4.9 mmol/L      Comment: Slight hemolysis detected by analyzer. Result may be falsely elevated.        Chloride 105 mmol/L      CO2 21.5 mmol/L      Calcium 9.5 mg/dL      Total Protein 6.7 g/dL      Albumin 3.9 g/dL      ALT (SGPT) 21 U/L      AST (SGOT) 33 U/L      Comment: Slight hemolysis detected by analyzer. Result may be falsely elevated.        Alkaline Phosphatase 75 U/L      Total Bilirubin 0.2 mg/dL      Globulin 2.8 gm/dL      A/G Ratio 1.4 g/dL      BUN/Creatinine Ratio 13.8     Anion Gap 9.5 mmol/L      eGFR 59.4 mL/min/1.73     Narrative:      GFR Normal >60  Chronic Kidney Disease <60  Kidney Failure <15      Lipase [[id number]]  (Abnormal) Collected: 09/26/24 1720    Specimen: Blood Updated: 09/26/24 1745     Lipase 84 U/L     BNP [[id number]]  (Normal) Collected: 09/26/24 1720    Specimen: Blood Updated: 09/26/24 1744     proBNP 316.1 pg/mL     Narrative:      This assay is used as an aid in the diagnosis of individuals suspected of having heart failure. It can be used as an aid in the diagnosis of acute decompensated heart failure (ADHF) in patients presenting with signs and symptoms of ADHF to the emergency department (ED). In addition, NT-proBNP of <300 pg/mL indicates ADHF is not likely.    Age Range Result Interpretation  NT-proBNP Concentration (pg/mL:      <50             Positive            >450                   Gray                 300-450                    Negative             <300    50-75           Positive            >900                  Gray                300-900                  Negative            <300      >75             Positive            >1800                  Gray                300-1800                  Negative            <300    Magnesium [[id number]]  (Normal) Collected:  09/26/24 1720    Specimen: Blood Updated: 09/26/24 1754     Magnesium 2.3 mg/dL     CBC Auto Differential [[id number]]  (Abnormal) Collected: 09/26/24 1720    Specimen: Blood Updated: 09/26/24 1726     WBC 9.19 10*3/mm3      RBC 4.66 10*6/mm3      Hemoglobin 13.6 g/dL      Hematocrit 41.0 %      MCV 88.0 fL      MCH 29.2 pg      MCHC 33.2 g/dL      RDW 14.1 %      RDW-SD 45.0 fl      MPV 9.9 fL      Platelets 213 10*3/mm3      Neutrophil % 64.0 %      Lymphocyte % 26.2 %      Monocyte % 7.2 %      Eosinophil % 1.1 %      Basophil % 0.8 %      Immature Grans % 0.7 %      Neutrophils, Absolute 5.89 10*3/mm3      Lymphocytes, Absolute 2.41 10*3/mm3      Monocytes, Absolute 0.66 10*3/mm3      Eosinophils, Absolute 0.10 10*3/mm3      Basophils, Absolute 0.07 10*3/mm3      Immature Grans, Absolute 0.06 10*3/mm3      nRBC 0.0 /100 WBC              Imaging:    XR Chest 1 View    Result Date: 9/26/2024  XR CHEST 1 VW Date of Exam: 9/26/2024 5:29 PM EDT Indication: Chest Pain Triage Protocol Comparison: Chest CT and chest radiograph 9/1/2024. Findings: Cardiomediastinal silhouette is within normal limits. No focal consolidation or overt pulmonary edema. No pleural effusion or pneumothorax. Osseous structures are unremarkable.     Impression: No evidence of acute cardiopulmonary disease. Electronically Signed: Ziyad Esteban MD  9/26/2024 5:42 PM EDT  Workstation ID: [id number]       Differential Diagnosis and Discussion:    Chest Pain:  Based on the patient's signs and symptoms, I considered aortic dissection, myocardial infaction, pulmonary embolism, cardiac tamponade, pericarditis, pneumothorax, musculoskeletal chest pain and other differential diagnosis as an etiology of the patient's chest pain.     All labs were reviewed and interpreted by me.  All X-rays impressions were independently interpreted by me.  EKG was interpreted by me.    MDM     Patient is status post recent cardiac cath with stent placement in the LAD.   Patient was discussed with the cardiologist who recommends admission overnight and he will consult.  Patient will be admitted to the hospital service.          Patient Care Considerations:          Consultants/Shared Management Plan:  Patient discussed with Dr. Rodriguez, cardiology,      Hospitalist: I have discussed the case with Dr. Murry who agrees to accept the patient for admission.    Social Determinants of Health:    Patient is independent, reliable, and has access to care.       Disposition and Care Coordination:    Admit:   Through independent evaluation of the patient's history, physical, and imperical data, the patient meets criteria for inpatient admission to the hospital.      Final diagnoses:   Chest pain, unspecified type        ED Disposition       ED Disposition   Decision to Admit    Condition   --    Comment   Level of Care: Telemetry [5]   Diagnosis: Chest pain [251309]   Admitting Physician: BUSHRA MURRY [[id number]]   Attending Physician: BUSHRA MURRY [[id number]]   Certification: I Certify That Inpatient Hospital Services Are Medically Necessary For Greater Than 2 Midnights                 This medical record created using voice recognition software.             Efren Guerra DO  09/26/24 [id number]

## 2024-09-26 NOTE — H&P
[facility]  Date: 2024   Patient Name: Bianca Boles  : 1980  MRN: [id number]  Primary Care Physician:  Patricia Hernandez, [place]  Date of admission: 2024    Subjective chest pain  Subjective   Chief Complaint: Chest pain female who presents to the emergency room    HPI:  Bianca Boles is a 44 y.o. female with chest pain.  Patient was recently in the hospital for chest pain on 2024 patient had a cardiac stent placed for nearly occluded LAD.  Today, she feels some chest tightness that started at 4:00 and it radiates up into her neck so she took some sublingual nitroglycerin which improved her chest pain.    On arrival to the ED, patient temperature 97.6, pulse of 66, respiratory rate 20, blood pressure 112/61, and she saturating 98% on room air.    Patient's chest x-ray showed no acute cardiopulmonary disease.    On labs, patient has a sodium of 136, chloride of 105, CO2 of 21.5, creatinine of 1.16, lipase of 84.  White blood cell count 9 platelets of 213.  Personal History     Past Medical History:  Past Medical History:   Diagnosis Date    Acid reflux     Anxiety     Anxiety and depression     Benign essential hypertension     Borderline personality disorder     Cancer     renal cancer/mass, PARTIAL NEPH, RIGHT    Diabetes     Diabetes mellitus     type ii, doesn't check bg at home     Diabetes mellitus, type 2     Elevated cholesterol     Frozen shoulder 2023    GERD (gastroesophageal reflux disease)     High triglycerides     History of bariatric surgery 2021    GASTRIC SLEEVE    History of kidney stones     HTN (hypertension)     Hyperlipemia     Hyperlipidemia     Hypertriglyceridemia     Lumbar herniated disc     Obesity     Panic attacks     PCOS (polycystic ovarian syndrome)     Periarthritis of shoulder     Psoriasis     ELBOWS    Rotator cuff syndrome     Seasonal allergies     Self-injurious behavior      Spinal headache     AFTER PAIN EPIDURALS.  NO BLOOD PATCH    Suicide attempt          Past Surgical History:  Past Surgical History:   Procedure Laterality Date    ABDOMINAL SURGERY  2020    Gastric sleeve    ADENOIDECTOMY  1984    CARDIAC CATHETERIZATION N/A 2024    Procedure: Left Heart Cath;  Surgeon: Toño Rodriguez MD;  Location: [facility] CATH INVASIVE LOCATION;  Service: Cardiovascular;  Laterality: N/A;    CARDIAC CATHETERIZATION N/A 2024    Procedure: Coronary angiography;  Surgeon: Toño Rodriguez MD;  Location: [facility] CATH INVASIVE LOCATION;  Service: Cardiovascular;  Laterality: N/A;    CARDIAC CATHETERIZATION N/A 2024    Procedure: Angioplasty-coronary;  Surgeon: Toño Rodriguez MD;  Location: [facility] CATH INVASIVE LOCATION;  Service: Cardiovascular;  Laterality: N/A;     SECTION  ,    COLONOSCOPY N/A 2024    Procedure: COLONOSCOPY;  Surgeon: Terrence Fry MD;  Location: [facility] ENDOSCOPY;  Service: Gastroenterology;  Laterality: N/A;  NORMAL COLONOSCOPY    CYSTOSCOPY BLADDER STONE LITHOTRIPSY      EAR TUBES      ENDOSCOPY N/A 10/20/2020    Procedure: ESOPHAGOGASTRODUODENOSCOPY WITH BIOPSY;  Surgeon: Fidel Grajeda Jr., MD;  Location: [facility] ENDOSCOPY;  Service: General;  Laterality: N/A;  PRE- GERD  POST- RETAINED FOOD, GASTRITIS, GASTRIC POLYPS    ENDOSCOPY      FOOT FRACTURE SURGERY Left 2017    screw placed    GASTRECTOMY  2020    GASTRIC SLEEVE LAPAROSCOPIC N/A 2020    Procedure: GASTRIC SLEEVE LAPAROSCOPIC;  Surgeon: Fidel Grajeda Jr., MD;  [facility];  Service: Bariatric;  Laterality: N/A;    NEPHRECTOMY PARTIAL Right 11/15/2021    Procedure: NEPHRECTOMY PARTIAL LAPAROSCOPIC WITH DAVINCI ROBOT;  Surgeon: Lori Troy MD;  Location: [facility] MAIN OR;  Service: Robotics - DaVinci;  Laterality: Right;    SHOULDER ARTHROSCOPY Left 2023    Procedure: LEFT SHOULDER MANIPULATION;  Surgeon: Domenic Bradley MD;   Location: [facility];  Service: Orthopedics;  Laterality: Left;    TONSILLECTOMY  1984    UPPER GASTROINTESTINAL ENDOSCOPY      URETEROSCOPY LASER LITHOTRIPSY WITH STENT INSERTION Right 2021    Procedure: CYSTOSCOPY, RIGHT URETEROSCOPY, LASERTRIPSY, STONE BASKET EXTRACTION AND STENT INSERTION;  Surgeon: Lori Troy MD;  [facility];  Service: Urology;  Laterality: Right;    URETEROSCOPY LASER LITHOTRIPSY WITH STENT INSERTION Left 2022    Procedure: URETEROSCOPY LASER LITHOTRIPSY WITH URETERAL STENT INSERTION;  Surgeon: Lori Troy MD;  [facility]: [facility];  Service: Urology;  Laterality: Left;        Family History:   Breast Cancer-related family history is not on file.      Social History:   Social History     Socioeconomic History    Marital status:    Tobacco Use    Smoking status: Former     Current packs/day: 0.00     Average packs/day: 0.3 packs/day for 35.0 years (8.8 ttl pk-yrs)     Types: Cigarettes     Start date: 1/15/1985     Quit date: 1/15/2020     Years since quittin.7     Passive exposure: Never    Smokeless tobacco: Never    Tobacco comments:     A PACK A WEEK   Vaping Use    Vaping status: Never Used   Substance and Sexual Activity    Alcohol use: Yes     Comment: OCCASIONAL/SOCIAL    Drug use: Not Currently     Frequency: 0.1 times per week     Types: Marijuana     Comment: OTC THC    Sexual activity: Yes     Partners: Male     Birth control/protection: Depo-provera         Home Medications:  Apremilast, Biotin, Calcium-Magnesium-Zinc, Cholecalciferol, Collagen, Diclofenac Sodium, Iron-Vitamin C, Probiotic Product, Tirzepatide, aspirin, buPROPion XL, busPIRone, cetirizine, dapagliflozin Propanediol, desvenlafaxine, fluticasone, ibuprofen, linaclotide, metoprolol succinate XL, multivitamin, nitroglycerin, ondansetron ODT, pravastatin, spironolactone, testosterone micronized, ticagrelor, topiramate, and traZODone    Allergies:  Allergies    Allergen Reactions    Morphine Itching and Nausea And Vomiting       Review of Systems   All systems were reviewed and negative except for: chect pain     Objective   Objective     Vitals:   Temp:  [97.6 °F (36.4 °C)] 97.6 °F (36.4 °C)  Heart Rate:  [66-74] 71  Resp:  [20] 20  BP: (106-112)/(61-66) 106/61    Physical Exam   Physical Exam   Constitutional:       General: She is not in acute distress.  Cardiovascular:      Rate and Rhythm: Normal rate and regular rhythm.      Heart sounds: Normal heart sounds.   Pulmonary:      Effort: Pulmonary effort is normal. No respiratory distress.      Breath sounds: Normal breath sounds.   Abdominal:      General: Abdomen is flat.      Palpations: Abdomen is soft.      Tenderness: There is no abdominal tenderness.   Musculoskeletal:         General: Normal range of motion.      Cervical back: Normal range of motion.   Skin:     General: Skin is warm and dry.   Neurological:      Mental Status: She is alert and oriented to person, place, and time. Mental status is at baseline.       Result Review      Result Review:  I have personally reviewed the results from the time of this admission to 9/26/2024 19:53 EDT and agree with these findings:  [x]  Laboratory  [x]  Microbiology  [x]  Radiology  [x]  EKG/Telemetry   [x]  Cardiology/Vascular   [x]  Pathology  [x]  Old records  []  Other:    Lab Results (most recent)       Procedure Component Value Units Date/Time    Comprehensive Metabolic Panel [[id number]]  (Abnormal) Collected: 09/26/24 1720    Specimen: Blood Updated: 09/26/24 1754     Glucose 78 mg/dL      BUN 16 mg/dL      Creatinine 1.16 mg/dL      Sodium 136 mmol/L      Potassium 4.9 mmol/L      Comment: Slight hemolysis detected by analyzer. Result may be falsely elevated.        Chloride 105 mmol/L      CO2 21.5 mmol/L      Calcium 9.5 mg/dL      Total Protein 6.7 g/dL      Albumin 3.9 g/dL      ALT (SGPT) 21 U/L      AST (SGOT) 33 U/L      Comment: Slight hemolysis  detected by analyzer. Result may be falsely elevated.        Alkaline Phosphatase 75 U/L      Total Bilirubin 0.2 mg/dL      Globulin 2.8 gm/dL      A/G Ratio 1.4 g/dL      BUN/Creatinine Ratio 13.8     Anion Gap 9.5 mmol/L      eGFR 59.4 mL/min/1.73     Narrative:      GFR Normal >60  Chronic Kidney Disease <60  Kidney Failure <15      Magnesium [[id number]]  (Normal) Collected: 09/26/24 1720    Specimen: Blood Updated: 09/26/24 1754     Magnesium 2.3 mg/dL     High Sensitivity Troponin T [[id number]]  (Normal) Collected: 09/26/24 1720    Specimen: Blood Updated: 09/26/24 1745     HS Troponin T 9 ng/L     Narrative:      High Sensitive Troponin T Reference Range:  <14.0 ng/L- Negative Female for AMI  <22.0 ng/L- Negative Male for AMI  >=14 - Abnormal Female indicating possible myocardial injury.  >=22 - Abnormal Male indicating possible myocardial injury.   Clinicians would have to utilize clinical acumen, EKG, Troponin, and serial changes to determine if it is an Acute Myocardial Infarction or myocardial injury due to an underlying chronic condition.         Lipase [[id number]]  (Abnormal) Collected: 09/26/24 1720    Specimen: Blood Updated: 09/26/24 1745     Lipase 84 U/L     BNP [[id number]]  (Normal) Collected: 09/26/24 1720    Specimen: Blood Updated: 09/26/24 1744     proBNP 316.1 pg/mL     Narrative:      This assay is used as an aid in the diagnosis of individuals suspected of having heart failure. It can be used as an aid in the diagnosis of acute decompensated heart failure (ADHF) in patients presenting with signs and symptoms of ADHF to the emergency department (ED). In addition, NT-proBNP of <300 pg/mL indicates ADHF is not likely.    Age Range Result Interpretation  NT-proBNP Concentration (pg/mL:      <50             Positive            >450                   Gray                 300-450                    Negative             <300    50-75           Positive            >900                  Garcia                 300-900                  Negative            <300      >75             Positive            >1800                  Gray                300-1800                  Negative            <300    [place] Draw [[id number]] Collected: 09/26/24 1720    Specimen: Blood Updated: 09/26/24 1731    Narrative:      The following orders were created for panel order [place] Draw.  Procedure                               Abnormality         Status                     ---------                               -----------         ------                     Green Top (Gel)[[id number]]                                  Final result               Lavender Top[[id number]]                                     Final result               Gold Top - SST[[id number]]                                   Final result               Light Blue Top[[id number]]                                   Final result                 Please view results for these tests on the individual orders.    Green Top (Gel) [[id number]] Collected: 09/26/24 1720    Specimen: Blood Updated: 09/26/24 1731     Extra Tube Hold for add-ons.     Comment: Auto resulted.       Lavender Top [[id number]] Collected: 09/26/24 1720    Specimen: Blood Updated: 09/26/24 1731     Extra Tube hold for add-on     Comment: Auto resulted       Gold Top - SST [[id number]] Collected: 09/26/24 1720    Specimen: Blood Updated: 09/26/24 1731     Extra Tube Hold for add-ons.     Comment: Auto resulted.       Light Blue Top [[id number]] Collected: 09/26/24 1720    Specimen: Blood Updated: 09/26/24 1731     Extra Tube Hold for add-ons.     Comment: Auto resulted       CBC & Differential [[id number]]  (Abnormal) Collected: 09/26/24 1720    Specimen: Blood Updated: 09/26/24 1726    Narrative:      The following orders were created for panel order CBC & Differential.  Procedure                               Abnormality         Status                     ---------                               -----------          ------                     CBC Auto Differential[[id number]]        Abnormal            Final result                 Please view results for these tests on the individual orders.    CBC Auto Differential [[id number]]  (Abnormal) Collected: 09/26/24 1720    Specimen: Blood Updated: 09/26/24 1726     WBC 9.19 10*3/mm3      RBC 4.66 10*6/mm3      Hemoglobin 13.6 g/dL      Hematocrit 41.0 %      MCV 88.0 fL      MCH 29.2 pg      MCHC 33.2 g/dL      RDW 14.1 %      RDW-SD 45.0 fl      MPV 9.9 fL      Platelets 213 10*3/mm3      Neutrophil % 64.0 %      Lymphocyte % 26.2 %      Monocyte % 7.2 %      Eosinophil % 1.1 %      Basophil % 0.8 %      Immature Grans % 0.7 %      Neutrophils, Absolute 5.89 10*3/mm3      Lymphocytes, Absolute 2.41 10*3/mm3      Monocytes, Absolute 0.66 10*3/mm3      Eosinophils, Absolute 0.10 10*3/mm3      Basophils, Absolute 0.07 10*3/mm3      Immature Grans, Absolute 0.06 10*3/mm3      nRBC 0.0 /100 WBC             XR Chest 1 View    Result Date: 9/26/2024  Impression: No evidence of acute cardiopulmonary disease. Electronically Signed: Ziyad Esteban MD  9/26/2024 5:42 PM EDT  Workstation ID: [id number]     Assessment & Plan   Assessment / Plan   Chest pain  Recent LATHA in the LAD  Headache  Hypertension  Anxiety  Depression  Hyperlipidemia  ROSEANNA  NIDDM    Plan  Admit to telemetry floor  Cardiology consulted  Given aspirin, beta-blocker, statin, Brilinta.  Hold spironolactone.  Gentle hydration.   ISS  Rest of appropriate home meds resumed      VTE Prophylaxis:  Mechanical VTE prophylaxis orders are present.        CODE STATUS:    Level Of Support Discussed With: Patient  Code Status (Patient has no pulse and is not breathing): CPR (Attempt to Resuscitate)  Medical Interventions (Patient has pulse or is breathing): Full Support      Admission Status:  I believe this patient meets observation status.    Electronically signed by Mel Licona DO, 09/26/24, 7:53 PM EDT.

## 2024-09-27 ENCOUNTER — READMISSION MANAGEMENT (OUTPATIENT)
Dept: CALL CENTER | Facility: HOSPITAL | Age: 44
End: 2024-09-27
Payer: COMMERCIAL

## 2024-09-27 ENCOUNTER — APPOINTMENT (OUTPATIENT)
Dept: CARDIOLOGY | Facility: HOSPITAL | Age: 44
End: 2024-09-27
Payer: COMMERCIAL

## 2024-09-27 VITALS
HEIGHT: 67 IN | TEMPERATURE: 97.9 F | SYSTOLIC BLOOD PRESSURE: 114 MMHG | OXYGEN SATURATION: 100 % | WEIGHT: 214.07 LBS | RESPIRATION RATE: 18 BRPM | BODY MASS INDEX: 33.6 KG/M2 | HEART RATE: 68 BPM | DIASTOLIC BLOOD PRESSURE: 73 MMHG

## 2024-09-27 LAB
ANION GAP SERPL CALCULATED.3IONS-SCNC: 7.1 MMOL/L (ref 5–15)
BASOPHILS # BLD AUTO: 0.06 10*3/MM3 (ref 0–0.2)
BASOPHILS NFR BLD AUTO: 0.7 % (ref 0–1.5)
BUN SERPL-MCNC: 16 MG/DL (ref 6–20)
BUN/CREAT SERPL: 13.4 (ref 7–25)
CALCIUM SPEC-SCNC: 8.8 MG/DL (ref 8.6–10.5)
CHLORIDE SERPL-SCNC: 111 MMOL/L (ref 98–107)
CO2 SERPL-SCNC: 23.9 MMOL/L (ref 22–29)
CREAT SERPL-MCNC: 1.19 MG/DL (ref 0.57–1)
DEPRECATED RDW RBC AUTO: 44.9 FL (ref 37–54)
EGFRCR SERPLBLD CKD-EPI 2021: 57.6 ML/MIN/1.73
EOSINOPHIL # BLD AUTO: 0.13 10*3/MM3 (ref 0–0.4)
EOSINOPHIL NFR BLD AUTO: 1.6 % (ref 0.3–6.2)
ERYTHROCYTE [DISTWIDTH] IN BLOOD BY AUTOMATED COUNT: 13.8 % (ref 12.3–15.4)
GLUCOSE BLDC GLUCOMTR-MCNC: 78 MG/DL (ref 70–99)
GLUCOSE BLDC GLUCOMTR-MCNC: 82 MG/DL (ref 70–99)
GLUCOSE BLDC GLUCOMTR-MCNC: 99 MG/DL (ref 70–99)
GLUCOSE SERPL-MCNC: 88 MG/DL (ref 65–99)
HCT VFR BLD AUTO: 38.3 % (ref 34–46.6)
HGB BLD-MCNC: 12.5 G/DL (ref 12–15.9)
IMM GRANULOCYTES # BLD AUTO: 0.07 10*3/MM3 (ref 0–0.05)
IMM GRANULOCYTES NFR BLD AUTO: 0.8 % (ref 0–0.5)
LYMPHOCYTES # BLD AUTO: 3.09 10*3/MM3 (ref 0.7–3.1)
LYMPHOCYTES NFR BLD AUTO: 37.5 % (ref 19.6–45.3)
MCH RBC QN AUTO: 29.1 PG (ref 26.6–33)
MCHC RBC AUTO-ENTMCNC: 32.6 G/DL (ref 31.5–35.7)
MCV RBC AUTO: 89.3 FL (ref 79–97)
MONOCYTES # BLD AUTO: 0.61 10*3/MM3 (ref 0.1–0.9)
MONOCYTES NFR BLD AUTO: 7.4 % (ref 5–12)
NEUTROPHILS NFR BLD AUTO: 4.29 10*3/MM3 (ref 1.7–7)
NEUTROPHILS NFR BLD AUTO: 52 % (ref 42.7–76)
NRBC BLD AUTO-RTO: 0 /100 WBC (ref 0–0.2)
PHOSPHATE SERPL-MCNC: 4.3 MG/DL (ref 2.5–4.5)
PLATELET # BLD AUTO: 176 10*3/MM3 (ref 140–450)
PMV BLD AUTO: 9.9 FL (ref 6–12)
POTASSIUM SERPL-SCNC: 4.2 MMOL/L (ref 3.5–5.2)
QT INTERVAL: 417 MS
QT INTERVAL: 421 MS
QTC INTERVAL: 437 MS
QTC INTERVAL: 440 MS
RBC # BLD AUTO: 4.29 10*6/MM3 (ref 3.77–5.28)
SODIUM SERPL-SCNC: 142 MMOL/L (ref 136–145)
TROPONIN T SERPL HS-MCNC: 8 NG/L
WBC NRBC COR # BLD AUTO: 8.25 10*3/MM3 (ref 3.4–10.8)

## 2024-09-27 PROCEDURE — G0378 HOSPITAL OBSERVATION PER HR: HCPCS

## 2024-09-27 PROCEDURE — 82948 REAGENT STRIP/BLOOD GLUCOSE: CPT | Performed by: HOSPITALIST

## 2024-09-27 PROCEDURE — 99213 OFFICE O/P EST LOW 20 MIN: CPT | Performed by: STUDENT IN AN ORGANIZED HEALTH CARE EDUCATION/TRAINING PROGRAM

## 2024-09-27 PROCEDURE — 84484 ASSAY OF TROPONIN QUANT: CPT | Performed by: STUDENT IN AN ORGANIZED HEALTH CARE EDUCATION/TRAINING PROGRAM

## 2024-09-27 PROCEDURE — 80048 BASIC METABOLIC PNL TOTAL CA: CPT | Performed by: HOSPITALIST

## 2024-09-27 PROCEDURE — 93308 TTE F-UP OR LMTD: CPT | Performed by: STUDENT IN AN ORGANIZED HEALTH CARE EDUCATION/TRAINING PROGRAM

## 2024-09-27 PROCEDURE — 25010000002 SULFUR HEXAFLUORIDE MICROSPH 60.7-25 MG RECONSTITUTED SUSPENSION: Performed by: FAMILY MEDICINE

## 2024-09-27 PROCEDURE — 99239 HOSP IP/OBS DSCHRG MGMT >30: CPT | Performed by: FAMILY MEDICINE

## 2024-09-27 PROCEDURE — 84100 ASSAY OF PHOSPHORUS: CPT | Performed by: HOSPITALIST

## 2024-09-27 PROCEDURE — 93308 TTE F-UP OR LMTD: CPT

## 2024-09-27 PROCEDURE — 82948 REAGENT STRIP/BLOOD GLUCOSE: CPT

## 2024-09-27 PROCEDURE — 85025 COMPLETE CBC W/AUTO DIFF WBC: CPT | Performed by: HOSPITALIST

## 2024-09-27 RX ORDER — AMLODIPINE BESYLATE 5 MG/1
5 TABLET ORAL DAILY
Qty: 60 TABLET | Refills: 3 | Status: SHIPPED | OUTPATIENT
Start: 2024-09-27

## 2024-09-27 RX ORDER — CLOPIDOGREL BISULFATE 75 MG/1
75 TABLET ORAL DAILY
Qty: 30 TABLET | Refills: 6 | Status: SHIPPED | OUTPATIENT
Start: 2024-09-28 | End: 2025-04-26

## 2024-09-27 RX ORDER — SPIRONOLACTONE 25 MG/1
12.5 TABLET ORAL DAILY
Qty: 60 TABLET | Refills: 3 | Status: SHIPPED | OUTPATIENT
Start: 2024-09-27

## 2024-09-27 RX ORDER — CLOPIDOGREL BISULFATE 75 MG/1
75 TABLET ORAL DAILY
Status: DISCONTINUED | OUTPATIENT
Start: 2024-09-28 | End: 2024-09-27 | Stop reason: HOSPADM

## 2024-09-27 RX ORDER — CLOPIDOGREL BISULFATE 75 MG/1
75 TABLET ORAL DAILY
Qty: 30 TABLET | Refills: 0 | Status: SHIPPED | OUTPATIENT
Start: 2024-09-28 | End: 2024-09-27

## 2024-09-27 RX ORDER — CLOPIDOGREL BISULFATE 75 MG/1
600 TABLET ORAL ONCE
Status: COMPLETED | OUTPATIENT
Start: 2024-09-27 | End: 2024-09-27

## 2024-09-27 RX ORDER — AMLODIPINE BESYLATE 5 MG/1
5 TABLET ORAL DAILY
Status: DISCONTINUED | OUTPATIENT
Start: 2024-09-27 | End: 2024-09-27 | Stop reason: HOSPADM

## 2024-09-27 RX ADMIN — AMLODIPINE BESYLATE 5 MG: 5 TABLET ORAL at 16:26

## 2024-09-27 RX ADMIN — BUSPIRONE HYDROCHLORIDE 5 MG: 5 TABLET ORAL at 08:47

## 2024-09-27 RX ADMIN — BUSPIRONE HYDROCHLORIDE 5 MG: 5 TABLET ORAL at 16:21

## 2024-09-27 RX ADMIN — CLOPIDOGREL BISULFATE 600 MG: 75 TABLET ORAL at 17:39

## 2024-09-27 RX ADMIN — Medication 10 ML: at 08:47

## 2024-09-27 RX ADMIN — Medication 1000 UNITS: at 08:47

## 2024-09-27 RX ADMIN — ASPIRIN 81 MG: 81 TABLET, COATED ORAL at 08:47

## 2024-09-27 RX ADMIN — BUPROPION HYDROCHLORIDE 300 MG: 150 TABLET, EXTENDED RELEASE ORAL at 08:47

## 2024-09-27 RX ADMIN — TICAGRELOR 90 MG: 90 TABLET ORAL at 08:47

## 2024-09-27 RX ADMIN — SULFUR HEXAFLUORIDE 5 ML: KIT at 16:58

## 2024-09-27 NOTE — DISCHARGE INSTR - APPOINTMENTS
Patient needs to follow up with Patricia Hernandez (PCP) on October 3rd at 11:15am. [phone]  Patient needs to follow up with Dr Rodriguez (cardiology) on October 1st at 1:15pm. [phone]

## 2024-09-27 NOTE — SIGNIFICANT NOTE
Wound Eval / Progress Noted     Carlos Alberto     Patient Name: Bianca Boles  : 1980  MRN: [id number]  Today's Date: 2024                 Admit Date: 2024    Visit Dx:    ICD-10-CM ICD-9-CM   1. Chest pain, unspecified type  R07.9 786.50   2. Other chest pain  R07.89 786.59         Chest pain        Past Medical History:   Diagnosis Date    Acid reflux     Anxiety     Anxiety and depression     Benign essential hypertension     Borderline personality disorder     Cancer     renal cancer/mass, PARTIAL NEPH, RIGHT    Diabetes     Diabetes mellitus     type ii, doesn't check bg at home     Diabetes mellitus, type 2     Elevated cholesterol     Frozen shoulder 2023    GERD (gastroesophageal reflux disease)     High triglycerides     History of bariatric surgery 2021    GASTRIC SLEEVE    History of kidney stones     HTN (hypertension)     Hyperlipemia     Hyperlipidemia     Hypertriglyceridemia     Lumbar herniated disc     Obesity     Panic attacks     PCOS (polycystic ovarian syndrome)     Periarthritis of shoulder     Psoriasis     ELBOWS    Rotator cuff syndrome     Seasonal allergies     Self-injurious behavior     Spinal headache     AFTER PAIN EPIDURALS.  NO BLOOD PATCH    Suicide attempt         Past Surgical History:   Procedure Laterality Date    ABDOMINAL SURGERY  2020    Gastric sleeve    ADENOIDECTOMY  1984    CARDIAC CATHETERIZATION N/A 2024    Procedure: Left Heart Cath;  Surgeon: Toño Rodriguez MD;  Location: [facility] INVASIVE LOCATION;  Service: Cardiovascular;  Laterality: N/A;    CARDIAC CATHETERIZATION N/A 2024    Procedure: Coronary angiography;  Surgeon: Toño Rodriguez MD;  Location: [facility] CATH INVASIVE LOCATION;  Service: Cardiovascular;  Laterality: N/A;    CARDIAC CATHETERIZATION N/A 2024    Procedure: Angioplasty-coronary;  Surgeon: Toño Rodriguez MD;  Location: [facility] CATH INVASIVE LOCATION;  Service: Cardiovascular;   Laterality: N/A;     SECTION  ,    COLONOSCOPY N/A 2024    Procedure: COLONOSCOPY;  Surgeon: Terrence Fry MD;  Location: [facility] ENDOSCOPY;  Service: Gastroenterology;  Laterality: N/A;  NORMAL COLONOSCOPY    CYSTOSCOPY BLADDER STONE LITHOTRIPSY      EAR TUBES      ENDOSCOPY N/A 10/20/2020    Procedure: ESOPHAGOGASTRODUODENOSCOPY WITH BIOPSY;  Surgeon: Fidel Grajeda Jr., MD;  Location: [facility] ENDOSCOPY;  Service: General;  Laterality: N/A;  PRE- GERD  POST- RETAINED FOOD, GASTRITIS, GASTRIC POLYPS    ENDOSCOPY      FOOT FRACTURE SURGERY Left 2017    screw placed    GASTRECTOMY  2020    GASTRIC SLEEVE LAPAROSCOPIC N/A 2020    Procedure: GASTRIC SLEEVE LAPAROSCOPIC;  Surgeon: Fidel Grajeda Jr., MD;  Location: [facility] OR [facility];  Service: Bariatric;  Laterality: N/A;    NEPHRECTOMY PARTIAL Right 11/15/2021    Procedure: NEPHRECTOMY PARTIAL LAPAROSCOPIC WITH DAVINCI ROBOT;  Surgeon: Lori Troy MD;  Location: [facility] OR;  Service: Robotics - DaVinci;  Laterality: Right;    SHOULDER ARTHROSCOPY Left 2023    Procedure: LEFT SHOULDER MANIPULATION;  Surgeon: Domenic Bradley MD;  Location: [facility] OR [facility];  Service: Orthopedics;  Laterality: Left;    TONSILLECTOMY      UPPER GASTROINTESTINAL ENDOSCOPY      URETEROSCOPY LASER LITHOTRIPSY WITH STENT INSERTION Right 2021    Procedure: CYSTOSCOPY, RIGHT URETEROSCOPY, LASERTRIPSY, STONE BASKET EXTRACTION AND STENT INSERTION;  Surgeon: Lori Troy MD;  Location: [facility] OR;  Service: Urology;  Laterality: Right;    URETEROSCOPY LASER LITHOTRIPSY WITH STENT INSERTION Left 2022    Procedure: URETEROSCOPY LASER LITHOTRIPSY WITH URETERAL STENT INSERTION;  Surgeon: Lori Troy MD;  Location: [facility] OR;  Service: Urology;  Laterality: Left;     Wound Check / Follow-up: Patient seen today for wound consult. Patient is awake, alert, and oriented at time of visit. She is agreeable  to assessment. Patient with small abrasion to right anterior lower leg. Dry, red tissue is present. No drainage noted. Patient reports she scratched her leg when it was itching. Recommending skin care with application of moisturizer. Patient denies any other wounds / skin issues at this time.      Impression: Abrasion.      Short term goals: Regain skin integrity, skin care.      Christina Jones RN    9/27/2024    13:02 EDT

## 2024-09-27 NOTE — DISCHARGE SUMMARY
[facility]         HOSPITALIST  DISCHARGE SUMMARY    Patient Name: Bianca Boles  : 1980  MRN: [id number]    Date of Admission: 2024  Date of Discharge:  2024    Primary Care Physician: Patricia Hernandez, [place]    Consults       No orders found from 2024 to 2024.            Active and Resolved Hospital Problems:  Chest pain, suspect noncardiac  Recent LATHA in LAD  Hypertension  Anxiety  Depression  ROSEANNA  NIDDM    Active Hospital Problems    Diagnosis POA    Chest pain [R07.9] Yes      Resolved Hospital Problems   No resolved problems to display.       Hospital Course     Hospital Course:  44-year-old female hospitalized with recent CAD status post stenting, hypertension, dyslipidemia, PCOS, anxiety, depression, presenting with chest pain symptoms, chest tightness, troponins were negative, EKG with no acute changes, cardiology consulted. Blood pressure medication was modified and Brilinta was changed to Plavix. Echo was unremarkable for acute issues. The patient was discharged in hemodynamically stable condition, to follow-up with cardiology as outpatient.        Day of Discharge     Vital Signs:  Temp:  [97.9 °F (36.6 °C)-98.1 °F (36.7 °C)] 97.9 °F (36.6 °C)  Heart Rate:  [68-71] 68  Resp:  [18] 18  BP: (101-114)/(62-73) 114/73  Review of systems:  All systems reviewed negative except for weakness, fatigue    Physical exam:   Constitutional: Awake, alert, no acute distress   Eyes: Pupils equal, sclerae anicteric, no conjunctival injection   HENT: NCAT, mucous membranes moist   Neck: Supple, no thyromegaly, no lymphadenopathy, trachea midline   Respiratory: Clear to auscultation bilaterally, nonlabored respirations    Cardiovascular: RRR, no murmurs, rubs, or gallops, palpable pedal pulses bilaterally   Gastrointestinal: Positive bowel sounds, soft, nontender, nondistended   Musculoskeletal: No bilateral ankle edema, no clubbing or cyanosis to  extremities   Psychiatric: Appropriate affect, cooperative   Neurologic: Oriented x 3, strength symmetric in all extremities, Cranial Nerves grossly intact to confrontation, speech clear   Skin: No rashes visible on exposed skin        Discharge Details        Discharge Medications        New Medications        Instructions Start Date   amLODIPine 5 MG tablet  Commonly known as: NORVASC   5 mg, Oral, Daily      clopidogrel 75 MG tablet  Commonly known as: PLAVIX   Take 1 tablet by mouth Daily             Changes to Medications        Instructions Start Date   spironolactone 25 MG tablet  Commonly known as: ALDACTONE  What changed: how much to take   12.5 mg, Oral, Daily      testosterone micronized powder  What changed:   how much to take  how to take this  when to take this   APPLY 2 CLICKS (0.5 ML) TOPICALLY EVERY DAY             Continue These Medications        Instructions Start Date   aspirin 81 MG EC tablet   81 mg, Oral, Daily      Biotin 59315 MCG tablet   1 tablet, Oral, Nightly      buPROPion  MG 24 hr tablet  Commonly known as: WELLBUTRIN XL   300 mg, Oral, Daily      busPIRone 5 MG tablet  Commonly known as: BUSPAR   5 mg, Oral, 3 Times Daily      CALCIUM-MAGNESIUM-ZINC PO   3 tablets, Oral, Daily      cetirizine 10 MG tablet  Commonly known as: zyrTEC   10 mg, Oral, Daily PRN      COLLAGEN PO   3 tablets, Oral, Daily      desvenlafaxine 50 MG 24 hr tablet  Commonly known as: PRISTIQ   50 mg, Oral, Daily      Diclofenac Sodium 1 % gel gel  Commonly known as: VOLTAREN   4 g, Topical, 4 Times Daily PRN      Farxiga 10 MG tablet  Generic drug: dapagliflozin Propanediol   10 mg, Oral, Daily      fluticasone 50 MCG/ACT nasal spray  Commonly known as: FLONASE   USE 2 SPRAYS IN EACH NOSTRIL ONCE DAILY AS DIRECTED      ibuprofen 800 MG tablet  Commonly known as: ADVIL,MOTRIN   800 mg, Oral, Every 6 Hours PRN      IRON-VITAMIN C PO   1 tablet, Oral, Daily      Linzess 72 MCG capsule capsule  Generic drug:  linaclotide   72 mcg, Oral, Every Morning Before Breakfast      metoprolol succinate XL 25 MG 24 hr tablet  Commonly known as: Toprol XL   25 mg, Oral, Nightly      Mounjaro 10 MG/0.5ML solution pen-injector pen  Generic drug: Tirzepatide   10 mg, Subcutaneous, Weekly      multivitamin tablet tablet  Commonly known as: THERAGRAN   1 tablet, Oral, Nightly      nitroglycerin 0.4 MG SL tablet  Commonly known as: NITROSTAT   0.4 mg, Sublingual, Every 5 Minutes PRN, Take no more than 3 doses in 15 minutes.      ondansetron ODT 4 MG disintegrating tablet  Commonly known as: ZOFRAN-ODT   4 mg, Translingual, Every 8 Hours PRN      Otezla 30 MG tablet  Generic drug: Apremilast   30 mg, Oral, 2 Times Daily      pravastatin 80 MG tablet  Commonly known as: PRAVACHOL   80 mg, Oral, Nightly      PROBIOTIC-10 PO   1 tablet, Oral, Nightly      topiramate 50 MG tablet  Commonly known as: TOPAMAX   50 mg, Oral, Every Night at Bedtime      traZODone 100 MG tablet  Commonly known as: DESYREL   100 mg, Oral, Nightly      Vitamin D High Potency 25 MCG (1000 UT) capsule  Generic drug: Cholecalciferol   2,000 Units, Oral, Daily             Stop These Medications      Brilinta 90 MG tablet tablet  Generic drug: ticagrelor              Allergies   Allergen Reactions    Morphine Itching and Nausea And Vomiting       Discharge Disposition:  Home or Self Care    Diet:  Hospital:  No active diet order      Discharge Activity: as tolerates      CODE STATUS:  Code Status and Medical Interventions: CPR (Attempt to Resuscitate); Full Support   Ordered at: 09/26/24 1939     Level Of Support Discussed With:    Patient     Code Status (Patient has no pulse and is not breathing):    CPR (Attempt to Resuscitate)     Medical Interventions (Patient has pulse or is breathing):    Full Support         Future Appointments   Date Time Provider Department Center   10/1/2024  1:15 PM Loretta Saleem APRN [facility] JOCELIN MARSHIXKARINA [facility]   10/3/2024 11:15 AM  Patricia Hernandez APRN [facility]   10/16/2024  8:00 AM Michell Horan APRDEBBIE [facility] GE ETW [facility]   12/10/2024  8:45 AM Loretta Saleem APRDEBBIE [facility] CD EDIXE [facility]   2024  8:45 AM Patricia Hernandez APRDEBBIE [facility]   2025  9:00 AM Lori Troy MD [facility] U ETRING [facility]   2025 10:00 AM Lori Manzanares APRN [facility] BAR SRG None       Additional Instructions for the Follow-ups that You Need to Schedule       Discharge Follow-up with PCP   As directed       Currently Documented PCP:    Patricia Hernandez APRN    PCP Phone Number:    [phone]     Follow Up Details: 3 to 7 days                Pertinent  and/or Most Recent Results     PROCEDURES:   XR Chest 1 View    Result Date: 2024  XR CHEST 1 VW Date of Exam: 2024 5:29 PM EDT Indication: Chest Pain Triage Protocol Comparison: Chest CT and chest radiograph 2024. Findings: Cardiomediastinal silhouette is within normal limits. No focal consolidation or overt pulmonary edema. No pleural effusion or pneumothorax. Osseous structures are unremarkable.     Impression: No evidence of acute cardiopulmonary disease. Electronically Signed: Ziyad Esteban MD  2024 5:42 PM EDT  Workstation ID: [id number]    Cardiac Catheterization/Vascular Study    Result Date: 2024    Prox RCA lesion is 20% stenosed.   1st Diag lesion is 40% stenosed.   Mid LAD lesion is 99% stenosed. [facility] CARDIAC CATHETERIZATION PROCEDURE REPORT Patient: Bianca Boles : 1980 MRN: [id number] Procedure Date: 24 Interventional Cardiologist: Toño Rodriguez MD Indication: NSTEMI type I Procedure performed: Left heart catheterization Selective left and right coronary angiography Left ventriculography PCI of mid LAD Pre and post PCI IVUS Access Sites: Right femoral artery Findings: 1. Coronary Artery Anatomy: Left main is a large-caliber vessel with subdivides into left circumflex and LAD.  Angiographically normal.  Left circumflex is a moderate caliber vessel which gives rise to 2 moderate-sized OM branches. LAD is a large-caliber vessel which gives rise to 1 large diagonal branch.  Mid segment has tandem lesions with 99% stenosis DESTINY I flow.  Ostial diagonal branch has 30 to 40% stenosis. RCA is a large-caliber vessel which gives rise to PDA and PL branches.  Proximal segment has 20 to 30% stenosis.  Distal segment has lumpy bumpy lesions 20% stenosis.  PDA and PL branches are angiographically normal. 2. Hemodynamics: Left Ventricle: 102/1 mmHg  LVEDP: 3 mmHg Aorta: 103/58 mmHg 3. Left Ventriculogram: Ejection Fraction: 45 to 50% % Wall Motion: Apical wall motion abnormalities Mitral Regurgitation: No significant MR is noted Procedure Details: Informed consent was obtained with an explanation of the risks, benefits and alternatives of the procedure. The patient was brought to the Cardiac Catheterization Laboratory and was prepped and draped in a standard sterile fashion. Moderate sedation with Fentanyl and Versed was administered by the circulating nurse. Lidocaine 2% was used to anesthetize the right groin area.  Under ultrasound guidance using micropuncture needle right femoral artery was accessed and a 6 Lithuanian 11 cm sheath was advanced into right femoral artery. 6 Lithuanian JL 4 and JR4 diagnostic catheters were used to cannulate LCA and RCA.  Coronary angiograms were completed in multiple orthogonal views.  6 Lithuanian pigtail catheter was used for LV cannulation to perform LV gram and to determine gradient across the aortic valve.  After analysis of angiogram decision was taken to proceed with PCI of mid LAD. We took a 6 Lithuanian XB LAD 3.5 guide to engage the left main.  Run-through wire was used to successfully cross the lesion in first attempt.  Initial predilation was performed with a compliant 2.5 x 15 mm balloon.  We then delivered IVUS catheter for vessel sizing for distal reference and proximal reference.  The distal  reference was 3.3 x 3.4 mm proximal reference was 4.8 x 4.9 mm.  Significant positive remodeling was noted.  No calcification was identified. We then delivered a 3.5 x 48 mm drug-eluting stent, positioned it under fluoroscopy at the desired location and then the stent was inflated.  Postdilatation was performed using a three 4 x 15 mm NC balloon followed by a 4.5 x 15 mm NC balloon proximally.  Subsequent angiogram showed excellent result with 0% residual stenosis and DESTINY-3 flow.  There was no concern for coronary perforation.  At this point interventional wires were retracted back into the guide.  Cath was removed over the wire.  Femoral angiogram was completed.  Perclose sutures were deployed for successful hemostasis. Cumulative fluoroscopy time: 17 minutes Cumulative air kerma: 2 Gy Total amount of contrast used: 120 cc Complications: None. Estimated Blood Loss: Minimal. Conclusions: NSTEMI type I Successful PCI of LAD mid segment with 3.5 x 48 mm drug-eluting stent optimized with 3 0% residual stenosis and DESTINY-3 flow. Recommendations: Continue uninterrupted dual antiplatelet therapy for at least 1 year Continue medical management for secondary prevention of coronary artery disease. Education regarding medication compliance, dietary changes and lifestyle modification. Toño Rodriguez MD 09/04/24 21:40 EDT    Stress Test With Myocardial Perfusion One Day    Result Date: 9/3/2024    Left ventricular ejection fraction is mildly reduced (Calculated EF = 45%). Abnormal, high risk myocardial perfusion imaging study Perfusion defects are noted.  There is a partially reversible perfusion defect of the distal anterior wall segment suggestive of erika-infarct ischemia.  There is absent uptake of tracer noted in the apical segments distal inferior wall segment distal anterior segment anterolateral and improved septal segments more pronounced on stress images than rest images. Wall motion abnormalities are noted involving  distal anterior and distal inferior and apical segments.  Overall EF is estimated to be around 45% No transient ischemic dilatation was noted Constellation of findings are suggestive of mid to distal LAD occlusion which tends to wraparound apex. Coronary angiogram is recommended for further evaluation     Adult Transthoracic Echo Complete w/ Color, Spectral and Contrast if necessary per protocol    Result Date: 9/2/2024  Grossly normal chamber sizes. LV has concentric remodeling hypertrophy.  Wall motion abnormalities are present as described below.  Systolic function is mildly reduced estimated LVEF is 45 to 50%.  Diastolic function appears to be normal.  RV systolic function is normal. Aortic valve has sclerotic changes.  There is no aortic valve stenosis. Mitral valve is thickened leaflets.  MAC is present.  There is trace MR noted by Doppler. Mild TR and TN are noted via Doppler flow. Aortic root and ascending aorta has normal dimensions. No pericardial effusion is noted. IVC has normal size corresponding to a right atrial pressure of 0 to 5 mmHg. No prior studies available for comparison.     CT Chest With Contrast Diagnostic    Result Date: 9/1/2024  CT CHEST W CONTRAST DIAGNOSTIC Date of Exam: 9/1/2024 2:01 PM EDT Indication: Shortness of breath shortness of breath. Comparison: Same day chest radiograph Technique: Axial CT images were obtained of the chest after the uneventful intravenous administration of iodinated contrast.  Reconstructed coronal and sagittal images were also obtained. Automated exposure control and iterative construction methods were  used. Findings: There is no evidence of pulmonary embolism. Normal pulmonary artery caliber. No right heart strain. No evidence of pericardial effusion. A few coronary artery calcifications are present. There are mitral annular calcifications. No evidence of thoracic aortic aneurysm. No evidence of mediastinal mass or threshold lymphadenopathy. Normal  appearance of the thoracic esophagus. Central airways are patent. No significant bronchial wall thickening or bronchiectasis. There is no focal airspace consolidation, pleural effusion, or pneumothorax. There is a well-circumscribed 1.4 cm pulmonary nodule along the pleural surface of the right middle lobe as seen on series 404 image 102. This demonstrates punctate internal calcification and is consistent with a benign partially calcified granuloma. No additional suspicious pulmonary nodule or mass. Chest wall soft tissues show no acute abnormality. Surgical changes of gastric sleeve. No acute abnormality of the upper abdomen. No evidence of acute fracture or suspicious osseous lesion.     Impression: No evidence of pulmonary embolism. No acute chest abnormality. Electronically Signed: Kaden Mcclure MD  9/1/2024 2:23 PM EDT  Workstation ID: [id number]    XR Chest 1 View    Result Date: 9/1/2024  XR CHEST 1 VW Date of Exam: 9/1/2024 11:57 AM EDT Indication: Chest Pain Triage Protocol Comparison: 8/23/2024 Findings: Heart size and pulmonary vasculature within normal limits. Lungs clear. Costophrenic angle sharp. No pneumothorax     Impression: No active cardiopulmonary disease Electronically Signed: Boy Tee  9/1/2024 12:16 PM EDT  Workstation ID: [id number]       LAB RESULTS:      Lab 09/27/24  0425 09/26/24  1720   WBC 8.25 9.19   HEMOGLOBIN 12.5 13.6   HEMATOCRIT 38.3 41.0   PLATELETS 176 213   NEUTROS ABS 4.29 5.89   IMMATURE GRANS (ABS) 0.07* 0.06*   LYMPHS ABS 3.09 2.41   MONOS ABS 0.61 0.66   EOS ABS 0.13 0.10   MCV 89.3 88.0         Lab 09/27/24  0425 09/26/24 1954 09/26/24  1720   SODIUM 142  --  136   POTASSIUM 4.2  --  4.9   CHLORIDE 111*  --  105   CO2 23.9  --  21.5*   ANION GAP 7.1  --  9.5   BUN 16  --  16   CREATININE 1.19*  --  1.16*   EGFR 57.6*  --  59.4*   GLUCOSE 88  --  78   CALCIUM 8.8  --  9.5   MAGNESIUM  --   --  2.3   PHOSPHORUS 4.3 3.2  --          Lab 09/26/24  1720   TOTAL PROTEIN 6.7    ALBUMIN 3.9   GLOBULIN 2.8   ALT (SGPT) 21   AST (SGOT) 33*   BILIRUBIN 0.2   ALK PHOS 75   LIPASE 84*         Lab 09/27/24  1618 09/26/24  1954 09/26/24  1720   PROBNP  --   --  316.1   HSTROP T 8 9 9                 Brief Urine Lab Results  (Last result in the past 365 days)        Color   Clarity   Blood   Leuk Est   Nitrite   Protein   CREAT   Urine HCG        08/31/24 1640 Yellow   Clear   Trace   Negative   Negative   Negative                 Microbiology Results (last 10 days)       ** No results found for the last 240 hours. **            XR Chest 1 View    Result Date: 9/26/2024  Impression: Impression: No evidence of acute cardiopulmonary disease. Electronically Signed: Ziyad Esteban MD  9/26/2024 5:42 PM EDT  Workstation ID: [id number]    CT Chest With Contrast Diagnostic    Result Date: 9/1/2024  Impression: Impression: No evidence of pulmonary embolism. No acute chest abnormality. Electronically Signed: Kaden Mcclure MD  9/1/2024 2:23 PM EDT  Workstation ID: [id number]    XR Chest 1 View    Result Date: 9/1/2024  Impression: Impression: No active cardiopulmonary disease Electronically Signed: Boy Tee  9/1/2024 12:16 PM EDT  Workstation ID: [id number]             Results for orders placed during the hospital encounter of 09/01/24    Adult Transthoracic Echo Complete w/ Color, Spectral and Contrast if necessary per protocol    Interpretation Summary  Grossly normal chamber sizes.  LV has concentric remodeling hypertrophy.  Wall motion abnormalities are present as described below.  Systolic function is mildly reduced estimated LVEF is 45 to 50%.  Diastolic function appears to be normal.  RV systolic function is normal.  Aortic valve has sclerotic changes.  There is no aortic valve stenosis.  Mitral valve is thickened leaflets.  MAC is present.  There is trace MR noted by Doppler.  Mild TR and AZ are noted via Doppler flow.  Aortic root and ascending aorta has normal dimensions.  No pericardial effusion is  noted.  IVC has normal size corresponding to a right atrial pressure of 0 to 5 mmHg.    No prior studies available for comparison.      Labs Pending at Discharge:        Time spent on Discharge including face to face service:  35 minutes    Electronically signed by Yesi Blake MD, 09/27/24, 3:40 PM EDT.    Portions of this documentation were transcribed electronically from a voice recognition software.  I confirm all data accurately represents the service(s) I performed at today's visit.

## 2024-09-27 NOTE — PLAN OF CARE
Goal Outcome Evaluation:      Discharging home follow up with cardiology

## 2024-09-27 NOTE — CONSULTS
Cardiology Consult Note  [facility] PROGRESSIVE CARE UNIT          Patient Identification:  Bianca Boles      [id number]  44 y.o.        female  1980       Reason for Consultation:   Chest pain   PCP: Patricia Hernandez APRN    History of Present Illness:     Ms. Boles is a 44-year-old female who has a past medical history of NSTEMI, status post PCI to LAD with successful revascularization November 4, 2024, heart failure with midrange EF, HTN, HLD who presents to the ER with a complaint of chest pain.  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  She stated that yesterday while she was typing she started developing chest discomfort reminiscent of her prior events.  It was central in location, dull in nature radiated to her neck and did not alleviate with taking 1 dose of sublingual nitro which encouraged her to come to ER for evaluation.  In the ER she was given, she was given pain medications full dose baby aspirin and IV fluids and was admitted for observation.  Serial troponins were checked which have been unremarkable thus far and patient has remained hemodynamically stable.  Patient's chest pain subsided and this morning she had another episode of chest discomfort although different in quality this time, localized to left side under the breast which she attributes to indigestion.    Prior to seeing me in the clinic she has had 1 episode of chest heaviness relieving with nitro, yesterday was her second episode.  Additionally she has noted exertional dyspnea, when she is  caring medications around.  She does not associate it with orthopnea, PND, peripheral edema or weight gain.  .  The sensation of dyspnea occurs intermittently at rest as well but then resolves with taking deep breath.    She denies smoking, drinking or doing illicit drug use and she has been well compliant with her medications    Past History:  Past Medical  History:   Diagnosis Date    Acid reflux     Anxiety     Anxiety and depression     Benign essential hypertension     Borderline personality disorder     Cancer     renal cancer/mass, PARTIAL NEPH, RIGHT    Diabetes     Diabetes mellitus     type ii, doesn't check bg at home     Diabetes mellitus, type 2     Elevated cholesterol     Frozen shoulder 2023    GERD (gastroesophageal reflux disease)     High triglycerides     History of bariatric surgery 2021    GASTRIC SLEEVE    History of kidney stones     HTN (hypertension)     Hyperlipemia     Hyperlipidemia     Hypertriglyceridemia     Lumbar herniated disc     Obesity     Panic attacks     PCOS (polycystic ovarian syndrome)     Periarthritis of shoulder     Psoriasis     ELBOWS    Rotator cuff syndrome     Seasonal allergies     Self-injurious behavior     Spinal headache     AFTER PAIN EPIDURALS.  NO BLOOD PATCH    Suicide attempt      Past Surgical History:   Procedure Laterality Date    ABDOMINAL SURGERY  2020    Gastric sleeve    ADENOIDECTOMY      CARDIAC CATHETERIZATION N/A 2024    Procedure: Left Heart Cath;  Surgeon: Toño Rodriguez MD;  Location: [facility] CATH INVASIVE LOCATION;  Service: Cardiovascular;  Laterality: N/A;    CARDIAC CATHETERIZATION N/A 2024    Procedure: Coronary angiography;  Surgeon: Toño Rodriguez MD;  Location: [facility] CATH INVASIVE LOCATION;  Service: Cardiovascular;  Laterality: N/A;    CARDIAC CATHETERIZATION N/A 2024    Procedure: Angioplasty-coronary;  Surgeon: Toño Rodriguez MD;  Location: [facility] CATH INVASIVE LOCATION;  Service: Cardiovascular;  Laterality: N/A;     SECTION  ,    COLONOSCOPY N/A 2024    Procedure: COLONOSCOPY;  Surgeon: Terrence Fry MD;  Location: [facility] ENDOSCOPY;  Service: Gastroenterology;  Laterality: N/A;  NORMAL COLONOSCOPY    CYSTOSCOPY BLADDER STONE LITHOTRIPSY      EAR TUBES  1984    ENDOSCOPY N/A 10/20/2020    Procedure:  ESOPHAGOGASTRODUODENOSCOPY WITH BIOPSY;  Surgeon: Fidel Grajeda Jr., MD;  [facility];  Service: General;  Laterality: N/A;  PRE- GERD  POST- RETAINED FOOD, GASTRITIS, GASTRIC POLYPS    ENDOSCOPY      FOOT FRACTURE SURGERY Left 2017    screw placed    GASTRECTOMY  2020    GASTRIC SLEEVE LAPAROSCOPIC N/A 2020    Procedure: GASTRIC SLEEVE LAPAROSCOPIC;  Surgeon: Fidel Grajeda Jr., MD;  [facility];  Service: Bariatric;  Laterality: N/A;    NEPHRECTOMY PARTIAL Right 11/15/2021    Procedure: NEPHRECTOMY PARTIAL LAPAROSCOPIC WITH DAVINCI ROBOT;  Surgeon: Lori Troy MD;  Location: [facility] OR;  Service: Robotics - DaVinci;  Laterality: Right;    SHOULDER ARTHROSCOPY Left 2023    Procedure: LEFT SHOULDER MANIPULATION;  Surgeon: Domenic Bradley MD;  Location: [facility] OR [facility]SC;  Service: Orthopedics;  Laterality: Left;    TONSILLECTOMY      UPPER GASTROINTESTINAL ENDOSCOPY      URETEROSCOPY LASER LITHOTRIPSY WITH STENT INSERTION Right 2021    Procedure: CYSTOSCOPY, RIGHT URETEROSCOPY, LASERTRIPSY, STONE BASKET EXTRACTION AND STENT INSERTION;  Surgeon: Lori Troy MD;  Location: [facility] OR;  Service: Urology;  Laterality: Right;    URETEROSCOPY LASER LITHOTRIPSY WITH STENT INSERTION Left 2022    Procedure: URETEROSCOPY LASER LITHOTRIPSY WITH URETERAL STENT INSERTION;  Surgeon: Lori Troy MD;  Location: [facility] MAIN OR;  Service: Urology;  Laterality: Left;     Allergies   Allergen Reactions    Morphine Itching and Nausea And Vomiting     Social History     Socioeconomic History    Marital status:    Tobacco Use    Smoking status: Former     Current packs/day: 0.00     Average packs/day: 0.3 packs/day for 35.0 years (8.8 ttl pk-yrs)     Types: Cigarettes     Start date: 1/15/1985     Quit date: 1/15/2020     Years since quittin.7     Passive exposure: Never    Smokeless tobacco: Never    Tobacco comments:     A PACK  A WEEK   Vaping Use    Vaping status: Never Used   Substance and Sexual Activity    Alcohol use: Yes     Comment: OCCASIONAL/SOCIAL    Drug use: Not Currently     Frequency: 0.1 times per week     Types: Marijuana     Comment: OTC THC    Sexual activity: Yes     Partners: Male     Birth control/protection: Depo-provera     Family History   Problem Relation Age of Onset    Cancer Mother         Breast    Diabetes Father     Hypertension Father     Heart disease Father     Cancer Father         Bladder prostate liver    Colon cancer Father         malignant, currently 62    No Known Problems Sister     No Known Problems Brother     No Known Problems Maternal Aunt     No Known Problems Paternal Aunt     No Known Problems Maternal Uncle     No Known Problems Paternal Uncle     No Known Problems Maternal Grandfather     Stroke Maternal Grandmother     Heart disease Maternal Grandmother     Diabetes Paternal Grandfather     Heart disease Paternal Grandfather     No Known Problems Paternal Grandmother     No Known Problems Cousin     Malig Hyperthermia Neg Hx     ADD / ADHD Neg Hx     Alcohol abuse Neg Hx     Anxiety disorder Neg Hx     Bipolar disorder Neg Hx     Dementia Neg Hx     Depression Neg Hx     Drug abuse Neg Hx     OCD Neg Hx     Paranoid behavior Neg Hx     Schizophrenia Neg Hx     Seizures Neg Hx     Self-Injurious Behavior  Neg Hx     Suicide Attempts Neg Hx         Medications:  Prior to Admission medications    Medication Sig Start Date End Date Taking? Authorizing Provider   Apremilast (Otezla) 30 MG tablet Take 30 mg by mouth 2 (Two) Times a Day. 9/17/24  Yes    aspirin 81 MG EC tablet Take 1 tablet by mouth Daily. 9/6/24  Yes Toño Rodriguez MD   Biotin 88123 MCG tablet Take 1 tablet by mouth Every Night.   Yes ProviderHeron MD   buPROPion XL (WELLBUTRIN XL) 300 MG 24 hr tablet Take 1 tablet by mouth Daily. 5/3/24  Yes Patricia Hernandez APRN   busPIRone (BUSPAR) 5 MG tablet Take 1 tablet  by mouth 3 (Three) Times a Day. 5/3/24  Yes Patricia Hernandez APRN   CALCIUM-MAGNESIUM-ZINC PO Take 3 tablets by mouth Daily.   Yes Heron Campuzano MD   cetirizine (zyrTEC) 10 MG tablet Take 1 tablet by mouth Daily As Needed. 5/31/22  Yes Heron Campuzano MD   Cholecalciferol 25 MCG (1000 UT) capsule Take 2 capsules by mouth Daily. 11/3/23  Yes Patricia Hernandez APRN   COLLAGEN PO Take 3 tablets by mouth Daily.   Yes Heron Campuzano MD   dapagliflozin Propanediol (Farxiga) 10 MG tablet Take 10 mg by mouth Daily. 9/5/24  Yes Toño Rodriguez MD   desvenlafaxine (PRISTIQ) 50 MG 24 hr tablet Take 1 tablet by mouth Daily. 5/3/24  Yes Patricia Hernandez APRN   Diclofenac Sodium (VOLTAREN) 1 % gel gel Apply 4 g topically to the appropriate area as directed 4 (Four) Times a Day As Needed (pain). 6/12/23  Yes Nicolasa Meade PA-C   fluticasone (FLONASE) 50 MCG/ACT nasal spray USE 2 SPRAYS IN EACH NOSTRIL ONCE DAILY AS DIRECTED 5/19/23  Yes Patricia Hernandez APRN   ibuprofen (ADVIL,MOTRIN) 800 MG tablet Take 1 tablet by mouth Every 6 (Six) Hours As Needed for Mild Pain. 9/6/24  Yes Patricia Hernandez APRN   IRON-VITAMIN C PO Take 1 tablet by mouth Daily.   Yes Heron Campuzano MD   linaclotide (Linzess) 72 MCG capsule capsule Take 1 capsule by mouth Every Morning Before Breakfast for 90 days. 4/11/24 10/8/24 Yes Michell Hroan APRN   metoprolol succinate XL (Toprol XL) 25 MG 24 hr tablet Take 1 tablet by mouth Every Night. 5/3/24  Yes Patricia Hernandez APRN   Multiple Vitamins-Minerals (MULTIVITAMIN PO) Take 1 tablet by mouth Every Night.   Yes Heron Campuzano MD   nitroglycerin (NITROSTAT) 0.4 MG SL tablet Place 1 tablet under the tongue Every 5 (Five) Minutes As Needed for Chest Pain (Systolic BP Greater Than 100). Take no more than 3 doses in 15 minutes. 9/5/24  Yes Toño Rodriguez MD   ondansetron ODT (ZOFRAN-ODT) 4 MG disintegrating tablet Place 1  tablet on the tongue Every 8 (Eight) Hours As Needed for Nausea. 9/5/24  Yes Dana Xiong MD   pravastatin (PRAVACHOL) 80 MG tablet Take 1 tablet by mouth Every Night. 9/5/24  Yes Toño Rodriguez MD   Probiotic Product (PROBIOTIC-10 PO) Take 1 tablet by mouth Every Night.   Yes Provider, MD Heron   spironolactone (ALDACTONE) 25 MG tablet Take 1 tablet by mouth Daily. 9/6/24  Yes Toño Rodriguez MD   testosterone micronized powder APPLY 2 CLICKS (0.5 ML) TOPICALLY EVERY DAY  Patient taking differently: Apply 1 Application topically to the appropriate area as directed Daily. APPLY 2 CLICKS (0.5 ML) TOPICALLY EVERY DAY 8/1/24  Yes Patricia Hernandez APRN   ticagrelor (BRILINTA) 90 MG tablet tablet Take 1 tablet by mouth 2 (Two) Times a Day. 9/5/24  Yes Toño Rodriguez MD   Tirzepatide (MOUNJARO) 10 MG/0.5ML solution pen-injector pen Inject 0.5 mL under the skin into the appropriate area as directed 1 (One) Time Per Week.  Patient taking differently: Inject 0.5 mL under the skin into the appropriate area as directed 1 (One) Time Per Week. Wednesday 6/25/24  Yes Patricia Hernandez APRN   topiramate (TOPAMAX) 50 MG tablet Take 1 tablet by mouth every night at bedtime for 180 days. 5/3/24 12/17/24 Yes Patricia Hernandez APRN   traZODone (DESYREL) 100 MG tablet Take 1 tablet by mouth Every Night. 5/3/24  Yes Patricia Hernandez APRN   amLODIPine (NORVASC) 5 MG tablet Take 1 tablet by mouth Daily. 9/27/24   Toño Rodriguez MD      Current medications:  amLODIPine, 5 mg, Oral, Daily  aspirin, 81 mg, Oral, Daily  buPROPion XL, 300 mg, Oral, Daily  busPIRone, 5 mg, Oral, TID  cholecalciferol, 1,000 Units, Oral, Daily  fluticasone, 2 spray, Nasal, Daily  insulin lispro, 2-9 Units, Subcutaneous, 4x Daily AC & at Bedtime  metoprolol succinate XL, 25 mg, Oral, Nightly  multivitamin with minerals, 1 tablet, Oral, Daily  pravastatin, 80 mg, Oral, Nightly  senna-docusate sodium, 2 tablet, Oral,  "BID  sodium chloride, 10 mL, Intravenous, Q12H  [Held by provider] spironolactone, 25 mg, Oral, Daily  ticagrelor, 90 mg, Oral, BID  topiramate, 50 mg, Oral, Nightly  traZODone, 100 mg, Oral, Nightly      Current IV drips:              Physical Exam    /73 (BP Location: Right arm, Patient Position: Lying)   Pulse 68   Temp 97.9 °F (36.6 °C) (Oral)   Resp 18   Ht 170.2 cm (67.01\")   Wt 97.1 kg (214 lb 1.1 oz) Comment: rn at bedise  SpO2 100%   BMI 33.52 kg/m²  Body mass index is 33.52 kg/m².   Oxygen saturation   @FLOWAN(10::1)@ SpO2  Min: 96 %  Max: 100 %    Constitutional:  Awake. Not in acute distress. Normal appearance.   Neck: No carotid bruit, hepatojugular reflux or JVD.   Cardiovascular:      Rate and Rhythm: Normal rate and regular rhythm.      Chest Wall: PMI is not displaced.      Heart sounds: Normal heart sounds, S1 normal and S2 normal. No murmur heard.       No friction rub. No gallop. No S3 or S4 sounds.    Pulmonary: Pulmonary effort is normal. Normal breath sounds. No wheezing, rhonchi or rales.   Extremities: No Bilateral edema is noted.   Skin: Warm and dry. Non cyanotic, No petechiae or rash.   Neurological: Alert and oriented x 3    Cardiographics:     ECG  (personally reviewed) Normal sinus rhythm T wave inversion in anterior leads, consider ischemia   Telemetry:  (personally reviewed)    Results for orders placed during the hospital encounter of 09/01/24    Adult Transthoracic Echo Complete w/ Color, Spectral and Contrast if necessary per protocol    Interpretation Summary  Grossly normal chamber sizes.  LV has concentric remodeling hypertrophy.  Wall motion abnormalities are present as described below.  Systolic function is mildly reduced estimated LVEF is 45 to 50%.  Diastolic function appears to be normal.  RV systolic function is normal.  Aortic valve has sclerotic changes.  There is no aortic valve stenosis.  Mitral valve is thickened leaflets.  MAC is present.  There is " trace MR noted by Doppler.  Mild TR and RI are noted via Doppler flow.  Aortic root and ascending aorta has normal dimensions.  No pericardial effusion is noted.  IVC has normal size corresponding to a right atrial pressure of 0 to 5 mmHg.    No prior studies available for comparison.     Results for orders placed during the hospital encounter of 24    Stress Test With Myocardial Perfusion One Day    Interpretation Summary    Left ventricular ejection fraction is mildly reduced (Calculated EF = 45%).    Abnormal, high risk myocardial perfusion imaging study  Perfusion defects are noted.  There is a partially reversible perfusion defect of the distal anterior wall segment suggestive of erika-infarct ischemia.  There is absent uptake of tracer noted in the apical segments distal inferior wall segment distal anterior segment anterolateral and improved septal segments more pronounced on stress images than rest images.    Wall motion abnormalities are noted involving distal anterior and distal inferior and apical segments.  Overall EF is estimated to be around 45%  No transient ischemic dilatation was noted    Constellation of findings are suggestive of mid to distal LAD occlusion which tends to wraparound apex.  Coronary angiogram is recommended for further evaluation      Results for orders placed during the hospital encounter of 24    Cardiac Catheterization/Vascular Study    Conclusion    Prox RCA lesion is 20% stenosed.    1st Diag lesion is 40% stenosed.    Mid LAD lesion is 99% stenosed.    [facility]  CARDIAC CATHETERIZATION PROCEDURE REPORT    Patient: Bianac Boles  : 1980  MRN: [id number]    Procedure Date:  24    Interventional Cardiologist:  Toño Rodriguez MD    Indication:  NSTEMI type I      Procedure performed:  Left heart catheterization  Selective left and right coronary angiography  Left ventriculography  PCI of mid LAD  Pre and post PCI IVUS    Access  Sites:  Right femoral artery    Findings:  1. Coronary Artery Anatomy:  Left main is a large-caliber vessel with subdivides into left circumflex and LAD.  Angiographically normal.  Left circumflex is a moderate caliber vessel which gives rise to 2 moderate-sized OM branches.  LAD is a large-caliber vessel which gives rise to 1 large diagonal branch.  Mid segment has tandem lesions with 99% stenosis DESTINY I flow.  Ostial diagonal branch has 30 to 40% stenosis.  RCA is a large-caliber vessel which gives rise to PDA and PL branches.  Proximal segment has 20 to 30% stenosis.  Distal segment has lumpy bumpy lesions 20% stenosis.  PDA and PL branches are angiographically normal.    2. Hemodynamics:  Left Ventricle: 102/1 mmHg  LVEDP: 3 mmHg  Aorta: 103/58 mmHg    3. Left Ventriculogram:  Ejection Fraction: 45 to 50% %  Wall Motion: Apical wall motion abnormalities  Mitral Regurgitation: No significant MR is noted      Procedure Details:  Informed consent was obtained with an explanation of the risks, benefits and alternatives of the procedure. The patient was brought to the Cardiac Catheterization Laboratory and was prepped and draped in a standard sterile fashion. Moderate sedation with Fentanyl and Versed was administered by the circulating nurse. Lidocaine 2% was used to anesthetize the right groin area.  Under ultrasound guidance using micropuncture needle right femoral artery was accessed and a 6 Somali 11 cm sheath was advanced into right femoral artery.    6 Somali JL 4 and JR4 diagnostic catheters were used to cannulate LCA and RCA.  Coronary angiograms were completed in multiple orthogonal views.  6 Somali pigtail catheter was used for LV cannulation to perform LV gram and to determine gradient across the aortic valve.  After analysis of angiogram decision was taken to proceed with PCI of mid LAD.    We took a 6 Somali XB LAD 3.5 guide to engage the left main.  Run-through wire was used to successfully cross the  lesion in first attempt.  Initial predilation was performed with a compliant 2.5 x 15 mm balloon.  We then delivered IVUS catheter for vessel sizing for distal reference and proximal reference.  The distal reference was 3.3 x 3.4 mm proximal reference was 4.8 x 4.9 mm.  Significant positive remodeling was noted.  No calcification was identified.    We then delivered a 3.5 x 48 mm drug-eluting stent, positioned it under fluoroscopy at the desired location and then the stent was inflated.  Postdilatation was performed using a three 4 x 15 mm NC balloon followed by a 4.5 x 15 mm NC balloon proximally.  Subsequent angiogram showed excellent result with 0% residual stenosis and DESTINY-3 flow.  There was no concern for coronary perforation.  At this point interventional wires were retracted back into the guide.  Cath was removed over the wire.  Femoral angiogram was completed.  Perclose sutures were deployed for successful hemostasis.      Cumulative fluoroscopy time: 17 minutes    Cumulative air kerma: 2 Gy    Total amount of contrast used: 120 cc    Complications:  None.    Estimated Blood Loss:  Minimal.    Conclusions:  NSTEMI type I  Successful PCI of LAD mid segment with 3.5 x 48 mm drug-eluting stent optimized with 3 0% residual stenosis and DESTINY-3 flow.      Recommendations:  Continue uninterrupted dual antiplatelet therapy for at least 1 year  Continue medical management for secondary prevention of coronary artery disease.  Education regarding medication compliance, dietary changes and lifestyle modification.        Toño Rodriguez MD    09/04/24  21:40 EDT      Cardiolite (Tc-99m Sestamibi) stress test   Lab Review:       CBC          9/5/2024    06:40 9/26/2024    17:20 9/27/2024    04:25   CBC   WBC 8.82  9.19  8.25    RBC 4.52  4.66  4.29    Hemoglobin 12.8  13.6  12.5    Hematocrit 39.0  41.0  38.3    MCV 86.3  88.0  89.3    MCH 28.3  29.2  29.1    MCHC 32.8  33.2  32.6    RDW 14.1  14.1  13.8    Platelets 166   "213  176        CMP          9/5/2024    06:40 9/26/2024    17:20 9/27/2024    04:25   CMP   Glucose 88  78  88    BUN 15  16  16    Creatinine 1.05  1.16  1.19    EGFR 66.9  59.4  57.6    Sodium 139  136  142    Potassium 4.0  4.9  4.2    Chloride 114  105  111    Calcium 8.8  9.5  8.8    Total Protein  6.7     Albumin  3.9     Globulin  2.8     Total Bilirubin  0.2     Alkaline Phosphatase  75     AST (SGOT)  33     ALT (SGPT)  21     Albumin/Globulin Ratio  1.4     BUN/Creatinine Ratio 14.3  13.8  13.4    Anion Gap 8.3  9.5  7.1         CARDIAC LABS:      Lab 09/26/24 1954 09/26/24  1720   PROBNP  --  316.1   HSTROP T 9 9      No results found for: \"DIGOXIN\"   Lab Results   Component Value Date    TSH 0.810 07/05/2024           Invalid input(s): \"LDLCALC\"  Lab Results   Component Value Date    POCTROP 0.00 04/20/2020     No results found for: \"DDIMERQUAN\"  Lab Results   Component Value Date    MG 2.3 09/26/2024             CARDIAC LABS:      Lab 09/26/24 1954 09/26/24  1720   PROBNP  --  316.1   HSTROP T 9 9        Imaging:  CXR  No evidence of pulmonary edema   Assessment:  Chronic stable angina  History of NSTEMI, status post PCI to LAD  Heart failure normalized EF  Hypertension  Hyperlipidemia           Plan:  Patient's EKG was reviewed.  Serial biomarkers have been unremarkable.  Will perform limited echocardiogram today for the assessment of wall motion abnormalities.  Will discontinue Brilinta and will instead switch it to Plavix after loading dose of 600 mg p.o. due to complaint of dyspnea.  If patient continues to have dyspnea despite this, will consider starting Lasix on as-needed basis to relieve congestion.  Continue aspirin 81 mg p.o. daily and Plavix 75 mg p.o. daily  Continue Crestor 40 mg p.o. daily  Patient has been enrolled in cardiac rehab, continue cardiac rehab on discharge.  Continue Farxiga and spironolactone.    Echo was reviewed at bedside, no regional wall motion abnormalities were " noted echocardiogram shows improvement in ejection fraction, EF has now normalized.      I have added amlodipine as an additional antianginal to minimize episodes of angina.  Patient was counseled again use of sublingual nitro.    If echocardiogram is unremarkable patient can be discharged today.    Thank you for allowing us to share in [facility]. Please call with any questions or concerns.             Toño Rodriguez MD   9/27/2024    16:24 EDT

## 2024-09-27 NOTE — PLAN OF CARE
Goal Outcome Evaluation:           Progress: no change  Outcome Evaluation: Patient has intermittent complaints of bilateral neck pressure/tightness but refuses the need for intervention. No other complaints at this time, VSS. Call light within reach

## 2024-09-27 NOTE — PROGRESS NOTES
[facility]   Hospitalist Progress Note  Date: 2024  Patient Name: Bianca Boles  : 1980  MRN: [id number]  Date of admission: 2024      Subjective   Subjective     Chief complaint: Chest pain    Summary:  44-year-old female with stenting of LAD, CAD, hypertension, history of renal cancer status post partial nephrectomy, diabetes, dyslipidemia, GERD without esophagitis, history of gastric sleeve, PCOS, panic attacks, hospitalized on 2024 for chief complaint of chest pain, cardiac enzymes have been negative, EKG with no acute schema changes, cardiology consulted for further evaluation    Interval follow-up: Seen and examined, no acute distress, no acute overnight events, intermittent complaints of neck pressure and tightness, not requiring intervention.  Enzymes have been flat, creatinine 1.19, BUN 16, potassium 4.2.  Blood sugars in the 100 range, white blood cell count 8000.      Review of systems:  All systems reviewed and negative except for weakness, fatigue    Objective   Objective     Vitals:   Temp:  [97.6 °F (36.4 °C)-98.1 °F (36.7 °C)] 97.9 °F (36.6 °C)  Heart Rate:  [61-74] 68  Resp:  [18-20] 18  BP: (101-130)/(61-81) 114/73  Physical Exam    Constitutional: Awake, alert, no acute distress   Eyes: Pupils equal, sclerae anicteric, no conjunctival injection   HENT: NCAT, mucous membranes moist   Neck: Supple, no thyromegaly, no lymphadenopathy, trachea midline   Respiratory: Clear to auscultation bilaterally, nonlabored respirations    Cardiovascular: RRR, no murmurs, rubs, or gallops, palpable pedal pulses bilaterally   Gastrointestinal: Positive bowel sounds, soft, nontender, nondistended   Musculoskeletal: No bilateral ankle edema, no clubbing or cyanosis to extremities   Psychiatric: Appropriate affect, cooperative   Neurologic: Oriented x 3, strength symmetric in all extremities, Cranial Nerves grossly intact to confrontation, speech clear   Skin: No rashes visible on  exposed skin    Result Review    Result Review:  I have personally reviewed the pertinent results from the past 24 hours to 9/27/2024 15:45 EDT and agree with these findings:  [x]  Laboratory   CBC          9/5/2024    06:40 9/26/2024    17:20 9/27/2024    04:25   CBC   WBC 8.82  9.19  8.25    RBC 4.52  4.66  4.29    Hemoglobin 12.8  13.6  12.5    Hematocrit 39.0  41.0  38.3    MCV 86.3  88.0  89.3    MCH 28.3  29.2  29.1    MCHC 32.8  33.2  32.6    RDW 14.1  14.1  13.8    Platelets 166  213  176      BMP          9/5/2024    06:40 9/26/2024    17:20 9/27/2024    04:25   BMP   BUN 15  16  16    Creatinine 1.05  1.16  1.19    Sodium 139  136  142    Potassium 4.0  4.9  4.2    Chloride 114  105  111    CO2 16.7  21.5  23.9    Calcium 8.8  9.5  8.8      LIVER FUNCTION TESTS:      Lab 09/26/24  1720   TOTAL PROTEIN 6.7   ALBUMIN 3.9   GLOBULIN 2.8   ALT (SGPT) 21   AST (SGOT) 33*   BILIRUBIN 0.2   ALK PHOS 75   LIPASE 84*       [x]  Microbiology   Microbiology Results (last 10 days)       ** No results found for the last 240 hours. **              [x]  Radiology XR Chest 1 View    Result Date: 9/26/2024  Impression: No evidence of acute cardiopulmonary disease. Electronically Signed: Ziyad Esteban MD  9/26/2024 5:42 PM EDT  Workstation ID: [id number]       [x]  EKG/Telemetry   ECG 12 Lead ED Triage Standing Order; Chest Pain   Final Result   HEART RATE=65  bpm   RR Ifctuwbe=016  ms   MN Interval=160  ms   P Horizontal Axis=8  deg   P Front Axis=24  deg   QRSD Interval=93  ms   QT Ojraxmlc=745  ms   QPnR=385  ms   QRS Axis=19  deg   T Wave Axis=87  deg   - ABNORMAL ECG -   Sinus rhythm   Abnormal T, consider ischemia, anterior leads   Electronically Signed By: Shine Childs ([facility]) 2024-09-27 09:40:59   Date and Time of Study:2024-09-26 19:20:41      ECG 12 Lead ED Triage Standing Order; Chest Pain   Final Result   HEART RATE=67  bpm   RR Tpvcmkqn=434  ms   MN Seqxupwq=172  ms   P Horizontal Axis=43  deg   P Front Axis=23   deg   QRSD Njzersqv=339  ms   QT Twepimwv=933  ms   HEoN=057  ms   QRS Axis=19  deg   T Wave Axis=108  deg   - ABNORMAL ECG -   Sinus rhythm   Abnrm T, probable ischemia, anterolateral lds   Electronically Signed By: Shine Childs ([facility]) 2024-09-27 09:41:04   Date and Time of Study:2024-09-26 17:12:57          [x]  Cardiology/Vascular   []  Pathology  [x]  Old records  []  Other:    Assessment & Plan   Assessment / Plan     Assessment/Plan:    Assessment:  Chest pain  Recent LATHA in the LAD  Headache  Hypertension  Anxiety  Depression  Hyperlipidemia  ROSEANNA  NIDDM    Plan:  Labs and imaging reviewed  Cardiology consulted, follow-up recommendations  Continue aspirin 81 mg daily  Continue Brilinta 90 mg twice a day  Continue Topamax 50 mg nightly  Continue trazodone 100 mg nightly  Continue pravastatin 80 mg nightly  Continue Toprol 25 mg XL daily  Continue insulin sliding scale coverage  Continue BuSpar 5 mg 3 times daily  Continue Wellbutrin  mg daily  Telemetry monitoring  A.m. labs  Full code  DVT prophylaxis with SCDs  Clinical course dictate further management  Discussed with nurse at the bedside    VTE Prophylaxis:  Mechanical VTE prophylaxis orders are present.        CODE STATUS:   Level Of Support Discussed With: Patient  Code Status (Patient has no pulse and is not breathing): CPR (Attempt to Resuscitate)  Medical Interventions (Patient has pulse or is breathing): Full Support        Electronically signed by Yesi Blake MD, 9/27/2024, 15:45 EDT.    Portions of this documentation were transcribed electronically from a voice recognition software.  I confirm all data accurately represents the service(s) I performed at today's visit.

## 2024-09-27 NOTE — SIGNIFICANT NOTE
09/27/24 1215   Coping/Psychosocial   Observed Emotional State calm;cooperative   Verbalized Emotional State relief;hopefulness   Trust Relationship/Rapport empathic listening provided   Family/Support Persons spouse   Involvement in Care interacting with patient   Additional Documentation Spiritual Care (Group)   Spiritual Care   Use of Spiritual Resources non-Christianity use of spiritual care   Spiritual Care Source  initiative   Spiritual Care Follow-Up follow-up, none required as presently assessed   Response to Spiritual Care receptive of support   Spiritual Care Interventions supportive conversation provided   Spiritual Care Visit Type initial   Receptivity to Spiritual Care visit welcomed

## 2024-09-28 LAB
BH CV ECHO MEAS - EDV(MOD-SP2): 99.5 ML
BH CV ECHO MEAS - EDV(MOD-SP4): 125 ML
BH CV ECHO MEAS - EF(MOD-BP): 57.3 %
BH CV ECHO MEAS - EF(MOD-SP2): 58.7 %
BH CV ECHO MEAS - EF(MOD-SP4): 56.6 %
BH CV ECHO MEAS - ESV(MOD-SP2): 41.1 ML
BH CV ECHO MEAS - ESV(MOD-SP4): 54.2 ML
BH CV ECHO MEAS - SV(MOD-SP2): 58.4 ML
BH CV ECHO MEAS - SV(MOD-SP4): 70.8 ML

## 2024-09-30 ENCOUNTER — TRANSITIONAL CARE MANAGEMENT TELEPHONE ENCOUNTER (OUTPATIENT)
Dept: CALL CENTER | Facility: HOSPITAL | Age: 44
End: 2024-09-30
Payer: COMMERCIAL

## 2024-10-02 PROBLEM — N28.9 RENAL INSUFFICIENCY: Status: RESOLVED | Noted: 2024-09-16 | Resolved: 2024-10-02

## 2024-10-02 NOTE — PROGRESS NOTES
Transitional Care Follow Up Visit     Patient Name: Bianca Boles  : 1980   MRN: [id number]     Chief Complaint:    Chief Complaint   Patient presents with    Hospital Follow Up Visit    Chest Pain       History of Present Illness: Bianca Boles is a 44 y.o. female who presents today for transitional care management visit.  The patient was contacted by our office within 48 hours after the discharge of the patient via telephone to coordinate their follow up care and needs.     hospitalized with recent CAD status post stenting, hypertension, dyslipidemia, PCOS, anxiety, depression, presenting with chest pain symptoms, chest tightness, troponins were negative, EKG with no acute changes, cardiology consulted.     Date of Admission: 2024  Date of Discharge:  2024        Active and Resolved Hospital Problems:  Chest pain, suspect noncardiac  Recent LATHA in LAD  Hypertension  Anxiety  Depression  ROSEANNA  NIDDM    Follow up with Cardiology 10/10/2024. If cleared, she will be going to cardiac rehab 10/15/24.  Patient says she is doing better now. She still occasionally has a pain.     Requests a refill of testosterone replacement therapy, is helping her libido     Subjective      Review of Systems:   Review of Systems   Constitutional:  Negative for fever.   Respiratory:  Negative for cough and shortness of breath.    Cardiovascular:  Negative for chest pain and palpitations.   Gastrointestinal:  Negative for abdominal pain.   Endocrine: Negative for polydipsia and polyuria.   Genitourinary:  Negative for dysuria.   Musculoskeletal:  Negative for myalgias.   Neurological:  Negative for dizziness.       I reviewed and discussed the details of that call along with the discharge summary, hospital problems, inpatient lab results, inpatient diagnostic studies, and consultation reports with Bianca Boles.     Current outpatient and discharge medications have been reconciled for the patient.       9/27/2024     6:04 PM   Date of TCM Phone Call   [facility]   Date of Admission 9/26/2024   Date of Discharge 9/27/2024   Discharge Disposition Home or Self Care       Medications:     Current Outpatient Medications:     amLODIPine (NORVASC) 5 MG tablet, Take 1 tablet by mouth Daily., Disp: 60 tablet, Rfl: 3    Apremilast (Otezla) 30 MG tablet, Take 30 mg by mouth 2 (Two) Times a Day., Disp: 30 tablet, Rfl: 1    aspirin 81 MG EC tablet, Take 1 tablet by mouth Daily., Disp: 90 tablet, Rfl: 5    Biotin 11202 MCG tablet, Take 1 tablet by mouth Every Night., Disp: , Rfl:     buPROPion XL (WELLBUTRIN XL) 300 MG 24 hr tablet, Take 1 tablet by mouth Daily., Disp: 90 tablet, Rfl: 1    busPIRone (BUSPAR) 5 MG tablet, Take 1 tablet by mouth 3 (Three) Times a Day., Disp: 270 tablet, Rfl: 1    CALCIUM-MAGNESIUM-ZINC PO, Take 3 tablets by mouth Daily., Disp: , Rfl:     cetirizine (zyrTEC) 10 MG tablet, Take 1 tablet by mouth Daily As Needed., Disp: , Rfl:     Cholecalciferol 25 MCG (1000 UT) capsule, Take 2 capsules by mouth Daily., Disp: 180 capsule, Rfl: 1    clopidogrel (PLAVIX) 75 MG tablet, Take 1 tablet by mouth Daily, Disp: 30 tablet, Rfl: 6    COLLAGEN PO, Take 3 tablets by mouth Daily., Disp: , Rfl:     dapagliflozin Propanediol (Farxiga) 10 MG tablet, Take 10 mg by mouth Daily., Disp: 90 tablet, Rfl: 1    desvenlafaxine (PRISTIQ) 50 MG 24 hr tablet, Take 1 tablet by mouth Daily., Disp: 90 tablet, Rfl: 1    Diclofenac Sodium (VOLTAREN) 1 % gel gel, Apply 4 g topically to the appropriate area as directed 4 (Four) Times a Day As Needed (pain)., Disp: 200 g, Rfl: 1    fluticasone (FLONASE) 50 MCG/ACT nasal spray, USE 2 SPRAYS IN EACH NOSTRIL ONCE DAILY AS DIRECTED, Disp: 48 g, Rfl: 1    ibuprofen (ADVIL,MOTRIN) 800 MG tablet, Take 1 tablet by mouth Every 6 (Six) Hours As Needed for Mild Pain., Disp: 90 tablet, Rfl: 1    IRON-VITAMIN C PO, Take 1 tablet by mouth Daily., Disp: , Rfl:     linaclotide  (Linzess) 72 MCG capsule capsule, Take 1 capsule by mouth Every Morning Before Breakfast for 90 days., Disp: 90 capsule, Rfl: 1    metoprolol succinate XL (Toprol XL) 25 MG 24 hr tablet, Take 1 tablet by mouth Every Night., Disp: 90 tablet, Rfl: 1    Multiple Vitamins-Minerals (MULTIVITAMIN PO), Take 1 tablet by mouth Every Night., Disp: , Rfl:     nitroglycerin (NITROSTAT) 0.4 MG SL tablet, Place 1 tablet under the tongue Every 5 (Five) Minutes As Needed for Chest Pain (Systolic BP Greater Than 100). Take no more than 3 doses in 15 minutes., Disp: 60 tablet, Rfl: 2    ondansetron ODT (ZOFRAN-ODT) 4 MG disintegrating tablet, Place 1 tablet on the tongue Every 8 (Eight) Hours As Needed for Nausea., Disp: , Rfl:     pravastatin (PRAVACHOL) 80 MG tablet, Take 1 tablet by mouth Every Night., Disp: 90 tablet, Rfl: 1    Probiotic Product (PROBIOTIC-10 PO), Take 1 tablet by mouth Every Night., Disp: , Rfl:     spironolactone (ALDACTONE) 25 MG tablet, Take 0.5 tablets by mouth Daily., Disp: 60 tablet, Rfl: 3    testosterone micronized powder, APPLY 2 CLICKS (0.5 ML) TOPICALLY EVERY DAY (Patient taking differently: Apply 1 Application topically to the appropriate area as directed Daily. APPLY 2 CLICKS (0.5 ML) TOPICALLY EVERY DAY), Disp: 25 g, Rfl: 0    Tirzepatide (MOUNJARO) 10 MG/0.5ML solution pen-injector pen, Inject 0.5 mL under the skin into the appropriate area as directed 1 (One) Time Per Week. (Patient taking differently: Inject 0.5 mL under the skin into the appropriate area as directed 1 (One) Time Per Week. Wednesday), Disp: 6 mL, Rfl: 3    topiramate (TOPAMAX) 50 MG tablet, Take 1 tablet by mouth every night at bedtime for 180 days., Disp: 90 tablet, Rfl: 1    traZODone (DESYREL) 100 MG tablet, Take 1 tablet by mouth Every Night., Disp: 90 tablet, Rfl: 1    Current Facility-Administered Medications:     MedroxyPROGESTERone Acetate (DEPO-PROVERA) injection 150 mg, 150 mg, Intramuscular, Q3 Months, David,  "Patricia CARLTON Crain, 150 mg at 08/08/24 0756    Allergies:   Allergies   Allergen Reactions    Morphine Itching and Nausea And Vomiting       Hospital Course Information:  Risk for Readmission (LACE) Score: 11 (9/27/2024  6:01 AM)       Course During Hospital Stay:  Blood pressure medication was modified and Brilinta was changed to Plavix. Echo was unremarkable for acute issues. The patient was discharged in hemodynamically stable condition, to follow-up with cardiology as outpatient.       PMH, PSH, Care Team, Medications including OTC/CAM and Allergies reviewed and updated as appropriate.     Objective     Physical Exam:  Vital Signs:   Vitals:    10/03/24 1115   BP: 97/64   Pulse: 69   Temp: 98.1 °F (36.7 °C)   SpO2: 99%   Weight: 94.8 kg (209 lb)   Height: 170.2 cm (67.01\")     Body mass index is 32.72 kg/m².     Physical Exam  Eyes:      Conjunctiva/sclera: Conjunctivae normal.   Neck:      Vascular: No carotid bruit.   Cardiovascular:      Rate and Rhythm: Normal rate and regular rhythm.      Heart sounds: Normal heart sounds. No murmur heard.  Pulmonary:      Effort: Pulmonary effort is normal.      Breath sounds: Normal breath sounds.   Abdominal:      General: Bowel sounds are normal.      Palpations: Abdomen is soft.   Musculoskeletal:      Right lower leg: No edema.      Left lower leg: No edema.   Skin:     General: Skin is warm and dry.   Neurological:      Mental Status: She is alert.   Psychiatric:         Mood and Affect: Mood normal.         Behavior: Behavior normal.         Assessment / Plan      Assessment/Plan:   Diagnoses and all orders for this visit:    1. Chest pain due to myocardial ischemia, unspecified ischemic chest pain type (Primary)    2. CAD S/P percutaneous coronary angioplasty    3. Type 2 diabetes mellitus without complication, without long-term current use of insulin  -     CBC Auto Differential; Future  -     Comprehensive Metabolic Panel; Future  -     Microalbumin / Creatinine " "Urine Ratio - Urine, Clean Catch; Future  -     Hemoglobin A1c; Future  -     TSH; Future  -     Urinalysis With Culture If Indicated -; Future    4. Essential hypertension    5. Mixed hyperlipidemia  -     Comprehensive Metabolic Panel; Future  -     Lipid Panel; Future    6. Stage 3 chronic kidney disease, unspecified whether stage 3a or 3b CKD  -     Comprehensive Metabolic Panel; Future  -     Comprehensive Metabolic Panel    7. Anxiety    8. Mild episode of recurrent major depressive disorder    9. Medication management  -     POC 12 Panel Urine Drug Screen    10. Low testosterone level in female  -     Testosterone (Free & Total), LC / MS; Future  -     Estradiol  -     CBC Auto Differential    11. Low libido  -     Testosterone (Free & Total), LC / MS; Future  -     Estradiol  -     CBC Auto Differential    Other orders  -     metoprolol succinate XL (Toprol XL) 25 MG 24 hr tablet; Take 1 tablet by mouth Every Night.  Dispense: 90 tablet; Refill: 1       Chest pain with coronary disease status post angioplasty patient has been referred to cardiac rehab patient denies chest pain since hospitalization  Diabetes mellitus type 2 currently controlled hemoglobin A1c normal currently on Mounjaro  Hypertension currently controlled amlodipine Toprol-XL and recently added spironolactone  Chronic kidney disease stage III avoid all NSAIDs currently on Farxiga  Anxiety depression stable at this time  Low testosterone female with low libido provide a refill of testosterone replacement therapy obtain labs to monitor    Follow Up:   Return in about 2 months (around 12/17/2024).      David Gomez, CARLTON    \"Please note that portions of this note were completed with a voice recognition program.\"      *Note: 38410 is for high complexity patients with a face to face visit within 7 days of discharge.  79771 is for high complexity patients with a face to face on days 8-14 post discharge or medium complexity with face to " face visit within 14 days post discharge.

## 2024-10-03 ENCOUNTER — TELEPHONE (OUTPATIENT)
Dept: FAMILY MEDICINE CLINIC | Facility: CLINIC | Age: 44
End: 2024-10-03

## 2024-10-03 ENCOUNTER — OFFICE VISIT (OUTPATIENT)
Dept: FAMILY MEDICINE CLINIC | Facility: CLINIC | Age: 44
End: 2024-10-03
Payer: COMMERCIAL

## 2024-10-03 VITALS
OXYGEN SATURATION: 99 % | TEMPERATURE: 98.1 F | DIASTOLIC BLOOD PRESSURE: 64 MMHG | SYSTOLIC BLOOD PRESSURE: 97 MMHG | WEIGHT: 209 LBS | HEIGHT: 67 IN | HEART RATE: 69 BPM | BODY MASS INDEX: 32.8 KG/M2

## 2024-10-03 DIAGNOSIS — I25.9 CHEST PAIN DUE TO MYOCARDIAL ISCHEMIA, UNSPECIFIED ISCHEMIC CHEST PAIN TYPE: Primary | ICD-10-CM

## 2024-10-03 DIAGNOSIS — N18.30 STAGE 3 CHRONIC KIDNEY DISEASE, UNSPECIFIED WHETHER STAGE 3A OR 3B CKD: ICD-10-CM

## 2024-10-03 DIAGNOSIS — R68.82 LOW LIBIDO: ICD-10-CM

## 2024-10-03 DIAGNOSIS — R79.89 LOW TESTOSTERONE LEVEL IN FEMALE: ICD-10-CM

## 2024-10-03 DIAGNOSIS — E78.2 MIXED HYPERLIPIDEMIA: ICD-10-CM

## 2024-10-03 DIAGNOSIS — E11.9 TYPE 2 DIABETES MELLITUS WITHOUT COMPLICATION, WITHOUT LONG-TERM CURRENT USE OF INSULIN: ICD-10-CM

## 2024-10-03 DIAGNOSIS — F41.9 ANXIETY: ICD-10-CM

## 2024-10-03 DIAGNOSIS — I25.10 CAD S/P PERCUTANEOUS CORONARY ANGIOPLASTY: ICD-10-CM

## 2024-10-03 DIAGNOSIS — Z98.61 CAD S/P PERCUTANEOUS CORONARY ANGIOPLASTY: ICD-10-CM

## 2024-10-03 DIAGNOSIS — I10 ESSENTIAL HYPERTENSION: ICD-10-CM

## 2024-10-03 DIAGNOSIS — F33.0 MILD EPISODE OF RECURRENT MAJOR DEPRESSIVE DISORDER: ICD-10-CM

## 2024-10-03 DIAGNOSIS — Z79.899 MEDICATION MANAGEMENT: ICD-10-CM

## 2024-10-03 LAB
ALBUMIN SERPL-MCNC: 4.3 G/DL (ref 3.5–5.2)
ALBUMIN/GLOB SERPL: 1.7 G/DL
ALP SERPL-CCNC: 78 U/L (ref 39–117)
ALT SERPL W P-5'-P-CCNC: 19 U/L (ref 1–33)
AMPHET+METHAMPHET UR QL: NEGATIVE
AMPHETAMINE INTERNAL CONTROL: NORMAL
AMPHETAMINES UR QL: NEGATIVE
ANION GAP SERPL CALCULATED.3IONS-SCNC: 8.9 MMOL/L (ref 5–15)
AST SERPL-CCNC: 16 U/L (ref 1–32)
BARBITURATE INTERNAL CONTROL: NORMAL
BARBITURATES UR QL SCN: NEGATIVE
BASOPHILS # BLD AUTO: 0.06 10*3/MM3 (ref 0–0.2)
BASOPHILS NFR BLD AUTO: 0.8 % (ref 0–1.5)
BENZODIAZ UR QL SCN: NEGATIVE
BENZODIAZEPINE INTERNAL CONTROL: NORMAL
BILIRUB SERPL-MCNC: 0.3 MG/DL (ref 0–1.2)
BUN SERPL-MCNC: 18 MG/DL (ref 6–20)
BUN/CREAT SERPL: 13.3 (ref 7–25)
BUPRENORPHINE INTERNAL CONTROL: NORMAL
BUPRENORPHINE SERPL-MCNC: NEGATIVE NG/ML
CALCIUM SPEC-SCNC: 9.8 MG/DL (ref 8.6–10.5)
CANNABINOIDS SERPL QL: NEGATIVE
CHLORIDE SERPL-SCNC: 106 MMOL/L (ref 98–107)
CO2 SERPL-SCNC: 24.1 MMOL/L (ref 22–29)
COCAINE INTERNAL CONTROL: NORMAL
COCAINE UR QL: NEGATIVE
CREAT SERPL-MCNC: 1.35 MG/DL (ref 0.57–1)
DEPRECATED RDW RBC AUTO: 41.4 FL (ref 37–54)
EGFRCR SERPLBLD CKD-EPI 2021: 49.5 ML/MIN/1.73
EOSINOPHIL # BLD AUTO: 0.11 10*3/MM3 (ref 0–0.4)
EOSINOPHIL NFR BLD AUTO: 1.4 % (ref 0.3–6.2)
ERYTHROCYTE [DISTWIDTH] IN BLOOD BY AUTOMATED COUNT: 13 % (ref 12.3–15.4)
ESTRADIOL SERPL HS-MCNC: 17.5 PG/ML
EXPIRATION DATE: NORMAL
GLOBULIN UR ELPH-MCNC: 2.6 GM/DL
GLUCOSE SERPL-MCNC: 90 MG/DL (ref 65–99)
HCT VFR BLD AUTO: 40.9 % (ref 34–46.6)
HGB BLD-MCNC: 13.9 G/DL (ref 12–15.9)
IMM GRANULOCYTES # BLD AUTO: 0.05 10*3/MM3 (ref 0–0.05)
IMM GRANULOCYTES NFR BLD AUTO: 0.6 % (ref 0–0.5)
LYMPHOCYTES # BLD AUTO: 1.79 10*3/MM3 (ref 0.7–3.1)
LYMPHOCYTES NFR BLD AUTO: 22.8 % (ref 19.6–45.3)
Lab: NORMAL
MCH RBC QN AUTO: 29.6 PG (ref 26.6–33)
MCHC RBC AUTO-ENTMCNC: 34 G/DL (ref 31.5–35.7)
MCV RBC AUTO: 87 FL (ref 79–97)
MDMA (ECSTASY) INTERNAL CONTROL: NORMAL
MDMA UR QL SCN: NEGATIVE
METHADONE INTERNAL CONTROL: NORMAL
METHADONE UR QL SCN: NEGATIVE
METHAMPHETAMINE INTERNAL CONTROL: NORMAL
MONOCYTES # BLD AUTO: 0.54 10*3/MM3 (ref 0.1–0.9)
MONOCYTES NFR BLD AUTO: 6.9 % (ref 5–12)
MORPHINE INTERNAL CONTROL: NORMAL
MORPHINE/OPIATES SCREEN, URINE: NEGATIVE
NEUTROPHILS NFR BLD AUTO: 5.3 10*3/MM3 (ref 1.7–7)
NEUTROPHILS NFR BLD AUTO: 67.5 % (ref 42.7–76)
NRBC BLD AUTO-RTO: 0 /100 WBC (ref 0–0.2)
OXYCODONE INTERNAL CONTROL: NORMAL
OXYCODONE UR QL SCN: NEGATIVE
PCP UR QL SCN: NEGATIVE
PHENCYCLIDINE INTERNAL CONTROL: NORMAL
PLATELET # BLD AUTO: 200 10*3/MM3 (ref 140–450)
PMV BLD AUTO: 9.7 FL (ref 6–12)
POTASSIUM SERPL-SCNC: 4.2 MMOL/L (ref 3.5–5.2)
PROT SERPL-MCNC: 6.9 G/DL (ref 6–8.5)
RBC # BLD AUTO: 4.7 10*6/MM3 (ref 3.77–5.28)
SODIUM SERPL-SCNC: 139 MMOL/L (ref 136–145)
THC INTERNAL CONTROL: NORMAL
WBC NRBC COR # BLD AUTO: 7.85 10*3/MM3 (ref 3.4–10.8)

## 2024-10-03 PROCEDURE — 84403 ASSAY OF TOTAL TESTOSTERONE: CPT | Performed by: NURSE PRACTITIONER

## 2024-10-03 PROCEDURE — 85025 COMPLETE CBC W/AUTO DIFF WBC: CPT | Performed by: NURSE PRACTITIONER

## 2024-10-03 PROCEDURE — 84402 ASSAY OF FREE TESTOSTERONE: CPT | Performed by: NURSE PRACTITIONER

## 2024-10-03 PROCEDURE — 80053 COMPREHEN METABOLIC PANEL: CPT | Performed by: NURSE PRACTITIONER

## 2024-10-03 PROCEDURE — 82670 ASSAY OF TOTAL ESTRADIOL: CPT | Performed by: NURSE PRACTITIONER

## 2024-10-03 RX ORDER — TESTOSTERONE MICRONIZED 100 %
POWDER (GRAM) MISCELLANEOUS
Qty: 25 G | Refills: 0 | Status: SHIPPED | OUTPATIENT
Start: 2024-10-03

## 2024-10-03 RX ORDER — TESTOSTERONE MICRONIZED 100 %
POWDER (GRAM) MISCELLANEOUS
Qty: 25 G | Refills: 0 | Status: SHIPPED | OUTPATIENT
Start: 2024-10-03 | End: 2024-10-03

## 2024-10-03 RX ORDER — TESTOSTERONE MICRONIZED 100 %
POWDER (GRAM) MISCELLANEOUS
Qty: 25 G | Refills: 0 | Status: CANCELLED | OUTPATIENT
Start: 2024-10-03

## 2024-10-03 RX ORDER — TESTOSTERONE MICRONIZED 100 %
1 POWDER (GRAM) MISCELLANEOUS DAILY
Qty: 1 G | Refills: 0 | Status: CANCELLED | OUTPATIENT
Start: 2024-10-03

## 2024-10-03 RX ORDER — TESTOSTERONE MICRONIZED 100 %
POWDER (GRAM) MISCELLANEOUS
Qty: 25 G | Refills: 0 | Status: SHIPPED | OUTPATIENT
Start: 2024-10-03 | End: 2024-10-03 | Stop reason: SDUPTHER

## 2024-10-03 RX ORDER — METOPROLOL SUCCINATE 25 MG/1
25 TABLET, EXTENDED RELEASE ORAL NIGHTLY
Qty: 90 TABLET | Refills: 1 | Status: SHIPPED | OUTPATIENT
Start: 2024-10-03

## 2024-10-03 NOTE — TELEPHONE ENCOUNTER
Pharmacy Name: [facility] [place] [address] [facility] [address] - [phone] [facility] [phone]      Pharmacy representative name: MARK    Pharmacy representative phone number: [phone]    What medication are you calling in regards to:     testosterone micronized powder       Additional notes: THERE IS A SIGNED ENCOUNTER NOTE DATED TODAY 10/03/2024 REFERENCING THIS. THE PHARMACY RECEIVED THE SAME SCRIPT. THEY ARE NOT ABLE TO DISPENSE TO PATIENT UNTIL THE NOTES ARE IN WITH THE SCRIPT FOR THE CREAM THAT WAS PRESCRIBED TODAY, 10/03/2024.

## 2024-10-03 NOTE — TELEPHONE ENCOUNTER
Pharmacy Name: [facility] [place] [address] [facility] [address] - [phone] [facility] [phone]      Pharmacy representative phone number: [phone]    What medication are you calling in regards to:   CREAM THAT WAS SENT 11:38 AM TODAY, 10/03/2024    What question does the pharmacy have: THERE IS NO NOTES ON THIS SCRIPT.     Who is the provider that prescribed the medication: MELANIE

## 2024-10-03 NOTE — LETTER
October 3, 2024     Patient: Bianca Boles   YOB: 1980   Date of Visit: 10/3/2024       To Whom It May Concern:    It is my medical opinion that Bianca Boles may return to work 10/3/2024.           Sincerely,        CARLTON Santa    CC: No Recipients

## 2024-10-08 ENCOUNTER — READMISSION MANAGEMENT (OUTPATIENT)
Dept: CALL CENTER | Facility: HOSPITAL | Age: 44
End: 2024-10-08
Payer: COMMERCIAL

## 2024-10-08 NOTE — OUTREACH NOTE
Medical Week 2 Survey      Flowsheet Row Responses   [facility] patient discharged from? [facility]   Does the patient have one of the following disease processes/diagnoses(primary or secondary)? Other   Week 2 attempt successful? Yes   Call start time 1540   Discharge diagnosis Recent LATHA in the LADChest pain   Call end time 1543   Meds reviewed with patient/caregiver? Yes   Is the patient taking all medications as directed (includes completed medication regime)? Yes   Does the patient have a primary care provider?  Yes   Does the patient have an appointment with their PCP within 7 days of discharge? Yes   Has the patient kept scheduled appointments due by today? Yes   Has home health visited the patient within 72 hours of discharge? N/A   Psychosocial issues? No   Did the patient receive a copy of their discharge instructions? Yes   Nursing interventions Reviewed instructions with patient   What is the patient's perception of their health status since discharge? Improving   Is the patient/caregiver able to teach back signs and symptoms related to disease process for when to call PCP? Yes   Is the patient/caregiver able to teach back signs and symptoms related to disease process for when to call 911? Yes   Is the patient/caregiver able to teach back the hierarchy of who to call/visit for symptoms/problems? PCP, Specialist, Home health nurse, Urgent Care, ED, 911 Yes   Week 2 Call Completed? Yes   Graduated Yes   Graduated/Revoked comments Pt has FU with cardio on 10/10/24 and has seen PCP. Pt reports she is doing well.   Call end time 1543            FER CANTU - Registered Nurse

## 2024-10-09 ENCOUNTER — TELEPHONE (OUTPATIENT)
Dept: FAMILY MEDICINE CLINIC | Facility: CLINIC | Age: 44
End: 2024-10-09
Payer: COMMERCIAL

## 2024-10-09 LAB
TESTOST FREE SERPL-MCNC: <0.2 PG/ML (ref 0–4.2)
TESTOST SERPL-MCNC: 8.7 NG/DL

## 2024-10-10 ENCOUNTER — OFFICE VISIT (OUTPATIENT)
Dept: CARDIOLOGY | Facility: CLINIC | Age: 44
End: 2024-10-10
Payer: COMMERCIAL

## 2024-10-10 VITALS
WEIGHT: 214.8 LBS | DIASTOLIC BLOOD PRESSURE: 64 MMHG | BODY MASS INDEX: 33.71 KG/M2 | SYSTOLIC BLOOD PRESSURE: 105 MMHG | HEIGHT: 67 IN | HEART RATE: 81 BPM

## 2024-10-10 DIAGNOSIS — Z98.61 CAD S/P PERCUTANEOUS CORONARY ANGIOPLASTY: ICD-10-CM

## 2024-10-10 DIAGNOSIS — I10 ESSENTIAL HYPERTENSION: Primary | ICD-10-CM

## 2024-10-10 DIAGNOSIS — E78.2 MIXED HYPERLIPIDEMIA: ICD-10-CM

## 2024-10-10 DIAGNOSIS — I25.10 CAD S/P PERCUTANEOUS CORONARY ANGIOPLASTY: ICD-10-CM

## 2024-10-10 NOTE — PROGRESS NOTES
Chief Complaint  Hypertension, Hyperlipidemia, and Hospital Follow Up Visit ([facility] f/u due to chest pain.  Pt had chest pain a few days ago.  Pain has gotten better. )    Subjective        History of Present Illness  Bianca Boles presents to [facility] CARDIOLOGY for follow up.   Patient is a 44-year-old female with past medical history outlined below, significant for type 2 diabetes, hypertension, hyperlipidemia, coronary artery disease status post recent NSTEMI where she received PCI to the LAD with a LATHA.  She has had intermittent chest discomfort since then and was started on antianginal therapy with metoprolol and amlodipine.  She continues to note occasional episodes of chest discomfort but reports they have decreased in intensity since antianginal therapy was initiated.  She was also suffering from shortness of breath that was thought to be related to Brilinta which has since been switched to Plavix and she reports improvement in this.  She is scheduled to start cardiac rehab in a few weeks.  Otherwise she has no complaints.  She denies palpitations, dizziness, lightheadedness or edema.    Past Medical History:   Diagnosis Date    Acid reflux     Anxiety     Anxiety and depression     Benign essential hypertension     Borderline personality disorder     Cancer     renal cancer/mass, PARTIAL NEPH, RIGHT    Diabetes     Diabetes mellitus     type ii, doesn't check bg at home     Diabetes mellitus, type 2     Elevated cholesterol     Frozen shoulder 08/02/2023    GERD (gastroesophageal reflux disease)     High triglycerides     History of bariatric surgery 02/04/2021    GASTRIC SLEEVE    History of kidney stones     HTN (hypertension)     Hyperlipemia     Hyperlipidemia     Hypertriglyceridemia     Lumbar herniated disc     Obesity     Panic attacks     PCOS (polycystic ovarian syndrome)     Periarthritis of shoulder 01/23    Psoriasis     ELBOWS    Rotator cuff syndrome 01/23     Seasonal allergies     Self-injurious behavior     Spinal headache     AFTER PAIN EPIDURALS.  NO BLOOD PATCH    Suicide attempt        ALLERGY  Allergies   Allergen Reactions    Morphine Itching and Nausea And Vomiting        Past Surgical History:   Procedure Laterality Date    ABDOMINAL SURGERY  2020    Gastric sleeve    ADENOIDECTOMY      CARDIAC CATHETERIZATION N/A 2024    Procedure: Left Heart Cath;  Surgeon: Toño Rodriguez MD;  Location: [facility] CATH INVASIVE LOCATION;  Service: Cardiovascular;  Laterality: N/A;    CARDIAC CATHETERIZATION N/A 2024    Procedure: Coronary angiography;  Surgeon: Toño Rodriguez MD;  Location: [facility] CATH INVASIVE LOCATION;  Service: Cardiovascular;  Laterality: N/A;    CARDIAC CATHETERIZATION N/A 2024    Procedure: Angioplasty-coronary;  Surgeon: Toño Rodriguez MD;  Location: [facility] CATH INVASIVE LOCATION;  Service: Cardiovascular;  Laterality: N/A;     SECTION  ,    COLONOSCOPY N/A 2024    Procedure: COLONOSCOPY;  Surgeon: Terrence Fry MD;  Location: [facility] ENDOSCOPY;  Service: Gastroenterology;  Laterality: N/A;  NORMAL COLONOSCOPY    CYSTOSCOPY BLADDER STONE LITHOTRIPSY      EAR TUBES  1984    ENDOSCOPY N/A 10/20/2020    Procedure: ESOPHAGOGASTRODUODENOSCOPY WITH BIOPSY;  Surgeon: Fidel Grajeda Jr., MD;  Location: [facility] ENDOSCOPY;  Service: General;  Laterality: N/A;  PRE- GERD  POST- RETAINED FOOD, GASTRITIS, GASTRIC POLYPS    ENDOSCOPY  2014    FOOT FRACTURE SURGERY Left 2017    screw placed    GASTRECTOMY  2020    GASTRIC SLEEVE LAPAROSCOPIC N/A 2020    Procedure: GASTRIC SLEEVE LAPAROSCOPIC;  Surgeon: Fidel Grajeda Jr., MD;  [facility];  Service: Bariatric;  Laterality: N/A;    NEPHRECTOMY PARTIAL Right 11/15/2021    Procedure: NEPHRECTOMY PARTIAL LAPAROSCOPIC WITH DAVINCI ROBOT;  Surgeon: Lori Troy MD;  Location: [facility] MAIN OR;  Service: Robotics - DaVinci;  Laterality:  Right;    SHOULDER ARTHROSCOPY Left 2023    Procedure: LEFT SHOULDER MANIPULATION;  Surgeon: Domenic Bradley MD;  Location: [facility];  Service: Orthopedics;  Laterality: Left;    TONSILLECTOMY  1984    UPPER GASTROINTESTINAL ENDOSCOPY      URETEROSCOPY LASER LITHOTRIPSY WITH STENT INSERTION Right 2021    Procedure: CYSTOSCOPY, RIGHT URETEROSCOPY, LASERTRIPSY, STONE BASKET EXTRACTION AND STENT INSERTION;  Surgeon: Lori Troy MD;  Location: [facility] OR;  Service: Urology;  Laterality: Right;    URETEROSCOPY LASER LITHOTRIPSY WITH STENT INSERTION Left 2022    Procedure: URETEROSCOPY LASER LITHOTRIPSY WITH URETERAL STENT INSERTION;  Surgeon: Lori Troy MD;  Location: [facility] OR;  Service: Urology;  Laterality: Left;        Social History     Socioeconomic History    Marital status:    Tobacco Use    Smoking status: Former     Current packs/day: 0.00     Average packs/day: 0.3 packs/day for 35.0 years (8.8 ttl pk-yrs)     Types: Cigarettes     Start date: 1/15/1985     Quit date: 1/15/2020     Years since quittin.7     Passive exposure: Never    Smokeless tobacco: Never    Tobacco comments:     A PACK A WEEK   Vaping Use    Vaping status: Never Used   Substance and Sexual Activity    Alcohol use: Yes     Comment: OCCASIONAL/SOCIAL    Drug use: Not Currently     Frequency: 0.1 times per week     Types: Marijuana     Comment: OTC THC    Sexual activity: Yes     Partners: Male     Birth control/protection: Depo-provera       Family History   Problem Relation Age of Onset    Cancer Mother         Breast    Diabetes Father     Hypertension Father     Heart disease Father     Cancer Father         Bladder prostate liver    Colon cancer Father         malignant, currently 62    No Known Problems Sister     No Known Problems Brother     No Known Problems Maternal Aunt     No Known Problems Paternal Aunt     No Known Problems Maternal Uncle     No Known Problems Paternal  Uncle     No Known Problems Maternal Grandfather     Stroke Maternal Grandmother     Heart disease Maternal Grandmother     Diabetes Paternal Grandfather     Heart disease Paternal Grandfather     No Known Problems Paternal Grandmother     No Known Problems Cousin     Stepan Hyperthermia Neg Hx     ADD / ADHD Neg Hx     Alcohol abuse Neg Hx     Anxiety disorder Neg Hx     Bipolar disorder Neg Hx     Dementia Neg Hx     Depression Neg Hx     Drug abuse Neg Hx     OCD Neg Hx     Paranoid behavior Neg Hx     Schizophrenia Neg Hx     Seizures Neg Hx     Self-Injurious Behavior  Neg Hx     Suicide Attempts Neg Hx         Current Outpatient Medications on File Prior to Visit   Medication Sig    amLODIPine (NORVASC) 5 MG tablet Take 1 tablet by mouth Daily.    Apremilast (Otezla) 30 MG tablet Take 30 mg by mouth 2 (Two) Times a Day.    aspirin 81 MG EC tablet Take 1 tablet by mouth Daily.    Biotin 92646 MCG tablet Take 1 tablet by mouth Every Night.    buPROPion XL (WELLBUTRIN XL) 300 MG 24 hr tablet Take 1 tablet by mouth Daily.    busPIRone (BUSPAR) 5 MG tablet Take 1 tablet by mouth 3 (Three) Times a Day.    CALCIUM-MAGNESIUM-ZINC PO Take 3 tablets by mouth Daily.    cetirizine (zyrTEC) 10 MG tablet Take 1 tablet by mouth Daily As Needed.    Cholecalciferol 25 MCG (1000 UT) capsule Take 2 capsules by mouth Daily.    clopidogrel (PLAVIX) 75 MG tablet Take 1 tablet by mouth Daily    COLLAGEN PO Take 3 tablets by mouth Daily.    dapagliflozin Propanediol (Farxiga) 10 MG tablet Take 10 mg by mouth Daily.    desvenlafaxine (PRISTIQ) 50 MG 24 hr tablet Take 1 tablet by mouth Daily.    Diclofenac Sodium (VOLTAREN) 1 % gel gel Apply 4 g topically to the appropriate area as directed 4 (Four) Times a Day As Needed (pain).    fluticasone (FLONASE) 50 MCG/ACT nasal spray USE 2 SPRAYS IN EACH NOSTRIL ONCE DAILY AS DIRECTED    ibuprofen (ADVIL,MOTRIN) 800 MG tablet Take 1 tablet by mouth Every 6 (Six) Hours As Needed for Mild Pain.  "   IRON-VITAMIN C PO Take 1 tablet by mouth Daily.    linaclotide (Linzess) 72 MCG capsule capsule Take 1 capsule by mouth Every Morning Before Breakfast for 90 days.    metoprolol succinate XL (Toprol XL) 25 MG 24 hr tablet Take 1 tablet by mouth Every Night.    Multiple Vitamins-Minerals (MULTIVITAMIN PO) Take 1 tablet by mouth Every Night.    nitroglycerin (NITROSTAT) 0.4 MG SL tablet Place 1 tablet under the tongue Every 5 (Five) Minutes As Needed for Chest Pain (Systolic BP Greater Than 100). Take no more than 3 doses in 15 minutes.    ondansetron ODT (ZOFRAN-ODT) 4 MG disintegrating tablet Place 1 tablet on the tongue Every 8 (Eight) Hours As Needed for Nausea.    pravastatin (PRAVACHOL) 80 MG tablet Take 1 tablet by mouth Every Night.    Probiotic Product (PROBIOTIC-10 PO) Take 1 tablet by mouth Every Night.    spironolactone (ALDACTONE) 25 MG tablet Take 0.5 tablets by mouth Daily.    testosterone micronized powder Testosterone 1% cream transdermal SIG appyl 0.5 mL (2 clicks) to skin daily    Tirzepatide (MOUNJARO) 10 MG/0.5ML solution pen-injector pen Inject 0.5 mL under the skin into the appropriate area as directed 1 (One) Time Per Week. (Patient taking differently: Inject 0.5 mL under the skin into the appropriate area as directed 1 (One) Time Per Week. Wednesday)    topiramate (TOPAMAX) 50 MG tablet Take 1 tablet by mouth every night at bedtime for 180 days.    traZODone (DESYREL) 100 MG tablet Take 1 tablet by mouth Every Night.    [DISCONTINUED] metoprolol succinate XL (TOPROL-XL) 25 MG 24 hr tablet Take 1 tablet by mouth Daily.     Current Facility-Administered Medications on File Prior to Visit   Medication    MedroxyPROGESTERone Acetate (DEPO-PROVERA) injection 150 mg       Objective   Vitals:    10/10/24 1012   BP: 105/64   Pulse: 81   Weight: 97.4 kg (214 lb 12.8 oz)   Height: 170.2 cm (67.01\")       Physical Exam  Constitutional:       General: She is awake. She is not in acute distress.     " Appearance: Normal appearance.   HENT:      Head: Normocephalic.      Nose: Nose normal. No congestion.   Eyes:      Extraocular Movements: Extraocular movements intact.      Conjunctiva/sclera: Conjunctivae normal.      Pupils: Pupils are equal, round, and reactive to light.   Neck:      Thyroid: No thyromegaly.      Vascular: No JVD.   Cardiovascular:      Rate and Rhythm: Normal rate and regular rhythm.      Chest Wall: PMI is not displaced.      Pulses: Normal pulses.      Heart sounds: Normal heart sounds, S1 normal and S2 normal. No murmur heard.     No friction rub. No gallop. No S3 or S4 sounds.   Pulmonary:      Effort: Pulmonary effort is normal.      Breath sounds: Normal breath sounds. No wheezing, rhonchi or rales.   Abdominal:      General: Bowel sounds are normal.      Palpations: Abdomen is soft.      Tenderness: There is no abdominal tenderness.   Musculoskeletal:      Cervical back: No tenderness.      Right lower leg: No edema.      Left lower leg: No edema.   Lymphadenopathy:      Cervical: No cervical adenopathy.   Skin:     General: Skin is warm and dry.      Capillary Refill: Capillary refill takes less than 2 seconds.      Coloration: Skin is not cyanotic.      Findings: No petechiae or rash.      Nails: There is no clubbing.   Neurological:      Mental Status: She is alert.   Psychiatric:         Mood and Affect: Mood normal.         Behavior: Behavior is cooperative.           Result Review     The following data was reviewed by CARLTON Borrero on 10/10/24.      CMP          9/26/2024    17:20 9/27/2024    04:25 10/3/2024    12:30   CMP   Glucose 78  88  90    BUN 16  16  18    Creatinine 1.16  1.19  1.35    EGFR 59.4  57.6  49.5    Sodium 136  142  139    Potassium 4.9  4.2  4.2    Chloride 105  111  106    Calcium 9.5  8.8  9.8    Total Protein 6.7   6.9    Albumin 3.9   4.3    Globulin 2.8   2.6    Total Bilirubin 0.2   0.3    Alkaline Phosphatase 75   78    AST (SGOT) 33   16     ALT (SGPT) 21   19    Albumin/Globulin Ratio 1.4   1.7    BUN/Creatinine Ratio 13.8  13.4  13.3    Anion Gap 9.5  7.1  8.9      CBC w/diff          9/26/2024    17:20 9/27/2024    04:25 10/3/2024    12:30   CBC w/Diff   WBC 9.19  8.25  7.85    RBC 4.66  4.29  4.70    Hemoglobin 13.6  12.5  13.9    Hematocrit 41.0  38.3  40.9    MCV 88.0  89.3  87.0    MCH 29.2  29.1  29.6    MCHC 33.2  32.6  34.0    RDW 14.1  13.8  13.0    Platelets 213  176  200    Neutrophil Rel % 64.0  52.0  67.5    Immature Granulocyte Rel % 0.7  0.8  0.6    Lymphocyte Rel % 26.2  37.5  22.8    Monocyte Rel % 7.2  7.4  6.9    Eosinophil Rel % 1.1  1.6  1.4    Basophil Rel % 0.8  0.7  0.8       Lipid Panel          5/2/2024    08:07 7/5/2024    12:03 9/5/2024    06:40   Lipid Panel   Total Cholesterol 164  152  114    Triglycerides 87  98  82    HDL Cholesterol 46  46  34    VLDL Cholesterol 16  18  16    LDL Cholesterol  102  88  64    LDL/HDL Ratio 2.19  1.88  1.87        Results for orders placed during the hospital encounter of 09/26/24    Adult Transthoracic Echo Limited W/ Cont if Necessary Per Protocol    Interpretation Summary  Limited echocardiogram performed for the assessment of LV function.    LV systolic function is normal. No regional wall motion abnormalities are noted. Calculated LVEF > 55%.    Compared to study from 9/2/24, LVEF now has normalized.    Results for orders placed during the hospital encounter of 09/01/24    Stress Test With Myocardial Perfusion One Day    Interpretation Summary    Left ventricular ejection fraction is mildly reduced (Calculated EF = 45%).    Abnormal, high risk myocardial perfusion imaging study  Perfusion defects are noted.  There is a partially reversible perfusion defect of the distal anterior wall segment suggestive of erika-infarct ischemia.  There is absent uptake of tracer noted in the apical segments distal inferior wall segment distal anterior segment anterolateral and improved septal  segments more pronounced on stress images than rest images.    Wall motion abnormalities are noted involving distal anterior and distal inferior and apical segments.  Overall EF is estimated to be around 45%  No transient ischemic dilatation was noted    Constellation of findings are suggestive of mid to distal LAD occlusion which tends to wraparound apex.  Coronary angiogram is recommended for further evaluation    === Results for orders placed during the hospital encounter of 10/07/22 ===    HOLTER MONITOR - 48 HOUR    - Interpretation Summary -    Patient had Holter monitor done from October 7 October 9 for 48 hours.  Basic rhythm was sinus with sinus tachycardia and sinus bradycardia.  There were frequent PVCs and occasional PACs.  No episodes of atrial flutter fibrillation, ventricular or supraventricular cardia, or pauses.          Procedures    Assessment & Plan  Diagnoses and all orders for this visit:    1. Essential hypertension (Primary)    2. Mixed hyperlipidemia    3. CAD S/P percutaneous coronary angioplasty    1.  Blood pressure is well-controlled if not a little overtreated on current antihypertensive regimen however recommend continuing the current regimen for antianginal therapy.  2.  Continue statin therapy.  Goal LDL less than 55.  3.  Status post recent PCI to the mid LAD.  She does note occasional episodes of chest discomfort that are unrelated to physical activity.  They have decreased in intensity since starting antianginal therapy which will be continued.  Continue aspirin and Plavix.  She is tolerating this much better than the Brilinta.  Recommend cardiac rehab.  Recent echocardiogram demonstrated normal LV systolic function with no wall motion abnormalities.  Continue current medical regimen and follow-up in 3 months with Dr. Rodriguez.    The medical services provided during this encounter are part of ongoing care related to this patient's single serious condition or complex  condition.    Follow Up   Return in about 3 months (around 1/10/2025) for With .    Patient was given instructions and counseling regarding her condition or for health maintenance advice. Please see specific information pulled into the AVS if appropriate.     Loretta Saleem, CARLTON  10/10/24  10:16 EDT    Dictated Utilizing Dragon Dictation

## 2024-10-15 ENCOUNTER — OFFICE VISIT (OUTPATIENT)
Dept: CARDIAC REHAB | Facility: HOSPITAL | Age: 44
End: 2024-10-15
Payer: COMMERCIAL

## 2024-10-15 ENCOUNTER — CLINICAL SUPPORT (OUTPATIENT)
Dept: FAMILY MEDICINE CLINIC | Facility: CLINIC | Age: 44
End: 2024-10-15
Payer: COMMERCIAL

## 2024-10-15 VITALS
HEART RATE: 70 BPM | WEIGHT: 212.08 LBS | BODY MASS INDEX: 33.21 KG/M2 | DIASTOLIC BLOOD PRESSURE: 70 MMHG | OXYGEN SATURATION: 99 % | SYSTOLIC BLOOD PRESSURE: 100 MMHG

## 2024-10-15 DIAGNOSIS — Z95.5 S/P CORONARY ARTERY STENT PLACEMENT: ICD-10-CM

## 2024-10-15 PROCEDURE — 96372 THER/PROPH/DIAG INJ SC/IM: CPT | Performed by: NURSE PRACTITIONER

## 2024-10-15 PROCEDURE — 93798 PHYS/QHP OP CAR RHAB W/ECG: CPT

## 2024-10-15 RX ADMIN — MEDROXYPROGESTERONE ACETATE 150 MG: 150 INJECTION, SUSPENSION INTRAMUSCULAR at 09:49

## 2024-10-15 NOTE — PROGRESS NOTES
Chief Complaint  Cardiac Rehab Phase II    Bianca Boles presents to [facility] CARDIOPULMONARY REHABILITATION    Physical Exam  Cardiovascular:      Rate and Rhythm: Normal rate and regular rhythm.   Pulmonary:      Effort: Pulmonary effort is normal.      Breath sounds: Normal breath sounds.      Comments: Bilateral lungs clear  Abdominal:      General: Bowel sounds are normal.   Musculoskeletal:         General: Normal range of motion.   Skin:     General: Skin is warm and dry.   Neurological:      General: No focal deficit present.      Mental Status: She is oriented to person, place, and time.   Psychiatric:         Mood and Affect: Mood normal.         Behavior: Behavior normal.      Comments: PHQ-9 at 9  [place] at 22      Result Review :          Assessment and Plan    Diagnoses and all orders for this visit:    1. S/P coronary artery stent placement  -     Cardiac Rehab Phase II        Lashon Ibanez RN     Phase II and III Education    Discipline Teaching:  Cardiac Rehab Phase II [x]      Cardiac Rehab Phase III []      Pulmonary Rehab II []      Pulmonary Rehab III []      Peripheral Artery Disease []        Class Given:  Asthma Management []      Beginners Cardiac Rehab []      Beginners Pulmonary Rehab []      CAD I []      CAD II []      CHF []      Diabetes Mellitus []     Diet & Cholesterol []     Diet Consult []      Energy Conservation []     Exercise []     Grocery Store Tour []     Harmonica Therapy []     High Blood Pressure []     Living with COPD []     Medication Safety []     Peripheral Artery Disease []     S & S of Infection []      Stress []        Education Topics:  Angina []      Arrhythmias []      Asthma Management []      Beginning Cardiac Rehab [x]      Blood Pressure [x]     Blood Sugar [x]     Breathing Retrainment []     CHF []     Cooking []     Daily Weight []     Diabetes Mellitus [x]      Diet [x]     Energy Conservation []     Exercise [x]      Foot Care []       Grocery Store Tour []      Heart Disease []      Heart Function []      Incision Care []     Living with COPD []     Medication Safety []     PAD Symptoms/Treatment []     Reconditioning Exercises []     S & S Infection []     Smoking Consult []     Stress Management []     Test Results []       Teaching Aids:  Video [x]      PAD Handout []      Pulmonary Workbook []      CAD Teaching Packet []      Stress Teaching Packet []     Exercise Teaching Packet []      Diet Teaching Packet []      CHF Teaching Packet []      Blood Pressure Teaching Packet []      Smoking Cessation Packet []      Medication Safety Handout []      Pflex Training []      Diabetes Teaching Packet []      Harmonica Therapy Packet []        Teaching Method:  Discussion [x]      Written Information [x]      Audio Visual [x]      Demonstration []        Teaching Recipient:  Patient [x]      Family []      Friend []      Legal Guardian []      Primary Care Giver []      Significant Other []        Barriers To Learning:  None [x]      Auditory []      Cultural []      Emotional []      Financial []      Language []    Mental []      Motivation []      Physical []      Reading Skills []      Taoism []      Verbal/Cognitive []      Visual []      Written/Cognitive []        Teaching Response:  Verified Via Teach back [x]      Return Demo Via Teach Back []      Reinforcement Needed []      Unable to Return Demo []      Unable to Comprehend []      Declines Teaching  []        Additional Education Topics:  Discussed Lipid Panel, A1C, Labs, Meds, Blood Glucose checks, Verbalized understanding to notify staff of any chest pain, soa, dizziness.  Follow Up Instruction Comment:  Encouraged food before exercise- does see nutritionist   See scanned notes attached.

## 2024-10-16 ENCOUNTER — OFFICE VISIT (OUTPATIENT)
Dept: GASTROENTEROLOGY | Facility: CLINIC | Age: 44
End: 2024-10-16
Payer: COMMERCIAL

## 2024-10-16 VITALS
DIASTOLIC BLOOD PRESSURE: 58 MMHG | HEART RATE: 61 BPM | WEIGHT: 212 LBS | HEIGHT: 67 IN | SYSTOLIC BLOOD PRESSURE: 101 MMHG | OXYGEN SATURATION: 100 % | BODY MASS INDEX: 33.27 KG/M2

## 2024-10-16 DIAGNOSIS — Z80.0 FAMILY HISTORY OF COLON CANCER: ICD-10-CM

## 2024-10-16 DIAGNOSIS — K59.09 CHRONIC CONSTIPATION: Primary | ICD-10-CM

## 2024-10-16 PROCEDURE — 99213 OFFICE O/P EST LOW 20 MIN: CPT

## 2024-10-16 NOTE — PROGRESS NOTES
Chief Complaint  Colonoscopy follow up     Bianca Boles is a 44 y.o. female who presents to [facility] [place] for follow up.     History of present Illness  Patient presents to the office for follow up with a history of gastroparesis, gastric sleeve (2020), constipation, and family history of colon cancer (father 50s). Prescribed Linzess 72 at last office visit to better manage constipation which provides a bowel movement every day or every other day. Overall this is a comfortable regimen for her. Denies abdominal pain, melena, and hematochezia. Denies upper GI symptoms.     Colonoscopy 5/23/24 by Dr. Fry - normal. Repeat 5 years due to family history of colon cancer (father 50s).     CT abdomen/pelvis with contrast 11/28/2023-no acute findings.     EGD 10/20/2020 by Dr. Grajeda - normal esophagus, food in stomach, small polyps in stomach, mild gastritis, and normal duodenum. Stomach biopsies - focal polypoid foveolar hyperplasia and mild chronic inflammation.    Past Medical History:   Diagnosis Date    Acid reflux     Anxiety     Anxiety and depression     Benign essential hypertension     Borderline personality disorder     Cancer     renal cancer/mass, PARTIAL NEPH, RIGHT    Chronic kidney disease     Coronary artery disease     Diabetes     Diabetes mellitus     type ii, doesn't check bg at home     Diabetes mellitus, type 2     Elevated cholesterol     Frozen shoulder 08/02/2023    GERD (gastroesophageal reflux disease)     High triglycerides     History of bariatric surgery 02/04/2021    GASTRIC SLEEVE    History of kidney stones     HTN (hypertension)     Hyperlipemia     Hyperlipidemia     Hypertriglyceridemia     Lumbar herniated disc     Myocardial infarction     Obesity     Panic attacks     PCOS (polycystic ovarian syndrome)     Periarthritis of shoulder 01/23    Psoriasis     ELBOWS    Rotator cuff syndrome 01/23    Seasonal allergies     Self-injurious  behavior     Spinal headache     AFTER PAIN EPIDURALS.  NO BLOOD PATCH    Suicide attempt        Past Surgical History:   Procedure Laterality Date    ABDOMINAL SURGERY  2020    Gastric sleeve    ADENOIDECTOMY  1984    CARDIAC CATHETERIZATION N/A 2024    Procedure: Left Heart Cath;  Surgeon: Toño Rodriguez MD;  Location: [facility] CATH INVASIVE LOCATION;  Service: Cardiovascular;  Laterality: N/A;    CARDIAC CATHETERIZATION N/A 2024    Procedure: Coronary angiography;  Surgeon: Toño Rodrgiuez MD;  Location: [facility] CATH INVASIVE LOCATION;  Service: Cardiovascular;  Laterality: N/A;    CARDIAC CATHETERIZATION N/A 2024    Procedure: Angioplasty-coronary;  Surgeon: Toño Rodriguez MD;  Location: [facility] CATH INVASIVE LOCATION;  Service: Cardiovascular;  Laterality: N/A;     SECTION  ,    COLONOSCOPY N/A 2024    Procedure: COLONOSCOPY;  Surgeon: Terrence Fry MD;  Location: [facility] ENDOSCOPY;  Service: Gastroenterology;  Laterality: N/A;  NORMAL COLONOSCOPY    CORONARY STENT PLACEMENT      CYSTOSCOPY BLADDER STONE LITHOTRIPSY      EAR TUBES  1984    ENDOSCOPY N/A 10/20/2020    Procedure: ESOPHAGOGASTRODUODENOSCOPY WITH BIOPSY;  Surgeon: Fidel Grajeda Jr., MD;  Location: [facility] ENDOSCOPY;  Service: General;  Laterality: N/A;  PRE- GERD  POST- RETAINED FOOD, GASTRITIS, GASTRIC POLYPS    ENDOSCOPY      FOOT FRACTURE SURGERY Left 2017    screw placed    GASTRECTOMY  2020    GASTRIC SLEEVE LAPAROSCOPIC N/A 2020    Procedure: GASTRIC SLEEVE LAPAROSCOPIC;  Surgeon: Fidel Grajeda Jr., MD;  [facility];  Service: Bariatric;  Laterality: N/A;    NEPHRECTOMY PARTIAL Right 11/15/2021    Procedure: NEPHRECTOMY PARTIAL LAPAROSCOPIC WITH DAVINCI ROBOT;  Surgeon: Lori Troy MD;  Location: [facility] MAIN OR;  Service: Robotics - DaVinci;  Laterality: Right;    SHOULDER ARTHROSCOPY Left 2023    Procedure: LEFT SHOULDER MANIPULATION;   Surgeon: Domenic Bradley MD;  Location: [facility] OR [facility];  Service: Orthopedics;  Laterality: Left;    TONSILLECTOMY  1984    UPPER GASTROINTESTINAL ENDOSCOPY      URETEROSCOPY LASER LITHOTRIPSY WITH STENT INSERTION Right 09/28/2021    Procedure: CYSTOSCOPY, RIGHT URETEROSCOPY, LASERTRIPSY, STONE BASKET EXTRACTION AND STENT INSERTION;  Surgeon: Lori Troy MD;  Location: [facility] MAIN OR;  Service: Urology;  Laterality: Right;    URETEROSCOPY LASER LITHOTRIPSY WITH STENT INSERTION Left 11/08/2022    Procedure: URETEROSCOPY LASER LITHOTRIPSY WITH URETERAL STENT INSERTION;  Surgeon: Lori Troy MD;  Location: [facility] MAIN OR;  Service: Urology;  Laterality: Left;         Current Outpatient Medications:     amLODIPine (NORVASC) 5 MG tablet, Take 1 tablet by mouth Daily., Disp: 60 tablet, Rfl: 3    Apremilast (Otezla) 30 MG tablet, Take 30 mg by mouth 2 (Two) Times a Day., Disp: 30 tablet, Rfl: 1    aspirin 81 MG EC tablet, Take 1 tablet by mouth Daily., Disp: 90 tablet, Rfl: 5    Biotin 50181 MCG tablet, Take 1 tablet by mouth Every Night., Disp: , Rfl:     buPROPion XL (WELLBUTRIN XL) 300 MG 24 hr tablet, Take 1 tablet by mouth Daily., Disp: 90 tablet, Rfl: 1    busPIRone (BUSPAR) 5 MG tablet, Take 1 tablet by mouth 3 (Three) Times a Day., Disp: 270 tablet, Rfl: 1    CALCIUM-MAGNESIUM-ZINC PO, Take 3 tablets by mouth Daily., Disp: , Rfl:     cetirizine (zyrTEC) 10 MG tablet, Take 1 tablet by mouth Daily As Needed., Disp: , Rfl:     Cholecalciferol 25 MCG (1000 UT) capsule, Take 2 capsules by mouth Daily., Disp: 180 capsule, Rfl: 1    clopidogrel (PLAVIX) 75 MG tablet, Take 1 tablet by mouth Daily, Disp: 30 tablet, Rfl: 6    COLLAGEN PO, Take 3 tablets by mouth Daily., Disp: , Rfl:     dapagliflozin Propanediol (Farxiga) 10 MG tablet, Take 10 mg by mouth Daily., Disp: 90 tablet, Rfl: 1    desvenlafaxine (PRISTIQ) 50 MG 24 hr tablet, Take 1 tablet by mouth Daily., Disp: 90 tablet, Rfl: 1    Diclofenac Sodium  (VOLTAREN) 1 % gel gel, Apply 4 g topically to the appropriate area as directed 4 (Four) Times a Day As Needed (pain)., Disp: 200 g, Rfl: 1    fluticasone (FLONASE) 50 MCG/ACT nasal spray, USE 2 SPRAYS IN EACH NOSTRIL ONCE DAILY AS DIRECTED, Disp: 48 g, Rfl: 1    IRON-VITAMIN C PO, Take 1 tablet by mouth Daily., Disp: , Rfl:     linaclotide (Linzess) 72 MCG capsule capsule, Take 1 capsule by mouth Every Morning Before Breakfast for 90 days., Disp: 90 capsule, Rfl: 3    metoprolol succinate XL (Toprol XL) 25 MG 24 hr tablet, Take 1 tablet by mouth Every Night., Disp: 90 tablet, Rfl: 1    Multiple Vitamins-Minerals (MULTIVITAMIN PO), Take 1 tablet by mouth Every Night., Disp: , Rfl:     nitroglycerin (NITROSTAT) 0.4 MG SL tablet, Place 1 tablet under the tongue Every 5 (Five) Minutes As Needed for Chest Pain (Systolic BP Greater Than 100). Take no more than 3 doses in 15 minutes., Disp: 60 tablet, Rfl: 2    ondansetron ODT (ZOFRAN-ODT) 4 MG disintegrating tablet, Place 1 tablet on the tongue Every 8 (Eight) Hours As Needed for Nausea., Disp: , Rfl:     pravastatin (PRAVACHOL) 80 MG tablet, Take 1 tablet by mouth Every Night., Disp: 90 tablet, Rfl: 1    Probiotic Product (PROBIOTIC-10 PO), Take 1 tablet by mouth Every Night., Disp: , Rfl:     spironolactone (ALDACTONE) 25 MG tablet, Take 0.5 tablets by mouth Daily., Disp: 60 tablet, Rfl: 3    testosterone micronized powder, Testosterone 1% cream transdermal SIG appyl 0.5 mL (2 clicks) to skin daily, Disp: 25 g, Rfl: 0    Tirzepatide (MOUNJARO) 10 MG/0.5ML solution pen-injector pen, Inject 0.5 mL under the skin into the appropriate area as directed 1 (One) Time Per Week. (Patient taking differently: Inject 0.5 mL under the skin into the appropriate area as directed 1 (One) Time Per Week. Wednesday), Disp: 6 mL, Rfl: 3    topiramate (TOPAMAX) 50 MG tablet, Take 1 tablet by mouth every night at bedtime for 180 days., Disp: 90 tablet, Rfl: 1    traZODone (DESYREL) 100 MG  "tablet, Take 1 tablet by mouth Every Night., Disp: 90 tablet, Rfl: 1    ibuprofen (ADVIL,MOTRIN) 800 MG tablet, Take 1 tablet by mouth Every 6 (Six) Hours As Needed for Mild Pain. (Patient not taking: Reported on 10/15/2024), Disp: 90 tablet, Rfl: 1    Current Facility-Administered Medications:     MedroxyPROGESTERone Acetate (DEPO-PROVERA) injection 150 mg, 150 mg, Intramuscular, Q3 Months, Patricia Hernandez Breann, APRN, 150 mg at 10/15/24 0949     Allergies   Allergen Reactions    Morphine Itching and Nausea And Vomiting       Family History   Problem Relation Age of Onset    Cancer Mother         Breast    Diabetes Father     Hypertension Father     Heart disease Father     Cancer Father         Bladder prostate liver    Colon cancer Father         malignant, currently 62    No Known Problems Sister     No Known Problems Brother     No Known Problems Maternal Aunt     No Known Problems Paternal Aunt     No Known Problems Maternal Uncle     No Known Problems Paternal Uncle     No Known Problems Maternal Grandfather     Stroke Maternal Grandmother     Heart disease Maternal Grandmother     Diabetes Paternal Grandfather     Heart disease Paternal Grandfather     No Known Problems Paternal Grandmother     No Known Problems Cousin     Malig Hyperthermia Neg Hx     ADD / ADHD Neg Hx     Alcohol abuse Neg Hx     Anxiety disorder Neg Hx     Bipolar disorder Neg Hx     Dementia Neg Hx     Depression Neg Hx     Drug abuse Neg Hx     OCD Neg Hx     Paranoid behavior Neg Hx     Schizophrenia Neg Hx     Seizures Neg Hx     Self-Injurious Behavior  Neg Hx     Suicide Attempts Neg Hx         Social History     Social History Narrative    Not on file       Objective       Vital Signs:   /58 (BP Location: Right arm, Patient Position: Sitting, Cuff Size: Adult)   Pulse 61   Ht 170.2 cm (67.01\")   Wt 96.2 kg (212 lb)   SpO2 100%   BMI 33.20 kg/m²       Physical Exam  Constitutional:       Appearance: Normal appearance. " She is normal weight.   HENT:      Head: Normocephalic and atraumatic.      Nose: Nose normal.   Pulmonary:      Effort: Pulmonary effort is normal.   Skin:     General: Skin is warm and dry.   Neurological:      Mental Status: She is alert and oriented to person, place, and time. Mental status is at baseline.   Psychiatric:         Mood and Affect: Mood normal.         Behavior: Behavior normal.         Thought Content: Thought content normal.         Judgment: Judgment normal.         Result Review :       CBC w/diff          9/26/2024    17:20 9/27/2024    04:25 10/3/2024    12:30   CBC w/Diff   WBC 9.19  8.25  7.85    RBC 4.66  4.29  4.70    Hemoglobin 13.6  12.5  13.9    Hematocrit 41.0  38.3  40.9    MCV 88.0  89.3  87.0    MCH 29.2  29.1  29.6    MCHC 33.2  32.6  34.0    RDW 14.1  13.8  13.0    Platelets 213  176  200    Neutrophil Rel % 64.0  52.0  67.5    Immature Granulocyte Rel % 0.7  0.8  0.6    Lymphocyte Rel % 26.2  37.5  22.8    Monocyte Rel % 7.2  7.4  6.9    Eosinophil Rel % 1.1  1.6  1.4    Basophil Rel % 0.8  0.7  0.8      CMP          9/26/2024    17:20 9/27/2024    04:25 10/3/2024    12:30   CMP   Glucose 78  88  90    BUN 16  16  18    Creatinine 1.16  1.19  1.35    EGFR 59.4  57.6  49.5    Sodium 136  142  139    Potassium 4.9  4.2  4.2    Chloride 105  111  106    Calcium 9.5  8.8  9.8    Total Protein 6.7   6.9    Albumin 3.9   4.3    Globulin 2.8   2.6    Total Bilirubin 0.2   0.3    Alkaline Phosphatase 75   78    AST (SGOT) 33   16    ALT (SGPT) 21   19    Albumin/Globulin Ratio 1.4   1.7    BUN/Creatinine Ratio 13.8  13.4  13.3    Anion Gap 9.5  7.1  8.9              Assessment and Plan    Diagnoses and all orders for this visit:    1. Chronic constipation (Primary)    2. Family history of colon cancer    Other orders  -     linaclotide (Linzess) 72 MCG capsule capsule; Take 1 capsule by mouth Every Morning Before Breakfast for 90 days.  Dispense: 90 capsule; Refill: 3    44 year old  patient presents to the office for follow up with a history of gastroparesis, gastric sleeve (2020), constipation, and family history of colon cancer (father 50s). Reviewed most recent colonoscopy and pathology report.  Patient recall updated to 5 years due to family history of colon cancer.  She will continue Linzess 72 as this is adequately managing her constipation, refill sent to pharmacy.  Overall patient is doing very well on current medication regimen and will follow-up in 1 year.  Patient is agreeable to plan call office any questions or concerns.     Follow Up   Return in about 1 year (around 10/16/2025) for constipation.  Patient was given instructions and counseling regarding her condition or for health maintenance advice. Please see specific information pulled into the AVS if appropriate.

## 2024-10-21 ENCOUNTER — TREATMENT (OUTPATIENT)
Dept: CARDIAC REHAB | Facility: HOSPITAL | Age: 44
End: 2024-10-21
Payer: COMMERCIAL

## 2024-10-21 DIAGNOSIS — Z95.5 S/P CORONARY ARTERY STENT PLACEMENT: Primary | ICD-10-CM

## 2024-10-21 PROCEDURE — 93798 PHYS/QHP OP CAR RHAB W/ECG: CPT

## 2024-10-21 RX ORDER — METOPROLOL SUCCINATE 25 MG/1
25 TABLET, EXTENDED RELEASE ORAL NIGHTLY
Qty: 90 TABLET | Refills: 1 | Status: SHIPPED | OUTPATIENT
Start: 2024-10-21

## 2024-10-23 ENCOUNTER — TREATMENT (OUTPATIENT)
Dept: CARDIAC REHAB | Facility: HOSPITAL | Age: 44
End: 2024-10-23
Payer: COMMERCIAL

## 2024-10-23 DIAGNOSIS — Z95.5 S/P CORONARY ARTERY STENT PLACEMENT: Primary | ICD-10-CM

## 2024-10-23 PROCEDURE — 93798 PHYS/QHP OP CAR RHAB W/ECG: CPT

## 2024-10-25 ENCOUNTER — APPOINTMENT (OUTPATIENT)
Dept: CARDIAC REHAB | Facility: HOSPITAL | Age: 44
End: 2024-10-25
Payer: COMMERCIAL

## 2024-10-30 ENCOUNTER — APPOINTMENT (OUTPATIENT)
Dept: CARDIAC REHAB | Facility: HOSPITAL | Age: 44
End: 2024-10-30
Payer: COMMERCIAL

## 2024-10-31 DIAGNOSIS — F33.0 MILD EPISODE OF RECURRENT MAJOR DEPRESSIVE DISORDER: ICD-10-CM

## 2024-10-31 DIAGNOSIS — F41.9 ANXIETY: ICD-10-CM

## 2024-10-31 RX ORDER — DESVENLAFAXINE 50 MG/1
50 TABLET, FILM COATED, EXTENDED RELEASE ORAL DAILY
Qty: 90 TABLET | Refills: 1 | Status: SHIPPED | OUTPATIENT
Start: 2024-10-31

## 2024-11-04 ENCOUNTER — TRANSCRIBE ORDERS (OUTPATIENT)
Dept: FAMILY MEDICINE CLINIC | Facility: CLINIC | Age: 44
End: 2024-11-04
Payer: COMMERCIAL

## 2024-11-04 ENCOUNTER — APPOINTMENT (OUTPATIENT)
Dept: CARDIAC REHAB | Facility: HOSPITAL | Age: 44
End: 2024-11-04
Payer: COMMERCIAL

## 2024-11-04 DIAGNOSIS — Z12.31 SCREENING MAMMOGRAM FOR BREAST CANCER: Primary | ICD-10-CM

## 2024-11-06 ENCOUNTER — TREATMENT (OUTPATIENT)
Dept: CARDIAC REHAB | Facility: HOSPITAL | Age: 44
End: 2024-11-06
Payer: COMMERCIAL

## 2024-11-06 DIAGNOSIS — Z95.5 S/P CORONARY ARTERY STENT PLACEMENT: Primary | ICD-10-CM

## 2024-11-06 PROCEDURE — 93798 PHYS/QHP OP CAR RHAB W/ECG: CPT

## 2024-11-11 ENCOUNTER — TELEPHONE (OUTPATIENT)
Dept: CARDIAC REHAB | Facility: HOSPITAL | Age: 44
End: 2024-11-11
Payer: COMMERCIAL

## 2024-11-11 DIAGNOSIS — R79.89 LOW TESTOSTERONE LEVEL IN FEMALE: ICD-10-CM

## 2024-11-11 DIAGNOSIS — R68.82 LOW LIBIDO: ICD-10-CM

## 2024-11-12 RX ORDER — TESTOSTERONE MICRONIZED 100 %
POWDER (GRAM) MISCELLANEOUS
Qty: 25 G | Refills: 0 | Status: SHIPPED | OUTPATIENT
Start: 2024-11-12

## 2024-11-13 ENCOUNTER — TREATMENT (OUTPATIENT)
Dept: CARDIAC REHAB | Facility: HOSPITAL | Age: 44
End: 2024-11-13
Payer: COMMERCIAL

## 2024-11-13 DIAGNOSIS — Z95.5 S/P CORONARY ARTERY STENT PLACEMENT: Primary | ICD-10-CM

## 2024-11-13 PROCEDURE — 93798 PHYS/QHP OP CAR RHAB W/ECG: CPT

## 2024-11-14 NOTE — PROGRESS NOTES
"[facility]  Follow-up Visit    Chief Complaint:  \"I want to get into some counseling\"     Initial History of Present Illness: Bianca Boles is a 43 y.o. female who presents today for initial evaluation regarding psychotherapy.  Bianca presents unaccompanied in no acute distress and engages with me appropriately. Psychotropic regimen with which patient presents is described as  bupropion   mg po daily, buspirone 5 mg po TID, trazodone 100 mg po q HS, desvenlafaxine 50 mg po q day.      History is positive for signs/symptoms suggestive of low mood, low energy, anhedonia, changes in sleep, changes in appetite, guilt, poor concentration, psychomotor changes, thoughts of being better off dead-endorses occasional passive suicidal thoughts, and daydreams about \"not being here.\" Psychiatric screening is negative for pathognomonic history of: TBI, kathryn, psychosis.      Ms. Boles has symptoms of history of significant trauma for which there are related intrusion symptoms related to the traumatic event (distressing memories, flashbacks, nightmares, intense distress associated with triggering stimuli, marked physiological reactions to triggering stimuli), persistent avoidance of triggering stimuli, negative alterations in cognition and mood (memory lapses, negative schemas, distorted cognitions about the event, social withdrawal, feelings of detachment/estrangement, persistent anhedonia), and marked alterations in arousal and reactivity (irritability, reckless behavior, hypervigilance, exaggerated startle, sleep disturbances). She also has a history a history of early exposure to enduring trauma associated with re-experiencing trauma, avoidance, hyperarousal (PTSD) and difficulty managing emotions, negative self-view, relationship difficulties, dissociative symptoms, and demoralization (complex PTSD). Patient has family history of ADHD, and possible ASD, will plan to screen " "for both for informational gathering purposes and not necessarily diagnosing.   Ms. Boles also voices current body image disturbances related to her bariatric procedure and intense fear of gaining the 98 lbs back, patient admits to the irrational fear, but has put on 10 lbs, I recommended sharing these concerns with her PCP.     I have counseled the patient with regard to diagnoses and the recommended treatment regimen as documented below: I will assume prescriptive responsibility (if PCP desires) for CONTINUE bupropion   mg po daily (in am), CONTINUE buspirone 5 mg po TID (patient to take more regularly for greatest efficacy) CONTINUE trazodone  mg po q HS, CONTINUE desvenlafaxine 50 mg po q day . Patient acknowledges the diagnoses per my rendered interpretation. Patient demonstrates awareness/understanding of viable alternatives for treatment as well as potential risks, benefits, and side effects associated with this regimen and is amenable to proceed in this fashion.      Recommended lifestyle changes: 30 minutes of activity to increase HR 2-3 days weekly.     Psychiatric History:  Diagnoses: depression, anxiety  Outpatient history: 14-14 yo  Inpatient history: none  Medication trials: fluoxetine, Zoloft, Effexor, citalopram, bupropion, pristiq, hx of GENESIGHT  Other treatment modalities: Psychotherapy  Self harm: cutting  Suicide attempts: Yes, OD  Abuse or neglect: emotional     Substance Abuse History:   Types/methods/frequency: marijuana  Transtheoretical stage: Maintenence     Social History:  Residence: lives house, my , 2 daughters  Vocation: yes  Source of income: Employed  Last grade completed: high  Pertinent developmental history: ADHD, will screen at next visit  Pertinent legal history: No history   Hobbies/interests: DND, and video games, entertain  Moravian: Catholicism -\"Jehovah's witness light\"  Exercise: walking, but scared to get obsessed  Dietary habits:  history of binge eating , " "type 2 DM  Sleep hygiene: wakes up at 3 am  Social habits: none  Sunlight: There are no concern for under-exposure.  Caffeine intake: no pertinent issues   Hydration habits: no pertinent issues    history: No, grew up in              Interval History Bianca is a 44 y.o. female who presents today for follow-up  Bianca presents unaccompanied in no acute distress and engages with me appropriately.     Current treatment regimen includes:   - Continue bupropion  mg p.o. daily continue buspirone 5 mg p.o. 3 times daily continue desvenlafaxine 50 mg p.o. daily continue trazodone 100 mg nightly (has not been using regularly)  Side-effects per given history: none.      Today the patient feels \"overwhelmed\" patient had a heart attack over labor day. She stated she was in the ER the week prior with CP, and it was ruled as anxiety. She has been in the hospital multiple times. She is trying to focus on her recovery as well as general health maintenance. \"I feel it is hard for me to stay motivated and not want to curl up in a ball.\"     Bianca also desires to see another therapist as she feels she the current one is not a good fit.      Thought process and content are devoid of overt aberration suggestive of acute kathryn/psychosis. The patient denies SI/HI/AVH. There are not changes on exam today compared to most recent evaluation.    - sleep: no concerns  - appetite: moderately controlled    No changes with medication for today.   I have counseled the patient with regard to diagnoses and the recommended treatment regimen as documented below. Patient acknowledges the diagnoses per my rendered interpretation. Patient demonstrates understanding of potential risks/benefits/side effects associated with this regimen and is amenable to proceed in this fashion.     Assignment: begin journaling instances of overwhelm, response thereto, ideal response to cultivate insight and begin breaking a pattern of " stimulus/response.      Social History     Socioeconomic History    Marital status:     Number of children: 2   Tobacco Use    Smoking status: Former     Current packs/day: 0.00     Average packs/day: 0.3 packs/day for 25.0 years (6.3 ttl pk-yrs)     Types: Cigarettes     Start date:      Quit date: 1/15/2020     Years since quittin.8     Passive exposure: Never    Smokeless tobacco: Never    Tobacco comments:     A PACK A WEEK   Vaping Use    Vaping status: Never Used   Substance and Sexual Activity    Alcohol use: Yes     Comment: OCCASIONAL/SOCIAL    Drug use: Not Currently     Frequency: 0.1 times per week     Types: Marijuana     Comment: OTC THC- GUMMIES    Sexual activity: Yes     Partners: Male     Birth control/protection: Depo-provera       Tobacco use counseling/intervention: patient does not use tobacco. Currently Precontemplation by transtheoretical model.     Problem List:  Patient Active Problem List   Diagnosis    Chronic fatigue    Essential hypertension    Hyperlipidemia    Heartburn    Multiple joint pain    Depression    Gastroparesis    Gastroesophageal reflux disease    Gastric polyps    S/P laparoscopic sleeve gastrectomy    Obesity, Class I, BMI 30-34.9    Anxiety    Urinary tract infection with hematuria    Leukocytosis    Insomnia    Left ureteral stone    Left renal stone    Hematuria    Acute cystitis with hematuria    Right renal mass    Seasonal allergies    Urinary urgency    Renal cell carcinoma of right kidney    Psoriasis    Palpitation    Post-COVID syndrome    Nausea    Type 2 diabetes mellitus without complication, without long-term current use of insulin    Left shoulder pain    Frozen shoulder    Impingement syndrome of left shoulder    Dysuria    Glucose found in urine on examination    Encounter for screening for malignant neoplasm of colon    Family history of colon cancer    Constipation    Vitamin D deficiency    CAD S/P percutaneous coronary angioplasty     Stage 3 chronic kidney disease    Chest pain     Allergy:   Allergies   Allergen Reactions    Morphine Itching and Nausea And Vomiting        Discontinued Medications:  There are no discontinued medications.    Current Medications:   Current Outpatient Medications   Medication Sig Dispense Refill    amLODIPine (NORVASC) 5 MG tablet Take 1 tablet by mouth Daily. 60 tablet 3    Apremilast (Otezla) 30 MG tablet Take 1 tablet by mouth 2 (Two) Times a Day. 60 tablet 0    aspirin 81 MG EC tablet Take 1 tablet by mouth Daily. 90 tablet 5    Biotin 53972 MCG tablet Take 1 tablet by mouth Every Night.      buPROPion XL (WELLBUTRIN XL) 300 MG 24 hr tablet Take 1 tablet by mouth Daily. 90 tablet 1    busPIRone (BUSPAR) 5 MG tablet Take 1 tablet by mouth 3 (Three) Times a Day. 270 tablet 1    CALCIUM-MAGNESIUM-ZINC PO Take 3 tablets by mouth Daily.      cetirizine (zyrTEC) 10 MG tablet Take 1 tablet by mouth Daily As Needed.      Cholecalciferol 25 MCG (1000 UT) capsule Take 2 capsules by mouth Daily. 180 capsule 1    clopidogrel (PLAVIX) 75 MG tablet Take 1 tablet by mouth Daily 30 tablet 6    COLLAGEN PO Take 3 tablets by mouth Daily.      dapagliflozin Propanediol (Farxiga) 10 MG tablet Take 10 mg by mouth Daily. 90 tablet 1    desvenlafaxine (PRISTIQ) 50 MG 24 hr tablet Take 1 tablet by mouth Daily. 90 tablet 1    Diclofenac Sodium (VOLTAREN) 1 % gel gel Apply 4 g topically to the appropriate area as directed 4 (Four) Times a Day As Needed (pain). 200 g 1    EC-RX TESTOSTERONE TD 1% COMPOUNDED      fluticasone (FLONASE) 50 MCG/ACT nasal spray USE 2 SPRAYS IN EACH NOSTRIL ONCE DAILY AS DIRECTED 48 g 1    IRON-VITAMIN C PO Take 1 tablet by mouth Daily.      linaclotide (Linzess) 72 MCG capsule capsule Take 1 capsule by mouth Every Morning Before Breakfast for 90 days. 90 capsule 3    metoprolol succinate XL (Toprol XL) 25 MG 24 hr tablet Take 1 tablet by mouth Every Night. 90 tablet 1    Multiple Vitamins-Minerals  (MULTIVITAMIN PO) Take 1 tablet by mouth Every Night.      nitroglycerin (NITROSTAT) 0.4 MG SL tablet Place 1 tablet under the tongue Every 5 (Five) Minutes As Needed for Chest Pain (Systolic BP Greater Than 100). Take no more than 3 doses in 15 minutes. 60 tablet 2    ondansetron ODT (ZOFRAN-ODT) 4 MG disintegrating tablet Place 1 tablet on the tongue Every 8 (Eight) Hours As Needed for Nausea.      pravastatin (PRAVACHOL) 80 MG tablet Take 1 tablet by mouth Every Night. 90 tablet 1    Probiotic Product (PROBIOTIC-10 PO) Take 1 tablet by mouth Every Night.      spironolactone (ALDACTONE) 25 MG tablet Take 0.5 tablets by mouth Daily. 60 tablet 3    testosterone micronized powder Testosterone 1% cream transdermal SIG appyl 0.5 mL (2 clicks) to skin daily 25 g 0    Tirzepatide 10 MG/0.5ML solution auto-injector Inject 0.5 mL under the skin into the appropriate area as directed 1 (One) Time Per Week. (Patient taking differently: Inject 10 mg under the skin into the appropriate area as directed 1 (One) Time Per Week. Wednesday) 6 mL 3    topiramate (TOPAMAX) 50 MG tablet Take 1 tablet by mouth every night at bedtime for 180 days. 90 tablet 1    traZODone (DESYREL) 100 MG tablet Take 1 tablet by mouth Every Night. 90 tablet 1    ibuprofen (ADVIL,MOTRIN) 800 MG tablet Take 1 tablet by mouth Every 6 (Six) Hours As Needed for Mild Pain. (Patient not taking: Reported on 11/15/2024) 90 tablet 1     Current Facility-Administered Medications   Medication Dose Route Frequency Provider Last Rate Last Admin    MedroxyPROGESTERone Acetate (DEPO-PROVERA) injection 150 mg  150 mg Intramuscular Q3 Months Patricia Hernandez, APRN   150 mg at 10/15/24 0949     Past Medical History:  Past Medical History:   Diagnosis Date    Acid reflux     Anxiety     Anxiety and depression     Benign essential hypertension     Borderline personality disorder     Cancer     renal cancer/mass, PARTIAL NEPH, RIGHT    Chronic kidney disease      Coronary artery disease     Diabetes     Diabetes mellitus     type ii, doesn't check bg at home     Diabetes mellitus, type 2     Elevated cholesterol     Frozen shoulder 2023    GERD (gastroesophageal reflux disease)     High triglycerides     History of bariatric surgery 2021    GASTRIC SLEEVE    History of kidney stones     HTN (hypertension)     Hyperlipemia     Hyperlipidemia     Hypertriglyceridemia     Lumbar herniated disc     Myocardial infarction     Obesity     Panic attacks     PCOS (polycystic ovarian syndrome)     Periarthritis of shoulder     Psoriasis     ELBOWS    Rotator cuff syndrome     Seasonal allergies     Self-injurious behavior     Spinal headache     AFTER PAIN EPIDURALS.  NO BLOOD PATCH    Suicide attempt      Past Surgical History:  Past Surgical History:   Procedure Laterality Date    ABDOMINAL SURGERY  2020    Gastric sleeve    ADENOIDECTOMY      CARDIAC CATHETERIZATION N/A 2024    Procedure: Left Heart Cath;  Surgeon: Toño Rodriguez MD;  Location: [facility] CATH INVASIVE LOCATION;  Service: Cardiovascular;  Laterality: N/A;    CARDIAC CATHETERIZATION N/A 2024    Procedure: Coronary angiography;  Surgeon: Toño Rodriguez MD;  Location: [facility] CATH INVASIVE LOCATION;  Service: Cardiovascular;  Laterality: N/A;    CARDIAC CATHETERIZATION N/A 2024    Procedure: Angioplasty-coronary;  Surgeon: Toño Rodriguez MD;  Location: [facility] CATH INVASIVE LOCATION;  Service: Cardiovascular;  Laterality: N/A;     SECTION  ,    COLONOSCOPY N/A 2024    Procedure: COLONOSCOPY;  Surgeon: Terrence Fry MD;  Location: [facility] ENDOSCOPY;  Service: Gastroenterology;  Laterality: N/A;  NORMAL COLONOSCOPY    CORONARY STENT PLACEMENT      CYSTOSCOPY BLADDER STONE LITHOTRIPSY      EAR TUBES  1984    ENDOSCOPY N/A 10/20/2020    Procedure: ESOPHAGOGASTRODUODENOSCOPY WITH BIOPSY;  Surgeon: Fidel Grajeda Jr., MD;  Location: [facility]  ENDOSCOPY;  Service: General;  Laterality: N/A;  PRE- GERD  POST- RETAINED FOOD, GASTRITIS, GASTRIC POLYPS    ENDOSCOPY  2014    FOOT FRACTURE SURGERY Left 2017    screw placed    GASTRECTOMY  12/2020    GASTRIC SLEEVE LAPAROSCOPIC N/A 12/07/2020    Procedure: GASTRIC SLEEVE LAPAROSCOPIC;  Surgeon: Fidel Grajeda Jr., MD;  Location: [facility] OR [facility];  Service: Bariatric;  Laterality: N/A;    NEPHRECTOMY PARTIAL Right 11/15/2021    Procedure: NEPHRECTOMY PARTIAL LAPAROSCOPIC WITH DAVINCI ROBOT;  Surgeon: Lori Troy MD;  Location: [facility] OR;  Service: Robotics - DaVinci;  Laterality: Right;    SHOULDER ARTHROSCOPY Left 08/14/2023    Procedure: LEFT SHOULDER MANIPULATION;  Surgeon: Domenic Bradley MD;  Location: [facility] OR [facility];  Service: Orthopedics;  Laterality: Left;    TONSILLECTOMY  1984    UPPER GASTROINTESTINAL ENDOSCOPY      URETEROSCOPY LASER LITHOTRIPSY WITH STENT INSERTION Right 09/28/2021    Procedure: CYSTOSCOPY, RIGHT URETEROSCOPY, LASERTRIPSY, STONE BASKET EXTRACTION AND STENT INSERTION;  Surgeon: Lori Troy MD;  Location: [facility] OR;  Service: Urology;  Laterality: Right;    URETEROSCOPY LASER LITHOTRIPSY WITH STENT INSERTION Left 11/08/2022    Procedure: URETEROSCOPY LASER LITHOTRIPSY WITH URETERAL STENT INSERTION;  Surgeon: Lori Troy MD;  Location: [facility] OR;  Service: Urology;  Laterality: Left;       MENTAL STATUS EXAM   General Appearance:  Cleanly groomed and dressed and well developed  Eye Contact:  Good eye contact  Attitude:  Cooperative, polite and candid  Motor Activity:  Normal gait, posture and fidgety  Muscle Strength:  Normal  Speech:  Normal rate, tone, volume  Language:  Spontaneous  Mood and affect:  Normal, pleasant  Hopelessness:  Denies  Loneliness: Denies  Thought Process:  Logical and goal-directed  Associations/ Thought Content:  No delusions  Hallucinations:  None  Suicidal Ideations:  Not present  Homicidal Ideation:  Not  "present  Sensorium:  Alert and clear  Orientation:  Person, place, time and situation  Immediate Recall, Recent, and Remote Memory:  Intact  Attention Span/ Concentration:  Good  Fund of Knowledge:  Appropriate for age and educational level  Intellectual Functioning:  Average range  Insight:  Good  Judgement:  Good  Reliability:  Good  Impulse Control:  Good      Vital Signs:   /60   Pulse 72   Ht 170.2 cm (67\")   Wt 66.3 kg (146 lb 3.2 oz)   BMI 22.90 kg/m²    Lab Results:   Office Visit on 10/03/2024   Component Date Value Ref Range Status    Amphetamine Screen, Urine 10/03/2024 Negative  Negative Final    AMP INTERNAL CONTROL 10/03/2024 Passed  Passed Final    Barbiturates Screen, Urine 10/03/2024 Negative  Negative Final    BARBITURATE INTERNAL CONTROL 10/03/2024 Passed  Passed Final    Buprenorphine, Screen, Urine 10/03/2024 Negative  Negative Final    BUPRENORPHINE INTERNAL CONTROL 10/03/2024 Passed  Passed Final    Benzodiazepine Screen, Urine 10/03/2024 Negative  Negative Final    BENZODIAZEPINE INTERNAL CONTROL 10/03/2024 Passed  Passed Final    Cocaine Screen, Urine 10/03/2024 Negative  Negative Final    COCAINE INTERNAL CONTROL 10/03/2024 Passed  Passed Final    MDMA (ECSTASY) 10/03/2024 Negative  Negative Final    MDMA (ECSTASY) INTERNAL CONTROL 10/03/2024 Passed  Passed Final    Methamphetamine, Ur 10/03/2024 Negative  Negative Final    METHAMPHETAMINE INTERNAL CONTROL 10/03/2024 Passed  Passed Final    Morphine/Opiates Screen, Urine 10/03/2024 Negative  Negative Final    MOR INTERNAL CONTROL 10/03/2024 Passed  Passed Final    Methadone Screen, Urine 10/03/2024 Negative  Negative Final    METHADONE INTERNAL CONTROL 10/03/2024 Passed  Passed Final    Oxycodone Screen, Urine 10/03/2024 Negative  Negative Final    OXYCODONE INTERNAL CONTROL 10/03/2024 Passed  Passed Final    Phencyclidine (PCP), Urine 10/03/2024 Negative  Negative Final    PHENCYCLIDINE INTERNAL CONTROL 10/03/2024 Passed  Passed " Final    THC, Screen, Urine 10/03/2024 Negative  Negative Final    THC INTERNAL CONTROL 10/03/2024 Passed  Passed Final    Lot Number 10/03/2024 [id number]   Final    Expiration Date 10/03/2024 6,102,026   Final    Estradiol 10/03/2024 17.5  pg/mL Final    WBC 10/03/2024 7.85  3.40 - 10.80 10*3/mm3 Final    RBC 10/03/2024 4.70  3.77 - 5.28 10*6/mm3 Final    Hemoglobin 10/03/2024 13.9  12.0 - 15.9 g/dL Final    Hematocrit 10/03/2024 40.9  34.0 - 46.6 % Final    MCV 10/03/2024 87.0  79.0 - 97.0 fL Final    MCH 10/03/2024 29.6  26.6 - 33.0 pg Final    MCHC 10/03/2024 34.0  31.5 - 35.7 g/dL Final    RDW 10/03/2024 13.0  12.3 - 15.4 % Final    RDW-SD 10/03/2024 41.4  37.0 - 54.0 fl Final    MPV 10/03/2024 9.7  6.0 - 12.0 fL Final    Platelets 10/03/2024 200  140 - 450 10*3/mm3 Final    Neutrophil % 10/03/2024 67.5  42.7 - 76.0 % Final    Lymphocyte % 10/03/2024 22.8  19.6 - 45.3 % Final    Monocyte % 10/03/2024 6.9  5.0 - 12.0 % Final    Eosinophil % 10/03/2024 1.4  0.3 - 6.2 % Final    Basophil % 10/03/2024 0.8  0.0 - 1.5 % Final    Immature Grans % 10/03/2024 0.6 (H)  0.0 - 0.5 % Final    Neutrophils, Absolute 10/03/2024 5.30  1.70 - 7.00 10*3/mm3 Final    Lymphocytes, Absolute 10/03/2024 1.79  0.70 - 3.10 10*3/mm3 Final    Monocytes, Absolute 10/03/2024 0.54  0.10 - 0.90 10*3/mm3 Final    Eosinophils, Absolute 10/03/2024 0.11  0.00 - 0.40 10*3/mm3 Final    Basophils, Absolute 10/03/2024 0.06  0.00 - 0.20 10*3/mm3 Final    Immature Grans, Absolute 10/03/2024 0.05  0.00 - 0.05 10*3/mm3 Final    nRBC 10/03/2024 0.0  0.0 - 0.2 /100 WBC Final    Glucose 10/03/2024 90  65 - 99 mg/dL Final    BUN 10/03/2024 18  6 - 20 mg/dL Final    Creatinine 10/03/2024 1.35 (H)  0.57 - 1.00 mg/dL Final    Sodium 10/03/2024 139  136 - 145 mmol/L Final    Potassium 10/03/2024 4.2  3.5 - 5.2 mmol/L Final    Chloride 10/03/2024 106  98 - 107 mmol/L Final    CO2 10/03/2024 24.1  22.0 - 29.0 mmol/L Final    Calcium 10/03/2024 9.8  8.6 - 10.5  mg/dL Final    Total Protein 10/03/2024 6.9  6.0 - 8.5 g/dL Final    Albumin 10/03/2024 4.3  3.5 - 5.2 g/dL Final    ALT (SGPT) 10/03/2024 19  1 - 33 U/L Final    AST (SGOT) 10/03/2024 16  1 - 32 U/L Final    Alkaline Phosphatase 10/03/2024 78  39 - 117 U/L Final    Total Bilirubin 10/03/2024 0.3  0.0 - 1.2 mg/dL Final    Globulin 10/03/2024 2.6  gm/dL Final    A/G Ratio 10/03/2024 1.7  g/dL Final    BUN/Creatinine Ratio 10/03/2024 13.3  7.0 - 25.0 Final    Anion Gap 10/03/2024 8.9  5.0 - 15.0 mmol/L Final    eGFR 10/03/2024 49.5 (L)  >60.0 mL/min/1.73 Final    Testosterone, Total 10/03/2024 8.7  ng/dL Final                             Female:                            Premenopausal    10.0 - 55.0                            Postmenopausal    7.0 - 40.0    Testosterone, Free 10/03/2024 <0.2  0.0 - 4.2 pg/mL Final   Admission on 09/26/2024, Discharged on 09/27/2024   Component Date Value Ref Range Status    QT Interval 09/26/2024 417  ms Final    QTC Interval 09/26/2024 440  ms Final    QT Interval 09/26/2024 421  ms Final    QTC Interval 09/26/2024 437  ms Final    HS Troponin T 09/26/2024 9  <14 ng/L Final    Glucose 09/26/2024 78  65 - 99 mg/dL Final    BUN 09/26/2024 16  6 - 20 mg/dL Final    Creatinine 09/26/2024 1.16 (H)  0.57 - 1.00 mg/dL Final    Sodium 09/26/2024 136  136 - 145 mmol/L Final    Potassium 09/26/2024 4.9  3.5 - 5.2 mmol/L Final    Slight hemolysis detected by analyzer. Result may be falsely elevated.    Chloride 09/26/2024 105  98 - 107 mmol/L Final    CO2 09/26/2024 21.5 (L)  22.0 - 29.0 mmol/L Final    Calcium 09/26/2024 9.5  8.6 - 10.5 mg/dL Final    Total Protein 09/26/2024 6.7  6.0 - 8.5 g/dL Final    Albumin 09/26/2024 3.9  3.5 - 5.2 g/dL Final    ALT (SGPT) 09/26/2024 21  1 - 33 U/L Final    AST (SGOT) 09/26/2024 33 (H)  1 - 32 U/L Final    Slight hemolysis detected by analyzer. Result may be falsely elevated.    Alkaline Phosphatase 09/26/2024 75  39 - 117 U/L Final    Total Bilirubin  09/26/2024 0.2  0.0 - 1.2 mg/dL Final    Globulin 09/26/2024 2.8  gm/dL Final    A/G Ratio 09/26/2024 1.4  g/dL Final    BUN/Creatinine Ratio 09/26/2024 13.8  7.0 - 25.0 Final    Anion Gap 09/26/2024 9.5  5.0 - 15.0 mmol/L Final    eGFR 09/26/2024 59.4 (L)  >60.0 mL/min/1.73 Final    Lipase 09/26/2024 84 (H)  13 - 60 U/L Final    proBNP 09/26/2024 316.1  0.0 - 450.0 pg/mL Final    Magnesium 09/26/2024 2.3  1.6 - 2.6 mg/dL Final    Extra Tube 09/26/2024 Hold for add-ons.   Final    Auto resulted.    Extra Tube 09/26/2024 hold for add-on   Final    Auto resulted    Extra Tube 09/26/2024 Hold for add-ons.   Final    Auto resulted.    Extra Tube 09/26/2024 Hold for add-ons.   Final    Auto resulted    WBC 09/26/2024 9.19  3.40 - 10.80 10*3/mm3 Final    RBC 09/26/2024 4.66  3.77 - 5.28 10*6/mm3 Final    Hemoglobin 09/26/2024 13.6  12.0 - 15.9 g/dL Final    Hematocrit 09/26/2024 41.0  34.0 - 46.6 % Final    MCV 09/26/2024 88.0  79.0 - 97.0 fL Final    MCH 09/26/2024 29.2  26.6 - 33.0 pg Final    MCHC 09/26/2024 33.2  31.5 - 35.7 g/dL Final    RDW 09/26/2024 14.1  12.3 - 15.4 % Final    RDW-SD 09/26/2024 45.0  37.0 - 54.0 fl Final    MPV 09/26/2024 9.9  6.0 - 12.0 fL Final    Platelets 09/26/2024 213  140 - 450 10*3/mm3 Final    Neutrophil % 09/26/2024 64.0  42.7 - 76.0 % Final    Lymphocyte % 09/26/2024 26.2  19.6 - 45.3 % Final    Monocyte % 09/26/2024 7.2  5.0 - 12.0 % Final    Eosinophil % 09/26/2024 1.1  0.3 - 6.2 % Final    Basophil % 09/26/2024 0.8  0.0 - 1.5 % Final    Immature Grans % 09/26/2024 0.7 (H)  0.0 - 0.5 % Final    Neutrophils, Absolute 09/26/2024 5.89  1.70 - 7.00 10*3/mm3 Final    Lymphocytes, Absolute 09/26/2024 2.41  0.70 - 3.10 10*3/mm3 Final    Monocytes, Absolute 09/26/2024 0.66  0.10 - 0.90 10*3/mm3 Final    Eosinophils, Absolute 09/26/2024 0.10  0.00 - 0.40 10*3/mm3 Final    Basophils, Absolute 09/26/2024 0.07  0.00 - 0.20 10*3/mm3 Final    Immature Grans, Absolute 09/26/2024 0.06 (H)  0.00 -  0.05 10*3/mm3 Final    nRBC 09/26/2024 0.0  0.0 - 0.2 /100 WBC Final    HS Troponin T 09/26/2024 9  <14 ng/L Final    Troponin T Delta 09/26/2024 0  >=-4 - <+4 ng/L Final    Phosphorus 09/26/2024 3.2  2.5 - 4.5 mg/dL Final    Glucose 09/27/2024 99  70 - 99 mg/dL Final    Serial Number: [id number]:  [id number]    Glucose 09/27/2024 78  70 - 99 mg/dL Final    Serial Number: [id number]:  [id number]    Glucose 09/26/2024 88  70 - 99 mg/dL Final    Serial Number: [id number]:  [id number]    Glucose 09/27/2024 88  65 - 99 mg/dL Final    BUN 09/27/2024 16  6 - 20 mg/dL Final    Creatinine 09/27/2024 1.19 (H)  0.57 - 1.00 mg/dL Final    Sodium 09/27/2024 142  136 - 145 mmol/L Final    Potassium 09/27/2024 4.2  3.5 - 5.2 mmol/L Final    Chloride 09/27/2024 111 (H)  98 - 107 mmol/L Final    CO2 09/27/2024 23.9  22.0 - 29.0 mmol/L Final    Calcium 09/27/2024 8.8  8.6 - 10.5 mg/dL Final    BUN/Creatinine Ratio 09/27/2024 13.4  7.0 - 25.0 Final    Anion Gap 09/27/2024 7.1  5.0 - 15.0 mmol/L Final    eGFR 09/27/2024 57.6 (L)  >60.0 mL/min/1.73 Final    Phosphorus 09/27/2024 4.3  2.5 - 4.5 mg/dL Final    WBC 09/27/2024 8.25  3.40 - 10.80 10*3/mm3 Final    RBC 09/27/2024 4.29  3.77 - 5.28 10*6/mm3 Final    Hemoglobin 09/27/2024 12.5  12.0 - 15.9 g/dL Final    Hematocrit 09/27/2024 38.3  34.0 - 46.6 % Final    MCV 09/27/2024 89.3  79.0 - 97.0 fL Final    MCH 09/27/2024 29.1  26.6 - 33.0 pg Final    MCHC 09/27/2024 32.6  31.5 - 35.7 g/dL Final    RDW 09/27/2024 13.8  12.3 - 15.4 % Final    RDW-SD 09/27/2024 44.9  37.0 - 54.0 fl Final    MPV 09/27/2024 9.9  6.0 - 12.0 fL Final    Platelets 09/27/2024 176  140 - 450 10*3/mm3 Final    Neutrophil % 09/27/2024 52.0  42.7 - 76.0 % Final    Lymphocyte % 09/27/2024 37.5  19.6 - 45.3 % Final    Monocyte % 09/27/2024 7.4  5.0 - 12.0 % Final    Eosinophil % 09/27/2024 1.6  0.3 - 6.2 % Final    Basophil % 09/27/2024 0.7  0.0 - 1.5 % Final    Immature Grans % 09/27/2024 0.8 (H)   0.0 - 0.5 % Final    Neutrophils, Absolute 09/27/2024 4.29  1.70 - 7.00 10*3/mm3 Final    Lymphocytes, Absolute 09/27/2024 3.09  0.70 - 3.10 10*3/mm3 Final    Monocytes, Absolute 09/27/2024 0.61  0.10 - 0.90 10*3/mm3 Final    Eosinophils, Absolute 09/27/2024 0.13  0.00 - 0.40 10*3/mm3 Final    Basophils, Absolute 09/27/2024 0.06  0.00 - 0.20 10*3/mm3 Final    Immature Grans, Absolute 09/27/2024 0.07 (H)  0.00 - 0.05 10*3/mm3 Final    nRBC 09/27/2024 0.0  0.0 - 0.2 /100 WBC Final    EF(MOD-bp) 09/27/2024 57.3  % Final    EDV(MOD-sp2) 09/27/2024 99.5  ml Final    EDV(MOD-sp4) 09/27/2024 125.0  ml Final    ESV(MOD-sp2) 09/27/2024 41.1  ml Final    ESV(MOD-sp4) 09/27/2024 54.2  ml Final    SV(MOD-sp2) 09/27/2024 58.4  ml Final    SV(MOD-sp4) 09/27/2024 70.8  ml Final    EF(MOD-sp2) 09/27/2024 58.7  % Final    EF(MOD-sp4) 09/27/2024 56.6  % Final    HS Troponin T 09/27/2024 8  <14 ng/L Final    Glucose 09/27/2024 82  70 - 99 mg/dL Final    Serial Number: [id number]:  [id number]   No results displayed because visit has over 200 results.      Admission on 08/31/2024, Discharged on 08/31/2024   Component Date Value Ref Range Status    Color 08/31/2024 Yellow  Yellow, Straw, Dark Yellow, Sheri Final    Clarity, UA 08/31/2024 Clear  Clear Final    Glucose, UA 08/31/2024 >=1000 mg/dL (3+) (A)  Negative mg/dL Final    Bilirubin 08/31/2024 Negative  Negative Final    Ketones, UA 08/31/2024 Negative  Negative Final    Specific Gravity  08/31/2024 1.025  1.005 - 1.030 Final    Blood, UA 08/31/2024 Trace (A)  Negative Final    pH, Urine 08/31/2024 5.5  5.0 - 8.0 Final    Protein, POC 08/31/2024 Negative  Negative mg/dL Final    Urobilinogen, UA 08/31/2024 0.2 E.U./dL  Normal, 0.2 E.U./dL Final    Nitrite, UA 08/31/2024 Negative  Negative Final    Leukocytes 08/31/2024 Negative  Negative Final   Admission on 08/23/2024, Discharged on 08/23/2024   Component Date Value Ref Range Status    QT Interval 08/23/2024 389  ms Final     QTC Interval 08/23/2024 425  ms Final    QT Interval 08/23/2024 414  ms Final    QTC Interval 08/23/2024 412  ms Final    HS Troponin T 08/23/2024 10  <14 ng/L Final    Glucose 08/23/2024 92  65 - 99 mg/dL Final    BUN 08/23/2024 20  6 - 20 mg/dL Final    Creatinine 08/23/2024 1.37 (H)  0.57 - 1.00 mg/dL Final    Sodium 08/23/2024 140  136 - 145 mmol/L Final    Potassium 08/23/2024 4.0  3.5 - 5.2 mmol/L Final    Chloride 08/23/2024 106  98 - 107 mmol/L Final    CO2 08/23/2024 23.1  22.0 - 29.0 mmol/L Final    Calcium 08/23/2024 9.4  8.6 - 10.5 mg/dL Final    Total Protein 08/23/2024 6.7  6.0 - 8.5 g/dL Final    Albumin 08/23/2024 4.0  3.5 - 5.2 g/dL Final    ALT (SGPT) 08/23/2024 19  1 - 33 U/L Final    AST (SGOT) 08/23/2024 23  1 - 32 U/L Final    Alkaline Phosphatase 08/23/2024 86  39 - 117 U/L Final    Total Bilirubin 08/23/2024 0.3  0.0 - 1.2 mg/dL Final    Globulin 08/23/2024 2.7  gm/dL Final    A/G Ratio 08/23/2024 1.5  g/dL Final    BUN/Creatinine Ratio 08/23/2024 14.6  7.0 - 25.0 Final    Anion Gap 08/23/2024 10.9  5.0 - 15.0 mmol/L Final    eGFR 08/23/2024 48.6 (L)  >60.0 mL/min/1.73 Final    Lipase 08/23/2024 82 (H)  13 - 60 U/L Final    proBNP 08/23/2024 116.6  0.0 - 450.0 pg/mL Final    Magnesium 08/23/2024 2.3  1.6 - 2.6 mg/dL Final    Extra Tube 08/23/2024 Hold for add-ons.   Final    Auto resulted.    Extra Tube 08/23/2024 hold for add-on   Final    Auto resulted    Extra Tube 08/23/2024 Hold for add-ons.   Final    Auto resulted.    Extra Tube 08/23/2024 Hold for add-ons.   Final    Auto resulted    WBC 08/23/2024 9.75  3.40 - 10.80 10*3/mm3 Final    RBC 08/23/2024 4.62  3.77 - 5.28 10*6/mm3 Final    Hemoglobin 08/23/2024 13.1  12.0 - 15.9 g/dL Final    Hematocrit 08/23/2024 40.0  34.0 - 46.6 % Final    MCV 08/23/2024 86.6  79.0 - 97.0 fL Final    MCH 08/23/2024 28.4  26.6 - 33.0 pg Final    MCHC 08/23/2024 32.8  31.5 - 35.7 g/dL Final    RDW 08/23/2024 13.6  12.3 - 15.4 % Final    RDW-SD 08/23/2024  42.5  37.0 - 54.0 fl Final    MPV 08/23/2024 9.9  6.0 - 12.0 fL Final    Platelets 08/23/2024 202  140 - 450 10*3/mm3 Final    Neutrophil % 08/23/2024 65.6  42.7 - 76.0 % Final    Lymphocyte % 08/23/2024 25.3  19.6 - 45.3 % Final    Monocyte % 08/23/2024 7.1  5.0 - 12.0 % Final    Eosinophil % 08/23/2024 0.9  0.3 - 6.2 % Final    Basophil % 08/23/2024 0.5  0.0 - 1.5 % Final    Immature Grans % 08/23/2024 0.6 (H)  0.0 - 0.5 % Final    Neutrophils, Absolute 08/23/2024 6.39  1.70 - 7.00 10*3/mm3 Final    Lymphocytes, Absolute 08/23/2024 2.47  0.70 - 3.10 10*3/mm3 Final    Monocytes, Absolute 08/23/2024 0.69  0.10 - 0.90 10*3/mm3 Final    Eosinophils, Absolute 08/23/2024 0.09  0.00 - 0.40 10*3/mm3 Final    Basophils, Absolute 08/23/2024 0.05  0.00 - 0.20 10*3/mm3 Final    Immature Grans, Absolute 08/23/2024 0.06 (H)  0.00 - 0.05 10*3/mm3 Final    nRBC 08/23/2024 0.0  0.0 - 0.2 /100 WBC Final    HS Troponin T 08/23/2024 9  <14 ng/L Final    Troponin T Delta 08/23/2024 -1  >=-4 - <+4 ng/L Final   Clinical Support on 08/08/2024   Component Date Value Ref Range Status    HCG, Urine, QL 08/08/2024 Negative  Negative Final    Lot Number 08/08/2024 -   Final    Internal Positive Control 08/08/2024 Positive  Positive, Passed Final    Internal Negative Control 08/08/2024 Negative  Negative, Passed Final    Expiration Date 08/08/2024 -   Final   Orders Only on 07/17/2024   Component Date Value Ref Range Status    Glucose 08/01/2024 90  65 - 99 mg/dL Final    BUN 08/01/2024 14  6 - 20 mg/dL Final    Creatinine 08/01/2024 1.17 (H)  0.57 - 1.00 mg/dL Final    Sodium 08/01/2024 139  136 - 145 mmol/L Final    Potassium 08/01/2024 4.4  3.5 - 5.2 mmol/L Final    Chloride 08/01/2024 108 (H)  98 - 107 mmol/L Final    CO2 08/01/2024 24.0  22.0 - 29.0 mmol/L Final    Calcium 08/01/2024 9.3  8.6 - 10.5 mg/dL Final    Total Protein 08/01/2024 6.5  6.0 - 8.5 g/dL Final    Albumin 08/01/2024 4.1  3.5 - 5.2 g/dL Final    ALT (SGPT) 08/01/2024 27   1 - 33 U/L Final    AST (SGOT) 08/01/2024 25  1 - 32 U/L Final    Alkaline Phosphatase 08/01/2024 72  39 - 117 U/L Final    Total Bilirubin 08/01/2024 0.2  0.0 - 1.2 mg/dL Final    Globulin 08/01/2024 2.4  gm/dL Final    A/G Ratio 08/01/2024 1.7  g/dL Final    BUN/Creatinine Ratio 08/01/2024 12.0  7.0 - 25.0 Final    Anion Gap 08/01/2024 7.0  5.0 - 15.0 mmol/L Final    eGFR 08/01/2024 58.8 (L)  >60.0 mL/min/1.73 Final    Color, UA 08/01/2024 Yellow  Yellow, Straw Final    Appearance, UA 08/01/2024 Clear  Clear Final    pH, UA 08/01/2024 8.0  5.0 - 8.0 Final    Specific Gravity, UA 08/01/2024 1.022  1.005 - 1.030 Final    Glucose, UA 08/01/2024 500 mg/dL (2+) (A)  Negative Final    Ketones, UA 08/01/2024 Negative  Negative Final    Bilirubin, UA 08/01/2024 Negative  Negative Final    Blood, UA 08/01/2024 Negative  Negative Final    Protein, UA 08/01/2024 Trace (A)  Negative Final    Leuk Esterase, UA 08/01/2024 Small (1+) (A)  Negative Final    Nitrite, UA 08/01/2024 Positive (A)  Negative Final    Urobilinogen, UA 08/01/2024 1.0 E.U./dL  0.2 - 1.0 E.U./dL Final    Extra Tube 08/01/2024 Hold for add-ons.   Final    Auto resulted.    RBC, UA 08/01/2024 0-2  None Seen, 0-2 /HPF Final    WBC, UA 08/01/2024 21-50 (A)  None Seen, 0-2 /HPF Final    Bacteria, UA 08/01/2024 None Seen  None Seen /HPF Final    Squamous Epithelial Cells, UA 08/01/2024 0-2  None Seen, 0-2 /HPF Final    Hyaline Casts, UA 08/01/2024 0-2  None Seen /LPF Final    Methodology 08/01/2024 Automated Microscopy   Final    Urine Culture 08/01/2024 >100,000 CFU/mL Escherichia coli (A)   Final   Lab on 07/05/2024   Component Date Value Ref Range Status    Glucose 07/05/2024 96  65 - 99 mg/dL Final    BUN 07/05/2024 14  6 - 20 mg/dL Final    Creatinine 07/05/2024 1.33 (H)  0.57 - 1.00 mg/dL Final    Sodium 07/05/2024 140  136 - 145 mmol/L Final    Potassium 07/05/2024 4.6  3.5 - 5.2 mmol/L Final    Chloride 07/05/2024 107  98 - 107 mmol/L Final    CO2  07/05/2024 20.0 (L)  22.0 - 29.0 mmol/L Final    Calcium 07/05/2024 9.2  8.6 - 10.5 mg/dL Final    Total Protein 07/05/2024 7.2  6.0 - 8.5 g/dL Final    Albumin 07/05/2024 4.5  3.5 - 5.2 g/dL Final    ALT (SGPT) 07/05/2024 21  1 - 33 U/L Final    AST (SGOT) 07/05/2024 21  1 - 32 U/L Final    Alkaline Phosphatase 07/05/2024 88  39 - 117 U/L Final    Total Bilirubin 07/05/2024 0.3  0.0 - 1.2 mg/dL Final    Globulin 07/05/2024 2.7  gm/dL Final    A/G Ratio 07/05/2024 1.7  g/dL Final    BUN/Creatinine Ratio 07/05/2024 10.5  7.0 - 25.0 Final    Anion Gap 07/05/2024 13.0  5.0 - 15.0 mmol/L Final    eGFR 07/05/2024 50.4 (L)  >60.0 mL/min/1.73 Final    WBC 07/05/2024 10.43  3.40 - 10.80 10*3/mm3 Final    RBC 07/05/2024 5.32 (H)  3.77 - 5.28 10*6/mm3 Final    Hemoglobin 07/05/2024 15.2  12.0 - 15.9 g/dL Final    Hematocrit 07/05/2024 44.6  34.0 - 46.6 % Final    MCV 07/05/2024 83.8  79.0 - 97.0 fL Final    MCH 07/05/2024 28.6  26.6 - 33.0 pg Final    MCHC 07/05/2024 34.1  31.5 - 35.7 g/dL Final    RDW 07/05/2024 12.7  12.3 - 15.4 % Final    RDW-SD 07/05/2024 38.0  37.0 - 54.0 fl Final    MPV 07/05/2024 10.2  6.0 - 12.0 fL Final    Platelets 07/05/2024 238  140 - 450 10*3/mm3 Final    Neutrophil % 07/05/2024 66.9  42.7 - 76.0 % Final    Lymphocyte % 07/05/2024 25.0  19.6 - 45.3 % Final    Monocyte % 07/05/2024 5.9  5.0 - 12.0 % Final    Eosinophil % 07/05/2024 0.9  0.3 - 6.2 % Final    Basophil % 07/05/2024 0.6  0.0 - 1.5 % Final    Immature Grans % 07/05/2024 0.7 (H)  0.0 - 0.5 % Final    Neutrophils, Absolute 07/05/2024 6.98  1.70 - 7.00 10*3/mm3 Final    Lymphocytes, Absolute 07/05/2024 2.61  0.70 - 3.10 10*3/mm3 Final    Monocytes, Absolute 07/05/2024 0.62  0.10 - 0.90 10*3/mm3 Final    Eosinophils, Absolute 07/05/2024 0.09  0.00 - 0.40 10*3/mm3 Final    Basophils, Absolute 07/05/2024 0.06  0.00 - 0.20 10*3/mm3 Final    Immature Grans, Absolute 07/05/2024 0.07 (H)  0.00 - 0.05 10*3/mm3 Final    nRBC 07/05/2024 0.0  0.0 -  0.2 /100 WBC Final    Microalbumin/Creatinine Ratio 07/05/2024 28.5  0.0 - 29.0 mg/g Final    Creatinine, Urine 07/05/2024 248.9  mg/dL Final    Microalbumin, Urine 07/05/2024 7.1  mg/dL Final    Hemoglobin A1C 07/05/2024 5.20  4.80 - 5.60 % Final    Total Cholesterol 07/05/2024 152  0 - 200 mg/dL Final    Triglycerides 07/05/2024 98  0 - 150 mg/dL Final    HDL Cholesterol 07/05/2024 46  40 - 60 mg/dL Final    LDL Cholesterol  07/05/2024 88  0 - 100 mg/dL Final    VLDL Cholesterol 07/05/2024 18  5 - 40 mg/dL Final    LDL/HDL Ratio 07/05/2024 1.88   Final    TSH 07/05/2024 0.810  0.270 - 4.200 uIU/mL Final    Color, UA 07/05/2024 Dark Yellow (A)  Yellow, Straw Final    Appearance, UA 07/05/2024 Cloudy (A)  Clear Final    pH, UA 07/05/2024 6.5  5.0 - 8.0 Final    Specific Gravity, UA 07/05/2024 1.026  1.005 - 1.030 Final    Glucose, UA 07/05/2024 500 mg/dL (2+) (A)  Negative Final    Ketones, UA 07/05/2024 Trace (A)  Negative Final    Bilirubin, UA 07/05/2024 Negative  Negative Final    Blood, UA 07/05/2024 Negative  Negative Final    Protein, UA 07/05/2024 30 mg/dL (1+) (A)  Negative Final    Leuk Esterase, UA 07/05/2024 Moderate (2+) (A)  Negative Final    Nitrite, UA 07/05/2024 Positive (A)  Negative Final    Urobilinogen, UA 07/05/2024 1.0 E.U./dL  0.2 - 1.0 E.U./dL Final    25 Hydroxy, Vitamin D 07/05/2024 84.5  30.0 - 100.0 ng/ml Final    Extra Tube 07/05/2024 Hold for add-ons.   Final    Auto resulted.    RBC, UA 07/05/2024 0-2  None Seen, 0-2 /HPF Final    WBC, UA 07/05/2024 6-10 (A)  None Seen, 0-2 /HPF Final    Bacteria, UA 07/05/2024 2+ (A)  None Seen /HPF Final    Squamous Epithelial Cells, UA 07/05/2024 3-6 (A)  None Seen, 0-2 /HPF Final    Hyaline Casts, UA 07/05/2024 0-2  None Seen /LPF Final    Methodology 07/05/2024 Automated Microscopy   Final    Urine Culture 07/05/2024 >100,000 CFU/mL Escherichia coli (A)   Final   Admission on 05/23/2024, Discharged on 05/23/2024   Component Date Value Ref Range  Status    Glucose 05/23/2024 80  70 - 99 mg/dL Final    Serial Number: [id number]:  [id number]    HCG, Urine QL 05/23/2024 Negative  Negative Final       ASSESSMENT AND PLAN:    ICD-10-CM ICD-9-CM   1. MDD (major depressive disorder), recurrent episode, moderate  F33.1 296.32   2. Post traumatic stress disorder (PTSD)  F43.10 309.81   3. Body image disturbance  F45.22 300.7       Bianca is a 44 y.o. female who presents today for follow-up regarding medication management. We have discussed the interval history and the treatment plan below, including potential R/B/SE of the recommended regimen of which the patient demonstrates understanding. Patient is agreeable to call 911 or go to the nearest ER should she become concerned for her own safety and/or the safety of those around her. There are are no overt indices of acute kathryn/psychosis on exam today. NELSON reviewed and is as expected.    Medication regimen: Continue bupropion  mg p.o. daily continue buspirone 5 mg p.o. 3 times daily continue desvenlafaxine 50 mg p.o. daily continue trazodone 100 mg nightly; patient is advised not to misuse prescribed medications or to use them with any exogenous substances that aren't disclosed to this provider as they may interact with the regimen to the patient's detriment.   Risk Assessment: protracted risk is moderate, imminent risk is moderate.  Do note that this is subject to change with the Restorationism of new stressors, treatment non-adherence, use of substances, and/or new medical ails.   Monitoring: reviewed labs/imaging as populated above; ordered  Therapy: will refer to virtual clinic   Follow-up: 1-3 months  Communications: N/A    TREATMENT PLAN/GOALS: challenge patterns of living conducive to symptom burden, implement recommended regimen as above with augmentative, intermittent supportive psychotherapy to reduce symptom burden. Patient acknowledged and verbally consented to continue treatment. The importance of  adherence to the recommended treatment and interval follow-up appointments was again emphasized today: patient has good treatment adherence per given history. Patient was today reminded to limit daily caffeine intake, hydrate appropriately, eat healthy and nutritious foods, engage sleep hygiene measures, engage appropriate exposure to sunlight, engage with hobbies in balance with life necessities, and exercise appropriate to their capacity to do so.       Parts of this note are electronic transcriptions/translations of spoken language to printed text using the Dragon Dictation system.    Electronically signed by CARLTON Carbajal, 11/14/24

## 2024-11-15 ENCOUNTER — HOSPITAL ENCOUNTER (OUTPATIENT)
Dept: MAMMOGRAPHY | Facility: HOSPITAL | Age: 44
Discharge: HOME | End: 2024-11-15
Admitting: NURSE PRACTITIONER
Payer: COMMERCIAL

## 2024-11-15 ENCOUNTER — OFFICE VISIT (OUTPATIENT)
Dept: PSYCHIATRY | Facility: CLINIC | Age: 44
End: 2024-11-15
Payer: COMMERCIAL

## 2024-11-15 ENCOUNTER — TREATMENT (OUTPATIENT)
Dept: CARDIAC REHAB | Facility: HOSPITAL | Age: 44
End: 2024-11-15
Payer: COMMERCIAL

## 2024-11-15 VITALS
WEIGHT: 146.2 LBS | HEART RATE: 72 BPM | BODY MASS INDEX: 22.95 KG/M2 | DIASTOLIC BLOOD PRESSURE: 60 MMHG | SYSTOLIC BLOOD PRESSURE: 103 MMHG | HEIGHT: 67 IN

## 2024-11-15 DIAGNOSIS — F43.10 POST TRAUMATIC STRESS DISORDER (PTSD): ICD-10-CM

## 2024-11-15 DIAGNOSIS — Z12.31 SCREENING MAMMOGRAM FOR BREAST CANCER: ICD-10-CM

## 2024-11-15 DIAGNOSIS — F33.1 MDD (MAJOR DEPRESSIVE DISORDER), RECURRENT EPISODE, MODERATE: Primary | ICD-10-CM

## 2024-11-15 DIAGNOSIS — Z95.5 S/P CORONARY ARTERY STENT PLACEMENT: Primary | ICD-10-CM

## 2024-11-15 DIAGNOSIS — F45.22 BODY IMAGE DISTURBANCE: ICD-10-CM

## 2024-11-15 PROCEDURE — 77063 BREAST TOMOSYNTHESIS BI: CPT

## 2024-11-15 PROCEDURE — 77067 SCR MAMMO BI INCL CAD: CPT

## 2024-11-15 PROCEDURE — 93798 PHYS/QHP OP CAR RHAB W/ECG: CPT

## 2024-11-19 RX ORDER — BUPROPION HYDROCHLORIDE 300 MG/1
300 TABLET ORAL DAILY
Qty: 90 TABLET | Refills: 1 | Status: SHIPPED | OUTPATIENT
Start: 2024-11-19

## 2024-11-21 ENCOUNTER — TELEPHONE (OUTPATIENT)
Dept: PSYCHIATRY | Facility: CLINIC | Age: 44
End: 2024-11-21
Payer: COMMERCIAL

## 2024-11-21 NOTE — TELEPHONE ENCOUNTER
Patient was referred out to the virtual clinic, referral was sent back unable to contact patient to schedule, closing out referral order.    Patient expressed no known problems or needs

## 2024-12-02 RX ORDER — TOPIRAMATE 50 MG/1
50 TABLET, FILM COATED ORAL
Qty: 90 TABLET | Refills: 1 | Status: SHIPPED | OUTPATIENT
Start: 2024-12-02 | End: 2025-05-31

## 2024-12-06 ENCOUNTER — TREATMENT (OUTPATIENT)
Dept: CARDIAC REHAB | Facility: HOSPITAL | Age: 44
End: 2024-12-06
Payer: COMMERCIAL

## 2024-12-06 ENCOUNTER — LAB (OUTPATIENT)
Facility: HOSPITAL | Age: 44
End: 2024-12-06
Payer: COMMERCIAL

## 2024-12-06 DIAGNOSIS — Z95.5 S/P CORONARY ARTERY STENT PLACEMENT: Primary | ICD-10-CM

## 2024-12-06 DIAGNOSIS — E11.9 TYPE 2 DIABETES MELLITUS WITHOUT COMPLICATION, WITHOUT LONG-TERM CURRENT USE OF INSULIN: ICD-10-CM

## 2024-12-06 DIAGNOSIS — E78.2 MIXED HYPERLIPIDEMIA: ICD-10-CM

## 2024-12-06 DIAGNOSIS — E78.5 HYPERLIPIDEMIA LDL GOAL <70: ICD-10-CM

## 2024-12-06 DIAGNOSIS — N18.30 STAGE 3 CHRONIC KIDNEY DISEASE, UNSPECIFIED WHETHER STAGE 3A OR 3B CKD: ICD-10-CM

## 2024-12-06 LAB
ALBUMIN SERPL-MCNC: 3.7 G/DL (ref 3.5–5.2)
ALBUMIN/GLOB SERPL: 1.1 G/DL
ALP SERPL-CCNC: 78 U/L (ref 39–117)
ALT SERPL W P-5'-P-CCNC: 32 U/L (ref 1–33)
ANION GAP SERPL CALCULATED.3IONS-SCNC: 7.5 MMOL/L (ref 5–15)
AST SERPL-CCNC: 26 U/L (ref 1–32)
BASOPHILS # BLD AUTO: 0.05 10*3/MM3 (ref 0–0.2)
BASOPHILS NFR BLD AUTO: 0.6 % (ref 0–1.5)
BILIRUB SERPL-MCNC: 0.3 MG/DL (ref 0–1.2)
BUN SERPL-MCNC: 20 MG/DL (ref 6–20)
BUN/CREAT SERPL: 16.3 (ref 7–25)
CALCIUM SPEC-SCNC: 9.2 MG/DL (ref 8.6–10.5)
CHLORIDE SERPL-SCNC: 108 MMOL/L (ref 98–107)
CHOLEST SERPL-MCNC: 134 MG/DL (ref 0–200)
CO2 SERPL-SCNC: 21.5 MMOL/L (ref 22–29)
CREAT SERPL-MCNC: 1.23 MG/DL (ref 0.57–1)
DEPRECATED RDW RBC AUTO: 38 FL (ref 37–54)
EGFRCR SERPLBLD CKD-EPI 2021: 55.3 ML/MIN/1.73
EOSINOPHIL # BLD AUTO: 0.16 10*3/MM3 (ref 0–0.4)
EOSINOPHIL NFR BLD AUTO: 1.8 % (ref 0.3–6.2)
ERYTHROCYTE [DISTWIDTH] IN BLOOD BY AUTOMATED COUNT: 12 % (ref 12.3–15.4)
GLOBULIN UR ELPH-MCNC: 3.3 GM/DL
GLUCOSE SERPL-MCNC: 95 MG/DL (ref 65–99)
HBA1C MFR BLD: 5.3 % (ref 4.8–5.6)
HCT VFR BLD AUTO: 44.6 % (ref 34–46.6)
HDLC SERPL-MCNC: 44 MG/DL (ref 40–60)
HGB BLD-MCNC: 15 G/DL (ref 12–15.9)
IMM GRANULOCYTES # BLD AUTO: 0.12 10*3/MM3 (ref 0–0.05)
IMM GRANULOCYTES NFR BLD AUTO: 1.4 % (ref 0–0.5)
LDLC SERPL CALC-MCNC: 73 MG/DL (ref 0–100)
LDLC/HDLC SERPL: 1.65 {RATIO}
LYMPHOCYTES # BLD AUTO: 2.16 10*3/MM3 (ref 0.7–3.1)
LYMPHOCYTES NFR BLD AUTO: 24.4 % (ref 19.6–45.3)
MCH RBC QN AUTO: 29.4 PG (ref 26.6–33)
MCHC RBC AUTO-ENTMCNC: 33.6 G/DL (ref 31.5–35.7)
MCV RBC AUTO: 87.3 FL (ref 79–97)
MONOCYTES # BLD AUTO: 0.66 10*3/MM3 (ref 0.1–0.9)
MONOCYTES NFR BLD AUTO: 7.5 % (ref 5–12)
NEUTROPHILS NFR BLD AUTO: 5.69 10*3/MM3 (ref 1.7–7)
NEUTROPHILS NFR BLD AUTO: 64.3 % (ref 42.7–76)
NRBC BLD AUTO-RTO: 0 /100 WBC (ref 0–0.2)
PLATELET # BLD AUTO: 226 10*3/MM3 (ref 140–450)
PMV BLD AUTO: 10 FL (ref 6–12)
POTASSIUM SERPL-SCNC: 4.3 MMOL/L (ref 3.5–5.2)
PROT SERPL-MCNC: 7 G/DL (ref 6–8.5)
RBC # BLD AUTO: 5.11 10*6/MM3 (ref 3.77–5.28)
SODIUM SERPL-SCNC: 137 MMOL/L (ref 136–145)
TRIGL SERPL-MCNC: 88 MG/DL (ref 0–150)
TSH SERPL DL<=0.05 MIU/L-ACNC: 0.74 UIU/ML (ref 0.27–4.2)
VLDLC SERPL-MCNC: 17 MG/DL (ref 5–40)
WBC NRBC COR # BLD AUTO: 8.84 10*3/MM3 (ref 3.4–10.8)

## 2024-12-06 PROCEDURE — 83036 HEMOGLOBIN GLYCOSYLATED A1C: CPT

## 2024-12-06 PROCEDURE — 36415 COLL VENOUS BLD VENIPUNCTURE: CPT

## 2024-12-06 PROCEDURE — 80050 GENERAL HEALTH PANEL: CPT

## 2024-12-06 PROCEDURE — 93798 PHYS/QHP OP CAR RHAB W/ECG: CPT

## 2024-12-06 PROCEDURE — 80061 LIPID PANEL: CPT

## 2024-12-06 RX ORDER — PRAVASTATIN SODIUM 80 MG/1
80 TABLET ORAL NIGHTLY
Qty: 90 TABLET | Refills: 1 | OUTPATIENT
Start: 2024-12-06

## 2024-12-06 RX ORDER — BUSPIRONE HYDROCHLORIDE 5 MG/1
5 TABLET ORAL 3 TIMES DAILY
Qty: 270 TABLET | Refills: 1 | Status: SHIPPED | OUTPATIENT
Start: 2024-12-06

## 2024-12-06 NOTE — PROGRESS NOTES
Pt tolerated exercises see scanned report.   What Type Of Note Output Would You Prefer (Optional)?: Standard Output Hpi Title: Evaluation of Skin Lesions How Severe Are Your Spot(S)?: mild Have Your Spot(S) Been Treated In The Past?: has not been treated Family Member: Father

## 2024-12-09 ENCOUNTER — TREATMENT (OUTPATIENT)
Dept: CARDIAC REHAB | Facility: HOSPITAL | Age: 44
End: 2024-12-09
Payer: COMMERCIAL

## 2024-12-09 DIAGNOSIS — Z95.5 S/P CORONARY ARTERY STENT PLACEMENT: Primary | ICD-10-CM

## 2024-12-09 PROCEDURE — 93798 PHYS/QHP OP CAR RHAB W/ECG: CPT

## 2024-12-11 ENCOUNTER — APPOINTMENT (OUTPATIENT)
Dept: CARDIAC REHAB | Facility: HOSPITAL | Age: 44
End: 2024-12-11
Payer: COMMERCIAL

## 2024-12-12 LAB
BACTERIA UR QL AUTO: ABNORMAL /HPF
BILIRUB UR QL STRIP: NEGATIVE
CLARITY UR: ABNORMAL
COD CRY URNS QL: PRESENT /HPF
COLOR UR: YELLOW
GLUCOSE UR STRIP-MCNC: ABNORMAL MG/DL
HGB UR QL STRIP.AUTO: NEGATIVE
HOLD SPECIMEN: NORMAL
HYALINE CASTS UR QL AUTO: ABNORMAL /LPF
KETONES UR QL STRIP: ABNORMAL
LEUKOCYTE ESTERASE UR QL STRIP.AUTO: ABNORMAL
NITRITE UR QL STRIP: POSITIVE
PH UR STRIP.AUTO: 5.5 [PH] (ref 5–8)
PROT UR QL STRIP: NEGATIVE
RBC # UR STRIP: ABNORMAL /HPF
REF LAB TEST METHOD: ABNORMAL
SP GR UR STRIP: 1.03 (ref 1–1.03)
SQUAMOUS #/AREA URNS HPF: ABNORMAL /HPF
UROBILINOGEN UR QL STRIP: ABNORMAL
WBC # UR STRIP: ABNORMAL /HPF

## 2024-12-12 PROCEDURE — 87186 SC STD MICRODIL/AGAR DIL: CPT

## 2024-12-12 PROCEDURE — 87086 URINE CULTURE/COLONY COUNT: CPT

## 2024-12-12 PROCEDURE — 82043 UR ALBUMIN QUANTITATIVE: CPT

## 2024-12-12 PROCEDURE — 82570 ASSAY OF URINE CREATININE: CPT

## 2024-12-12 PROCEDURE — 81001 URINALYSIS AUTO W/SCOPE: CPT

## 2024-12-12 PROCEDURE — 87088 URINE BACTERIA CULTURE: CPT

## 2024-12-13 ENCOUNTER — TREATMENT (OUTPATIENT)
Dept: CARDIAC REHAB | Facility: HOSPITAL | Age: 44
End: 2024-12-13
Payer: COMMERCIAL

## 2024-12-13 DIAGNOSIS — Z95.5 S/P CORONARY ARTERY STENT PLACEMENT: Primary | ICD-10-CM

## 2024-12-13 LAB
ALBUMIN UR-MCNC: 2.4 MG/DL
CREAT UR-MCNC: 165.4 MG/DL
MICROALBUMIN/CREAT UR: 14.5 MG/G (ref 0–29)

## 2024-12-13 PROCEDURE — 93798 PHYS/QHP OP CAR RHAB W/ECG: CPT

## 2024-12-13 NOTE — PROGRESS NOTES
Follow Up Office Visit      Patient Name: Bianca Boles  : 1980   MRN: [id number]     Chief Complaint:    Chief Complaint   Patient presents with    Hypertension    Hyperlipidemia    Heartburn    Anxiety    Insomnia    Diabetes    Coronary Artery Disease    Chronic Kidney Disease       History of Present Illness: Bianca Boles is a 44 y.o. female who is here today to follow up for hyperlipidemia, depression, GERD, anxiety, insomnia, DM2.  Review lab results      A1C-2024  Mounjaro 0.5ml weekly   Eye exam-  vision works   foot exam- checks at home.  Mammogram-2024  Pap-2023  Colonoscopy- 2024     C/o She states her ears are hurting and feels like there may be fluid in them. Not currently using flonase     C/o urinary frequency and right flank pain, history of renal stones    Behavioral health currently raimundo bains, counseling scheduled for      Subjective      Review of Systems:   Review of Systems   Constitutional:  Negative for fever.   HENT:  Positive for ear pain. Negative for sore throat.    Eyes:  Negative for visual disturbance.   Respiratory:  Negative for cough.    Cardiovascular:  Negative for chest pain.   Gastrointestinal:  Negative for abdominal pain.   Endocrine: Negative for polydipsia and polyuria.   Genitourinary:  Negative for dysuria.   Musculoskeletal:  Negative for myalgias.   Allergic/Immunologic: Positive for environmental allergies.        Past Medical History:   Past Medical History:   Diagnosis Date    Acid reflux     Anxiety     Anxiety and depression     Benign essential hypertension     Borderline personality disorder     Cancer     renal cancer/mass, PARTIAL NEPH, RIGHT    Chronic kidney disease     Coronary artery disease     Diabetes     Diabetes mellitus     type ii, doesn't check bg at home     Diabetes mellitus, type 2     Elevated cholesterol     Frozen shoulder 2023    GERD (gastroesophageal reflux disease)     High triglycerides      History of bariatric surgery 2021    GASTRIC SLEEVE    History of kidney stones     HTN (hypertension)     Hyperlipemia     Hyperlipidemia     Hypertriglyceridemia     Lumbar herniated disc     Myocardial infarction     Obesity     Panic attacks     PCOS (polycystic ovarian syndrome)     Periarthritis of shoulder     Psoriasis     ELBOWS    Rotator cuff syndrome     Seasonal allergies     Self-injurious behavior     Spinal headache     AFTER PAIN EPIDURALS.  NO BLOOD PATCH    Suicide attempt        Past Surgical History:   Past Surgical History:   Procedure Laterality Date    ABDOMINAL SURGERY  2020    Gastric sleeve    ADENOIDECTOMY  1984    CARDIAC CATHETERIZATION N/A 2024    Procedure: Left Heart Cath;  Surgeon: Toño Rodriguez MD;  Location: [facility] CATH INVASIVE LOCATION;  Service: Cardiovascular;  Laterality: N/A;    CARDIAC CATHETERIZATION N/A 2024    Procedure: Coronary angiography;  Surgeon: Toño Rodriguez MD;  Location: [facility] CATH INVASIVE LOCATION;  Service: Cardiovascular;  Laterality: N/A;    CARDIAC CATHETERIZATION N/A 2024    Procedure: Angioplasty-coronary;  Surgeon: Toño Rodriguez MD;  Location: [facility] CATH INVASIVE LOCATION;  Service: Cardiovascular;  Laterality: N/A;     SECTION  ,    COLONOSCOPY N/A 2024    Procedure: COLONOSCOPY;  Surgeon: Terrence Fry MD;  Location: [facility] ENDOSCOPY;  Service: Gastroenterology;  Laterality: N/A;  NORMAL COLONOSCOPY    CORONARY STENT PLACEMENT      CYSTOSCOPY BLADDER STONE LITHOTRIPSY      EAR TUBES  1984    ENDOSCOPY N/A 10/20/2020    Procedure: ESOPHAGOGASTRODUODENOSCOPY WITH BIOPSY;  Surgeon: Fidel Grajeda Jr., MD;  Location: [facility] ENDOSCOPY;  Service: General;  Laterality: N/A;  PRE- GERD  POST- RETAINED FOOD, GASTRITIS, GASTRIC POLYPS    ENDOSCOPY  2014    FOOT FRACTURE SURGERY Left 2017    screw placed    GASTRECTOMY  2020    GASTRIC SLEEVE LAPAROSCOPIC N/A 2020     Procedure: GASTRIC SLEEVE LAPAROSCOPIC;  Surgeon: Fidel Garjeda Jr., MD;  Location: [facility];  Service: Bariatric;  Laterality: N/A;    NEPHRECTOMY PARTIAL Right 11/15/2021    Procedure: NEPHRECTOMY PARTIAL LAPAROSCOPIC WITH DAVINCI ROBOT;  Surgeon: Lori Troy MD;  Location: [facility] OR;  Service: Robotics - DaVinci;  Laterality: Right;    SHOULDER ARTHROSCOPY Left 08/14/2023    Procedure: LEFT SHOULDER MANIPULATION;  Surgeon: Domenic Bradley MD;  Location: [facility];  Service: Orthopedics;  Laterality: Left;    TONSILLECTOMY  1984    UPPER GASTROINTESTINAL ENDOSCOPY      URETEROSCOPY LASER LITHOTRIPSY WITH STENT INSERTION Right 09/28/2021    Procedure: CYSTOSCOPY, RIGHT URETEROSCOPY, LASERTRIPSY, STONE BASKET EXTRACTION AND STENT INSERTION;  Surgeon: Lori Troy MD;  Location: [facility] OR;  Service: Urology;  Laterality: Right;    URETEROSCOPY LASER LITHOTRIPSY WITH STENT INSERTION Left 11/08/2022    Procedure: URETEROSCOPY LASER LITHOTRIPSY WITH URETERAL STENT INSERTION;  Surgeon: Lori Troy MD;  Location: [facility] OR;  Service: Urology;  Laterality: Left;       Family History:   Family History   Problem Relation Age of Onset    Cancer Mother         Breast    Diabetes Father     Hypertension Father     Heart disease Father     Cancer Father         Bladder prostate liver    Colon cancer Father         malignant, currently 62    No Known Problems Sister     No Known Problems Brother     No Known Problems Maternal Aunt     No Known Problems Paternal Aunt     No Known Problems Maternal Uncle     No Known Problems Paternal Uncle     No Known Problems Maternal Grandfather     Stroke Maternal Grandmother     Heart disease Maternal Grandmother     Diabetes Paternal Grandfather     Heart disease Paternal Grandfather     No Known Problems Paternal Grandmother     No Known Problems Cousin     Malig Hyperthermia Neg Hx     ADD / ADHD Neg Hx     Alcohol abuse Neg Hx      Anxiety disorder Neg Hx     Bipolar disorder Neg Hx     Dementia Neg Hx     Depression Neg Hx     Drug abuse Neg Hx     OCD Neg Hx     Paranoid behavior Neg Hx     Schizophrenia Neg Hx     Seizures Neg Hx     Self-Injurious Behavior  Neg Hx     Suicide Attempts Neg Hx        Social History:   Social History     Socioeconomic History    Marital status:     Number of children: 2   Tobacco Use    Smoking status: Former     Current packs/day: 0.00     Average packs/day: 0.3 packs/day for 25.0 years (6.3 ttl pk-yrs)     Types: Cigarettes     Start date:      Quit date: 1/15/2020     Years since quittin.9     Passive exposure: Never    Smokeless tobacco: Never    Tobacco comments:     A PACK A WEEK   Vaping Use    Vaping status: Never Used   Substance and Sexual Activity    Alcohol use: Yes     Comment: OCCASIONAL/SOCIAL    Drug use: Not Currently     Frequency: 0.1 times per week     Types: Marijuana     Comment: OTC THC- GUMMIES    Sexual activity: Yes     Partners: Male     Birth control/protection: Depo-provera       Medications:     Current Outpatient Medications:     amLODIPine (NORVASC) 5 MG tablet, Take 1 tablet by mouth Daily., Disp: 60 tablet, Rfl: 3    Apremilast (Otezla) 30 MG tablet, Take 1 tablet by mouth 2 (Two) Times a Day., Disp: 60 tablet, Rfl: 5    aspirin 81 MG EC tablet, Take 1 tablet by mouth Daily., Disp: 90 tablet, Rfl: 5    azelaic acid (AZELEX) 15 % gel, Apply 1 Application topically to face as directed Daily after cleansing., Disp: 50 g, Rfl: 3    betamethasone valerate (VALISONE) 0.1 % cream, Apply 1 Application topically to the appropriate area as directed 1 (One) to  2 (Two) Times a Day until approved., Disp: 45 g, Rfl: 2    Biotin 44444 MCG tablet, Take 1 tablet by mouth Every Night., Disp: , Rfl:     buPROPion XL (WELLBUTRIN XL) 300 MG 24 hr tablet, Take 1 tablet by mouth Daily., Disp: 90 tablet, Rfl: 1    busPIRone (BUSPAR) 5 MG tablet, Take 1 tablet by mouth 3 (Three) Times  a Day., Disp: 270 tablet, Rfl: 1    CALCIUM-MAGNESIUM-ZINC PO, Take 3 tablets by mouth Daily., Disp: , Rfl:     cetirizine (zyrTEC) 10 MG tablet, Take 1 tablet by mouth Daily As Needed., Disp: , Rfl:     Cholecalciferol 25 MCG (1000 UT) capsule, Take 2 capsules by mouth Daily., Disp: 180 capsule, Rfl: 1    clopidogrel (PLAVIX) 75 MG tablet, Take 1 tablet by mouth Daily, Disp: 30 tablet, Rfl: 6    COLLAGEN PO, Take 3 tablets by mouth Daily., Disp: , Rfl:     dapagliflozin Propanediol (Farxiga) 10 MG tablet, Take 10 mg by mouth Daily., Disp: 90 tablet, Rfl: 1    desvenlafaxine (PRISTIQ) 50 MG 24 hr tablet, Take 1 tablet by mouth Daily., Disp: 90 tablet, Rfl: 1    Diclofenac Sodium (VOLTAREN) 1 % gel gel, Apply 4 g topically to the appropriate area as directed 4 (Four) Times a Day As Needed (pain)., Disp: 200 g, Rfl: 1    fluticasone (FLONASE) 50 MCG/ACT nasal spray, USE 2 SPRAYS IN EACH NOSTRIL ONCE DAILY AS DIRECTED, Disp: 48 g, Rfl: 1    hydrocortisone 2.5 % cream, Apply 1 Application topically to the appropriate area of the face as directed 1 (One) to 2 (Two) Times a Day until approved., Disp: 30 g, Rfl: 2    ibuprofen (ADVIL,MOTRIN) 800 MG tablet, Take 1 tablet by mouth Every 6 (Six) Hours As Needed for Mild Pain., Disp: 90 tablet, Rfl: 1    IRON-VITAMIN C PO, Take 1 tablet by mouth Daily., Disp: , Rfl:     linaclotide (Linzess) 72 MCG capsule capsule, Take 1 capsule by mouth Every Morning Before Breakfast for 90 days., Disp: 90 capsule, Rfl: 3    metoprolol succinate XL (Toprol XL) 25 MG 24 hr tablet, Take 1 tablet by mouth Every Night., Disp: 90 tablet, Rfl: 1    Multiple Vitamins-Minerals (MULTIVITAMIN PO), Take 1 tablet by mouth Every Night., Disp: , Rfl:     nitroglycerin (NITROSTAT) 0.4 MG SL tablet, Place 1 tablet under the tongue Every 5 (Five) Minutes As Needed for Chest Pain (Systolic BP Greater Than 100). Take no more than 3 doses in 15 minutes., Disp: 60 tablet, Rfl: 2    ondansetron ODT (ZOFRAN-ODT) 4  "MG disintegrating tablet, Place 1 tablet on the tongue Every 8 (Eight) Hours As Needed for Nausea., Disp: , Rfl:     pravastatin (PRAVACHOL) 80 MG tablet, Take 1 tablet by mouth Every Night., Disp: 90 tablet, Rfl: 1    Probiotic Product (PROBIOTIC-10 PO), Take 1 tablet by mouth Every Night., Disp: , Rfl:     spironolactone (ALDACTONE) 25 MG tablet, Take 0.5 tablets by mouth Daily., Disp: 60 tablet, Rfl: 3    testosterone micronized powder, Appyl 0.5 mL (2 clicks) to skin daily, Disp: 15 g, Rfl: 0    Tirzepatide 12.5 MG/0.5ML solution auto-injector, Inject 12.5 mg under the skin into the appropriate area as directed 1 (One) Time Per Week., Disp: 2 mL, Rfl: 5    topiramate (TOPAMAX) 50 MG tablet, Take 1 tablet by mouth every night at bedtime for 180 days., Disp: 90 tablet, Rfl: 1    traZODone (DESYREL) 100 MG tablet, Take 1 tablet by mouth Every Night., Disp: 90 tablet, Rfl: 1    cephalexin (Keflex) 500 MG capsule, Take 1 capsule by mouth 4 (Four) Times a Day., Disp: 40 capsule, Rfl: 0    Estrogens Conjugated (Premarin) 0.625 MG/GM vaginal cream, Insert 1 GM per vagina daily at bedtime X2 WEEKS THEN 1 GM per vagina daily at bedtime  2 DAYS PER WEEK, Disp: 30 g, Rfl: 3    Current Facility-Administered Medications:     MedroxyPROGESTERone Acetate (DEPO-PROVERA) injection 150 mg, 150 mg, Intramuscular, Q3 Months, Patricia Hernandez Breann, APRN, 150 mg at 10/15/24 0949    Allergies:   Allergies   Allergen Reactions    Morphine Itching and Nausea And Vomiting           Physical Exam:  Vital Signs:   Vitals:    12/17/24 0853   BP: 109/70   Pulse: 68   Temp: 97.6 °F (36.4 °C)   SpO2: 98%   Weight: 98.9 kg (218 lb)   Height: 170.2 cm (67\")     Body mass index is 34.14 kg/m².   BMI is within normal parameters. No other follow-up for BMI required.       Physical Exam  HENT:      Right Ear: A middle ear effusion is present. Tympanic membrane is injected.      Left Ear: A middle ear effusion is present. Tympanic membrane is " injected.      Nose: Nose normal.      Mouth/Throat:      Mouth: Mucous membranes are moist.   Eyes:      Conjunctiva/sclera: Conjunctivae normal.   Neck:      Thyroid: No thyroid tenderness.      Vascular: No carotid bruit.   Cardiovascular:      Rate and Rhythm: Normal rate and regular rhythm.      Heart sounds: Normal heart sounds. No murmur heard.  Pulmonary:      Effort: Pulmonary effort is normal.      Breath sounds: Normal breath sounds.   Abdominal:      General: Bowel sounds are normal.      Palpations: Abdomen is soft.      Tenderness: There is right CVA tenderness.   Musculoskeletal:      Right lower leg: No edema.      Left lower leg: No edema.   Skin:     General: Skin is warm and dry.   Neurological:      Mental Status: She is alert.   Psychiatric:         Mood and Affect: Mood normal.         Behavior: Behavior normal.             Assessment / Plan      Assessment/Plan:   Diagnoses and all orders for this visit:    1. Type 2 diabetes mellitus without complication, without long-term current use of insulin (Primary)  -     CBC Auto Differential; Future  -     Comprehensive Metabolic Panel; Future  -     Microalbumin / Creatinine Urine Ratio - Urine, Clean Catch; Future  -     Hemoglobin A1c; Future  -     TSH; Future  -     Urinalysis With Culture If Indicated -; Future    2. Essential hypertension    3. Mixed hyperlipidemia  -     Comprehensive Metabolic Panel; Future  -     Lipid Panel; Future    4. Stage 3 chronic kidney disease, unspecified whether stage 3a or 3b CKD  -     Comprehensive Metabolic Panel; Future    5. CAD S/P percutaneous coronary angioplasty    6. Palpitation    7. Vitamin D deficiency  -     Vitamin D,25-Hydroxy; Future    8. Anxiety  -     desvenlafaxine (PRISTIQ) 50 MG 24 hr tablet; Take 1 tablet by mouth Daily.  Dispense: 90 tablet; Refill: 1    9. Mild episode of recurrent major depressive disorder  -     desvenlafaxine (PRISTIQ) 50 MG 24 hr tablet; Take 1 tablet by mouth  Daily.  Dispense: 90 tablet; Refill: 1    10. Right flank pain  -     XR Abdomen KUB; Future    11. Acute cystitis without hematuria    12. Recurrent UTI    13. Eustachian tube disorder, bilateral    Other orders  -     buPROPion XL (WELLBUTRIN XL) 300 MG 24 hr tablet; Take 1 tablet by mouth Daily.  Dispense: 90 tablet; Refill: 1  -     busPIRone (BUSPAR) 5 MG tablet; Take 1 tablet by mouth 3 (Three) Times a Day.  Dispense: 270 tablet; Refill: 1  -     Cholecalciferol 25 MCG (1000 UT) capsule; Take 2 capsules by mouth Daily.  Dispense: 180 capsule; Refill: 1  -     fluticasone (FLONASE) 50 MCG/ACT nasal spray; USE 2 SPRAYS IN EACH NOSTRIL ONCE DAILY AS DIRECTED  Dispense: 48 g; Refill: 1  -     metoprolol succinate XL (Toprol XL) 25 MG 24 hr tablet; Take 1 tablet by mouth Every Night.  Dispense: 90 tablet; Refill: 1  -     topiramate (TOPAMAX) 50 MG tablet; Take 1 tablet by mouth every night at bedtime for 180 days.  Dispense: 90 tablet; Refill: 1  -     traZODone (DESYREL) 100 MG tablet; Take 1 tablet by mouth Every Night.  Dispense: 90 tablet; Refill: 1  -     Estrogens Conjugated (Premarin) 0.625 MG/GM vaginal cream; Insert 1 GM per vagina daily at bedtime X2 WEEKS THEN 1 GM per vagina daily at bedtime  2 DAYS PER WEEK  Dispense: 30 g; Refill: 3  -     cephalexin (Keflex) 500 MG capsule; Take 1 capsule by mouth 4 (Four) Times a Day.  Dispense: 40 capsule; Refill: 0       Diabetes mellitus type 2 currently controlled hemoglobin A1c below 6.5 we will continue Mounjaro  Hypertension currently controlled at this time amlodipine 5 mg daily  Hyperlipidemia LDL slightly above goal on pravastatin 80 mg at nighttime previously below 70 recommend cutting out fried foods reducing pork cheese red meat increasing fruits vegetables whole grains working on weight loss will reassess in 6 months if remains elevated will change to Crestor  Coronary artery disease status post angioplasty currently on aspirin and Plavix per  "cardiology follow-up as scheduled  Palpitations currently controlled with Toprol-XL  Chronic kidney disease stage III avoid all NSAIDs increase water intake currently on Farxiga 10 mg daily  Vitamin D deficiency recommend 2000 units supplementation daily  Anxiety depression stable on Pristiq continue to follow with behavioral health and attend counseling as scheduled  Right flank pain will obtain KUB does have a CT scheduled in January with urology Dr. Lori Cruz  Acute cystitis without hematuria will treat with Keflex urine culture reviewed  With recurrent urinary tract infections will start Premarin vaginal cream  Eustachian tube disorder resume Flonase      Follow Up:   Return in about 6 months (around 6/17/2025).    Kofi Hernandez, APRN    \"Please note that portions of this note were completed with a voice recognition program.\"    "

## 2024-12-14 LAB — BACTERIA SPEC AEROBE CULT: ABNORMAL

## 2024-12-16 ENCOUNTER — TREATMENT (OUTPATIENT)
Dept: CARDIAC REHAB | Facility: HOSPITAL | Age: 44
End: 2024-12-16
Payer: COMMERCIAL

## 2024-12-16 DIAGNOSIS — R68.82 LOW LIBIDO: ICD-10-CM

## 2024-12-16 DIAGNOSIS — R79.89 LOW TESTOSTERONE LEVEL IN FEMALE: ICD-10-CM

## 2024-12-16 DIAGNOSIS — Z95.5 S/P CORONARY ARTERY STENT PLACEMENT: Primary | ICD-10-CM

## 2024-12-16 PROCEDURE — 93798 PHYS/QHP OP CAR RHAB W/ECG: CPT

## 2024-12-16 RX ORDER — ONDANSETRON 4 MG/1
4 TABLET, ORALLY DISINTEGRATING ORAL EVERY 8 HOURS PRN
Qty: 21 TABLET | Refills: 5 | OUTPATIENT
Start: 2024-12-16

## 2024-12-16 RX ORDER — TESTOSTERONE MICRONIZED 100 %
POWDER (GRAM) MISCELLANEOUS
Qty: 15 G | Refills: 0 | Status: SHIPPED | OUTPATIENT
Start: 2024-12-16

## 2024-12-17 ENCOUNTER — HOSPITAL ENCOUNTER (OUTPATIENT)
Dept: GENERAL RADIOLOGY | Facility: HOSPITAL | Age: 44
Discharge: HOME | End: 2024-12-17
Admitting: NURSE PRACTITIONER
Payer: COMMERCIAL

## 2024-12-17 ENCOUNTER — OFFICE VISIT (OUTPATIENT)
Dept: FAMILY MEDICINE CLINIC | Facility: CLINIC | Age: 44
End: 2024-12-17
Payer: COMMERCIAL

## 2024-12-17 VITALS
TEMPERATURE: 97.6 F | OXYGEN SATURATION: 98 % | HEIGHT: 67 IN | DIASTOLIC BLOOD PRESSURE: 70 MMHG | BODY MASS INDEX: 34.21 KG/M2 | HEART RATE: 68 BPM | SYSTOLIC BLOOD PRESSURE: 109 MMHG | WEIGHT: 218 LBS

## 2024-12-17 DIAGNOSIS — F41.9 ANXIETY: ICD-10-CM

## 2024-12-17 DIAGNOSIS — E55.9 VITAMIN D DEFICIENCY: ICD-10-CM

## 2024-12-17 DIAGNOSIS — N18.30 STAGE 3 CHRONIC KIDNEY DISEASE, UNSPECIFIED WHETHER STAGE 3A OR 3B CKD: ICD-10-CM

## 2024-12-17 DIAGNOSIS — N39.0 RECURRENT UTI: ICD-10-CM

## 2024-12-17 DIAGNOSIS — E11.9 TYPE 2 DIABETES MELLITUS WITHOUT COMPLICATION, WITHOUT LONG-TERM CURRENT USE OF INSULIN: Primary | ICD-10-CM

## 2024-12-17 DIAGNOSIS — R10.9 RIGHT FLANK PAIN: ICD-10-CM

## 2024-12-17 DIAGNOSIS — N30.00 ACUTE CYSTITIS WITHOUT HEMATURIA: ICD-10-CM

## 2024-12-17 DIAGNOSIS — R00.2 PALPITATION: ICD-10-CM

## 2024-12-17 DIAGNOSIS — Z98.61 CAD S/P PERCUTANEOUS CORONARY ANGIOPLASTY: ICD-10-CM

## 2024-12-17 DIAGNOSIS — H69.93 EUSTACHIAN TUBE DISORDER, BILATERAL: ICD-10-CM

## 2024-12-17 DIAGNOSIS — F33.0 MILD EPISODE OF RECURRENT MAJOR DEPRESSIVE DISORDER: ICD-10-CM

## 2024-12-17 DIAGNOSIS — E78.2 MIXED HYPERLIPIDEMIA: ICD-10-CM

## 2024-12-17 DIAGNOSIS — I10 ESSENTIAL HYPERTENSION: ICD-10-CM

## 2024-12-17 DIAGNOSIS — I25.10 CAD S/P PERCUTANEOUS CORONARY ANGIOPLASTY: ICD-10-CM

## 2024-12-17 PROCEDURE — 74018 RADEX ABDOMEN 1 VIEW: CPT

## 2024-12-17 PROCEDURE — 99214 OFFICE O/P EST MOD 30 MIN: CPT | Performed by: NURSE PRACTITIONER

## 2024-12-17 RX ORDER — CONJUGATED ESTROGENS 0.62 MG/G
CREAM VAGINAL
Qty: 30 G | Refills: 3 | Status: SHIPPED | OUTPATIENT
Start: 2024-12-17

## 2024-12-17 RX ORDER — BUSPIRONE HYDROCHLORIDE 5 MG/1
5 TABLET ORAL 3 TIMES DAILY
Qty: 270 TABLET | Refills: 1 | Status: SHIPPED | OUTPATIENT
Start: 2024-12-17

## 2024-12-17 RX ORDER — CEPHALEXIN 500 MG/1
500 CAPSULE ORAL 4 TIMES DAILY
Qty: 40 CAPSULE | Refills: 0 | Status: SHIPPED | OUTPATIENT
Start: 2024-12-17

## 2024-12-17 RX ORDER — DESVENLAFAXINE 50 MG/1
50 TABLET, FILM COATED, EXTENDED RELEASE ORAL DAILY
Qty: 90 TABLET | Refills: 1 | Status: SHIPPED | OUTPATIENT
Start: 2024-12-17

## 2024-12-17 RX ORDER — TRAZODONE HYDROCHLORIDE 100 MG/1
100 TABLET ORAL NIGHTLY
Qty: 90 TABLET | Refills: 1 | Status: SHIPPED | OUTPATIENT
Start: 2024-12-17

## 2024-12-17 RX ORDER — FLUTICASONE PROPIONATE 50 MCG
2 SPRAY, SUSPENSION (ML) NASAL DAILY
Qty: 48 G | Refills: 1 | Status: SHIPPED | OUTPATIENT
Start: 2024-12-17

## 2024-12-17 RX ORDER — METOPROLOL SUCCINATE 25 MG/1
25 TABLET, EXTENDED RELEASE ORAL NIGHTLY
Qty: 90 TABLET | Refills: 1 | Status: SHIPPED | OUTPATIENT
Start: 2024-12-17

## 2024-12-17 RX ORDER — BUPROPION HYDROCHLORIDE 300 MG/1
300 TABLET ORAL DAILY
Qty: 90 TABLET | Refills: 1 | Status: SHIPPED | OUTPATIENT
Start: 2024-12-17

## 2024-12-17 RX ORDER — TOPIRAMATE 50 MG/1
50 TABLET, FILM COATED ORAL
Qty: 90 TABLET | Refills: 1 | Status: SHIPPED | OUTPATIENT
Start: 2024-12-17 | End: 2025-06-15

## 2024-12-20 ENCOUNTER — TREATMENT (OUTPATIENT)
Dept: CARDIAC REHAB | Facility: HOSPITAL | Age: 44
End: 2024-12-20
Payer: COMMERCIAL

## 2024-12-20 DIAGNOSIS — Z95.5 S/P CORONARY ARTERY STENT PLACEMENT: Primary | ICD-10-CM

## 2024-12-20 PROCEDURE — 93798 PHYS/QHP OP CAR RHAB W/ECG: CPT

## 2024-12-23 ENCOUNTER — APPOINTMENT (OUTPATIENT)
Dept: CARDIAC REHAB | Facility: HOSPITAL | Age: 44
End: 2024-12-23
Payer: COMMERCIAL

## 2024-12-26 ENCOUNTER — TELEPHONE (OUTPATIENT)
Dept: CARDIOLOGY | Facility: CLINIC | Age: 44
End: 2024-12-26
Payer: COMMERCIAL

## 2024-12-26 NOTE — TELEPHONE ENCOUNTER
Procedure: Dental cleaning and fillings    Med Directive: NA    PMH: CAD stent 9/6/24, HTN, HLD    Last Seen: 10/10/2024

## 2024-12-27 ENCOUNTER — APPOINTMENT (OUTPATIENT)
Dept: CT IMAGING | Facility: HOSPITAL | Age: 44
End: 2024-12-27
Payer: OTHER MISCELLANEOUS

## 2024-12-27 ENCOUNTER — TREATMENT (OUTPATIENT)
Dept: CARDIAC REHAB | Facility: HOSPITAL | Age: 44
End: 2024-12-27
Payer: OTHER MISCELLANEOUS

## 2024-12-27 ENCOUNTER — HOSPITAL ENCOUNTER (EMERGENCY)
Facility: HOSPITAL | Age: 44
Discharge: HOME | End: 2024-12-27
Attending: EMERGENCY MEDICINE
Payer: OTHER MISCELLANEOUS

## 2024-12-27 VITALS
TEMPERATURE: 98 F | WEIGHT: 222.88 LBS | RESPIRATION RATE: 12 BRPM | HEART RATE: 70 BPM | DIASTOLIC BLOOD PRESSURE: 51 MMHG | OXYGEN SATURATION: 98 % | BODY MASS INDEX: 34.98 KG/M2 | HEIGHT: 67 IN | SYSTOLIC BLOOD PRESSURE: 103 MMHG
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
## 2024-12-27 DIAGNOSIS — Z95.5 S/P CORONARY ARTERY STENT PLACEMENT: Primary | ICD-10-CM

## 2024-12-27 DIAGNOSIS — S30.1XXA CONTUSION OF ABDOMINAL WALL, INITIAL ENCOUNTER: ICD-10-CM

## 2024-12-27 DIAGNOSIS — S22.43XA CLOSED FRACTURE OF MULTIPLE RIBS OF BOTH SIDES, INITIAL ENCOUNTER: Primary | ICD-10-CM

## 2024-12-27 LAB
ALBUMIN SERPL-MCNC: 4.1 G/DL (ref 3.5–5.2)
ALBUMIN/GLOB SERPL: 1.5 G/DL
ALP SERPL-CCNC: 79 U/L (ref 39–117)
ALT SERPL W P-5'-P-CCNC: 27 U/L (ref 1–33)
ANION GAP SERPL CALCULATED.3IONS-SCNC: 9 MMOL/L (ref 5–15)
AST SERPL-CCNC: 29 U/L (ref 1–32)
BASOPHILS # BLD AUTO: 0.05 10*3/MM3 (ref 0–0.2)
BASOPHILS NFR BLD AUTO: 0.5 % (ref 0–1.5)
BILIRUB SERPL-MCNC: 0.2 MG/DL (ref 0–1.2)
BILIRUB UR QL STRIP: NEGATIVE
BUN SERPL-MCNC: 17 MG/DL (ref 6–20)
BUN/CREAT SERPL: 12.7 (ref 7–25)
CALCIUM SPEC-SCNC: 9.5 MG/DL (ref 8.6–10.5)
CHLORIDE SERPL-SCNC: 104 MMOL/L (ref 98–107)
CLARITY UR: CLEAR
CO2 SERPL-SCNC: 24 MMOL/L (ref 22–29)
COLOR UR: YELLOW
CREAT SERPL-MCNC: 1.34 MG/DL (ref 0.57–1)
DEPRECATED RDW RBC AUTO: 42.7 FL (ref 37–54)
EGFRCR SERPLBLD CKD-EPI 2021: 49.9 ML/MIN/1.73
EOSINOPHIL # BLD AUTO: 0.18 10*3/MM3 (ref 0–0.4)
EOSINOPHIL NFR BLD AUTO: 1.9 % (ref 0.3–6.2)
ERYTHROCYTE [DISTWIDTH] IN BLOOD BY AUTOMATED COUNT: 13.2 % (ref 12.3–15.4)
GEN 5 1HR TROPONIN T REFLEX: 11 NG/L
GLOBULIN UR ELPH-MCNC: 2.7 GM/DL
GLUCOSE SERPL-MCNC: 87 MG/DL (ref 65–99)
GLUCOSE UR STRIP-MCNC: ABNORMAL MG/DL
HCT VFR BLD AUTO: 43.3 % (ref 34–46.6)
HGB BLD-MCNC: 14.1 G/DL (ref 12–15.9)
HGB UR QL STRIP.AUTO: NEGATIVE
HOLD SPECIMEN: NORMAL
HOLD SPECIMEN: NORMAL
IMM GRANULOCYTES # BLD AUTO: 0.11 10*3/MM3 (ref 0–0.05)
IMM GRANULOCYTES NFR BLD AUTO: 1.1 % (ref 0–0.5)
KETONES UR QL STRIP: NEGATIVE
LEUKOCYTE ESTERASE UR QL STRIP.AUTO: NEGATIVE
LIPASE SERPL-CCNC: 97 U/L (ref 13–60)
LYMPHOCYTES # BLD AUTO: 2.64 10*3/MM3 (ref 0.7–3.1)
LYMPHOCYTES NFR BLD AUTO: 27.3 % (ref 19.6–45.3)
MCH RBC QN AUTO: 28.8 PG (ref 26.6–33)
MCHC RBC AUTO-ENTMCNC: 32.6 G/DL (ref 31.5–35.7)
MCV RBC AUTO: 88.4 FL (ref 79–97)
MONOCYTES # BLD AUTO: 0.74 10*3/MM3 (ref 0.1–0.9)
MONOCYTES NFR BLD AUTO: 7.7 % (ref 5–12)
NEUTROPHILS NFR BLD AUTO: 5.95 10*3/MM3 (ref 1.7–7)
NEUTROPHILS NFR BLD AUTO: 61.5 % (ref 42.7–76)
NITRITE UR QL STRIP: NEGATIVE
NRBC BLD AUTO-RTO: 0 /100 WBC (ref 0–0.2)
PH UR STRIP.AUTO: 6 [PH] (ref 5–8)
PLATELET # BLD AUTO: 208 10*3/MM3 (ref 140–450)
PMV BLD AUTO: 9.8 FL (ref 6–12)
POTASSIUM SERPL-SCNC: 4.4 MMOL/L (ref 3.5–5.2)
PROT SERPL-MCNC: 6.8 G/DL (ref 6–8.5)
PROT UR QL STRIP: NEGATIVE
QT INTERVAL: 397 MS
QTC INTERVAL: 414 MS
RBC # BLD AUTO: 4.9 10*6/MM3 (ref 3.77–5.28)
SODIUM SERPL-SCNC: 137 MMOL/L (ref 136–145)
SP GR UR STRIP: 1.01 (ref 1–1.03)
TROPONIN T NUMERIC DELTA: 0 NG/L
TROPONIN T SERPL HS-MCNC: 11 NG/L
UROBILINOGEN UR QL STRIP: ABNORMAL
WBC NRBC COR # BLD AUTO: 9.67 10*3/MM3 (ref 3.4–10.8)
WHOLE BLOOD HOLD COAG: NORMAL
WHOLE BLOOD HOLD SPECIMEN: NORMAL

## 2024-12-27 PROCEDURE — 83690 ASSAY OF LIPASE: CPT | Performed by: EMERGENCY MEDICINE

## 2024-12-27 PROCEDURE — 96374 THER/PROPH/DIAG INJ IV PUSH: CPT

## 2024-12-27 PROCEDURE — 71250 CT THORAX DX C-: CPT

## 2024-12-27 PROCEDURE — 85025 COMPLETE CBC W/AUTO DIFF WBC: CPT | Performed by: EMERGENCY MEDICINE

## 2024-12-27 PROCEDURE — 93005 ELECTROCARDIOGRAM TRACING: CPT | Performed by: EMERGENCY MEDICINE

## 2024-12-27 PROCEDURE — 96375 TX/PRO/DX INJ NEW DRUG ADDON: CPT

## 2024-12-27 PROCEDURE — 70450 CT HEAD/BRAIN W/O DYE: CPT

## 2024-12-27 PROCEDURE — 99285 EMERGENCY DEPT VISIT HI MDM: CPT

## 2024-12-27 PROCEDURE — 81003 URINALYSIS AUTO W/O SCOPE: CPT | Performed by: EMERGENCY MEDICINE

## 2024-12-27 PROCEDURE — 74176 CT ABD & PELVIS W/O CONTRAST: CPT

## 2024-12-27 PROCEDURE — 36415 COLL VENOUS BLD VENIPUNCTURE: CPT

## 2024-12-27 PROCEDURE — 84484 ASSAY OF TROPONIN QUANT: CPT | Performed by: EMERGENCY MEDICINE

## 2024-12-27 PROCEDURE — 93798 PHYS/QHP OP CAR RHAB W/ECG: CPT

## 2024-12-27 PROCEDURE — 72125 CT NECK SPINE W/O DYE: CPT

## 2024-12-27 PROCEDURE — 80053 COMPREHEN METABOLIC PANEL: CPT | Performed by: EMERGENCY MEDICINE

## 2024-12-27 PROCEDURE — 25010000002 ONDANSETRON PER 1 MG: Performed by: EMERGENCY MEDICINE

## 2024-12-27 PROCEDURE — 25010000002 HYDROMORPHONE 1 MG/ML SOLUTION: Performed by: EMERGENCY MEDICINE

## 2024-12-27 RX ORDER — ONDANSETRON 2 MG/ML
4 INJECTION INTRAMUSCULAR; INTRAVENOUS ONCE
Status: COMPLETED | OUTPATIENT
Start: 2024-12-27 | End: 2024-12-27

## 2024-12-27 RX ORDER — OXYCODONE AND ACETAMINOPHEN 5; 325 MG/1; MG/1
1 TABLET ORAL EVERY 6 HOURS PRN
Qty: 20 TABLET | Refills: 0 | Status: SHIPPED | OUTPATIENT
Start: 2024-12-27

## 2024-12-27 RX ORDER — SODIUM CHLORIDE 0.9 % (FLUSH) 0.9 %
10 SYRINGE (ML) INJECTION AS NEEDED
Status: DISCONTINUED | OUTPATIENT
Start: 2024-12-27 | End: 2024-12-28 | Stop reason: HOSPADM

## 2024-12-27 RX ORDER — OXYCODONE AND ACETAMINOPHEN 5; 325 MG/1; MG/1
1 TABLET ORAL ONCE
Status: COMPLETED | OUTPATIENT
Start: 2024-12-27 | End: 2024-12-27

## 2024-12-27 RX ADMIN — ONDANSETRON 4 MG: 2 INJECTION INTRAMUSCULAR; INTRAVENOUS at 22:13

## 2024-12-27 RX ADMIN — HYDROMORPHONE HYDROCHLORIDE 0.5 MG: 1 INJECTION, SOLUTION INTRAMUSCULAR; INTRAVENOUS; SUBCUTANEOUS at 22:13

## 2024-12-27 RX ADMIN — OXYCODONE HYDROCHLORIDE AND ACETAMINOPHEN 1 TABLET: 5; 325 TABLET ORAL at 22:52

## 2024-12-27 NOTE — Clinical Note
[facility] EMERGENCY ROOM  [address] [place] [address]  Phone: [phone]  Fax: [phone]    Bianca Boles was seen and treated in our emergency department on 12/27/2024.  She may return to work on 12/31/2024.         Thank you for choosing [facility].    Amari Esteban MD
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
## 2024-12-30 ENCOUNTER — SPECIALTY PHARMACY (OUTPATIENT)
Dept: PHARMACY | Facility: TELEHEALTH | Age: 44
End: 2024-12-30
Payer: OTHER MISCELLANEOUS

## 2024-12-30 NOTE — PROGRESS NOTES
Specialty Pharmacy Patient Management Program  Initial Assessment     Bianca Boles is a 44 y.o. female with psoriasis and enrolled in the Patient Management program offered by [facility]. An initial outreach was conducted, including assessment of therapy appropriateness and specialty medication education for Otezla. The patient was introduced to services offered by [facility], including: regular assessments, refill coordination, curbside pick-up or mail order delivery options, prior authorization maintenance, and financial assistance programs as applicable. The patient was also provided with contact information for the pharmacy team.     Insurance Coverage & Financial Support  [employer] + copay card      Relevant Past Medical History and Comorbidities  Relevant medical history and concomitant health conditions were discussed with the patient. The patient's chart has been reviewed for relevant past medical history and comorbid health conditions and updated as necessary.   Past Medical History:   Diagnosis Date    Acid reflux     Anxiety     Anxiety and depression     Benign essential hypertension     Borderline personality disorder     Cancer     renal cancer/mass, PARTIAL NEPH, RIGHT    Chronic kidney disease     Coronary artery disease     Diabetes     Diabetes mellitus     type ii, doesn't check bg at home     Diabetes mellitus, type 2     Elevated cholesterol     Frozen shoulder 08/02/2023    GERD (gastroesophageal reflux disease)     High triglycerides     History of bariatric surgery 02/04/2021    GASTRIC SLEEVE    History of kidney stones     HTN (hypertension)     Hyperlipemia     Hyperlipidemia     Hypertriglyceridemia     Lumbar herniated disc     Myocardial infarction     Obesity     Panic attacks     PCOS (polycystic ovarian syndrome)     Periarthritis of shoulder 01/23    Psoriasis     ELBOWS    Rotator cuff syndrome 01/23    Seasonal allergies      Self-injurious behavior     Spinal headache     AFTER PAIN EPIDURALS.  NO BLOOD PATCH    Suicide attempt      Social History     Socioeconomic History    Marital status:     Number of children: 2   Tobacco Use    Smoking status: Former     Current packs/day: 0.00     Average packs/day: 0.3 packs/day for 25.0 years (6.3 ttl pk-yrs)     Types: Cigarettes     Start date:      Quit date: 1/15/2020     Years since quittin.9     Passive exposure: Never    Smokeless tobacco: Never    Tobacco comments:     A PACK A WEEK   Vaping Use    Vaping status: Never Used   Substance and Sexual Activity    Alcohol use: Yes     Comment: OCCASIONAL/SOCIAL    Drug use: Not Currently     Frequency: 0.1 times per week     Types: Marijuana     Comment: OTC THC- GUMMIES    Sexual activity: Yes     Partners: Male     Birth control/protection: Depo-provera     Problem list reviewed by Faye Carcamo RPH on 2024 at 11:02 AM    Allergies  Known allergies and reactions were discussed with the patient. The patient's chart has been reviewed for allergy information and updated as necessary.   Morphine  Allergies reviewed by Faye Carcamo RPH on 2024 at 11:02 AM  Allergies reviewed by Faye Carcamo RPH on 2024 at 11:02 AM    Current Medication List  This medication list has been reviewed with the patient and evaluated for any interactions or necessary modifications/recommendations, and updated to include all prescription medications, OTC medications, and supplements the patient is currently taking. This list reflects what is contained in the patient's profile, which has also been marked as reviewed to communicate to other providers it is the most up to date version of the patient's current medication therapy.     Current Outpatient Medications:     amLODIPine (NORVASC) 5 MG tablet, Take 1 tablet by mouth Daily., Disp: 60 tablet, Rfl: 3    Apremilast (Otezla) 30 MG tablet, Take 1 tablet by mouth 2 (Two) Times a  Day., Disp: 60 tablet, Rfl: 5    aspirin 81 MG EC tablet, Take 1 tablet by mouth Daily., Disp: 90 tablet, Rfl: 5    azelaic acid (AZELEX) 15 % gel, Apply 1 Application topically to face as directed Daily after cleansing., Disp: 50 g, Rfl: 3    betamethasone valerate (VALISONE) 0.1 % cream, Apply 1 Application topically to the appropriate area as directed 1 (One) to  2 (Two) Times a Day until approved., Disp: 45 g, Rfl: 2    Biotin 09955 MCG tablet, Take 1 tablet by mouth Every Night., Disp: , Rfl:     buPROPion XL (WELLBUTRIN XL) 300 MG 24 hr tablet, Take 1 tablet by mouth Daily., Disp: 90 tablet, Rfl: 1    busPIRone (BUSPAR) 5 MG tablet, Take 1 tablet by mouth 3 (Three) Times a Day., Disp: 270 tablet, Rfl: 1    CALCIUM-MAGNESIUM-ZINC PO, Take 3 tablets by mouth Daily., Disp: , Rfl:     cephalexin (Keflex) 500 MG capsule, Take 1 capsule by mouth 4 (Four) Times a Day., Disp: 40 capsule, Rfl: 0    cetirizine (zyrTEC) 10 MG tablet, Take 1 tablet by mouth Daily As Needed., Disp: , Rfl:     Cholecalciferol 25 MCG (1000 UT) capsule, Take 2 capsules by mouth Daily., Disp: 180 capsule, Rfl: 1    clopidogrel (PLAVIX) 75 MG tablet, Take 1 tablet by mouth Daily, Disp: 30 tablet, Rfl: 6    COLLAGEN PO, Take 3 tablets by mouth Daily., Disp: , Rfl:     dapagliflozin Propanediol (Farxiga) 10 MG tablet, Take 10 mg by mouth Daily., Disp: 90 tablet, Rfl: 1    desvenlafaxine (PRISTIQ) 50 MG 24 hr tablet, Take 1 tablet by mouth Daily., Disp: 90 tablet, Rfl: 1    Diclofenac Sodium (VOLTAREN) 1 % gel gel, Apply 4 g topically to the appropriate area as directed 4 (Four) Times a Day As Needed (pain)., Disp: 200 g, Rfl: 1    Estrogens Conjugated (Premarin) 0.625 MG/GM vaginal cream, Insert 1 GM per vagina daily at bedtime X2 WEEKS THEN 1 GM per vagina daily at bedtime  2 DAYS PER WEEK, Disp: 30 g, Rfl: 3    fluticasone (FLONASE) 50 MCG/ACT nasal spray, USE 2 SPRAYS IN EACH NOSTRIL ONCE DAILY AS DIRECTED, Disp: 48 g, Rfl: 1    hydrocortisone  2.5 % cream, Apply 1 Application topically to the appropriate area of the face as directed 1 (One) to 2 (Two) Times a Day until approved., Disp: 30 g, Rfl: 2    ibuprofen (ADVIL,MOTRIN) 800 MG tablet, Take 1 tablet by mouth Every 6 (Six) Hours As Needed for Mild Pain., Disp: 90 tablet, Rfl: 1    IRON-VITAMIN C PO, Take 1 tablet by mouth Daily., Disp: , Rfl:     linaclotide (Linzess) 72 MCG capsule capsule, Take 1 capsule by mouth Every Morning Before Breakfast for 90 days., Disp: 90 capsule, Rfl: 3    metoprolol succinate XL (Toprol XL) 25 MG 24 hr tablet, Take 1 tablet by mouth Every Night., Disp: 90 tablet, Rfl: 1    Multiple Vitamins-Minerals (MULTIVITAMIN PO), Take 1 tablet by mouth Every Night., Disp: , Rfl:     nitroglycerin (NITROSTAT) 0.4 MG SL tablet, Place 1 tablet under the tongue Every 5 (Five) Minutes As Needed for Chest Pain (Systolic BP Greater Than 100). Take no more than 3 doses in 15 minutes., Disp: 60 tablet, Rfl: 2    ondansetron ODT (ZOFRAN-ODT) 4 MG disintegrating tablet, Place 1 tablet on the tongue Every 8 (Eight) Hours As Needed for Nausea., Disp: , Rfl:     oxyCODONE-acetaminophen (PERCOCET) 5-325 MG per tablet, Take 1 tablet by mouth Every 6 (Six) Hours As Needed for Moderate Pain., Disp: 20 tablet, Rfl: 0    pravastatin (PRAVACHOL) 80 MG tablet, Take 1 tablet by mouth Every Night., Disp: 90 tablet, Rfl: 1    Probiotic Product (PROBIOTIC-10 PO), Take 1 tablet by mouth Every Night., Disp: , Rfl:     spironolactone (ALDACTONE) 25 MG tablet, Take 0.5 tablets by mouth Daily., Disp: 60 tablet, Rfl: 3    testosterone micronized powder, Appyl 0.5 mL (2 clicks) to skin daily, Disp: 15 g, Rfl: 0    Tirzepatide 12.5 MG/0.5ML solution auto-injector, Inject 12.5 mg under the skin into the appropriate area as directed 1 (One) Time Per Week., Disp: 2 mL, Rfl: 5    topiramate (TOPAMAX) 50 MG tablet, Take 1 tablet by mouth every night at bedtime for 180 days., Disp: 90 tablet, Rfl: 1    traZODone  (DESYREL) 100 MG tablet, Take 1 tablet by mouth Every Night., Disp: 90 tablet, Rfl: 1    Current Facility-Administered Medications:     MedroxyPROGESTERone Acetate (DEPO-PROVERA) injection 150 mg, 150 mg, Intramuscular, Q3 Months, NorimercyPatricia Breann, APRN, 150 mg at 10/15/24 0949  Medicines reviewed by Faye Carcamo [facility] on 12/30/2024 at 11:02 AM    Drug Interactions  none     Relevant Laboratory Values  Lab Results   Component Value Date    GLUCOSE 87 12/27/2024    CALCIUM 9.5 12/27/2024     12/27/2024    K 4.4 12/27/2024    CO2 24.0 12/27/2024     12/27/2024    BUN 17 12/27/2024    CREATININE 1.34 (H) 12/27/2024    BCR 12.7 12/27/2024    ANIONGAP 9.0 12/27/2024     Lab Results   Component Value Date    WBC 9.67 12/27/2024    HGB 14.1 12/27/2024    HCT 43.3 12/27/2024    MCV 88.4 12/27/2024     12/27/2024    INR 1.11 09/04/2024     Lab Value Review  The above lab values have been reviewed; the following specialty medication(s) dose adjustment(s) are recommended: none.    Initial Education Provided for Specialty Medication  The patient has been provided with the following education and any applicable administration techniques (i.e. self-injection) have been demonstrated for the therapies indicated. All questions and concerns have been addressed prior to the patient receiving the medication, and the patient has verbalized understanding of the education and any materials provided. Additional patient education shall be provided and documented upon request by the patient, provider or payer.            Otezla (apremilast)           Medication Expectations   Why am I taking this medication? You are taking this medication for treatment of plaque psoriasis, psoriatic arthritis, and certain ulcers.   What should I expect while on this medication? You should expect to a decrease in the frequency and severity of your symptoms.   How does the medication work? Otezla is called a PDE4 inhibitor, which  works inside inflammatory cells to reduce PDE4 activity. A reduction in PDE4 activity will reduce the overactive inflammation from occurring.   How long will I be on this medication for? The amount of time you will be on this medication will be determined by your doctor and your response to the medication.    How do I take this medication? Take as directed on your prescription label. This medication is an oral tablet, swallow whole.  Do not chew, break or crush.  This medication may be taken with or without food.   What are some possible side effects? Depressed mood, weight loss, diarrhea, headache, nausea, vomiting   What happens if I miss a dose? If you miss a dose, take it as soon as you can. If it is almost time for your next dose, take only that dose. Do not take double or extra doses.                  Medication Safety   What are things I should warn my doctor immediately about? Patients and their families should watch out for new or worsening depression or thoughts of suicide. Also watch out for sudden changes in feelings such as feeling anxious, agitated, panicky, irritable, hostile, aggressive, impulsive, severely restless, overly excited and hyperactive, or not being able to sleep.  Weight loss should also be reported.   What are things that I should be cautious of? Severe diarrhea, nausea and vomiting, or if you sweat a lot. The loss of too much body fluid can make it dangerous for you to take this medicine.   What are some medications that can interact with this one? This medicine may interact with the following medications:  carbamazepine, phenobarbital, phenytoin, and rifampin.            Medication Storage/Handling   How should I handle this medication? Keep this medication out of reach of pets/children.   How does this medication need to be stored? Store at room temperature in a dry place. Do not store in a bathroom.  Keep all drugs in a safe place.    How should I dispose of this medication? Throw  away unused or  drugs. Do not flush down a toilet or pour down a drain unless you are told to do so. Check with your pharmacist if you have questions about the best way to throw out drugs. There may be drug take-back programs in your area.            Resources/Support   How can I remind myself to take this medication? You can download a reminder wilfrido on your phone or use a calandar  to help.   Is financial support available?  Yes, [employer] can provide co-pay cards if you have commercial insurance or patient assistance if you have Medicare or no insurance.    Which vaccines are recommended for me? Talk to your doctor about these vaccines: Flu, Coronavirus (COVID-19), Pneumococcal (pneumonia), Tdap, Hepatitis B, Zoster (shingles)                 Adherence, Self-Administration, and Current Therapy Problems  Adherence related to the patient's specialty therapy was discussed with the patient. The Adherence segment of this outreach has been reviewed and updated.          Additional Barriers to Patient Self-Administration: None identified  Methods for Supporting Patient Self-Administration: n/a    Open Medication Therapy Problems  No medication therapy recommendations to display    Goals of Therapy   Goals Addressed Today        Specialty Pharmacy General Goal      A reduction in BSA of skin lesions on the body.                Reassessment Plan & Follow-Up  Medication Therapy Changes: Patient is continuing Otezla, beginning services through [facility]  Additional Plans, Therapy Recommendations, or Therapy Problems to Be Addressed: none   Pharmacist to perform regular reassessments no more than (6) months from the previous assessment.  Welcome information and patient satisfaction survey to be sent by retail team with patient's initial fill.  Care Coordinator to set up future refill outreaches, coordinate prescription delivery, and escalate clinical questions to pharmacist.     Attestation  I attest the patient was actively  involved in and has agreed to the above plan of care. I attest that the initiated specialty medication(s) are appropriate for the patient based on my assessment. If the prescribed therapy is at any point deemed not appropriate based on the current or future assessments, a consultation will be initiated with the patient's specialty care provider to determine the best course of action. The revised plan of therapy will be documented along with any reassessments and/or additional patient education provided.     Electronically signed by Faye Caracmo RPH, 12/30/24, 11:03 AM EST.

## 2025-01-03 ENCOUNTER — LAB (OUTPATIENT)
Facility: HOSPITAL | Age: 45
End: 2025-01-03
Payer: OTHER MISCELLANEOUS

## 2025-01-03 ENCOUNTER — OFFICE VISIT (OUTPATIENT)
Dept: FAMILY MEDICINE CLINIC | Facility: CLINIC | Age: 45
End: 2025-01-03
Payer: OTHER MISCELLANEOUS

## 2025-01-03 ENCOUNTER — APPOINTMENT (OUTPATIENT)
Dept: CARDIAC REHAB | Facility: HOSPITAL | Age: 45
End: 2025-01-03
Payer: COMMERCIAL

## 2025-01-03 VITALS
HEART RATE: 67 BPM | WEIGHT: 215.8 LBS | HEIGHT: 67 IN | OXYGEN SATURATION: 100 % | SYSTOLIC BLOOD PRESSURE: 96 MMHG | TEMPERATURE: 97.9 F | BODY MASS INDEX: 33.87 KG/M2 | DIASTOLIC BLOOD PRESSURE: 61 MMHG
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
## 2025-01-03 DIAGNOSIS — M62.838 MUSCLE SPASM: Primary | ICD-10-CM
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
## 2025-01-03 DIAGNOSIS — M62.838 MUSCLE SPASM: ICD-10-CM

## 2025-01-03 LAB
ALBUMIN SERPL-MCNC: 4.2 G/DL (ref 3.5–5.2)
ALBUMIN/GLOB SERPL: 1.5 G/DL
ALP SERPL-CCNC: 86 U/L (ref 39–117)
ALT SERPL W P-5'-P-CCNC: 22 U/L (ref 1–33)
ANION GAP SERPL CALCULATED.3IONS-SCNC: 9.6 MMOL/L (ref 5–15)
AST SERPL-CCNC: 20 U/L (ref 1–32)
BILIRUB SERPL-MCNC: 0.5 MG/DL (ref 0–1.2)
BUN SERPL-MCNC: 21 MG/DL (ref 6–20)
BUN/CREAT SERPL: 15.1 (ref 7–25)
CALCIUM SPEC-SCNC: 9.8 MG/DL (ref 8.6–10.5)
CHLORIDE SERPL-SCNC: 106 MMOL/L (ref 98–107)
CO2 SERPL-SCNC: 24.4 MMOL/L (ref 22–29)
CREAT SERPL-MCNC: 1.39 MG/DL (ref 0.57–1)
EGFRCR SERPLBLD CKD-EPI 2021: 47.8 ML/MIN/1.73
GLOBULIN UR ELPH-MCNC: 2.8 GM/DL
GLUCOSE SERPL-MCNC: 96 MG/DL (ref 65–99)
POTASSIUM SERPL-SCNC: 4.3 MMOL/L (ref 3.5–5.2)
PROT SERPL-MCNC: 7 G/DL (ref 6–8.5)
SODIUM SERPL-SCNC: 140 MMOL/L (ref 136–145)

## 2025-01-03 PROCEDURE — 80053 COMPREHEN METABOLIC PANEL: CPT

## 2025-01-03 PROCEDURE — 36415 COLL VENOUS BLD VENIPUNCTURE: CPT

## 2025-01-03 RX ORDER — CYCLOBENZAPRINE HCL 10 MG
10 TABLET ORAL NIGHTLY
Qty: 30 TABLET | Refills: 0 | Status: SHIPPED | OUTPATIENT
Start: 2025-01-03
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
## 2025-01-06 ENCOUNTER — APPOINTMENT (OUTPATIENT)
Dept: CARDIAC REHAB | Facility: HOSPITAL | Age: 45
End: 2025-01-06
Payer: COMMERCIAL

## 2025-01-08 ENCOUNTER — APPOINTMENT (OUTPATIENT)
Dept: CARDIAC REHAB | Facility: HOSPITAL | Age: 45
End: 2025-01-08
Payer: COMMERCIAL

## 2025-01-08 RX ORDER — SPIRONOLACTONE 25 MG/1
12.5 TABLET ORAL DAILY
Qty: 60 TABLET | Refills: 3 | Status: SHIPPED | OUTPATIENT
Start: 2025-01-08

## 2025-01-09 ENCOUNTER — HOSPITAL ENCOUNTER (OUTPATIENT)
Dept: CT IMAGING | Facility: HOSPITAL | Age: 45
Discharge: HOME | End: 2025-01-09
Admitting: UROLOGY
Payer: COMMERCIAL

## 2025-01-09 DIAGNOSIS — C64.1 RENAL CELL CARCINOMA OF RIGHT KIDNEY: ICD-10-CM

## 2025-01-09 DIAGNOSIS — N20.0 LEFT RENAL STONE: ICD-10-CM

## 2025-01-09 PROCEDURE — 25510000001 IOPAMIDOL PER 1 ML: Performed by: UROLOGY

## 2025-01-09 PROCEDURE — 74178 CT ABD&PLV WO CNTR FLWD CNTR: CPT

## 2025-01-09 RX ORDER — IOPAMIDOL 755 MG/ML
100 INJECTION, SOLUTION INTRAVASCULAR
Status: COMPLETED | OUTPATIENT
Start: 2025-01-09 | End: 2025-01-09

## 2025-01-09 RX ADMIN — IOPAMIDOL 100 ML: 755 INJECTION, SOLUTION INTRAVENOUS at 08:41

## 2025-01-10 ENCOUNTER — APPOINTMENT (OUTPATIENT)
Dept: CARDIAC REHAB | Facility: HOSPITAL | Age: 45
End: 2025-01-10
Payer: COMMERCIAL

## 2025-01-13 ENCOUNTER — APPOINTMENT (OUTPATIENT)
Dept: CARDIAC REHAB | Facility: HOSPITAL | Age: 45
End: 2025-01-13
Payer: COMMERCIAL

## 2025-01-14 ENCOUNTER — TELEMEDICINE (OUTPATIENT)
Dept: PSYCHIATRY | Facility: CLINIC | Age: 45
End: 2025-01-14
Payer: OTHER MISCELLANEOUS

## 2025-01-14 DIAGNOSIS — F43.10 POST TRAUMATIC STRESS DISORDER (PTSD): ICD-10-CM

## 2025-01-14 DIAGNOSIS — F33.1 MDD (MAJOR DEPRESSIVE DISORDER), RECURRENT EPISODE, MODERATE: Primary | ICD-10-CM
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
## 2025-01-14 NOTE — PATIENT INSTRUCTIONS
[place] warm Line: Phone number: [phone]      Monday through Friday 10 AM to 9 PM and Saturdays 5 PM to 9 PM.      A warmline is a NON-CRISIS number to call to get emotional support. You can call to have a conversation with someone who can provide support during hard times. Warmlines are staffed by trained peers who have been through their own mental health struggles and know what it's like to need help.      -Suicide hotline number: 988      -[facility] Resource Connection      The resource line is dedicated to providing behavioral health resources to residents of [place] and [facility], seven days a week, 7 a.m.-7 p.m.      [phone]      [email or web address]  [facility], [facility]alHealth      Should you ever need assistance or just want to reach out to someone when your behavioral health provider is not available due to the office being closed you can contact [email or web address].       -Just text HOME to 272248 and someone will reach out to you within a few minutes.  This is a 24/7 help line and they are open even on holidays.

## 2025-01-16 DIAGNOSIS — R79.89 LOW TESTOSTERONE LEVEL IN FEMALE: ICD-10-CM

## 2025-01-16 DIAGNOSIS — R68.82 LOW LIBIDO: ICD-10-CM

## 2025-01-16 RX ORDER — TESTOSTERONE MICRONIZED 100 %
POWDER (GRAM) MISCELLANEOUS
Qty: 15 G | Refills: 0 | Status: SHIPPED | OUTPATIENT
Start: 2025-01-16

## 2025-01-17 ENCOUNTER — OFFICE VISIT (OUTPATIENT)
Dept: CARDIOLOGY | Facility: CLINIC | Age: 45
End: 2025-01-17
Payer: COMMERCIAL

## 2025-01-17 VITALS
WEIGHT: 216.4 LBS | HEIGHT: 67 IN | SYSTOLIC BLOOD PRESSURE: 99 MMHG | DIASTOLIC BLOOD PRESSURE: 62 MMHG | BODY MASS INDEX: 33.97 KG/M2 | HEART RATE: 75 BPM

## 2025-01-17 DIAGNOSIS — I10 ESSENTIAL HYPERTENSION: Primary | ICD-10-CM

## 2025-01-17 RX ORDER — LOSARTAN POTASSIUM 25 MG/1
25 TABLET ORAL DAILY
Qty: 60 TABLET | Refills: 5 | Status: SHIPPED | OUTPATIENT
Start: 2025-01-17

## 2025-01-17 RX ORDER — TESTOSTERONE MICRONIZED 100 %
POWDER (GRAM) MISCELLANEOUS
Qty: 15 G | Refills: 0 | OUTPATIENT
Start: 2025-01-17

## 2025-01-17 NOTE — PROGRESS NOTES
[facility]  Interventional Cardiology Patient Visit Note      Referring Provider:  No referring provider defined for this encounter.         History of Presenting Illness:  History of Present Illness     In brief, Ms. 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  Her workup showed abnormal stress testing concerning for coronary artery disease coronary angiogram was notable for 99% LAD stenosis which was successfully revascularized.  Patient was initiated on guideline directed medical therapy for dyslipidemia hypertension and coronary artery disease.  She has successfully completed cardiac rehab therapy and presents today for follow-up.    Initially she was initiated on aspirin and Brilinta but due to significant dyspnea from Brilinta, she was switched to Plavix and has done fine since.  She is able to do her routine activities without any discomfort.  She can take several flight of stairs without any discomfort she denies exertional angina, use of nitroglycerin pills, exertional dyspnea, peripheral edema, orthopnea, PND, palpitations, presyncope and syncope.  She is well compliant with the medications.    On home monitoring her blood pressure is optimally controlled ranging from 100 -110/70-80s    SOCIAL HISTORY  The patient denies smoking.    Past Medical History  Past Medical History:   Diagnosis Date    Acid reflux     Anxiety     Anxiety and depression     Benign essential hypertension     Borderline personality disorder     Cancer     renal cancer/mass, PARTIAL NEPH, RIGHT    Chronic kidney disease     Coronary artery disease     Diabetes     Diabetes mellitus     type ii, doesn't check bg at home     Diabetes mellitus, type 2     Elevated cholesterol     Frozen shoulder 08/02/2023    GERD  (gastroesophageal reflux disease)     High triglycerides     History of bariatric surgery 02/04/2021    GASTRIC SLEEVE    History of kidney stones     HTN (hypertension)     Hyperlipemia     Hyperlipidemia     Hypertriglyceridemia     Lumbar herniated disc     Myocardial infarction     Obesity     Panic attacks     PCOS (polycystic ovarian syndrome)     Periarthritis of shoulder 01/23    Psoriasis     ELBOWS    Rotator cuff syndrome 01/23    Seasonal allergies     Self-injurious behavior 1994    Spinal headache     AFTER PAIN EPIDURALS.  NO BLOOD PATCH    Suicide attempt 1995         Current Outpatient Medications:     Apremilast (Otezla) 30 MG tablet, Take 1 tablet by mouth 2 (Two) Times a Day., Disp: 60 tablet, Rfl: 5    aspirin 81 MG EC tablet, Take 1 tablet by mouth Daily., Disp: 90 tablet, Rfl: 5    azelaic acid (AZELEX) 15 % gel, Apply 1 Application topically to face as directed Daily after cleansing., Disp: 50 g, Rfl: 3    betamethasone valerate (VALISONE) 0.1 % cream, Apply 1 Application topically to the appropriate area as directed 1 (One) to  2 (Two) Times a Day until approved., Disp: 45 g, Rfl: 2    Biotin 55110 MCG tablet, Take 1 tablet by mouth Every Night., Disp: , Rfl:     buPROPion XL (WELLBUTRIN XL) 300 MG 24 hr tablet, Take 1 tablet by mouth Daily., Disp: 90 tablet, Rfl: 1    busPIRone (BUSPAR) 5 MG tablet, Take 1 tablet by mouth 3 (Three) Times a Day., Disp: 270 tablet, Rfl: 1    CALCIUM-MAGNESIUM-ZINC PO, Take 3 tablets by mouth Daily., Disp: , Rfl:     cetirizine (zyrTEC) 10 MG tablet, Take 1 tablet by mouth Daily As Needed., Disp: , Rfl:     Cholecalciferol 25 MCG (1000 UT) capsule, Take 2 capsules by mouth Daily., Disp: 180 capsule, Rfl: 1    clopidogrel (PLAVIX) 75 MG tablet, Take 1 tablet by mouth Daily, Disp: 30 tablet, Rfl: 6    COLLAGEN PO, Take 3 tablets by mouth Daily., Disp: , Rfl:     cyclobenzaprine (FLEXERIL) 10 MG tablet, Take 1 tablet by mouth Every Night., Disp: 30 tablet, Rfl:  0    dapagliflozin Propanediol (Farxiga) 10 MG tablet, Take 10 mg by mouth Daily., Disp: 90 tablet, Rfl: 1    desvenlafaxine (PRISTIQ) 50 MG 24 hr tablet, Take 1 tablet by mouth Daily., Disp: 90 tablet, Rfl: 1    Diclofenac Sodium (VOLTAREN) 1 % gel gel, Apply 4 g topically to the appropriate area as directed 4 (Four) Times a Day As Needed (pain)., Disp: 200 g, Rfl: 1    Estrogens Conjugated (Premarin) 0.625 MG/GM vaginal cream, Insert 1 gram into the vagina every night at bedtime for 2 Weeks, THEN 1 gram into the vagina every night at bedtime 2 (Two) Times a Week., Disp: 30 g, Rfl: 3    fluticasone (FLONASE) 50 MCG/ACT nasal spray, USE 2 SPRAYS IN EACH NOSTRIL ONCE DAILY AS DIRECTED, Disp: 48 g, Rfl: 1    hydrocortisone 2.5 % cream, Apply 1 Application topically to the appropriate area of the face as directed 1 (One) to 2 (Two) Times a Day until approved., Disp: 30 g, Rfl: 2    IRON-VITAMIN C PO, Take 1 tablet by mouth Daily., Disp: , Rfl:     linaclotide (Linzess) 72 MCG capsule capsule, Take 1 capsule by mouth Every Morning Before Breakfast for 90 days., Disp: 90 capsule, Rfl: 3    metoprolol succinate XL (Toprol XL) 25 MG 24 hr tablet, Take 1 tablet by mouth Every Night., Disp: 90 tablet, Rfl: 1    Multiple Vitamins-Minerals (MULTIVITAMIN PO), Take 1 tablet by mouth Every Night., Disp: , Rfl:     nitroglycerin (NITROSTAT) 0.4 MG SL tablet, Place 1 tablet under the tongue Every 5 (Five) Minutes As Needed for Chest Pain (Systolic BP Greater Than 100). Take no more than 3 doses in 15 minutes., Disp: 60 tablet, Rfl: 2    ondansetron ODT (ZOFRAN-ODT) 4 MG disintegrating tablet, Place 1 tablet on the tongue Every 8 (Eight) Hours As Needed for Nausea., Disp: , Rfl:     pravastatin (PRAVACHOL) 80 MG tablet, Take 1 tablet by mouth Every Night., Disp: 90 tablet, Rfl: 1    Probiotic Product (PROBIOTIC-10 PO), Take 1 tablet by mouth Every Night., Disp: , Rfl:     testosterone micronized powder, Appyl 0.5 mL (2 clicks) to  skin daily, Disp: 15 g, Rfl: 0    Tirzepatide 12.5 MG/0.5ML solution auto-injector, Inject 12.5 mg under the skin into the appropriate area as directed 1 (One) Time Per Week., Disp: 2 mL, Rfl: 5    topiramate (TOPAMAX) 50 MG tablet, Take 1 tablet by mouth every night at bedtime for 180 days., Disp: 90 tablet, Rfl: 1    traZODone (DESYREL) 100 MG tablet, Take 1 tablet by mouth Every Night., Disp: 90 tablet, Rfl: 1    losartan (COZAAR) 25 MG tablet, Take 1 tablet by mouth Daily., Disp: 60 tablet, Rfl: 5    Current Facility-Administered Medications:     MedroxyPROGESTERone Acetate (DEPO-PROVERA) injection 150 mg, 150 mg, Intramuscular, Q3 Months, Colasanti, Chrysalis Breann, APRN, 150 mg at 10/15/24 0949  Current outpatient and discharge medications have been reconciled for the patient.  Reviewed by: Toño Rodriguez MD     Medications Discontinued During This Encounter   Medication Reason    cephalexin (Keflex) 500 MG capsule *Therapy completed    oxyCODONE-acetaminophen (PERCOCET) 5-325 MG per tablet *Therapy completed    ibuprofen (ADVIL,MOTRIN) 800 MG tablet *Therapy completed    amLODIPine (NORVASC) 5 MG tablet *Therapy completed    spironolactone (ALDACTONE) 25 MG tablet *Therapy completed     Allergies   Allergen Reactions    Morphine Itching and Nausea And Vomiting      Social History     Tobacco Use    Smoking status: Former     Current packs/day: 0.00     Average packs/day: 0.3 packs/day for 25.0 years (6.3 ttl pk-yrs)     Types: Cigarettes     Start date:      Quit date: 1/15/2020     Years since quittin.0     Passive exposure: Never    Smokeless tobacco: Never    Tobacco comments:     A PACK A WEEK   Vaping Use    Vaping status: Never Used   Substance Use Topics    Alcohol use: Yes     Comment: OCCASIONAL/SOCIAL    Drug use: Not Currently     Frequency: 0.1 times per week     Types: Marijuana     Comment: OTC THC- GUMMIES     Family History   Problem Relation Age of Onset    Cancer Mother         Breast  "   Diabetes Father     Hypertension Father     Heart disease Father     Cancer Father         Bladder prostate liver    Colon cancer Father         malignant, currently 62    No Known Problems Sister     No Known Problems Brother     No Known Problems Maternal Aunt     No Known Problems Paternal Aunt     No Known Problems Maternal Uncle     No Known Problems Paternal Uncle     No Known Problems Maternal Grandfather     Stroke Maternal Grandmother     Heart disease Maternal Grandmother     Diabetes Paternal Grandfather     Heart disease Paternal Grandfather     No Known Problems Paternal Grandmother     No Known Problems Cousin     Malig Hyperthermia Neg Hx     ADD / ADHD Neg Hx     Alcohol abuse Neg Hx     Anxiety disorder Neg Hx     Bipolar disorder Neg Hx     Dementia Neg Hx     Depression Neg Hx     Drug abuse Neg Hx     OCD Neg Hx     Paranoid behavior Neg Hx     Schizophrenia Neg Hx     Seizures Neg Hx     Self-Injurious Behavior  Neg Hx     Suicide Attempts Neg Hx           Objective   BP 99/62 (BP Location: Left arm, Patient Position: Sitting, Cuff Size: Large Adult)   Pulse 75   Ht 170.2 cm (67\")   Wt 98.2 kg (216 lb 6.4 oz)   BMI 33.89 kg/m²     Wt Readings from Last 3 Encounters:   01/17/25 98.2 kg (216 lb 6.4 oz)   01/03/25 97.9 kg (215 lb 12.8 oz)   12/27/24 101 kg (222 lb 14.2 oz)     BP Readings from Last 3 Encounters:   01/17/25 99/62   01/03/25 96/61   12/27/24 103/51       Physical Exam  Constitutional:  Awake. Not in acute distress. Normal appearance.   Neck: No carotid bruit, hepatojugular reflux or JVD.   Cardiovascular:      Rate and Rhythm: Normal rate and regular rhythm.      Chest Wall: PMI is not displaced.      Heart sounds: Normal heart sounds, S1 normal and S2 normal. No murmur heard.       No friction rub. No gallop. No S3 or S4 sounds.    Pulmonary: Pulmonary effort is normal. Normal breath sounds. No wheezing, rhonchi or rales.   Extremities: No Bilateral edema is noted.   Skin: " "Warm and dry. Non cyanotic, No petechiae or rash.   Neurological: Alert and oriented x 3  Psychiatric:  Behavior is cooperative.       Result Review :   The following data was reviewed by Toño Rodriguez MD on 01/17/2025   proBNP   Date Value Ref Range Status   09/26/2024 316.1 0.0 - 450.0 pg/mL Final     CMP          12/6/2024    10:24 12/27/2024    21:10 1/3/2025    13:39   CMP   Glucose 95  87  96    BUN 20  17  21    Creatinine 1.23  1.34  1.39    EGFR 55.3  49.9  47.8    Sodium 137  137  140    Potassium 4.3  4.4  4.3    Chloride 108  104  106    Calcium 9.2  9.5  9.8    Total Protein 7.0  6.8  7.0    Albumin 3.7  4.1  4.2    Globulin 3.3  2.7  2.8    Total Bilirubin 0.3  0.2  0.5    Alkaline Phosphatase 78  79  86    AST (SGOT) 26  29  20    ALT (SGPT) 32  27  22    Albumin/Globulin Ratio 1.1  1.5  1.5    BUN/Creatinine Ratio 16.3  12.7  15.1    Anion Gap 7.5  9.0  9.6      CBC w/diff          9/3/2024    03:01 9/4/2024    04:21 9/5/2024    06:40   CBC w/Diff   WBC 9.66  8.52  8.82    RBC 4.63  4.80  4.52    Hemoglobin 13.3  14.0  12.8    Hematocrit 40.1  41.4  39.0    MCV 86.6  86.3  86.3    MCH 28.7  29.2  28.3    MCHC 33.2  33.8  32.8    RDW 13.8  13.7  14.1    Platelets 165  192  166    Neutrophil Rel % 59.9  51.3  64.6    Immature Granulocyte Rel % 0.9  0.8  0.6    Lymphocyte Rel % 29.3  35.1  24.4    Monocyte Rel % 7.8  9.3  7.9    Eosinophil Rel % 1.6  2.8  2.0    Basophil Rel % 0.5  0.7  0.5       Lab Results   Component Value Date    TSH 0.739 12/06/2024      Lab Results   Component Value Date    FREET4 1.21 09/29/2022      No results found for: \"DDIMERQUANT\"  Magnesium   Date Value Ref Range Status   09/26/2024 2.3 1.6 - 2.6 mg/dL Final      No results found for: \"DIGOXIN\"   Lab Results   Component Value Date    TROPONINT 11 12/27/2024      Lab Results   Component Value Date    POCTROP 0.00 04/20/2020          A1C Last 3 Results          7/5/2024    12:03 9/2/2024    03:58 12/6/2024    10:24   HGBA1C " Last 3 Results   Hemoglobin A1C 5.20  5.10  5.30      Lipid Panel          2024    12:03 2024    06:40 2024    10:24   Lipid Panel   Total Cholesterol 152  114  134    Triglycerides 98  82  88    HDL Cholesterol 46  34  44    VLDL Cholesterol 18  16  17    LDL Cholesterol  88  64  73    LDL/HDL Ratio 1.88  1.87  1.65      Results for orders placed during the hospital encounter of 24    Adult Transthoracic Echo Limited W/ Cont if Necessary Per Protocol    Interpretation Summary  Limited echocardiogram performed for the assessment of LV function.    LV systolic function is normal. No regional wall motion abnormalities are noted. Calculated LVEF > 55%.    Compared to study from 24, LVEF now has normalized.     Results for orders placed during the hospital encounter of 24    Adult Transthoracic Echo Limited W/ Cont if Necessary Per Protocol    Interpretation Summary  Limited echocardiogram performed for the assessment of LV function.    LV systolic function is normal. No regional wall motion abnormalities are noted. Calculated LVEF > 55%.    Compared to study from 24, LVEF now has normalized.     Results for orders placed during the hospital encounter of 24    Cardiac Catheterization/Vascular Study    Conclusion    Prox RCA lesion is 20% stenosed.    1st Diag lesion is 40% stenosed.    Mid LAD lesion is 99% stenosed.    [facility]  CARDIAC CATHETERIZATION PROCEDURE REPORT    Patient: Bianca Boles  : 1980  MRN: [id number]    Procedure Date:  24    Interventional Cardiologist:  Toño Rodriguez MD    Indication:  NSTEMI type I      Procedure performed:  Left heart catheterization  Selective left and right coronary angiography  Left ventriculography  PCI of mid LAD  Pre and post PCI IVUS    Access Sites:  Right femoral artery    Findings:  1. Coronary Artery Anatomy:  Left main is a large-caliber vessel with subdivides into left circumflex and LAD.   Angiographically normal.  Left circumflex is a moderate caliber vessel which gives rise to 2 moderate-sized OM branches.  LAD is a large-caliber vessel which gives rise to 1 large diagonal branch.  Mid segment has tandem lesions with 99% stenosis DESTINY I flow.  Ostial diagonal branch has 30 to 40% stenosis.  RCA is a large-caliber vessel which gives rise to PDA and PL branches.  Proximal segment has 20 to 30% stenosis.  Distal segment has lumpy bumpy lesions 20% stenosis.  PDA and PL branches are angiographically normal.    2. Hemodynamics:  Left Ventricle: 102/1 mmHg  LVEDP: 3 mmHg  Aorta: 103/58 mmHg    3. Left Ventriculogram:  Ejection Fraction: 45 to 50% %  Wall Motion: Apical wall motion abnormalities  Mitral Regurgitation: No significant MR is noted      Procedure Details:  Informed consent was obtained with an explanation of the risks, benefits and alternatives of the procedure. The patient was brought to the Cardiac Catheterization Laboratory and was prepped and draped in a standard sterile fashion. Moderate sedation with Fentanyl and Versed was administered by the circulating nurse. Lidocaine 2% was used to anesthetize the right groin area.  Under ultrasound guidance using micropuncture needle right femoral artery was accessed and a 6 English 11 cm sheath was advanced into right femoral artery.    6 English JL 4 and JR4 diagnostic catheters were used to cannulate LCA and RCA.  Coronary angiograms were completed in multiple orthogonal views.  6 English pigtail catheter was used for LV cannulation to perform LV gram and to determine gradient across the aortic valve.  After analysis of angiogram decision was taken to proceed with PCI of mid LAD.    We took a 6 English XB LAD 3.5 guide to engage the left main.  Run-through wire was used to successfully cross the lesion in first attempt.  Initial predilation was performed with a compliant 2.5 x 15 mm balloon.  We then delivered IVUS catheter for vessel sizing for  distal reference and proximal reference.  The distal reference was 3.3 x 3.4 mm proximal reference was 4.8 x 4.9 mm.  Significant positive remodeling was noted.  No calcification was identified.    We then delivered a 3.5 x 48 mm drug-eluting stent, positioned it under fluoroscopy at the desired location and then the stent was inflated.  Postdilatation was performed using a three 4 x 15 mm NC balloon followed by a 4.5 x 15 mm NC balloon proximally.  Subsequent angiogram showed excellent result with 0% residual stenosis and DESTINY-3 flow.  There was no concern for coronary perforation.  At this point interventional wires were retracted back into the guide.  Cath was removed over the wire.  Femoral angiogram was completed.  Perclose sutures were deployed for successful hemostasis.      Cumulative fluoroscopy time: 17 minutes    Cumulative air kerma: 2 Gy    Total amount of contrast used: 120 cc    Complications:  None.    Estimated Blood Loss:  Minimal.    Conclusions:  NSTEMI type I  Successful PCI of LAD mid segment with 3.5 x 48 mm drug-eluting stent optimized with 3 0% residual stenosis and DESTINY-3 flow.      Recommendations:  Continue uninterrupted dual antiplatelet therapy for at least 1 year  Continue medical management for secondary prevention of coronary artery disease.  Education regarding medication compliance, dietary changes and lifestyle modification.        Toño Rodriguez MD    09/04/24  21:40 EDT     Results for orders placed during the hospital encounter of 09/01/24    Stress Test With Myocardial Perfusion One Day    Interpretation Summary    Left ventricular ejection fraction is mildly reduced (Calculated EF = 45%).    Abnormal, high risk myocardial perfusion imaging study  Perfusion defects are noted.  There is a partially reversible perfusion defect of the distal anterior wall segment suggestive of erika-infarct ischemia.  There is absent uptake of tracer noted in the apical segments distal inferior wall  segment distal anterior segment anterolateral and improved septal segments more pronounced on stress images than rest images.    Wall motion abnormalities are noted involving distal anterior and distal inferior and apical segments.  Overall EF is estimated to be around 45%  No transient ischemic dilatation was noted    Constellation of findings are suggestive of mid to distal LAD occlusion which tends to wraparound apex.  Coronary angiogram is recommended for further evaluation     The ASCVD Risk score (Arturo DK, et al., 2019) failed to calculate for the following reasons:    Risk score cannot be calculated because patient has a medical history suggesting prior/existing ASCVD        Assessment  Assessment & Plan  1. Coronary artery disease status post PCI LAD mid segment  2. History of NSTEMI  3.  Hypertension  4.  Hyperlipidemia    Patient's symptoms of exertional angina, exertional dyspnea have resolved.  She has had sensation of dyspnea from Brilinta which she is no longer experiencing after switching to Plavix.  She has a successfully completed cardiac rehab and has returned to her routine activities.  She is able to keep up with her work requirements which involves going up and down several flight of stairs.  Continue aspirin 81 mg p.o. daily along with Plavix 75 mg p.o. daily for 1 year uninterrupted, we will switch her to Plavix subsequently as a single antiplatelet therapy.  Continue Pravachol 80 mg p.o. nightly.  Patient is able to achieve LDL around 70 and triglycerides below 125 with this medication.  We will switch this with Crestor in future to achieve LDL below 55.  And if needed will add Zetia  Patient's EF has normalized.  I have discontinued amlodipine and spironolactone and in fact have replaced it with losartan.  Patient was advised to keep a blood pressure log and in case blood pressure remains uncontrolled she can increase losartan to 50 mg p.o. once daily.  Patient has verbalized  understanding.        I have seen and examined the patient. I spent 30 min caring for the patient on this date of service. This time includes time spent by me in the following activities as necessary: face to face encounter, preparing for the visit, reviewing tests, specialists records and previous visits, obtaining and/or reviewing a separately obtained history, performing a medically appropriate exam and/or evaluation, counseling and educating the patient, family, caregiver, referring and/or communicating with other healthcare professionals, documenting information in the medical record, independently interpreting results and communicating that information with the patient, family, caregiver, and developing a medically appropriate treatment plan with consideration of other conditions, medications, and treatments. More than 50 % time was spent in counselling and patient education.      Follow-up  A follow-up visit is scheduled in 3 months.              Diagnoses and all orders for this visit:    1. Essential hypertension (Primary)  -     losartan (COZAAR) 25 MG tablet; Take 1 tablet by mouth Daily.  Dispense: 60 tablet; Refill: 5      Follow Up     Return in about 6 months (around 7/17/2025) for With Dr. Rodriguez.      Toño Rodriguez MD  Interventional Cardiology  01/17/2025  14:54 EST      Patient was given instructions and counseling regarding her condition or for health maintenance advice. Please see specific information pulled into the AVS if appropriate.     Please note that portions of this document were completed using a voice recognition program.     Patient or patient representative verbalized consent for the use of Ambient Listening during the visit with  Toño Rodriguez MD for chart documentation. 1/17/2025  10:46 EDT

## 2025-01-20 LAB
QT INTERVAL: 397 MS
QTC INTERVAL: 414 MS

## 2025-01-24 ENCOUNTER — CLINICAL SUPPORT (OUTPATIENT)
Dept: FAMILY MEDICINE CLINIC | Facility: CLINIC | Age: 45
End: 2025-01-24
Payer: COMMERCIAL

## 2025-01-24 DIAGNOSIS — Z30.42 ENCOUNTER FOR SURVEILLANCE OF INJECTABLE CONTRACEPTIVE: Primary | ICD-10-CM

## 2025-01-24 PROCEDURE — 81025 URINE PREGNANCY TEST: CPT | Performed by: NURSE PRACTITIONER

## 2025-01-24 PROCEDURE — 96372 THER/PROPH/DIAG INJ SC/IM: CPT | Performed by: NURSE PRACTITIONER

## 2025-01-24 RX ORDER — MEDROXYPROGESTERONE ACETATE 150 MG/ML
150 INJECTION, SUSPENSION INTRAMUSCULAR ONCE
Status: COMPLETED | OUTPATIENT
Start: 2025-01-24 | End: 2025-01-24

## 2025-01-24 RX ADMIN — MEDROXYPROGESTERONE ACETATE 150 MG: 150 INJECTION, SUSPENSION INTRAMUSCULAR at 11:05

## 2025-01-24 NOTE — ADDENDUM NOTE
Addended by: PATY COKER on: 1/24/2025 11:11 AM     Modules accepted: Orders    
Addended by: PATY COKER on: 1/24/2025 11:18 AM     Modules accepted: Orders    
Addended by: PATY COKRE on: 1/24/2025 11:07 AM     Modules accepted: Orders    
Statement Selected

## 2025-01-27 ENCOUNTER — SPECIALTY PHARMACY (OUTPATIENT)
Dept: PHARMACY | Facility: TELEHEALTH | Age: 45
End: 2025-01-27
Payer: COMMERCIAL

## 2025-01-27 NOTE — PROGRESS NOTES
Specialty Pharmacy Refill Coordination Note     Bianca is a 44 y.o. female contacted today regarding refills of  Otezla specialty medication(s).    Reviewed and verified with patient:  Allergies  Meds       Specialty medication(s) and dose(s) confirmed: yes    Refill Questions      Flowsheet Row Most Recent Value   Changes to allergies? No   Changes to medications? No   New conditions or infections since last clinic visit No   Unplanned office visit, urgent care, ED, or hospital admission in the last 4 weeks  No   How does patient/caregiver feel medication is working? Very good   Financial problems or insurance changes  No   Since the previous refill, were any specialty medication doses or scheduled injections missed or delayed?  No  [Patient has meds remaining on hand]   Does this patient require a clinical escalation to a pharmacist? No            Delivery Questions      Flowsheet Row Most Recent Value   Delivery method UPS   Delivery address verified with patient/caregiver? Yes   Delivery address Home   Number of medications in delivery 1   Medication(s) being filled and delivered Apremilast (Otezla)   Doses left of specialty medications Patient has meds remaining on hand   Copay verified? Yes   Copay amount $0.00   Copay form of payment No copayment ($0)   Ship Date 1/28/25   Delivery Date Selection 01/29/25   Signature Required No                   Follow-up: 21 day(s)     Marco Manzanares, Pharmacy Technician  Specialty Pharmacy Technician

## 2025-01-30 ENCOUNTER — TREATMENT (OUTPATIENT)
Dept: CARDIAC REHAB | Facility: HOSPITAL | Age: 45
End: 2025-01-30
Payer: COMMERCIAL

## 2025-01-30 VITALS — BODY MASS INDEX: 33.29 KG/M2 | WEIGHT: 212.52 LBS

## 2025-01-30 DIAGNOSIS — Z95.5 S/P CORONARY ARTERY STENT PLACEMENT: Primary | ICD-10-CM

## 2025-01-30 PROCEDURE — 93798 PHYS/QHP OP CAR RHAB W/ECG: CPT

## 2025-02-03 ENCOUNTER — OFFICE VISIT (OUTPATIENT)
Dept: UROLOGY | Age: 45
End: 2025-02-03
Payer: COMMERCIAL

## 2025-02-03 VITALS — HEIGHT: 67 IN | WEIGHT: 212 LBS | BODY MASS INDEX: 33.27 KG/M2

## 2025-02-03 DIAGNOSIS — N20.0 LEFT RENAL STONE: Primary | ICD-10-CM

## 2025-02-03 DIAGNOSIS — C64.1 RENAL CELL CARCINOMA OF RIGHT KIDNEY: ICD-10-CM

## 2025-02-03 LAB
BILIRUB BLD-MCNC: NEGATIVE MG/DL
CLARITY, POC: CLEAR
COLOR UR: YELLOW
EXPIRATION DATE: ABNORMAL
GLUCOSE UR STRIP-MCNC: ABNORMAL MG/DL
KETONES UR QL: NEGATIVE
LEUKOCYTE EST, POC: ABNORMAL
Lab: ABNORMAL
NITRITE UR-MCNC: POSITIVE MG/ML
PH UR: 6 [PH] (ref 5–8)
PROT UR STRIP-MCNC: ABNORMAL MG/DL
RBC # UR STRIP: NEGATIVE /UL
SP GR UR: 1.02 (ref 1–1.03)
UROBILINOGEN UR QL: ABNORMAL

## 2025-02-03 PROCEDURE — 99213 OFFICE O/P EST LOW 20 MIN: CPT | Performed by: UROLOGY

## 2025-02-03 PROCEDURE — 81003 URINALYSIS AUTO W/O SCOPE: CPT | Performed by: UROLOGY
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
## 2025-02-14 ENCOUNTER — TELEMEDICINE (OUTPATIENT)
Dept: PSYCHIATRY | Facility: CLINIC | Age: 45
End: 2025-02-14
Payer: COMMERCIAL

## 2025-02-14 DIAGNOSIS — F41.1 GAD (GENERALIZED ANXIETY DISORDER): ICD-10-CM

## 2025-02-14 DIAGNOSIS — F33.1 MDD (MAJOR DEPRESSIVE DISORDER), RECURRENT EPISODE, MODERATE: Primary | ICD-10-CM

## 2025-02-14 NOTE — PROGRESS NOTES
Date: February 14, 2025  Time In: 8:02  Time out: 9:01    This provider is located at the [facility]), [address], [place] [address] using a secure Mc[employer] Video Visit through [employer]. Patient is being seen remotely via telehealth, and they are in a secure environment for this session. The patient's condition being diagnosed/treated is appropriate for telemedicine. The provider identified herself as well as her credentials. The patient, and/or patients guardian, consent to be seen remotely, and when consent is given they understand that the consent allows for patient identifiable information to be sent to a third party as needed. They may refuse to be seen remotely at any time. The electronic data is encrypted and password protected, and the patient and/or guardian has been advised of the potential risks to privacy not withstanding such measures.     Mode of Visit: Video  Location of patient:   Location of provider: Provider home  You have chosen to receive care through a telehealth visit.  The patient has signed the video visit consent form.  The visit included audio and video interaction. No technical issues occurred during this visit    Subjective   Bianca Boles is a 44 y.o. female who presents today fully oriented, appropriately dressed and groomed, and open to communication for follow up appointment.    Chief Complaint:   Chief Complaint   Patient presents with    Anxiety    Depression    Sleeping Problem        History of Present Illness: Rapport was established through conversation and unconditional positive regard. Recent events were discussed and how these events impact Pt's emotional health.   Pt reports successful use of learned coping skills since last report.  Pt reports continuation of symptoms and states the intensity and duration of symptoms has worsened since last report. Pt rates anxiety at 8/10 and depression at 5/10.  "    Sleep: Pt reports sleep has remained unchanged since last report. Healthy sleep habits were discussed such as maintaining a schedule, routine, avoiding caffeine, and limiting screen time before bed.  Sleep non-restorative.  Intermittent waking averaging 2-4 x per night.  Pt is dreaming a lot.  Dreams are weird and confusing.     Appetite:  Pt reports appetite has been fair since last report.  Discussed the importance of hydration and eating a healthy diet for overall and mental health.  \"I fee like I'm eating\" but she forgets to eat.     Medication compliance: Pt reports medications are being taken daily as prescribed. Discussed the importance of compliance with prescriber's directions and Pt was instructed to report questions/concerns, as well as missed doses or discontinuation of medication by Pt.     Safety Plan in Place: No Pt denies SI/HI/SIB recent or current    Daily Functioning:  Since last report, Pt states symptoms are causing Moderate difficulty in daily functioning.      Content Discussion:   Pt reports life updates since previous session. Pt identified current stressors. Pt acknowledged stressors within and outside of one's control. Pt identified successes and issues with utilizing coping mechanisms.  Pt reports there has been a lot of stuff happening the last two weeks.  Pt's sister who lives in [place] had to be hospitalized due to breathing problems.  Sister and pt have a strained relationship, but Pt was reaching out the whole time sister was in hospital.  They learned sister is in liver failure and needs a liver transplant.  Pt states Mother is going up to help with sister's care. States she is having difficulty focusing on work thinking about this.  She is exhausted by the anxiety. Discussed the importance to use mindfulness to refrain from borrowing anxiety from the future. Discussed the need to refrain from exhausting herself physically and emotionally by researching transplant information " online.  Encouraged pt to use mindfulness to stay in the here and now.      Counselor supported Pt in continued exploration of underlying belief systems which contribute to difficulty in connecting/vulnerability.  Through discussion, Pt identifies themes of preservation and overt emotional and physical reactivity when physical and/or emotional statis is in question, even in low or perceived threatening situations. Counselor supported Pt in identifying past experiences and underlying beliefs which contribute to these feelings and reactions.  Pt was supported and encouraged in processing emotions related to frustrations with the expectations of self and others.  Pt was encouraged to identify cultural and nuclear familial contexts which contribute to these concerns.  Pt was receptive and engaged in conversation, and Pt reports this was beneficial and applicable to their situation.      Reviewed coping skills and encouraged Pt to continue to practicing coping skills daily when not under distress. Pt was praised for their attempts to decrease symptoms and mitigate activating events.    Clinical Maneuvering/Intervention:  CBT was utilized to encourage Pt to identify maladaptive thoughts and behaviors and replace with more affirming and positive.Pt encouraged to set and maintain appropriate and healthy boundaries with others. Pt was instructed to practice daily using appropriate and specific words to communicate feelings to others.  Motivational interviewing used to encourage Pt to identify strengths which can be utilized in working toward treatment goals. Pt encouraged to practice daily learned skills such as controlled breathing, grounding, and mindfulness. Pt was encouraged to ask for help from support persons to assist them in maintaining stability and alleviate symptoms. Discussed the importance of being one's own mental health advocate and to refrain from seeing the need to ask for help as a weakness. Pt was  encouraged to formulate a plan of action to be more proactive in managing stressors and refrain from using reactive and automatic heightened emotional responses.     Solution-focused therapy employed to identify how Pt would like their life to be if they were to make positive changes. Pt encouraged to identify effective coping skills and strengths they can use to continue utilizing those skills. Pt encouraged to discontinue utilizing non-effective coping mechanisms. Pt provided with feedback to highlight achievements and personal and other resources. Encouraged use of SMART goals    Assisted patient in processing above session content; acknowledged and normalized patient’s thoughts, feelings, and concerns.  Rationalized patient thought process regarding concerns presented at session.  Discussed triggers associated with patient's  anxiety  and depression  Also discussed coping skills for patient to implement such as self care , positive self talk , and mindfulness.    Allowed patient to freely discuss issues without interruption or judgment. Provided safe, confidential environment to facilitate the development of positive therapeutic relationship and encourage open, honest communication. Assisted patient in identifying risk factors which would indicate the need for higher level of care including thoughts to harm self or others and/or self-harming behavior and encouraged patient to contact this office, call 911, or present to the nearest emergency room should any of these events occur. Discussed crisis intervention services and means to access. Patient adamantly and convincingly denies current suicidal or homicidal ideation or perceptual disturbance.    Assessment:     Patient appears to maintain relative stability as compared to their baseline.  However, patient persistently struggles with symptoms which continue to cause impairment in important areas of functioning.  As a result, they can be reasonably expected to  continue to benefit from treatment and would likely be at increased risk for decompensation otherwise.      PHQ-Score Total:  PHQ-9 Total Score: PHQ-9 Depression Screening  Little interest or pleasure in doing things?  2   Feeling down, depressed, or hopeless?  2   PHQ-2 Total Score     Trouble falling or staying asleep, or sleeping too much?  3   Feeling tired or having little energy?  2   Poor appetite or overeating?  1   Feeling bad about yourself - or that you are a failure or have let yourself or your family down?  1   Trouble concentrating on things, such as reading the newspaper or watching television?  1   Moving or speaking so slowly that other people could have noticed? Or the opposite - being so fidgety or restless that you have been moving around a lot more than usual?  0   Thoughts that you would be better off dead, or of hurting yourself in some way?  0   PHQ-9 Total Score  12   If you checked off any problems, how difficult have these problems made it for you to do your work, take care of things at home, or get along with other people?  somewhat             Mental Status Exam:   Hygiene:   good  Cooperation:  Cooperative  Eye Contact:  Good  Psychomotor Behavior:  Appropriate  Affect:   Worried and sad  Mood: depressed and anxious  Speech:  Normal  Thought Process:  Pressured by anxiety  Thought Content:  Normal and Mood congruent  Suicidal:  None  Homicidal: None  Hallucinations:  None  Delusion: None  Memory:  Intact  Orientation:  Grossly Intact  Reliability:  good  Insight:  Good  Judgement:  Good  Impulse Control:  Good  Physical/Medical Issues:  No        Functional Status: Moderate impairment     Progress toward goal: Not at goal    Prognosis: Good with continued therapeutic intervention        Plan:    Patient will continue in individual outpatient therapy with focus on improved functioning and coping skills, maintaining stability, and avoiding decompensation and the need for higher level of  care.    Patient will adhere to medication regimen as prescribed and report any side effects. Patient will contact this office, call 911 or present to the nearest emergency room should suicidal or homicidal ideations occur. Provide Cognitive Behavioral Therapy and Solution Focused Therapy to improve functioning, maintain stability, and avoid decompensation and the need for higher level of care.     Return in about 2 weeks (around 2/28/2025).      VISIT DIAGNOSIS:    Diagnosis Plan   1. MDD (major depressive disorder), recurrent episode, moderate        2. INDIRA (generalized anxiety disorder)         08:02 EST       This document has been electronically signed by LLUVIA Isaac  February 14, 2025      Part of this note may be an electronic transcription/translation of spoken language to printed text using the Dragon Dictation System.

## 2025-02-20 DIAGNOSIS — R68.82 LOW LIBIDO: ICD-10-CM

## 2025-02-20 DIAGNOSIS — R79.89 LOW TESTOSTERONE LEVEL IN FEMALE: ICD-10-CM

## 2025-02-20 RX ORDER — TESTOSTERONE MICRONIZED 100 %
POWDER (GRAM) MISCELLANEOUS
Qty: 15 G | Refills: 0 | Status: SHIPPED | OUTPATIENT
Start: 2025-02-20

## 2025-02-21 ENCOUNTER — SPECIALTY PHARMACY (OUTPATIENT)
Dept: PHARMACY | Facility: TELEHEALTH | Age: 45
End: 2025-02-21
Payer: COMMERCIAL

## 2025-02-21 ENCOUNTER — TELEPHONE (OUTPATIENT)
Dept: PSYCHIATRY | Facility: CLINIC | Age: 45
End: 2025-02-21
Payer: COMMERCIAL

## 2025-02-21 DIAGNOSIS — F41.1 GAD (GENERALIZED ANXIETY DISORDER): Primary | ICD-10-CM

## 2025-02-21 RX ORDER — BUSPIRONE HYDROCHLORIDE 10 MG/1
10 TABLET ORAL 3 TIMES DAILY
Qty: 90 TABLET | Refills: 2 | Status: SHIPPED | OUTPATIENT
Start: 2025-02-21 | End: 2025-05-22

## 2025-02-21 NOTE — PROGRESS NOTES
Specialty Pharmacy Patient Management Program  Refill Outreach     Bianca was contacted today regarding refills of their medication(s).    Refill Questions      Flowsheet Row Most Recent Value   Changes to allergies? No   Changes to medications? No   New conditions or infections since last clinic visit No   Unplanned office visit, urgent care, ED, or hospital admission in the last 4 weeks  No   How does patient/caregiver feel medication is working? Very good   Financial problems or insurance changes  No   Since the previous refill, were any specialty medication doses or scheduled injections missed or delayed?  No  [Patient has doses remaining on hand]   Does this patient require a clinical escalation to a pharmacist? No            Delivery Questions      Flowsheet Row Most Recent Value   Delivery method UPS   Delivery address verified with patient/caregiver? Yes   Delivery address Home   Other address preferred N/A   Number of medications in delivery 1   Medication(s) being filled and delivered Apremilast ([facility])   Doses left of specialty medications Patient has doses remaining on hand   Copay verified? Yes   Copay amount $0.00   Copay form of payment No copayment ($0)   Delivery Date Selection 02/25/25   Signature Required No                 Follow-up: 23 day(s)     Marco Manzanares, Pharmacy Technician  2/21/2025  10:34 EST

## 2025-02-21 NOTE — TELEPHONE ENCOUNTER
PT(PATIENT) VERIFIED     CONTACTED PT(PATIENT) PER PROVIDER'S INSTRUCTIONS     Nika Crowley APRN  [facility] Clinical PoolJust now (12:13 PM)      Can she take 2 tabs of buspirone by mouth 3 times daily-making this 10 mg po TID? At follow up- We will look at perhaps reducing Wellbutrin as this can exacerbate anxiety.     PT(PATIENT) STATES SHE IS OK TO TAKE THE INCREASED DOSAGE OF busPIRone (BUSPAR) 5 MG tablet (2024)     PT(PATIENT) VERBALIZED UNDERSTANDING AND HAD NO FURTHER QUESTIONS AT THIS TIME

## 2025-02-21 NOTE — TELEPHONE ENCOUNTER
"Pt states that she hates to message knowing that she has an upcoming appointment on Wednesday but she is really struggling and wants to know if her meds can be adjusted or if there is something that can be added to help.      Pt states \"I basically feel like I am having a constant anxiety attack. I'm not sleeping well just like 2 hour stretches at a time and my jaw hurts from clenching it constantly. I have moments where my hands start shaking and my heart is racing for no obvious reason.  \"    Provider please advise  "

## 2025-02-24 NOTE — PROGRESS NOTES
"[facility]  Follow-up Visit    Chief Complaint:  \"I want to get into some counseling\"     Initial History of Present Illness: Bianca Boles is a 43 y.o. female who presents today for initial evaluation regarding psychotherapy.  Bianca presents unaccompanied in no acute distress and engages with me appropriately. Psychotropic regimen with which patient presents is described as  bupropion   mg po daily, buspirone 5 mg po TID, trazodone 100 mg po q HS, desvenlafaxine 50 mg po q day.      History is positive for signs/symptoms suggestive of low mood, low energy, anhedonia, changes in sleep, changes in appetite, guilt, poor concentration, psychomotor changes, thoughts of being better off dead-endorses occasional passive suicidal thoughts, and daydreams about \"not being here.\" Psychiatric screening is negative for pathognomonic history of: TBI, kathryn, psychosis.      Ms. Boles has symptoms of history of significant trauma for which there are related intrusion symptoms related to the traumatic event (distressing memories, flashbacks, nightmares, intense distress associated with triggering stimuli, marked physiological reactions to triggering stimuli), persistent avoidance of triggering stimuli, negative alterations in cognition and mood (memory lapses, negative schemas, distorted cognitions about the event, social withdrawal, feelings of detachment/estrangement, persistent anhedonia), and marked alterations in arousal and reactivity (irritability, reckless behavior, hypervigilance, exaggerated startle, sleep disturbances). She also has a history a history of early exposure to enduring trauma associated with re-experiencing trauma, avoidance, hyperarousal (PTSD) and difficulty managing emotions, negative self-view, relationship difficulties, dissociative symptoms, and demoralization (complex PTSD). Patient has family history of ADHD, and possible ASD, will plan to screen " "for both for informational gathering purposes and not necessarily diagnosing.   Ms. Boles also voices current body image disturbances related to her bariatric procedure and intense fear of gaining the 98 lbs back, patient admits to the irrational fear, but has put on 10 lbs, I recommended sharing these concerns with her PCP.     I have counseled the patient with regard to diagnoses and the recommended treatment regimen as documented below: I will assume prescriptive responsibility (if PCP desires) for CONTINUE bupropion   mg po daily (in am), CONTINUE buspirone 5 mg po TID (patient to take more regularly for greatest efficacy) CONTINUE trazodone  mg po q HS, CONTINUE desvenlafaxine 50 mg po q day . Patient acknowledges the diagnoses per my rendered interpretation. Patient demonstrates awareness/understanding of viable alternatives for treatment as well as potential risks, benefits, and side effects associated with this regimen and is amenable to proceed in this fashion.      Recommended lifestyle changes: 30 minutes of activity to increase HR 2-3 days weekly.     Psychiatric History:  Diagnoses: depression, anxiety  Outpatient history: 14-14 yo  Inpatient history: none  Medication trials: fluoxetine, Zoloft, Effexor, citalopram, bupropion, pristiq, hx of GENESIGHT  Other treatment modalities: Psychotherapy  Self harm: cutting  Suicide attempts: Yes, OD  Abuse or neglect: emotional     Substance Abuse History:   Types/methods/frequency: marijuana  Transtheoretical stage: Maintenence     Social History:  Residence: lives house, my , 2 daughters  Vocation: yes  Source of income: Employed  Last grade completed: high  Pertinent developmental history: ADHD, will screen at next visit  Pertinent legal history: No history   Hobbies/interests: DND, and video games, entertain  Hinduism: Catholicism -\"Anglican light\"  Exercise: walking, but scared to get obsessed  Dietary habits:  history of binge eating , " "type 2 DM  Sleep hygiene: wakes up at 3 am  Social habits: none  Sunlight: There are no concern for under-exposure.  Caffeine intake: no pertinent issues   Hydration habits: no pertinent issues    history: No, grew up in              Interval History Bianca is a 44 y.o. female who presents today for follow-up. Bianca presents unaccompanied in no acute distress and engages with me appropriately.   Current treatment regimen includes:   - bupropion  mg po q am  Buspirone 10 mg po TID  Trazodone 100 mg po q HS  Pristiq 50 mg po q day  Side-effects per given history: none.      Today the patient feels \"overwhelmed\" she has been dealing with her sister's illness-who needs a liver transplant. Bianca is trying to figure out with her own health issues, if she could be a live donor. She is scrambling to get passports as her sister lives in [place]. She endorses feeling helpless in [place] while her sister is alone in [place].   Thought process and content are devoid of overt aberration suggestive of acute kathryn/psychosis. The patient denies SI/HI/AVH. There are changes on exam today compared to most recent evaluation.    - sleep: no concerns  - appetite: moderately controlled    Briefly discussed reducing bupropion-to see if it is playing a part in her underlying anxiety. Patient declined, as she reports that bupropion helps her focus. We discussed using a low dose/low quantity benzodiazapine as rescue agent. We discussed using a non-controlled medicine such as hydroxyzine for as needed anxiety.   I will begin hydroxyzine PRN anxiety; patient has been advised this medication can elicit sedation and dizziness and should not be taken prior to driving or operating machinery. We will continue other medications in regimen for now.  Patient advised to call back in 2 to 3 weeks or sooner if anxiety worsens or she develops panic symptoms.  Considered propranolol or clonidine for use but with the patient's recent " cardiological events we will defer for now.   I have counseled the patient with regard to diagnoses and the recommended treatment regimen as documented below. Patient acknowledges the diagnoses per my rendered interpretation. Patient demonstrates understanding of potential risks/benefits/side effects associated with this regimen and is amenable to proceed in this fashion.       Social History     Socioeconomic History    Marital status:     Number of children: 2   Tobacco Use    Smoking status: Former     Current packs/day: 0.00     Average packs/day: 0.3 packs/day for 25.0 years (6.3 ttl pk-yrs)     Types: Cigarettes     Start date:      Quit date: 1/15/2020     Years since quittin.1     Passive exposure: Never    Smokeless tobacco: Never    Tobacco comments:     A PACK A WEEK   Vaping Use    Vaping status: Never Used   Substance and Sexual Activity    Alcohol use: Yes     Comment: OCCASIONAL/SOCIAL    Drug use: Not Currently     Frequency: 0.1 times per week     Types: Marijuana     Comment: OTC THC- GUMMIES    Sexual activity: Yes     Partners: Male     Birth control/protection: Depo-provera       Tobacco use counseling/intervention: patient does not use tobacco.   Problem List:  Patient Active Problem List   Diagnosis    Chronic fatigue    Essential hypertension    Hyperlipidemia    Heartburn    Multiple joint pain    Depression    Gastroparesis    Gastroesophageal reflux disease    Gastric polyps    S/P laparoscopic sleeve gastrectomy    Obesity, Class I, BMI 30-34.9    Anxiety    Urinary tract infection with hematuria    Leukocytosis    Insomnia    Left ureteral stone    Left renal stone    Hematuria    Acute cystitis with hematuria    Right renal mass    Seasonal allergies    Urinary urgency    Renal cell carcinoma of right kidney    Psoriasis    Palpitation    Post-COVID syndrome    Nausea    Type 2 diabetes mellitus without complication, without long-term current use of insulin    Left  shoulder pain    Frozen shoulder    Impingement syndrome of left shoulder    Dysuria    Glucose found in urine on examination    Encounter for screening for malignant neoplasm of colon    Family history of colon cancer    Constipation    Vitamin D deficiency    CAD S/P percutaneous coronary angioplasty    Stage 3 chronic kidney disease    Chest pain    Recurrent UTI     Allergy:   Allergies   Allergen Reactions    Morphine Itching and Nausea And Vomiting        Discontinued Medications:  Medications Discontinued During This Encounter   Medication Reason    Semaglutide-Weight Management (Wegovy) 2.4 MG/0.75ML solution auto-injector *Therapy completed       Current Medications:   Current Outpatient Medications   Medication Sig Dispense Refill    Apremilast (Otezla) 30 MG tablet Take 1 tablet by mouth 2 (Two) Times a Day. 60 tablet 5    aspirin 81 MG EC tablet Take 1 tablet by mouth Daily. 90 tablet 5    azelaic acid (AZELEX) 15 % gel Apply 1 Application topically to face as directed Daily after cleansing. 50 g 3    betamethasone valerate (VALISONE) 0.1 % cream Apply 1 Application topically to the appropriate area as directed 1 (One) to  2 (Two) Times a Day until approved. 45 g 2    Biotin 76028 MCG tablet Take 1 tablet by mouth Every Night.      buPROPion XL (WELLBUTRIN XL) 300 MG 24 hr tablet Take 1 tablet by mouth Daily. 90 tablet 1    busPIRone (BUSPAR) 10 MG tablet Take 1 tablet by mouth 3 (Three) Times a Day for 90 days. 90 tablet 2    CALCIUM-MAGNESIUM-ZINC PO Take 3 tablets by mouth Daily.      cetirizine (zyrTEC) 10 MG tablet Take 1 tablet by mouth Daily As Needed.      Cholecalciferol 25 MCG (1000 UT) capsule Take 2 capsules by mouth Daily. 180 capsule 1    clopidogrel (PLAVIX) 75 MG tablet Take 1 tablet by mouth Daily 30 tablet 6    COLLAGEN PO Take 3 tablets by mouth Daily.      cyclobenzaprine (FLEXERIL) 10 MG tablet Take 1 tablet by mouth Every Night. 30 tablet 0    dapagliflozin Propanediol (Farxiga) 10  MG tablet Take 10 mg by mouth Daily. 90 tablet 1    desvenlafaxine (PRISTIQ) 50 MG 24 hr tablet Take 1 tablet by mouth Daily. 90 tablet 1    Diclofenac Sodium (VOLTAREN) 1 % gel gel Apply 4 g topically to the appropriate area as directed 4 (Four) Times a Day As Needed (pain). 200 g 1    Estrogens Conjugated (Premarin) 0.625 MG/GM vaginal cream Insert 1 gram into the vagina every night at bedtime for 2 Weeks, THEN 1 gram into the vagina every night at bedtime 2 (Two) Times a Week. 30 g 3    fluticasone (FLONASE) 50 MCG/ACT nasal spray USE 2 SPRAYS IN EACH NOSTRIL ONCE DAILY AS DIRECTED 48 g 1    hydrocortisone 2.5 % cream Apply 1 Application topically to the appropriate area of the face as directed 1 (One) to 2 (Two) Times a Day until approved. 30 g 2    IRON-VITAMIN C PO Take 1 tablet by mouth Daily.      linaclotide (Linzess) 72 MCG capsule capsule Take 1 capsule by mouth Every Morning Before Breakfast for 90 days. 90 capsule 3    losartan (COZAAR) 25 MG tablet Take 1 tablet by mouth Daily. 60 tablet 5    metoprolol succinate XL (Toprol XL) 25 MG 24 hr tablet Take 1 tablet by mouth Every Night. 90 tablet 1    Multiple Vitamins-Minerals (MULTIVITAMIN PO) Take 1 tablet by mouth Every Night.      nitroglycerin (NITROSTAT) 0.4 MG SL tablet Place 1 tablet under the tongue Every 5 (Five) Minutes As Needed for Chest Pain (Systolic BP Greater Than 100). Take no more than 3 doses in 15 minutes. 60 tablet 2    ondansetron ODT (ZOFRAN-ODT) 4 MG disintegrating tablet Place 1 tablet on the tongue Every 8 (Eight) Hours As Needed for Nausea.      pravastatin (PRAVACHOL) 80 MG tablet Take 1 tablet by mouth Every Night. 90 tablet 1    Probiotic Product (PROBIOTIC-10 PO) Take 1 tablet by mouth Every Night.      testosterone micronized powder Appyl 0.5 mL (2 clicks) to skin daily 15 g 0    Tirzepatide 12.5 MG/0.5ML solution auto-injector Inject 12.5 mg under the skin into the appropriate area as directed 1 (One) Time Per Week. 2 mL 5     topiramate (TOPAMAX) 50 MG tablet Take 1 tablet by mouth every night at bedtime for 180 days. 90 tablet 1    traZODone (DESYREL) 100 MG tablet Take 1 tablet by mouth Every Night. 90 tablet 1    hydrOXYzine (ATARAX) 10 MG tablet Take 1 tablet by mouth 3 (Three) Times a Day As Needed for Anxiety for up to 90 days. 90 tablet 1     No current facility-administered medications for this visit.     Past Medical History:  Past Medical History:   Diagnosis Date    Acid reflux     Anxiety     Anxiety and depression     Benign essential hypertension     Borderline personality disorder     Cancer     renal cancer/mass, PARTIAL NEPH, RIGHT    Chronic kidney disease     Coronary artery disease     Diabetes     Diabetes mellitus     type ii, doesn't check bg at home     Diabetes mellitus, type 2     Elevated cholesterol     Frozen shoulder 08/02/2023    GERD (gastroesophageal reflux disease)     High triglycerides     History of bariatric surgery 02/04/2021    GASTRIC SLEEVE    History of kidney stones     HTN (hypertension)     Hyperlipemia     Hyperlipidemia     Hypertriglyceridemia     Lumbar herniated disc     Myocardial infarction     Obesity     Panic attacks     PCOS (polycystic ovarian syndrome)     Periarthritis of shoulder 01/23    Psoriasis     ELBOWS    Rotator cuff syndrome 01/23    Seasonal allergies     Self-injurious behavior 1994    Spinal headache     AFTER PAIN EPIDURALS.  NO BLOOD PATCH    Suicide attempt 1995     Past Surgical History:  Past Surgical History:   Procedure Laterality Date    ABDOMINAL SURGERY  12/2020    Gastric sleeve    ADENOIDECTOMY  1984    CARDIAC CATHETERIZATION N/A 09/04/2024    Procedure: Left Heart Cath;  Surgeon: Toño Rodriguez MD;  Location: [facility] INVASIVE LOCATION;  Service: Cardiovascular;  Laterality: N/A;    CARDIAC CATHETERIZATION N/A 09/04/2024    Procedure: Coronary angiography;  Surgeon: Toño Rodriguez MD;  Location: [facility] INVASIVE LOCATION;  Service:  Cardiovascular;  Laterality: N/A;    CARDIAC CATHETERIZATION N/A 2024    Procedure: Angioplasty-coronary;  Surgeon: Toño Rodriguez MD;  Location: [facility] CATH INVASIVE LOCATION;  Service: Cardiovascular;  Laterality: N/A;     SECTION  ,    COLONOSCOPY N/A 2024    Procedure: COLONOSCOPY;  Surgeon: Terrence Fry MD;  Location: [facility] ENDOSCOPY;  Service: Gastroenterology;  Laterality: N/A;  NORMAL COLONOSCOPY    CORONARY STENT PLACEMENT      CYSTOSCOPY BLADDER STONE LITHOTRIPSY      EAR TUBES  1984    ENDOSCOPY N/A 10/20/2020    Procedure: ESOPHAGOGASTRODUODENOSCOPY WITH BIOPSY;  Surgeon: Fidel Grajeda Jr., MD;  Location: [facility] ENDOSCOPY;  Service: General;  Laterality: N/A;  PRE- GERD  POST- RETAINED FOOD, GASTRITIS, GASTRIC POLYPS    ENDOSCOPY      FOOT FRACTURE SURGERY Left 2017    screw placed    GASTRECTOMY  2020    GASTRIC SLEEVE LAPAROSCOPIC N/A 2020    Procedure: GASTRIC SLEEVE LAPAROSCOPIC;  Surgeon: Fidel Grajeda Jr., MD;  Location: [facility] OR OSC;  Service: Bariatric;  Laterality: N/A;    NEPHRECTOMY PARTIAL Right 11/15/2021    Procedure: NEPHRECTOMY PARTIAL LAPAROSCOPIC WITH DAVINCI ROBOT;  Surgeon: Lori Troy MD;  Location: [facility] MAIN OR;  Service: Robotics - DaVinci;  Laterality: Right;    SHOULDER ARTHROSCOPY Left 2023    Procedure: LEFT SHOULDER MANIPULATION;  Surgeon: Domenic Bradley MD;  Location: [facility] OR OSC;  Service: Orthopedics;  Laterality: Left;    TONSILLECTOMY      UPPER GASTROINTESTINAL ENDOSCOPY      URETEROSCOPY LASER LITHOTRIPSY WITH STENT INSERTION Right 2021    Procedure: CYSTOSCOPY, RIGHT URETEROSCOPY, LASERTRIPSY, STONE BASKET EXTRACTION AND STENT INSERTION;  Surgeon: Lori Troy MD;  Location: [facility] MAIN OR;  Service: Urology;  Laterality: Right;    URETEROSCOPY LASER LITHOTRIPSY WITH STENT INSERTION Left 2022    Procedure: URETEROSCOPY LASER LITHOTRIPSY WITH URETERAL STENT  "INSERTION;  Surgeon: Lori Troy MD;  Location: [facility] OR;  Service: Urology;  Laterality: Left;       MENTAL STATUS EXAM   General Appearance:  Cleanly groomed and dressed and well developed  Eye Contact:  Good eye contact  Attitude:  Cooperative, polite and candid  Motor Activity:  Fidgety  Muscle Strength:  Normal  Speech:  Normal rate, tone, volume  Language:  Spontaneous  Mood and affect:  Anxious  Thought Process:  Logical and goal-directed  Associations/ Thought Content:  No delusions  Hallucinations:  None  Suicidal Ideations:  Not present  Homicidal Ideation:  Not present  Sensorium:  Alert and clear  Orientation:  Person, place, time and situation  Immediate Recall, Recent, and Remote Memory:  Intact  Attention Span/ Concentration:  Good  Fund of Knowledge:  Appropriate for age and educational level  Intellectual Functioning:  Average range  Insight:  Good  Judgement:  Good  Reliability:  Good  Impulse Control:  Good      Vital Signs:   /64   Pulse 68   Ht 170.2 cm (67.01\")   Wt 97.3 kg (214 lb 6.4 oz)   BMI 33.57 kg/m²    Lab Results:   Office Visit on 02/03/2025   Component Date Value Ref Range Status    Color 02/03/2025 Yellow  Yellow, Straw, Dark Yellow, Sheri Final    Clarity, UA 02/03/2025 Clear  Clear Final    Specific Gravity  02/03/2025 1.025  1.005 - 1.030 Final    pH, Urine 02/03/2025 6.0  5.0 - 8.0 Final    Leukocytes 02/03/2025 Trace (A)  Negative Final    Nitrite, UA 02/03/2025 Positive (A)  Negative Final    Protein, POC 02/03/2025 30 mg/dL (A)  Negative mg/dL Final    Glucose, UA 02/03/2025 1000 mg/dL (A)  Negative mg/dL Final    Ketones, UA 02/03/2025 Negative  Negative Final    Urobilinogen, UA 02/03/2025 0.2 E.U./dL  Normal, 0.2 E.U./dL Final    Bilirubin 02/03/2025 Negative  Negative Final    Blood, UA 02/03/2025 Negative  Negative Final    Lot Number 02/03/2025 -   Final    Expiration Date 02/03/2025 -   Final   Clinical Support on 01/24/2025   Component Date " Value Ref Range Status    HCG, Urine, QL 01/24/2025 Negative  Negative Final    Lot Number 01/24/2025 [id number]   Final    Internal Positive Control 01/24/2025 Passed  Positive, Passed Final    Internal Negative Control 01/24/2025 Passed  Negative, Passed Final    Expiration Date 01/24/2025 05/10/2025   Final   Lab on 01/03/2025   Component Date Value Ref Range Status    Glucose 01/03/2025 96  65 - 99 mg/dL Final    BUN 01/03/2025 21 (H)  6 - 20 mg/dL Final    Creatinine 01/03/2025 1.39 (H)  0.57 - 1.00 mg/dL Final    Sodium 01/03/2025 140  136 - 145 mmol/L Final    Potassium 01/03/2025 4.3  3.5 - 5.2 mmol/L Final    Chloride 01/03/2025 106  98 - 107 mmol/L Final    CO2 01/03/2025 24.4  22.0 - 29.0 mmol/L Final    Calcium 01/03/2025 9.8  8.6 - 10.5 mg/dL Final    Total Protein 01/03/2025 7.0  6.0 - 8.5 g/dL Final    Albumin 01/03/2025 4.2  3.5 - 5.2 g/dL Final    ALT (SGPT) 01/03/2025 22  1 - 33 U/L Final    AST (SGOT) 01/03/2025 20  1 - 32 U/L Final    Alkaline Phosphatase 01/03/2025 86  39 - 117 U/L Final    Total Bilirubin 01/03/2025 0.5  0.0 - 1.2 mg/dL Final    Globulin 01/03/2025 2.8  gm/dL Final    A/G Ratio 01/03/2025 1.5  g/dL Final    BUN/Creatinine Ratio 01/03/2025 15.1  7.0 - 25.0 Final    Anion Gap 01/03/2025 9.6  5.0 - 15.0 mmol/L Final    eGFR 01/03/2025 47.8 (L)  >60.0 mL/min/1.73 Final   Admission on 12/27/2024, Discharged on 12/27/2024   Component Date Value Ref Range Status    QT Interval 12/27/2024 397  ms Final    QTC Interval 12/27/2024 414  ms Final    Glucose 12/27/2024 87  65 - 99 mg/dL Final    BUN 12/27/2024 17  6 - 20 mg/dL Final    Creatinine 12/27/2024 1.34 (H)  0.57 - 1.00 mg/dL Final    Sodium 12/27/2024 137  136 - 145 mmol/L Final    Potassium 12/27/2024 4.4  3.5 - 5.2 mmol/L Final    Slight hemolysis detected by analyzer. Result may be falsely elevated.    Chloride 12/27/2024 104  98 - 107 mmol/L Final    CO2 12/27/2024 24.0  22.0 - 29.0 mmol/L Final    Calcium 12/27/2024 9.5  8.6 -  10.5 mg/dL Final    Total Protein 12/27/2024 6.8  6.0 - 8.5 g/dL Final    Albumin 12/27/2024 4.1  3.5 - 5.2 g/dL Final    ALT (SGPT) 12/27/2024 27  1 - 33 U/L Final    AST (SGOT) 12/27/2024 29  1 - 32 U/L Final    Alkaline Phosphatase 12/27/2024 79  39 - 117 U/L Final    Total Bilirubin 12/27/2024 0.2  0.0 - 1.2 mg/dL Final    Globulin 12/27/2024 2.7  gm/dL Final    A/G Ratio 12/27/2024 1.5  g/dL Final    BUN/Creatinine Ratio 12/27/2024 12.7  7.0 - 25.0 Final    Anion Gap 12/27/2024 9.0  5.0 - 15.0 mmol/L Final    eGFR 12/27/2024 49.9 (L)  >60.0 mL/min/1.73 Final    Lipase 12/27/2024 97 (H)  13 - 60 U/L Final    HS Troponin T 12/27/2024 11  <14 ng/L Final    Color, UA 12/27/2024 Yellow  Yellow, Straw Final    Appearance, UA 12/27/2024 Clear  Clear Final    pH, UA 12/27/2024 6.0  5.0 - 8.0 Final    Specific Gravity, UA 12/27/2024 1.014  1.005 - 1.030 Final    Glucose, UA 12/27/2024 >=1000 mg/dL (3+) (A)  Negative Final    Ketones, UA 12/27/2024 Negative  Negative Final    Bilirubin, UA 12/27/2024 Negative  Negative Final    Blood, UA 12/27/2024 Negative  Negative Final    Protein, UA 12/27/2024 Negative  Negative Final    Leuk Esterase, UA 12/27/2024 Negative  Negative Final    Nitrite, UA 12/27/2024 Negative  Negative Final    Urobilinogen, UA 12/27/2024 0.2 E.U./dL  0.2 - 1.0 E.U./dL Final    Extra Tube 12/27/2024 Hold for add-ons.   Final    Auto resulted.    Extra Tube 12/27/2024 hold for add-on   Final    Auto resulted    Extra Tube 12/27/2024 Hold for add-ons.   Final    Auto resulted.    Extra Tube 12/27/2024 Hold for add-ons.   Final    Auto resulted    WBC 12/27/2024 9.67  3.40 - 10.80 10*3/mm3 Final    RBC 12/27/2024 4.90  3.77 - 5.28 10*6/mm3 Final    Hemoglobin 12/27/2024 14.1  12.0 - 15.9 g/dL Final    Hematocrit 12/27/2024 43.3  34.0 - 46.6 % Final    MCV 12/27/2024 88.4  79.0 - 97.0 fL Final    MCH 12/27/2024 28.8  26.6 - 33.0 pg Final    MCHC 12/27/2024 32.6  31.5 - 35.7 g/dL Final    RDW 12/27/2024  13.2  12.3 - 15.4 % Final    RDW-SD 12/27/2024 42.7  37.0 - 54.0 fl Final    MPV 12/27/2024 9.8  6.0 - 12.0 fL Final    Platelets 12/27/2024 208  140 - 450 10*3/mm3 Final    Neutrophil % 12/27/2024 61.5  42.7 - 76.0 % Final    Lymphocyte % 12/27/2024 27.3  19.6 - 45.3 % Final    Monocyte % 12/27/2024 7.7  5.0 - 12.0 % Final    Eosinophil % 12/27/2024 1.9  0.3 - 6.2 % Final    Basophil % 12/27/2024 0.5  0.0 - 1.5 % Final    Immature Grans % 12/27/2024 1.1 (H)  0.0 - 0.5 % Final    Neutrophils, Absolute 12/27/2024 5.95  1.70 - 7.00 10*3/mm3 Final    Lymphocytes, Absolute 12/27/2024 2.64  0.70 - 3.10 10*3/mm3 Final    Monocytes, Absolute 12/27/2024 0.74  0.10 - 0.90 10*3/mm3 Final    Eosinophils, Absolute 12/27/2024 0.18  0.00 - 0.40 10*3/mm3 Final    Basophils, Absolute 12/27/2024 0.05  0.00 - 0.20 10*3/mm3 Final    Immature Grans, Absolute 12/27/2024 0.11 (H)  0.00 - 0.05 10*3/mm3 Final    nRBC 12/27/2024 0.0  0.0 - 0.2 /100 WBC Final    HS Troponin T 12/27/2024 11  <14 ng/L Final    Troponin T Numeric Delta 12/27/2024 0  Abnormal if >/=3 ng/L Final   Lab on 12/06/2024   Component Date Value Ref Range Status    WBC 12/06/2024 8.84  3.40 - 10.80 10*3/mm3 Final    RBC 12/06/2024 5.11  3.77 - 5.28 10*6/mm3 Final    Hemoglobin 12/06/2024 15.0  12.0 - 15.9 g/dL Final    Hematocrit 12/06/2024 44.6  34.0 - 46.6 % Final    MCV 12/06/2024 87.3  79.0 - 97.0 fL Final    MCH 12/06/2024 29.4  26.6 - 33.0 pg Final    MCHC 12/06/2024 33.6  31.5 - 35.7 g/dL Final    RDW 12/06/2024 12.0 (L)  12.3 - 15.4 % Final    RDW-SD 12/06/2024 38.0  37.0 - 54.0 fl Final    MPV 12/06/2024 10.0  6.0 - 12.0 fL Final    Platelets 12/06/2024 226  140 - 450 10*3/mm3 Final    Neutrophil % 12/06/2024 64.3  42.7 - 76.0 % Final    Lymphocyte % 12/06/2024 24.4  19.6 - 45.3 % Final    Monocyte % 12/06/2024 7.5  5.0 - 12.0 % Final    Eosinophil % 12/06/2024 1.8  0.3 - 6.2 % Final    Basophil % 12/06/2024 0.6  0.0 - 1.5 % Final    Immature Grans % 12/06/2024  1.4 (H)  0.0 - 0.5 % Final    Neutrophils, Absolute 12/06/2024 5.69  1.70 - 7.00 10*3/mm3 Final    Lymphocytes, Absolute 12/06/2024 2.16  0.70 - 3.10 10*3/mm3 Final    Monocytes, Absolute 12/06/2024 0.66  0.10 - 0.90 10*3/mm3 Final    Eosinophils, Absolute 12/06/2024 0.16  0.00 - 0.40 10*3/mm3 Final    Basophils, Absolute 12/06/2024 0.05  0.00 - 0.20 10*3/mm3 Final    Immature Grans, Absolute 12/06/2024 0.12 (H)  0.00 - 0.05 10*3/mm3 Final    nRBC 12/06/2024 0.0  0.0 - 0.2 /100 WBC Final    Total Cholesterol 12/06/2024 134  0 - 200 mg/dL Final    Triglycerides 12/06/2024 88  0 - 150 mg/dL Final    HDL Cholesterol 12/06/2024 44  40 - 60 mg/dL Final    LDL Cholesterol  12/06/2024 73  0 - 100 mg/dL Final    VLDL Cholesterol 12/06/2024 17  5 - 40 mg/dL Final    LDL/HDL Ratio 12/06/2024 1.65   Final    Glucose 12/06/2024 95  65 - 99 mg/dL Final    BUN 12/06/2024 20  6 - 20 mg/dL Final    Creatinine 12/06/2024 1.23 (H)  0.57 - 1.00 mg/dL Final    Sodium 12/06/2024 137  136 - 145 mmol/L Final    Potassium 12/06/2024 4.3  3.5 - 5.2 mmol/L Final    Chloride 12/06/2024 108 (H)  98 - 107 mmol/L Final    CO2 12/06/2024 21.5 (L)  22.0 - 29.0 mmol/L Final    Calcium 12/06/2024 9.2  8.6 - 10.5 mg/dL Final    Total Protein 12/06/2024 7.0  6.0 - 8.5 g/dL Final    Albumin 12/06/2024 3.7  3.5 - 5.2 g/dL Final    ALT (SGPT) 12/06/2024 32  1 - 33 U/L Final    AST (SGOT) 12/06/2024 26  1 - 32 U/L Final    Alkaline Phosphatase 12/06/2024 78  39 - 117 U/L Final    Total Bilirubin 12/06/2024 0.3  0.0 - 1.2 mg/dL Final    Globulin 12/06/2024 3.3  gm/dL Final    A/G Ratio 12/06/2024 1.1  g/dL Final    BUN/Creatinine Ratio 12/06/2024 16.3  7.0 - 25.0 Final    Anion Gap 12/06/2024 7.5  5.0 - 15.0 mmol/L Final    eGFR 12/06/2024 55.3 (L)  >60.0 mL/min/1.73 Final    Microalbumin/Creatinine Ratio 12/12/2024 14.5  0.0 - 29.0 mg/g Final    Creatinine, Urine 12/12/2024 165.4  mg/dL Final    Microalbumin, Urine 12/12/2024 2.4  mg/dL Final     Hemoglobin A1C 12/06/2024 5.30  4.80 - 5.60 % Final    TSH 12/06/2024 0.739  0.270 - 4.200 uIU/mL Final    Color, UA 12/12/2024 Yellow  Yellow, Straw Final    Appearance, UA 12/12/2024 Cloudy (A)  Clear Final    pH, UA 12/12/2024 5.5  5.0 - 8.0 Final    Specific Gravity, UA 12/12/2024 1.030  1.005 - 1.030 Final    Glucose, UA 12/12/2024 >=1000 mg/dL (3+) (A)  Negative Final    Ketones, UA 12/12/2024 Trace (A)  Negative Final    Bilirubin, UA 12/12/2024 Negative  Negative Final    Blood, UA 12/12/2024 Negative  Negative Final    Protein, UA 12/12/2024 Negative  Negative Final    Leuk Esterase, UA 12/12/2024 Trace (A)  Negative Final    Nitrite, UA 12/12/2024 Positive (A)  Negative Final    Urobilinogen, UA 12/12/2024 0.2 E.U./dL  0.2 - 1.0 E.U./dL Final    Extra Tube 12/12/2024 Hold for add-ons.   Final    Auto resulted.    RBC, UA 12/12/2024 0-2  None Seen, 0-2 /HPF Final    WBC, UA 12/12/2024 6-10 (A)  None Seen, 0-2 /HPF Final    Bacteria, UA 12/12/2024 1+ (A)  None Seen /HPF Final    Squamous Epithelial Cells, UA 12/12/2024 3-6 (A)  None Seen, 0-2 /HPF Final    Hyaline Casts, UA 12/12/2024 None Seen  None Seen /LPF Final    Calcium Oxalate Crystals, UA 12/12/2024 Present  None Seen /HPF Final    Methodology 12/12/2024 Manual Light Microscopy   Final    Urine Culture 12/12/2024 >100,000 CFU/mL Escherichia coli (A)   Final   Office Visit on 10/03/2024   Component Date Value Ref Range Status    Amphetamine Screen, Urine 10/03/2024 Negative  Negative Final    AMP INTERNAL CONTROL 10/03/2024 Passed  Passed Final    Barbiturates Screen, Urine 10/03/2024 Negative  Negative Final    BARBITURATE INTERNAL CONTROL 10/03/2024 Passed  Passed Final    Buprenorphine, Screen, Urine 10/03/2024 Negative  Negative Final    BUPRENORPHINE INTERNAL CONTROL 10/03/2024 Passed  Passed Final    Benzodiazepine Screen, Urine 10/03/2024 Negative  Negative Final    BENZODIAZEPINE INTERNAL CONTROL 10/03/2024 Passed  Passed Final    Cocaine  Screen, Urine 10/03/2024 Negative  Negative Final    COCAINE INTERNAL CONTROL 10/03/2024 Passed  Passed Final    MDMA (ECSTASY) 10/03/2024 Negative  Negative Final    MDMA (ECSTASY) INTERNAL CONTROL 10/03/2024 Passed  Passed Final    Methamphetamine, Ur 10/03/2024 Negative  Negative Final    METHAMPHETAMINE INTERNAL CONTROL 10/03/2024 Passed  Passed Final    Morphine/Opiates Screen, Urine 10/03/2024 Negative  Negative Final    MOR INTERNAL CONTROL 10/03/2024 Passed  Passed Final    Methadone Screen, Urine 10/03/2024 Negative  Negative Final    METHADONE INTERNAL CONTROL 10/03/2024 Passed  Passed Final    Oxycodone Screen, Urine 10/03/2024 Negative  Negative Final    OXYCODONE INTERNAL CONTROL 10/03/2024 Passed  Passed Final    Phencyclidine (PCP), Urine 10/03/2024 Negative  Negative Final    PHENCYCLIDINE INTERNAL CONTROL 10/03/2024 Passed  Passed Final    THC, Screen, Urine 10/03/2024 Negative  Negative Final    THC INTERNAL CONTROL 10/03/2024 Passed  Passed Final    Lot Number 10/03/2024 [id number]   Final    Expiration Date 10/03/2024 6,102,026   Final    Estradiol 10/03/2024 17.5  pg/mL Final    WBC 10/03/2024 7.85  3.40 - 10.80 10*3/mm3 Final    RBC 10/03/2024 4.70  3.77 - 5.28 10*6/mm3 Final    Hemoglobin 10/03/2024 13.9  12.0 - 15.9 g/dL Final    Hematocrit 10/03/2024 40.9  34.0 - 46.6 % Final    MCV 10/03/2024 87.0  79.0 - 97.0 fL Final    MCH 10/03/2024 29.6  26.6 - 33.0 pg Final    MCHC 10/03/2024 34.0  31.5 - 35.7 g/dL Final    RDW 10/03/2024 13.0  12.3 - 15.4 % Final    RDW-SD 10/03/2024 41.4  37.0 - 54.0 fl Final    MPV 10/03/2024 9.7  6.0 - 12.0 fL Final    Platelets 10/03/2024 200  140 - 450 10*3/mm3 Final    Neutrophil % 10/03/2024 67.5  42.7 - 76.0 % Final    Lymphocyte % 10/03/2024 22.8  19.6 - 45.3 % Final    Monocyte % 10/03/2024 6.9  5.0 - 12.0 % Final    Eosinophil % 10/03/2024 1.4  0.3 - 6.2 % Final    Basophil % 10/03/2024 0.8  0.0 - 1.5 % Final    Immature Grans % 10/03/2024 0.6 (H)  0.0 -  0.5 % Final    Neutrophils, Absolute 10/03/2024 5.30  1.70 - 7.00 10*3/mm3 Final    Lymphocytes, Absolute 10/03/2024 1.79  0.70 - 3.10 10*3/mm3 Final    Monocytes, Absolute 10/03/2024 0.54  0.10 - 0.90 10*3/mm3 Final    Eosinophils, Absolute 10/03/2024 0.11  0.00 - 0.40 10*3/mm3 Final    Basophils, Absolute 10/03/2024 0.06  0.00 - 0.20 10*3/mm3 Final    Immature Grans, Absolute 10/03/2024 0.05  0.00 - 0.05 10*3/mm3 Final    nRBC 10/03/2024 0.0  0.0 - 0.2 /100 WBC Final    Glucose 10/03/2024 90  65 - 99 mg/dL Final    BUN 10/03/2024 18  6 - 20 mg/dL Final    Creatinine 10/03/2024 1.35 (H)  0.57 - 1.00 mg/dL Final    Sodium 10/03/2024 139  136 - 145 mmol/L Final    Potassium 10/03/2024 4.2  3.5 - 5.2 mmol/L Final    Chloride 10/03/2024 106  98 - 107 mmol/L Final    CO2 10/03/2024 24.1  22.0 - 29.0 mmol/L Final    Calcium 10/03/2024 9.8  8.6 - 10.5 mg/dL Final    Total Protein 10/03/2024 6.9  6.0 - 8.5 g/dL Final    Albumin 10/03/2024 4.3  3.5 - 5.2 g/dL Final    ALT (SGPT) 10/03/2024 19  1 - 33 U/L Final    AST (SGOT) 10/03/2024 16  1 - 32 U/L Final    Alkaline Phosphatase 10/03/2024 78  39 - 117 U/L Final    Total Bilirubin 10/03/2024 0.3  0.0 - 1.2 mg/dL Final    Globulin 10/03/2024 2.6  gm/dL Final    A/G Ratio 10/03/2024 1.7  g/dL Final    BUN/Creatinine Ratio 10/03/2024 13.3  7.0 - 25.0 Final    Anion Gap 10/03/2024 8.9  5.0 - 15.0 mmol/L Final    eGFR 10/03/2024 49.5 (L)  >60.0 mL/min/1.73 Final    Testosterone, Total 10/03/2024 8.7  ng/dL Final                             Female:                            Premenopausal    10.0 - 55.0                            Postmenopausal    7.0 - 40.0    Testosterone, Free 10/03/2024 <0.2  0.0 - 4.2 pg/mL Final   Admission on 09/26/2024, Discharged on 09/27/2024   Component Date Value Ref Range Status    QT Interval 09/26/2024 417  ms Final    QTC Interval 09/26/2024 440  ms Final    QT Interval 09/26/2024 421  ms Final    QTC Interval 09/26/2024 437  ms Final    HS  Troponin T 09/26/2024 9  <14 ng/L Final    Glucose 09/26/2024 78  65 - 99 mg/dL Final    BUN 09/26/2024 16  6 - 20 mg/dL Final    Creatinine 09/26/2024 1.16 (H)  0.57 - 1.00 mg/dL Final    Sodium 09/26/2024 136  136 - 145 mmol/L Final    Potassium 09/26/2024 4.9  3.5 - 5.2 mmol/L Final    Slight hemolysis detected by analyzer. Result may be falsely elevated.    Chloride 09/26/2024 105  98 - 107 mmol/L Final    CO2 09/26/2024 21.5 (L)  22.0 - 29.0 mmol/L Final    Calcium 09/26/2024 9.5  8.6 - 10.5 mg/dL Final    Total Protein 09/26/2024 6.7  6.0 - 8.5 g/dL Final    Albumin 09/26/2024 3.9  3.5 - 5.2 g/dL Final    ALT (SGPT) 09/26/2024 21  1 - 33 U/L Final    AST (SGOT) 09/26/2024 33 (H)  1 - 32 U/L Final    Slight hemolysis detected by analyzer. Result may be falsely elevated.    Alkaline Phosphatase 09/26/2024 75  39 - 117 U/L Final    Total Bilirubin 09/26/2024 0.2  0.0 - 1.2 mg/dL Final    Globulin 09/26/2024 2.8  gm/dL Final    A/G Ratio 09/26/2024 1.4  g/dL Final    BUN/Creatinine Ratio 09/26/2024 13.8  7.0 - 25.0 Final    Anion Gap 09/26/2024 9.5  5.0 - 15.0 mmol/L Final    eGFR 09/26/2024 59.4 (L)  >60.0 mL/min/1.73 Final    Lipase 09/26/2024 84 (H)  13 - 60 U/L Final    proBNP 09/26/2024 316.1  0.0 - 450.0 pg/mL Final    Magnesium 09/26/2024 2.3  1.6 - 2.6 mg/dL Final    Extra Tube 09/26/2024 Hold for add-ons.   Final    Auto resulted.    Extra Tube 09/26/2024 hold for add-on   Final    Auto resulted    Extra Tube 09/26/2024 Hold for add-ons.   Final    Auto resulted.    Extra Tube 09/26/2024 Hold for add-ons.   Final    Auto resulted    WBC 09/26/2024 9.19  3.40 - 10.80 10*3/mm3 Final    RBC 09/26/2024 4.66  3.77 - 5.28 10*6/mm3 Final    Hemoglobin 09/26/2024 13.6  12.0 - 15.9 g/dL Final    Hematocrit 09/26/2024 41.0  34.0 - 46.6 % Final    MCV 09/26/2024 88.0  79.0 - 97.0 fL Final    MCH 09/26/2024 29.2  26.6 - 33.0 pg Final    MCHC 09/26/2024 33.2  31.5 - 35.7 g/dL Final    RDW 09/26/2024 14.1  12.3 - 15.4  % Final    RDW-SD 09/26/2024 45.0  37.0 - 54.0 fl Final    MPV 09/26/2024 9.9  6.0 - 12.0 fL Final    Platelets 09/26/2024 213  140 - 450 10*3/mm3 Final    Neutrophil % 09/26/2024 64.0  42.7 - 76.0 % Final    Lymphocyte % 09/26/2024 26.2  19.6 - 45.3 % Final    Monocyte % 09/26/2024 7.2  5.0 - 12.0 % Final    Eosinophil % 09/26/2024 1.1  0.3 - 6.2 % Final    Basophil % 09/26/2024 0.8  0.0 - 1.5 % Final    Immature Grans % 09/26/2024 0.7 (H)  0.0 - 0.5 % Final    Neutrophils, Absolute 09/26/2024 5.89  1.70 - 7.00 10*3/mm3 Final    Lymphocytes, Absolute 09/26/2024 2.41  0.70 - 3.10 10*3/mm3 Final    Monocytes, Absolute 09/26/2024 0.66  0.10 - 0.90 10*3/mm3 Final    Eosinophils, Absolute 09/26/2024 0.10  0.00 - 0.40 10*3/mm3 Final    Basophils, Absolute 09/26/2024 0.07  0.00 - 0.20 10*3/mm3 Final    Immature Grans, Absolute 09/26/2024 0.06 (H)  0.00 - 0.05 10*3/mm3 Final    nRBC 09/26/2024 0.0  0.0 - 0.2 /100 WBC Final    HS Troponin T 09/26/2024 9  <14 ng/L Final    Troponin T Numeric Delta 09/26/2024 0  >=-4 - <+4 ng/L Final    Phosphorus 09/26/2024 3.2  2.5 - 4.5 mg/dL Final    Glucose 09/27/2024 99  70 - 99 mg/dL Final    Serial Number: [id number]:  [id number]    Glucose 09/27/2024 78  70 - 99 mg/dL Final    Serial Number: [id number]:  [id number]    Glucose 09/26/2024 88  70 - 99 mg/dL Final    Serial Number: [id number]:  [id number]    Glucose 09/27/2024 88  65 - 99 mg/dL Final    BUN 09/27/2024 16  6 - 20 mg/dL Final    Creatinine 09/27/2024 1.19 (H)  0.57 - 1.00 mg/dL Final    Sodium 09/27/2024 142  136 - 145 mmol/L Final    Potassium 09/27/2024 4.2  3.5 - 5.2 mmol/L Final    Chloride 09/27/2024 111 (H)  98 - 107 mmol/L Final    CO2 09/27/2024 23.9  22.0 - 29.0 mmol/L Final    Calcium 09/27/2024 8.8  8.6 - 10.5 mg/dL Final    BUN/Creatinine Ratio 09/27/2024 13.4  7.0 - 25.0 Final    Anion Gap 09/27/2024 7.1  5.0 - 15.0 mmol/L Final    eGFR 09/27/2024 57.6 (L)  >60.0 mL/min/1.73 Final     Phosphorus 09/27/2024 4.3  2.5 - 4.5 mg/dL Final    WBC 09/27/2024 8.25  3.40 - 10.80 10*3/mm3 Final    RBC 09/27/2024 4.29  3.77 - 5.28 10*6/mm3 Final    Hemoglobin 09/27/2024 12.5  12.0 - 15.9 g/dL Final    Hematocrit 09/27/2024 38.3  34.0 - 46.6 % Final    MCV 09/27/2024 89.3  79.0 - 97.0 fL Final    MCH 09/27/2024 29.1  26.6 - 33.0 pg Final    MCHC 09/27/2024 32.6  31.5 - 35.7 g/dL Final    RDW 09/27/2024 13.8  12.3 - 15.4 % Final    RDW-SD 09/27/2024 44.9  37.0 - 54.0 fl Final    MPV 09/27/2024 9.9  6.0 - 12.0 fL Final    Platelets 09/27/2024 176  140 - 450 10*3/mm3 Final    Neutrophil % 09/27/2024 52.0  42.7 - 76.0 % Final    Lymphocyte % 09/27/2024 37.5  19.6 - 45.3 % Final    Monocyte % 09/27/2024 7.4  5.0 - 12.0 % Final    Eosinophil % 09/27/2024 1.6  0.3 - 6.2 % Final    Basophil % 09/27/2024 0.7  0.0 - 1.5 % Final    Immature Grans % 09/27/2024 0.8 (H)  0.0 - 0.5 % Final    Neutrophils, Absolute 09/27/2024 4.29  1.70 - 7.00 10*3/mm3 Final    Lymphocytes, Absolute 09/27/2024 3.09  0.70 - 3.10 10*3/mm3 Final    Monocytes, Absolute 09/27/2024 0.61  0.10 - 0.90 10*3/mm3 Final    Eosinophils, Absolute 09/27/2024 0.13  0.00 - 0.40 10*3/mm3 Final    Basophils, Absolute 09/27/2024 0.06  0.00 - 0.20 10*3/mm3 Final    Immature Grans, Absolute 09/27/2024 0.07 (H)  0.00 - 0.05 10*3/mm3 Final    nRBC 09/27/2024 0.0  0.0 - 0.2 /100 WBC Final    EF(MOD-bp) 09/27/2024 57.3  % Final    EDV(MOD-sp2) 09/27/2024 99.5  ml Final    EDV(MOD-sp4) 09/27/2024 125.0  ml Final    ESV(MOD-sp2) 09/27/2024 41.1  ml Final    ESV(MOD-sp4) 09/27/2024 54.2  ml Final    SV(MOD-sp2) 09/27/2024 58.4  ml Final    SV(MOD-sp4) 09/27/2024 70.8  ml Final    EF(MOD-sp2) 09/27/2024 58.7  % Final    EF(MOD-sp4) 09/27/2024 56.6  % Final    HS Troponin T 09/27/2024 8  <14 ng/L Final    Glucose 09/27/2024 82  70 - 99 mg/dL Final    Serial Number: [id number]:  [id number]   No results displayed because visit has over 200 results.      Admission on  08/31/2024, Discharged on 08/31/2024   Component Date Value Ref Range Status    Color 08/31/2024 Yellow  Yellow, Straw, Dark Yellow, Sheri Final    Clarity, UA 08/31/2024 Clear  Clear Final    Glucose, UA 08/31/2024 >=1000 mg/dL (3+) (A)  Negative mg/dL Final    Bilirubin 08/31/2024 Negative  Negative Final    Ketones, UA 08/31/2024 Negative  Negative Final    Specific Gravity  08/31/2024 1.025  1.005 - 1.030 Final    Blood, UA 08/31/2024 Trace (A)  Negative Final    pH, Urine 08/31/2024 5.5  5.0 - 8.0 Final    Protein, POC 08/31/2024 Negative  Negative mg/dL Final    Urobilinogen, UA 08/31/2024 0.2 E.U./dL  Normal, 0.2 E.U./dL Final    Nitrite, UA 08/31/2024 Negative  Negative Final    Leukocytes 08/31/2024 Negative  Negative Final       ASSESSMENT AND PLAN:    ICD-10-CM ICD-9-CM   1. INDIRA (generalized anxiety disorder)  F41.1 300.02   2. MDD (major depressive disorder), recurrent episode, moderate  F33.1 296.32   3. Post traumatic stress disorder (PTSD)  F43.10 309.81       Bianca is a 44 y.o. female who presents today for follow-up regarding medication management. We have discussed the interval history and the treatment plan below, including potential R/B/SE of the recommended regimen of which the patient demonstrates understanding. Patient is agreeable to call 911 or go to the nearest ER should she become concerned for her own safety and/or the safety of those around her. There are are no overt indices of acute kathryn/psychosis on exam today. NELSON reviewed and is not as expected.    Medication regimen:   begin hydroxyzine 10 mg po TID prn anxiety,   continue bupropion  mg po q am  Continue Buspirone 10 mg po TID  Continue Trazodone 100 mg po q HS  Continue Pristiq 50 mg po q day; patient is advised not to misuse prescribed medications or to use them with any exogenous substances that aren't disclosed to this provider as they may interact with the regimen to the patient's detriment.   Risk Assessment:  protracted risk is moderate, imminent risk is moderate. Do note that this is subject to change with the Tenriism of new stressors, treatment non-adherence, use of substances, and/or new medical ails.   Monitoring: reviewed labs/imaging as populated above; ordered  Therapy: priscilla Luevano [facility]  Follow-up: 6 weeks  Communications: N/A    TREATMENT PLAN/GOALS: challenge patterns of living conducive to symptom burden, implement recommended regimen as above with augmentative, intermittent supportive psychotherapy to reduce symptom burden. Patient acknowledged and verbally consented to continue treatment. The importance of adherence to the recommended treatment and interval follow-up appointments was again emphasized today: patient has good treatment adherence per given history. Patient was today reminded to limit daily caffeine intake, hydrate appropriately, eat healthy and nutritious foods, engage sleep hygiene measures, engage appropriate exposure to sunlight, engage with hobbies in balance with life necessities, and exercise appropriate to their capacity to do so.       Parts of this note are electronic transcriptions/translations of spoken language to printed text using the Dragon Dictation system.    Electronically signed by CARLTON Carbajal, 02/24/25

## 2025-02-26 ENCOUNTER — OFFICE VISIT (OUTPATIENT)
Dept: PSYCHIATRY | Facility: CLINIC | Age: 45
End: 2025-02-26
Payer: COMMERCIAL

## 2025-02-26 VITALS
BODY MASS INDEX: 33.65 KG/M2 | WEIGHT: 214.4 LBS | HEART RATE: 68 BPM | SYSTOLIC BLOOD PRESSURE: 104 MMHG | HEIGHT: 67 IN | DIASTOLIC BLOOD PRESSURE: 64 MMHG

## 2025-02-26 DIAGNOSIS — F43.10 POST TRAUMATIC STRESS DISORDER (PTSD): ICD-10-CM

## 2025-02-26 DIAGNOSIS — F41.1 GAD (GENERALIZED ANXIETY DISORDER): Primary | ICD-10-CM

## 2025-02-26 DIAGNOSIS — F33.1 MDD (MAJOR DEPRESSIVE DISORDER), RECURRENT EPISODE, MODERATE: ICD-10-CM

## 2025-02-26 RX ORDER — HYDROXYZINE HYDROCHLORIDE 10 MG/1
10 TABLET, FILM COATED ORAL 3 TIMES DAILY PRN
Qty: 90 TABLET | Refills: 1 | Status: SHIPPED | OUTPATIENT
Start: 2025-02-26 | End: 2025-05-27

## 2025-02-27 RX ORDER — DAPAGLIFLOZIN 10 MG/1
10 TABLET, FILM COATED ORAL DAILY
Qty: 90 TABLET | Refills: 1 | Status: SHIPPED | OUTPATIENT
Start: 2025-02-27

## 2025-02-28 ENCOUNTER — OFFICE VISIT (OUTPATIENT)
Dept: BARIATRICS/WEIGHT MGMT | Facility: CLINIC | Age: 45
End: 2025-02-28
Payer: COMMERCIAL

## 2025-02-28 VITALS
SYSTOLIC BLOOD PRESSURE: 105 MMHG | BODY MASS INDEX: 33.43 KG/M2 | TEMPERATURE: 97.7 F | DIASTOLIC BLOOD PRESSURE: 54 MMHG | HEART RATE: 68 BPM | WEIGHT: 213 LBS | HEIGHT: 67 IN

## 2025-02-28 DIAGNOSIS — I10 ESSENTIAL HYPERTENSION: ICD-10-CM

## 2025-02-28 DIAGNOSIS — F33.0 MILD EPISODE OF RECURRENT MAJOR DEPRESSIVE DISORDER: ICD-10-CM

## 2025-02-28 DIAGNOSIS — R53.82 CHRONIC FATIGUE: ICD-10-CM

## 2025-02-28 DIAGNOSIS — K21.9 GASTROESOPHAGEAL REFLUX DISEASE WITHOUT ESOPHAGITIS: ICD-10-CM

## 2025-02-28 DIAGNOSIS — N18.30 STAGE 3 CHRONIC KIDNEY DISEASE, UNSPECIFIED WHETHER STAGE 3A OR 3B CKD: ICD-10-CM

## 2025-02-28 DIAGNOSIS — Z98.84 S/P LAPAROSCOPIC SLEEVE GASTRECTOMY: Primary | ICD-10-CM

## 2025-02-28 DIAGNOSIS — E11.9 TYPE 2 DIABETES MELLITUS WITHOUT COMPLICATION, WITHOUT LONG-TERM CURRENT USE OF INSULIN: ICD-10-CM

## 2025-02-28 DIAGNOSIS — E66.811 OBESITY, CLASS I, BMI 30-34.9: ICD-10-CM

## 2025-02-28 PROBLEM — R81 GLUCOSE FOUND IN URINE ON EXAMINATION: Status: RESOLVED | Noted: 2023-08-31 | Resolved: 2025-02-28

## 2025-02-28 PROBLEM — R39.15 URINARY URGENCY: Status: RESOLVED | Noted: 2021-11-23 | Resolved: 2025-02-28

## 2025-02-28 PROBLEM — R07.9 CHEST PAIN: Status: RESOLVED | Noted: 2024-09-26 | Resolved: 2025-02-28

## 2025-02-28 PROBLEM — U09.9 POST-COVID SYNDROME: Status: RESOLVED | Noted: 2022-09-29 | Resolved: 2025-02-28

## 2025-02-28 NOTE — PROGRESS NOTES
[facility]IATRIC SURGERY  [address]  [facility] [id number] [phone]  [address]  [facility] [id number] [phone]  Dept: [phone]  2/28/2025      Bianca Boles.  [id number]  [id number]  1980  female      Chief Complaint   Patient presents with    Follow-up     4 years follow up sleeve        BH Post-Op Bariatric Surgery:   Bianca Boles is status post Laparoscopic Sleeve procedure, performed on 12/7/2020     HPI:       Today's weight is 96.6 kg (213 lb) pounds, today's BMI is Body mass index is 32.89 kg/m²., she has a loss of 1 pounds since the last visit and her weight loss since surgery is 88 pounds. The patient reports a decreased portion size and loss of appetite.    Bianca Boles denies nausea, vomiting, reflux and reports that she is doing well. She is taking mounjaro at 12.5 weekly. She initially brought her dose back down to 10 because she wasn'tg getting e nouigh nutroition in. She is back to 12.5 and is doing well getting her nutrition in.      Diet and Exercise: Diet history reviewed and discussed with the patient. Weight loss/gains to date discussed with the patient. The patient states they are eating 60-70 grams of protein per day. She reports eating 2 solid meals per day, a typical portion size of 1/2 cup, eating 1-2 snacks per day, drinking 5-6 or more 8-oz. glasses of water per day, no carbonated beverage consumption and exercising regularly- walking most days after cardiac rehab- 30 minutes on the treadmill once or twice per week    Supplements: bariatric specific MTV with iron and calcium     Review of Systems   Constitutional:  Positive for appetite change. Negative for fatigue and unexpected weight change.   HENT: Negative.     Eyes: Negative.    Respiratory: Negative.     Cardiovascular: Negative.  Negative for leg swelling.   Gastrointestinal:  Negative for abdominal distention,  abdominal pain, constipation, diarrhea, nausea and vomiting.   Genitourinary:  Negative for difficulty urinating, frequency and urgency.   Musculoskeletal:  Negative for back pain.   Skin: Negative.    Psychiatric/Behavioral: Negative.     All other systems reviewed and are negative.      Patient Active Problem List   Diagnosis    Chronic fatigue    Essential hypertension    Hyperlipidemia    Heartburn    Multiple joint pain    Depression    Gastroparesis    Gastroesophageal reflux disease    Gastric polyps    S/P laparoscopic sleeve gastrectomy    Obesity, Class I, BMI 30-34.9    Anxiety    Urinary tract infection with hematuria    Leukocytosis    Insomnia    Left ureteral stone    Left renal stone    Hematuria    Acute cystitis with hematuria    Right renal mass    Seasonal allergies    Renal cell carcinoma of right kidney    Psoriasis    Palpitation    Nausea    Type 2 diabetes mellitus without complication, without long-term current use of insulin    Left shoulder pain    Frozen shoulder    Impingement syndrome of left shoulder    Dysuria    Encounter for screening for malignant neoplasm of colon    Family history of colon cancer    Constipation    Vitamin D deficiency    CAD S/P percutaneous coronary angioplasty    Stage 3 chronic kidney disease    Recurrent UTI       Past Medical History:   Diagnosis Date    Acid reflux     Anxiety     Anxiety and depression     Benign essential hypertension     Borderline personality disorder     Cancer     renal cancer/mass, PARTIAL NEPH, RIGHT    Chronic kidney disease     Coronary artery disease     Diabetes     Diabetes mellitus     type ii, doesn't check bg at home     Diabetes mellitus, type 2     Elevated cholesterol     Frozen shoulder 08/02/2023    GERD (gastroesophageal reflux disease)     High triglycerides     History of bariatric surgery 02/04/2021    GASTRIC SLEEVE    History of kidney stones     HTN (hypertension)     Hyperlipemia     Hyperlipidemia      Hypertriglyceridemia     Lumbar herniated disc     Myocardial infarction     Obesity     Panic attacks     PCOS (polycystic ovarian syndrome)     Periarthritis of shoulder 01/23    Psoriasis     ELBOWS    Rotator cuff syndrome 01/23    Seasonal allergies     Self-injurious behavior 1994    Spinal headache     AFTER PAIN EPIDURALS.  NO BLOOD PATCH    Suicide attempt 1995       The following portions of the patient's history were reviewed and updated as appropriate: allergies, current medications, past family history, past medical history, past social history, past surgical history, and problem list.    Vitals:    02/28/25 1126   BP: 105/54   Pulse: 68   Temp: 97.7 °F (36.5 °C)       Physical Exam  Vitals and nursing note reviewed.   Constitutional:       Appearance: She is well-developed.   Neck:      Thyroid: No thyromegaly.   Cardiovascular:      Rate and Rhythm: Normal rate.   Pulmonary:      Effort: Pulmonary effort is normal. No respiratory distress.   Abdominal:      Palpations: Abdomen is soft.   Musculoskeletal:         General: No tenderness.   Skin:     General: Skin is warm and dry.   Neurological:      Mental Status: She is alert.   Psychiatric:         Behavior: Behavior normal.         Assessment:   Post-op, the patient is doing well.       Plan:     Encounter Diagnoses   Name Primary?    S/P laparoscopic sleeve gastrectomy Yes    Obesity, Class I, BMI 30-34.9     Essential hypertension     Type 2 diabetes mellitus without complication, without long-term current use of insulin     Gastroesophageal reflux disease without esophagitis     Mild episode of recurrent major depressive disorder     Chronic fatigue        Encouraged patient to be sure to get plenty of lean protein per day through small frequent meals all with a protein source.   Activity restrictions: none.   Recommended patient be sure to get at least 70 grams of protein per day by eating small, frequent meals all with high lean protein  choices. Be sure to limit/cut back on daily carbohydrate intake. Discussed with the patient the recommended amount of water per day to intake- half of body weight in ounces. Reviewed vitamin requirements. Be sure to do routine exercise, 150 minutes per week minimum, including both cardio and strength training.     Instructions / Recommendations: dietary counseling recommended, recommended a daily protein intake of  grams, vitamin supplement(s) recommended, recommended exercising at least 150 minutes per week, behavior modifications recommended and instructed to call the office for concerns, questions, or problems.     The patient was instructed to follow up in 1 year .   . Total time spent during this encounter today was 35 minutes

## 2025-03-03 ENCOUNTER — LAB (OUTPATIENT)
Facility: HOSPITAL | Age: 45
End: 2025-03-03
Payer: COMMERCIAL

## 2025-03-03 DIAGNOSIS — R53.82 CHRONIC FATIGUE: ICD-10-CM

## 2025-03-03 DIAGNOSIS — E66.811 OBESITY, CLASS I, BMI 30-34.9: ICD-10-CM

## 2025-03-03 DIAGNOSIS — I10 ESSENTIAL HYPERTENSION: ICD-10-CM

## 2025-03-03 DIAGNOSIS — Z98.84 S/P LAPAROSCOPIC SLEEVE GASTRECTOMY: ICD-10-CM

## 2025-03-03 DIAGNOSIS — F33.0 MILD EPISODE OF RECURRENT MAJOR DEPRESSIVE DISORDER: ICD-10-CM

## 2025-03-03 DIAGNOSIS — E11.9 TYPE 2 DIABETES MELLITUS WITHOUT COMPLICATION, WITHOUT LONG-TERM CURRENT USE OF INSULIN: ICD-10-CM

## 2025-03-03 DIAGNOSIS — K21.9 GASTROESOPHAGEAL REFLUX DISEASE WITHOUT ESOPHAGITIS: ICD-10-CM

## 2025-03-03 DIAGNOSIS — N18.30 STAGE 3 CHRONIC KIDNEY DISEASE, UNSPECIFIED WHETHER STAGE 3A OR 3B CKD: ICD-10-CM

## 2025-03-03 LAB
25(OH)D3 SERPL-MCNC: 91.1 NG/ML (ref 30–100)
FERRITIN SERPL-MCNC: 76.9 NG/ML (ref 13–150)
FOLATE SERPL-MCNC: >20 NG/ML (ref 4.78–24.2)
IRON 24H UR-MRATE: 62 MCG/DL (ref 37–145)

## 2025-03-03 PROCEDURE — 82306 VITAMIN D 25 HYDROXY: CPT

## 2025-03-03 PROCEDURE — 83540 ASSAY OF IRON: CPT

## 2025-03-03 PROCEDURE — 36415 COLL VENOUS BLD VENIPUNCTURE: CPT

## 2025-03-03 PROCEDURE — 82728 ASSAY OF FERRITIN: CPT

## 2025-03-03 PROCEDURE — 82746 ASSAY OF FOLIC ACID SERUM: CPT

## 2025-03-03 RX ORDER — PRAVASTATIN SODIUM 80 MG/1
80 TABLET ORAL NIGHTLY
Qty: 90 TABLET | Refills: 1 | Status: SHIPPED | OUTPATIENT
Start: 2025-03-03

## 2025-03-04 ENCOUNTER — TELEMEDICINE (OUTPATIENT)
Dept: PSYCHIATRY | Facility: CLINIC | Age: 45
End: 2025-03-04
Payer: COMMERCIAL

## 2025-03-04 DIAGNOSIS — F33.1 MDD (MAJOR DEPRESSIVE DISORDER), RECURRENT EPISODE, MODERATE: Primary | ICD-10-CM

## 2025-03-04 DIAGNOSIS — F41.1 GAD (GENERALIZED ANXIETY DISORDER): ICD-10-CM

## 2025-03-04 DIAGNOSIS — F43.10 POST TRAUMATIC STRESS DISORDER (PTSD): ICD-10-CM

## 2025-03-04 NOTE — PROGRESS NOTES
Date: March 4, 2025  Time In: 8:04  Time out: 8:59      This provider is located at the [facility]), [address], [place] [address] using a secure KONUXt Video Visit through [employer]. Patient is being seen remotely via telehealth, and they are in a secure environment for this session. The patient's condition being diagnosed/treated is appropriate for telemedicine. The provider identified herself as well as her credentials. The patient, and/or patients guardian, consent to be seen remotely, and when consent is given they understand that the consent allows for patient identifiable information to be sent to a third party as needed. They may refuse to be seen remotely at any time. The electronic data is encrypted and password protected, and the patient and/or guardian has been advised of the potential risks to privacy not withstanding such measures.     Mode of Visit: Video  Location of patient: Home  Location of provider: Provider home  You have chosen to receive care through a telehealth visit.  The patient has signed the video visit consent form.  The visit included audio and video interaction. No technical issues occurred during this visit    Subjective   Bianca Boles is a 44 y.o. female who presents today fully oriented, appropriately dressed and groomed, and open to communication for follow up appointment.    Chief Complaint:   Chief Complaint   Patient presents with    Anxiety    Depression    PTSD    Sleeping Problem        History of Present Illness: Rapport was established through conversation and unconditional positive regard. Recent events were discussed and how these events impact Pt's emotional health.   Pt reports successful use of learned coping skills since last report.  Pt reports continuation of symptoms and states the intensity and duration of symptoms has remained unchanged since last report. Pt rates anxiety at 7/10 and  "depression at 5/10.     Sleep: Pt reports sleep has remained unchanged since last report. Healthy sleep habits were discussed such as maintaining a schedule, routine, avoiding caffeine, and limiting screen time before bed.  Pt states she is getting about 5 restorative hours of sleep.  \"I wake up a lot\"     Appetite:  Pt reports appetite has been good since last report.  Discussed the importance of hydration and eating a healthy diet for overall and mental health.  \"Ok\"  States she was over eating a little and she is now back to healthier habits.    Medication compliance: Pt reports medications are being taken daily as prescribed. Discussed the importance of compliance with prescriber's directions and Pt was instructed to report questions/concerns, as well as missed doses or discontinuation of medication by Pt.  Pt states talked to prescriber and Pt is now taking anti anxiety medications more frequently, and this is helpful.    Safety Plan in Place: No Pt denies SI/HI/SIB recent or current    Daily Functioning:  Since last report, Pt states symptoms are causing Mild difficulty in daily functioning.      Content Discussion:   Pt reports life updates since previous session. Pt identified current stressors. Pt acknowledged stressors within and outside of one's control. Pt identified successes and issues with utilizing coping mechanisms.  Pt states she flew to [place] to be with sister who is on a liver transplant list.  States she has not really been talking to her sister since returning home because Mom is there and Pt has had a lot going on.  Pt states she has been busy here trying to get things in order.  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   Pt states oldest daughter will be coming home from college in end of May and parents will be going to [place] to be " "with sister.  Daughter will stay at Pt's parents' home while they are with sister.  States \"It's better\" around her house with regard to communication and stability.  \"The chaos is a little more controlled.\"  States she does not feel as overwhelmed when she walks in the door every day, and she has been working on work/home transition.  This weekend is going to to be difficult due to picking up Mom at the airport on midnight, picking Mom up at the airport on Sunday and daughter has an [employer] tournament Sunday afternoon.  Practiced and reviewed coping skills such as controlled 4:6 breathing and muscle relaxation, and pt states she will practice daily.      Counselor supported Pt in continued exploration of underlying belief systems which contribute to difficulty in connecting/vulnerability.  Through discussion, Pt identifies themes of preservation and overt emotional and physical reactivity when physical and/or emotional statis is in question, even in low or perceived threatening situations. Counselor supported Pt in identifying past experiences and underlying beliefs which contribute to these feelings and reactions.  Pt was supported and encouraged in processing emotions related to frustrations with the expectations of self and others.  Pt was encouraged to identify cultural and nuclear familial contexts which contribute to these concerns.  Pt was receptive and engaged in conversation, and Pt reports this was beneficial and applicable to their situation.      Reviewed coping skills and encouraged Pt to continue to practicing coping skills daily when not under distress. Pt was praised for their attempts to decrease symptoms and mitigate activating events.    Clinical Maneuvering/Intervention:  CBT was utilized to encourage Pt to identify maladaptive thoughts and behaviors and replace with more affirming and positive.Pt encouraged to set and maintain appropriate and healthy boundaries with others. Pt was instructed to " practice daily using appropriate and specific words to communicate feelings to others.  Motivational interviewing used to encourage Pt to identify strengths which can be utilized in working toward treatment goals. Pt encouraged to practice daily learned skills such as controlled breathing, grounding, and mindfulness. Pt was encouraged to ask for help from support persons to assist them in maintaining stability and alleviate symptoms. Discussed the importance of being one's own mental health advocate and to refrain from seeing the need to ask for help as a weakness. Pt was encouraged to formulate a plan of action to be more proactive in managing stressors and refrain from using reactive and automatic heightened emotional responses.     Solution-focused therapy employed to identify how Pt would like their life to be if they were to make positive changes. Pt encouraged to identify effective coping skills and strengths they can use to continue utilizing those skills. Pt encouraged to discontinue utilizing non-effective coping mechanisms. Pt provided with feedback to highlight achievements and personal and other resources. Encouraged use of SMART goals    Assisted patient in processing above session content; acknowledged and normalized patient’s thoughts, feelings, and concerns.  Rationalized patient thought process regarding concerns presented at session.  Discussed triggers associated with patient's  anxiety  and depression  Also discussed coping skills for patient to implement such as grounding , self care , and positive self talk     Allowed patient to freely discuss issues without interruption or judgment. Provided safe, confidential environment to facilitate the development of positive therapeutic relationship and encourage open, honest communication. Assisted patient in identifying risk factors which would indicate the need for higher level of care including thoughts to harm self or others and/or self-harming  behavior and encouraged patient to contact this office, call 911, or present to the nearest emergency room should any of these events occur. Discussed crisis intervention services and means to access. Patient adamantly and convincingly denies current suicidal or homicidal ideation or perceptual disturbance.    Assessment:     Patient appears to maintain relative stability as compared to their baseline.  However, patient persistently struggles with symptoms which continue to cause impairment in important areas of functioning.  As a result, they can be reasonably expected to continue to benefit from treatment and would likely be at increased risk for decompensation otherwise.      PHQ-Score Total:  PHQ-9 Total Score: PHQ-9 Depression Screening  Little interest or pleasure in doing things?  1   Feeling down, depressed, or hopeless?  1   PHQ-2 Total Score     Trouble falling or staying asleep, or sleeping too much?  1   Feeling tired or having little energy?  1   Poor appetite or overeating?  2   Feeling bad about yourself - or that you are a failure or have let yourself or your family down?  2   Trouble concentrating on things, such as reading the newspaper or watching television?  2   Moving or speaking so slowly that other people could have noticed? Or the opposite - being so fidgety or restless that you have been moving around a lot more than usual?  1   Thoughts that you would be better off dead, or of hurting yourself in some way?  0   PHQ-9 Total Score  11   If you checked off any problems, how difficult have these problems made it for you to do your work, take care of things at home, or get along with other people?  somewhat             Mental Status Exam:   Hygiene:   good  Cooperation:  Cooperative  Eye Contact:  Good  Psychomotor Behavior:  Appropriate  Affect:   worried  Mood: depressed and anxious  Speech:  Normal  Thought Process:  Goal directed  Thought Content:  Normal  Suicidal:  None  Homicidal:  None  Hallucinations:  None  Delusion: None  Memory:  Intact  Orientation:  Grossly Intact  Reliability:  good  Insight:  Good  Judgement:  Good  Impulse Control:  Good  Physical/Medical Issues:  No        Functional Status: Moderate impairment     Progress toward goal: Not at goal    Prognosis: Good with continued therapeutic intervention        Plan:    Patient will continue in individual outpatient therapy with focus on improved functioning and coping skills, maintaining stability, and avoiding decompensation and the need for higher level of care.    Patient will adhere to medication regimen as prescribed and report any side effects. Patient will contact this office, call 911 or present to the nearest emergency room should suicidal or homicidal ideations occur. Provide Cognitive Behavioral Therapy and Solution Focused Therapy to improve functioning, maintain stability, and avoid decompensation and the need for higher level of care.     Return in about 2 weeks (around 3/18/2025).      VISIT DIAGNOSIS:    Diagnosis Plan   1. MDD (major depressive disorder), recurrent episode, moderate        2. INDIRA (generalized anxiety disorder)        3. Post traumatic stress disorder (PTSD)         08:03 EST       This document has been electronically signed by LLUVIA Isaac  March 4, 2025      Part of this note may be an electronic transcription/translation of spoken language to printed text using the Dragon Dictation System.

## 2025-03-14 ENCOUNTER — TELEPHONE (OUTPATIENT)
Dept: FAMILY MEDICINE CLINIC | Facility: CLINIC | Age: 45
End: 2025-03-14

## 2025-03-18 ENCOUNTER — TELEMEDICINE (OUTPATIENT)
Dept: PSYCHIATRY | Facility: CLINIC | Age: 45
End: 2025-03-18
Payer: COMMERCIAL

## 2025-03-18 DIAGNOSIS — F33.1 MDD (MAJOR DEPRESSIVE DISORDER), RECURRENT EPISODE, MODERATE: Primary | ICD-10-CM

## 2025-03-18 DIAGNOSIS — F41.1 GAD (GENERALIZED ANXIETY DISORDER): ICD-10-CM

## 2025-03-18 DIAGNOSIS — F43.10 POST TRAUMATIC STRESS DISORDER (PTSD): ICD-10-CM

## 2025-03-18 NOTE — PROGRESS NOTES
"Date: March 19, 2025  Time In: 8:02  Time out: 8:57      This provider is located at the [facility] ([facility]), [address], [place] [address] using a secure Access[employer] Video Visit through [employer]. Patient is being seen remotely via telehealth, and they are in a secure environment for this session. The patient's condition being diagnosed/treated is appropriate for telemedicine. The provider identified herself as well as her credentials. The patient, and/or patients guardian, consent to be seen remotely, and when consent is given they understand that the consent allows for patient identifiable information to be sent to a third party as needed. They may refuse to be seen remotely at any time. The electronic data is encrypted and password protected, and the patient and/or guardian has been advised of the potential risks to privacy not withstanding such measures.     Mode of Visit: Video  Location of patient: Home  Location of provider: Provider home  You have chosen to receive care through a telehealth visit.  The patient has signed the video visit consent form.  The visit included audio and video interaction. No technical issues occurred during this visit    Subjective   Bianca Boles is a 44 y.o. female who presents today fully oriented, appropriately dressed and groomed, and open to communication for follow up appointment.    Chief Complaint:   Chief Complaint   Patient presents with    Anxiety    Depression        History of Present Illness: Rapport was established through conversation and unconditional positive regard. Recent events were discussed and how these events impact Pt's emotional health.   Pt reports successful use of learned coping skills since last report.  Pt reports continuation of symptoms and states the intensity and duration of symptoms has improved \"a little\" since last report. Pt rates anxiety at 6/10 and depression at 6/10.     Sleep: " "Pt reports sleep has worsened somewhat since last report. Healthy sleep habits were discussed such as maintaining a schedule, routine, avoiding caffeine, and limiting screen time before bed.  \"Sleep is not great\"   It is better than a few weeks ago.  Reports some periodic waking.  States the cat woke Pt a couple of times last night.      Appetite:  Pt reports appetite has been good since last report.  Discussed the importance of hydration and eating a healthy diet for overall and mental health.  \"Not bad\"  kind of back to normal.     Medication compliance: Pt reports medications are being taken daily as prescribed. Discussed the importance of compliance with prescriber's directions and Pt was instructed to report questions/concerns, as well as missed doses or discontinuation of medication by Pt.     Safety Plan in Place: No Pt denies SI/HI/SIB recent or current    Daily Functioning:  Since last report, Pt states symptoms are causing Moderate difficulty in daily functioning.      Content Discussion:   Pt reports life updates since previous session. Pt identified current stressors. Pt acknowledged stressors within and outside of one's control. Pt identified successes and issues with utilizing coping mechanisms.  States her daughter came home from college last week and is here for a long weekend for her spring break.  \"It's nice\"  States they have been having quality time and doing things fun as a family.  States she has to talk herself into doing fun things like hang out with friends and go to lunch.  This is more about feeling \"worn down\" and \"blah\" than having a busy schedule.   The reward is not worth the effort.   Pt will call CARLTON Alfredo, today or tomorrow to discuss depression symptoms.  Reviewed mindfulness and grounding techniques. Encouraged Pt to balance getting restorative sleep and rest with regular social interactions and physical movement.    Counselor supported Pt in continued exploration of " underlying belief systems which contribute to difficulty in connecting/vulnerability.  Through discussion, Pt identifies themes of preservation and overt emotional and physical reactivity when physical and/or emotional statis is in question, even in low or perceived threatening situations. Counselor supported Pt in identifying past experiences and underlying beliefs which contribute to these feelings and reactions.  Pt was supported and encouraged in processing emotions related to frustrations with the expectations of self and others.  Pt was encouraged to identify cultural and nuclear familial contexts which contribute to these concerns.  Pt was receptive and engaged in conversation, and Pt reports this was beneficial and applicable to their situation.      Reviewed coping skills and encouraged Pt to continue to practicing coping skills daily when not under distress. Pt was praised for their attempts to decrease symptoms and mitigate activating events.    Clinical Maneuvering/Intervention:  CBT was utilized to encourage Pt to identify maladaptive thoughts and behaviors and replace with more affirming and positive.Pt encouraged to set and maintain appropriate and healthy boundaries with others. Pt was instructed to practice daily using appropriate and specific words to communicate feelings to others.  Motivational interviewing used to encourage Pt to identify strengths which can be utilized in working toward treatment goals. Pt encouraged to practice daily learned skills such as controlled breathing, grounding, and mindfulness. Pt was encouraged to ask for help from support persons to assist them in maintaining stability and alleviate symptoms. Discussed the importance of being one's own mental health advocate and to refrain from seeing the need to ask for help as a weakness. Pt was encouraged to formulate a plan of action to be more proactive in managing stressors and refrain from using reactive and automatic  heightened emotional responses.     Solution-focused therapy employed to identify how Pt would like their life to be if they were to make positive changes. Pt encouraged to identify effective coping skills and strengths they can use to continue utilizing those skills. Pt encouraged to discontinue utilizing non-effective coping mechanisms. Pt provided with feedback to highlight achievements and personal and other resources. Encouraged use of SMART goals    Assisted patient in processing above session content; acknowledged and normalized patient’s thoughts, feelings, and concerns.  Rationalized patient thought process regarding concerns presented at session.  Discussed triggers associated with patient's  anxiety  and depression  Also discussed coping skills for patient to implement such as increasing activity , self care , and positive self talk     Allowed patient to freely discuss issues without interruption or judgment. Provided safe, confidential environment to facilitate the development of positive therapeutic relationship and encourage open, honest communication. Assisted patient in identifying risk factors which would indicate the need for higher level of care including thoughts to harm self or others and/or self-harming behavior and encouraged patient to contact this office, call 911, or present to the nearest emergency room should any of these events occur. Discussed crisis intervention services and means to access. Patient adamantly and convincingly denies current suicidal or homicidal ideation or perceptual disturbance.    Assessment:     Patient appears to maintain relative stability as compared to their baseline.  However, patient persistently struggles with symptoms which continue to cause impairment in important areas of functioning.  As a result, they can be reasonably expected to continue to benefit from treatment and would likely be at increased risk for decompensation otherwise.      PHQ-Score  Total:  PHQ-9 Total Score: PHQ-9 Depression Screening  Little interest or pleasure in doing things?  1   Feeling down, depressed, or hopeless?  1   PHQ-2 Total Score     Trouble falling or staying asleep, or sleeping too much?  2   Feeling tired or having little energy?  2   Poor appetite or overeating?  1   Feeling bad about yourself - or that you are a failure or have let yourself or your family down?  1   Trouble concentrating on things, such as reading the newspaper or watching television?  0   Moving or speaking so slowly that other people could have noticed? Or the opposite - being so fidgety or restless that you have been moving around a lot more than usual?  0   Thoughts that you would be better off dead, or of hurting yourself in some way?  0   PHQ-9 Total Score  8   If you checked off any problems, how difficult have these problems made it for you to do your work, take care of things at home, or get along with other people?  somewhat             Mental Status Exam:   Hygiene:   good  Cooperation:  Cooperative  Eye Contact:  Good  Psychomotor Behavior:  Appropriate at session reports feeling tired  Affect:  restricted and worried  Mood: depressed and anxious  Speech:  Normal  Thought Process:  Goal directed  Thought Content:  Normal  Suicidal:  None  Homicidal: None  Hallucinations:  None  Delusion: None  Memory:  Intact  Orientation:  Grossly Intact  Reliability:  good  Insight:  Good  Judgement:  Good  Impulse Control:  Good  Physical/Medical Issues:  No        Functional Status: Moderate impairment     Progress toward goal: Not at goal    Prognosis: Good with continued therapeutic intervention        Plan:    Patient will continue in individual outpatient therapy with focus on improved functioning and coping skills, maintaining stability, and avoiding decompensation and the need for higher level of care.    Patient will adhere to medication regimen as prescribed and report any side effects. Patient will  contact this office, call 911 or present to the nearest emergency room should suicidal or homicidal ideations occur. Provide Cognitive Behavioral Therapy and Solution Focused Therapy to improve functioning, maintain stability, and avoid decompensation and the need for higher level of care.     Return in about 2 weeks (around 4/1/2025).      VISIT DIAGNOSIS:    Diagnosis Plan   1. MDD (major depressive disorder), recurrent episode, moderate        2. INDIRA (generalized anxiety disorder)        3. Post traumatic stress disorder (PTSD)         08:02 EDT       This document has been electronically signed by LLUVIA Isaac  March 19, 2025      Part of this note may be an electronic transcription/translation of spoken language to printed text using the Dragon Dictation System.

## 2025-03-21 ENCOUNTER — SPECIALTY PHARMACY (OUTPATIENT)
Dept: PHARMACY | Facility: TELEHEALTH | Age: 45
End: 2025-03-21
Payer: COMMERCIAL

## 2025-03-30 DIAGNOSIS — F41.9 ANXIETY: ICD-10-CM

## 2025-03-30 DIAGNOSIS — F33.0 MILD EPISODE OF RECURRENT MAJOR DEPRESSIVE DISORDER: ICD-10-CM

## 2025-03-31 DIAGNOSIS — R79.89 LOW TESTOSTERONE LEVEL IN FEMALE: ICD-10-CM

## 2025-03-31 DIAGNOSIS — R68.82 LOW LIBIDO: ICD-10-CM

## 2025-03-31 RX ORDER — TESTOSTERONE MICRONIZED 100 %
POWDER (GRAM) MISCELLANEOUS
Qty: 15 G | Refills: 0 | Status: SHIPPED | OUTPATIENT
Start: 2025-03-31

## 2025-03-31 RX ORDER — DESVENLAFAXINE 50 MG/1
50 TABLET, FILM COATED, EXTENDED RELEASE ORAL DAILY
Qty: 90 TABLET | Refills: 1 | Status: SHIPPED | OUTPATIENT
Start: 2025-03-31

## 2025-04-01 ENCOUNTER — TELEPHONE (OUTPATIENT)
Dept: PSYCHIATRY | Facility: CLINIC | Age: 45
End: 2025-04-01

## 2025-04-01 NOTE — TELEPHONE ENCOUNTER
Patient had left voicemail that she was trying to log in for session before 8 but each time she tried she got a message stating the  was down. Patient wanted to make you aware she was trying to get and did not intentionally no show.   I called patient back to make her aware I got her voice mail and that the no show can not be removed until tomorrow. I will reach out to Gary and ask her to change that in the am to cancel, thanks.

## 2025-04-09 RX ORDER — CLOPIDOGREL BISULFATE 75 MG/1
75 TABLET ORAL DAILY
Qty: 30 TABLET | Refills: 6 | Status: SHIPPED | OUTPATIENT
Start: 2025-04-09 | End: 2025-11-05

## 2025-04-15 ENCOUNTER — TELEMEDICINE (OUTPATIENT)
Dept: PSYCHIATRY | Facility: CLINIC | Age: 45
End: 2025-04-15
Payer: COMMERCIAL

## 2025-04-15 ENCOUNTER — TELEPHONE (OUTPATIENT)
Dept: CARDIOLOGY | Facility: CLINIC | Age: 45
End: 2025-04-15

## 2025-04-15 DIAGNOSIS — F33.1 MDD (MAJOR DEPRESSIVE DISORDER), RECURRENT EPISODE, MODERATE: Primary | ICD-10-CM

## 2025-04-15 DIAGNOSIS — F43.10 POST TRAUMATIC STRESS DISORDER (PTSD): ICD-10-CM

## 2025-04-15 DIAGNOSIS — F41.1 GAD (GENERALIZED ANXIETY DISORDER): ICD-10-CM

## 2025-04-15 NOTE — PROGRESS NOTES
Date: April 16, 2025  Time In: 8:04  Time out: 9:02      This provider is located at the [facility] ([facility]), [address], [place] [address] using a secure [employer] Video Visit through [employer]. Patient is being seen remotely via telehealth, and they are in a secure environment for this session. The patient's condition being diagnosed/treated is appropriate for telemedicine. The provider identified herself as well as her credentials. The patient, and/or patients guardian, consent to be seen remotely, and when consent is given they understand that the consent allows for patient identifiable information to be sent to a third party as needed. They may refuse to be seen remotely at any time. The electronic data is encrypted and password protected, and the patient and/or guardian has been advised of the potential risks to privacy not withstanding such measures.     Mode of Visit: Video  Location of patient: Home  Location of provider: Provider home  You have chosen to receive care through a telehealth visit.  The patient has signed the video visit consent form.  The visit included audio and video interaction. No technical issues occurred during this visit    Subjective   Bianca Boles is a 45 y.o. female who presents today fully oriented, appropriately dressed and groomed, and open to communication for follow up appointment.    Chief Complaint:   Chief Complaint   Patient presents with    Anxiety    Depression    Family Problem        History of Present Illness: Rapport was established through conversation and unconditional positive regard. Recent events were discussed and how these events impact Pt's emotional health.   Pt reports successful use of learned coping skills since last report.  Pt reports continuation of symptoms and states the intensity and duration of symptoms has remained unchanged since last report. Pt rates anxiety at 6/10 and depression at  "4/10.     Sleep: Pt reports sleep has remained unchanged since last report. Healthy sleep habits were discussed such as maintaining a schedule, routine, avoiding caffeine, and limiting screen time before bed.    Appetite:  Pt reports appetite has been good since last report.  Discussed the importance of hydration and eating a healthy diet for overall and mental health.    Medication compliance: Pt reports medications are being taken daily as prescribed. Discussed the importance of compliance with prescriber's directions and Pt was instructed to report questions/concerns, as well as missed doses or discontinuation of medication by Pt.     Safety Plan in Place: No Pt denies SI/HI/SIB recent or current    Daily Functioning:  Since last report, Pt states symptoms are causing Moderate difficulty in daily functioning.      Content Discussion:   Pt reports life updates since previous session. Pt identified current stressors. Pt acknowledged stressors within and outside of one's control. Pt identified successes and issues with utilizing coping mechanisms.  Saturday Pt was on the phone with her sister a good part of the day.  \"I feel like my sister and I are trying to fix our relationship.\"  States they have never been super close but since she has been ill with liver issues, Pt is trying to \"be better\"   Pt states sister is a \"set in her ways person.\"  Pt is working on trying to let go and move forward and try and have a better relationship with her. Discussed various ways of communication and how introvert and extrovert processing can affect a relationship.  Pt was allowed to process feelings associated with nuclear family dynamic and how her view as an adult can allow both Pt and sister to gain insight into how it effects Pt's relationship with family now.  Pt states this was helpful, and she is able to see things differently and will move forward with a new perspective.     Counselor supported Pt in continued exploration " of underlying belief systems which contribute to difficulty in connecting/vulnerability.  Through discussion, Pt identifies themes of preservation and overt emotional and physical reactivity when physical and/or emotional statis is in question, even in low or perceived threatening situations. Counselor supported Pt in identifying past experiences and underlying beliefs which contribute to these feelings and reactions.  Pt was supported and encouraged in processing emotions related to frustrations with the expectations of self and others.  Pt was encouraged to identify cultural and nuclear familial contexts which contribute to these concerns.  Pt was receptive and engaged in conversation, and Pt reports this was beneficial and applicable to their situation.      Reviewed coping skills and encouraged Pt to continue to practicing coping skills daily when not under distress. Pt was praised for their attempts to decrease symptoms and mitigate activating events.    Clinical Maneuvering/Intervention:  CBT was utilized to encourage Pt to identify maladaptive thoughts and behaviors and replace with more affirming and positive.Pt encouraged to set and maintain appropriate and healthy boundaries with others. Pt was instructed to practice daily using appropriate and specific words to communicate feelings to others.  Motivational interviewing used to encourage Pt to identify strengths which can be utilized in working toward treatment goals. Pt encouraged to practice daily learned skills such as controlled breathing, grounding, and mindfulness. Pt was encouraged to ask for help from support persons to assist them in maintaining stability and alleviate symptoms. Discussed the importance of being one's own mental health advocate and to refrain from seeing the need to ask for help as a weakness. Pt was encouraged to formulate a plan of action to be more proactive in managing stressors and refrain from using reactive and automatic  heightened emotional responses.     Solution-focused therapy employed to identify how Pt would like their life to be if they were to make positive changes. Pt encouraged to identify effective coping skills and strengths they can use to continue utilizing those skills. Pt encouraged to discontinue utilizing non-effective coping mechanisms. Pt provided with feedback to highlight achievements and personal and other resources. Encouraged use of SMART goals    Assisted patient in processing above session content; acknowledged and normalized patient’s thoughts, feelings, and concerns.  Rationalized patient thought process regarding concerns presented at session.  Discussed triggers associated with patient's  anxiety , depression , and PTSD Also discussed coping skills for patient to implement such as self care , positive self talk , and effectively communicating wants and needs to others    Allowed patient to freely discuss issues without interruption or judgment. Provided safe, confidential environment to facilitate the development of positive therapeutic relationship and encourage open, honest communication. Assisted patient in identifying risk factors which would indicate the need for higher level of care including thoughts to harm self or others and/or self-harming behavior and encouraged patient to contact this office, call 911, or present to the nearest emergency room should any of these events occur. Discussed crisis intervention services and means to access. Patient adamantly and convincingly denies current suicidal or homicidal ideation or perceptual disturbance.    Assessment:     Patient appears to maintain relative stability as compared to their baseline.  However, patient persistently struggles with symptoms which continue to cause impairment in important areas of functioning.  As a result, they can be reasonably expected to continue to benefit from treatment and would likely be at increased risk for  "decompensation otherwise.        Mental Status Exam:   Hygiene:   good  Cooperation:  Cooperative  Eye Contact:  Good  Psychomotor Behavior:  Appropriate  Affect:  Full range  Mood: anxious \"a little angry with her\"  Speech:  Normal  Thought Process:  Goal directed  Thought Content:  Normal  Suicidal:  None  Homicidal: None  Hallucinations:  None  Delusion: None  Memory:  Intact  Orientation:  Grossly Intact  Reliability:  good  Insight:  Good  Judgement:  Good  Impulse Control:  Good  Physical/Medical Issues:  No        Functional Status: Moderate impairment     Progress toward goal: Not at goal    Prognosis: Good with continued therapeutic intervention        Plan:    Patient will continue in individual outpatient therapy with focus on improved functioning and coping skills, maintaining stability, and avoiding decompensation and the need for higher level of care.    Patient will adhere to medication regimen as prescribed and report any side effects. Patient will contact this office, call 911 or present to the nearest emergency room should suicidal or homicidal ideations occur. Provide Cognitive Behavioral Therapy and Solution Focused Therapy to improve functioning, maintain stability, and avoid decompensation and the need for higher level of care.     Return in about 2 weeks (around 4/29/2025).      VISIT DIAGNOSIS:    Diagnosis Plan   1. MDD (major depressive disorder), recurrent episode, moderate        2. Post traumatic stress disorder (PTSD)        3. INDIRA (generalized anxiety disorder)         08:04 EDT       This document has been electronically signed by LLUVIA Isaac  April 16, 2025      Part of this note may be an electronic transcription/translation of spoken language to printed text using the Dragon Dictation System.  "

## 2025-04-15 NOTE — TELEPHONE ENCOUNTER
Caller: Bianca Boles    Relationship to patient: Self    Best call back number:   Telephone Information:   Mobile [phone]       Chief complaint: CHEST PAIN ON AND OFF FOR  THE LAST 2 WEEKS  NOT BAD BUT ANNOYING     Type of visit: FOLLOW UP    Requested date: ASAP

## 2025-04-16 ENCOUNTER — APPOINTMENT (OUTPATIENT)
Dept: GENERAL RADIOLOGY | Facility: HOSPITAL | Age: 45
End: 2025-04-16
Payer: COMMERCIAL

## 2025-04-16 ENCOUNTER — HOSPITAL ENCOUNTER (EMERGENCY)
Facility: HOSPITAL | Age: 45
Discharge: HOME | End: 2025-04-16
Attending: EMERGENCY MEDICINE | Admitting: EMERGENCY MEDICINE
Payer: COMMERCIAL

## 2025-04-16 VITALS
RESPIRATION RATE: 17 BRPM | HEIGHT: 67 IN | SYSTOLIC BLOOD PRESSURE: 113 MMHG | BODY MASS INDEX: 33.67 KG/M2 | TEMPERATURE: 99 F | OXYGEN SATURATION: 99 % | DIASTOLIC BLOOD PRESSURE: 68 MMHG | WEIGHT: 214.51 LBS | HEART RATE: 72 BPM

## 2025-04-16 DIAGNOSIS — R07.9 CHEST PAIN, UNSPECIFIED TYPE: Primary | ICD-10-CM

## 2025-04-16 LAB
ALBUMIN SERPL-MCNC: 4.4 G/DL (ref 3.5–5.2)
ALBUMIN/GLOB SERPL: 1.4 G/DL
ALP SERPL-CCNC: 91 U/L (ref 39–117)
ALT SERPL W P-5'-P-CCNC: 33 U/L (ref 1–33)
ANION GAP SERPL CALCULATED.3IONS-SCNC: 13.1 MMOL/L (ref 5–15)
AST SERPL-CCNC: 30 U/L (ref 1–32)
BASOPHILS # BLD AUTO: 0.05 10*3/MM3 (ref 0–0.2)
BASOPHILS NFR BLD AUTO: 0.6 % (ref 0–1.5)
BILIRUB SERPL-MCNC: 0.3 MG/DL (ref 0–1.2)
BUN SERPL-MCNC: 21 MG/DL (ref 6–20)
BUN/CREAT SERPL: 15.9 (ref 7–25)
CALCIUM SPEC-SCNC: 9.7 MG/DL (ref 8.6–10.5)
CHLORIDE SERPL-SCNC: 104 MMOL/L (ref 98–107)
CO2 SERPL-SCNC: 22.9 MMOL/L (ref 22–29)
CREAT SERPL-MCNC: 1.32 MG/DL (ref 0.57–1)
DEPRECATED RDW RBC AUTO: 42.5 FL (ref 37–54)
EGFRCR SERPLBLD CKD-EPI 2021: 50.5 ML/MIN/1.73
EOSINOPHIL # BLD AUTO: 0.15 10*3/MM3 (ref 0–0.4)
EOSINOPHIL NFR BLD AUTO: 1.8 % (ref 0.3–6.2)
ERYTHROCYTE [DISTWIDTH] IN BLOOD BY AUTOMATED COUNT: 13 % (ref 12.3–15.4)
GEN 5 1HR TROPONIN T REFLEX: 12 NG/L
GLOBULIN UR ELPH-MCNC: 3.2 GM/DL
GLUCOSE SERPL-MCNC: 78 MG/DL (ref 65–99)
HCT VFR BLD AUTO: 44.6 % (ref 34–46.6)
HGB BLD-MCNC: 14.9 G/DL (ref 12–15.9)
HOLD SPECIMEN: NORMAL
HOLD SPECIMEN: NORMAL
IMM GRANULOCYTES # BLD AUTO: 0.05 10*3/MM3 (ref 0–0.05)
IMM GRANULOCYTES NFR BLD AUTO: 0.6 % (ref 0–0.5)
LIPASE SERPL-CCNC: 85 U/L (ref 13–60)
LYMPHOCYTES # BLD AUTO: 2.65 10*3/MM3 (ref 0.7–3.1)
LYMPHOCYTES NFR BLD AUTO: 31.8 % (ref 19.6–45.3)
MAGNESIUM SERPL-MCNC: 2.3 MG/DL (ref 1.6–2.6)
MCH RBC QN AUTO: 29.9 PG (ref 26.6–33)
MCHC RBC AUTO-ENTMCNC: 33.4 G/DL (ref 31.5–35.7)
MCV RBC AUTO: 89.4 FL (ref 79–97)
MONOCYTES # BLD AUTO: 0.73 10*3/MM3 (ref 0.1–0.9)
MONOCYTES NFR BLD AUTO: 8.8 % (ref 5–12)
NEUTROPHILS NFR BLD AUTO: 4.7 10*3/MM3 (ref 1.7–7)
NEUTROPHILS NFR BLD AUTO: 56.4 % (ref 42.7–76)
NRBC BLD AUTO-RTO: 0 /100 WBC (ref 0–0.2)
NT-PROBNP SERPL-MCNC: 66.2 PG/ML (ref 0–450)
PLATELET # BLD AUTO: 195 10*3/MM3 (ref 140–450)
PMV BLD AUTO: 9.7 FL (ref 6–12)
POTASSIUM SERPL-SCNC: 4.5 MMOL/L (ref 3.5–5.2)
PROT SERPL-MCNC: 7.6 G/DL (ref 6–8.5)
QT INTERVAL: 387 MS
QTC INTERVAL: 422 MS
RBC # BLD AUTO: 4.99 10*6/MM3 (ref 3.77–5.28)
SODIUM SERPL-SCNC: 140 MMOL/L (ref 136–145)
TROPONIN T NUMERIC DELTA: 0 NG/L
TROPONIN T SERPL HS-MCNC: 12 NG/L
WBC NRBC COR # BLD AUTO: 8.33 10*3/MM3 (ref 3.4–10.8)
WHOLE BLOOD HOLD COAG: NORMAL
WHOLE BLOOD HOLD SPECIMEN: NORMAL

## 2025-04-16 PROCEDURE — 71045 X-RAY EXAM CHEST 1 VIEW: CPT

## 2025-04-16 PROCEDURE — 85025 COMPLETE CBC W/AUTO DIFF WBC: CPT | Performed by: EMERGENCY MEDICINE

## 2025-04-16 PROCEDURE — 93005 ELECTROCARDIOGRAM TRACING: CPT | Performed by: EMERGENCY MEDICINE

## 2025-04-16 PROCEDURE — 83690 ASSAY OF LIPASE: CPT | Performed by: EMERGENCY MEDICINE

## 2025-04-16 PROCEDURE — 83880 ASSAY OF NATRIURETIC PEPTIDE: CPT | Performed by: EMERGENCY MEDICINE

## 2025-04-16 PROCEDURE — 99284 EMERGENCY DEPT VISIT MOD MDM: CPT

## 2025-04-16 PROCEDURE — 83735 ASSAY OF MAGNESIUM: CPT | Performed by: EMERGENCY MEDICINE

## 2025-04-16 PROCEDURE — 84484 ASSAY OF TROPONIN QUANT: CPT | Performed by: EMERGENCY MEDICINE

## 2025-04-16 PROCEDURE — 80053 COMPREHEN METABOLIC PANEL: CPT | Performed by: EMERGENCY MEDICINE

## 2025-04-16 PROCEDURE — 36415 COLL VENOUS BLD VENIPUNCTURE: CPT | Performed by: EMERGENCY MEDICINE

## 2025-04-16 PROCEDURE — 93005 ELECTROCARDIOGRAM TRACING: CPT

## 2025-04-16 RX ORDER — SODIUM CHLORIDE 0.9 % (FLUSH) 0.9 %
10 SYRINGE (ML) INJECTION AS NEEDED
Status: DISCONTINUED | OUTPATIENT
Start: 2025-04-16 | End: 2025-04-17 | Stop reason: HOSPADM

## 2025-04-16 RX ORDER — ASPIRIN 81 MG/1
324 TABLET, CHEWABLE ORAL ONCE
Status: COMPLETED | OUTPATIENT
Start: 2025-04-16 | End: 2025-04-16

## 2025-04-16 RX ADMIN — ASPIRIN 324 MG: 81 TABLET, CHEWABLE ORAL at 22:36

## 2025-04-17 NOTE — ED PROVIDER NOTES
Time: 8:17 PM EDT  Date of encounter:  4/16/2025  Independent Historian/Clinical History and Information was obtained by:   Patient    History is limited by: N/A    Chief Complaint   Patient presents with    Chest Pain         History of Present Illness:  Patient is a 45 y.o. year old female who presents to the emergency department for evaluation of chest pain that radiates to her neck.  Reports mild chest pain about a week ago but has gotten worse yesterday and a lot worse today.  Mild improvement with sitting and laying down.  She has a history of cardiac stent that was placed in September 2024.  She is currently on Plavix. (Triage Provider: Braulio Barrientos PA-C).  Patient says she has an appointment with her cardiologist next week.    Patient Care Team  Primary Care Provider: Patricia Hernandez APRN    Past Medical History:     Allergies   Allergen Reactions    Morphine Itching and Nausea And Vomiting     Past Medical History:   Diagnosis Date    Acid reflux     Anxiety     Anxiety and depression     Benign essential hypertension     Borderline personality disorder     Cancer     renal cancer/mass, PARTIAL NEPH, RIGHT    Chronic kidney disease     Coronary artery disease     Diabetes     Diabetes mellitus     type ii, doesn't check bg at home     Diabetes mellitus, type 2     Elevated cholesterol     Frozen shoulder 08/02/2023    GERD (gastroesophageal reflux disease)     High triglycerides     History of bariatric surgery 02/04/2021    GASTRIC SLEEVE    History of kidney stones     HTN (hypertension)     Hyperlipemia     Hyperlipidemia     Hypertriglyceridemia     Lumbar herniated disc     Myocardial infarction     Obesity     Panic attacks     PCOS (polycystic ovarian syndrome)     Periarthritis of shoulder 01/23    Psoriasis     ELBOWS    Rotator cuff syndrome 01/23    Seasonal allergies     Self-injurious behavior 1994    Spinal headache     AFTER PAIN EPIDURALS.  NO BLOOD PATCH    Suicide attempt 1995      Past Surgical History:   Procedure Laterality Date    ABDOMINAL SURGERY  2020    Gastric sleeve    ADENOIDECTOMY  1984    CARDIAC CATHETERIZATION N/A 2024    Procedure: Left Heart Cath;  Surgeon: Toño Rodriguez MD;  Location: [facility] CATH INVASIVE LOCATION;  Service: Cardiovascular;  Laterality: N/A;    CARDIAC CATHETERIZATION N/A 2024    Procedure: Coronary angiography;  Surgeon: Toño Rodriguez MD;  Location: [facility] CATH INVASIVE LOCATION;  Service: Cardiovascular;  Laterality: N/A;    CARDIAC CATHETERIZATION N/A 2024    Procedure: Angioplasty-coronary;  Surgeon: Toño Rodriguez MD;  Location: [facility] CATH INVASIVE LOCATION;  Service: Cardiovascular;  Laterality: N/A;     SECTION  ,    COLONOSCOPY N/A 2024    Procedure: COLONOSCOPY;  Surgeon: Terrence Fry MD;  Location: [facility] ENDOSCOPY;  Service: Gastroenterology;  Laterality: N/A;  NORMAL COLONOSCOPY    CORONARY STENT PLACEMENT      CYSTOSCOPY BLADDER STONE LITHOTRIPSY      EAR TUBES      ENDOSCOPY N/A 10/20/2020    Procedure: ESOPHAGOGASTRODUODENOSCOPY WITH BIOPSY;  Surgeon: Fidel Grajeda Jr., MD;  Location: [facility] ENDOSCOPY;  Service: General;  Laterality: N/A;  PRE- GERD  POST- RETAINED FOOD, GASTRITIS, GASTRIC POLYPS    ENDOSCOPY      FOOT FRACTURE SURGERY Left 2017    screw placed    GASTRECTOMY  2020    GASTRIC SLEEVE LAPAROSCOPIC N/A 2020    Procedure: GASTRIC SLEEVE LAPAROSCOPIC;  Surgeon: Fidel Grajeda Jr., MD;  Location: [facility] OR OSC;  Service: Bariatric;  Laterality: N/A;    NEPHRECTOMY PARTIAL Right 11/15/2021    Procedure: NEPHRECTOMY PARTIAL LAPAROSCOPIC WITH DAVINCI ROBOT;  Surgeon: Lori Troy MD;  Location: [facility] MAIN OR;  Service: Robotics - DaVinci;  Laterality: Right;    SHOULDER ARTHROSCOPY Left 2023    Procedure: LEFT SHOULDER MANIPULATION;  Surgeon: Domenic Bradley MD;  Location: [facility] OR [facility];  Service: Orthopedics;  Laterality: Left;     TONSILLECTOMY  1984    UPPER GASTROINTESTINAL ENDOSCOPY      URETEROSCOPY LASER LITHOTRIPSY WITH STENT INSERTION Right 09/28/2021    Procedure: CYSTOSCOPY, RIGHT URETEROSCOPY, LASERTRIPSY, STONE BASKET EXTRACTION AND STENT INSERTION;  Surgeon: Lori Troy MD;  Location: [facility] MAIN OR;  Service: Urology;  Laterality: Right;    URETEROSCOPY LASER LITHOTRIPSY WITH STENT INSERTION Left 11/08/2022    Procedure: URETEROSCOPY LASER LITHOTRIPSY WITH URETERAL STENT INSERTION;  Surgeon: Lori Troy MD;  Location: [facility] MAIN OR;  Service: Urology;  Laterality: Left;     Family History   Problem Relation Age of Onset    Cancer Mother         Breast    Diabetes Father     Hypertension Father     Heart disease Father     Cancer Father         Bladder prostate liver    Colon cancer Father         malignant, currently 62    No Known Problems Sister     No Known Problems Brother     No Known Problems Maternal Aunt     No Known Problems Paternal Aunt     No Known Problems Maternal Uncle     No Known Problems Paternal Uncle     No Known Problems Maternal Grandfather     Stroke Maternal Grandmother     Heart disease Maternal Grandmother     Diabetes Paternal Grandfather     Heart disease Paternal Grandfather     No Known Problems Paternal Grandmother     No Known Problems Cousin     Malig Hyperthermia Neg Hx     ADD / ADHD Neg Hx     Alcohol abuse Neg Hx     Anxiety disorder Neg Hx     Bipolar disorder Neg Hx     Dementia Neg Hx     Depression Neg Hx     Drug abuse Neg Hx     OCD Neg Hx     Paranoid behavior Neg Hx     Schizophrenia Neg Hx     Seizures Neg Hx     Self-Injurious Behavior  Neg Hx     Suicide Attempts Neg Hx        Home Medications:  Prior to Admission medications    Medication Sig Start Date End Date Taking? Authorizing Provider   Apremilast (Otezla) 30 MG tablet Take 1 tablet by mouth 2 (Two) Times a Day. 12/4/24      aspirin 81 MG EC tablet Take 1 tablet by mouth Daily. 9/6/24   Toño Rodriguez MD    azelaic acid (AZELEX) 15 % gel Apply 1 Application topically to face as directed Daily after cleansing. 12/4/24      betamethasone valerate (VALISONE) 0.1 % cream Apply 1 Application topically to the appropriate area as directed 1 (One) to  2 (Two) Times a Day until approved. 12/4/24      Biotin 74711 MCG tablet Take 1 tablet by mouth Every Night.    Heron Campuzano MD   buPROPion XL (WELLBUTRIN XL) 300 MG 24 hr tablet Take 1 tablet by mouth Daily. 12/17/24   Patricia Hernandez APRN   busPIRone (BUSPAR) 10 MG tablet Take 1 tablet by mouth 3 (Three) Times a Day for 90 days. 2/21/25 6/7/25  Nika Crowley APRN   CALCIUM-MAGNESIUM-ZINC PO Take 3 tablets by mouth Daily.    Heron Campuzano MD   cetirizine (zyrTEC) 10 MG tablet Take 1 tablet by mouth Daily As Needed. 5/31/22   Heron Campuzano MD   Cholecalciferol 25 MCG (1000 UT) capsule Take 2 capsules by mouth Daily. 12/17/24   Patricia Hernandez APRN   clopidogrel (PLAVIX) 75 MG tablet Take 1 tablet by mouth Daily 4/9/25 11/5/25  Loretta Saleem APRN   COLLAGEN PO Take 3 tablets by mouth Daily.    Heron Campuzano MD   cyclobenzaprine (FLEXERIL) 10 MG tablet Take 1 tablet by mouth Every Night. 1/3/25   Krista Gillis MD   dapagliflozin Propanediol (Farxiga) 10 MG tablet Take 1 tablet by mouth Daily. 2/27/25   Toño Rodriguez MD   desvenlafaxine (PRISTIQ) 50 MG 24 hr tablet Take 1 tablet by mouth Daily. 3/31/25   Patricia Hernandez APRN   Diclofenac Sodium (VOLTAREN) 1 % gel gel Apply 4 g topically to the appropriate area as directed 4 (Four) Times a Day As Needed (pain). 6/12/23   Nicolasa Meade PA-C   Estrogens Conjugated (Premarin) 0.625 MG/GM vaginal cream Insert 1 gram into the vagina every night at bedtime for 2 Weeks, THEN 1 gram into the vagina every night at bedtime 2 (Two) Times a Week. 12/17/24   Patricia Hernandez APRN   fluticasone (FLONASE) 50 MCG/ACT nasal spray USE 2 SPRAYS IN EACH NOSTRIL  ONCE DAILY AS DIRECTED 12/17/24   Patricia Hernandez APRN   hydrocortisone 2.5 % cream Apply 1 Application topically to the appropriate area of the face as directed 1 (One) to 2 (Two) Times a Day until approved. 12/4/24      hydrOXYzine (ATARAX) 10 MG tablet Take 1 tablet by mouth 3 (Three) Times a Day As Needed for Anxiety for up to 90 days. 2/26/25 5/27/25  Nika Crowley APRN   IRON-VITAMIN C PO Take 1 tablet by mouth Daily.    Heron Campuzano MD   linaclotide (Linzess) 72 MCG capsule capsule Take 1 capsule by mouth Every Morning Before Breakfast for 90 days. 10/16/24 7/13/25  Michell Horan APRN   losartan (COZAAR) 25 MG tablet Take 1 tablet by mouth Daily. 1/17/25   Toño Rodriguez MD   metoprolol succinate XL (Toprol XL) 25 MG 24 hr tablet Take 1 tablet by mouth Every Night. 12/17/24   Patricia Hernandez APRN   Multiple Vitamins-Minerals (MULTIVITAMIN PO) Take 1 tablet by mouth Every Night.    Heron Campuzano MD   nitroglycerin (NITROSTAT) 0.4 MG SL tablet Place 1 tablet under the tongue Every 5 (Five) Minutes As Needed for Chest Pain (Systolic BP Greater Than 100). Take no more than 3 doses in 15 minutes. 9/5/24   Toño Rodriguez MD   ondansetron ODT (ZOFRAN-ODT) 4 MG disintegrating tablet Place 1 tablet on the tongue Every 8 (Eight) Hours As Needed for Nausea. 9/5/24   Dana Xiong MD   pravastatin (PRAVACHOL) 80 MG tablet Take 1 tablet by mouth Every Night. 3/3/25   Toño Rodriguez MD   Probiotic Product (PROBIOTIC-10 PO) Take 1 tablet by mouth Every Night.    Heron Campuzano MD   testosterone micronized powder Appyl 0.5 mL (2 clicks) to skin daily 3/31/25   Patricia Hernandez APRN   Tirzepatide 12.5 MG/0.5ML solution auto-injector Inject 12.5 mg under the skin into the appropriate area as directed 1 (One) Time Per Week. 12/9/24   Patricia Hernandez APRN   topiramate (TOPAMAX) 50 MG tablet Take 1 tablet by mouth every night at bedtime 12/17/24  "6/15/25  Patricia Hernandez APRN   traZODone (DESYREL) 100 MG tablet Take 1 tablet by mouth Every Night. 24   Patricia Hernandez APRN        Social History:   Social History     Tobacco Use    Smoking status: Former     Current packs/day: 0.00     Average packs/day: 0.3 packs/day for 25.0 years (6.3 ttl pk-yrs)     Types: Cigarettes     Start date:      Quit date: 1/15/2020     Years since quittin.2     Passive exposure: Never    Smokeless tobacco: Never    Tobacco comments:     A PACK A WEEK   Vaping Use    Vaping status: Never Used   Substance Use Topics    Alcohol use: Yes     Comment: OCCASIONAL/SOCIAL    Drug use: Not Currently     Frequency: 0.1 times per week     Types: Marijuana     Comment: OTC THC- GUMMIES         Review of Systems:  Review of Systems   Constitutional:  Negative for chills and fever.   HENT:  Negative for congestion, ear pain and sore throat.    Eyes:  Negative for pain.   Respiratory:  Negative for cough, chest tightness and shortness of breath.    Cardiovascular:  Positive for chest pain.   Gastrointestinal:  Positive for nausea. Negative for abdominal pain, diarrhea and vomiting.   Genitourinary:  Negative for flank pain and hematuria.   Musculoskeletal:  Negative for joint swelling.   Skin:  Negative for pallor.   Neurological:  Negative for seizures and headaches.   All other systems reviewed and are negative.       Physical Exam:  /63   Pulse 77   Temp 97.6 °F (36.4 °C) (Oral)   Resp 16   Ht 170.2 cm (67\")   Wt 97.3 kg (214 lb 8.1 oz)   SpO2 98%   BMI 33.60 kg/m²         Physical Exam  Constitutional:       Appearance: Normal appearance.   HENT:      Head: Normocephalic and atraumatic.      Nose: Nose normal.      Mouth/Throat:      Mouth: Mucous membranes are moist.   Eyes:      Extraocular Movements: Extraocular movements intact.      Conjunctiva/sclera: Conjunctivae normal.      Pupils: Pupils are equal, round, and reactive to light. "   Cardiovascular:      Rate and Rhythm: Normal rate and regular rhythm.      Pulses: Normal pulses.      Heart sounds: Normal heart sounds.   Pulmonary:      Effort: Pulmonary effort is normal.      Breath sounds: Normal breath sounds.   Abdominal:      General: There is no distension.      Palpations: Abdomen is soft.      Tenderness: There is no abdominal tenderness.   Musculoskeletal:         General: Normal range of motion.      Cervical back: Normal range of motion.   Skin:     General: Skin is warm and dry.      Capillary Refill: Capillary refill takes less than 2 seconds.   Neurological:      General: No focal deficit present.      Mental Status: She is alert and oriented to person, place, and time. Mental status is at baseline.   Psychiatric:         Mood and Affect: Mood normal.         Behavior: Behavior normal.                            Medical Decision Making:      Comorbidities that affect care:    Coronary Artery Disease, Diabetes, Hypertension, Obesity    External Notes reviewed:    Previous Clinic Note: Patient was seen by behavioral health on 4/15/25 for anxiety, depression and family problems.      The following orders were placed and all results were independently analyzed by me:  Orders Placed This Encounter   Procedures    XR Chest 1 View    [place] Draw    High Sensitivity Troponin T    Comprehensive Metabolic Panel    Lipase    BNP    Magnesium    CBC Auto Differential    High Sensitivity Troponin T 1Hr    NPO Diet NPO Type: Strict NPO    Undress & Gown    Continuous Pulse Oximetry    Oxygen Therapy- Nasal Cannula; Titrate 1-6 LPM Per SpO2; 90 - 95%    ECG 12 Lead ED Triage Standing Order; Chest Pain    ECG 12 Lead ED Triage Standing Order; Chest Pain    Insert Peripheral IV    CBC & Differential    Green Top (Gel)    Lavender Top    Gold Top - SST    Light Blue Top       Medications Given in the Emergency Department:  Medications   sodium chloride 0.9 % flush 10 mL (has no administration in  time range)   aspirin chewable tablet 324 mg (324 mg Oral Given 4/16/25 2236)        ED Course:    The patient was initially evaluated in the triage area where orders were placed. The patient was later dispositioned by Mookie Kimball MD.      The patient was advised to stay for completion of workup which includes but is not limited to communication of labs and radiological results, reassessment and plan. The patient was advised that leaving prior to disposition by a provider could result in critical findings that are not communicated to the patient.     ED Course as of 04/16/25 2332 Wed Apr 16, 2025 2018 PROVIDER IN TRIAGE  Patient was evaluated by Braulio mccauley PA-C. Orders were placed and awaiting final results and disposition.   [MV]   2331 ECG 12 Lead ED Triage Standing Order; Chest Pain  Sinus rhythm with rate of 71.  No acute ST elevation.  Normal [place] and QTc.  EKG interpreted by me [LD]      ED Course User Index  [LD] Mookie Kimball MD  [MV] Braulio Barrientos PA       Labs:    Lab Results (last 24 hours)       Procedure Component Value Units Date/Time    High Sensitivity Troponin T [[id number]]  (Normal) Collected: 04/16/25 2025    Specimen: Blood from Arm, Right Updated: 04/16/25 2058     HS Troponin T 12 ng/L     Narrative:      High Sensitive Troponin T Reference Range:  <14.0 ng/L- Negative Female for AMI  <22.0 ng/L- Negative Male for AMI  >=14 - Abnormal Female indicating possible myocardial injury.  >=22 - Abnormal Male indicating possible myocardial injury.   Clinicians would have to utilize clinical acumen, EKG, Troponin, and serial changes to determine if it is an Acute Myocardial Infarction or myocardial injury due to an underlying chronic condition.         CBC & Differential [[id number]]  (Abnormal) Collected: 04/16/25 2025    Specimen: Blood from Arm, Right Updated: 04/16/25 2035    Narrative:      The following orders were created for panel order CBC & Differential.  Procedure                                Abnormality         Status                     ---------                               -----------         ------                     CBC Auto Differential[[id number]]        Abnormal            Final result                 Please view results for these tests on the individual orders.    Comprehensive Metabolic Panel [[id number]]  (Abnormal) Collected: 04/16/25 2025    Specimen: Blood from Arm, Right Updated: 04/16/25 2058     Glucose 78 mg/dL      BUN 21 mg/dL      Creatinine 1.32 mg/dL      Sodium 140 mmol/L      Potassium 4.5 mmol/L      Chloride 104 mmol/L      CO2 22.9 mmol/L      Calcium 9.7 mg/dL      Total Protein 7.6 g/dL      Albumin 4.4 g/dL      ALT (SGPT) 33 U/L      AST (SGOT) 30 U/L      Alkaline Phosphatase 91 U/L      Total Bilirubin 0.3 mg/dL      Globulin 3.2 gm/dL      A/G Ratio 1.4 g/dL      BUN/Creatinine Ratio 15.9     Anion Gap 13.1 mmol/L      eGFR 50.5 mL/min/1.73     Narrative:      GFR Categories in Chronic Kidney Disease (CKD)      GFR Category          GFR (mL/min/1.73)    Interpretation  G1                     90 or greater         Normal or high (1)  G2                      60-89                Mild decrease (1)  G3a                   45-59                Mild to moderate decrease  G3b                   30-44                Moderate to severe decrease  G4                    15-29                Severe decrease  G5                    14 or less           Kidney failure          (1)In the absence of evidence of kidney disease, neither GFR category G1 or G2 fulfill the criteria for CKD.    eGFR calculation 2021 CKD-EPI creatinine equation, which does not include race as a factor    Lipase [[id number]]  (Abnormal) Collected: 04/16/25 2025    Specimen: Blood from Arm, Right Updated: 04/16/25 2058     Lipase 85 U/L     BNP [[id number]]  (Normal) Collected: 04/16/25 2025    Specimen: Blood from Arm, Right Updated: 04/16/25 2053     proBNP 66.2 pg/mL     Narrative:      This  assay is used as an aid in the diagnosis of individuals suspected of having heart failure. It can be used as an aid in the diagnosis of acute decompensated heart failure (ADHF) in patients presenting with signs and symptoms of ADHF to the emergency department (ED). In addition, NT-proBNP of <300 pg/mL indicates ADHF is not likely.    Age Range Result Interpretation  NT-proBNP Concentration (pg/mL:      <50             Positive            >450                   Gray                 300-450                    Negative             <300    50-75           Positive            >900                  Gray                300-900                  Negative            <300      >75             Positive            >1800                  Gray                300-1800                  Negative            <300    Magnesium [[id number]]  (Normal) Collected: 04/16/25 2025    Specimen: Blood from Arm, Right Updated: 04/16/25 2058     Magnesium 2.3 mg/dL     CBC Auto Differential [[id number]]  (Abnormal) Collected: 04/16/25 2025    Specimen: Blood from Arm, Right Updated: 04/16/25 2035     WBC 8.33 10*3/mm3      RBC 4.99 10*6/mm3      Hemoglobin 14.9 g/dL      Hematocrit 44.6 %      MCV 89.4 fL      MCH 29.9 pg      MCHC 33.4 g/dL      RDW 13.0 %      RDW-SD 42.5 fl      MPV 9.7 fL      Platelets 195 10*3/mm3      Neutrophil % 56.4 %      Lymphocyte % 31.8 %      Monocyte % 8.8 %      Eosinophil % 1.8 %      Basophil % 0.6 %      Immature Grans % 0.6 %      Neutrophils, Absolute 4.70 10*3/mm3      Lymphocytes, Absolute 2.65 10*3/mm3      Monocytes, Absolute 0.73 10*3/mm3      Eosinophils, Absolute 0.15 10*3/mm3      Basophils, Absolute 0.05 10*3/mm3      Immature Grans, Absolute 0.05 10*3/mm3      nRBC 0.0 /100 WBC     High Sensitivity Troponin T 1Hr [[id number]]  (Normal) Collected: 04/16/25 2207    Specimen: Blood Updated: 04/16/25 2237     HS Troponin T 12 ng/L      Troponin T Numeric Delta 0 ng/L     Narrative:      High Sensitive  Troponin T Reference Range:  <14.0 ng/L- Negative Female for AMI  <22.0 ng/L- Negative Male for AMI  >=14 - Abnormal Female indicating possible myocardial injury.  >=22 - Abnormal Male indicating possible myocardial injury.   Clinicians would have to utilize clinical acumen, EKG, Troponin, and serial changes to determine if it is an Acute Myocardial Infarction or myocardial injury due to an underlying chronic condition.                  Imaging:    XR Chest 1 View  Result Date: 4/16/2025  XR CHEST 1 VW Date of Exam: 4/16/2025 9:26 PM EDT Indication: Chest Pain Triage Protocol Comparison: 9/26/2024 Findings: Cardiac and mediastinal contours are normal. Pulmonary vascularity is normal. The lungs are clear. No pneumothorax identified. There is a left-sided coronary stent.     No active disease. Electronically Signed: Brian Cardenas MD  4/16/2025 9:34 PM EDT  Workstation ID: [id number]        Differential Diagnosis and Discussion:      Chest Pain:  Based on the patient's signs and symptoms, I considered aortic dissection, myocardial infaction, pulmonary embolism, cardiac tamponade, pericarditis, pneumothorax, musculoskeletal chest pain and other differential diagnosis as an etiology of the patient's chest pain.     PROCEDURES:    Labs were collected in the emergency department and all labs were reviewed and interpreted by me.  X-ray were performed in the emergency department and all X-ray impressions were independently interpreted by me.  An EKG was performed and the EKG was interpreted by me.    ECG 12 Lead ED Triage Standing Order; Chest Pain   Preliminary Result   HEART RATE=71  bpm   RR Bzuazolc=827  ms   MO Hpancjaf=605  ms   P Horizontal Axis=12  deg   P Front Axis=32  deg   QRSD Interval=99  ms   QT Gbblzlcl=539  ms   ECiK=152  ms   QRS Axis=20  deg   T Wave Axis=37  deg   - OTHERWISE NORMAL ECG -   Sinus rhythm   Low voltage, precordial leads   Date and Time of Study:2025-04-16 20:13:49            Procedures    MDM  Number of Diagnoses or Management Options  Diagnosis management comments: Patient is afebrile and nontoxic appearing.   The patient had an EKG that shows no acute changes. Specifically, there are no ST elevations, t-wave changes of concern, delta waves, or rhythm abnormalities warranting admission. The patient was placed on the cardiac monitor and observed with continuous telemetry. The patient has a chest x-ray that is negative for pneumothorax, pneumonia, and is essentially unremarkable. The patient has had two negative troponins on blood draw.      The patient is resting comfortably and feels better, is alert and in no distress. The repeat examination is unremarkable and benign. Electrocardiogram shows no signs of acute ischemia and the history, exam, diagnostic testing and current condition did not suggest that this patient is having an acute myocardial infarction, significant arrhythmia, unstable angina, esophageal perforation, pulmonary embolism, aortic dissection, severe pneumonia, sepsis for other significant pathology that would warrant further testing, continued ED treatment, admission, cardiology or other specialist consultation at this point. The vital signs have been stable. Heart score places patient at low risk for major acute cardiac event.      The patient's condition is stable and appropriate for discharge. The patient will pursue further outpatient evaluation with the primary care physician, or designated physician or cardiologist. The patient has expressed a clear and thorough understanding and agreed to follow-up as instructed. Discussed return precautions, discharge instructions and answered all their questions.          Amount and/or Complexity of Data Reviewed  Clinical lab tests: reviewed  Tests in the radiology section of CPT®: reviewed  Review and summarize past medical records: yes  Independent visualization of images, tracings, or specimens: yes    Risk of  Complications, Morbidity, and/or Mortality  Presenting problems: moderate  Management options: moderate                     Patient Care Considerations:    SEPSIS was considered but is NOT present in the emergency department as SIRS criteria is not present.      Consultants/Shared Management Plan:    None    Social Determinants of Health:    Patient is independent, reliable, and has access to care.       Disposition and Care Coordination:    Discharged: The patient is suitable and stable for discharge with no need for consideration of admission.    I have explained the patient´s condition, diagnoses and treatment plan based on the information available to me at this time. I have answered questions and addressed any concerns. The patient has a good  understanding of the patient´s diagnosis, condition, and treatment plan as can be expected at this point. The vital signs have been stable. The patient´s condition is stable and appropriate for discharge from the emergency department.      The patient will pursue further outpatient evaluation with the primary care physician or other designated or consulting physician as outlined in the discharge instructions. They are agreeable to this plan of care and follow-up instructions have been explained in detail. The patient has received these instructions in written format and has expressed an understanding of the discharge instructions. The patient is aware that any significant change in condition or worsening of symptoms should prompt an immediate return to this or the closest emergency department or call to 911.  I have explained discharge medications and the need for follow up with the patient/caretakers. This was also printed in the discharge instructions. Patient was discharged with the following medications and follow up:      Medication List      No changes were made to your prescriptions during this visit.      Patricia Hernandez, APRN  [address]  [place] [address]  [phone]    In 2 days         Final diagnoses:   Chest pain, unspecified type        ED Disposition       ED Disposition   Discharge    Condition   Stable    Comment   --               This medical record created using voice recognition software.             Mookie Kimball MD  04/16/25 [id number]

## 2025-04-22 ENCOUNTER — TELEPHONE (OUTPATIENT)
Dept: UROLOGY | Age: 45
End: 2025-04-22
Payer: COMMERCIAL

## 2025-04-22 PROCEDURE — 87088 URINE BACTERIA CULTURE: CPT

## 2025-04-22 PROCEDURE — 87086 URINE CULTURE/COLONY COUNT: CPT

## 2025-04-22 PROCEDURE — 87186 SC STD MICRODIL/AGAR DIL: CPT
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
## 2025-04-22 NOTE — TELEPHONE ENCOUNTER
Spoke to patient and relayed information per Dr. Troy regarding symptoms and treatment. Patient verbalized understanding.

## 2025-04-22 NOTE — TELEPHONE ENCOUNTER
PATIENT CALLED.  SHE SAID SHE HAS A HISTORY OF RENAL CANCER AND HISTORY OF STONES.  SHE HAS HAD A PARTIAL NEPHRECTOMY ON HER RIGHT KIDNEY.    SHE WENT TO  URGENT CARE TODAY.  SHE HAS A LOT OF BLOOD IN HER URINE. NO PAIN. A LITTLE URGENCY.  SHE THOUGHT SHE MAY HAVE A UTI.      THEY ARE TREATING HER LIKE SHE HAS A UTI, AND STARTED HER ON MACROBID.  THEY ARE SENDING URINE FOR CULTURE, BUT THE URINE THEY DID TODAY DIDN'T INDICATE A UTI.  SHE SAID THEIR WERE NO NITRATES AND A LITTLE LEUCOCYTES.    THEY TOLD HER TO CALL HER UROLOGIST.    #[phone]

## 2025-04-23 ENCOUNTER — LAB (OUTPATIENT)
Facility: HOSPITAL | Age: 45
End: 2025-04-23
Payer: COMMERCIAL

## 2025-04-23 ENCOUNTER — OFFICE VISIT (OUTPATIENT)
Dept: CARDIOLOGY | Facility: CLINIC | Age: 45
End: 2025-04-23
Payer: COMMERCIAL

## 2025-04-23 VITALS
DIASTOLIC BLOOD PRESSURE: 65 MMHG | SYSTOLIC BLOOD PRESSURE: 102 MMHG | WEIGHT: 213.1 LBS | HEART RATE: 67 BPM | BODY MASS INDEX: 33.45 KG/M2 | HEIGHT: 67 IN

## 2025-04-23 DIAGNOSIS — I31.9 PERICARDITIS, UNSPECIFIED CHRONICITY, UNSPECIFIED TYPE: ICD-10-CM

## 2025-04-23 DIAGNOSIS — I25.10 CAD S/P PERCUTANEOUS CORONARY ANGIOPLASTY: Primary | ICD-10-CM

## 2025-04-23 DIAGNOSIS — Z98.61 CAD S/P PERCUTANEOUS CORONARY ANGIOPLASTY: Primary | ICD-10-CM

## 2025-04-23 LAB
CRP SERPL-MCNC: 0.07 MG/DL (ref 0.01–0.5)
ERYTHROCYTE [SEDIMENTATION RATE] IN BLOOD: <1 MM/HR (ref 0–20)

## 2025-04-23 PROCEDURE — 36415 COLL VENOUS BLD VENIPUNCTURE: CPT

## 2025-04-23 PROCEDURE — 99214 OFFICE O/P EST MOD 30 MIN: CPT | Performed by: INTERNAL MEDICINE

## 2025-04-23 PROCEDURE — 86141 C-REACTIVE PROTEIN HS: CPT

## 2025-04-23 PROCEDURE — 85652 RBC SED RATE AUTOMATED: CPT

## 2025-04-23 RX ORDER — COLCHICINE 0.6 MG/1
0.6 TABLET ORAL DAILY
Qty: 30 TABLET | Refills: 3 | Status: SHIPPED | OUTPATIENT
Start: 2025-04-23

## 2025-04-23 NOTE — PROGRESS NOTES
CARDIOLOGY FOLLOW-UP PROGRESS NOTE        Chief Complaint  Follow-up (PT states chest pain has been happening on and off for the last 2 weeks. PT states it's not bad, but is discomforting. ), Hypertension, and Hyperlipidemia    Subjective            Bianca Boles presents to [facility] CARDIOLOGY  History of Present Illness      The patient comes for a cardiovascular evaluation and to establish care.  Her previous provider left the practice.  She has strong family history of premature coronary artery disease, father have myocardial infarction in late 40s.  She quit smoking about 5 years ago.  The patient had an episode of severe chest discomfort lasting for September and was evaluated in the emergency room.  She had a nuclear stress test which showed a moderate to large sized perfusion defect in the anterior wall suggestive of infarction with erika-infarct ischemia.  Patient did not had an urgent coronary angiogram with total occlusion of the mid left anterior descending coronary artery with collateral flow from the right and also delayed filling of the mid to distal LAD.  She had PCI with a drug-eluting 3.5 x 45 mm Xience stent postdilated with a 4.5 balloon.  He had post DESTINY-3 flow but diffuse disease in the mid to distal LAD.  She reports episodes of chest discomfort sometimes comes down and other times during inspiration.  He denies palpitations but has some mild exertional dyspnea.  Recent visit to the ED showed normal [place] troponin with an EKG which showed low voltage and early repolarization.      Past History:    Medical History:  Past Medical History:   Diagnosis Date    Acid reflux     Anxiety     Anxiety and depression     Benign essential hypertension     Borderline personality disorder     Cancer 2021    renal cancer/mass, PARTIAL NEPH, RIGHT    Chronic kidney disease     Coronary artery disease     Diabetes     Diabetes mellitus     type ii, doesn't check bg at home      Diabetes mellitus, type 2     Elevated cholesterol     Fatty liver     Fracture, foot 2017    Frozen shoulder 2023    GERD (gastroesophageal reflux disease)     High triglycerides     History of bariatric surgery 2021    GASTRIC SLEEVE    History of kidney stones     HTN (hypertension)     Hyperlipemia     Hyperlipidemia     Hypertriglyceridemia     Kidney stone     Low back pain     Lumbar herniated disc     Myocardial infarction 2024    Obesity     Panic attacks     PCOS (polycystic ovarian syndrome)     Periarthritis of shoulder     Psoriasis     ELBOWS    PTSD (post-traumatic stress disorder)     Rotator cuff syndrome     Seasonal allergies     Self-injurious behavior     Spinal headache     AFTER PAIN EPIDURALS.  NO BLOOD PATCH    Suicide attempt        Surgical History: has a past surgical history that includes  section (,); Tonsillectomy (); Ear Tubes Removal (); Adenoidectomy (); cystoscopy bladder stone lithotripsy; Foot fracture surgery (Left, ); Esophagogastroduodenoscopy (N/A, 10/20/2020); Gastric Sleeve (N/A, 2020); Esophagogastroduodenoscopy (); ureteroscopy laser lithotripsy with stent insertion (Right, 2021); Partial nephrectomy (Right, 11/15/2021); ureteroscopy laser lithotripsy with stent insertion (Left, 2022); Shoulder arthroscopy (Left, 2023); Abdominal surgery (2020); Gastrectomy (2020); Upper gastrointestinal endoscopy; Colonoscopy (N/A, 2024); Cardiac catheterization (N/A, 2024); Cardiac catheterization (N/A, 2024); Cardiac catheterization (N/A, 2024); Coronary stent placement (2024); Kidney stone surgery (21); Bariatric Surgery (); and Lithotripsy (21).     Family History: family history includes Cancer in her father and mother; Colon cancer in her father; Depression in her sister; Diabetes in her father and paternal grandfather; Heart  disease in her father, maternal grandmother, and paternal grandfather; Hypertension in her father; Kidney cancer in her maternal grandmother; Liver cancer in her father; No Known Problems in her brother, cousin, maternal aunt, maternal grandfather, maternal uncle, paternal aunt, paternal grandmother, and paternal uncle; Prostate cancer in her father; Stroke in her maternal grandmother.     Social History: reports that she quit smoking about 5 years ago. Her smoking use included cigarettes. She started smoking about 30 years ago. She has a 8.7 pack-year smoking history. She has never been exposed to tobacco smoke. She has never used smokeless tobacco. She reports that she does not currently use alcohol. She reports that she does not currently use drugs after having used the following drugs: Marijuana. Frequency: 0.10 times per week.    Allergies: Morphine    Current Outpatient Medications on File Prior to Visit   Medication Sig    Apremilast (Otezla) 30 MG tablet Take 1 tablet by mouth 2 (Two) Times a Day.    aspirin 81 MG EC tablet Take 1 tablet by mouth Daily.    azelaic acid (AZELEX) 15 % gel Apply 1 Application topically to face as directed Daily after cleansing.    betamethasone valerate (VALISONE) 0.1 % cream Apply 1 Application topically to the appropriate area as directed 1 (One) to  2 (Two) Times a Day until approved.    Biotin 83823 MCG tablet Take 1 tablet by mouth Every Night.    buPROPion XL (WELLBUTRIN XL) 300 MG 24 hr tablet Take 1 tablet by mouth Daily.    busPIRone (BUSPAR) 10 MG tablet Take 1 tablet by mouth 3 (Three) Times a Day for 90 days.    CALCIUM-MAGNESIUM-ZINC PO Take 3 tablets by mouth Daily.    cetirizine (zyrTEC) 10 MG tablet Take 1 tablet by mouth Daily As Needed.    Cholecalciferol 25 MCG (1000 UT) capsule Take 2 capsules by mouth Daily.    clopidogrel (PLAVIX) 75 MG tablet Take 1 tablet by mouth Daily    COLLAGEN PO Take 3 tablets by mouth Daily.    cyclobenzaprine (FLEXERIL) 10 MG  tablet Take 1 tablet by mouth Every Night.    dapagliflozin Propanediol (Farxiga) 10 MG tablet Take 1 tablet by mouth Daily.    desvenlafaxine (PRISTIQ) 50 MG 24 hr tablet Take 1 tablet by mouth Daily.    Diclofenac Sodium (VOLTAREN) 1 % gel gel Apply 4 g topically to the appropriate area as directed 4 (Four) Times a Day As Needed (pain).    Estrogens Conjugated (Premarin) 0.625 MG/GM vaginal cream Insert 1 gram into the vagina every night at bedtime for 2 Weeks, THEN 1 gram into the vagina every night at bedtime 2 (Two) Times a Week.    fluticasone (FLONASE) 50 MCG/ACT nasal spray USE 2 SPRAYS IN EACH NOSTRIL ONCE DAILY AS DIRECTED    hydrocortisone 2.5 % cream Apply 1 Application topically to the appropriate area of the face as directed 1 (One) to 2 (Two) Times a Day until approved.    hydrOXYzine (ATARAX) 10 MG tablet Take 1 tablet by mouth 3 (Three) Times a Day As Needed for Anxiety for up to 90 days.    IRON-VITAMIN C PO Take 1 tablet by mouth Daily.    linaclotide (Linzess) 72 MCG capsule capsule Take 1 capsule by mouth Every Morning Before Breakfast for 90 days.    losartan (COZAAR) 25 MG tablet Take 1 tablet by mouth Daily.    metoprolol succinate XL (Toprol XL) 25 MG 24 hr tablet Take 1 tablet by mouth Every Night.    Multiple Vitamins-Minerals (MULTIVITAMIN PO) Take 1 tablet by mouth Every Night.    nitrofurantoin, macrocrystal-monohydrate, (MACROBID) 100 MG capsule Take 1 capsule by mouth 2 (Two) Times a Day for 5 days.    nitroglycerin (NITROSTAT) 0.4 MG SL tablet Place 1 tablet under the tongue Every 5 (Five) Minutes As Needed for Chest Pain (Systolic BP Greater Than 100). Take no more than 3 doses in 15 minutes.    ondansetron ODT (ZOFRAN-ODT) 4 MG disintegrating tablet Place 1 tablet on the tongue Every 8 (Eight) Hours As Needed for Nausea.    phenazopyridine (PYRIDIUM) 200 MG tablet Take 1 tablet by mouth 3 (Three) Times a Day As Needed for Bladder Spasms or Dysuria for up to 2 days.    pravastatin  "(PRAVACHOL) 80 MG tablet Take 1 tablet by mouth Every Night.    Probiotic Product (PROBIOTIC-10 PO) Take 1 tablet by mouth Every Night.    Testosterone 1.62 % gel Appyl 0.5 mL (2 clicks) to skin daily    testosterone micronized powder Appyl 0.5 mL (2 clicks) to skin daily    Tirzepatide 12.5 MG/0.5ML solution auto-injector Inject 12.5 mg under the skin into the appropriate area as directed 1 (One) Time Per Week.    topiramate (TOPAMAX) 50 MG tablet Take 1 tablet by mouth every night at bedtime    traZODone (DESYREL) 100 MG tablet Take 1 tablet by mouth Every Night.     No current facility-administered medications on file prior to visit.          Review of Systems : All systems were reviewed and negative save for constant and pleuritic chest pain and mild exertional dyspnea    Objective     /65   Pulse 67   Ht 170.2 cm (67\")   Wt 96.7 kg (213 lb 1.6 oz)   BMI 33.38 kg/m²       Physical Exam    General : Alert, awake, no acute distress  Neck : Supple, no carotid bruit, no jugular venous distention  CVS : Regular rate and rhythm, no murmur, rubs or gallops  Lungs: Clear to auscultation bilaterally, no crackles or rhonchi  Abdomen: Soft, nontender, bowel sounds heard in all 4 quadrants  Extremities: Warm, well-perfused, no pedal edema  Neurologic: No apparent motor deficit    Result Review :     The following data was reviewed by: Shine Connolly MD on 04/23/2025:    CMP          12/27/2024    21:10 1/3/2025    13:39 4/16/2025    20:25   CMP   Glucose 87  96  78    BUN 17  21  21    Creatinine 1.34  1.39  1.32    EGFR 49.9  47.8  50.5    Sodium 137  140  140    Potassium 4.4  4.3  4.5    Chloride 104  106  104    Calcium 9.5  9.8  9.7    Total Protein 6.8  7.0  7.6    Albumin 4.1  4.2  4.4    Globulin 2.7  2.8  3.2    Total Bilirubin 0.2  0.5  0.3    Alkaline Phosphatase 79  86  91    AST (SGOT) 29  20  30    ALT (SGPT) 27  22  33    Albumin/Globulin Ratio 1.5  1.5  1.4    BUN/Creatinine Ratio 12.7  15.1  15.9 "    Anion Gap 9.0  9.6  13.1      CBC          12/6/2024    10:24 12/27/2024    21:10 4/16/2025    20:25   CBC   WBC 8.84  9.67  8.33    RBC 5.11  4.90  4.99    Hemoglobin 15.0  14.1  14.9    Hematocrit 44.6  43.3  44.6    MCV 87.3  88.4  89.4    MCH 29.4  28.8  29.9    MCHC 33.6  32.6  33.4    RDW 12.0  13.2  13.0    Platelets 226  208  195      TSH          5/2/2024    08:07 7/5/2024    12:03 12/6/2024    10:24   TSH   TSH 0.784  0.810  0.739      Lipid Panel          7/5/2024    12:03 9/5/2024    06:40 12/6/2024    10:24   Lipid Panel   Total Cholesterol 152  114  134    Triglycerides 98  82  88    HDL Cholesterol 46  34  44    VLDL Cholesterol 18  16  17    LDL Cholesterol  88  64  73    LDL/HDL Ratio 1.88  1.87  1.65           Data reviewed: Cardiology studies        Results for orders placed during the hospital encounter of 09/26/24    Adult Transthoracic Echo Limited W/ Cont if Necessary Per Protocol    Interpretation Summary  Limited echocardiogram performed for the assessment of LV function.    LV systolic function is normal. No regional wall motion abnormalities are noted. Calculated LVEF > 55%.    Compared to study from 9/2/24, LVEF now has normalized.      Results for orders placed during the hospital encounter of 09/01/24    Stress Test With Myocardial Perfusion One Day    Interpretation Summary    Left ventricular ejection fraction is mildly reduced (Calculated EF = 45%).    Abnormal, high risk myocardial perfusion imaging study  Perfusion defects are noted.  There is a partially reversible perfusion defect of the distal anterior wall segment suggestive of erika-infarct ischemia.  There is absent uptake of tracer noted in the apical segments distal inferior wall segment distal anterior segment anterolateral and improved septal segments more pronounced on stress images than rest images.    Wall motion abnormalities are noted involving distal anterior and distal inferior and apical segments.  Overall EF is  estimated to be around 45%  No transient ischemic dilatation was noted    Constellation of findings are suggestive of mid to distal LAD occlusion which tends to wraparound apex.  Coronary angiogram is recommended for further evaluation               Assessment and Plan        Diagnoses and all orders for this visit:    1. CAD S/P percutaneous coronary angioplasty (Primary)  -     Adult Transthoracic Echo Complete W/ Cont if Necessary Per Protocol; Future  -     Stress Test With Myocardial Perfusion One Day; Future    2. Pericarditis, unspecified chronicity, unspecified type  -     Sedimentation Rate; Future  -     High Sensitivity CRP; Future    Other orders  -     colchicine 0.6 MG tablet; Take 1 tablet by mouth Daily.  Dispense: 30 tablet; Refill: 3        The patient has known severe coronary artery disease with previous anterior wall myocardial infarction and PCI of the LAD with drug-eluting stent back in September of last year.  She reports episodes of constant and intermittent pleuritic chest pain.  Her latest ED visit showed a normal high-sensitivity troponin and EKG which showed low voltage and early repolarization.  She may has pericardial disease.  I would initiate colchicine treatment.  I will obtain a 2D echocardiogram to assess cardiac function, wall motion and to rule out pericardial effusion.  She will be scheduled for a pharmacological nuclear stress test to evaluate for progression of disease and to determine if ischemia is present.  Continue with dual antiplatelet therapy, statins, beta-blockers and angiotensin receptor blockers.  Continue with lifestyle modification and heart healthy diet.  Avoid strenuous exercise.  She was advised to notify us if any changes in symptoms      Follow Up     Return for After testing.    Patient was given instructions and counseling regarding her condition or for health maintenance advice. Please see specific information pulled into the AVS if appropriate.

## 2025-04-25 ENCOUNTER — SPECIALTY PHARMACY (OUTPATIENT)
Dept: PHARMACY | Facility: TELEHEALTH | Age: 45
End: 2025-04-25
Payer: COMMERCIAL

## 2025-04-25 LAB
QT INTERVAL: 387 MS
QTC INTERVAL: 422 MS

## 2025-04-25 NOTE — PROGRESS NOTES
Specialty Pharmacy Patient Management Program  Refill Outreach     Bianca was contacted today regarding refills of their medication(s).    Refill Questions      Flowsheet Row Most Recent Value   Changes to allergies? No   Changes to medications? No   New conditions or infections since last clinic visit No   Unplanned office visit, urgent care, ED, or hospital admission in the last 4 weeks  No   How does patient/caregiver feel medication is working? Very good   Financial problems or insurance changes  No   Since the previous refill, were any specialty medication doses or scheduled injections missed or delayed?  No  [Pt has doses remaining on hand]   Does this patient require a clinical escalation to a pharmacist? No            Delivery Questions      Flowsheet Row Most Recent Value   Delivery method UPS   Delivery address verified with patient/caregiver? Yes   Delivery address Home   Other address preferred N/A   Number of medications in delivery 1   Medication(s) being filled and delivered Apremilast (Otezla)   Doses left of specialty medications Pt has doses remaining on hand   Copay verified? Yes   Copay amount $0.00   Copay form of payment No copayment ($0)   Delivery Date Selection 04/29/25   Signature Required No                 Follow-up: 24 day(s)     Marco Manzanares, Pharmacy Technician  4/25/2025  14:45 EDT

## 2025-05-02 ENCOUNTER — CLINICAL SUPPORT (OUTPATIENT)
Dept: FAMILY MEDICINE CLINIC | Facility: CLINIC | Age: 45
End: 2025-05-02
Payer: COMMERCIAL

## 2025-05-02 DIAGNOSIS — Z30.42 ENCOUNTER FOR SURVEILLANCE OF INJECTABLE CONTRACEPTIVE: Primary | ICD-10-CM

## 2025-05-02 LAB
B-HCG UR QL: NEGATIVE
EXPIRATION DATE: ABNORMAL
INTERNAL NEGATIVE CONTROL: ABNORMAL
INTERNAL POSITIVE CONTROL: POSITIVE
Lab: ABNORMAL

## 2025-05-02 RX ORDER — MEDROXYPROGESTERONE ACETATE 150 MG/ML
150 INJECTION, SUSPENSION INTRAMUSCULAR ONCE
Status: COMPLETED | OUTPATIENT
Start: 2025-05-02 | End: 2025-05-02

## 2025-05-02 RX ADMIN — MEDROXYPROGESTERONE ACETATE 150 MG: 150 INJECTION, SUSPENSION INTRAMUSCULAR at 14:09

## 2025-05-05 DIAGNOSIS — R68.82 LOW LIBIDO: ICD-10-CM

## 2025-05-05 DIAGNOSIS — R79.89 LOW TESTOSTERONE LEVEL IN FEMALE: ICD-10-CM

## 2025-05-06 RX ORDER — TESTOSTERONE MICRONIZED 100 %
POWDER (GRAM) MISCELLANEOUS
Qty: 15 G | Refills: 0 | Status: ON HOLD | OUTPATIENT
Start: 2025-05-06

## 2025-05-11 ENCOUNTER — APPOINTMENT (OUTPATIENT)
Dept: GENERAL RADIOLOGY | Facility: HOSPITAL | Age: 45
End: 2025-05-11
Payer: COMMERCIAL

## 2025-05-11 ENCOUNTER — HOSPITAL ENCOUNTER (OUTPATIENT)
Facility: HOSPITAL | Age: 45
Discharge: HOME | End: 2025-05-13
Attending: EMERGENCY MEDICINE | Admitting: FAMILY MEDICINE
Payer: COMMERCIAL

## 2025-05-11 DIAGNOSIS — R07.9 CHEST PAIN, UNSPECIFIED TYPE: Primary | ICD-10-CM

## 2025-05-11 DIAGNOSIS — I24.9 ACUTE CORONARY SYNDROME WITH HIGH TROPONIN: ICD-10-CM

## 2025-05-11 LAB
ALBUMIN SERPL-MCNC: 4.7 G/DL (ref 3.5–5.2)
ALBUMIN/GLOB SERPL: 1.9 G/DL
ALP SERPL-CCNC: 86 U/L (ref 39–117)
ALT SERPL W P-5'-P-CCNC: 41 U/L (ref 1–33)
ANION GAP SERPL CALCULATED.3IONS-SCNC: 13.1 MMOL/L (ref 5–15)
AST SERPL-CCNC: 33 U/L (ref 1–32)
BASOPHILS # BLD AUTO: 0.04 10*3/MM3 (ref 0–0.2)
BASOPHILS NFR BLD AUTO: 0.5 % (ref 0–1.5)
BILIRUB SERPL-MCNC: 0.5 MG/DL (ref 0–1.2)
BUN SERPL-MCNC: 17 MG/DL (ref 6–20)
BUN/CREAT SERPL: 15.6 (ref 7–25)
CALCIUM SPEC-SCNC: 9.5 MG/DL (ref 8.6–10.5)
CHLORIDE SERPL-SCNC: 106 MMOL/L (ref 98–107)
CO2 SERPL-SCNC: 20.9 MMOL/L (ref 22–29)
CREAT SERPL-MCNC: 1.09 MG/DL (ref 0.57–1)
DEPRECATED RDW RBC AUTO: 42.6 FL (ref 37–54)
EGFRCR SERPLBLD CKD-EPI 2021: 63.6 ML/MIN/1.73
EOSINOPHIL # BLD AUTO: 0.15 10*3/MM3 (ref 0–0.4)
EOSINOPHIL NFR BLD AUTO: 1.7 % (ref 0.3–6.2)
ERYTHROCYTE [DISTWIDTH] IN BLOOD BY AUTOMATED COUNT: 13.2 % (ref 12.3–15.4)
GEN 5 1HR TROPONIN T REFLEX: 22 NG/L
GLOBULIN UR ELPH-MCNC: 2.5 GM/DL
GLUCOSE BLDC GLUCOMTR-MCNC: 183 MG/DL (ref 70–99)
GLUCOSE SERPL-MCNC: 84 MG/DL (ref 65–99)
HCT VFR BLD AUTO: 44.5 % (ref 34–46.6)
HGB BLD-MCNC: 14.9 G/DL (ref 12–15.9)
HOLD SPECIMEN: NORMAL
IMM GRANULOCYTES # BLD AUTO: 0.07 10*3/MM3 (ref 0–0.05)
IMM GRANULOCYTES NFR BLD AUTO: 0.8 % (ref 0–0.5)
LIPASE SERPL-CCNC: 71 U/L (ref 13–60)
LYMPHOCYTES # BLD AUTO: 2.32 10*3/MM3 (ref 0.7–3.1)
LYMPHOCYTES NFR BLD AUTO: 26.8 % (ref 19.6–45.3)
MAGNESIUM SERPL-MCNC: 2.3 MG/DL (ref 1.6–2.6)
MCH RBC QN AUTO: 29.6 PG (ref 26.6–33)
MCHC RBC AUTO-ENTMCNC: 33.5 G/DL (ref 31.5–35.7)
MCV RBC AUTO: 88.5 FL (ref 79–97)
MONOCYTES # BLD AUTO: 0.65 10*3/MM3 (ref 0.1–0.9)
MONOCYTES NFR BLD AUTO: 7.5 % (ref 5–12)
NEUTROPHILS NFR BLD AUTO: 5.43 10*3/MM3 (ref 1.7–7)
NEUTROPHILS NFR BLD AUTO: 62.7 % (ref 42.7–76)
NRBC BLD AUTO-RTO: 0 /100 WBC (ref 0–0.2)
NT-PROBNP SERPL-MCNC: 85.9 PG/ML (ref 0–450)
PLATELET # BLD AUTO: 196 10*3/MM3 (ref 140–450)
PMV BLD AUTO: 9.9 FL (ref 6–12)
POTASSIUM SERPL-SCNC: 3.7 MMOL/L (ref 3.5–5.2)
PROT SERPL-MCNC: 7.2 G/DL (ref 6–8.5)
QT INTERVAL: 414 MS
QTC INTERVAL: 426 MS
RBC # BLD AUTO: 5.03 10*6/MM3 (ref 3.77–5.28)
SODIUM SERPL-SCNC: 140 MMOL/L (ref 136–145)
TROPONIN T % DELTA: 0
TROPONIN T NUMERIC DELTA: 0 NG/L
TROPONIN T SERPL HS-MCNC: 22 NG/L
WBC NRBC COR # BLD AUTO: 8.66 10*3/MM3 (ref 3.4–10.8)
WHOLE BLOOD HOLD COAG: NORMAL
WHOLE BLOOD HOLD SPECIMEN: NORMAL

## 2025-05-11 PROCEDURE — G0378 HOSPITAL OBSERVATION PER HR: HCPCS

## 2025-05-11 PROCEDURE — 93005 ELECTROCARDIOGRAM TRACING: CPT

## 2025-05-11 PROCEDURE — 71045 X-RAY EXAM CHEST 1 VIEW: CPT

## 2025-05-11 PROCEDURE — 83690 ASSAY OF LIPASE: CPT | Performed by: EMERGENCY MEDICINE

## 2025-05-11 PROCEDURE — 83880 ASSAY OF NATRIURETIC PEPTIDE: CPT | Performed by: EMERGENCY MEDICINE

## 2025-05-11 PROCEDURE — 94761 N-INVAS EAR/PLS OXIMETRY MLT: CPT

## 2025-05-11 PROCEDURE — 99285 EMERGENCY DEPT VISIT HI MDM: CPT

## 2025-05-11 PROCEDURE — 82948 REAGENT STRIP/BLOOD GLUCOSE: CPT

## 2025-05-11 PROCEDURE — 93005 ELECTROCARDIOGRAM TRACING: CPT | Performed by: EMERGENCY MEDICINE

## 2025-05-11 PROCEDURE — 83735 ASSAY OF MAGNESIUM: CPT | Performed by: EMERGENCY MEDICINE

## 2025-05-11 PROCEDURE — 25010000002 HEPARIN (PORCINE) PER 1000 UNITS: Performed by: STUDENT IN AN ORGANIZED HEALTH CARE EDUCATION/TRAINING PROGRAM

## 2025-05-11 PROCEDURE — 36415 COLL VENOUS BLD VENIPUNCTURE: CPT

## 2025-05-11 PROCEDURE — 84484 ASSAY OF TROPONIN QUANT: CPT | Performed by: EMERGENCY MEDICINE

## 2025-05-11 PROCEDURE — 85025 COMPLETE CBC W/AUTO DIFF WBC: CPT | Performed by: EMERGENCY MEDICINE

## 2025-05-11 PROCEDURE — 99223 1ST HOSP IP/OBS HIGH 75: CPT | Performed by: STUDENT IN AN ORGANIZED HEALTH CARE EDUCATION/TRAINING PROGRAM

## 2025-05-11 PROCEDURE — 80053 COMPREHEN METABOLIC PANEL: CPT | Performed by: EMERGENCY MEDICINE

## 2025-05-11 PROCEDURE — 96372 THER/PROPH/DIAG INJ SC/IM: CPT

## 2025-05-11 RX ORDER — BISACODYL 5 MG/1
5 TABLET, DELAYED RELEASE ORAL DAILY PRN
Status: DISCONTINUED | OUTPATIENT
Start: 2025-05-11 | End: 2025-05-13 | Stop reason: HOSPADM

## 2025-05-11 RX ORDER — ASPIRIN 81 MG/1
81 TABLET ORAL DAILY
Status: DISCONTINUED | OUTPATIENT
Start: 2025-05-12 | End: 2025-05-12 | Stop reason: SDUPTHER

## 2025-05-11 RX ORDER — SODIUM CHLORIDE 0.9 % (FLUSH) 0.9 %
10 SYRINGE (ML) INJECTION EVERY 12 HOURS SCHEDULED
Status: DISCONTINUED | OUTPATIENT
Start: 2025-05-11 | End: 2025-05-13 | Stop reason: HOSPADM

## 2025-05-11 RX ORDER — AMOXICILLIN 250 MG
2 CAPSULE ORAL 2 TIMES DAILY PRN
Status: DISCONTINUED | OUTPATIENT
Start: 2025-05-11 | End: 2025-05-13 | Stop reason: HOSPADM

## 2025-05-11 RX ORDER — IBUPROFEN 600 MG/1
1 TABLET ORAL
Status: DISCONTINUED | OUTPATIENT
Start: 2025-05-11 | End: 2025-05-13 | Stop reason: HOSPADM

## 2025-05-11 RX ORDER — SODIUM CHLORIDE 0.9 % (FLUSH) 0.9 %
10 SYRINGE (ML) INJECTION AS NEEDED
Status: DISCONTINUED | OUTPATIENT
Start: 2025-05-11 | End: 2025-05-13 | Stop reason: HOSPADM

## 2025-05-11 RX ORDER — LOSARTAN POTASSIUM 25 MG/1
25 TABLET ORAL DAILY
Status: DISCONTINUED | OUTPATIENT
Start: 2025-05-12 | End: 2025-05-13

## 2025-05-11 RX ORDER — BISACODYL 10 MG
10 SUPPOSITORY, RECTAL RECTAL DAILY PRN
Status: DISCONTINUED | OUTPATIENT
Start: 2025-05-11 | End: 2025-05-13 | Stop reason: HOSPADM

## 2025-05-11 RX ORDER — POLYETHYLENE GLYCOL 3350 17 G/17G
17 POWDER, FOR SOLUTION ORAL DAILY PRN
Status: DISCONTINUED | OUTPATIENT
Start: 2025-05-11 | End: 2025-05-13 | Stop reason: HOSPADM

## 2025-05-11 RX ORDER — DEXTROSE MONOHYDRATE 25 G/50ML
25 INJECTION, SOLUTION INTRAVENOUS
Status: DISCONTINUED | OUTPATIENT
Start: 2025-05-11 | End: 2025-05-13 | Stop reason: HOSPADM

## 2025-05-11 RX ORDER — HEPARIN SODIUM 5000 [USP'U]/ML
5000 INJECTION, SOLUTION INTRAVENOUS; SUBCUTANEOUS EVERY 8 HOURS SCHEDULED
Status: DISCONTINUED | OUTPATIENT
Start: 2025-05-11 | End: 2025-05-12

## 2025-05-11 RX ORDER — NITROGLYCERIN 0.4 MG/1
0.4 TABLET SUBLINGUAL
Status: DISCONTINUED | OUTPATIENT
Start: 2025-05-11 | End: 2025-05-12 | Stop reason: SDUPTHER

## 2025-05-11 RX ORDER — PRAVASTATIN SODIUM 20 MG
80 TABLET ORAL NIGHTLY
Status: DISCONTINUED | OUTPATIENT
Start: 2025-05-11 | End: 2025-05-12

## 2025-05-11 RX ORDER — METOPROLOL SUCCINATE 25 MG/1
25 TABLET, EXTENDED RELEASE ORAL NIGHTLY
Status: DISCONTINUED | OUTPATIENT
Start: 2025-05-11 | End: 2025-05-13 | Stop reason: HOSPADM

## 2025-05-11 RX ORDER — SODIUM CHLORIDE 9 MG/ML
40 INJECTION, SOLUTION INTRAVENOUS AS NEEDED
Status: DISCONTINUED | OUTPATIENT
Start: 2025-05-11 | End: 2025-05-13 | Stop reason: HOSPADM

## 2025-05-11 RX ORDER — CLOPIDOGREL BISULFATE 75 MG/1
75 TABLET ORAL DAILY
Status: DISCONTINUED | OUTPATIENT
Start: 2025-05-12 | End: 2025-05-12 | Stop reason: SDUPTHER

## 2025-05-11 RX ORDER — NICOTINE POLACRILEX 4 MG
15 LOZENGE BUCCAL
Status: DISCONTINUED | OUTPATIENT
Start: 2025-05-11 | End: 2025-05-13 | Stop reason: HOSPADM

## 2025-05-11 RX ORDER — ASPIRIN 81 MG/1
324 TABLET, CHEWABLE ORAL ONCE
Status: DISCONTINUED | OUTPATIENT
Start: 2025-05-11 | End: 2025-05-11

## 2025-05-11 RX ADMIN — Medication 10 ML: at 22:29

## 2025-05-11 RX ADMIN — PRAVASTATIN SODIUM 80 MG: 20 TABLET ORAL at 22:23

## 2025-05-11 RX ADMIN — HEPARIN SODIUM 5000 UNITS: 5000 INJECTION INTRAVENOUS; SUBCUTANEOUS at 22:22

## 2025-05-11 RX ADMIN — METOPROLOL SUCCINATE 25 MG: 25 TABLET, EXTENDED RELEASE ORAL at 22:23

## 2025-05-11 RX ADMIN — NITROGLYCERIN 1 INCH: 20 OINTMENT TOPICAL at 19:54

## 2025-05-11 NOTE — ED PROVIDER NOTES
Time: 7:50 PM EDT  Date of encounter:  5/11/2025  Independent Historian/Clinical History and Information was obtained by:   Patient  Chief Complaint: Chest pain    History is limited by: N/A    History of Present Illness:  Patient is a 45 y.o. year old female who presents to the emergency department for evaluation of chest pain.  Patient has a history of coronary disease with stent placement in September 2024.  Patient reports that starting about a month or so ago she started having some recurrent chest pain.  She was seen in the ER about 3 weeks ago and had a negative workup.  She follow-up with her cardiologist who made some changes to her medications and has her scheduled for an upcoming echo and stress test but these have not been performed yet.  Patient reports that she started having worsening pain last night.  She states it is midsternal in nature.  She describes it as a squeezing pain.  It has been waxing and waning since last night.  She endorses activity as an exacerbating factor and reports that rest makes the pain improved.  Patient also states that when she has chest tightness it makes it feel like it is hard for her to breathe.    Patient Care Team  Primary Care Provider: Patricia Hernandez APRN    Past Medical History:     Allergies   Allergen Reactions    Morphine Itching and Nausea And Vomiting     Past Medical History:   Diagnosis Date    Acid reflux     Anxiety     Anxiety and depression     Benign essential hypertension     Borderline personality disorder     Cancer 2021    renal cancer/mass, PARTIAL NEPH, RIGHT    Chronic kidney disease     Coronary artery disease     Diabetes     Diabetes mellitus     type ii, doesn't check bg at home     Diabetes mellitus, type 2     Elevated cholesterol     Fatty liver 1995    Fracture, foot 09/2017    Frozen shoulder 08/02/2023    GERD (gastroesophageal reflux disease)     High triglycerides     History of bariatric surgery 02/04/2021    GASTRIC  SLEEVE    History of kidney stones     HTN (hypertension)     Hyperlipemia     Hyperlipidemia     Hypertriglyceridemia     Kidney stone     Low back pain     Lumbar herniated disc     Myocardial infarction 2024    Obesity     Panic attacks     PCOS (polycystic ovarian syndrome)     Periarthritis of shoulder     Psoriasis     ELBOWS    PTSD (post-traumatic stress disorder)     Rotator cuff syndrome     Seasonal allergies     Self-injurious behavior     Spinal headache     AFTER PAIN EPIDURALS.  NO BLOOD PATCH    Suicide attempt      Past Surgical History:   Procedure Laterality Date    ABDOMINAL SURGERY  2020    Gastric sleeve    ADENOIDECTOMY  1984    BARIATRIC SURGERY      CARDIAC CATHETERIZATION N/A 2024    Procedure: Left Heart Cath;  Surgeon: Toño Rodriguez MD;  Location: [facility] CATH INVASIVE LOCATION;  Service: Cardiovascular;  Laterality: N/A;    CARDIAC CATHETERIZATION N/A 2024    Procedure: Coronary angiography;  Surgeon: Toño Rodriguez MD;  Location: [facility] CATH INVASIVE LOCATION;  Service: Cardiovascular;  Laterality: N/A;    CARDIAC CATHETERIZATION N/A 2024    Procedure: Angioplasty-coronary;  Surgeon: Toño Rodriguez MD;  Location: [facility] CATH INVASIVE LOCATION;  Service: Cardiovascular;  Laterality: N/A;     SECTION  ,    COLONOSCOPY N/A 2024    Procedure: COLONOSCOPY;  Surgeon: Terrence Fry MD;  Location: [facility] ENDOSCOPY;  Service: Gastroenterology;  Laterality: N/A;  NORMAL COLONOSCOPY    CORONARY STENT PLACEMENT  2024    CYSTOSCOPY BLADDER STONE LITHOTRIPSY      EAR TUBES  1984    ENDOSCOPY N/A 10/20/2020    Procedure: ESOPHAGOGASTRODUODENOSCOPY WITH BIOPSY;  Surgeon: Fidel Grajeda Jr., MD;  Location: [facility] ENDOSCOPY;  Service: General;  Laterality: N/A;  PRE- GERD  POST- RETAINED FOOD, GASTRITIS, GASTRIC POLYPS    ENDOSCOPY  2014    FOOT FRACTURE SURGERY Left 2017    screw placed    GASTRECTOMY  2020     GASTRIC SLEEVE LAPAROSCOPIC N/A 12/07/2020    Procedure: GASTRIC SLEEVE LAPAROSCOPIC;  Surgeon: Fidel Grajeda Jr., MD;  [facility];  Service: Bariatric;  Laterality: N/A;    KIDNEY STONE SURGERY  09/27/21    LITHOTRIPSY  09/27/21    NEPHRECTOMY PARTIAL Right 11/15/2021    Procedure: NEPHRECTOMY PARTIAL LAPAROSCOPIC WITH DAVINCI ROBOT;  Surgeon: Lori Troy MD;  Location: [facility] OR;  Service: Robotics - DaVinci;  Laterality: Right;    SHOULDER ARTHROSCOPY Left 08/14/2023    Procedure: LEFT SHOULDER MANIPULATION;  Surgeon: Domenic Bradley MD;  Location: [facility] OR [facility];  Service: Orthopedics;  Laterality: Left;    TONSILLECTOMY  1984    UPPER GASTROINTESTINAL ENDOSCOPY      URETEROSCOPY LASER LITHOTRIPSY WITH STENT INSERTION Right 09/28/2021    Procedure: CYSTOSCOPY, RIGHT URETEROSCOPY, LASERTRIPSY, STONE BASKET EXTRACTION AND STENT INSERTION;  Surgeon: Lori Troy MD;  Location: [facility] OR;  Service: Urology;  Laterality: Right;    URETEROSCOPY LASER LITHOTRIPSY WITH STENT INSERTION Left 11/08/2022    Procedure: URETEROSCOPY LASER LITHOTRIPSY WITH URETERAL STENT INSERTION;  Surgeon: Lori Troy MD;  Location: [facility] MAIN OR;  Service: Urology;  Laterality: Left;     Family History   Problem Relation Age of Onset    Cancer Mother         Breast    Diabetes Father     Hypertension Father     Heart disease Father     Cancer Father         Bladder prostate liver lung    Colon cancer Father         malignant, currently 62    Liver cancer Father     Prostate cancer Father     Depression Sister     No Known Problems Brother     No Known Problems Maternal Aunt     No Known Problems Paternal Aunt     No Known Problems Maternal Uncle     No Known Problems Paternal Uncle     No Known Problems Maternal Grandfather     Stroke Maternal Grandmother     Heart disease Maternal Grandmother     Kidney cancer Maternal Grandmother     Diabetes Paternal Grandfather     Heart disease Paternal  Grandfather     No Known Problems Paternal Grandmother     No Known Problems Cousin     Stepan Hyperthermia Neg Hx     ADD / ADHD Neg Hx     Alcohol abuse Neg Hx     Anxiety disorder Neg Hx     Bipolar disorder Neg Hx     Dementia Neg Hx     Drug abuse Neg Hx     OCD Neg Hx     Paranoid behavior Neg Hx     Schizophrenia Neg Hx     Seizures Neg Hx     Self-Injurious Behavior  Neg Hx     Suicide Attempts Neg Hx        Home Medications:  Prior to Admission medications    Medication Sig Start Date End Date Taking? Authorizing Provider   Apremilast (Otezla) 30 MG tablet Take 1 tablet by mouth 2 (Two) Times a Day. 12/4/24      aspirin 81 MG EC tablet Take 1 tablet by mouth Daily. 9/6/24   Toño Rodriguez MD   azelaic acid (AZELEX) 15 % gel Apply 1 Application topically to face as directed Daily after cleansing. 12/4/24      betamethasone valerate (VALISONE) 0.1 % cream Apply 1 Application topically to the appropriate area as directed 1 (One) to  2 (Two) Times a Day until approved. 12/4/24      Biotin 48411 MCG tablet Take 1 tablet by mouth Every Night.    Heron Campuzano MD   buPROPion XL (WELLBUTRIN XL) 300 MG 24 hr tablet Take 1 tablet by mouth Daily. 12/17/24   Patricia Hernandez APRN   busPIRone (BUSPAR) 10 MG tablet Take 1 tablet by mouth 3 (Three) Times a Day for 90 days. 2/21/25 6/7/25  Nika Crowley APRN   CALCIUM-MAGNESIUM-ZINC PO Take 3 tablets by mouth Daily.    Heron Campuzano MD   cetirizine (zyrTEC) 10 MG tablet Take 1 tablet by mouth Daily As Needed. 5/31/22   Heron Campuzano MD   Cholecalciferol 25 MCG (1000 UT) capsule Take 2 capsules by mouth Daily. 12/17/24   Patricia Hernandez APRN   clopidogrel (PLAVIX) 75 MG tablet Take 1 tablet by mouth Daily 4/9/25 11/5/25  Loretta Saleem APRN   colchicine 0.6 MG tablet Take 1 tablet by mouth Daily. 4/23/25   Shine Childs MD   COLLAGEN PO Take 3 tablets by mouth Daily.    Heron Campuzano MD   cyclobenzaprine  (FLEXERIL) 10 MG tablet Take 1 tablet by mouth Every Night. 1/3/25   Krista Gillis MD   dapagliflozin Propanediol (Farxiga) 10 MG tablet Take 1 tablet by mouth Daily. 2/27/25   Toño Rodriguez MD   desvenlafaxine (PRISTIQ) 50 MG 24 hr tablet Take 1 tablet by mouth Daily. 3/31/25   Patricia Hernandez APRN   Diclofenac Sodium (VOLTAREN) 1 % gel gel Apply 4 g topically to the appropriate area as directed 4 (Four) Times a Day As Needed (pain). 6/12/23   Nicolasa Meade PA-C   Estrogens Conjugated (Premarin) 0.625 MG/GM vaginal cream Insert 1 gram into the vagina every night at bedtime for 2 Weeks, THEN 1 gram into the vagina every night at bedtime 2 (Two) Times a Week. 12/17/24   Patricia Hernandez APRN   fluticasone (FLONASE) 50 MCG/ACT nasal spray USE 2 SPRAYS IN EACH NOSTRIL ONCE DAILY AS DIRECTED 12/17/24   Patricia Hernandez APRN   hydrocortisone 2.5 % cream Apply 1 Application topically to the appropriate area of the face as directed 1 (One) to 2 (Two) Times a Day until approved. 12/4/24      hydrOXYzine (ATARAX) 10 MG tablet Take 1 tablet by mouth 3 (Three) Times a Day As Needed for Anxiety for up to 90 days. 2/26/25 5/27/25  Nika Crowley APRN   IRON-VITAMIN C PO Take 1 tablet by mouth Daily.    Provider, MD Heron   linaclotide (Linzess) 72 MCG capsule capsule Take 1 capsule by mouth Every Morning Before Breakfast for 90 days. 10/16/24 7/13/25  Michell Horan APRN   losartan (COZAAR) 25 MG tablet Take 1 tablet by mouth Daily. 1/17/25   Toño Rodriguez MD   metoprolol succinate XL (Toprol XL) 25 MG 24 hr tablet Take 1 tablet by mouth Every Night. 12/17/24   Patricia Hernandez APRN   Multiple Vitamins-Minerals (MULTIVITAMIN PO) Take 1 tablet by mouth Every Night.    Provider, MD Heron   nitroglycerin (NITROSTAT) 0.4 MG SL tablet Place 1 tablet under the tongue Every 5 (Five) Minutes As Needed for Chest Pain (Systolic BP Greater Than 100). Take no more than 3 doses in  15 minutes. 24   Toño Rodriguez MD   ondansetron ODT (ZOFRAN-ODT) 4 MG disintegrating tablet Place 1 tablet on the tongue Every 8 (Eight) Hours As Needed for Nausea. 24   Dana Xiong MD   pravastatin (PRAVACHOL) 80 MG tablet Take 1 tablet by mouth Every Night. 3/3/25   Toño Rodriguez MD   Probiotic Product (PROBIOTIC-10 PO) Take 1 tablet by mouth Every Night.    Provider, MD Heron   testosterone micronized powder Appyl 0.5 mL (2 clicks) to skin daily 25   Patricia Hernandez APRN   Tirzepatide 12.5 MG/0.5ML solution auto-injector Inject 12.5 mg under the skin into the appropriate area as directed 1 (One) Time Per Week. 24   Patricia Hernandez APRN   topiramate (TOPAMAX) 50 MG tablet Take 1 tablet by mouth every night at bedtime 12/17/24 6/15/25  Patricia Hernandez APRN   traZODone (DESYREL) 100 MG tablet Take 1 tablet by mouth Every Night. 24   Patricia Hernandez APRN        Social History:   Social History     Tobacco Use    Smoking status: Former     Current packs/day: 0.00     Average packs/day: 0.3 packs/day for 34.8 years (8.7 ttl pk-yrs)     Types: Cigarettes     Start date:      Quit date: 1/15/2020     Years since quittin.3     Passive exposure: Never    Smokeless tobacco: Never    Tobacco comments:     A PACK A WEEK   Vaping Use    Vaping status: Never Used   Substance Use Topics    Alcohol use: Not Currently     Comment: Very rarely    Drug use: Not Currently     Frequency: 0.1 times per week     Types: Marijuana     Comment: Very rarely in my youth/Current CBD use occasionally         Review of Systems:  Review of Systems   Constitutional:  Negative for chills and fever.   HENT:  Negative for congestion, ear pain and sore throat.    Eyes:  Negative for pain.   Respiratory:  Negative for cough, chest tightness and shortness of breath.    Cardiovascular:  Positive for chest pain.   Gastrointestinal:  Negative for abdominal pain,  "diarrhea, nausea and vomiting.   Genitourinary:  Negative for flank pain and hematuria.   Musculoskeletal:  Negative for joint swelling.   Skin:  Negative for pallor.   Neurological:  Negative for seizures and headaches.   All other systems reviewed and are negative.       Physical Exam:  /72 (BP Location: Left arm, Patient Position: Sitting)   Pulse 65   Temp 98.2 °F (36.8 °C)   Resp 18   Ht 170.2 cm (67.01\")   Wt 96.7 kg (213 lb 3 oz)   SpO2 99%   BMI 33.38 kg/m²     Physical Exam    Vital signs were reviewed under triage note.  General appearance - Patient appears well-developed and well-nourished.  Patient is in no acute distress.  Head - Normocephalic, atraumatic.  Pupils - Equal, round, reactive to light.  Extraocular muscles are intact.  Conjunctiva is clear.  Nasal - Normal inspection.  No evidence of trauma or epistaxis.  Tympanic membranes - Gray, intact without erythema or retractions.  Oral mucosa - Pink and moist without lesions or erythema.  Uvula is midline.  Chest wall - Atraumatic.  Chest wall is nontender.  There are no vesicular rashes noted.  Neck - Supple.  Trachea was midline.  There is no palpable lymphadenopathy or thyromegaly.  There are no meningeal signs  Lungs - Clear to auscultation and percussion bilaterally.  Heart - Regular rate and rhythm without any murmurs, clicks, or gallops.  Abdomen - Soft.  Bowel sounds are present.  There is no palpable tenderness.  There is no rebound, guarding, or rigidity.  There are no palpable masses.  There are no pulsatile masses.  Back - Spine is straight and midline.  There is no CVA tenderness.  Extremities - Intact x4 with full range of motion.  There is no palpable edema.  Pulses are intact x4 and equal.  Neurologic - Patient is awake, alert, and oriented x3.  Cranial nerves II through XII are grossly intact.  Motor and sensory functions grossly intact.  Cerebellar function was normal.  Integument - There are no rashes.  There are no " petechia or purpura lesions noted.  There are no vesicular lesions noted.           Procedures:  Procedures      Medical Decision Making:      Comorbidities that affect care:    Coronary artery disease with stent placement, hypertriglyceridemia, anxiety, depression, diabetes, renal cell carcinoma, GERD, PTSD, kidney stones, PCOS, hypertension    External Notes reviewed:    Previous Clinic Note: Office visit with Dr. Ervin Connolly on 4/23/2025 was reviewed by me.      The following orders were placed and all results were independently analyzed by me:  Orders Placed This Encounter   Procedures    XR Chest 1 View    [place] Draw    High Sensitivity Troponin T    Comprehensive Metabolic Panel    Lipase    BNP    Magnesium    CBC Auto Differential    High Sensitivity Troponin T 1Hr    NPO Diet NPO Type: Strict NPO    Undress & Gown    Continuous Pulse Oximetry    Hospitalist (on-call MD unless specified)    Oxygen Therapy- Nasal Cannula; Titrate 1-6 LPM Per SpO2; 90 - 95%    ECG 12 Lead ED Triage Standing Order; Chest Pain    ECG 12 Lead ED Triage Standing Order; Chest Pain    Insert Peripheral IV    CBC & Differential    Green Top (Gel)    Lavender Top    Gold Top - SST    Light Blue Top    Extra Tubes    Gray Top       Medications Given in the Emergency Department:  Medications   sodium chloride 0.9 % flush 10 mL (has no administration in time range)   aspirin chewable tablet 324 mg (324 mg Oral Not Given 5/11/25 1838)   nitroglycerin (NITROSTAT) ointment 1 inch (1 inch Topical Given 5/11/25 1954)        ED Course:    ED Course as of 05/11/25 1955   Sun May 11, 2025   1707 EKG performed at 1653 was interpreted by me to show normal sinus rhythm with a ventricular rate of 64 bpm.  The NH interval was 154 ms.  P waves are normal.  QRS intervals normal.  Axis was at 20 degrees.  There was no acute ischemic ST or T wave change identified.  QT corrected was 426 ms. [TB]      ED Course User Index  [TB] Hugo Carvalho,        The  patient was seen and evaluated in the ED by me.  The above history and physical examination was performed as documented.  Diagnostic data was obtained.  Results reviewed.  Findings were discussed with the patient.  The patient's chest pain is currently abated.  Based on her history I will start her on Nitropaste.  Her troponins are stable.  I have consulted the hospital service for admission.  Recommend cardiology consult as I feel she would benefit from inpatient stress test/cardiac catheterization evaluation.  Patient had a heart score of 5.    Labs:    Lab Results (last 24 hours)       Procedure Component Value Units Date/Time    High Sensitivity Troponin T [[id number]]  (Abnormal) Collected: 05/11/25 1719    Specimen: Blood Updated: 05/11/25 1756     HS Troponin T 22 ng/L     Narrative:      High Sensitive Troponin T Reference Range:  <14.0 ng/L- Negative Female for AMI  <22.0 ng/L- Negative Male for AMI  >=14 - Abnormal Female indicating possible myocardial injury.  >=22 - Abnormal Male indicating possible myocardial injury.   Clinicians would have to utilize clinical acumen, EKG, Troponin, and serial changes to determine if it is an Acute Myocardial Infarction or myocardial injury due to an underlying chronic condition.         CBC & Differential [[id number]]  (Abnormal) Collected: 05/11/25 1719    Specimen: Blood Updated: 05/11/25 1735    Narrative:      The following orders were created for panel order CBC & Differential.  Procedure                               Abnormality         Status                     ---------                               -----------         ------                     CBC Auto Differential[[id number]]        Abnormal            Final result                 Please view results for these tests on the individual orders.    Comprehensive Metabolic Panel [[id number]]  (Abnormal) Collected: 05/11/25 1719    Specimen: Blood Updated: 05/11/25 1756     Glucose 84 mg/dL      BUN 17 mg/dL       Creatinine 1.09 mg/dL      Sodium 140 mmol/L      Potassium 3.7 mmol/L      Chloride 106 mmol/L      CO2 20.9 mmol/L      Calcium 9.5 mg/dL      Total Protein 7.2 g/dL      Albumin 4.7 g/dL      ALT (SGPT) 41 U/L      AST (SGOT) 33 U/L      Alkaline Phosphatase 86 U/L      Total Bilirubin 0.5 mg/dL      Globulin 2.5 gm/dL      A/G Ratio 1.9 g/dL      BUN/Creatinine Ratio 15.6     Anion Gap 13.1 mmol/L      eGFR 63.6 mL/min/1.73     Narrative:      GFR Categories in Chronic Kidney Disease (CKD)              GFR Category          GFR (mL/min/1.73)    Interpretation  G1                    90 or greater        Normal or high (1)  G2                    60-89                Mild decrease (1)  G3a                   45-59                Mild to moderate decrease  G3b                   30-44                Moderate to severe decrease  G4                    15-29                Severe decrease  G5                    14 or less           Kidney failure    (1)In the absence of evidence of kidney disease, neither GFR category G1 or G2 fulfill the criteria for CKD.    eGFR calculation 2021 CKD-EPI creatinine equation, which does not include race as a factor    Lipase [[id number]]  (Abnormal) Collected: 05/11/25 1719    Specimen: Blood Updated: 05/11/25 1756     Lipase 71 U/L     BNP [[id number]]  (Normal) Collected: 05/11/25 1719    Specimen: Blood Updated: 05/11/25 1754     proBNP 85.9 pg/mL     Narrative:      This assay is used as an aid in the diagnosis of individuals suspected of having heart failure. It can be used as an aid in the diagnosis of acute decompensated heart failure (ADHF) in patients presenting with signs and symptoms of ADHF to the emergency department (ED). In addition, NT-proBNP of <300 pg/mL indicates ADHF is not likely.    Age Range Result Interpretation  NT-proBNP Concentration (pg/mL:      <50             Positive            >450                   Gray                 300-450                    Negative              <300    50-75           Positive            >900                  Gray                300-900                  Negative            <300      >75             Positive            >1800                  Gray                300-1800                  Negative            <300    Magnesium [[id number]]  (Normal) Collected: 05/11/25 1719    Specimen: Blood Updated: 05/11/25 1756     Magnesium 2.3 mg/dL     CBC Auto Differential [[id number]]  (Abnormal) Collected: 05/11/25 1719    Specimen: Blood Updated: 05/11/25 1735     WBC 8.66 10*3/mm3      RBC 5.03 10*6/mm3      Hemoglobin 14.9 g/dL      Hematocrit 44.5 %      MCV 88.5 fL      MCH 29.6 pg      MCHC 33.5 g/dL      RDW 13.2 %      RDW-SD 42.6 fl      MPV 9.9 fL      Platelets 196 10*3/mm3      Neutrophil % 62.7 %      Lymphocyte % 26.8 %      Monocyte % 7.5 %      Eosinophil % 1.7 %      Basophil % 0.5 %      Immature Grans % 0.8 %      Neutrophils, Absolute 5.43 10*3/mm3      Lymphocytes, Absolute 2.32 10*3/mm3      Monocytes, Absolute 0.65 10*3/mm3      Eosinophils, Absolute 0.15 10*3/mm3      Basophils, Absolute 0.04 10*3/mm3      Immature Grans, Absolute 0.07 10*3/mm3      nRBC 0.0 /100 WBC     High Sensitivity Troponin T 1Hr [[id number]]  (Abnormal) Collected: 05/11/25 1906    Specimen: Blood Updated: 05/11/25 1927     HS Troponin T 22 ng/L      Troponin T Numeric Delta 0 ng/L      Troponin T % Delta 0    Narrative:      High Sensitive Troponin T Reference Range:  <14.0 ng/L- Negative Female for AMI  <22.0 ng/L- Negative Male for AMI  >=14 - Abnormal Female indicating possible myocardial injury.  >=22 - Abnormal Male indicating possible myocardial injury.   Clinicians would have to utilize clinical acumen, EKG, Troponin, and serial changes to determine if it is an Acute Myocardial Infarction or myocardial injury due to an underlying chronic condition.                  Imaging:    XR Chest 1 View  Result Date: 5/11/2025  XR CHEST 1 VW Date of Exam: 5/11/2025  5:10 PM EDT Indication: Chest Pain Triage Protocol Comparison: CXR 4/16/2025 Findings: The heart is normal in size. The lungs are well-expanded and free of infiltrates. Bony structures appear intact.     Impression: No active disease is seen. Electronically Signed: Jace Arriola MD  5/11/2025 5:29 PM EDT  Workstation ID: [id number]        Differential Diagnosis and Discussion:    Chest Pain:  Based on the patient's signs and symptoms, I considered aortic dissection, myocardial infaction, pulmonary embolism, cardiac tamponade, pericarditis, pneumothorax, musculoskeletal chest pain and other differential diagnosis as an etiology of the patient's chest pain.     Labs were collected in the emergency department and all labs were reviewed and interpreted by me.  X-ray were performed in the emergency department and all X-ray impressions were independently interpreted by me.  An EKG was performed and the EKG was interpreted by me.    MDM     Amount and/or Complexity of Data Reviewed  Tests in the medicine section of CPT®: reviewed             Patient Care Considerations:    CT CHEST: I considered ordering a CT scan of the chest, however patient has a low probability Wells score.      Consultants/Shared Management Plan:    Hospitalist: I have discussed the case with Dr. Oliva who agrees to accept the patient for admission.    Social Determinants of Health:    Patient is independent, reliable, and has access to care.       Disposition and Care Coordination:    Admit:   Through independent evaluation of the patient's history, physical, and imperical data, the patient meets criteria for inpatient admission to the hospital.        Final diagnoses:   Chest pain, unspecified type        ED Disposition       ED Disposition   Decision to Admit    Condition   --    Comment   --               This medical record created using voice recognition software.             Hugo Carvalho DO  05/13/25 [id number]

## 2025-05-11 NOTE — Clinical Note
First balloon inflation max pressure = 18 ni. First balloon inflation duration = 10 seconds. Second inflation of balloon - Max pressure = 18 ni. 2nd Inflation of balloon - Duration = 10 seconds.

## 2025-05-11 NOTE — Clinical Note
First balloon inflation max pressure = 20 ni. First balloon inflation duration = 10 seconds. Second inflation of balloon - Max pressure = 24 ni. 2nd Inflation of balloon - Duration = 10 seconds. The balloon is positioned in the Distal segment of the vessel.

## 2025-05-11 NOTE — Clinical Note
First balloon inflation max pressure = 12 ni. First balloon inflation duration = 10 seconds. Second inflation of balloon - Max pressure = 18 ni. 2nd Inflation of balloon - Duration = 10 seconds. The balloon is positioned in the Distal segment of the vessel.

## 2025-05-11 NOTE — H&P
[facility]   HOSPITALIST HISTORY AND PHYSICAL  Date: 2025   Patient Name: Bianca Boles  : 1980  MRN: [id number]  Primary Care Physician:  Patricia Hernandez APRN  Date of admission: 2025    Subjective   Subjective     Chief Complaint: Chest pain    HPI:    Bianca Boles is a 45 y.o. female  with a past medical history of hypertension, type 2 diabetes mellitus, GERD, obesity, status post laparoscopic sleeve gastrectomy, CKD stage III, depression, CAD s/p stent to LAD in 2024, possible pericarditis presented to the ED for evaluation of chest pain.  Patient has been experiencing intermittent chest pain since last 1 month, seen by outpatient cardiologist Dr. Ervin petersen and started on colchicine for possible pericarditis and scheduled for outpatient cardiac echo and nuclear stress test.  Since last night patient has been experiencing intermittent substernal chest pain, radiating to the neck.  Worsening with activity and improved with rest.  Describes it as squeezing pain, 5/10 in intensity.  Denies any nausea, shortness of breath, lower extremity swelling, calf pain, recent travel.    Upon arrival, vital signs temperature 98.2, pulse 72, respiratory to 18, blood pressure 110/72 on room air saturating at 100%.  Initial troponin 22, repeat 22, normal proBNP, creatinine 1.09, around the baseline, AST/ALT 41/33, lipase 71, rest of the CMP with no significant findings, normal CBC.  EKG showed sinus rhythm with no significant ST-T wave changes.  Chest x-ray showed no active disease.  Patient admitted for further evaluation and management of acute chest pain      Personal History     Past Medical History:   Diagnosis Date    Acid reflux     Anxiety     Anxiety and depression     Benign essential hypertension     Borderline personality disorder     Cancer     renal cancer/mass, PARTIAL NEPH, RIGHT    Chronic kidney disease     Coronary artery disease     Diabetes     Diabetes  mellitus     type ii, doesn't check bg at home     Diabetes mellitus, type 2     Elevated cholesterol     Fatty liver     Fracture, foot 2017    Frozen shoulder 2023    GERD (gastroesophageal reflux disease)     High triglycerides     History of bariatric surgery 2021    GASTRIC SLEEVE    History of kidney stones     HTN (hypertension)     Hyperlipemia     Hyperlipidemia     Hypertriglyceridemia     Kidney stone     Low back pain     Lumbar herniated disc     Myocardial infarction 2024    Obesity     Panic attacks     PCOS (polycystic ovarian syndrome)     Periarthritis of shoulder     Psoriasis     ELBOWS    PTSD (post-traumatic stress disorder)     Rotator cuff syndrome     Seasonal allergies     Self-injurious behavior     Spinal headache     AFTER PAIN EPIDURALS.  NO BLOOD PATCH    Suicide attempt          Past Surgical History:   Procedure Laterality Date    ABDOMINAL SURGERY  2020    Gastric sleeve    ADENOIDECTOMY  1984    BARIATRIC SURGERY      CARDIAC CATHETERIZATION N/A 2024    Procedure: Left Heart Cath;  Surgeon: Toño Rodriguez MD;  Location: [facility] CATH INVASIVE LOCATION;  Service: Cardiovascular;  Laterality: N/A;    CARDIAC CATHETERIZATION N/A 2024    Procedure: Coronary angiography;  Surgeon: Toño Rodriguez MD;  Location: [facility] CATH INVASIVE LOCATION;  Service: Cardiovascular;  Laterality: N/A;    CARDIAC CATHETERIZATION N/A 2024    Procedure: Angioplasty-coronary;  Surgeon: Toño Rodriguez MD;  Location: [facility] CATH INVASIVE LOCATION;  Service: Cardiovascular;  Laterality: N/A;     SECTION  ,    COLONOSCOPY N/A 2024    Procedure: COLONOSCOPY;  Surgeon: Terrence Fry MD;  Location: [facility] ENDOSCOPY;  Service: Gastroenterology;  Laterality: N/A;  NORMAL COLONOSCOPY    CORONARY STENT PLACEMENT  2024    CYSTOSCOPY BLADDER STONE LITHOTRIPSY      EAR TUBES  1984    ENDOSCOPY N/A 10/20/2020    Procedure:  ESOPHAGOGASTRODUODENOSCOPY WITH BIOPSY;  Surgeon: Fidel Grajeda Jr., MD;  Location: [facility] ENDOSCOPY;  Service: General;  Laterality: N/A;  PRE- GERD  POST- RETAINED FOOD, GASTRITIS, GASTRIC POLYPS    ENDOSCOPY  2014    FOOT FRACTURE SURGERY Left 2017    screw placed    GASTRECTOMY  12/2020    GASTRIC SLEEVE LAPAROSCOPIC N/A 12/07/2020    Procedure: GASTRIC SLEEVE LAPAROSCOPIC;  Surgeon: Fidel Grajeda Jr., MD;  [facility];  Service: Bariatric;  Laterality: N/A;    KIDNEY STONE SURGERY  09/27/21    LITHOTRIPSY  09/27/21    NEPHRECTOMY PARTIAL Right 11/15/2021    Procedure: NEPHRECTOMY PARTIAL LAPAROSCOPIC WITH DAVINCI ROBOT;  Surgeon: Lori Troy MD;  Location: [facility] OR;  Service: Robotics - DaVinci;  Laterality: Right;    SHOULDER ARTHROSCOPY Left 08/14/2023    Procedure: LEFT SHOULDER MANIPULATION;  Surgeon: Domenic Bradley MD;  Location: [facility] OR [facility];  Service: Orthopedics;  Laterality: Left;    TONSILLECTOMY  1984    UPPER GASTROINTESTINAL ENDOSCOPY      URETEROSCOPY LASER LITHOTRIPSY WITH STENT INSERTION Right 09/28/2021    Procedure: CYSTOSCOPY, RIGHT URETEROSCOPY, LASERTRIPSY, STONE BASKET EXTRACTION AND STENT INSERTION;  Surgeon: Lori Troy MD;  Location: [facility] MAIN OR;  Service: Urology;  Laterality: Right;    URETEROSCOPY LASER LITHOTRIPSY WITH STENT INSERTION Left 11/08/2022    Procedure: URETEROSCOPY LASER LITHOTRIPSY WITH URETERAL STENT INSERTION;  Surgeon: Lori Troy MD;  Location: [facility] MAIN OR;  Service: Urology;  Laterality: Left;         Family History   Problem Relation Age of Onset    Cancer Mother         Breast    Diabetes Father     Hypertension Father     Heart disease Father     Cancer Father         Bladder prostate liver lung    Colon cancer Father         malignant, currently 62    Liver cancer Father     Prostate cancer Father     Depression Sister     No Known Problems Brother     No Known Problems Maternal Aunt     No Known  Problems Paternal Aunt     No Known Problems Maternal Uncle     No Known Problems Paternal Uncle     No Known Problems Maternal Grandfather     Stroke Maternal Grandmother     Heart disease Maternal Grandmother     Kidney cancer Maternal Grandmother     Diabetes Paternal Grandfather     Heart disease Paternal Grandfather     No Known Problems Paternal Grandmother     No Known Problems Cousin     Malig Hyperthermia Neg Hx     ADD / ADHD Neg Hx     Alcohol abuse Neg Hx     Anxiety disorder Neg Hx     Bipolar disorder Neg Hx     Dementia Neg Hx     Drug abuse Neg Hx     OCD Neg Hx     Paranoid behavior Neg Hx     Schizophrenia Neg Hx     Seizures Neg Hx     Self-Injurious Behavior  Neg Hx     Suicide Attempts Neg Hx          Social History     Socioeconomic History    Marital status:     Number of children: 2   Tobacco Use    Smoking status: Former     Current packs/day: 0.00     Average packs/day: 0.3 packs/day for 34.8 years (8.7 ttl pk-yrs)     Types: Cigarettes     Start date:      Quit date: 1/15/2020     Years since quittin.3     Passive exposure: Never    Smokeless tobacco: Never    Tobacco comments:     A PACK A WEEK   Vaping Use    Vaping status: Never Used   Substance and Sexual Activity    Alcohol use: Not Currently     Comment: Very rarely    Drug use: Not Currently     Frequency: 0.1 times per week     Types: Marijuana     Comment: Very rarely in my youth/Current CBD use occasionally    Sexual activity: Yes     Partners: Male     Birth control/protection: Depo-provera, Inserts         Home Medications:  Apremilast, Biotin, Calcium-Magnesium-Zinc, Cholecalciferol, Collagen-Vitamin C-Biotin, Diclofenac Sodium, Estrogens Conjugated, Iron-Vitamin C, Probiotic Product, Tirzepatide, aspirin, azelaic acid, betamethasone valerate, buPROPion XL, busPIRone, cetirizine, clopidogrel, colchicine, cyclobenzaprine, dapagliflozin Propanediol, desvenlafaxine, fluticasone, hydrOXYzine, hydrocortisone,  linaclotide, losartan, metoprolol succinate XL, multivitamin, nitroglycerin, ondansetron ODT, pravastatin, testosterone micronized, topiramate, and traZODone    Allergies:  Allergies   Allergen Reactions    Morphine Itching and Nausea And Vomiting         Objective   Objective     Vitals:   Temp:  [98.2 °F (36.8 °C)] 98.2 °F (36.8 °C)  Heart Rate:  [65-72] 70  Resp:  [15-18] 15  BP: (110-116)/(72-75) 116/75    Physical Exam    Constitutional: Awake, alert, no acute distress   Eyes: Pupils equal, sclerae anicteric, no conjunctival injection   HENT: NCAT, mucous membranes moist   Respiratory: Clear to auscultation bilaterally, nonlabored respirations    Cardiovascular: RRR, no murmurs   Gastrointestinal: Positive bowel sounds, soft, nontender, nondistended   Musculoskeletal: No bilateral ankle edema, no clubbing or cyanosis to extremities   Neurologic: Oriented x 3, strength symmetric in all extremities, Cranial Nerves grossly intact to confrontation, speech clear   Skin: No rashes     Result Review    Result Review:  I have personally reviewed the results from the time of this admission to 5/11/2025 21:12 EDT and agree with these findings:  [x]  Laboratory  []  Microbiology  [x]  Radiology  [x]  EKG/Telemetry   []  Cardiology/Vascular   []  Pathology  []  Old records  []  Other:        Imaging Results (Last 24 Hours)       Procedure Component Value Units Date/Time    XR Chest 1 View [[id number]] Collected: 05/11/25 1727     Updated: 05/11/25 1731    Narrative:      XR CHEST 1 VW    Date of Exam: 5/11/2025 5:10 PM EDT    Indication: Chest Pain Triage Protocol    Comparison: CXR 4/16/2025    Findings:  The heart is normal in size. The lungs are well-expanded and free of infiltrates.    Bony structures appear intact.       Impression:      Impression:  No active disease is seen.      Electronically Signed: Jace Arriola MD    5/11/2025 5:29 PM EDT    Workstation ID: [id number]             aspirin, 324 mg, Oral, Once          Assessment & Plan   Assessment / Plan     Assessment/Plan:   Intermittent chest pain  Stable angina  ?  Pericarditis  History of CAD status post stent to LAD in September 2024  Hypertension  Type 2 diabetes mellitus  GERD  Status post laparoscopic sleeve gastrectomy  CKD stage III  Depression    Plan  Admit to observation, telemetry  Cardiology consult in the a.m. Dr. Ervin Connolly  N.p.o. after midnight  Cardiac echo  Continue aspirin, Plavix, losartan, metoprolol, pravastatin  Heart healthy, consistent carb diet  Low-dose sliding scale insulin  Resume other appropriate home medications once reconciled    VTE Prophylaxis:  Pharmacologic VTE prophylaxis orders are signed & held.      CODE STATUS:    Code Status (Patient has no pulse and is not breathing): CPR (Attempt to Resuscitate)  Medical Interventions (Patient has pulse or is breathing): Full Support  Level Of Support Discussed With: Patient      Admission Status:  I believe this patient meets observation status.    Part of this note may be an electronic transcription/translation of spoken language to printed text using the Dragon Dictation System    Hernnado Oliva MD

## 2025-05-12 ENCOUNTER — APPOINTMENT (OUTPATIENT)
Dept: CARDIOLOGY | Facility: HOSPITAL | Age: 45
End: 2025-05-12
Payer: COMMERCIAL

## 2025-05-12 PROBLEM — I24.9 ACUTE CORONARY SYNDROME WITH HIGH TROPONIN: Status: ACTIVE | Noted: 2025-05-11

## 2025-05-12 LAB
ACT BLD: 573 SECONDS (ref 89–137)
ALBUMIN SERPL-MCNC: 4 G/DL (ref 3.5–5.2)
ALBUMIN/GLOB SERPL: 1.5 G/DL
ALP SERPL-CCNC: 79 U/L (ref 39–117)
ALT SERPL W P-5'-P-CCNC: 36 U/L (ref 1–33)
ANION GAP SERPL CALCULATED.3IONS-SCNC: 8.4 MMOL/L (ref 5–15)
AORTIC DIMENSIONLESS INDEX: 0.58 (DI)
ASCENDING AORTA: 3.6 CM
AST SERPL-CCNC: 31 U/L (ref 1–32)
AV MEAN PRESS GRAD SYS DOP V1V2: 7 MMHG
AV VMAX SYS DOP: 177.1 CM/SEC
BASOPHILS # BLD AUTO: 0.04 10*3/MM3 (ref 0–0.2)
BASOPHILS NFR BLD AUTO: 0.5 % (ref 0–1.5)
BH CV ECHO MEAS - AO MAX PG: 12.5 MMHG
BH CV ECHO MEAS - AO ROOT DIAM: 2.5 CM
BH CV ECHO MEAS - AO V2 VTI: 41.6 CM
BH CV ECHO MEAS - AVA(I,D): 1.83 CM2
BH CV ECHO MEAS - EDV(MOD-SP2): 95.5 ML
BH CV ECHO MEAS - EDV(MOD-SP4): 103.1 ML
BH CV ECHO MEAS - EF(MOD-SP2): 60.4 %
BH CV ECHO MEAS - EF(MOD-SP4): 57.9 %
BH CV ECHO MEAS - ESV(MOD-SP2): 37.8 ML
BH CV ECHO MEAS - ESV(MOD-SP4): 43.4 ML
BH CV ECHO MEAS - IVS/LVPW: 0.91 CM
BH CV ECHO MEAS - IVSD: 1 CM
BH CV ECHO MEAS - LA DIMENSION: 3.9 CM
BH CV ECHO MEAS - LAT PEAK E' VEL: 14.3 CM/SEC
BH CV ECHO MEAS - LV DIASTOLIC VOL/BSA (35-75): 49.7 CM2
BH CV ECHO MEAS - LV MAX PG: 4.8 MMHG
BH CV ECHO MEAS - LV MEAN PG: 2.6 MMHG
BH CV ECHO MEAS - LV SYSTOLIC VOL/BSA (12-30): 20.9 CM2
BH CV ECHO MEAS - LV V1 MAX: 110 CM/SEC
BH CV ECHO MEAS - LV V1 VTI: 24.3 CM
BH CV ECHO MEAS - LVIDD: 4.8 CM
BH CV ECHO MEAS - LVIDS: 3.4 CM
BH CV ECHO MEAS - LVOT AREA: 3.1 CM2
BH CV ECHO MEAS - LVOT DIAM: 2 CM
BH CV ECHO MEAS - LVPWD: 1.1 CM
BH CV ECHO MEAS - MED PEAK E' VEL: 8.4 CM/SEC
BH CV ECHO MEAS - MV A MAX VEL: 102.2 CM/SEC
BH CV ECHO MEAS - MV DEC SLOPE: 421 CM/SEC2
BH CV ECHO MEAS - MV E MAX VEL: 111.4 CM/SEC
BH CV ECHO MEAS - MV E/A: 1.09
BH CV ECHO MEAS - MV MAX PG: 6 MMHG
BH CV ECHO MEAS - MV MEAN PG: 2.5 MMHG
BH CV ECHO MEAS - MV P1/2T: 87 MSEC
BH CV ECHO MEAS - MV V2 VTI: 37.3 CM
BH CV ECHO MEAS - MVA(P1/2T): 2.5 CM2
BH CV ECHO MEAS - MVA(VTI): 2.04 CM2
BH CV ECHO MEAS - RAP SYSTOLE: 3 MMHG
BH CV ECHO MEAS - RVDD: 2.8 CM
BH CV ECHO MEAS - RVSP: 21.9 MMHG
BH CV ECHO MEAS - SV(LVOT): 76.2 ML
BH CV ECHO MEAS - SV(MOD-SP2): 57.7 ML
BH CV ECHO MEAS - SV(MOD-SP4): 59.7 ML
BH CV ECHO MEAS - SVI(LVOT): 36.7 ML/M2
BH CV ECHO MEAS - SVI(MOD-SP2): 27.8 ML/M2
BH CV ECHO MEAS - SVI(MOD-SP4): 28.8 ML/M2
BH CV ECHO MEAS - TR MAX PG: 18.9 MMHG
BH CV ECHO MEAS - TR MAX VEL: 216.7 CM/SEC
BH CV ECHO MEASUREMENTS AVERAGE E/E' RATIO: 9.81
BILIRUB SERPL-MCNC: 0.3 MG/DL (ref 0–1.2)
BUN SERPL-MCNC: 16 MG/DL (ref 6–20)
BUN/CREAT SERPL: 13 (ref 7–25)
CALCIUM SPEC-SCNC: 9.1 MG/DL (ref 8.6–10.5)
CHLORIDE SERPL-SCNC: 107 MMOL/L (ref 98–107)
CO2 SERPL-SCNC: 21.6 MMOL/L (ref 22–29)
CREAT SERPL-MCNC: 1.23 MG/DL (ref 0.57–1)
DEPRECATED RDW RBC AUTO: 43.5 FL (ref 37–54)
EGFRCR SERPLBLD CKD-EPI 2021: 55 ML/MIN/1.73
EOSINOPHIL # BLD AUTO: 0.19 10*3/MM3 (ref 0–0.4)
EOSINOPHIL NFR BLD AUTO: 2.6 % (ref 0.3–6.2)
ERYTHROCYTE [DISTWIDTH] IN BLOOD BY AUTOMATED COUNT: 13.5 % (ref 12.3–15.4)
GLOBULIN UR ELPH-MCNC: 2.6 GM/DL
GLUCOSE BLDC GLUCOMTR-MCNC: 75 MG/DL (ref 70–99)
GLUCOSE BLDC GLUCOMTR-MCNC: 77 MG/DL (ref 70–99)
GLUCOSE SERPL-MCNC: 75 MG/DL (ref 65–99)
HCT VFR BLD AUTO: 41.4 % (ref 34–46.6)
HGB BLD-MCNC: 13.8 G/DL (ref 12–15.9)
IMM GRANULOCYTES # BLD AUTO: 0.05 10*3/MM3 (ref 0–0.05)
IMM GRANULOCYTES NFR BLD AUTO: 0.7 % (ref 0–0.5)
LEFT ATRIUM VOLUME INDEX: 28.6 ML/M2
LV EF BIPLANE MOD: 59 %
LYMPHOCYTES # BLD AUTO: 2.65 10*3/MM3 (ref 0.7–3.1)
LYMPHOCYTES NFR BLD AUTO: 36.4 % (ref 19.6–45.3)
MAGNESIUM SERPL-MCNC: 2.2 MG/DL (ref 1.6–2.6)
MCH RBC QN AUTO: 29.6 PG (ref 26.6–33)
MCHC RBC AUTO-ENTMCNC: 33.3 G/DL (ref 31.5–35.7)
MCV RBC AUTO: 88.7 FL (ref 79–97)
MONOCYTES # BLD AUTO: 0.66 10*3/MM3 (ref 0.1–0.9)
MONOCYTES NFR BLD AUTO: 9.1 % (ref 5–12)
NEUTROPHILS NFR BLD AUTO: 3.69 10*3/MM3 (ref 1.7–7)
NEUTROPHILS NFR BLD AUTO: 50.7 % (ref 42.7–76)
NRBC BLD AUTO-RTO: 0 /100 WBC (ref 0–0.2)
PHOSPHATE SERPL-MCNC: 3.8 MG/DL (ref 2.5–4.5)
PLATELET # BLD AUTO: 177 10*3/MM3 (ref 140–450)
PMV BLD AUTO: 10.1 FL (ref 6–12)
POTASSIUM SERPL-SCNC: 4.2 MMOL/L (ref 3.5–5.2)
PROT SERPL-MCNC: 6.6 G/DL (ref 6–8.5)
RBC # BLD AUTO: 4.67 10*6/MM3 (ref 3.77–5.28)
SODIUM SERPL-SCNC: 137 MMOL/L (ref 136–145)
WBC NRBC COR # BLD AUTO: 7.28 10*3/MM3 (ref 3.4–10.8)

## 2025-05-12 PROCEDURE — G0378 HOSPITAL OBSERVATION PER HR: HCPCS

## 2025-05-12 PROCEDURE — C1753 CATH, INTRAVAS ULTRASOUND: HCPCS | Performed by: INTERNAL MEDICINE

## 2025-05-12 PROCEDURE — 83735 ASSAY OF MAGNESIUM: CPT | Performed by: STUDENT IN AN ORGANIZED HEALTH CARE EDUCATION/TRAINING PROGRAM

## 2025-05-12 PROCEDURE — 25010000002 FENTANYL CITRATE (PF) 50 MCG/ML SOLUTION: Performed by: INTERNAL MEDICINE

## 2025-05-12 PROCEDURE — C9600 PERC DRUG-EL COR STENT SING: HCPCS | Performed by: INTERNAL MEDICINE

## 2025-05-12 PROCEDURE — C1874 STENT, COATED/COV W/DEL SYS: HCPCS | Performed by: INTERNAL MEDICINE

## 2025-05-12 PROCEDURE — C1769 GUIDE WIRE: HCPCS | Performed by: INTERNAL MEDICINE

## 2025-05-12 PROCEDURE — 92978 ENDOLUMINL IVUS OCT C 1ST: CPT | Performed by: INTERNAL MEDICINE

## 2025-05-12 PROCEDURE — C1894 INTRO/SHEATH, NON-LASER: HCPCS | Performed by: INTERNAL MEDICINE

## 2025-05-12 PROCEDURE — 82948 REAGENT STRIP/BLOOD GLUCOSE: CPT

## 2025-05-12 PROCEDURE — 93005 ELECTROCARDIOGRAM TRACING: CPT | Performed by: INTERNAL MEDICINE

## 2025-05-12 PROCEDURE — 25010000002 BIVALIRUDIN TRIFLUOROACETATE 250 MG RECONSTITUTED SOLUTION 1 EACH VIAL: Performed by: INTERNAL MEDICINE

## 2025-05-12 PROCEDURE — C1887 CATHETER, GUIDING: HCPCS | Performed by: INTERNAL MEDICINE

## 2025-05-12 PROCEDURE — 93454 CORONARY ARTERY ANGIO S&I: CPT | Performed by: INTERNAL MEDICINE

## 2025-05-12 PROCEDURE — C1725 CATH, TRANSLUMIN NON-LASER: HCPCS | Performed by: INTERNAL MEDICINE

## 2025-05-12 PROCEDURE — 93306 TTE W/DOPPLER COMPLETE: CPT

## 2025-05-12 PROCEDURE — 94761 N-INVAS EAR/PLS OXIMETRY MLT: CPT

## 2025-05-12 PROCEDURE — 85025 COMPLETE CBC W/AUTO DIFF WBC: CPT | Performed by: STUDENT IN AN ORGANIZED HEALTH CARE EDUCATION/TRAINING PROGRAM

## 2025-05-12 PROCEDURE — 85347 COAGULATION TIME ACTIVATED: CPT

## 2025-05-12 PROCEDURE — 25810000003 SODIUM CHLORIDE 0.9 % SOLUTION: Performed by: INTERNAL MEDICINE

## 2025-05-12 PROCEDURE — 25510000001 IOPAMIDOL PER 1 ML: Performed by: INTERNAL MEDICINE

## 2025-05-12 PROCEDURE — 99232 SBSQ HOSP IP/OBS MODERATE 35: CPT | Performed by: FAMILY MEDICINE

## 2025-05-12 PROCEDURE — 80053 COMPREHEN METABOLIC PANEL: CPT | Performed by: STUDENT IN AN ORGANIZED HEALTH CARE EDUCATION/TRAINING PROGRAM

## 2025-05-12 PROCEDURE — 25010000002 MIDAZOLAM PER 1MG: Performed by: INTERNAL MEDICINE

## 2025-05-12 PROCEDURE — 84100 ASSAY OF PHOSPHORUS: CPT | Performed by: STUDENT IN AN ORGANIZED HEALTH CARE EDUCATION/TRAINING PROGRAM

## 2025-05-12 PROCEDURE — 25010000002 HEPARIN (PORCINE) PER 1000 UNITS: Performed by: INTERNAL MEDICINE

## 2025-05-12 PROCEDURE — 94799 UNLISTED PULMONARY SVC/PX: CPT

## 2025-05-12 PROCEDURE — 93306 TTE W/DOPPLER COMPLETE: CPT | Performed by: INTERNAL MEDICINE

## 2025-05-12 DEVICE — XIENCE SKYPOINT™ EVEROLIMUS ELUTING CORONARY STENT SYSTEM 3.50 MM X 23 MM / RAPID-EXCHANGE
Type: IMPLANTABLE DEVICE | Site: CORONARY | Status: FUNCTIONAL
Brand: XIENCE SKYPOINT™

## 2025-05-12 RX ORDER — MIDAZOLAM HYDROCHLORIDE 2 MG/2ML
INJECTION, SOLUTION INTRAMUSCULAR; INTRAVENOUS
Status: DISCONTINUED | OUTPATIENT
Start: 2025-05-12 | End: 2025-05-12 | Stop reason: HOSPADM

## 2025-05-12 RX ORDER — SODIUM CHLORIDE 9 MG/ML
100 INJECTION, SOLUTION INTRAVENOUS CONTINUOUS
Status: ACTIVE | OUTPATIENT
Start: 2025-05-12 | End: 2025-05-12

## 2025-05-12 RX ORDER — BUSPIRONE HYDROCHLORIDE 10 MG/1
10 TABLET ORAL 3 TIMES DAILY
Status: DISCONTINUED | OUTPATIENT
Start: 2025-05-12 | End: 2025-05-13 | Stop reason: HOSPADM

## 2025-05-12 RX ORDER — HYDROXYZINE HYDROCHLORIDE 10 MG/1
10 TABLET, FILM COATED ORAL 3 TIMES DAILY PRN
Status: DISCONTINUED | OUTPATIENT
Start: 2025-05-12 | End: 2025-05-13 | Stop reason: HOSPADM

## 2025-05-12 RX ORDER — COLCHICINE 0.6 MG/1
0.6 TABLET ORAL DAILY
Status: DISCONTINUED | OUTPATIENT
Start: 2025-05-12 | End: 2025-05-13 | Stop reason: HOSPADM

## 2025-05-12 RX ORDER — CLOPIDOGREL BISULFATE 75 MG/1
75 TABLET ORAL DAILY
Status: DISCONTINUED | OUTPATIENT
Start: 2025-05-13 | End: 2025-05-13 | Stop reason: HOSPADM

## 2025-05-12 RX ORDER — ONDANSETRON 2 MG/ML
4 INJECTION INTRAMUSCULAR; INTRAVENOUS EVERY 6 HOURS PRN
Status: DISCONTINUED | OUTPATIENT
Start: 2025-05-12 | End: 2025-05-13 | Stop reason: HOSPADM

## 2025-05-12 RX ORDER — ASPIRIN 81 MG/1
81 TABLET ORAL DAILY
Status: DISCONTINUED | OUTPATIENT
Start: 2025-05-13 | End: 2025-05-13 | Stop reason: HOSPADM

## 2025-05-12 RX ORDER — FENTANYL CITRATE 50 UG/ML
INJECTION, SOLUTION INTRAMUSCULAR; INTRAVENOUS
Status: DISCONTINUED | OUTPATIENT
Start: 2025-05-12 | End: 2025-05-12 | Stop reason: HOSPADM

## 2025-05-12 RX ORDER — HEPARIN SODIUM 1000 [USP'U]/ML
INJECTION, SOLUTION INTRAVENOUS; SUBCUTANEOUS
Status: DISCONTINUED | OUTPATIENT
Start: 2025-05-12 | End: 2025-05-12 | Stop reason: HOSPADM

## 2025-05-12 RX ORDER — BUPROPION HYDROCHLORIDE 150 MG/1
300 TABLET ORAL DAILY
Status: DISCONTINUED | OUTPATIENT
Start: 2025-05-12 | End: 2025-05-13 | Stop reason: HOSPADM

## 2025-05-12 RX ORDER — ONDANSETRON 4 MG/1
4 TABLET, ORALLY DISINTEGRATING ORAL EVERY 6 HOURS PRN
Status: DISCONTINUED | OUTPATIENT
Start: 2025-05-12 | End: 2025-05-13 | Stop reason: HOSPADM

## 2025-05-12 RX ORDER — ACETAMINOPHEN 325 MG/1
650 TABLET ORAL EVERY 4 HOURS PRN
Status: DISCONTINUED | OUTPATIENT
Start: 2025-05-12 | End: 2025-05-13 | Stop reason: HOSPADM

## 2025-05-12 RX ORDER — TOPIRAMATE 25 MG/1
50 TABLET, FILM COATED ORAL NIGHTLY
Status: DISCONTINUED | OUTPATIENT
Start: 2025-05-12 | End: 2025-05-13 | Stop reason: HOSPADM

## 2025-05-12 RX ORDER — TRAZODONE HYDROCHLORIDE 100 MG/1
100 TABLET ORAL NIGHTLY
Status: DISCONTINUED | OUTPATIENT
Start: 2025-05-12 | End: 2025-05-13 | Stop reason: HOSPADM

## 2025-05-12 RX ORDER — IOPAMIDOL 755 MG/ML
INJECTION, SOLUTION INTRAVASCULAR
Status: DISCONTINUED | OUTPATIENT
Start: 2025-05-12 | End: 2025-05-12 | Stop reason: HOSPADM

## 2025-05-12 RX ORDER — CLOPIDOGREL BISULFATE 75 MG/1
TABLET ORAL
Status: DISCONTINUED | OUTPATIENT
Start: 2025-05-12 | End: 2025-05-12 | Stop reason: HOSPADM

## 2025-05-12 RX ORDER — VERAPAMIL HYDROCHLORIDE 2.5 MG/ML
INJECTION INTRAVENOUS
Status: DISCONTINUED | OUTPATIENT
Start: 2025-05-12 | End: 2025-05-12 | Stop reason: HOSPADM

## 2025-05-12 RX ORDER — ROSUVASTATIN CALCIUM 20 MG/1
40 TABLET, COATED ORAL NIGHTLY
Status: DISCONTINUED | OUTPATIENT
Start: 2025-05-12 | End: 2025-05-13 | Stop reason: HOSPADM

## 2025-05-12 RX ORDER — NITROGLYCERIN 0.4 MG/1
0.4 TABLET SUBLINGUAL
Status: DISCONTINUED | OUTPATIENT
Start: 2025-05-12 | End: 2025-05-13 | Stop reason: HOSPADM

## 2025-05-12 RX ADMIN — LOSARTAN POTASSIUM 25 MG: 25 TABLET, FILM COATED ORAL at 13:29

## 2025-05-12 RX ADMIN — SODIUM CHLORIDE 100 ML/HR: 9 INJECTION, SOLUTION INTRAVENOUS at 13:41

## 2025-05-12 RX ADMIN — BUSPIRONE HYDROCHLORIDE 10 MG: 10 TABLET ORAL at 17:47

## 2025-05-12 RX ADMIN — Medication 10 ML: at 21:13

## 2025-05-12 RX ADMIN — ROSUVASTATIN CALCIUM 40 MG: 20 TABLET, FILM COATED ORAL at 21:12

## 2025-05-12 RX ADMIN — BUSPIRONE HYDROCHLORIDE 10 MG: 10 TABLET ORAL at 21:12

## 2025-05-12 RX ADMIN — CLOPIDOGREL BISULFATE 75 MG: 75 TABLET, FILM COATED ORAL at 08:01

## 2025-05-12 RX ADMIN — COLCHICINE 0.6 MG: 0.6 TABLET ORAL at 13:28

## 2025-05-12 RX ADMIN — TOPIRAMATE 50 MG: 25 TABLET, FILM COATED ORAL at 21:12

## 2025-05-12 RX ADMIN — BUPROPION HYDROCHLORIDE 300 MG: 150 TABLET, EXTENDED RELEASE ORAL at 13:40

## 2025-05-12 RX ADMIN — ASPIRIN 81 MG: 81 TABLET, COATED ORAL at 08:01

## 2025-05-12 RX ADMIN — EMPAGLIFLOZIN 25 MG: 25 TABLET, FILM COATED ORAL at 13:27

## 2025-05-12 RX ADMIN — METOPROLOL SUCCINATE 25 MG: 25 TABLET, EXTENDED RELEASE ORAL at 21:12

## 2025-05-12 RX ADMIN — TRAZODONE HYDROCHLORIDE 100 MG: 100 TABLET ORAL at 21:13

## 2025-05-12 RX ADMIN — Medication 10 ML: at 08:01

## 2025-05-12 NOTE — CONSULTS
[facility]   Cardiology Consult Note    Patient Name: Bianca Boles  : 1980  MRN: [id number]  Primary Care Physician:  Patricia Hernandez APRN  Referring Physician: No ref. provider found    Date of admission: 2025    Subjective   Subjective     Reason for Consultation : Chest pain and elevated high-sensitivity troponin    Chief Complaint : Chest pain    HPI:  Bianca Boles is a 45 y.o. female with known severe coronary artery disease.  She quit smoking few years ago.  He has strong family history of premature coronary artery disease, father had an myocardial infarction in his late 40s..  Last September the patient was having significant angina and had a very abnormal stress test which showed a moderate perfusion defect of the anterior wall suggestive of infarction and erika-infarct ischemia ischemia.  She had a coronary angiogram which showed a subtotal occlusion of the mid left anterior descending coronary artery artery treated with PCI with drug-eluting stent 3.5 x 48 mm Xience stent postdilated with a 4.5 balloon post DESTINY-3 flow.  The right coronary artery have mild proximal disease in the left circumflex artery was small with mild disease.  The patient have some atypical chest pain about a month ago unrelated with exertion and treated with colchicine with improvement.  Patient was having sexual intercourse last night and soon after developed retrosternal chest discomfort associated with shortness of breath.  Chest discomfort got some relief but then she was awakened from sleep with the chest pain was not getting better and she decided to come to the emergency room.  EKG shows sinus rhythm with repolarization abnormalities and the high sensitive troponin is 22.    Review of Systems   All systems were reviewed and negative except for: Retrosternal chest pain    Personal History     Past Medical History:   Diagnosis Date    Acid reflux     Anxiety     Anxiety and depression      Benign essential hypertension     Borderline personality disorder     Cancer 2021    renal cancer/mass, PARTIAL NEPH, RIGHT    Chronic kidney disease     Coronary artery disease     Diabetes     Diabetes mellitus     type ii, doesn't check bg at home     Diabetes mellitus, type 2     Elevated cholesterol     Fatty liver 1995    Fracture, foot 09/2017    Frozen shoulder 08/02/2023    GERD (gastroesophageal reflux disease)     High triglycerides     History of bariatric surgery 02/04/2021    GASTRIC SLEEVE    History of kidney stones     HTN (hypertension)     Hyperlipemia     Hyperlipidemia     Hypertriglyceridemia     Kidney stone     Low back pain     Lumbar herniated disc     Myocardial infarction 09/2024    Obesity     Panic attacks     PCOS (polycystic ovarian syndrome)     Periarthritis of shoulder 01/23    Psoriasis     ELBOWS    PTSD (post-traumatic stress disorder) 2024    Rotator cuff syndrome 01/23    Seasonal allergies     Self-injurious behavior 1994    Spinal headache     AFTER PAIN EPIDURALS.  NO BLOOD PATCH    Suicide attempt 1995              Family History: family history includes Cancer in her father and mother; Colon cancer in her father; Depression in her sister; Diabetes in her father and paternal grandfather; Heart disease in her father, maternal grandmother, and paternal grandfather; Hypertension in her father; Kidney cancer in her maternal grandmother; Liver cancer in her father; No Known Problems in her brother, cousin, maternal aunt, maternal grandfather, maternal uncle, paternal aunt, paternal grandmother, and paternal uncle; Prostate cancer in her father; Stroke in her maternal grandmother. Otherwise pertinent FHx was reviewed and not pertinent to current issue.    Social History:  reports that she quit smoking about 5 years ago. Her smoking use included cigarettes. She started smoking about 30 years ago. She has a 8.7 pack-year smoking history. She has never been exposed to tobacco  smoke. She has never used smokeless tobacco. She reports that she does not currently use alcohol. She reports that she does not currently use drugs after having used the following drugs: Marijuana. Frequency: 0.10 times per week.    Home Medications:  Apremilast, Biotin, Calcium-Magnesium-Zinc, Cholecalciferol, Collagen-Vitamin C-Biotin, Diclofenac Sodium, Estrogens Conjugated, Iron-Vitamin C, Probiotic Product, Tirzepatide, aspirin, azelaic acid, betamethasone valerate, buPROPion XL, busPIRone, cetirizine, clopidogrel, colchicine, cyclobenzaprine, dapagliflozin Propanediol, desvenlafaxine, fluticasone, hydrOXYzine, hydrocortisone, linaclotide, losartan, metoprolol succinate XL, multivitamin, nitroglycerin, ondansetron ODT, pravastatin, testosterone micronized, topiramate, and traZODone    Allergies:  Allergies   Allergen Reactions    Morphine Itching and Nausea And Vomiting       Objective    Objective     Vitals:   Temp:  [97.8 °F (36.6 °C)-99 °F (37.2 °C)] 97.9 °F (36.6 °C)  Heart Rate:  [61-73] 66  Resp:  [14-18] 16  BP: ()/(54-75) 88/65      Physical Exam:   Constitutional: Awake, alert, No acute distress    Eyes: PERRLA, sclerae anicteric, no conjunctival injection   HENT: NCAT, mucous membranes moist   Neck: Supple, no thyromegaly, no lymphadenopathy, trachea midline   Respiratory: Clear to auscultation bilaterally, nonlabored respirations    Cardiovascular: RRR, no murmurs, rubs, or gallops, palpable pedal pulses bilaterally   Gastrointestinal: Positive bowel sounds, soft, nontender, nondistended   Musculoskeletal: No bilateral ankle edema, no clubbing or cyanosis to extremities   Psychiatric: Appropriate affect, cooperative   Neurologic: Oriented x 3, strength symmetric in all extremities, Cranial Nerves grossly intact to confrontation, speech clear   Skin: No rashes     Result Review    Result Review:  I have personally reviewed the results from the time of this admission to 5/12/2025 08:38 EDT and  agree with these findings:  [x]  Laboratory  []  Microbiology  [x]  Radiology  [x]  EKG/Telemetry   [x]  Cardiology/Vascular   []  Pathology  [x]  Old records  []  Other:  Most notable findings include:     CMP          4/16/2025    20:25 5/11/2025    17:19 5/12/2025    04:25   CMP   Glucose 78  84  75    BUN 21  17  16    Creatinine 1.32  1.09  1.23    EGFR 50.5  63.6  55.0    Sodium 140  140  137    Potassium 4.5  3.7  4.2    Chloride 104  106  107    Calcium 9.7  9.5  9.1    Total Protein 7.6  7.2  6.6    Albumin 4.4  4.7  4.0    Globulin 3.2  2.5  2.6    Total Bilirubin 0.3  0.5  0.3    Alkaline Phosphatase 91  86  79    AST (SGOT) 30  33  31    ALT (SGPT) 33  41  36    Albumin/Globulin Ratio 1.4  1.9  1.5    BUN/Creatinine Ratio 15.9  15.6  13.0    Anion Gap 13.1  13.1  8.4       CBC          4/16/2025    20:25 5/11/2025    17:19 5/12/2025    04:25   CBC   WBC 8.33  8.66  7.28    RBC 4.99  5.03  4.67    Hemoglobin 14.9  14.9  13.8    Hematocrit 44.6  44.5  41.4    MCV 89.4  88.5  88.7    MCH 29.9  29.6  29.6    MCHC 33.4  33.5  33.3    RDW 13.0  13.2  13.5    Platelets 195  196  177       Lab Results   Component Value Date    TROPONINT 22 (H) 05/11/2025         Assessment & Plan   Assessment / Plan     Brief Patient Summary:  Bianca Boles is a 45 y.o. female who has known severe coronary artery disease with previous PCI of the left anterior descending coronary artery with drug-eluting stents back in September of last year.  She now admitted with new onset angina and mildly elevated troponins.  The 2D echo showed preserved ejection fraction and no significant wall motion abnormalities.    Active Hospital Problems:  Active Hospital Problems    Diagnosis     **Acute coronary syndrome with high troponin     Chest pain          Plan:     The patient has acute coronary syndrome and had an urgent coronary angiography which showed patent stent of the LAD.  The right coronary artery has a new severe distal and 94%  stenosis with pre-DESTINY 2 flow.  This was the culprit lesion I was treated with intravascular ultrasound-guided PCI with a drug-eluting 3.5 x 23 mm Xience stent which was postdilated with a 4.0 balloon to 4.2/4.3 mm in diameter.  Good stent apposition and result with post DESTINY-3 flow.  Plan is to continue with aspirin 81 mg oral daily and Plavix 75 m oral daily.  Continue with blood pressure control including beta-blockers and angiotensin receptor blockers.  Continue with Jardiance as tolerated.  Will switch to rosuvastatin 40 mg oral daily as tolerated for goal LDL less than 70.  Discontinue the pravastatin.  Watch for bleeding hematoma peripheral pulses.  Possible discharge home tomorrow if stable.    Electronically signed by Shine Connolly MD, 05/12/25, 8:38 AM EDT.

## 2025-05-12 NOTE — PROGRESS NOTES
[facility]   Hospitalist Progress Note  Date: 2025  Patient Name: Bianca Boles  : 1980  MRN: [id number]  Date of admission: 2025      Subjective   Subjective     Chief complaint: Chest pain    Summary:  45-year-old female with history of hypertension, diabetes, GERD, history of laparoscopic sleeve gastrectomy, CKD stage IIIa, depression, CAD status post senting to [facility] in , has been having chest pain on and off for the past month with concern for pericarditis being managed as outpatient, substernal chest pain started to radiate to neck, hospitalized for further monitoring and management, cardiology consulted, echo ordered, remains n.p.o. for possible procedural cardiology intervention     Interval follow-up: Seen and examined, patient was having echocardiogram being performed at the bedside.  Still complains of chest tightness, substernal radiating to her neck.  Blood pressures are soft but stable heart rate in the 60s, white blood count 7000, hemoglobin 13.8, creatinine 1.23, BUN 16, potassium 4.2, sodium 137.    Review of systems:  All systems reviewed and negative except for chest pain    Objective   Objective     Vitals:   Temp:  [97.8 °F (36.6 °C)-99 °F (37.2 °C)] 97.9 °F (36.6 °C)  Heart Rate:  [61-73] 66  Resp:  [14-18] 16  BP: ()/(54-75) 88/65  Physical Exam               Constitutional: Awake, alert, no acute distress              Eyes: Pupils equal, sclerae anicteric, no conjunctival injection              HENT: NCAT, mucous membranes moist              Respiratory: Clear to auscultation bilaterally, nonlabored respirations               Cardiovascular: RRR, no murmurs              Gastrointestinal: Positive bowel sounds, soft, nontender, nondistended              Musculoskeletal: No bilateral ankle edema, no clubbing or cyanosis to extremities              Neurologic: Oriented x 3, strength symmetric in all extremities, Cranial Nerves grossly intact to confrontation,  speech clear              Skin: No rashes         Result Review    Result Review:  I have personally reviewed the pertinent results from the past 24 hours to 5/12/2025 10:56 EDT and agree with these findings:  [x]  Laboratory   CBC          4/16/2025    20:25 5/11/2025    17:19 5/12/2025    04:25   CBC   WBC 8.33  8.66  7.28    RBC 4.99  5.03  4.67    Hemoglobin 14.9  14.9  13.8    Hematocrit 44.6  44.5  41.4    MCV 89.4  88.5  88.7    MCH 29.9  29.6  29.6    MCHC 33.4  33.5  33.3    RDW 13.0  13.2  13.5    Platelets 195  196  177      BMP          4/16/2025    20:25 5/11/2025    17:19 5/12/2025    04:25   BMP   BUN 21  17  16    Creatinine 1.32  1.09  1.23    Sodium 140  140  137    Potassium 4.5  3.7  4.2    Chloride 104  106  107    CO2 22.9  20.9  21.6    Calcium 9.7  9.5  9.1      LIVER FUNCTION TESTS:      Lab 05/12/25  0425 05/11/25  1719   TOTAL PROTEIN 6.6 7.2   ALBUMIN 4.0 4.7   GLOBULIN 2.6 2.5   ALT (SGPT) 36* 41*   AST (SGOT) 31 33*   BILIRUBIN 0.3 0.5   ALK PHOS 79 86   LIPASE  --  71*       [x]  Microbiology   Microbiology Results (last 10 days)       ** No results found for the last 240 hours. **              [x]  Radiology XR Chest 1 View  Result Date: 5/11/2025  Impression: No active disease is seen. Electronically Signed: Jace Arriola MD  5/11/2025 5:29 PM EDT  Workstation ID: [id number]        [x]  EKG/Telemetry   ECG 12 Lead ED Triage Standing Order; Chest Pain   Final Result   HEART RATE=64  bpm   RR Ydjugqxv=281  ms   LA Hjpmwjwo=766  ms   P Horizontal Axis=0  deg   P Front Axis=36  deg   QRSD Bkibzvlz=070  ms   QT Qurlysfg=451  ms   SKoE=671  ms   QRS Axis=20  deg   T Wave Axis=35  deg   - NORMAL ECG -   Sinus rhythm   When compared with ECG of 16-Apr-2025 20:13:49,   Significant axis, voltage or hypertrophy change   Electronically Signed By: Jacob Gaspar ([facility]) 2025-05-11 22:03:03   Date and Time of Study:2025-05-11 16:53:28          []  Cardiology/Vascular   []  Pathology  [x]  Old  records  []  Other:    Assessment & Plan   Assessment / Plan     Assessment/Plan:  Assessment:  Intermittent chest pain  Stable angina  ?  Pericarditis  History of CAD status post stent to LAD in September 2024  Hypertension  Type 2 diabetes mellitus  GERD  Status post laparoscopic sleeve gastrectomy  CKD stage III  Depression    Plan:  Labs and imaging reviewed  2D echo today  N.p.o. for possibility of cardiology intervention  Reconcile home medications, resumed accordingly  Continue Plavix, colchicine, BuSpar, bupropion, Cozaar, Toprol-XL  Continue Topamax, trazodone  Telemetry monitoring  Follow-up cardiology recommendations  A.m. labs  Full code  DVT prophylaxis heparin  Clinical course dictate further management  Discussed with nurse at the bedside      VTE Prophylaxis:  Pharmacologic VTE prophylaxis orders are present.        CODE STATUS:   Code Status (Patient has no pulse and is not breathing): CPR (Attempt to Resuscitate)  Medical Interventions (Patient has pulse or is breathing): Full Support  Level Of Support Discussed With: Patient        Electronically signed by Yesi Blake MD, 5/12/2025, 10:56 EDT.    Portions of this documentation were transcribed electronically from a voice recognition software.  I confirm all data accurately represents the service(s) I performed at today's visit.

## 2025-05-12 NOTE — PLAN OF CARE
Goal Outcome Evaluation:              Outcome Evaluation: Patient aox4, vss. No c/o pain or discomfort. Heart cath performed and one stent placed, TR band is removed and a pressure dressing is in place. Patient kept the splint on right hand to prevent moving too much. Call light in reach.

## 2025-05-12 NOTE — PLAN OF CARE
Goal Outcome Evaluation:              Outcome Evaluation: Patient alert and oriented x4, VSS. Patient is type 2 diabetic and is currently on Q6H Blood sugar checks. Patient rated chest pain as a 3 out of 10 with more tightness than pain. Patient made NPO at midnight for stress today 5/12. No significant changes this shift, plan of care ongoing.

## 2025-05-13 ENCOUNTER — READMISSION MANAGEMENT (OUTPATIENT)
Dept: CALL CENTER | Facility: HOSPITAL | Age: 45
End: 2025-05-13
Payer: COMMERCIAL

## 2025-05-13 VITALS
RESPIRATION RATE: 18 BRPM | WEIGHT: 213.19 LBS | DIASTOLIC BLOOD PRESSURE: 63 MMHG | HEIGHT: 67 IN | BODY MASS INDEX: 33.46 KG/M2 | HEART RATE: 73 BPM | TEMPERATURE: 97.9 F | SYSTOLIC BLOOD PRESSURE: 102 MMHG | OXYGEN SATURATION: 99 %

## 2025-05-13 LAB
ALBUMIN SERPL-MCNC: 3.7 G/DL (ref 3.5–5.2)
ALP SERPL-CCNC: 72 U/L (ref 39–117)
ALT SERPL W P-5'-P-CCNC: 29 U/L (ref 1–33)
ANION GAP SERPL CALCULATED.3IONS-SCNC: 10.7 MMOL/L (ref 5–15)
AST SERPL-CCNC: 23 U/L (ref 1–32)
BASOPHILS # BLD AUTO: 0.04 10*3/MM3 (ref 0–0.2)
BASOPHILS NFR BLD AUTO: 0.6 % (ref 0–1.5)
BILIRUB CONJ SERPL-MCNC: 0.1 MG/DL (ref 0–0.3)
BILIRUB INDIRECT SERPL-MCNC: 0.2 MG/DL
BILIRUB SERPL-MCNC: 0.3 MG/DL (ref 0–1.2)
BUN SERPL-MCNC: 14 MG/DL (ref 6–20)
BUN/CREAT SERPL: 13.5 (ref 7–25)
CALCIUM SPEC-SCNC: 8.9 MG/DL (ref 8.6–10.5)
CHLORIDE SERPL-SCNC: 110 MMOL/L (ref 98–107)
CO2 SERPL-SCNC: 16.3 MMOL/L (ref 22–29)
CREAT SERPL-MCNC: 1.04 MG/DL (ref 0.57–1)
DEPRECATED RDW RBC AUTO: 43 FL (ref 37–54)
EGFRCR SERPLBLD CKD-EPI 2021: 67.3 ML/MIN/1.73
EOSINOPHIL # BLD AUTO: 0.14 10*3/MM3 (ref 0–0.4)
EOSINOPHIL NFR BLD AUTO: 2.2 % (ref 0.3–6.2)
ERYTHROCYTE [DISTWIDTH] IN BLOOD BY AUTOMATED COUNT: 13.2 % (ref 12.3–15.4)
GLUCOSE BLDC GLUCOMTR-MCNC: 120 MG/DL (ref 70–99)
GLUCOSE BLDC GLUCOMTR-MCNC: 125 MG/DL (ref 70–99)
GLUCOSE BLDC GLUCOMTR-MCNC: 67 MG/DL (ref 70–99)
GLUCOSE BLDC GLUCOMTR-MCNC: 80 MG/DL (ref 70–99)
GLUCOSE SERPL-MCNC: 82 MG/DL (ref 65–99)
HCT VFR BLD AUTO: 41.5 % (ref 34–46.6)
HGB BLD-MCNC: 13.8 G/DL (ref 12–15.9)
IMM GRANULOCYTES # BLD AUTO: 0.06 10*3/MM3 (ref 0–0.05)
IMM GRANULOCYTES NFR BLD AUTO: 1 % (ref 0–0.5)
LYMPHOCYTES # BLD AUTO: 1.74 10*3/MM3 (ref 0.7–3.1)
LYMPHOCYTES NFR BLD AUTO: 27.8 % (ref 19.6–45.3)
MAGNESIUM SERPL-MCNC: 2.1 MG/DL (ref 1.6–2.6)
MCH RBC QN AUTO: 29.6 PG (ref 26.6–33)
MCHC RBC AUTO-ENTMCNC: 33.3 G/DL (ref 31.5–35.7)
MCV RBC AUTO: 89.1 FL (ref 79–97)
MONOCYTES # BLD AUTO: 0.71 10*3/MM3 (ref 0.1–0.9)
MONOCYTES NFR BLD AUTO: 11.4 % (ref 5–12)
NEUTROPHILS NFR BLD AUTO: 3.56 10*3/MM3 (ref 1.7–7)
NEUTROPHILS NFR BLD AUTO: 57 % (ref 42.7–76)
NRBC BLD AUTO-RTO: 0 /100 WBC (ref 0–0.2)
PHOSPHATE SERPL-MCNC: 3.7 MG/DL (ref 2.5–4.5)
PLATELET # BLD AUTO: 151 10*3/MM3 (ref 140–450)
PMV BLD AUTO: 9.7 FL (ref 6–12)
POTASSIUM SERPL-SCNC: 3.9 MMOL/L (ref 3.5–5.2)
PROT SERPL-MCNC: 6 G/DL (ref 6–8.5)
QT INTERVAL: 398 MS
QT INTERVAL: 407 MS
QTC INTERVAL: 419 MS
QTC INTERVAL: 429 MS
RBC # BLD AUTO: 4.66 10*6/MM3 (ref 3.77–5.28)
SODIUM SERPL-SCNC: 137 MMOL/L (ref 136–145)
WBC NRBC COR # BLD AUTO: 6.25 10*3/MM3 (ref 3.4–10.8)

## 2025-05-13 PROCEDURE — G0378 HOSPITAL OBSERVATION PER HR: HCPCS

## 2025-05-13 PROCEDURE — 93005 ELECTROCARDIOGRAM TRACING: CPT | Performed by: INTERNAL MEDICINE

## 2025-05-13 PROCEDURE — 82948 REAGENT STRIP/BLOOD GLUCOSE: CPT

## 2025-05-13 PROCEDURE — 85025 COMPLETE CBC W/AUTO DIFF WBC: CPT | Performed by: INTERNAL MEDICINE

## 2025-05-13 PROCEDURE — 99239 HOSP IP/OBS DSCHRG MGMT >30: CPT | Performed by: FAMILY MEDICINE

## 2025-05-13 PROCEDURE — 80076 HEPATIC FUNCTION PANEL: CPT | Performed by: INTERNAL MEDICINE

## 2025-05-13 PROCEDURE — 83735 ASSAY OF MAGNESIUM: CPT | Performed by: INTERNAL MEDICINE

## 2025-05-13 PROCEDURE — 82948 REAGENT STRIP/BLOOD GLUCOSE: CPT | Performed by: INTERNAL MEDICINE

## 2025-05-13 PROCEDURE — 84100 ASSAY OF PHOSPHORUS: CPT | Performed by: INTERNAL MEDICINE

## 2025-05-13 PROCEDURE — 80048 BASIC METABOLIC PNL TOTAL CA: CPT | Performed by: INTERNAL MEDICINE

## 2025-05-13 RX ORDER — ROSUVASTATIN CALCIUM 40 MG/1
40 TABLET, COATED ORAL NIGHTLY
Qty: 30 TABLET | Refills: 0 | Status: SHIPPED | OUTPATIENT
Start: 2025-05-13 | End: 2025-06-12

## 2025-05-13 RX ADMIN — BUSPIRONE HYDROCHLORIDE 10 MG: 10 TABLET ORAL at 09:17

## 2025-05-13 RX ADMIN — BUSPIRONE HYDROCHLORIDE 10 MG: 10 TABLET ORAL at 16:35

## 2025-05-13 RX ADMIN — COLCHICINE 0.6 MG: 0.6 TABLET ORAL at 09:16

## 2025-05-13 RX ADMIN — ASPIRIN 81 MG: 81 TABLET, COATED ORAL at 09:16

## 2025-05-13 RX ADMIN — CLOPIDOGREL BISULFATE 75 MG: 75 TABLET, FILM COATED ORAL at 09:17

## 2025-05-13 RX ADMIN — EMPAGLIFLOZIN 25 MG: 25 TABLET, FILM COATED ORAL at 09:17

## 2025-05-13 RX ADMIN — Medication 10 ML: at 09:17

## 2025-05-13 NOTE — OUTREACH NOTE
Prep Survey      Flowsheet Row Responses   [facility] patient discharged from? [facility]   Is LACE score < 7 ? No   Eligibility [facility]   Date of Admission 05/11/25   Date of Discharge 05/13/25   Discharge diagnosis Acute coronary syndrome with high troponin   Does the patient have one of the following disease processes/diagnoses(primary or secondary)? Other   Prep survey completed? Yes            Lidia CARROLL - Registered Nurse

## 2025-05-13 NOTE — PLAN OF CARE
Goal Outcome Evaluation:  Plan of Care Reviewed With: patient           Outcome Evaluation: A&Ox4. Vital signs stable. No complaints of chest pain or discomfort this shift. Pressure dressing clean, dry, and intact with no signs of bleeding. No insulin needed at bedtime. No questions or concerns noted at this time. Plan of care ongoing.

## 2025-05-13 NOTE — NURSING NOTE
Patient discharged home this evening. IV removed with no signs of excessive bleeding. Tele monitor removed and returned back to central monitoring. Discharge instructions completed with verbal understanding from patient. Patient advised to return to work after 48 hours and resume Losartan on 05/14 if bp stable according to Yesi Blake MD. Meds retrieved from pharmacy and returned to patient. Meds to beds completed with patient. Patient declined wheelchair, walked out to private vehicle with family.

## 2025-05-13 NOTE — PLAN OF CARE
Goal Outcome Evaluation:           Progress: improving  Outcome Evaluation: patient A&Ox4. bp soft this shift, held losartan and bupropion. no c/o chest pain. Cardiac cath post op site clean dry and intact. No other changes this shift. ongoing care.

## 2025-05-13 NOTE — DISCHARGE SUMMARY
[facility]         HOSPITALIST  DISCHARGE SUMMARY    Patient Name: Bianca Boles  : 1980  MRN: [id number]    Date of Admission: 2025  Date of Discharge:  2025    Primary Care Physician: Patricia Hernandez APRN    Consults       Date and Time Order Name Status Description    2025  9:34 PM Inpatient Cardiology Consult      2025  7:31 PM Hospitalist (on-call MD unless specified)              Active and Resolved Hospital Problems:  Intermittent chest pain  Unstable angina  CAD with history of LAD stenting, now with RCA stenting  Hypertension  Type 2 diabetes mellitus  GERD  Status post laparoscopic sleeve gastrectomy  CKD stage III  Depression  Active Hospital Problems    Diagnosis POA    **Acute coronary syndrome with high troponin [I24.9] Unknown    Chest pain [R07.9] Yes      Resolved Hospital Problems   No resolved problems to display.       Hospital Course     Hospital Course:  45-year-old female with history of hypertension, diabetes, GERD, history of laparoscopic sleeve gastrectomy, CKD stage IIIa, depression, CAD status post senting to LAD in , has been having chest pain on and off for the past month with concern for pericarditis being managed as outpatient, substernal chest pain started to radiate to neck, hospitalized for further monitoring and management, cardiology consulted, echo ordered, echo showed EF 56 to 60%, cardiology proceeded cardiac cath which showed irregularities of the right coronary artery with stenosis, status post PCI LATHA to right coronary artery.  Left coronary artery stent patent.  Tolerated procedure well, blood pressures trended low, was further monitored, losartan held, blood pressures improved.  Patient discharged in hemodynamically stable addition on 2025 to continue Plavix and follow-up with cardiology as outpatient.  Of note pravastatin was switched to Crestor on discharge.      Day of Discharge     Vital  Signs:  Temp:  [97.9 °F (36.6 °C)-98.2 °F (36.8 °C)] 97.9 °F (36.6 °C)  Heart Rate:  [64-78] 73  Resp:  [18] 18  BP: ()/(56-68) 102/63  Review of systems:  All systems reviewed and negative except for generalized fatigue    Physical Exam                         Constitutional: Awake, alert, no acute distress sitting upright in the bed              Eyes: Pupils equal, sclerae anicteric, no conjunctival injection              HENT: NCAT, mucous membranes moist              Respiratory: Clear to auscultation bilaterally, nonlabored respirations               Cardiovascular: RRR, no murmurs              Gastrointestinal: Positive bowel sounds, soft, nontender, nondistended              Musculoskeletal: No bilateral ankle edema, no clubbing or cyanosis to extremities              Neurologic: Oriented x 3, strength symmetric in all extremities, Cranial Nerves grossly intact to confrontation, speech clear              Skin: No rashes       Discharge Details        Discharge Medications        PAUSE taking these medications        Instructions Start Date   losartan 25 MG tablet  Wait to take this until: May 14, 2025 Around Noon  Commonly known as: COZAAR   25 mg, Oral, Daily             New Medications        Instructions Start Date   rosuvastatin 40 MG tablet  Commonly known as: CRESTOR   40 mg, Oral, Nightly             Continue These Medications        Instructions Start Date   Aspirin Low Dose 81 MG EC tablet  Generic drug: aspirin   81 mg, Oral, Daily      azelaic acid 15 % gel  Commonly known as: AZELEX   Apply 1 Application topically to face as directed Daily after cleansing.      betamethasone valerate 0.1 % cream  Commonly known as: VALISONE   Apply 1 Application topically to the appropriate area as directed 1 (One) to  2 (Two) Times a Day until approved.      Biotin 46969 MCG tablet   1 tablet, Daily      buPROPion  MG 24 hr tablet  Commonly known as: WELLBUTRIN XL   300 mg, Oral, Daily      busPIRone  10 MG tablet  Commonly known as: BUSPAR   10 mg, Oral, 3 Times Daily      CALCIUM-MAGNESIUM-ZINC PO   1 tablet, Daily      cetirizine 10 MG tablet  Commonly known as: zyrTEC   10 mg, Daily PRN      Cholecalciferol 25 MCG (1000 UT) capsule   2,000 Units, Oral, Daily      clopidogrel 75 MG tablet  Commonly known as: PLAVIX   Take 1 tablet by mouth Daily      colchicine 0.6 MG tablet   0.6 mg, Oral, Daily      COLLAGEN PO   1 tablet, Daily      cyclobenzaprine 10 MG tablet  Commonly known as: FLEXERIL   10 mg, Oral, Nightly      desvenlafaxine 50 MG 24 hr tablet  Commonly known as: PRISTIQ   50 mg, Oral, Daily      Diclofenac Sodium 1 % gel gel  Commonly known as: VOLTAREN   4 g, Topical, 4 Times Daily PRN      Farxiga 10 MG tablet  Generic drug: dapagliflozin Propanediol   Take 1 tablet by mouth Daily.      fluticasone 50 MCG/ACT nasal spray  Commonly known as: FLONASE   USE 2 SPRAYS IN EACH NOSTRIL ONCE DAILY AS DIRECTED      hydrocortisone 2.5 % cream   Apply 1 Application topically to the appropriate area of the face as directed 1 (One) to 2 (Two) Times a Day until approved.      hydrOXYzine 10 MG tablet  Commonly known as: ATARAX   10 mg, Oral, 3 Times Daily PRN      IRON-VITAMIN C PO   1 tablet, Daily      Linzess 72 MCG capsule capsule  Generic drug: linaclotide   72 mcg, Oral, Every Morning Before Breakfast      metoprolol succinate XL 25 MG 24 hr tablet  Commonly known as: Toprol XL   25 mg, Oral, Nightly      Mounjaro 12.5 MG/0.5ML solution auto-injector  Generic drug: Tirzepatide   Inject 12.5 mg under the skin into the appropriate area as directed 1 (One) Time Per Week.      multivitamin tablet tablet  Commonly known as: THERAGRAN   1 tablet, Nightly      nitroglycerin 0.4 MG SL tablet  Commonly known as: NITROSTAT   0.4 mg, Sublingual, Every 5 Minutes PRN, Take no more than 3 doses in 15 minutes.      ondansetron ODT 4 MG disintegrating tablet  Commonly known as: ZOFRAN-ODT   4 mg, Translingual, Every 8  Hours PRN      Otezla 30 MG tablet  Generic drug: Apremilast   Take 1 tablet by mouth 2 (Two) Times a Day.      Premarin 0.625 MG/GM vaginal cream  Generic drug: Estrogens Conjugated   Insert 1 gram into the vagina every night at bedtime for 2 Weeks, THEN 1 gram into the vagina every night at bedtime 2 (Two) Times a Week.      PROBIOTIC-10 PO   1 tablet, Nightly      testosterone micronized powder   Appyl 0.5 mL (2 clicks) to skin daily      topiramate 50 MG tablet  Commonly known as: TOPAMAX   Take 1 tablet by mouth every night at bedtime      traZODone 100 MG tablet  Commonly known as: DESYREL   100 mg, Oral, Nightly             Stop These Medications      pravastatin 80 MG tablet  Commonly known as: PRAVACHOL              Allergies   Allergen Reactions    Morphine Itching and Nausea And Vomiting       Discharge Disposition:  Home or Self Care    Diet:  Hospital:  Diet Order   Procedures    Diet: Cardiac; Healthy Heart (2-3 Na+); Fluid Consistency: Thin (IDDSI 0)       Discharge Activity: as tolerates      CODE STATUS:  Code Status and Medical Interventions: CPR (Attempt to Resuscitate); Full Support   Ordered at: 05/11/25 2112     Code Status (Patient has no pulse and is not breathing):    CPR (Attempt to Resuscitate)     Medical Interventions (Patient has pulse or is breathing):    Full Support     Level Of Support Discussed With:    Patient         Future Appointments   Date Time Provider Department Center   5/21/2025  9:00 AM [place] ECHO PAV 2 [facility] CARDP None   5/21/2025 10:00 AM [place] CARD RM PAV 1  [place] NC P None   6/4/2025 11:00 AM Evie Luevano [facility] E  COR2 COR   6/17/2025  8:15 AM Patricia Hernandez APRN [facility] HFC [facility]   6/19/2025 10:00 AM Evie Luevano [facility] MGE  COR2 COR   10/15/2025  8:00 AM Michell Horan APRN [facility] GE ETW [facility]   2/9/2026  9:30 AM Lori Troy MD MG U ETRING [facility]   2/27/2026 10:30 AM Lori Manzanares APRN [facility] SRG None       Additional Instructions for the  Follow-ups that You Need to Schedule       Discharge Follow-up with PCP   As directed       Currently Documented PCP:    Patricia Hernandez APRN    PCP Phone Number:    [phone]     Follow Up Details: 3 to 7 days                Pertinent  and/or Most Recent Results     PROCEDURES:   Adult Transthoracic Echo Complete W/ Cont if Necessary Per Protocol  Result Date: 2025    Left ventricular ejection fraction appears to be 56 - 60%.   Left ventricular diastolic function is consistent with (grade I) impaired relaxation.   Estimated right ventricular systolic pressure from tricuspid regurgitation is normal (<35 mmHg).     Cardiac Catheterization/Vascular Study  Result Date: 2025  [facility] CARDIAC CATHETERIZATION PROCEDURE REPORT Patient: Bianca Boles : 1980 MRN: [id number] Procedure Date: 25 Referring Physician: Shnie Connolly MD Interventional Cardiologist: Shine Connolly MD, [facility] Indication: Acute Coronary Syndrome Severe CAD Clinical Presentation: 59-year-old female with known severe coronary artery disease and previous PCI of the left anterior descending coronary artery with drug-eluting stent back in September of last year.  At that time she had a very abnormal stress test and 100% occlusion of the mid LAD.  Now she was admitted with new onset angina and mild elevation in troponins. Procedure performed: Diagnostic Left Heart Catheterization Coronary Angiography Intravascular ultrasound-guided successful percutaneous coronary intervention to right coronary artery with drug-eluting stent 3.523 mm Xience stent postdilated with a 4.0 balloon to 4.3 mm diameter. Access Site(s): Right radial Findings: 1. Coronary Artery Anatomy: Dominance: Right Left Main: No stenosis. Left Anterior Descending: Patent stent with minimal irregularities behind stent struts. Mild diffuse distal disease. Small diagonal branches with minimal disease. Left Circumflex Artery: Mild disease at  the first marginal branch. The second marginal had mild disease. The distal vessel had mild disease. Right Coronary Artery:  Large and dominant with minimal proximal and mid segment irregularities. The distal vessel had a 95 % stenosis. Pre DESTINY 2 flow. The PL branch and PDA had mild disease. 2. Hemodynamics:    The opening aortic pressure is 130/990 mmHg. 4. Percutaneous Intervention: Location: RCA Treatment: LATHA 3.5 23 MM XIENCE STENT. Pre-stenosis: 95 % Post-stenosis: 0 % Lesion Type C: N DESTINY Flow Pre: 2 DESTINY Flow Post: 3 Bifurcation: N Severe Calcium: N Dissection: No Conclusions: Right Coronary Artery:  Large and dominant with minimal proximal and mid segment irregularities. The distal vessel had a 95 % stenosis. Pre DESTINY 2 flow. The PL branch and PDA had mild disease.  PCI with a drug-eluting stent 3.5 23 mm Xience stent.  IVUS guided postdilated with a 4.0 x 12 NC balloon to 24 ni, stent luminal area of 4.2 to 4.3 mm².  No residual stenosis of the stented segment.  The stent expansion and apposition.  There was mild plaque distal to the stent edge with an area of 8 to 9 mm². Patent stent in the LAD. Recommendations: Aspirin 81 mg oral daily and Plavix 75 mg daily. Discontinue Pravachol and initiate rosuvastatin 40 mg oral daily.  Consider combination with Repatha. Procedure Status: Complete Details of the procedure: Informed consent was obtained with an explanation of the risks, benefits and alternatives of the procedure. The patient was brought to the Cardiac Catheterization Laboratory and was prepped and draped in a standard sterile fashion. Moderate sedation with Fentanyl and Versed was administered by the circulating nurse. Lidocaine 2% was used to anesthetize the right  radial artery and a 5/6 Slender sheath was placed.   A 6 F JR. 3.5 and JL 3.5 catheters used.  Each catheter was used to engage the right and left coronary arteries with diagnostic angiography obtained in all appropriate projections.     The catheter was then removed over a guidewire. Details of angioplasty: After the clinical and angiographic data decision was made to proceed with percutaneous coronary intervention of the right coronary artery which was the culprit vessel with a severe stenotic segment and DESTINY II flow.  Angiomax was given IV push followed by infusion.  The patient received a reloading of clopidogrel 300 mg and aspirin 81 mg.  A 6 Azeri JR 3.5 guide catheter was used to engage the right coronary artery.  Nitroglycerin was given to the guiding catheter dosage of 200 mcg.  A Arvind blue wire was navigated through able to cross the stenotic segment in place of the distal posterolateral branch.  The severe stenotic segment was predilated with a 2.5 x 12 mm balloon to 16 ni then stented with a drug-eluting 3.5 x 23 mm Xience stent which was deployed at 18 in.  Intravascular ultrasound probe was advanced and images were obtained distal edge few millimeters into the distal RCA had a concentric stenosis with a luminal area of 9 mm² which was not significant.  The very distal end of the stent is in a healthy segment.  The stented segment was still under deployed.  Vessel size was about 4.2 to 4.3 mm².  A 4.0 x 12 mm NC balloon was used to post dilate the stented segment up to 24 ni with excellent stent apposition and resolved. Post DESTINY-3 flow down the vessel.  Patient was in a stable patient.  The wire and the guide catheter were removed.  Patient was stable. Heparin was used for anticoagulation throughout the procedure with frequent checking of ACT.  Patient was already on aspirin. Plavix given 300 mg re-loading dose.  .  At the end of the procedure, the radial artery sheath was removed and TR band was applied for hemostasis.  Patient tolerated procedure well without any complications.  The results of the test were explained in detail to the patient. Cumulative fluoroscopy time: 9.7 min Cumulative air kerma: 589 mGy Total amount of  contrast used: 110 ml of Isovue Complications: None. Estimated Blood Loss: Minimal. Shine Connolly MD, [facility] 05/12/25 13:14 EDT     XR Chest 1 View  Result Date: 5/11/2025  XR CHEST 1 VW Date of Exam: 5/11/2025 5:10 PM EDT Indication: Chest Pain Triage Protocol Comparison: CXR 4/16/2025 Findings: The heart is normal in size. The lungs are well-expanded and free of infiltrates. Bony structures appear intact.     Impression: No active disease is seen. Electronically Signed: Jace Arriola MD  5/11/2025 5:29 PM EDT  Workstation ID: [id number]    XR Chest 1 View  Result Date: 4/16/2025  XR CHEST 1 VW Date of Exam: 4/16/2025 9:26 PM EDT Indication: Chest Pain Triage Protocol Comparison: 9/26/2024 Findings: Cardiac and mediastinal contours are normal. Pulmonary vascularity is normal. The lungs are clear. No pneumothorax identified. There is a left-sided coronary stent.     No active disease. Electronically Signed: Brian Cardenas MD  4/16/2025 9:34 PM EDT  Workstation ID: [id number]        LAB RESULTS:      Lab 05/13/25  0423 05/12/25  0425 05/11/25  1719   WBC 6.25 7.28 8.66   HEMOGLOBIN 13.8 13.8 14.9   HEMATOCRIT 41.5 41.4 44.5   PLATELETS 151 177 196   NEUTROS ABS 3.56 3.69 5.43   IMMATURE GRANS (ABS) 0.06* 0.05 0.07*   LYMPHS ABS 1.74 2.65 2.32   MONOS ABS 0.71 0.66 0.65   EOS ABS 0.14 0.19 0.15   MCV 89.1 88.7 88.5         Lab 05/13/25  0423 05/12/25  0425 05/11/25  1719   SODIUM 137 137 140   POTASSIUM 3.9 4.2 3.7   CHLORIDE 110* 107 106   CO2 16.3* 21.6* 20.9*   ANION GAP 10.7 8.4 13.1   BUN 14 16 17   CREATININE 1.04* 1.23* 1.09*   EGFR 67.3 55.0* 63.6   GLUCOSE 82 75 84   CALCIUM 8.9 9.1 9.5   MAGNESIUM 2.1 2.2 2.3   PHOSPHORUS 3.7 3.8  --          Lab 05/13/25  0423 05/12/25  0425 05/11/25  1719   TOTAL PROTEIN 6.0 6.6 7.2   ALBUMIN 3.7 4.0 4.7   GLOBULIN  --  2.6 2.5   ALT (SGPT) 29 36* 41*   AST (SGOT) 23 31 33*   BILIRUBIN 0.3 0.3 0.5   INDIRECT BILIRUBIN 0.2  --   --    BILIRUBIN DIRECT 0.1  --   --    ALK PHOS  72 79 86   LIPASE  --   --  71*         Lab 05/11/25  1906 05/11/25  1719   PROBNP  --  85.9   HSTROP T 22* 22*                 Brief Urine Lab Results  (Last result in the past 365 days)        Color   Clarity   Blood   Leuk Est   Nitrite   Protein   CREAT   Urine HCG        05/02/25 1407               Negative             Microbiology Results (last 10 days)       ** No results found for the last 240 hours. **            XR Chest 1 View  Result Date: 5/11/2025  Impression: Impression: No active disease is seen. Electronically Signed: Jace Arriola MD  5/11/2025 5:29 PM EDT  Workstation ID: [id number]    XR Chest 1 View  Result Date: 4/16/2025  Impression: No active disease. Electronically Signed: Brian Cardenas MD  4/16/2025 9:34 PM EDT  Workstation ID: [id number]              Results for orders placed during the hospital encounter of 05/11/25    Adult Transthoracic Echo Complete W/ Cont if Necessary Per Protocol    Interpretation Summary    Left ventricular ejection fraction appears to be 56 - 60%.    Left ventricular diastolic function is consistent with (grade I) impaired relaxation.    Estimated right ventricular systolic pressure from tricuspid regurgitation is normal (<35 mmHg).      Labs Pending at Discharge:        Time spent on Discharge including face to face service:  33 minutes    Electronically signed by Yesi Blake MD, 05/13/25, 4:53 PM EDT.    Portions of this documentation were transcribed electronically from a voice recognition software.  I confirm all data accurately represents the service(s) I performed at today's visit.

## 2025-05-13 NOTE — PROGRESS NOTES
[facility]     Cardiology Progress Note    Patient Name: Bianca Boles  : 1980  MRN: [id number]  Primary Care Physician:  Patricia Hernandez, [place]  Date of admission: 2025    Subjective   Subjective     Chief Complaint: The patient is cardiac stable without recurrent angina or dyspnea.  Noted borderline low blood pressure this morning.    Interval HPI:    Patient federico in sinus rhythm    Review of Systems   All systems were reviewed and negative     Objective   Objective     Vitals:   Temp:  [98.1 °F (36.7 °C)-98.2 °F (36.8 °C)] 98.2 °F (36.8 °C)  Heart Rate:  [64-78] 69  Resp:  [18] 18  BP: ()/(56-75) 97/65  Physical Exam      Alert  Heart. Regular, no gallops, no rubs, no murmurs.  Lungs: no rales, no wheezing  Abdomen: bs +  LE: No edema  Neurologic. No motor deficits.     Scheduled Meds:!Patient Home Medications Stored in Pharmacy, , Not Applicable, BID  aspirin, 81 mg, Oral, Daily  buPROPion XL, 300 mg, Oral, Daily  busPIRone, 10 mg, Oral, TID  clopidogrel, 75 mg, Oral, Daily  colchicine, 0.6 mg, Oral, Daily  empagliflozin, 25 mg, Oral, Daily  insulin regular, 2-7 Units, Subcutaneous, Q6H  losartan, 25 mg, Oral, Daily  metoprolol succinate XL, 25 mg, Oral, Nightly  rosuvastatin, 40 mg, Oral, Nightly  sodium chloride, 10 mL, Intravenous, Q12H  topiramate, 50 mg, Oral, Nightly  traZODone, 100 mg, Oral, Nightly      Continuous Infusions:        Result Review    Result Review:  I have personally reviewed the results from the time of this admission to 2025 15:40 EDT and agree with these findings:  [x]  Laboratory  []  Microbiology  [x]  Radiology  [x]  EKG/Telemetry   [x]  Cardiology/Vascular   []  Pathology  []  Old records  []  Other:  Most notable findings include:     CBC          2025    17:19 2025    04:25 2025    04:23   CBC   WBC 8.66  7.28  6.25    RBC 5.03  4.67  4.66    Hemoglobin 14.9  13.8  13.8    Hematocrit 44.5  41.4  41.5    MCV 88.5  88.7  89.1     MCH 29.6  29.6  29.6    MCHC 33.5  33.3  33.3    RDW 13.2  13.5  13.2    Platelets 196  177  151      CMP          5/11/2025    17:19 5/12/2025    04:25 5/13/2025    04:23   CMP   Glucose 84  75  82    BUN 17  16  14    Creatinine 1.09  1.23  1.04    EGFR 63.6  55.0  67.3    Sodium 140  137  137    Potassium 3.7  4.2  3.9    Chloride 106  107  110    Calcium 9.5  9.1  8.9    Total Protein 7.2  6.6  6.0    Albumin 4.7  4.0  3.7    Globulin 2.5  2.6     Total Bilirubin 0.5  0.3  0.3    Alkaline Phosphatase 86  79  72    AST (SGOT) 33  31  23    ALT (SGPT) 41  36  29    Albumin/Globulin Ratio 1.9  1.5     BUN/Creatinine Ratio 15.6  13.0  13.5    Anion Gap 13.1  8.4  10.7       CARDIAC LABS:      Lab 05/11/25  1906 05/11/25  1719   PROBNP  --  85.9   HSTROP T 22* 22*        Assessment & Plan   Assessment / Plan     Brief Patient Summary:  Bianca Boles is a 45 y.o. female with acute coronary syndrome.  She had an urgent coronary angiography yesterday which showed new severe stenosis of the right coronary artery treated with drug-eluting stent.  The LAD stent was patent.  The patient without events overnight except for the low blood pressure.    Active Hospital Problems:  Active Hospital Problems    Diagnosis     **Acute coronary syndrome with high troponin     Chest pain            Plan:     I will continue with dual antiplatelet therapy to complete at least a minimum of 1 year postprocedure.  Continue with high-dose statin therapy for goal LDL than 70.  Will hold the losartan and continue with beta-blockers.  If blood pressure remains stable the patient has no symptoms upon ambulation, consider discharge home.  Will follow-up as an outpatient.       CODE STATUS:   Code Status (Patient has no pulse and is not breathing): CPR (Attempt to Resuscitate)  Medical Interventions (Patient has pulse or is breathing): Full Support  Level Of Support Discussed With: Patient      Electronically signed by Shine Connolly MD,  05/13/25, 3:40 PM EDT.

## 2025-05-13 NOTE — PROGRESS NOTES
[facility]   Hospitalist Progress Note  Date: 2025  Patient Name: Bianca Boles  : 1980  MRN: [id number]  Date of admission: 2025      Subjective   Subjective   Chief complaint: Chest pain    Summary:  45-year-old female with history of hypertension, diabetes, GERD, history of laparoscopic sleeve gastrectomy, CKD stage IIIa, depression, CAD status post senting to LAD in , has been having chest pain on and off for the past month with concern for pericarditis being managed as outpatient, substernal chest pain started to radiate to neck, hospitalized for further monitoring and management, cardiology consulted, echo ordered, echo showed EF 56 to 60%, cardiology proceeded cardiac cath which showed irregularities of the right coronary artery with stenosis, status post PCI LATHA to right coronary artery.  Left coronary artery stent patent.  Tolerated procedure well    Interval follow-up: Seen and examined, no acute distress, no acute major overnight events, chest pain improving, no nausea or vomiting.  Blood pressure soft today, monitoring, creatinine 1.04, bicarb 16, potassium 3.9, sodium 137, white blood count 6000, hemoglobin 13.8.    Review of systems:  All systems reviewed and negative except for generalized fatigue      Objective   Objective     Vitals:   Temp:  [98.1 °F (36.7 °C)-98.2 °F (36.8 °C)] 98.2 °F (36.8 °C)  Heart Rate:  [64-78] 68  Resp:  [16-18] 18  BP: ()/(54-76) 89/61  Flow (L/min) (Oxygen Therapy):  [2] 2  Physical Exam               Constitutional: Awake, alert, no acute distress sitting upright in the bed              Eyes: Pupils equal, sclerae anicteric, no conjunctival injection              HENT: NCAT, mucous membranes moist              Respiratory: Clear to auscultation bilaterally, nonlabored respirations               Cardiovascular: RRR, no murmurs              Gastrointestinal: Positive bowel sounds, soft, nontender, nondistended               Musculoskeletal: No bilateral ankle edema, no clubbing or cyanosis to extremities              Neurologic: Oriented x 3, strength symmetric in all extremities, Cranial Nerves grossly intact to confrontation, speech clear              Skin: No rashes     Result Review    Result Review:  I have personally reviewed the pertinent results from the past 24 hours to 5/13/2025 09:51 EDT and agree with these findings:  [x]  Laboratory   CBC          5/11/2025    17:19 5/12/2025    04:25 5/13/2025    04:23   CBC   WBC 8.66  7.28  6.25    RBC 5.03  4.67  4.66    Hemoglobin 14.9  13.8  13.8    Hematocrit 44.5  41.4  41.5    MCV 88.5  88.7  89.1    MCH 29.6  29.6  29.6    MCHC 33.5  33.3  33.3    RDW 13.2  13.5  13.2    Platelets 196  177  151      BMP          5/11/2025    17:19 5/12/2025    04:25 5/13/2025    04:23   BMP   BUN 17  16  14    Creatinine 1.09  1.23  1.04    Sodium 140  137  137    Potassium 3.7  4.2  3.9    Chloride 106  107  110    CO2 20.9  21.6  16.3    Calcium 9.5  9.1  8.9      LIVER FUNCTION TESTS:      Lab 05/13/25  0423 05/12/25  0425 05/11/25  1719   TOTAL PROTEIN 6.0 6.6 7.2   ALBUMIN 3.7 4.0 4.7   GLOBULIN  --  2.6 2.5   ALT (SGPT) 29 36* 41*   AST (SGOT) 23 31 33*   BILIRUBIN 0.3 0.3 0.5   INDIRECT BILIRUBIN 0.2  --   --    BILIRUBIN DIRECT 0.1  --   --    ALK PHOS 72 79 86   LIPASE  --   --  71*       [x]  Microbiology   Microbiology Results (last 10 days)       ** No results found for the last 240 hours. **              [x]  Radiology XR Chest 1 View  Result Date: 5/11/2025  Impression: No active disease is seen. Electronically Signed: Jace Arriola MD  5/11/2025 5:29 PM EDT  Workstation ID: [id number]        [x]  EKG/Telemetry   ECG 12 Lead Chest Pain   Preliminary Result   HEART RATE=67  bpm   RR Qnmcekoq=138  ms   IN Dqcvlscr=243  ms   P Horizontal Axis=18  deg   P Front Axis=33  deg   QRSD Interval=91  ms   QT Siqgvpkv=315  ms   QBiZ=860  ms   QRS Axis=10  deg   T Wave Axis=33  deg   - NORMAL ECG  -   Sinus rhythm   Date and Time of Study:2025-05-13 07:09:16      ECG 12 Lead Chest Pain   Preliminary Result   HEART RATE=66  bpm   RR Vvuewora=004  ms   TX Pneggwxw=502  ms   P Horizontal Axis=9  deg   P Front Axis=21  deg   QRSD Interval=89  ms   QT Pdmejjpd=602  ms   GZqZ=074  ms   QRS Axis=0  deg   T Wave Axis=39  deg   - NORMAL ECG -   Sinus rhythm   Date and Time of Study:2025-05-12 14:06:39      ECG 12 Lead ED Triage Standing Order; Chest Pain   Final Result   HEART RATE=64  bpm   RR Brzxzhmu=427  ms   TX Vsykeyll=054  ms   P Horizontal Axis=0  deg   P Front Axis=36  deg   QRSD Copjgvle=446  ms   QT Yvhjipqm=447  ms   MOfP=099  ms   QRS Axis=20  deg   T Wave Axis=35  deg   - NORMAL ECG -   Sinus rhythm   When compared with ECG of 16-Apr-2025 20:13:49,   Significant axis, voltage or hypertrophy change   Electronically Signed By: Jacob Gaspar ([facility]) 2025-05-11 22:03:03   Date and Time of Study:2025-05-11 16:53:28          []  Cardiology/Vascular   []  Pathology  [x]  Old records  []  Other:    Assessment & Plan   Assessment / Plan     Assessment/Plan:  Assessment:  Intermittent chest pain  Unstable angina  CAD with history of LAD stenting, now with RCA stenting  Hypertension  Type 2 diabetes mellitus  GERD  Status post laparoscopic sleeve gastrectomy  CKD stage III  Depression    Plan:  Labs and imaging reviewed  Continue Plavix 75 mg daily  Monitoring patient's blood pressures, trending low, may need adjustments to either Toprol or Cozaar  Reconcile home medications, resumed accordingly  Continue Plavix, colchicine, BuSpar, bupropion, Cozaar, Toprol-XL  Continue Topamax, trazodone  Telemetry monitoring  Cardiology recommendations appreciated  A.m. labs  Full code  DVT prophylaxis SCD  Clinical course dictate further management  Discussed with nurse at the bedside      VTE Prophylaxis:  No VTE prophylaxis order currently exists.        CODE STATUS:   Code Status (Patient has no pulse and is not breathing): CPR  (Attempt to Resuscitate)  Medical Interventions (Patient has pulse or is breathing): Full Support  Level Of Support Discussed With: Patient        Electronically signed by Yesi Blake MD, 5/13/2025, 09:51 EDT.    Portions of this documentation were transcribed electronically from a voice recognition software.  I confirm all data accurately represents the service(s) I performed at today's visit.

## 2025-05-14 ENCOUNTER — TRANSITIONAL CARE MANAGEMENT TELEPHONE ENCOUNTER (OUTPATIENT)
Dept: CALL CENTER | Facility: HOSPITAL | Age: 45
End: 2025-05-14
Payer: COMMERCIAL

## 2025-05-14 ENCOUNTER — TELEPHONE (OUTPATIENT)
Dept: CARDIOLOGY | Facility: CLINIC | Age: 45
End: 2025-05-14
Payer: COMMERCIAL

## 2025-05-14 NOTE — OUTREACH NOTE
Call Center TCM Note      Flowsheet Row Responses   [facility] patient discharged from? [facility]   Does the patient have one of the following disease processes/diagnoses(primary or secondary)? Other   TCM attempt successful? No  [verbal release for Clive Boles]   Unsuccessful attempts Attempt 1   Call Status Left message            ODALYS Mcdonough Registered Nurse    5/14/2025, 10:02 EDT

## 2025-05-14 NOTE — TELEPHONE ENCOUNTER
Caller: Bianca Boles    Relationship to patient: Self    Best call back number: [phone]    New or established patient?  [] New  [x] Established    Date of discharge: 5.13.25    Facility discharged from: [facility]     Diagnosis/Symptoms: ACUTE CORONARY SYNDROME WITH HIGH TROPONIN     Length of stay (If applicable): 2 DAYS     Specialty Only: Did you see a [facility] provider?    [x] Yes  [] No  If so, who? DR. RAMOS, DR. BUSCH, AND DR. AYALA     Additional Details: PT CALLED IN TO SCHEDULE A HFU WITH DR. AYALA. NO TIMEFRAME GIVEN ON DISCHARGE SUMMARY, SO SENDING FOR ADVISEMENT. PT IS FEELING OKAY JUST TIRED.     PT ALSO HAS ECHO AND STRESS TEST SCHEDULED FOR 5.21.25 AND IS WONDERING IF THOSE NEEDS TO REMAIN ON THAT DAY OR R/S.     PLS GIVE PT A CALL BACK TO DISCUSS AND SCHEDULE - THANKS!

## 2025-05-14 NOTE — OUTREACH NOTE
Call Center TCM Note      Flowsheet Row Responses   [facility] facility patient discharged from? [facility]   Does the patient have one of the following disease processes/diagnoses(primary or secondary)? Other   TCM attempt successful? No   Unsuccessful attempts Attempt 2            ODALYS Mcdonough Registered Nurse    5/14/2025, 14:59 EDT

## 2025-05-15 ENCOUNTER — TRANSITIONAL CARE MANAGEMENT TELEPHONE ENCOUNTER (OUTPATIENT)
Dept: CALL CENTER | Facility: HOSPITAL | Age: 45
End: 2025-05-15
Payer: COMMERCIAL

## 2025-05-15 NOTE — OUTREACH NOTE
Call Center TCM Note      Flowsheet Row Responses   [facility] patient discharged from? [facility]   Does the patient have one of the following disease processes/diagnoses(primary or secondary)? Other   TCM attempt successful? Yes   Call start time 0916   Call end time 0923   Discharge diagnosis Acute coronary syndrome with high troponin/cardiac cath with stent   Meds reviewed with patient/caregiver? Yes   Is the patient having any side effects they believe may be caused by any medication additions or changes? No   Does the patient have all medications ordered at discharge? Yes   Prescription comments pt still holding losartan at this time due to BP concerns--reports latest reading of 107/77   Is the patient taking all medications as directed (includes completed medication regime)? Yes   Comments TCM FOLLOW UP APPOINTMENT IS 5/21/25@1115am (appt in place at time of call)   Does the patient have an appointment with their PCP within 7-14 days of discharge? Yes   Nursing Interventions Confirmed date/time of appointment   Has home health visited the patient within 72 hours of discharge? N/A   Psychosocial issues? No   Did the patient receive a copy of their discharge instructions? Yes   Nursing interventions Reviewed instructions with patient   What is the patient's perception of their health status since discharge? Same  [Reports tired post d/c, BP as noted, no cardiac complaints at this time.  Radial cath site with soreness but no complications, aware of site and activity precautions. Pt will bring BP readings to f/u appts, will see cardio and PCP.]   Is the patient/caregiver able to teach back signs and symptoms related to disease process for when to call PCP? Yes   Is the patient/caregiver able to teach back signs and symptoms related to disease process for when to call 911? Yes   Additional teach back comments Participation in cardiac rehab undecided at this time as will check on insurance status as far as her  co-pay/deductible at this time.  Pt plans on checking with cardio regarding return to work.   TCM call completed? Yes   Call end time 0923            ODALYS KANG - Registered Nurse    5/15/2025, 09:30 EDT

## 2025-05-15 NOTE — TELEPHONE ENCOUNTER
SW patient. Informed patient she can cancel the stress and ECHO as she had ECHO and LHC in hospital. Patient has been feeling very tired over the past few days. Scheduled f/u with CARLTON Ortiz.     Patient will need return to work information and note.

## 2025-05-16 ENCOUNTER — OFFICE VISIT (OUTPATIENT)
Dept: CARDIOLOGY | Facility: CLINIC | Age: 45
End: 2025-05-16
Payer: COMMERCIAL

## 2025-05-16 VITALS
WEIGHT: 211 LBS | HEIGHT: 67 IN | BODY MASS INDEX: 33.12 KG/M2 | HEART RATE: 72 BPM | SYSTOLIC BLOOD PRESSURE: 97 MMHG | DIASTOLIC BLOOD PRESSURE: 63 MMHG

## 2025-05-16 DIAGNOSIS — E78.2 MIXED HYPERLIPIDEMIA: ICD-10-CM

## 2025-05-16 DIAGNOSIS — Z98.61 CAD S/P PERCUTANEOUS CORONARY ANGIOPLASTY: Primary | ICD-10-CM

## 2025-05-16 DIAGNOSIS — I25.10 CAD S/P PERCUTANEOUS CORONARY ANGIOPLASTY: Primary | ICD-10-CM

## 2025-05-16 DIAGNOSIS — I10 ESSENTIAL HYPERTENSION: ICD-10-CM

## 2025-05-16 PROCEDURE — 99214 OFFICE O/P EST MOD 30 MIN: CPT | Performed by: FAMILY MEDICINE

## 2025-05-16 RX ORDER — METOPROLOL SUCCINATE 25 MG/1
12.5 TABLET, EXTENDED RELEASE ORAL NIGHTLY
Start: 2025-05-16

## 2025-05-16 NOTE — PROGRESS NOTES
Chief Complaint  Follow-up and Fatigue    Subjective        History of Present Illness  Bianca Boles presents to [facility] CARDIOLOGY   Ms. Boles is a 45-year-old female coming in today for cardiac follow-up.  She previous history of coronary disease with previous PCI to the LAD, in September of last year.  She came back to the hospital with chest pains, ended up being treated for acute coronary syndrome, and undergoing urgent cardiac catheterization, which showed patent stent in LAD, but RCA had new severe distal disease with 94% stenosis, she had PCI with LATHA 3.5 x 23 mm Xience stent she has not had any further.    chest pain or chest discomfort.  However she has been having some lower blood pressures, with some mild symptoms of dizziness, which is accompanied by feelings of fatigue.  Currently holding losartan.  She is tolerating DAPT therapy without any bleeding issues.      Past Medical History:   Diagnosis Date    Acid reflux     Anxiety     Anxiety and depression     Benign essential hypertension     Borderline personality disorder     Cancer 2021    Chronic kidney disease     Coronary artery disease     Diabetes     Diabetes mellitus     Diabetes mellitus, type 2     Elevated cholesterol     Fatty liver 1995    Fracture, foot 09/2017    Frozen shoulder 08/02/2023    GERD (gastroesophageal reflux disease)     High triglycerides     History of bariatric surgery 02/04/2021    History of kidney stones     HTN (hypertension)     Hyperlipemia     Hyperlipidemia     Hypertriglyceridemia     Kidney stone     Low back pain     Lumbar herniated disc     Myocardial infarction 09/2024    Obesity     Panic attacks     PCOS (polycystic ovarian syndrome)     Periarthritis of shoulder 01/23    Psoriasis     PTSD (post-traumatic stress disorder) 2024    Rotator cuff syndrome 01/23    Seasonal allergies     Self-injurious behavior 1994    Spinal headache     Suicide attempt 1995       Allergies    Allergen Reactions    Morphine Itching and Nausea And Vomiting        Past Surgical History:   Procedure Laterality Date    ABDOMINAL SURGERY  2020    Gastric sleeve    ADENOIDECTOMY  1984    BARIATRIC SURGERY      CARDIAC CATHETERIZATION N/A 2024    Procedure: Left Heart Cath;  Surgeon: Toño Rodriguez MD;  Location: [facility] CATH INVASIVE LOCATION;  Service: Cardiovascular;  Laterality: N/A;    CARDIAC CATHETERIZATION N/A 2024    Procedure: Coronary angiography;  Surgeon: Toño Rodriguez MD;  Location: [facility] CATH INVASIVE LOCATION;  Service: Cardiovascular;  Laterality: N/A;    CARDIAC CATHETERIZATION N/A 2024    Procedure: Angioplasty-coronary;  Surgeon: Toño Rodriguez MD;  Location: [facility] CATH INVASIVE LOCATION;  Service: Cardiovascular;  Laterality: N/A;    CARDIAC CATHETERIZATION N/A 2025    Procedure: Left Heart Cath/Possible PCI;  Surgeon: Shine Childs MD;  Location: [facility] CATH INVASIVE LOCATION;  Service: Cardiovascular;  Laterality: N/A;     SECTION  ,    COLONOSCOPY N/A 2024    Procedure: COLONOSCOPY;  Surgeon: Terrence Fry MD;  Location: [facility] ENDOSCOPY;  Service: Gastroenterology;  Laterality: N/A;  NORMAL COLONOSCOPY    CORONARY STENT PLACEMENT  2024    CYSTOSCOPY BLADDER STONE LITHOTRIPSY      EAR TUBES      ENDOSCOPY N/A 10/20/2020    Procedure: ESOPHAGOGASTRODUODENOSCOPY WITH BIOPSY;  Surgeon: Fidel Grajeda Jr., MD;  Location: [facility] ENDOSCOPY;  Service: General;  Laterality: N/A;  PRE- GERD  POST- RETAINED FOOD, GASTRITIS, GASTRIC POLYPS    ENDOSCOPY      FOOT FRACTURE SURGERY Left 2017    screw placed    GASTRECTOMY  2020    GASTRIC SLEEVE LAPAROSCOPIC N/A 2020    Procedure: GASTRIC SLEEVE LAPAROSCOPIC;  Surgeon: Fidel Grajeda Jr., MD;  Location:  [facility];  Service: Bariatric;  Laterality: N/A;    KIDNEY STONE SURGERY  21    LITHOTRIPSY  21    NEPHRECTOMY PARTIAL Right 11/15/2021     Procedure: NEPHRECTOMY PARTIAL LAPAROSCOPIC WITH DAVINCI ROBOT;  Surgeon: Lori Troy MD;  Location: [facility] OR;  Service: Robotics - DaVinci;  Laterality: Right;    SHOULDER ARTHROSCOPY Left 08/14/2023    Procedure: LEFT SHOULDER MANIPULATION;  Surgeon: Domenic Bradley MD;  [facility];  Service: Orthopedics;  Laterality: Left;    TONSILLECTOMY  1984    UPPER GASTROINTESTINAL ENDOSCOPY      URETEROSCOPY LASER LITHOTRIPSY WITH STENT INSERTION Right 09/28/2021    Procedure: CYSTOSCOPY, RIGHT URETEROSCOPY, LASERTRIPSY, STONE BASKET EXTRACTION AND STENT INSERTION;  Surgeon: Lori Troy MD;  Location: [facility] OR;  Service: Urology;  Laterality: Right;    URETEROSCOPY LASER LITHOTRIPSY WITH STENT INSERTION Left 11/08/2022    Procedure: URETEROSCOPY LASER LITHOTRIPSY WITH URETERAL STENT INSERTION;  Surgeon: Lori Troy MD;  Location: [facility] OR;  Service: Urology;  Laterality: Left;        Social History  She  reports that she quit smoking about 5 years ago. Her smoking use included cigarettes. She started smoking about 30 years ago. She has a 8.7 pack-year smoking history. She has never been exposed to tobacco smoke. She has never used smokeless tobacco. She reports that she does not currently use alcohol. She reports that she does not currently use drugs after having used the following drugs: Marijuana. Frequency: 0.10 times per week.    Family History  Her family history includes Cancer in her father and mother; Colon cancer in her father; Depression in her sister; Diabetes in her father and paternal grandfather; Heart disease in her father, maternal grandmother, and paternal grandfather; Hypertension in her father; Kidney cancer in her maternal grandmother; Liver cancer in her father; No Known Problems in her brother, cousin, maternal aunt, maternal grandfather, maternal uncle, paternal aunt, paternal grandmother, and paternal uncle; Prostate cancer in her father; Stroke  in her maternal grandmother.       Current Outpatient Medications on File Prior to Visit   Medication Sig    Apremilast (Otezla) 30 MG tablet Take 1 tablet by mouth 2 (Two) Times a Day.    aspirin 81 MG EC tablet Take 1 tablet by mouth Daily.    azelaic acid (AZELEX) 15 % gel Apply 1 Application topically to face as directed Daily after cleansing.    betamethasone valerate (VALISONE) 0.1 % cream Apply 1 Application topically to the appropriate area as directed 1 (One) to  2 (Two) Times a Day until approved.    Biotin 99563 MCG tablet Take 1 tablet by mouth Daily.    buPROPion XL (WELLBUTRIN XL) 300 MG 24 hr tablet Take 1 tablet by mouth Daily.    CALCIUM-MAGNESIUM-ZINC PO Take 1 tablet by mouth Daily.    cetirizine (zyrTEC) 10 MG tablet Take 1 tablet by mouth Daily As Needed for Allergies or Rhinitis.    Cholecalciferol 25 MCG (1000 UT) capsule Take 2 capsules by mouth Daily.    clopidogrel (PLAVIX) 75 MG tablet Take 1 tablet by mouth Daily    colchicine 0.6 MG tablet Take 1 tablet by mouth Daily.    COLLAGEN PO Take 1 tablet by mouth Daily.    cyclobenzaprine (FLEXERIL) 10 MG tablet Take 1 tablet by mouth Every Night. (Patient taking differently: Take 1 tablet by mouth At Night As Needed for Muscle Spasms.)    dapagliflozin Propanediol (Farxiga) 10 MG tablet Take 1 tablet by mouth Daily.    desvenlafaxine (PRISTIQ) 50 MG 24 hr tablet Take 1 tablet by mouth Daily.    Diclofenac Sodium (VOLTAREN) 1 % gel gel Apply 4 g topically to the appropriate area as directed 4 (Four) Times a Day As Needed (pain).    Estrogens Conjugated (Premarin) 0.625 MG/GM vaginal cream Insert 1 gram into the vagina every night at bedtime for 2 Weeks, THEN 1 gram into the vagina every night at bedtime 2 (Two) Times a Week.    fluticasone (FLONASE) 50 MCG/ACT nasal spray USE 2 SPRAYS IN EACH NOSTRIL ONCE DAILY AS DIRECTED (Patient taking differently: Administer 2 sprays into the nostril(s) as directed by provider Daily As Needed for Rhinitis  "or Allergies.)    hydrocortisone 2.5 % cream Apply 1 Application topically to the appropriate area of the face as directed 1 (One) to 2 (Two) Times a Day until approved. (Patient taking differently: Apply 1 Application topically to the appropriate area as directed 2 (Two) Times a Day As Needed.)    IRON-VITAMIN C PO Take 1 tablet by mouth Daily.    linaclotide (Linzess) 72 MCG capsule capsule Take 1 capsule by mouth Every Morning Before Breakfast for 90 days.    Multiple Vitamins-Minerals (MULTIVITAMIN PO) Take 1 tablet by mouth Every Night.    nitroglycerin (NITROSTAT) 0.4 MG SL tablet Place 1 tablet under the tongue Every 5 (Five) Minutes As Needed for Chest Pain (Systolic BP Greater Than 100). Take no more than 3 doses in 15 minutes.    ondansetron ODT (ZOFRAN-ODT) 4 MG disintegrating tablet Place 1 tablet on the tongue Every 8 (Eight) Hours As Needed for Nausea.    Probiotic Product (PROBIOTIC-10 PO) Take 1 tablet by mouth Every Night.    rosuvastatin (CRESTOR) 40 MG tablet Take 1 tablet by mouth Every Night for 30 days.    testosterone micronized powder Appyl 0.5 mL (2 clicks) to skin daily    topiramate (TOPAMAX) 50 MG tablet Take 1 tablet by mouth every night at bedtime    traZODone (DESYREL) 100 MG tablet Take 1 tablet by mouth Every Night. (Patient taking differently: Take 1 tablet by mouth At Night As Needed for Sleep or Depression.)     No current facility-administered medications on file prior to visit.         Review of Systems     See HPI      Objective   Vitals:    05/16/25 0845   BP: 97/63   Pulse: 72   Weight: 95.7 kg (211 lb)   Height: 170.2 cm (67.01\")         Physical Exam  General : Alert, awake, no acute distress  Neck : Supple, no carotid bruit, no jugular venous distention  CVS : Regular rate and rhythm, no murmur, no rubs or gallops  Lungs: Clear to auscultation bilaterally, no crackles or rhonchi  Abdomen: Soft, nontender, bowel sounds active  Extremities: Warm, well-perfused, no pedal " "edema      Result Review     The following data was reviewed by CARLTON Esquivel  proBNP   Date Value Ref Range Status   05/11/2025 85.9 0.0 - 450.0 pg/mL Final     CMP          5/11/2025    17:19 5/12/2025    04:25 5/13/2025    04:23   CMP   Glucose 84  75  82    BUN 17  16  14    Creatinine 1.09  1.23  1.04    EGFR 63.6  55.0  67.3    Sodium 140  137  137    Potassium 3.7  4.2  3.9    Chloride 106  107  110    Calcium 9.5  9.1  8.9    Total Protein 7.2  6.6  6.0    Albumin 4.7  4.0  3.7    Globulin 2.5  2.6     Total Bilirubin 0.5  0.3  0.3    Alkaline Phosphatase 86  79  72    AST (SGOT) 33  31  23    ALT (SGPT) 41  36  29    Albumin/Globulin Ratio 1.9  1.5     BUN/Creatinine Ratio 15.6  13.0  13.5    Anion Gap 13.1  8.4  10.7      CBC w/diff          5/11/2025    17:19 5/12/2025    04:25 5/13/2025    04:23   CBC w/Diff   WBC 8.66  7.28  6.25    RBC 5.03  4.67  4.66    Hemoglobin 14.9  13.8  13.8    Hematocrit 44.5  41.4  41.5    MCV 88.5  88.7  89.1    MCH 29.6  29.6  29.6    MCHC 33.5  33.3  33.3    RDW 13.2  13.5  13.2    Platelets 196  177  151    Neutrophil Rel % 62.7  50.7  57.0    Immature Granulocyte Rel % 0.8  0.7  1.0    Lymphocyte Rel % 26.8  36.4  27.8    Monocyte Rel % 7.5  9.1  11.4    Eosinophil Rel % 1.7  2.6  2.2    Basophil Rel % 0.5  0.5  0.6       Lab Results   Component Value Date    TSH 0.739 12/06/2024      Lab Results   Component Value Date    FREET4 1.21 09/29/2022      No results found for: \"DDIMERQUANT\"  Magnesium   Date Value Ref Range Status   05/13/2025 2.1 1.6 - 2.6 mg/dL Final      No results found for: \"DIGOXIN\"   Lab Results   Component Value Date    TROPONINT 22 (H) 05/11/2025           Lipid Panel          7/5/2024    12:03 9/5/2024    06:40 12/6/2024    10:24   Lipid Panel   Total Cholesterol 152  114  134    Triglycerides 98  82  88    HDL Cholesterol 46  34  44    VLDL Cholesterol 18  16  17    LDL Cholesterol  88  64  73    LDL/HDL Ratio 1.88  1.87  1.65  "         Results for orders placed during the hospital encounter of 05/11/25    Adult Transthoracic Echo Complete W/ Cont if Necessary Per Protocol    Interpretation Summary    Left ventricular ejection fraction appears to be 56 - 60%.    Left ventricular diastolic function is consistent with (grade I) impaired relaxation.    Estimated right ventricular systolic pressure from tricuspid regurgitation is normal (<35 mmHg).    Results for orders placed during the hospital encounter of 09/01/24    Stress Test With Myocardial Perfusion One Day    Interpretation Summary    Left ventricular ejection fraction is mildly reduced (Calculated EF = 45%).    Abnormal, high risk myocardial perfusion imaging study  Perfusion defects are noted.  There is a partially reversible perfusion defect of the distal anterior wall segment suggestive of erkia-infarct ischemia.  There is absent uptake of tracer noted in the apical segments distal inferior wall segment distal anterior segment anterolateral and improved septal segments more pronounced on stress images than rest images.    Wall motion abnormalities are noted involving distal anterior and distal inferior and apical segments.  Overall EF is estimated to be around 45%  No transient ischemic dilatation was noted    Constellation of findings are suggestive of mid to distal LAD occlusion which tends to wraparound apex.  Coronary angiogram is recommended for further evaluation        Cardiac catheterization  5/12/2025  Conclusions:  Right Coronary Artery:  Large and dominant with minimal proximal and mid segment irregularities. The distal vessel had a 95 % stenosis. Pre DESTINY 2 flow. The PL branch and PDA had mild disease.  PCI with a drug-eluting stent 3.5 23 mm Xience stent.  IVUS guided postdilated with a 4.0 x 12 NC balloon to 24 ni, stent luminal area of 4.2 to 4.3 mm².  No residual stenosis of the stented segment.  The stent expansion and apposition.  There was mild plaque distal to  the stent edge with an area of 8 to 9 mm².  Patent stent in the LAD.     Recommendations:   Aspirin 81 mg oral daily and Plavix 75 mg daily.  Discontinue Pravachol and initiate rosuvastatin 40 mg oral daily.  Consider combination with Repatha.          Assessment and Plan   Diagnoses and all orders for this visit:    1. CAD S/P percutaneous coronary angioplasty (Primary)    2. Essential hypertension  -     Comprehensive Metabolic Panel; Future    3. Mixed hyperlipidemia  -     Comprehensive Metabolic Panel; Future  -     Lipid Panel; Future    Other orders  -     metoprolol succinate XL (Toprol XL) 25 MG 24 hr tablet; Take 0.5 tablets by mouth Every Night.      CAD status post PCI-stable without symptoms of angina.  Continue DAPT therapy for minimum of 1 year.  Refer to cardiac rehab.  Will provide documentation to hold off on returning to work.  Hypertension-blood pressures have been low, discontinuing losartan, and will decrease dose of metoprolol.  Continue to monitor home BP, we may reintroduce or titrate back up depending on her overall BP control.  Hyperlipidemia-continue rosuvastatin for goal LDL below 70.          Follow Up   Return in about 2 months (around 7/16/2025) for With Dr. Connolly.    Patient was given instructions and counseling regarding her condition or for health maintenance advice. Please see specific information pulled into the AVS if appropriate.     Signed,  Dorys Ramirez, APRN  05/16/2025     Dictated Utilizing Dragon Dictation: Please note that portions of this note were completed with a voice recognition program.  Part of this note may be an electronic transcription/translation of spoken language to printed text using the Dragon Dictation System.

## 2025-05-19 RX ORDER — TIRZEPATIDE 12.5 MG/.5ML
12.5 INJECTION, SOLUTION SUBCUTANEOUS WEEKLY
Qty: 2 ML | Refills: 5 | Status: SHIPPED | OUTPATIENT
Start: 2025-05-19

## 2025-05-19 NOTE — PROGRESS NOTES
Transitional Care Follow Up Visit     Patient Name: Bianca Boles  : 1980   MRN: [id number]     Chief Complaint:  Hospital follow up for Acute coronary syndrome with high troponin and chest pain    History of Present Illness: Bianca Boles is a 45 y.o. female who presents today for transitional care management visit.  The patient was contacted by our office within 48 hours after the discharge of the patient via telephone to coordinate their follow up care and needs.     Patient complaining of left-sided chest pain without radiation starting during the night last night denies palpitations shortness of breath        Date of Admission: 2025  Date of Discharge:  20            Active and Resolved Hospital Problems:  Intermittent chest pain  Unstable angina  CAD with history of LAD stenting, now with RCA stenting  Hypertension  Type 2 diabetes mellitus  GERD  Status post laparoscopic sleeve gastrectomy  CKD stage III  Depression              Follow up with cardiologist 2025 stopped losartan cut toprol xl dose to 12.5 mg daily fu scheduled for July       Subjective      Review of Systems:   Review of Systems   Constitutional:  Negative for fever.   Respiratory:  Negative for cough.    Cardiovascular:  Positive for chest pain.   Gastrointestinal:  Negative for abdominal pain.   Genitourinary:  Negative for dysuria.   Musculoskeletal:  Negative for myalgias.       I reviewed and discussed the details of that call along with the discharge summary, hospital problems, inpatient lab results, inpatient diagnostic studies, and consultation reports with Bianca Boles.     Current outpatient and discharge medications have been reconciled for the patient.      2025     6:32 PM   Date of TCM Phone Call   [facility]   Date of Admission 2025   Date of Discharge 2025       Medications:     Current Outpatient Medications:     Apremilast (Otezla) 30 MG tablet, Take  1 tablet by mouth 2 (Two) Times a Day., Disp: 60 tablet, Rfl: 5    aspirin 81 MG EC tablet, Take 1 tablet by mouth Daily., Disp: 90 tablet, Rfl: 5    azelaic acid (AZELEX) 15 % gel, Apply 1 Application topically to face as directed Daily after cleansing., Disp: 50 g, Rfl: 3    betamethasone valerate (VALISONE) 0.1 % cream, Apply 1 Application topically to the appropriate area as directed 1 (One) to  2 (Two) Times a Day until approved., Disp: 45 g, Rfl: 2    Biotin 92137 MCG tablet, Take 1 tablet by mouth Daily., Disp: , Rfl:     buPROPion XL (WELLBUTRIN XL) 300 MG 24 hr tablet, Take 1 tablet by mouth Daily., Disp: 90 tablet, Rfl: 1    busPIRone (BUSPAR) 10 MG tablet, Take 1 tablet by mouth 3 (Three) Times a Day for 90 days., Disp: 90 tablet, Rfl: 2    CALCIUM-MAGNESIUM-ZINC PO, Take 1 tablet by mouth Daily., Disp: , Rfl:     cetirizine (zyrTEC) 10 MG tablet, Take 1 tablet by mouth Daily As Needed for Allergies or Rhinitis., Disp: , Rfl:     Cholecalciferol 25 MCG (1000 UT) capsule, Take 2 capsules by mouth Daily., Disp: 180 capsule, Rfl: 1    clopidogrel (PLAVIX) 75 MG tablet, Take 1 tablet by mouth Daily, Disp: 30 tablet, Rfl: 6    colchicine 0.6 MG tablet, Take 1 tablet by mouth Daily., Disp: 30 tablet, Rfl: 3    COLLAGEN PO, Take 1 tablet by mouth Daily., Disp: , Rfl:     cyclobenzaprine (FLEXERIL) 10 MG tablet, Take 1 tablet by mouth Every Night. (Patient taking differently: Take 1 tablet by mouth At Night As Needed for Muscle Spasms.), Disp: 30 tablet, Rfl: 0    dapagliflozin Propanediol (Farxiga) 10 MG tablet, Take 1 tablet by mouth Daily., Disp: 90 tablet, Rfl: 1    desvenlafaxine (PRISTIQ) 50 MG 24 hr tablet, Take 1 tablet by mouth Daily., Disp: 90 tablet, Rfl: 1    Diclofenac Sodium (VOLTAREN) 1 % gel gel, Apply 4 g topically to the appropriate area as directed 4 (Four) Times a Day As Needed (pain)., Disp: 200 g, Rfl: 1    Estrogens Conjugated (Premarin) 0.625 MG/GM vaginal cream, Insert 1 gram into the  vagina every night at bedtime for 2 Weeks, THEN 1 gram into the vagina every night at bedtime 2 (Two) Times a Week., Disp: 30 g, Rfl: 3    fluticasone (FLONASE) 50 MCG/ACT nasal spray, USE 2 SPRAYS IN EACH NOSTRIL ONCE DAILY AS DIRECTED (Patient taking differently: Administer 2 sprays into the nostril(s) as directed by provider Daily As Needed for Rhinitis or Allergies.), Disp: 48 g, Rfl: 1    hydrocortisone 2.5 % cream, Apply 1 Application topically to the appropriate area of the face as directed 1 (One) to 2 (Two) Times a Day until approved. (Patient taking differently: Apply 1 Application topically to the appropriate area as directed 2 (Two) Times a Day As Needed.), Disp: 30 g, Rfl: 2    hydrOXYzine (ATARAX) 10 MG tablet, Take 1 tablet by mouth 3 (Three) Times a Day As Needed for Anxiety for up to 90 days., Disp: 90 tablet, Rfl: 1    IRON-VITAMIN C PO, Take 1 tablet by mouth Daily., Disp: , Rfl:     linaclotide (Linzess) 72 MCG capsule capsule, Take 1 capsule by mouth Every Morning Before Breakfast for 90 days., Disp: 90 capsule, Rfl: 3    metoprolol succinate XL (Toprol XL) 25 MG 24 hr tablet, Take 0.5 tablets by mouth Every Night., Disp: , Rfl:     Multiple Vitamins-Minerals (MULTIVITAMIN PO), Take 1 tablet by mouth Every Night., Disp: , Rfl:     nitroglycerin (NITROSTAT) 0.4 MG SL tablet, Place 1 tablet under the tongue Every 5 (Five) Minutes As Needed for Chest Pain (Systolic BP Greater Than 100). Take no more than 3 doses in 15 minutes., Disp: 60 tablet, Rfl: 2    ondansetron ODT (ZOFRAN-ODT) 4 MG disintegrating tablet, Place 1 tablet on the tongue Every 8 (Eight) Hours As Needed for Nausea., Disp: , Rfl:     Probiotic Product (PROBIOTIC-10 PO), Take 1 tablet by mouth Every Night., Disp: , Rfl:     rosuvastatin (CRESTOR) 40 MG tablet, Take 1 tablet by mouth Every Night for 30 days., Disp: 30 tablet, Rfl: 0    testosterone micronized powder, Appyl 0.5 mL (2 clicks) to skin daily, Disp: 15 g, Rfl: 0     Tirzepatide (Mounjaro) 12.5 MG/0.5ML solution auto-injector, Inject 12.5 mg under the skin into the appropriate area as directed 1 (One) Time Per Week., Disp: 2 mL, Rfl: 5    topiramate (TOPAMAX) 50 MG tablet, Take 1 tablet by mouth every night at bedtime, Disp: 90 tablet, Rfl: 1    traZODone (DESYREL) 100 MG tablet, Take 1 tablet by mouth Every Night. (Patient taking differently: Take 1 tablet by mouth At Night As Needed for Sleep or Depression.), Disp: 90 tablet, Rfl: 1    Allergies:   Allergies   Allergen Reactions    Morphine Itching and Nausea And Vomiting       Hospital Course Information:  Risk for Readmission (LACE) Score: 9 (5/13/2025  6:00 AM)       Course During Hospital Stay:      Hospital Course:  45-year-old female with history of hypertension, diabetes, GERD, history of laparoscopic sleeve gastrectomy, CKD stage IIIa, depression, CAD status post senting to LAD in 2024, has been having chest pain on and off for the past month with concern for pericarditis being managed as outpatient, substernal chest pain started to radiate to neck, hospitalized for further monitoring and management, cardiology consulted, echo ordered, echo showed EF 56 to 60%, cardiology proceeded cardiac cath which showed irregularities of the right coronary artery with stenosis, status post PCI LATHA to right coronary artery.  Left coronary artery stent patent.  Tolerated procedure well, blood pressures trended low, was further monitored, losartan held, blood pressures improved.  Patient discharged in hemodynamically stable addition on 5/13/2025 to continue Plavix and follow-up with cardiology as outpatient.  Of note pravastatin was switched to Crestor on discharge.     PMH, PSH, Care Team, Medications including OTC/CAM and Allergies reviewed and updated as appropriate.     Objective     Physical Exam:  Vital Signs:   Vitals:    05/21/25 1046   BP: 104/71   Pulse: 81   Temp: 97.6 °F (36.4 °C)   SpO2: 98%   Weight: 95.7 kg (211 lb)  "  Height: 170.2 cm (67.01\")   PainSc: 4    PainLoc: Chest     Body mass index is 33.04 kg/m².     Physical Exam  Eyes:      Conjunctiva/sclera: Conjunctivae normal.   Neck:      Thyroid: No thyroid tenderness.      Vascular: No carotid bruit.   Cardiovascular:      Rate and Rhythm: Normal rate and regular rhythm.      Heart sounds: Normal heart sounds. No murmur heard.  Pulmonary:      Effort: Pulmonary effort is normal.      Breath sounds: Normal breath sounds.   Abdominal:      General: Bowel sounds are normal.      Palpations: Abdomen is soft.   Musculoskeletal:      Right lower leg: No edema.      Left lower leg: No edema.   Skin:     General: Skin is warm and dry.   Neurological:      Mental Status: She is alert.   Psychiatric:         Mood and Affect: Mood normal.         Behavior: Behavior normal.     ECG 12 Lead    Date/Time: 5/21/2025 11:48 AM  Performed by: Patricia Hernandez APRN    Authorized by: Patricia Hernandez APRN  Comparison: compared with previous ECG from 5/13/2025  Similar to previous ECG  Comparison to previous ECG: HEART RATE=67  bpm  RR Wtqmhoqv=362  ms  OK Gjzrzztn=333  ms  P Horizontal Axis=18  deg  P Front Axis=33  deg  QRSD Interval=91  ms  QT Tqwvjlen=332  ms  VXnI=642  ms  QRS Axis=10  deg  T Wave Axis=33  deg  - NORMAL ECG -  Sinus rhythm  When compared with ECG of 12-May-2025 14:06:39,  No significant change        Rhythm: sinus rhythm  Conduction: conduction normal  ST Segments: ST segments normal  T Waves: T waves normal  Other: no other findings         Assessment / Plan      Assessment/Plan:   Diagnoses and all orders for this visit:    1. CAD S/P percutaneous coronary angioplasty (Primary)    2. Acute coronary syndrome with high troponin    3. Essential hypertension    4. Mixed hyperlipidemia  -     Comprehensive Metabolic Panel  -     Lipid Panel    5. Type 2 diabetes mellitus without complication, without long-term current use of insulin  -     CBC Auto " "Differential  -     Comprehensive Metabolic Panel  -     Microalbumin / Creatinine Urine Ratio - Urine, Clean Catch  -     Hemoglobin A1c  -     TSH  -     Urinalysis With Culture If Indicated -    6. Chest pain, unspecified type  -     High Sensitivity Troponin T    7. Stage 3 chronic kidney disease, unspecified whether stage 3a or 3b CKD  -     Comprehensive Metabolic Panel    8. Vitamin D deficiency  -     Vitamin D,25-Hydroxy       Coronary artery disease status post angioplasty with high troponin stent placed currently on Plavix aspirin  Hypertension currently controlled  Hyperlipidemia obtain lipid panel CMP to monitor continue statin Crestor 40 mg at nighttime denies myalgias  Diabetes mellitus type 2 currently on Mounjaro will obtain labs to monitor with chronic kidney stage III currently on Farxiga  Chest pain EKG in office completed normal sinus compared to previous EKG no change discussed with patient if pain persists go to ER or call 911 and contact cardiology as patient did not have pain on her follow-up appointment      Follow Up:   Return in about 4 weeks (around 6/18/2025).      David Gomez APRN    \"Please note that portions of this note were completed with a voice recognition program.\"      *Note: 35329 is for high complexity patients with a face to face visit within 7 days of discharge.  13066 is for high complexity patients with a face to face on days 8-14 post discharge or medium complexity with face to face visit within 14 days post discharge.   "

## 2025-05-21 ENCOUNTER — RESULTS FOLLOW-UP (OUTPATIENT)
Dept: FAMILY MEDICINE CLINIC | Facility: CLINIC | Age: 45
End: 2025-05-21

## 2025-05-21 ENCOUNTER — OFFICE VISIT (OUTPATIENT)
Dept: FAMILY MEDICINE CLINIC | Facility: CLINIC | Age: 45
End: 2025-05-21
Payer: COMMERCIAL

## 2025-05-21 VITALS
OXYGEN SATURATION: 98 % | TEMPERATURE: 97.6 F | HEIGHT: 67 IN | HEART RATE: 81 BPM | SYSTOLIC BLOOD PRESSURE: 104 MMHG | DIASTOLIC BLOOD PRESSURE: 71 MMHG | BODY MASS INDEX: 33.12 KG/M2 | WEIGHT: 211 LBS

## 2025-05-21 DIAGNOSIS — E55.9 VITAMIN D DEFICIENCY: ICD-10-CM

## 2025-05-21 DIAGNOSIS — N18.30 STAGE 3 CHRONIC KIDNEY DISEASE, UNSPECIFIED WHETHER STAGE 3A OR 3B CKD: ICD-10-CM

## 2025-05-21 DIAGNOSIS — I10 ESSENTIAL HYPERTENSION: ICD-10-CM

## 2025-05-21 DIAGNOSIS — I25.10 CAD S/P PERCUTANEOUS CORONARY ANGIOPLASTY: Primary | ICD-10-CM

## 2025-05-21 DIAGNOSIS — I24.9 ACUTE CORONARY SYNDROME WITH HIGH TROPONIN: ICD-10-CM

## 2025-05-21 DIAGNOSIS — E78.2 MIXED HYPERLIPIDEMIA: ICD-10-CM

## 2025-05-21 DIAGNOSIS — R07.9 CHEST PAIN, UNSPECIFIED TYPE: ICD-10-CM

## 2025-05-21 DIAGNOSIS — Z98.61 CAD S/P PERCUTANEOUS CORONARY ANGIOPLASTY: Primary | ICD-10-CM

## 2025-05-21 DIAGNOSIS — E11.9 TYPE 2 DIABETES MELLITUS WITHOUT COMPLICATION, WITHOUT LONG-TERM CURRENT USE OF INSULIN: ICD-10-CM

## 2025-05-21 LAB
25(OH)D3 SERPL-MCNC: 83.9 NG/ML (ref 30–100)
ALBUMIN SERPL-MCNC: 4.3 G/DL (ref 3.5–5.2)
ALBUMIN UR-MCNC: 15.6 MG/DL
ALBUMIN/GLOB SERPL: 1.7 G/DL
ALP SERPL-CCNC: 80 U/L (ref 39–117)
ALT SERPL W P-5'-P-CCNC: 27 U/L (ref 1–33)
ANION GAP SERPL CALCULATED.3IONS-SCNC: 11 MMOL/L (ref 5–15)
AST SERPL-CCNC: 27 U/L (ref 1–32)
BACTERIA UR QL AUTO: ABNORMAL /HPF
BASOPHILS # BLD AUTO: 0.03 10*3/MM3 (ref 0–0.2)
BASOPHILS NFR BLD AUTO: 0.3 % (ref 0–1.5)
BILIRUB SERPL-MCNC: 0.4 MG/DL (ref 0–1.2)
BILIRUB UR QL STRIP: NEGATIVE
BUN SERPL-MCNC: 16 MG/DL (ref 6–20)
BUN/CREAT SERPL: 11.7 (ref 7–25)
CALCIUM SPEC-SCNC: 9.6 MG/DL (ref 8.6–10.5)
CHLORIDE SERPL-SCNC: 107 MMOL/L (ref 98–107)
CHOLEST SERPL-MCNC: 107 MG/DL (ref 0–200)
CLARITY UR: ABNORMAL
CO2 SERPL-SCNC: 22 MMOL/L (ref 22–29)
COD CRY URNS QL: PRESENT /HPF
COLOR UR: ABNORMAL
CREAT SERPL-MCNC: 1.37 MG/DL (ref 0.57–1)
CREAT UR-MCNC: 197.4 MG/DL
DEPRECATED RDW RBC AUTO: 39.6 FL (ref 37–54)
EGFRCR SERPLBLD CKD-EPI 2021: 48.3 ML/MIN/1.73
EOSINOPHIL # BLD AUTO: 0.1 10*3/MM3 (ref 0–0.4)
EOSINOPHIL NFR BLD AUTO: 1 % (ref 0.3–6.2)
ERYTHROCYTE [DISTWIDTH] IN BLOOD BY AUTOMATED COUNT: 12.7 % (ref 12.3–15.4)
GLOBULIN UR ELPH-MCNC: 2.6 GM/DL
GLUCOSE SERPL-MCNC: 122 MG/DL (ref 65–99)
GLUCOSE UR STRIP-MCNC: ABNORMAL MG/DL
HBA1C MFR BLD: 4.8 % (ref 4.8–5.6)
HCT VFR BLD AUTO: 40.8 % (ref 34–46.6)
HDLC SERPL-MCNC: 41 MG/DL (ref 40–60)
HGB BLD-MCNC: 14 G/DL (ref 12–15.9)
HGB UR QL STRIP.AUTO: ABNORMAL
HOLD SPECIMEN: NORMAL
HYALINE CASTS UR QL AUTO: ABNORMAL /LPF
IMM GRANULOCYTES # BLD AUTO: 0.07 10*3/MM3 (ref 0–0.05)
IMM GRANULOCYTES NFR BLD AUTO: 0.7 % (ref 0–0.5)
KETONES UR QL STRIP: ABNORMAL
LDLC SERPL CALC-MCNC: 45 MG/DL (ref 0–100)
LDLC/HDLC SERPL: 1.03 {RATIO}
LEUKOCYTE ESTERASE UR QL STRIP.AUTO: ABNORMAL
LYMPHOCYTES # BLD AUTO: 1.89 10*3/MM3 (ref 0.7–3.1)
LYMPHOCYTES NFR BLD AUTO: 18.8 % (ref 19.6–45.3)
MCH RBC QN AUTO: 29.9 PG (ref 26.6–33)
MCHC RBC AUTO-ENTMCNC: 34.3 G/DL (ref 31.5–35.7)
MCV RBC AUTO: 87 FL (ref 79–97)
MICROALBUMIN/CREAT UR: 79 MG/G (ref 0–29)
MONOCYTES # BLD AUTO: 0.68 10*3/MM3 (ref 0.1–0.9)
MONOCYTES NFR BLD AUTO: 6.8 % (ref 5–12)
NEUTROPHILS NFR BLD AUTO: 7.28 10*3/MM3 (ref 1.7–7)
NEUTROPHILS NFR BLD AUTO: 72.4 % (ref 42.7–76)
NITRITE UR QL STRIP: NEGATIVE
NRBC BLD AUTO-RTO: 0 /100 WBC (ref 0–0.2)
PH UR STRIP.AUTO: 5.5 [PH] (ref 5–8)
PLATELET # BLD AUTO: 208 10*3/MM3 (ref 140–450)
PMV BLD AUTO: 9.8 FL (ref 6–12)
POTASSIUM SERPL-SCNC: 3.7 MMOL/L (ref 3.5–5.2)
PROT SERPL-MCNC: 6.9 G/DL (ref 6–8.5)
PROT UR QL STRIP: ABNORMAL
RBC # BLD AUTO: 4.69 10*6/MM3 (ref 3.77–5.28)
RBC # UR STRIP: ABNORMAL /HPF
REF LAB TEST METHOD: ABNORMAL
SODIUM SERPL-SCNC: 140 MMOL/L (ref 136–145)
SP GR UR STRIP: >1.03 (ref 1–1.03)
SQUAMOUS #/AREA URNS HPF: ABNORMAL /HPF
TRIGL SERPL-MCNC: 119 MG/DL (ref 0–150)
TROPONIN T SERPL HS-MCNC: 12 NG/L
TSH SERPL DL<=0.05 MIU/L-ACNC: 1.16 UIU/ML (ref 0.27–4.2)
UROBILINOGEN UR QL STRIP: ABNORMAL
VLDLC SERPL-MCNC: 21 MG/DL (ref 5–40)
WBC # UR STRIP: ABNORMAL /HPF
WBC NRBC COR # BLD AUTO: 10.05 10*3/MM3 (ref 3.4–10.8)

## 2025-05-21 PROCEDURE — 84484 ASSAY OF TROPONIN QUANT: CPT | Performed by: NURSE PRACTITIONER

## 2025-05-21 PROCEDURE — 81001 URINALYSIS AUTO W/SCOPE: CPT | Performed by: NURSE PRACTITIONER

## 2025-05-21 PROCEDURE — 82306 VITAMIN D 25 HYDROXY: CPT | Performed by: NURSE PRACTITIONER

## 2025-05-21 PROCEDURE — 87077 CULTURE AEROBIC IDENTIFY: CPT | Performed by: NURSE PRACTITIONER

## 2025-05-21 PROCEDURE — 82570 ASSAY OF URINE CREATININE: CPT | Performed by: NURSE PRACTITIONER

## 2025-05-21 PROCEDURE — 87086 URINE CULTURE/COLONY COUNT: CPT | Performed by: NURSE PRACTITIONER

## 2025-05-21 PROCEDURE — 87186 SC STD MICRODIL/AGAR DIL: CPT | Performed by: NURSE PRACTITIONER

## 2025-05-21 PROCEDURE — 80050 GENERAL HEALTH PANEL: CPT | Performed by: NURSE PRACTITIONER

## 2025-05-21 PROCEDURE — 80061 LIPID PANEL: CPT | Performed by: NURSE PRACTITIONER

## 2025-05-21 PROCEDURE — 82043 UR ALBUMIN QUANTITATIVE: CPT | Performed by: NURSE PRACTITIONER

## 2025-05-21 PROCEDURE — 83036 HEMOGLOBIN GLYCOSYLATED A1C: CPT | Performed by: NURSE PRACTITIONER

## 2025-05-22 RX ORDER — CEPHALEXIN 500 MG/1
500 CAPSULE ORAL 4 TIMES DAILY
Qty: 40 CAPSULE | Refills: 0 | Status: SHIPPED | OUTPATIENT
Start: 2025-05-22

## 2025-05-23 ENCOUNTER — READMISSION MANAGEMENT (OUTPATIENT)
Dept: CALL CENTER | Facility: HOSPITAL | Age: 45
End: 2025-05-23
Payer: COMMERCIAL

## 2025-05-23 LAB — BACTERIA SPEC AEROBE CULT: ABNORMAL

## 2025-05-23 RX ORDER — SULFAMETHOXAZOLE AND TRIMETHOPRIM 800; 160 MG/1; MG/1
1 TABLET ORAL 2 TIMES DAILY
Qty: 20 TABLET | Refills: 0 | Status: SHIPPED | OUTPATIENT
Start: 2025-05-23

## 2025-05-23 NOTE — TELEPHONE ENCOUNTER
"Relay     \"Urine culture positive will treat with Bactrim will send to patient's pharmacy \"          "

## 2025-05-23 NOTE — OUTREACH NOTE
Medical Week 2 Survey      Flowsheet Row Responses   [facility] patient discharged from? [facility]   Does the patient have one of the following disease processes/diagnoses(primary or secondary)? Other   Week 2 attempt successful? Yes   Call start time 1045   Discharge diagnosis Acute coronary syndrome with high troponin/cardiac cath with stent   Call end time 1048   Person spoke with today (if not patient) and relationship pt   Meds reviewed with patient/caregiver? Yes   Is the patient having any side effects they believe may be caused by any medication additions or changes? No   Does the patient have all medications ordered at discharge? Yes   Is the patient taking all medications as directed (includes completed medication regime)? Yes   Does the patient have a primary care provider?  Yes   Does the patient have an appointment with their PCP within 7 days of discharge? Yes   Has the patient kept scheduled appointments due by today? N/A   Psychosocial issues? No   What is the patient's perception of their health status since discharge? Improving   Is the patient/caregiver able to teach back signs and symptoms related to disease process for when to call PCP? Yes   Is the patient/caregiver able to teach back signs and symptoms related to disease process for when to call 911? Yes   Is the patient/caregiver able to teach back the hierarchy of who to call/visit for symptoms/problems? PCP, Specialist, Home health nurse, Urgent Care, ED, 911 Yes   Week 2 Call Completed? Yes   Is the patient interested in additional calls from an ambulatory ? No   Would this patient benefit from a Referral to Amb Social Work? No   Wrap up additional comments Pt reports some chest twinges, and reported these to PCP. Pt shares she does feel she is starting to get back to her baseline, having more stamina. No medication issues. Pt had PCP fu appt, and has upcoming cardiology fu appt.   Call end time 1048            Gin Mcdonough  Registered Nurse

## 2025-06-02 DIAGNOSIS — F41.1 GAD (GENERALIZED ANXIETY DISORDER): ICD-10-CM

## 2025-06-02 NOTE — TELEPHONE ENCOUNTER
ATTEMPTED TO CONTACT PT(PATIENT)      NO ANSWER      LEFT VOICEMAIL WITH INSTRUCTIONS TO RETURN CALL TO OFFICE AT [phone]

## 2025-06-02 NOTE — TELEPHONE ENCOUNTER
NEXT VISIT WITH PROVIDER   NONE IN [facility]     LAST SEEN BY PROVIDER   Office Visit with Nika Crowley APRN (02/26/2025)     LAST MED REFILL  busPIRone (BUSPAR) 10 MG tablet (02/21/2025)

## 2025-06-03 ENCOUNTER — READMISSION MANAGEMENT (OUTPATIENT)
Dept: CALL CENTER | Facility: HOSPITAL | Age: 45
End: 2025-06-03
Payer: COMMERCIAL

## 2025-06-03 RX ORDER — BUSPIRONE HYDROCHLORIDE 10 MG/1
10 TABLET ORAL 3 TIMES DAILY
Qty: 90 TABLET | Refills: 2 | Status: SHIPPED | OUTPATIENT
Start: 2025-06-03 | End: 2025-09-01

## 2025-06-03 NOTE — TELEPHONE ENCOUNTER
ATTEMPTED TO CONTACT PT(PATIENT)      NO ANSWER      LEFT VOICEMAIL WITH INSTRUCTIONS TO RETURN CALL TO OFFICE AT [phone]

## 2025-06-03 NOTE — OUTREACH NOTE
Medical Week 3 Survey      Flowsheet Row Responses   [facility] patient discharged from? [facility]   Does the patient have one of the following disease processes/diagnoses(primary or secondary)? Other   Week 3 attempt successful? Yes   Call start time 1607   Call end time 1611   Discharge diagnosis Acute coronary syndrome with high troponin/cardiac cath with stent   Meds reviewed with patient/caregiver? Yes   Is the patient having any side effects they believe may be caused by any medication additions or changes? No   Does the patient have all medications ordered at discharge? Yes   Prescription comments reports that losartan has been stopped and metoprolol dose decreased   Is the patient taking all medications as directed (includes completed medication regime)? Yes   Does the patient have a primary care provider?  Yes   Does the patient have an appointment with their PCP within 7 days of discharge? Yes   Has the patient kept scheduled appointments due by today? Yes   Comments Pt has seen PCP and cardio since discharge and has more upcoming appts in place   Has home health visited the patient within 72 hours of discharge? N/A   Psychosocial issues? No   Did the patient receive a copy of their discharge instructions? Yes   Nursing interventions Reviewed instructions with patient   What is the patient's perception of their health status since discharge? Same  [Reports still some chest pain at times but not like before, not causing additional s/s. Aware of return s/s. Discussed cardiac rehab and plans on reaching out to cardio for referral.]   Is the patient/caregiver able to teach back signs and symptoms related to disease process for when to call PCP? Yes   Is the patient/caregiver able to teach back signs and symptoms related to disease process for when to call 911? Yes   Week 3 Call Completed? Yes   Graduated Yes   Call end time 1611            ODALYS KANG - Registered Nurse

## 2025-06-03 NOTE — TELEPHONE ENCOUNTER
PT PHONED IN.    PT IS NOW SCHEDULED FOR FOLLOW UP ON 06/24/2025 VIA [employer].    ROUTING REFILL OVER TO PROVIDER NOW.

## 2025-06-04 ENCOUNTER — TELEMEDICINE (OUTPATIENT)
Dept: PSYCHIATRY | Facility: CLINIC | Age: 45
End: 2025-06-04
Payer: COMMERCIAL

## 2025-06-04 DIAGNOSIS — F43.10 POST TRAUMATIC STRESS DISORDER (PTSD): ICD-10-CM

## 2025-06-04 DIAGNOSIS — F33.1 MDD (MAJOR DEPRESSIVE DISORDER), RECURRENT EPISODE, MODERATE: ICD-10-CM

## 2025-06-04 DIAGNOSIS — F41.1 GAD (GENERALIZED ANXIETY DISORDER): Primary | ICD-10-CM

## 2025-06-04 NOTE — PROGRESS NOTES
Date: June 7, 2025  Time In: 11:11  Time out: 12:03      This provider is located at the [facility] ([facility]), [address], [place] [address] using a secure MDdatacort Video Visit through [employer]. Patient is being seen remotely via telehealth, and they are in a secure environment for this session. The patient's condition being diagnosed/treated is appropriate for telemedicine. The provider identified herself as well as her credentials. The patient, and/or patients guardian, consent to be seen remotely, and when consent is given they understand that the consent allows for patient identifiable information to be sent to a third party as needed. They may refuse to be seen remotely at any time. The electronic data is encrypted and password protected, and the patient and/or guardian has been advised of the potential risks to privacy not withstanding such measures.     Mode of Visit: Video  Location of patient: Home  Location of provider: Provider home  You have chosen to receive care through a telehealth visit.  The patient has signed the video visit consent form.  The visit included audio and video interaction. No technical issues occurred during this visit    Subjective   Bianca Boles is a 45 y.o. female who presents today fully oriented, appropriately dressed and groomed, and open to communication for follow up appointment.    Chief Complaint:   Chief Complaint   Patient presents with    Anxiety    Depression        History of Present Illness: Rapport was established through conversation and unconditional positive regard. Recent events were discussed and how these events impact Pt's emotional health.   Pt reports successful use of learned coping skills since last report.  Pt reports continuation of symptoms and states the intensity and duration of symptoms has remained unchanged since last report. Pt rates anxiety at 7/10 and depression at 6/10.     Sleep: Pt  "reports sleep has remained unchanged since last report. Healthy sleep habits were discussed such as maintaining a schedule, routine, avoiding caffeine, and limiting screen time before bed.    Appetite:  Pt reports appetite has been good since last report.  Discussed the importance of hydration and eating a healthy diet for overall and mental health.    Medication compliance: Pt reports medications are being taken daily as prescribed. Discussed the importance of compliance with prescriber's directions and Pt was instructed to report questions/concerns, as well as missed doses or discontinuation of medication by Pt.     Safety Plan in Place: No Pt denies SI/HI/SIB recent or current    Daily Functioning:  Since last report, Pt states symptoms are causing Moderate difficulty in daily functioning.      Content Discussion:   Pt reports life updates since previous session. Pt identified current stressors. Pt acknowledged stressors within and outside of one's control. Pt identified successes and issues with utilizing coping mechanisms.  Pt had a cardiac event on Mother's day and had to go to hospital and have another stent placed on the other side of the heart.  Pt states the doctor kept asking if Pt is she takes her medicine and pt states she does take medication as prescribed.  Pt states her sister is on the liver transplant list and sister is stable for now.  Pt states she has been ruminating on trying to repair relationships including sister and a friend with whom Pt had a falling out a while back.  In the last three years the friend has shilpa reaching out and initiating contact.  About five years ago Pt's best friend and Pt had a huge falling out and it got really hurtful.   Pt states at that point she began focusing on her  and children and \"I kind of pushed everybody else out.\"  Pt states this is against her nature. \"I need to have people around me.\"  Pt states while she did learn that she does not need others' " "validation, she has been realizing that she does need a friend group and social support system.  Pt states she is struggling with letting her guard down.  Pt states she has a hx of avoidant issues and she used to feel that if her friend was not \"all in\" then Pt perceives them as leaving her. Discussed confirmation bias and re-framing  and challenging   trying to confirm beliefs into truths.    Pt and friends are going to start trying to go on walks and hikes.   When they go walking they chat and Pt now recognizes she has been having automatic defense mode when she mentions the other friends.      Counselor supported Pt in continued exploration of underlying belief systems which contribute to difficulty in connecting/vulnerability.  Through discussion, Pt identifies themes of preservation and overt emotional and physical reactivity when physical and/or emotional statis is in question, even in low or perceived threatening situations. Counselor supported Pt in identifying past experiences and underlying beliefs which contribute to these feelings and reactions.  Pt was supported and encouraged in processing emotions related to frustrations with the expectations of self and others.  Pt was encouraged to identify cultural and nuclear familial contexts which contribute to these concerns.  Pt was receptive and engaged in conversation, and Pt reports this was beneficial and applicable to their situation.      Reviewed coping skills and encouraged Pt to continue to practicing coping skills daily when not under distress. Pt was praised for their attempts to decrease symptoms and mitigate activating events.    Clinical Maneuvering/Intervention:  CBT was utilized to encourage Pt to identify maladaptive thoughts and behaviors and replace with more affirming and positive.Pt encouraged to set and maintain appropriate and healthy boundaries with others. Pt was instructed to practice daily using appropriate and specific words to " communicate feelings to others.  Motivational interviewing used to encourage Pt to identify strengths which can be utilized in working toward treatment goals. Pt encouraged to practice daily learned skills such as controlled breathing, grounding, and mindfulness. Pt was encouraged to ask for help from support persons to assist them in maintaining stability and alleviate symptoms. Discussed the importance of being one's own mental health advocate and to refrain from seeing the need to ask for help as a weakness. Pt was encouraged to formulate a plan of action to be more proactive in managing stressors and refrain from using reactive and automatic heightened emotional responses.     Solution-focused therapy employed to identify how Pt would like their life to be if they were to make positive changes. Pt encouraged to identify effective coping skills and strengths they can use to continue utilizing those skills. Pt encouraged to discontinue utilizing non-effective coping mechanisms. Pt provided with feedback to highlight achievements and personal and other resources. Encouraged use of SMART goals    Assisted patient in processing above session content; acknowledged and normalized patient’s thoughts, feelings, and concerns.  Rationalized patient thought process regarding concerns presented at session.  Discussed triggers associated with patient's  anxiety  and depression  Also discussed coping skills for patient to implement such as grounding , self care , and positive self talk     Allowed patient to freely discuss issues without interruption or judgment. Provided safe, confidential environment to facilitate the development of positive therapeutic relationship and encourage open, honest communication. Assisted patient in identifying risk factors which would indicate the need for higher level of care including thoughts to harm self or others and/or self-harming behavior and encouraged patient to contact this office,  call 911, or present to the nearest emergency room should any of these events occur. Discussed crisis intervention services and means to access. Patient adamantly and convincingly denies current suicidal or homicidal ideation or perceptual disturbance.    Assessment:     Patient appears to maintain relative stability as compared to their baseline.  However, patient persistently struggles with symptoms which continue to cause impairment in important areas of functioning.  As a result, they can be reasonably expected to continue to benefit from treatment and would likely be at increased risk for decompensation otherwise.              Mental Status Exam:   Hygiene:   good  Cooperation:  Cooperative  Eye Contact:  Good  Psychomotor Behavior:  Appropriate  Affect:  Full range  Mood: depressed and anxious  Speech:  Normal  Thought Process:  Goal directed  Thought Content:  Normal  Suicidal:  None  Homicidal: None  Hallucinations:  None  Delusion: None  Memory:  Intact  Orientation:  Grossly Intact  Reliability:  good  Insight:  Improved   Judgement:  Good  Impulse Control:  Good  Physical/Medical Issues:  chronic health issues      Functional Status: Moderate impairment     Progress toward goal: Not at goal    Prognosis: Good with continued therapeutic intervention        Plan:    Patient will continue in individual outpatient therapy with focus on improved functioning and coping skills, maintaining stability, and avoiding decompensation and the need for higher level of care.    Patient will adhere to medication regimen as prescribed and report any side effects. Patient will contact this office, call 911 or present to the nearest emergency room should suicidal or homicidal ideations occur. Provide Cognitive Behavioral Therapy and Solution Focused Therapy to improve functioning, maintain stability, and avoid decompensation and the need for higher level of care.     Return in about 2 weeks (around 6/18/2025).      VISIT  DIAGNOSIS:    Diagnosis Plan   1. INDIRA (generalized anxiety disorder)        2. MDD (major depressive disorder), recurrent episode, moderate        3. Post traumatic stress disorder (PTSD)         11:11 EDT       This document has been electronically signed by LLUVIA Isaac  June 7, 2025      Part of this note may be an electronic transcription/translation of spoken language to printed text using the Dragon Dictation System.

## 2025-06-11 ENCOUNTER — SPECIALTY PHARMACY (OUTPATIENT)
Dept: PHARMACY | Facility: TELEHEALTH | Age: 45
End: 2025-06-11
Payer: COMMERCIAL

## 2025-06-11 NOTE — PROGRESS NOTES
Specialty Pharmacy Patient Management Program  Refill Outreach     Bianca was contacted today regarding refills of their medication(s).    Refill Questions      Flowsheet Row Most Recent Value   Changes to allergies? No   Changes to medications? No   New conditions or infections since last clinic visit No   Unplanned office visit, urgent care, ED, or hospital admission in the last 4 weeks  No   How does patient/caregiver feel medication is working? Very good   Financial problems or insurance changes  No   Since the previous refill, were any specialty medication doses or scheduled injections missed or delayed?  No  [Patient has meds on hand]   Does this patient require a clinical escalation to a pharmacist? No            Delivery Questions      Flowsheet Row Most Recent Value   Delivery method UPS   Delivery address verified with patient/caregiver? Yes   Delivery address Home   Other address preferred N/A   Number of medications in delivery 1   Medication(s) being filled and delivered Apremilast (Otezla)   Doses left of specialty medications N/A   Copay verified? No   Copay amount $0.00   Copay form of payment No copayment ($0)   Delivery Date Selection 06/12/25   Signature Required No                 Follow-up: 24 day(s)     Marco Manzanares, Pharmacy Technician  6/11/2025  16:52 EDT

## 2025-06-16 DIAGNOSIS — R79.89 LOW TESTOSTERONE LEVEL IN FEMALE: ICD-10-CM

## 2025-06-16 DIAGNOSIS — R68.82 LOW LIBIDO: ICD-10-CM

## 2025-06-16 RX ORDER — TESTOSTERONE MICRONIZED 100 %
POWDER (GRAM) MISCELLANEOUS
Qty: 15 G | Refills: 0 | Status: SHIPPED | OUTPATIENT
Start: 2025-06-16

## 2025-06-16 NOTE — PROGRESS NOTES
Specialty Pharmacy Patient Management Program  Reassessment     Bianca Boles is a 45 y.o. female with psoriasis and enrolled in the Patient Management program offered by [facility]. A follow-up outreach was conducted, including assessment of continued therapy appropriateness, medication adherence, and side effect incidence and management for otezla.     Changes to Insurance Coverage or Financial Support  none    Relevant Past Medical History and Comorbidities  Relevant medical history and concomitant health conditions were discussed with the patient. The patient's chart has been reviewed for relevant past medical history and comorbid health conditions and updated as necessary.   Past Medical History:   Diagnosis Date    Acid reflux     Anxiety     Anxiety and depression     Benign essential hypertension     Borderline personality disorder     Cancer 2021    renal cancer/mass, PARTIAL NEPH, RIGHT    Chronic kidney disease     Coronary artery disease     Diabetes     Diabetes mellitus     type ii, doesn't check bg at home     Diabetes mellitus, type 2     Elevated cholesterol     Fatty liver 1995    Fracture, foot 09/2017    Frozen shoulder 08/02/2023    GERD (gastroesophageal reflux disease)     High triglycerides     History of bariatric surgery 02/04/2021    GASTRIC SLEEVE    History of kidney stones     HTN (hypertension)     Hyperlipemia     Hyperlipidemia     Hypertriglyceridemia     Kidney stone     Low back pain     Lumbar herniated disc     Myocardial infarction 09/2024    Obesity     Panic attacks     PCOS (polycystic ovarian syndrome)     Periarthritis of shoulder 01/23    Psoriasis     ELBOWS    PTSD (post-traumatic stress disorder) 2024    Rotator cuff syndrome 01/23    Seasonal allergies     Self-injurious behavior 1994    Spinal headache     AFTER PAIN EPIDURALS.  NO BLOOD PATCH    Suicide attempt 1995     Social History     Socioeconomic History    Marital status:      Number of children: 2   Tobacco Use    Smoking status: Former     Current packs/day: 0.00     Average packs/day: 0.3 packs/day for 34.8 years (8.7 ttl pk-yrs)     Types: Cigarettes     Start date:      Quit date: 1/15/2020     Years since quittin.4     Passive exposure: Never    Smokeless tobacco: Never    Tobacco comments:     A PACK A WEEK   Vaping Use    Vaping status: Never Used   Substance and Sexual Activity    Alcohol use: Not Currently     Comment: Very rarely    Drug use: Not Currently     Frequency: 0.1 times per week     Types: Marijuana     Comment: Very rarely in my youth/Current CBD use occasionally    Sexual activity: Yes     Partners: Male     Birth control/protection: Depo-provera, Inserts     Problem list reviewed by Aren Cheung RPH on 2025 at  9:50 AM    Allergies  Known allergies and reactions were discussed with the patient. The patient's chart has been reviewed for allergy information and updated as necessary.   Allergies   Allergen Reactions    Morphine Itching and Nausea And Vomiting     Allergies reviewed by Aren Cheung RPH on 2025 at  9:50 AM    Relevant Laboratory Values  Lab Results   Component Value Date    GLUCOSE 122 (H) 2025    CALCIUM 9.6 2025     2025    K 3.7 2025    CO2 22.0 2025     2025    BUN 16 2025    CREATININE 1.37 (H) 2025    BCR 11.7 2025    ANIONGAP 11.0 2025     Lab Results   Component Value Date    WBC 10.05 2025    HGB 14.0 2025    HCT 40.8 2025    MCV 87.0 2025     2025    INR 1.11 2024     Lab Value Review  The above lab values have been reviewed; the following specialty medication(s) dose adjustment(s) are recommended: none.    Current Medication List  This medication list has been reviewed with the patient and evaluated for any interactions or necessary modifications/recommendations, and updated to include all  prescription medications, OTC medications, and supplements the patient is currently taking. This list reflects what is contained in the patient's profile, which has also been marked as reviewed to communicate to other providers it is the most up to date version of the patient's current medication therapy.     Current Outpatient Medications:     Apremilast (Otezla) 30 MG tablet, Take 1 tablet by mouth 2 (Two) Times a Day., Disp: 60 tablet, Rfl: 5    Apremilast (Otezla) 30 MG tablet, Take 30 mg by mouth Every 12 (Twelve) Hours., Disp: 60 tablet, Rfl: 1    aspirin 81 MG EC tablet, Take 1 tablet by mouth Daily., Disp: 90 tablet, Rfl: 5    azelaic acid (AZELEX) 15 % gel, Apply 1 Application topically to face as directed Daily after cleansing., Disp: 50 g, Rfl: 3    betamethasone valerate (VALISONE) 0.1 % cream, Apply 1 Application topically to the appropriate area as directed 1 (One) to  2 (Two) Times a Day until approved., Disp: 45 g, Rfl: 2    Biotin 40282 MCG tablet, Take 1 tablet by mouth Daily., Disp: , Rfl:     buPROPion XL (WELLBUTRIN XL) 300 MG 24 hr tablet, Take 1 tablet by mouth Daily., Disp: 90 tablet, Rfl: 1    busPIRone (BUSPAR) 10 MG tablet, Take 1 tablet by mouth 3 (Three) Times a Day for 90 days., Disp: 90 tablet, Rfl: 2    CALCIUM-MAGNESIUM-ZINC PO, Take 1 tablet by mouth Daily., Disp: , Rfl:     cephalexin (KEFLEX) 500 MG capsule, Take 1 capsule by mouth 4 (Four) Times a Day., Disp: 40 capsule, Rfl: 0    cetirizine (zyrTEC) 10 MG tablet, Take 1 tablet by mouth Daily As Needed for Allergies or Rhinitis., Disp: , Rfl:     Cholecalciferol 25 MCG (1000 UT) capsule, Take 2 capsules by mouth Daily., Disp: 180 capsule, Rfl: 1    clopidogrel (PLAVIX) 75 MG tablet, Take 1 tablet by mouth Daily, Disp: 30 tablet, Rfl: 6    colchicine 0.6 MG tablet, Take 1 tablet by mouth Daily., Disp: 30 tablet, Rfl: 3    COLLAGEN PO, Take 1 tablet by mouth Daily., Disp: , Rfl:     cyclobenzaprine (FLEXERIL) 10 MG tablet, Take 1  tablet by mouth Every Night. (Patient taking differently: Take 1 tablet by mouth At Night As Needed for Muscle Spasms.), Disp: 30 tablet, Rfl: 0    dapagliflozin Propanediol (Farxiga) 10 MG tablet, Take 1 tablet by mouth Daily., Disp: 90 tablet, Rfl: 1    desvenlafaxine (PRISTIQ) 50 MG 24 hr tablet, Take 1 tablet by mouth Daily., Disp: 90 tablet, Rfl: 1    Diclofenac Sodium (VOLTAREN) 1 % gel gel, Apply 4 g topically to the appropriate area as directed 4 (Four) Times a Day As Needed (pain)., Disp: 200 g, Rfl: 1    Estrogens Conjugated (Premarin) 0.625 MG/GM vaginal cream, Insert 1 gram into the vagina every night at bedtime for 2 Weeks, THEN 1 gram into the vagina every night at bedtime 2 (Two) Times a Week., Disp: 30 g, Rfl: 3    fluticasone (FLONASE) 50 MCG/ACT nasal spray, USE 2 SPRAYS IN EACH NOSTRIL ONCE DAILY AS DIRECTED (Patient taking differently: Administer 2 sprays into the nostril(s) as directed by provider Daily As Needed for Rhinitis or Allergies.), Disp: 48 g, Rfl: 1    hydrocortisone 2.5 % cream, Apply 1 Application topically to the appropriate area of the face as directed 1 (One) to 2 (Two) Times a Day until approved. (Patient taking differently: Apply 1 Application topically to the appropriate area as directed 2 (Two) Times a Day As Needed.), Disp: 30 g, Rfl: 2    IRON-VITAMIN C PO, Take 1 tablet by mouth Daily., Disp: , Rfl:     linaclotide (Linzess) 72 MCG capsule capsule, Take 1 capsule by mouth Every Morning Before Breakfast for 90 days., Disp: 90 capsule, Rfl: 3    metoprolol succinate XL (Toprol XL) 25 MG 24 hr tablet, Take 0.5 tablets by mouth Every Night., Disp: , Rfl:     Multiple Vitamins-Minerals (MULTIVITAMIN PO), Take 1 tablet by mouth Every Night., Disp: , Rfl:     nitroglycerin (NITROSTAT) 0.4 MG SL tablet, Place 1 tablet under the tongue Every 5 (Five) Minutes As Needed for Chest Pain (Systolic BP Greater Than 100). Take no more than 3 doses in 15 minutes., Disp: 60 tablet, Rfl: 2     ondansetron ODT (ZOFRAN-ODT) 4 MG disintegrating tablet, Place 1 tablet on the tongue Every 8 (Eight) Hours As Needed for Nausea., Disp: , Rfl:     Probiotic Product (PROBIOTIC-10 PO), Take 1 tablet by mouth Every Night., Disp: , Rfl:     rosuvastatin (CRESTOR) 40 MG tablet, Take 1 tablet by mouth Every Night for 30 days., Disp: 30 tablet, Rfl: 0    sulfamethoxazole-trimethoprim (Bactrim DS) 800-160 MG per tablet, Take 1 tablet by mouth 2 (Two) Times a Day., Disp: 20 tablet, Rfl: 0    testosterone micronized powder, Appyl 0.5 mL (2 clicks) to skin daily, Disp: 15 g, Rfl: 0    Tirzepatide (Mounjaro) 12.5 MG/0.5ML solution auto-injector, Inject 12.5 mg under the skin into the appropriate area as directed 1 (One) Time Per Week., Disp: 2 mL, Rfl: 5    topiramate (TOPAMAX) 50 MG tablet, Take 1 tablet by mouth every night at bedtime, Disp: 90 tablet, Rfl: 1    traZODone (DESYREL) 100 MG tablet, Take 1 tablet by mouth Every Night. (Patient taking differently: Take 1 tablet by mouth At Night As Needed for Sleep or Depression.), Disp: 90 tablet, Rfl: 1  Medicines reviewed by Aren Cheung [facility] on 6/16/2025 at  9:50 AM    Drug Interactions  none     Adverse Drug Reactions  Medication tolerability: Tolerating with no to minimal ADRs  Medication plan: Continue therapy with normal follow-up  Plan for ADR Management: none    Hospitalizations and Urgent Care Since Last Assessment  Hospitalizations or Admissions: none  ED Visits: none  Urgent Office Visits: none     Adherence, Self-Administration, and Current Therapy Problems  Adherence related to the patient's specialty therapy was discussed with the patient. The Adherence segment of this outreach has been reviewed and updated.     Adherence Questions  Linked Medication(s) Assessed: Apremilast (Otezla)  On average, how many doses/injections does the patient miss per month?: 0  What are the identified reasons for non-adherence or missed doses? : no problems identified  What is  the estimated medication adherence level?: %  Based on the patient/caregiver response and refill history, does this patient require an MTP to track adherence improvements?: no    Additional Barriers to Patient Self-Administration: none  Methods for Supporting Patient Self-Administration: none    Open Medication Therapy Problems  No medication therapy recommendations to display    Goals of Therapy  Goals related to the patient's specialty therapy were discussed with the patient. The Patient Goals segment of this outreach has been reviewed and updated.   Goals Addressed Today        Specialty Pharmacy General Goal      A reduction in BSA of skin lesions on the body.                Progress Toward Meeting Patient-Identified Goals of Therapy: On Track  New Patient-Identified Goals, If Applicable:     Progress Toward Meeting Clinical Goals or Therapeutic Targets: On Track  New Clinical Goals or Therapeutic Targets, If Applicable:     Quality of Life Assessment   Quality of Life related to the patient's enrollment in the patient management program and services provided was discussed with the patient. The QOL segment of this outreach has been reviewed and updated.  Quality of Life Improvement Scale: 8-Moderately better    Reassessment Plan & Follow-Up  Medication Therapy Changes: none  Additional Plans, Therapy Recommendations, or Therapy Problems to Be Addressed: none   Pharmacist to perform regular reassessments no more than (6) months from the previous assessment.  Care Coordinator to set up future refill outreaches, coordinate prescription delivery, and escalate clinical questions to pharmacist.     Attestation  I attest the patient was actively involved in and has agreed to the above plan of care. I attest that the specialty medication(s) addressed above are appropriate for the patient based on my reassessment. If the prescribed therapy is at any point deemed not appropriate based on the current or future  assessments, a consultation will be initiated with the patient's specialty care provider to determine the best course of action. The revised plan of therapy will be documented along with any required assessments and/or additional patient education provided.     Electronically signed by Aren Cheung RPH, 06/16/25, 9:51 AM EDT.

## 2025-06-17 PROBLEM — R79.89 LOW TESTOSTERONE LEVEL IN FEMALE: Status: ACTIVE | Noted: 2025-06-17

## 2025-06-17 PROBLEM — R68.82 LOW LIBIDO: Status: ACTIVE | Noted: 2025-06-17

## 2025-06-17 PROBLEM — Z79.899 MEDICATION MANAGEMENT: Status: ACTIVE | Noted: 2025-06-17

## 2025-06-17 RX ORDER — ROSUVASTATIN CALCIUM 40 MG/1
40 TABLET, COATED ORAL NIGHTLY
Qty: 30 TABLET | Refills: 0 | OUTPATIENT
Start: 2025-06-17 | End: 2025-07-17

## 2025-06-17 RX ORDER — ROSUVASTATIN CALCIUM 40 MG/1
40 TABLET, COATED ORAL NIGHTLY
Qty: 30 TABLET | Refills: 0 | Status: SHIPPED | OUTPATIENT
Start: 2025-06-17 | End: 2025-07-17

## 2025-06-17 NOTE — TELEPHONE ENCOUNTER
Caller: Bianca Boles    Relationship: Self    Best call back number:   Telephone Information:   Mobile [phone]        Requested Prescriptions:   Requested Prescriptions     Pending Prescriptions Disp Refills    rosuvastatin (CRESTOR) 40 MG tablet 30 tablet 0     Sig: Take 1 tablet by mouth Every Night for 30 days.        Pharmacy where request should be sent: [facility]     Last office visit with prescribing clinician: 6/17/2025   Last telemedicine visit with prescribing clinician: Visit date not found   Next office visit with prescribing clinician: Visit date not found         Does the patient have less than a 3 day supply:  [x] Yes  [] No        Summer Arcenio Roach Rep   06/17/25 13:21 EDT

## 2025-06-19 ENCOUNTER — APPOINTMENT (OUTPATIENT)
Dept: GENERAL RADIOLOGY | Facility: HOSPITAL | Age: 45
End: 2025-06-19
Payer: COMMERCIAL

## 2025-06-19 ENCOUNTER — HOSPITAL ENCOUNTER (EMERGENCY)
Facility: HOSPITAL | Age: 45
Discharge: HOME | End: 2025-06-19
Attending: EMERGENCY MEDICINE
Payer: COMMERCIAL

## 2025-06-19 ENCOUNTER — TELEMEDICINE (OUTPATIENT)
Dept: PSYCHIATRY | Facility: CLINIC | Age: 45
End: 2025-06-19
Payer: COMMERCIAL

## 2025-06-19 VITALS
HEIGHT: 67 IN | DIASTOLIC BLOOD PRESSURE: 73 MMHG | TEMPERATURE: 97.8 F | HEART RATE: 70 BPM | RESPIRATION RATE: 20 BRPM | OXYGEN SATURATION: 97 % | WEIGHT: 212.3 LBS | SYSTOLIC BLOOD PRESSURE: 120 MMHG | BODY MASS INDEX: 33.32 KG/M2

## 2025-06-19 DIAGNOSIS — Z86.79 HISTORY OF CAD (CORONARY ARTERY DISEASE): ICD-10-CM

## 2025-06-19 DIAGNOSIS — R07.9 CHEST PAIN, UNSPECIFIED TYPE: Primary | ICD-10-CM

## 2025-06-19 DIAGNOSIS — F41.1 GAD (GENERALIZED ANXIETY DISORDER): Primary | ICD-10-CM

## 2025-06-19 DIAGNOSIS — F33.1 MDD (MAJOR DEPRESSIVE DISORDER), RECURRENT EPISODE, MODERATE: ICD-10-CM

## 2025-06-19 DIAGNOSIS — F43.10 POST TRAUMATIC STRESS DISORDER (PTSD): ICD-10-CM

## 2025-06-19 LAB
ALBUMIN SERPL-MCNC: 4.3 G/DL (ref 3.5–5.2)
ALBUMIN/GLOB SERPL: 1.7 G/DL
ALP SERPL-CCNC: 83 U/L (ref 39–117)
ALT SERPL W P-5'-P-CCNC: 36 U/L (ref 1–33)
ANION GAP SERPL CALCULATED.3IONS-SCNC: 8.9 MMOL/L (ref 5–15)
AST SERPL-CCNC: 29 U/L (ref 1–32)
BASOPHILS # BLD AUTO: 0.05 10*3/MM3 (ref 0–0.2)
BASOPHILS NFR BLD AUTO: 0.6 % (ref 0–1.5)
BILIRUB SERPL-MCNC: 0.3 MG/DL (ref 0–1.2)
BUN SERPL-MCNC: 15.2 MG/DL (ref 6–20)
BUN/CREAT SERPL: 12.8 (ref 7–25)
CALCIUM SPEC-SCNC: 9.2 MG/DL (ref 8.6–10.5)
CHLORIDE SERPL-SCNC: 106 MMOL/L (ref 98–107)
CO2 SERPL-SCNC: 23.1 MMOL/L (ref 22–29)
CREAT SERPL-MCNC: 1.19 MG/DL (ref 0.57–1)
D DIMER PPP FEU-MCNC: <0.27 MCGFEU/ML (ref 0–0.5)
DEPRECATED RDW RBC AUTO: 43.3 FL (ref 37–54)
EGFRCR SERPLBLD CKD-EPI 2021: 57.2 ML/MIN/1.73
EOSINOPHIL # BLD AUTO: 0.14 10*3/MM3 (ref 0–0.4)
EOSINOPHIL NFR BLD AUTO: 1.6 % (ref 0.3–6.2)
ERYTHROCYTE [DISTWIDTH] IN BLOOD BY AUTOMATED COUNT: 13.2 % (ref 12.3–15.4)
GLOBULIN UR ELPH-MCNC: 2.6 GM/DL
GLUCOSE SERPL-MCNC: 110 MG/DL (ref 65–99)
HCT VFR BLD AUTO: 42.2 % (ref 34–46.6)
HGB BLD-MCNC: 14 G/DL (ref 12–15.9)
HOLD SPECIMEN: NORMAL
HOLD SPECIMEN: NORMAL
IMM GRANULOCYTES # BLD AUTO: 0.05 10*3/MM3 (ref 0–0.05)
IMM GRANULOCYTES NFR BLD AUTO: 0.6 % (ref 0–0.5)
LIPASE SERPL-CCNC: 74 U/L (ref 13–60)
LYMPHOCYTES # BLD AUTO: 1.9 10*3/MM3 (ref 0.7–3.1)
LYMPHOCYTES NFR BLD AUTO: 21.2 % (ref 19.6–45.3)
MAGNESIUM SERPL-MCNC: 2.2 MG/DL (ref 1.6–2.6)
MCH RBC QN AUTO: 30 PG (ref 26.6–33)
MCHC RBC AUTO-ENTMCNC: 33.2 G/DL (ref 31.5–35.7)
MCV RBC AUTO: 90.4 FL (ref 79–97)
MONOCYTES # BLD AUTO: 0.69 10*3/MM3 (ref 0.1–0.9)
MONOCYTES NFR BLD AUTO: 7.7 % (ref 5–12)
NEUTROPHILS NFR BLD AUTO: 6.12 10*3/MM3 (ref 1.7–7)
NEUTROPHILS NFR BLD AUTO: 68.3 % (ref 42.7–76)
NRBC BLD AUTO-RTO: 0 /100 WBC (ref 0–0.2)
NT-PROBNP SERPL-MCNC: 111.4 PG/ML (ref 0–450)
PLATELET # BLD AUTO: 175 10*3/MM3 (ref 140–450)
PMV BLD AUTO: 9.2 FL (ref 6–12)
POTASSIUM SERPL-SCNC: 3.8 MMOL/L (ref 3.5–5.2)
PROT SERPL-MCNC: 6.9 G/DL (ref 6–8.5)
RBC # BLD AUTO: 4.67 10*6/MM3 (ref 3.77–5.28)
SODIUM SERPL-SCNC: 138 MMOL/L (ref 136–145)
TROPONIN T SERPL HS-MCNC: 13 NG/L
WBC NRBC COR # BLD AUTO: 8.95 10*3/MM3 (ref 3.4–10.8)
WHOLE BLOOD HOLD COAG: NORMAL
WHOLE BLOOD HOLD SPECIMEN: NORMAL

## 2025-06-19 PROCEDURE — 83880 ASSAY OF NATRIURETIC PEPTIDE: CPT | Performed by: EMERGENCY MEDICINE

## 2025-06-19 PROCEDURE — 83690 ASSAY OF LIPASE: CPT | Performed by: EMERGENCY MEDICINE

## 2025-06-19 PROCEDURE — 80053 COMPREHEN METABOLIC PANEL: CPT | Performed by: EMERGENCY MEDICINE

## 2025-06-19 PROCEDURE — 36415 COLL VENOUS BLD VENIPUNCTURE: CPT

## 2025-06-19 PROCEDURE — 83735 ASSAY OF MAGNESIUM: CPT | Performed by: EMERGENCY MEDICINE

## 2025-06-19 PROCEDURE — 85379 FIBRIN DEGRADATION QUANT: CPT

## 2025-06-19 PROCEDURE — 71045 X-RAY EXAM CHEST 1 VIEW: CPT

## 2025-06-19 PROCEDURE — 85025 COMPLETE CBC W/AUTO DIFF WBC: CPT | Performed by: EMERGENCY MEDICINE

## 2025-06-19 PROCEDURE — 93005 ELECTROCARDIOGRAM TRACING: CPT | Performed by: EMERGENCY MEDICINE

## 2025-06-19 PROCEDURE — 84484 ASSAY OF TROPONIN QUANT: CPT | Performed by: EMERGENCY MEDICINE

## 2025-06-19 PROCEDURE — 99284 EMERGENCY DEPT VISIT MOD MDM: CPT

## 2025-06-19 PROCEDURE — 93010 ELECTROCARDIOGRAM REPORT: CPT | Performed by: INTERNAL MEDICINE

## 2025-06-19 RX ORDER — SODIUM CHLORIDE 0.9 % (FLUSH) 0.9 %
10 SYRINGE (ML) INJECTION AS NEEDED
Status: DISCONTINUED | OUTPATIENT
Start: 2025-06-19 | End: 2025-06-19 | Stop reason: HOSPADM

## 2025-06-19 RX ORDER — ASPIRIN 81 MG/1
324 TABLET, CHEWABLE ORAL ONCE
Status: COMPLETED | OUTPATIENT
Start: 2025-06-19 | End: 2025-06-19

## 2025-06-19 RX ADMIN — ASPIRIN 243 MG: 81 TABLET, CHEWABLE ORAL at 16:01

## 2025-06-19 NOTE — ED PROVIDER NOTES
Time: 3:40 PM EDT  Date of encounter:  6/19/2025  Independent Historian/Clinical History and Information was obtained by:   Patient    History is limited by: N/A    Chief Complaint: Chest pain shortness of breath      History of Present Illness:  Patient is a 45 y.o. year old female who presents to the emergency department for evaluation of chest pain and shortness of breath x 2 days.  Patient has a significant prior medical history for coronary artery disease status post percutaneous stent placement, diabetes mellitus, stage III kidney disease.  Patient reports she had a cardiac stent placed in May 2025.  Patient reports yesterday she for started to appreciate the chest pain after having a lunch.  The chest pain is left-sided radiating to the side of her torso.  Denies syncope.  Denies aggravating or alleviating factors.  Reports that she has been taking her dual antiplatelet therapy and cardiac regimen as prescribed.  Reports her blood sugars at home have been running in the low 100s.  She denies acute chest pain or shortness of breath at bedside at this time.      Patient Care Team  Primary Care Provider: Patricia Hernandez APRN    Past Medical History:     Allergies   Allergen Reactions    Morphine Itching and Nausea And Vomiting     Past Medical History:   Diagnosis Date    Acid reflux     Anxiety     Anxiety and depression     Benign essential hypertension     Borderline personality disorder     Cancer 2021    renal cancer/mass, PARTIAL NEPH, RIGHT    Chronic kidney disease     Coronary artery disease     Diabetes     Diabetes mellitus     type ii, doesn't check bg at home     Diabetes mellitus, type 2     Elevated cholesterol     Fatty liver 1995    Fracture, foot 09/2017    Frozen shoulder 08/02/2023    GERD (gastroesophageal reflux disease)     High triglycerides     History of bariatric surgery 02/04/2021    GASTRIC SLEEVE    History of kidney stones     HTN (hypertension)     Hyperlipemia      Hyperlipidemia     Hypertriglyceridemia     Kidney stone     Low back pain     Lumbar herniated disc     Myocardial infarction 2024    Obesity     Panic attacks     PCOS (polycystic ovarian syndrome)     Periarthritis of shoulder     Psoriasis     ELBOWS    PTSD (post-traumatic stress disorder)     Rotator cuff syndrome     Seasonal allergies     Self-injurious behavior     Spinal headache     AFTER PAIN EPIDURALS.  NO BLOOD PATCH    Suicide attempt      Past Surgical History:   Procedure Laterality Date    ABDOMINAL SURGERY  2020    Gastric sleeve    ADENOIDECTOMY      BARIATRIC SURGERY      CARDIAC CATHETERIZATION N/A 2024    Procedure: Left Heart Cath;  Surgeon: Toño Rodriguez MD;  Location: [facility] CATH INVASIVE LOCATION;  Service: Cardiovascular;  Laterality: N/A;    CARDIAC CATHETERIZATION N/A 2024    Procedure: Coronary angiography;  Surgeon: Toño Rodriguez MD;  Location: [facility] CATH INVASIVE LOCATION;  Service: Cardiovascular;  Laterality: N/A;    CARDIAC CATHETERIZATION N/A 2024    Procedure: Angioplasty-coronary;  Surgeon: Toño Rodriguez MD;  Location: [facility] CATH INVASIVE LOCATION;  Service: Cardiovascular;  Laterality: N/A;    CARDIAC CATHETERIZATION N/A 2025    Procedure: Left Heart Cath/Possible PCI;  Surgeon: Shine Childs MD;  Location: [facility] CATH INVASIVE LOCATION;  Service: Cardiovascular;  Laterality: N/A;     SECTION  ,    COLONOSCOPY N/A 2024    Procedure: COLONOSCOPY;  Surgeon: Terrence Fry MD;  Location: [facility] ENDOSCOPY;  Service: Gastroenterology;  Laterality: N/A;  NORMAL COLONOSCOPY    CORONARY STENT PLACEMENT  2024    CYSTOSCOPY BLADDER STONE LITHOTRIPSY      EAR TUBES      ENDOSCOPY N/A 10/20/2020    Procedure: ESOPHAGOGASTRODUODENOSCOPY WITH BIOPSY;  Surgeon: Fidel Grajeda Jr., MD;  Location: [facility] ENDOSCOPY;  Service: General;  Laterality: N/A;  PRE- GERD  POST- RETAINED FOOD,  GASTRITIS, GASTRIC POLYPS    ENDOSCOPY  2014    FOOT FRACTURE SURGERY Left 2017    screw placed    GASTRECTOMY  12/2020    GASTRIC SLEEVE LAPAROSCOPIC N/A 12/07/2020    Procedure: GASTRIC SLEEVE LAPAROSCOPIC;  Surgeon: Fidel Grajeda Jr., MD;  [place] [facility] [place] [facility]  Service: Bariatric;  Laterality: N/A;    KIDNEY STONE SURGERY  09/27/21    LITHOTRIPSY  09/27/21    NEPHRECTOMY PARTIAL Right 11/15/2021    Procedure: NEPHRECTOMY PARTIAL LAPAROSCOPIC WITH DAVINCI ROBOT;  Surgeon: Lori Troy MD;  Location: [facility];  Service: Robotics - DaVinci;  Laterality: Right;    SHOULDER ARTHROSCOPY Left 08/14/2023    Procedure: LEFT SHOULDER MANIPULATION;  Surgeon: Domenic Bradley MD;  Location: [facility];  Service: Orthopedics;  Laterality: Left;    TONSILLECTOMY  1984    UPPER GASTROINTESTINAL ENDOSCOPY      URETEROSCOPY LASER LITHOTRIPSY WITH STENT INSERTION Right 09/28/2021    Procedure: CYSTOSCOPY, RIGHT URETEROSCOPY, LASERTRIPSY, STONE BASKET EXTRACTION AND STENT INSERTION;  Surgeon: Lori Troy MD;  Location: [facility];  Service: Urology;  Laterality: Right;    URETEROSCOPY LASER LITHOTRIPSY WITH STENT INSERTION Left 11/08/2022    Procedure: URETEROSCOPY LASER LITHOTRIPSY WITH URETERAL STENT INSERTION;  Surgeon: Lori Troy MD;  Location: [facility];  Service: Urology;  Laterality: Left;     Family History   Problem Relation Age of Onset    Cancer Mother         Breast    Diabetes Father     Hypertension Father     Heart disease Father     Cancer Father         Bladder prostate liver lung    Colon cancer Father         malignant, currently 62    Liver cancer Father     Prostate cancer Father     Depression Sister     No Known Problems Brother     No Known Problems Maternal Aunt     No Known Problems Paternal Aunt     No Known Problems Maternal Uncle     No Known Problems Paternal Uncle     No Known Problems Maternal Grandfather     Stroke Maternal Grandmother     Heart  disease Maternal Grandmother     Kidney cancer Maternal Grandmother     Diabetes Paternal Grandfather     Heart disease Paternal Grandfather     No Known Problems Paternal Grandmother     No Known Problems Cousin     Malig Hyperthermia Neg Hx     ADD / ADHD Neg Hx     Alcohol abuse Neg Hx     Anxiety disorder Neg Hx     Bipolar disorder Neg Hx     Dementia Neg Hx     Drug abuse Neg Hx     OCD Neg Hx     Paranoid behavior Neg Hx     Schizophrenia Neg Hx     Seizures Neg Hx     Self-Injurious Behavior  Neg Hx     Suicide Attempts Neg Hx        Home Medications:  Prior to Admission medications    Medication Sig Start Date End Date Taking? Authorizing Provider   Apremilast (Otezla) 30 MG tablet Take 1 tablet by mouth 2 (Two) Times a Day. 12/4/24      Apremilast (Otezla) 30 MG tablet Take 30 mg by mouth Every 12 (Twelve) Hours. 6/11/25      aspirin 81 MG EC tablet Take 1 tablet by mouth Daily. 9/6/24   Toño Rodriguez MD   azelaic acid (AZELEX) 15 % gel Apply 1 Application topically to face as directed Daily after cleansing. 12/4/24      betamethasone valerate (VALISONE) 0.1 % cream Apply 1 Application topically to the appropriate area as directed 1 (One) to  2 (Two) Times a Day until approved. 12/4/24      Biotin 39716 MCG tablet Take 1 tablet by mouth Daily.    ProviderHeron MD   buPROPion XL (WELLBUTRIN XL) 300 MG 24 hr tablet Take 1 tablet by mouth Daily. 12/17/24   Patricia Hernandez APRN   busPIRone (BUSPAR) 10 MG tablet Take 1 tablet by mouth 3 (Three) Times a Day for 90 days. 6/3/25 9/1/25  Nika Crowley APRN   CALCIUM-MAGNESIUM-ZINC PO Take 1 tablet by mouth Daily.    Heron Campuzano MD   cephalexin (KEFLEX) 500 MG capsule Take 1 capsule by mouth 4 (Four) Times a Day. 5/22/25   Patricia Hernandez APRN   cetirizine (zyrTEC) 10 MG tablet Take 1 tablet by mouth Daily As Needed for Allergies or Rhinitis. 5/31/22   Heron Campuzano MD   Cholecalciferol 25 MCG (1000 UT) capsule Take  2 capsules by mouth Daily. 12/17/24   Patricia Hernandez APRN   clopidogrel (PLAVIX) 75 MG tablet Take 1 tablet by mouth Daily 4/9/25 11/5/25  Loretta Saleem APRN   colchicine 0.6 MG tablet Take 1 tablet by mouth Daily. 4/23/25   Shine Childs MD   COLLAGEN PO Take 1 tablet by mouth Daily.    Provider, MD Heron   cyclobenzaprine (FLEXERIL) 10 MG tablet Take 1 tablet by mouth Every Night.  Patient taking differently: Take 1 tablet by mouth At Night As Needed for Muscle Spasms. 1/3/25   Krista Gillis MD   dapagliflozin Propanediol (Farxiga) 10 MG tablet Take 1 tablet by mouth Daily. 2/27/25   Toño Rodriguez MD   desvenlafaxine (PRISTIQ) 50 MG 24 hr tablet Take 1 tablet by mouth Daily. 3/31/25   Patricia Hernandez APRN   Diclofenac Sodium (VOLTAREN) 1 % gel gel Apply 4 g topically to the appropriate area as directed 4 (Four) Times a Day As Needed (pain). 6/12/23   Nicolasa Meade PA-C   Estrogens Conjugated (Premarin) 0.625 MG/GM vaginal cream Insert 1 gram into the vagina every night at bedtime for 2 Weeks, THEN 1 gram into the vagina every night at bedtime 2 (Two) Times a Week. 12/17/24   Patricia Hernandez APRN   fluticasone (FLONASE) 50 MCG/ACT nasal spray USE 2 SPRAYS IN EACH NOSTRIL ONCE DAILY AS DIRECTED  Patient taking differently: Administer 2 sprays into the nostril(s) as directed by provider Daily As Needed for Rhinitis or Allergies. 12/17/24   Patricia Hernandez APRN   hydrocortisone 2.5 % cream Apply 1 Application topically to the appropriate area of the face as directed 1 (One) to 2 (Two) Times a Day until approved.  Patient taking differently: Apply 1 Application topically to the appropriate area as directed 2 (Two) Times a Day As Needed. 12/4/24      IRON-VITAMIN C PO Take 1 tablet by mouth Daily.    Provider, MD Heron   linaclotide (Linzess) 72 MCG capsule capsule Take 1 capsule by mouth Every Morning Before Breakfast for 90 days. 10/16/24 7/13/25   Michell Horan APRN   metoprolol succinate XL (Toprol XL) 25 MG 24 hr tablet Take 0.5 tablets by mouth Every Night. 5/16/25   Dorys Ramirez APRN   Multiple Vitamins-Minerals (MULTIVITAMIN PO) Take 1 tablet by mouth Every Night.    Provider, MD Heron   nitroglycerin (NITROSTAT) 0.4 MG SL tablet Place 1 tablet under the tongue Every 5 (Five) Minutes As Needed for Chest Pain (Systolic BP Greater Than 100). Take no more than 3 doses in 15 minutes. 9/5/24   Toño Rodriguez MD   ondansetron ODT (ZOFRAN-ODT) 4 MG disintegrating tablet Place 1 tablet on the tongue Every 8 (Eight) Hours As Needed for Nausea. 9/5/24   Dana Xiong MD   Probiotic Product (PROBIOTIC-10 PO) Take 1 tablet by mouth Every Night.    Provider, MD Heron   rosuvastatin (CRESTOR) 40 MG tablet Take 1 tablet by mouth Every Night for 30 days. 6/17/25 7/17/25  Patricia Hernandez APRN   sulfamethoxazole-trimethoprim (Bactrim DS) 800-160 MG per tablet Take 1 tablet by mouth 2 (Two) Times a Day. 5/23/25   Patricia Hernandez APRN   testosterone micronized powder Appyl 0.5 mL (2 clicks) to skin daily 6/16/25   Patricia Hernandez APRN   Tirzepatide (Mounjaro) 12.5 MG/0.5ML solution auto-injector Inject 12.5 mg under the skin into the appropriate area as directed 1 (One) Time Per Week. 5/19/25   Patricia Hernandez APRN   topiramate (TOPAMAX) 50 MG tablet Take 1 tablet by mouth every night at bedtime 12/17/24 9/8/25  Patricia Hernandez APRN   traZODone (DESYREL) 100 MG tablet Take 1 tablet by mouth Every Night.  Patient taking differently: Take 1 tablet by mouth At Night As Needed for Sleep or Depression. 12/17/24   Patricia Hernandez APRN        Social History:   Social History     Tobacco Use    Smoking status: Former     Current packs/day: 0.00     Average packs/day: 0.3 packs/day for 34.8 years (8.7 ttl pk-yrs)     Types: Cigarettes     Start date: 1995     Quit date: 1/15/2020     Years  "since quittin.4     Passive exposure: Never    Smokeless tobacco: Never    Tobacco comments:     A PACK A WEEK   Vaping Use    Vaping status: Never Used   Substance Use Topics    Alcohol use: Not Currently     Comment: Very rarely    Drug use: Not Currently     Frequency: 0.1 times per week     Types: Marijuana     Comment: Very rarely in my youth/Current CBD use occasionally         Review of Systems:  Review of Systems     Physical Exam:  /73 (BP Location: Left arm, Patient Position: Sitting)   Pulse 70   Temp 97.8 °F (36.6 °C)   Resp 20   Ht 170.2 cm (67\")   Wt 96.3 kg (212 lb 4.9 oz)   SpO2 97%   BMI 33.25 kg/m²     Physical Exam  Vitals and nursing note reviewed.   Constitutional:       General: She is not in acute distress.  HENT:      Head: Normocephalic.      Mouth/Throat:      Mouth: Mucous membranes are moist.   Eyes:      Pupils: Pupils are equal, round, and reactive to light.   Cardiovascular:      Rate and Rhythm: Normal rate and regular rhythm.      Pulses: Normal pulses.      Heart sounds: Normal heart sounds. No murmur heard.  Pulmonary:      Effort: Pulmonary effort is normal. No respiratory distress.      Breath sounds: Normal breath sounds.   Abdominal:      General: Abdomen is flat. There is no distension.      Palpations: Abdomen is soft.      Tenderness: There is no abdominal tenderness. There is no guarding.   Musculoskeletal:      Cervical back: Neck supple.      Right lower leg: No edema.      Left lower leg: No edema.   Skin:     General: Skin is warm and dry.   Neurological:      General: No focal deficit present.      Mental Status: She is alert and oriented to person, place, and time.   Psychiatric:         Mood and Affect: Mood normal.         Behavior: Behavior normal.                    Medical Decision Making:      Comorbidities that affect care:    Coronary Artery Disease    External Notes reviewed:    Previous Clinic Note: Previous clinic note on 2025 " reviewed: Patient was seen for type 2 diabetes mellitus      The following orders were placed and all results were independently analyzed by me:  Orders Placed This Encounter   Procedures    XR Chest 1 View    [place] Draw    High Sensitivity Troponin T    Comprehensive Metabolic Panel    Lipase    BNP    Magnesium    CBC Auto Differential    D-dimer, Quantitative    Undress & Gown    Continuous Pulse Oximetry    ECG 12 Lead ED Triage Standing Order; Chest Pain    CBC & Differential    Green Top (Gel)    Lavender Top    Gold Top - SST    Light Blue Top       Medications Given in the Emergency Department:  Medications   aspirin chewable tablet 324 mg (243 mg Oral Given 6/19/25 1601)        ED Course:    ED Course as of 06/19/25 2210   Thu Jun 19, 2025   1544 PERC Rule for Pulmonary Embolism - MDCalc  Calculated on Jun 19 2025 3:44 PM  1 criteria -> If any criteria are positive, the PERC rule cannot be used to rule out PE in this patient. [CB]   1601 Chest x-ray reviewed: No acute findings [CB]   1614 D-dimer reviewed: Within normal limits. [CB]   1615 Aortic Dissection Detection Risk Score (ADD-RS) - MDCalc  Calculated on Jun 19 2025 4:15 PM  0 points -> Proceed to D-dimer testing according to ADD-RS; if <500 ng/mL, consider stopping dissection workup, or if >=500 ng/mL, consider CTA. [CB]   1616 HEART Score for Major Cardiac Events - MDCalc  Calculated on Jun 19 2025 4:16 PM  3 points -> Low Score (0-3 points) Risk of MACE of 0.9-1.7%. [CB]      ED Course User Index  [CB] Rickey Rhodes, JIE       Labs:    Lab Results (last 24 hours)       Procedure Component Value Units Date/Time    High Sensitivity Troponin T [[id number]]  (Normal) Collected: 06/19/25 1509    Specimen: Blood from Arm, Right Updated: 06/19/25 1547     HS Troponin T 13 ng/L     Narrative:      High Sensitive Troponin T Reference Range:  <14.0 ng/L- Negative Female for AMI  <22.0 ng/L- Negative Male for AMI  >=14 - Abnormal Female indicating  possible myocardial injury.  >=22 - Abnormal Male indicating possible myocardial injury.   Clinicians would have to utilize clinical acumen, EKG, Troponin, and serial changes to determine if it is an Acute Myocardial Infarction or myocardial injury due to an underlying chronic condition.         CBC & Differential [[id number]]  (Abnormal) Collected: 06/19/25 1509    Specimen: Blood from Arm, Right Updated: 06/19/25 1519    Narrative:      The following orders were created for panel order CBC & Differential.  Procedure                               Abnormality         Status                     ---------                               -----------         ------                     CBC Auto Differential[[id number]]        Abnormal            Final result                 Please view results for these tests on the individual orders.    Comprehensive Metabolic Panel [[id number]]  (Abnormal) Collected: 06/19/25 1509    Specimen: Blood from Arm, Right Updated: 06/19/25 1547     Glucose 110 mg/dL      BUN 15.2 mg/dL      Creatinine 1.19 mg/dL      Sodium 138 mmol/L      Potassium 3.8 mmol/L      Chloride 106 mmol/L      CO2 23.1 mmol/L      Calcium 9.2 mg/dL      Total Protein 6.9 g/dL      Albumin 4.3 g/dL      ALT (SGPT) 36 U/L      AST (SGOT) 29 U/L      Alkaline Phosphatase 83 U/L      Total Bilirubin 0.3 mg/dL      Globulin 2.6 gm/dL      A/G Ratio 1.7 g/dL      BUN/Creatinine Ratio 12.8     Anion Gap 8.9 mmol/L      eGFR 57.2 mL/min/1.73     Narrative:      GFR Categories in Chronic Kidney Disease (CKD)              GFR Category          GFR (mL/min/1.73)    Interpretation  G1                    90 or greater        Normal or high (1)  G2                    60-89                Mild decrease (1)  G3a                   45-59                Mild to moderate decrease  G3b                   30-44                Moderate to severe decrease  G4                    15-29                Severe decrease  G5                    14 or  less           Kidney failure    (1)In the absence of evidence of kidney disease, neither GFR category G1 or G2 fulfill the criteria for CKD.    eGFR calculation 2021 CKD-EPI creatinine equation, which does not include race as a factor    Lipase [[id number]]  (Abnormal) Collected: 06/19/25 1509    Specimen: Blood from Arm, Right Updated: 06/19/25 1547     Lipase 74 U/L     BNP [[id number]]  (Normal) Collected: 06/19/25 1509    Specimen: Blood from Arm, Right Updated: 06/19/25 1541     proBNP 111.4 pg/mL     Narrative:      This assay is used as an aid in the diagnosis of individuals suspected of having heart failure. It can be used as an aid in the diagnosis of acute decompensated heart failure (ADHF) in patients presenting with signs and symptoms of ADHF to the emergency department (ED). In addition, NT-proBNP of <300 pg/mL indicates ADHF is not likely.    Age Range Result Interpretation  NT-proBNP Concentration (pg/mL:      <50             Positive            >450                   Gray                 300-450                    Negative             <300    50-75           Positive            >900                  Gray                300-900                  Negative            <300      >75             Positive            >1800                  Gray                300-1800                  Negative            <300    Magnesium [[id number]]  (Normal) Collected: 06/19/25 1509    Specimen: Blood from Arm, Right Updated: 06/19/25 1547     Magnesium 2.2 mg/dL     CBC Auto Differential [[id number]]  (Abnormal) Collected: 06/19/25 1509    Specimen: Blood from Arm, Right Updated: 06/19/25 1519     WBC 8.95 10*3/mm3      RBC 4.67 10*6/mm3      Hemoglobin 14.0 g/dL      Hematocrit 42.2 %      MCV 90.4 fL      MCH 30.0 pg      MCHC 33.2 g/dL      RDW 13.2 %      RDW-SD 43.3 fl      MPV 9.2 fL      Platelets 175 10*3/mm3      Neutrophil % 68.3 %      Lymphocyte % 21.2 %      Monocyte % 7.7 %      Eosinophil % 1.6 %      Basophil  "% 0.6 %      Immature Grans % 0.6 %      Neutrophils, Absolute 6.12 10*3/mm3      Lymphocytes, Absolute 1.90 10*3/mm3      Monocytes, Absolute 0.69 10*3/mm3      Eosinophils, Absolute 0.14 10*3/mm3      Basophils, Absolute 0.05 10*3/mm3      Immature Grans, Absolute 0.05 10*3/mm3      nRBC 0.0 /100 WBC     D-dimer, Quantitative [[id number]]  (Normal) Collected: 06/19/25 1509    Specimen: Blood from Arm, Right Updated: 06/19/25 1604     D-Dimer, Quantitative <0.27 MCGFEU/mL     Narrative:      According to the assay 's published package insert, a normal (<0.50 MCGFEU/mL) D-dimer result in conjunction with a non-high clinical probability assessment, excludes deep vein thrombosis (DVT) and pulmonary embolism (PE) with high sensitivity.    D-dimer values increase with age and this can make VTE exclusion of an older population difficult. To address this, the [employer], based on best available evidence and recent guidelines, recommends that clinicians use age-adjusted D-dimer thresholds in patients greater than 50 years of age with: a) a low probability of PE who do not meet all Pulmonary Embolism Rule Out Criteria, or b) in those with intermediate probability of PE.   The formula for an age-adjusted D-dimer cut-off is \"age/100\".  For example, a 60 year old patient would have an age-adjusted cut-off of 0.60 MCGFEU/mL and an 80 year old 0.80 MCGFEU/mL.             Imaging:    XR Chest 1 View  Result Date: 6/19/2025  XR CHEST 1 VW Date of Exam: 6/19/2025 3:31 PM EDT Indication: Chest Pain Triage Protocol Comparison: Chest radiograph 5/11/2025. Findings: Cardiomediastinal silhouette is within normal limits. Coronary artery stent. No focal consolidation. No pleural effusion or pneumothorax. Osseous structures are unremarkable.     Impression: No acute cardiopulmonary findings. Electronically Signed: Ziyad Esteban MD  6/19/2025 3:34 PM EDT  Workstation ID: [id number]        Differential Diagnosis " and Discussion:    Chest Pain:  Based on the patient's signs and symptoms, I considered aortic dissection, myocardial infaction, pulmonary embolism, cardiac tamponade, pericarditis, pneumothorax, musculoskeletal chest pain and other differential diagnosis as an etiology of the patient's chest pain.     PROCEDURES:    Labs were collected in the emergency department and all labs were reviewed and interpreted by me.  An EKG was performed and the EKG was interpreted by me.    ECG 12 Lead ED Triage Standing Order; Chest Pain   Preliminary Result   HEART RATE=75  bpm   RR Iipnajff=611  ms   WV Jurzlcox=294  ms   P Horizontal Axis=17  deg   P Front Axis=53  deg   QRSD Interval=93  ms   QT Nbdyysqq=609  ms   OAaO=748  ms   QRS Axis=12  deg   T Wave Axis=39  deg   - NORMAL ECG -   Sinus rhythm   Date and Time of Study:2025-06-19 15:01:51          Procedures    MDM     Amount and/or Complexity of Data Reviewed  Clinical lab tests: reviewed  Tests in the radiology section of CPT®: reviewed  Tests in the medicine section of CPT®: reviewed  Decide to obtain previous medical records or to obtain history from someone other than the patient: yes    The patient is resting comfortably and feels better, is alert and in no distress. The repeat examination is unremarkable and benign. Electrocardiogram shows no signs of acute ischemia and the history, exam, diagnostic testing and current condition did not suggest that this patient is having an acute myocardial infarction, significant arrhythmia, unstable angina, esophageal perforation, pulmonary embolism, aortic dissection, severe pneumonia, sepsis for other significant pathology that would warrant further testing, continued ED treatment, admission, cardiology or other specialist consultation at this point. The vital signs have been stable. The patient's condition is stable and appropriate for discharge. The patient will pursue further outpatient evaluation with the primary care  physician, or designated physician or cardiologist. The patient has expressed a clear and thorough understanding and agreed to follow-up as instructed.                Patient Care Considerations:    SEPSIS was considered but is NOT present in the emergency department as SIRS criteria is not present. PERC: I used the PERC score to risk stratify the patient for PE and a CT of the chest was considered but ultimately not indicated in today's visit.      Consultants/Shared Management Plan:    SHARED VISIT: I have discussed the case with my supervising physician, Dr. Acharya who states agrees with treatment plan. The substantive portion of the medical decision was made by the attesting physician who made or approve the management plan and will take responsibility for the patient.  Clinical findings were discussed and ultimate disposition was made in consult with supervising physician.    Social Determinants of Health:    Patient is independent, reliable, and has access to care.       Disposition and Care Coordination:    Discharged: I considered escalation of care by admitting this patient to the hospital, however patient does not meet sepsis or SIRS criteria    I have explained the patient´s condition, diagnoses and treatment plan based on the information available to me at this time. I have answered questions and addressed any concerns. The patient has a good  understanding of the patient´s diagnosis, condition, and treatment plan as can be expected at this point. The vital signs have been stable. The patient´s condition is stable and appropriate for discharge from the emergency department.      The patient will pursue further outpatient evaluation with the primary care physician or other designated or consulting physician as outlined in the discharge instructions. They are agreeable to this plan of care and follow-up instructions have been explained in detail. The patient has received these instructions in written  format and has expressed an understanding of the discharge instructions. The patient is aware that any significant change in condition or worsening of symptoms should prompt an immediate return to this or the closest emergency department or call to 911.  I have explained discharge medications and the need for follow up with the patient/caretakers. This was also printed in the discharge instructions. Patient was discharged with the following medications and follow up:      Medication List        Changed      cyclobenzaprine 10 MG tablet  Commonly known as: FLEXERIL  Take 1 tablet by mouth Every Night.  What changed:   when to take this  reasons to take this     fluticasone 50 MCG/ACT nasal spray  Commonly known as: FLONASE  USE 2 SPRAYS IN EACH NOSTRIL ONCE DAILY AS DIRECTED  What changed:   when to take this  reasons to take this     hydrocortisone 2.5 % cream  Apply 1 Application topically to the appropriate area of the face as directed 1 (One) to 2 (Two) Times a Day until approved.  What changed:   when to take this  reasons to take this     traZODone 100 MG tablet  Commonly known as: DESYREL  Take 1 tablet by mouth Every Night.  What changed:   when to take this  reasons to take this           Patricia Hernandez, APRDEBBIE  [address] [place] [address]  [phone]    Schedule an appointment as soon as possible for a visit          Final diagnoses:   Chest pain, unspecified type   History of CAD (coronary artery disease)        ED Disposition       ED Disposition   Discharge    Condition   Stable    Comment   --               This medical record created using voice recognition software.             Rickey Rhodes PA-C  06/19/25 [id number]
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
## 2025-06-19 NOTE — DISCHARGE INSTRUCTIONS
Thank you for allowing us to provide care for you today.  Your workup today revealed evidence of chest pain without evidence of acute cardiopulmonary abnormalities on your physical exam imaging or blood workup.  Please continue to take your previously prescribed cardiac regimen I will have you follow-up with Dr. Raymundo Szymanski in clinic related to this ED visit.  Please follow-up with your PCP as well in the next 3 to 5 days.  Thank you

## 2025-06-19 NOTE — ED PROVIDER NOTES
"SHARED VISIT ATTESTATION:    This visit was performed by myself and an APC.  I personally approved the management plan/medical decision making and take responsibility for the patient management.      SHARED VISIT NOTE:    Patient is 45 y.o. year old female that presents to the ED for evaluation of chest discomfort.  The patient has had intermittent episodes of chest discomfort since yesterday.  The patient had no fever chills or cough.  She does have a history of known coronary disease.  Currently the patient is pain-free.     Physical Exam patient has a normal physical exam and heart and lung exam are both normal    ED Course:    /73 (BP Location: Left arm, Patient Position: Sitting)   Pulse 70   Temp 97.8 °F (36.6 °C)   Resp 20   Ht 170.2 cm (67\")   Wt 96.3 kg (212 lb 4.9 oz)   SpO2 97%   BMI 33.25 kg/m²       The following orders were placed and all results were independently analyzed by me:  Orders Placed This Encounter   Procedures    XR Chest 1 View    Crawford Draw    High Sensitivity Troponin T    Comprehensive Metabolic Panel    Lipase    BNP    Magnesium    CBC Auto Differential    D-dimer, Quantitative    Undress & Gown    Continuous Pulse Oximetry    ECG 12 Lead ED Triage Standing Order; Chest Pain    CBC & Differential    Green Top (Gel)    Lavender Top    Gold Top - SST    Light Blue Top       Medications Given in the Emergency Department:  Medications   aspirin chewable tablet 324 mg (243 mg Oral Given 6/19/25 1601)        ED Course:    ED Course as of 06/19/25 2327   Thu Jun 19, 2025   1544 PERC Rule for Pulmonary Embolism - MDCalc  Calculated on Jun 19 2025 3:44 PM  1 criteria -> If any criteria are positive, the PERC rule cannot be used to rule out PE in this patient. [CB]   1601 Chest x-ray reviewed: No acute findings [CB]   1614 D-dimer reviewed: Within normal limits. [CB]   1615 Aortic Dissection Detection Risk Score (ADD-RS) - MDCalc  Calculated on Jun 19 2025 4:15 PM  0 points -> " Proceed to D-dimer testing according to ADD-RS; if <500 ng/mL, consider stopping dissection workup, or if >=500 ng/mL, consider CTA. [CB]   1616 HEART Score for Major Cardiac Events - MDCalc  Calculated on Jun 19 2025 4:16 PM  3 points -> Low Score (0-3 points) Risk of MACE of 0.9-1.7%. [CB]      ED Course User Index  [CB] Rickey Rhodes, JIE       Labs:    Lab Results (last 24 hours)       Procedure Component Value Units Date/Time    High Sensitivity Troponin T [[id number]]  (Normal) Collected: 06/19/25 1509    Specimen: Blood from Arm, Right Updated: 06/19/25 1547     HS Troponin T 13 ng/L     Narrative:      High Sensitive Troponin T Reference Range:  <14.0 ng/L- Negative Female for AMI  <22.0 ng/L- Negative Male for AMI  >=14 - Abnormal Female indicating possible myocardial injury.  >=22 - Abnormal Male indicating possible myocardial injury.   Clinicians would have to utilize clinical acumen, EKG, Troponin, and serial changes to determine if it is an Acute Myocardial Infarction or myocardial injury due to an underlying chronic condition.         CBC & Differential [[id number]]  (Abnormal) Collected: 06/19/25 1509    Specimen: Blood from Arm, Right Updated: 06/19/25 7239    Narrative:      The following orders were created for panel order CBC & Differential.  Procedure                               Abnormality         Status                     ---------                               -----------         ------                     CBC Auto Differential[[id number]]        Abnormal            Final result                 Please view results for these tests on the individual orders.    Comprehensive Metabolic Panel [[id number]]  (Abnormal) Collected: 06/19/25 1509    Specimen: Blood from Arm, Right Updated: 06/19/25 1547     Glucose 110 mg/dL      BUN 15.2 mg/dL      Creatinine 1.19 mg/dL      Sodium 138 mmol/L      Potassium 3.8 mmol/L      Chloride 106 mmol/L      CO2 23.1 mmol/L      Calcium 9.2 mg/dL      Total  Protein 6.9 g/dL      Albumin 4.3 g/dL      ALT (SGPT) 36 U/L      AST (SGOT) 29 U/L      Alkaline Phosphatase 83 U/L      Total Bilirubin 0.3 mg/dL      Globulin 2.6 gm/dL      A/G Ratio 1.7 g/dL      BUN/Creatinine Ratio 12.8     Anion Gap 8.9 mmol/L      eGFR 57.2 mL/min/1.73     Narrative:      GFR Categories in Chronic Kidney Disease (CKD)              GFR Category          GFR (mL/min/1.73)    Interpretation  G1                    90 or greater        Normal or high (1)  G2                    60-89                Mild decrease (1)  G3a                   45-59                Mild to moderate decrease  G3b                   30-44                Moderate to severe decrease  G4                    15-29                Severe decrease  G5                    14 or less           Kidney failure    (1)In the absence of evidence of kidney disease, neither GFR category G1 or G2 fulfill the criteria for CKD.    eGFR calculation 2021 CKD-EPI creatinine equation, which does not include race as a factor    Lipase [[id number]]  (Abnormal) Collected: 06/19/25 1509    Specimen: Blood from Arm, Right Updated: 06/19/25 1547     Lipase 74 U/L     BNP [[id number]]  (Normal) Collected: 06/19/25 1509    Specimen: Blood from Arm, Right Updated: 06/19/25 1541     proBNP 111.4 pg/mL     Narrative:      This assay is used as an aid in the diagnosis of individuals suspected of having heart failure. It can be used as an aid in the diagnosis of acute decompensated heart failure (ADHF) in patients presenting with signs and symptoms of ADHF to the emergency department (ED). In addition, NT-proBNP of <300 pg/mL indicates ADHF is not likely.    Age Range Result Interpretation  NT-proBNP Concentration (pg/mL:      <50             Positive            >450                   Gray                 300-450                    Negative             <300    50-75           Positive            >900                  Gray                300-900                   Negative            <300      >75             Positive            >1800                  Gray                300-1800                  Negative            <300    Magnesium [[id number]]  (Normal) Collected: 06/19/25 1509    Specimen: Blood from Arm, Right Updated: 06/19/25 1547     Magnesium 2.2 mg/dL     CBC Auto Differential [[id number]]  (Abnormal) Collected: 06/19/25 1509    Specimen: Blood from Arm, Right Updated: 06/19/25 1519     WBC 8.95 10*3/mm3      RBC 4.67 10*6/mm3      Hemoglobin 14.0 g/dL      Hematocrit 42.2 %      MCV 90.4 fL      MCH 30.0 pg      MCHC 33.2 g/dL      RDW 13.2 %      RDW-SD 43.3 fl      MPV 9.2 fL      Platelets 175 10*3/mm3      Neutrophil % 68.3 %      Lymphocyte % 21.2 %      Monocyte % 7.7 %      Eosinophil % 1.6 %      Basophil % 0.6 %      Immature Grans % 0.6 %      Neutrophils, Absolute 6.12 10*3/mm3      Lymphocytes, Absolute 1.90 10*3/mm3      Monocytes, Absolute 0.69 10*3/mm3      Eosinophils, Absolute 0.14 10*3/mm3      Basophils, Absolute 0.05 10*3/mm3      Immature Grans, Absolute 0.05 10*3/mm3      nRBC 0.0 /100 WBC     D-dimer, Quantitative [[id number]]  (Normal) Collected: 06/19/25 1509    Specimen: Blood from Arm, Right Updated: 06/19/25 1604     D-Dimer, Quantitative <0.27 MCGFEU/mL     Narrative:      According to the assay 's published package insert, a normal (<0.50 MCGFEU/mL) D-dimer result in conjunction with a non-high clinical probability assessment, excludes deep vein thrombosis (DVT) and pulmonary embolism (PE) with high sensitivity.    D-dimer values increase with age and this can make VTE exclusion of an older population difficult. To address this, the [employer], based on best available evidence and recent guidelines, recommends that clinicians use age-adjusted D-dimer thresholds in patients greater than 50 years of age with: a) a low probability of PE who do not meet all Pulmonary Embolism Rule Out Criteria, or b) in those  "with intermediate probability of PE.   The formula for an age-adjusted D-dimer cut-off is \"age/100\".  For example, a 60 year old patient would have an age-adjusted cut-off of 0.60 MCGFEU/mL and an 80 year old 0.80 MCGFEU/mL.             Imaging:    XR Chest 1 View  Result Date: 6/19/2025  XR CHEST 1 VW Date of Exam: 6/19/2025 3:31 PM EDT Indication: Chest Pain Triage Protocol Comparison: Chest radiograph 5/11/2025. Findings: Cardiomediastinal silhouette is within normal limits. Coronary artery stent. No focal consolidation. No pleural effusion or pneumothorax. Osseous structures are unremarkable.     Impression: No acute cardiopulmonary findings. Electronically Signed: Ziyad Esteban MD  6/19/2025 3:34 PM EDT  Workstation ID: [id number]      MDM:    Procedures    Labs were collected in the emergency department and all labs were reviewed and interpreted by me.  X-ray were performed in the emergency department and all X-ray impressions were independently interpreted by me.  An EKG was performed and the EKG was interpreted by me.                     Adolph Acharya DO  23:27 EDT  06/19/25         Adolph Acharya DO  06/19/25 2328    "

## 2025-06-20 LAB
QT INTERVAL: 383 MS
QTC INTERVAL: 429 MS

## 2025-06-24 ENCOUNTER — TELEMEDICINE (OUTPATIENT)
Dept: PSYCHIATRY | Facility: CLINIC | Age: 45
End: 2025-06-24
Payer: COMMERCIAL

## 2025-06-24 ENCOUNTER — TELEPHONE (OUTPATIENT)
Dept: PSYCHIATRY | Facility: CLINIC | Age: 45
End: 2025-06-24

## 2025-06-24 DIAGNOSIS — F43.10 POST TRAUMATIC STRESS DISORDER (PTSD): ICD-10-CM

## 2025-06-24 DIAGNOSIS — F33.1 MDD (MAJOR DEPRESSIVE DISORDER), RECURRENT EPISODE, MODERATE: ICD-10-CM

## 2025-06-24 DIAGNOSIS — F41.1 GAD (GENERALIZED ANXIETY DISORDER): Primary | ICD-10-CM

## 2025-06-24 PROCEDURE — 99214 OFFICE O/P EST MOD 30 MIN: CPT

## 2025-06-24 RX ORDER — BUSPIRONE HYDROCHLORIDE 15 MG/1
15 TABLET ORAL 2 TIMES DAILY
Qty: 60 TABLET | Refills: 2 | Status: SHIPPED | OUTPATIENT
Start: 2025-06-24 | End: 2025-09-22

## 2025-06-24 NOTE — PROGRESS NOTES
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 05/21/2025 Negative  Negative Final    Urobilinogen, UA 05/21/2025 0.2 E.U./dL  0.2 - 1.0 E.U./dL Final    25 Hydroxy, Vitamin D 05/21/2025 83.9  30.0 - 100.0 ng/ml Final    Extra Tube 05/21/2025 Hold for add-ons.   Final    Auto resulted.    RBC, UA 05/21/2025 6-10 (A)  None Seen, 0-2 /HPF Final    WBC, UA 05/21/2025 Too Numerous to Count (A)  None Seen, 0-2 /HPF Final    Bacteria, UA 05/21/2025 4+ (A)  None Seen /HPF Final    Squamous Epithelial Cells, UA 05/21/2025 0-2  None Seen, 0-2 /HPF Final    Hyaline Casts, UA 05/21/2025 3-6  None Seen /LPF Final    Calcium Oxalate Crystals, UA 05/21/2025 Present  None Seen /HPF Final    Methodology 05/21/2025 Automated Microscopy   Final    Urine Culture 05/21/2025 >100,000 CFU/mL Escherichia coli (A)   Final   No results displayed because visit has over 200 results.      Clinical Support on 05/02/2025   Component Date Value Ref Range Status    HCG, Urine, QL 05/02/2025 Negative  Negative Final    Lot Number 05/02/2025 [id number]   Final    Internal Positive Control 05/02/2025 Positive  Positive, Passed Final    Internal Negative Control 05/02/2025 Passed  Negative, Passed Final    Expiration Date  05/02/2025 05/10/2025   Final   Lab on 04/23/2025   Component Date Value Ref Range Status    Sed Rate 04/23/2025 <1  0 - 20 mm/hr Final    HS C-Reactive Protein 04/23/2025 0.074  0.010 - 0.500 mg/dL Final   Admission on 04/22/2025, Discharged on 04/22/2025   Component Date Value Ref Range Status    Color 04/22/2025 Other (A)   Final    Clarity, UA 04/22/2025 Cloudy (A)   Final    Glucose, UA 04/22/2025 2+ (A)  mg/dL Final    Bilirubin 04/22/2025 Negative   Final    Ketones, UA 04/22/2025 Negative   Final    Specific Gravity  04/22/2025 1.015  1.005 - 1.030 Final    Blood, UA 04/22/2025 3+ (A)   Final    pH, Urine 04/22/2025 6.5  5.0 - 8.0 Final    Protein, POC 04/22/2025 1+ (A)  mg/dL Final    Urobilinogen, UA 04/22/2025 0.2 E.U./dL   Final    Nitrite, UA 04/22/2025 Negative   Final    Leukocytes 04/22/2025 Small (1+) (A)   Final    Urine Culture 04/22/2025 >100,000 CFU/mL Escherichia coli (A)   Final   Admission on 04/16/2025, Discharged on 04/16/2025   Component Date Value Ref Range Status    QT Interval 04/16/2025 387  ms Final    QTC Interval 04/16/2025 422  ms Final    HS Troponin T 04/16/2025 12  <14 ng/L Final    Glucose 04/16/2025 78  65 - 99 mg/dL Final    BUN 04/16/2025 21 (H)  6 - 20 mg/dL Final    Creatinine 04/16/2025 1.32 (H)  0.57 - 1.00 mg/dL Final    Sodium 04/16/2025 140  136 - 145 mmol/L Final    Potassium 04/16/2025 4.5  3.5 - 5.2 mmol/L Final    Chloride 04/16/2025 104  98 - 107 mmol/L Final    CO2 04/16/2025 22.9  22.0 - 29.0 mmol/L Final    Calcium 04/16/2025 9.7  8.6 - 10.5 mg/dL Final    Total Protein 04/16/2025 7.6  6.0 - 8.5 g/dL Final    Albumin 04/16/2025 4.4  3.5 - 5.2 g/dL Final    ALT (SGPT) 04/16/2025 33  1 - 33 U/L Final    AST (SGOT) 04/16/2025 30  1 - 32 U/L Final    Alkaline Phosphatase 04/16/2025 91  39 - 117 U/L Final    Total Bilirubin 04/16/2025 0.3  0.0 - 1.2 mg/dL Final    Globulin 04/16/2025 3.2  gm/dL Final    A/G Ratio 04/16/2025 1.4  g/dL Final    BUN/Creatinine Ratio  04/16/2025 15.9  7.0 - 25.0 Final    Anion Gap 04/16/2025 13.1  5.0 - 15.0 mmol/L Final    eGFR 04/16/2025 50.5 (L)  >60.0 mL/min/1.73 Final    Lipase 04/16/2025 85 (H)  13 - 60 U/L Final    proBNP 04/16/2025 66.2  0.0 - 450.0 pg/mL Final    Magnesium 04/16/2025 2.3  1.6 - 2.6 mg/dL Final    Extra Tube 04/16/2025 Hold for add-ons.   Final    Auto resulted.    Extra Tube 04/16/2025 hold for add-on   Final    Auto resulted    Extra Tube 04/16/2025 Hold for add-ons.   Final    Auto resulted.    Extra Tube 04/16/2025 Hold for add-ons.   Final    Auto resulted    WBC 04/16/2025 8.33  3.40 - 10.80 10*3/mm3 Final    RBC 04/16/2025 4.99  3.77 - 5.28 10*6/mm3 Final    Hemoglobin 04/16/2025 14.9  12.0 - 15.9 g/dL Final    Hematocrit 04/16/2025 44.6  34.0 - 46.6 % Final    MCV 04/16/2025 89.4  79.0 - 97.0 fL Final    MCH 04/16/2025 29.9  26.6 - 33.0 pg Final    MCHC 04/16/2025 33.4  31.5 - 35.7 g/dL Final    RDW 04/16/2025 13.0  12.3 - 15.4 % Final    RDW-SD 04/16/2025 42.5  37.0 - 54.0 fl Final    MPV 04/16/2025 9.7  6.0 - 12.0 fL Final    Platelets 04/16/2025 195  140 - 450 10*3/mm3 Final    Neutrophil % 04/16/2025 56.4  42.7 - 76.0 % Final    Lymphocyte % 04/16/2025 31.8  19.6 - 45.3 % Final    Monocyte % 04/16/2025 8.8  5.0 - 12.0 % Final    Eosinophil % 04/16/2025 1.8  0.3 - 6.2 % Final    Basophil % 04/16/2025 0.6  0.0 - 1.5 % Final    Immature Grans % 04/16/2025 0.6 (H)  0.0 - 0.5 % Final    Neutrophils, Absolute 04/16/2025 4.70  1.70 - 7.00 10*3/mm3 Final    Lymphocytes, Absolute 04/16/2025 2.65  0.70 - 3.10 10*3/mm3 Final    Monocytes, Absolute 04/16/2025 0.73  0.10 - 0.90 10*3/mm3 Final    Eosinophils, Absolute 04/16/2025 0.15  0.00 - 0.40 10*3/mm3 Final    Basophils, Absolute 04/16/2025 0.05  0.00 - 0.20 10*3/mm3 Final    Immature Grans, Absolute 04/16/2025 0.05  0.00 - 0.05 10*3/mm3 Final    nRBC 04/16/2025 0.0  0.0 - 0.2 /100 WBC Final    HS Troponin T 04/16/2025 12  <14 ng/L Final    Troponin T Numeric Delta  04/16/2025 0  Abnormal if >/=3 ng/L Final   Lab on 03/03/2025   Component Date Value Ref Range Status    Ferritin 03/03/2025 76.90  13.00 - 150.00 ng/mL Final    Iron 03/03/2025 62  37 - 145 mcg/dL Final    Folate 03/03/2025 >20.00  4.78 - 24.20 ng/mL Final    25 Hydroxy, Vitamin D 03/03/2025 91.1  30.0 - 100.0 ng/ml Final   Office Visit on 02/03/2025   Component Date Value Ref Range Status    Color 02/03/2025 Yellow  Yellow, Straw, Dark Yellow, Sheri Final    Clarity, UA 02/03/2025 Clear  Clear Final    Specific Gravity  02/03/2025 1.025  1.005 - 1.030 Final    pH, Urine 02/03/2025 6.0  5.0 - 8.0 Final    Leukocytes 02/03/2025 Trace (A)  Negative Final    Nitrite, UA 02/03/2025 Positive (A)  Negative Final    Protein, POC 02/03/2025 30 mg/dL (A)  Negative mg/dL Final    Glucose, UA 02/03/2025 1000 mg/dL (A)  Negative mg/dL Final    Ketones, UA 02/03/2025 Negative  Negative Final    Urobilinogen, UA 02/03/2025 0.2 E.U./dL  Normal, 0.2 E.U./dL Final    Bilirubin 02/03/2025 Negative  Negative Final    Blood, UA 02/03/2025 Negative  Negative Final    Lot Number 02/03/2025 -   Final    Expiration Date 02/03/2025 -   Final   Clinical Support on 01/24/2025   Component Date Value Ref Range Status    HCG, Urine, QL 01/24/2025 Negative  Negative Final    Lot Number 01/24/2025 [id number]   Final    Internal Positive Control 01/24/2025 Passed  Positive, Passed Final    Internal Negative Control 01/24/2025 Passed  Negative, Passed Final    Expiration Date 01/24/2025 05/10/2025   Final   There may be more visits with results that are not included.       ASSESSMENT AND PLAN:    ICD-10-CM ICD-9-CM   1. INDIRA (generalized anxiety disorder)  F41.1 300.02   2. MDD (major depressive disorder), recurrent episode, moderate  F33.1 296.32   3. Post traumatic stress disorder (PTSD)  F43.10 309.81       Bianca is a 45 y.o. female who presents today for follow-up regarding medication response, anxiety, and recent life stressors. We have discussed  the interval history and the treatment plan below, including potential R/B/SE of the recommended regimen of which the patient demonstrates understanding. Patient is agreeable to call 911 or go to the nearest ER should she become concerned for her own safety and/or the safety of those around her. There are are no overt indices of acute kathryn/psychosis on exam today. NELSON reviewed and is as expected.    Medication regimen:  begin buspirone 15 mg po BID   continue bupropion  mg po q day  Continue Trazodone 100 mg po q day  Continue desvenlafaxine 50 mg po q hs  Continue Hydroxyzine 10 mg po TID for anxiety   The patient is advised not to misuse prescribed medications or to use them with any exogenous substances that aren't disclosed to this provider as they may interact with the regimen to the patient's detriment.   Risk Assessment: protracted risk is moderate, imminent risk is moderate. Do note that this is subject to change with the Mandaeism of new stressors, treatment non-adherence, use of substances, and/or new medical ails.   Monitoring: reviewed labs/imaging as populated above; ordered  Therapy: sees Evie Luevano in September, on waiting list   Follow-up: 4 weeks  Communications: N/A    TREATMENT PLAN/GOALS: challenge patterns of living conducive to symptom burden, implement recommended regimen as above with augmentative, intermittent supportive psychotherapy to reduce symptom burden. Patient acknowledged and verbally consented to continue treatment. The importance of adherence to the recommended treatment and interval follow-up appointments was again emphasized today: patient has good treatment adherence per given history. Patient was today reminded to limit daily caffeine intake, hydrate appropriately, eat healthy and nutritious foods, engage sleep hygiene measures, engage appropriate exposure to sunlight, engage with hobbies in balance with life necessities, and exercise appropriate to their capacity to  do so.       Parts of this note are electronic transcriptions/translations of spoken language to printed text using the Dragon Dictation system.    Electronically signed by CARLTON Carbajal, 06/24/25

## 2025-07-02 RX ORDER — BUPROPION HYDROCHLORIDE 300 MG/1
300 TABLET ORAL DAILY
Qty: 90 TABLET | Refills: 1 | Status: SHIPPED | OUTPATIENT
Start: 2025-07-02

## 2025-07-17 ENCOUNTER — OFFICE VISIT (OUTPATIENT)
Dept: CARDIOLOGY | Facility: CLINIC | Age: 45
End: 2025-07-17
Payer: COMMERCIAL

## 2025-07-17 VITALS
DIASTOLIC BLOOD PRESSURE: 68 MMHG | HEIGHT: 67 IN | SYSTOLIC BLOOD PRESSURE: 104 MMHG | BODY MASS INDEX: 32.65 KG/M2 | WEIGHT: 208 LBS | HEART RATE: 63 BPM

## 2025-07-17 DIAGNOSIS — I25.118 CORONARY ARTERY DISEASE INVOLVING NATIVE CORONARY ARTERY OF NATIVE HEART WITH OTHER FORM OF ANGINA PECTORIS: Primary | ICD-10-CM

## 2025-07-17 DIAGNOSIS — Z95.5 STENTED CORONARY ARTERY: ICD-10-CM

## 2025-07-17 DIAGNOSIS — E78.2 MIXED HYPERLIPIDEMIA: ICD-10-CM

## 2025-07-17 DIAGNOSIS — I25.10 CAD S/P PERCUTANEOUS CORONARY ANGIOPLASTY: ICD-10-CM

## 2025-07-17 DIAGNOSIS — Z98.61 CAD S/P PERCUTANEOUS CORONARY ANGIOPLASTY: ICD-10-CM

## 2025-07-17 DIAGNOSIS — I10 ESSENTIAL HYPERTENSION: ICD-10-CM

## 2025-07-17 NOTE — PROGRESS NOTES
CARDIOLOGY FOLLOW-UP PROGRESS NOTE        Chief Complaint  Follow-up, Coronary Artery Disease, Hypertension, and Chest Pain    Subjective            Bianca Boles presents to [facility] CARDIOLOGY  History of Present Illness      Patient is cardiac wise stable and denies angina or dyspnea.  She had PCI of the right coronary artery with IVUS guided LATHA 3.5 x 23 Xience stent which was postdilated to 4.3 mm diameter.  The LAD had patent stents.  Her lipid profile is at goal.             Component  Ref Range & Units (hover) 1 mo ago  (5/21/25) 7 mo ago  (12/6/24) 10 mo ago  (9/5/24) 1 yr ago  (7/5/24) 1 yr ago  (5/2/24) 1 yr ago  (11/3/23) 2 yr ago  (5/5/23)   Total Cholesterol 107 134 114 152 164 142 132   Triglycerides 119 88 82 98 87 146 99   HDL Cholesterol 41 44 34 Low  46 46 52 50   LDL Cholesterol 45 73 64 88 102 High  65 64   VLDL Cholesterol 21 17 16 18 16 25 18   LDL/HDL Ratio 1.03 1.65 1.87 1.88 2.19            Past History:    Medical History:  Past Medical History:   Diagnosis Date    Acid reflux     Anxiety     Anxiety and depression     Benign essential hypertension     Borderline personality disorder     Cancer 2021    renal cancer/mass, PARTIAL NEPH, RIGHT    Chronic kidney disease     Coronary artery disease     Diabetes     Diabetes mellitus     type ii, doesn't check bg at home     Diabetes mellitus, type 2     Elevated cholesterol     Fatty liver 1995    Fracture, foot 09/2017    Frozen shoulder 08/02/2023    GERD (gastroesophageal reflux disease)     High triglycerides     History of bariatric surgery 02/04/2021    GASTRIC SLEEVE    History of kidney stones     HTN (hypertension)     Hyperlipemia     Hyperlipidemia     Hypertriglyceridemia     Kidney stone     Low back pain     Lumbar herniated disc     Myocardial infarction 09/2024    Obesity     Panic attacks     PCOS (polycystic ovarian syndrome)     Periarthritis of shoulder 01/23    Psoriasis     ELBOWS    PTSD  (post-traumatic stress disorder)     Renal insufficiency     Rotator cuff syndrome     Seasonal allergies     Self-injurious behavior     Spinal headache     AFTER PAIN EPIDURALS.  NO BLOOD PATCH    Suicide attempt     Urinary tract infection        Surgical History: has a past surgical history that includes  section (,); Tonsillectomy (); Ear Tubes Removal (); Adenoidectomy (); cystoscopy bladder stone lithotripsy; Foot fracture surgery (Left, ); Esophagogastroduodenoscopy (N/A, 10/20/2020); Gastric Sleeve (N/A, 2020); Esophagogastroduodenoscopy (); ureteroscopy laser lithotripsy with stent insertion (Right, 2021); Partial nephrectomy (Right, 11/15/2021); ureteroscopy laser lithotripsy with stent insertion (Left, 2022); Shoulder arthroscopy (Left, 2023); Abdominal surgery (2020); Gastrectomy (2020); Upper gastrointestinal endoscopy; Colonoscopy (N/A, 2024); Cardiac catheterization (N/A, 2024); Cardiac catheterization (N/A, 2024); Cardiac catheterization (N/A, 2024); Coronary stent placement (2024); Kidney stone surgery (21); Bariatric Surgery (); Lithotripsy (21); and Cardiac catheterization (N/A, 2025).     Family History: family history includes Cancer in her father and mother; Colon cancer in her father; Depression in her sister; Diabetes in her father and paternal grandfather; Heart attack in her father; Heart disease in her father, maternal grandmother, and paternal grandfather; Hyperlipidemia in her father; Hypertension in her father; Kidney cancer in her maternal grandmother; Liver cancer in her father; No Known Problems in her brother, cousin, maternal aunt, maternal grandfather, maternal uncle, paternal aunt, paternal grandmother, and paternal uncle; Prostate cancer in her father; Stroke in her maternal grandmother; Thyroid disease in her mother.     Social History: reports  that she quit smoking about 5 years ago. Her smoking use included cigarettes. She started smoking about 30 years ago. She has a 10.4 pack-year smoking history. She has never been exposed to tobacco smoke. She has never used smokeless tobacco. She reports that she does not currently use alcohol. She reports that she does not currently use drugs after having used the following drugs: LSD, Marijuana, and MDMA (ecstacy). Frequency: 0.10 times per week.    Allergies: Morphine    Current Outpatient Medications on File Prior to Visit   Medication Sig    Apremilast (Otezla) 30 MG tablet Take 1 tablet by mouth 2 (Two) Times a Day.    Apremilast (Otezla) 30 MG tablet Take 30 mg by mouth Every 12 (Twelve) Hours.    aspirin 81 MG EC tablet Take 1 tablet by mouth Daily.    azelaic acid (AZELEX) 15 % gel Apply 1 Application topically to face as directed Daily after cleansing.    betamethasone valerate (VALISONE) 0.1 % cream Apply 1 Application topically to the appropriate area as directed 1 (One) to  2 (Two) Times a Day until approved.    Biotin 88992 MCG tablet Take 1 tablet by mouth Daily.    buPROPion XL (WELLBUTRIN XL) 300 MG 24 hr tablet Take 1 tablet by mouth Daily.    busPIRone (BUSPAR) 15 MG tablet Take 1 tablet by mouth 2 (Two) Times a Day.    CALCIUM-MAGNESIUM-ZINC PO Take 1 tablet by mouth Daily.    cephalexin (KEFLEX) 500 MG capsule Take 1 capsule by mouth 4 (Four) Times a Day.    cetirizine (zyrTEC) 10 MG tablet Take 1 tablet by mouth Daily As Needed for Allergies or Rhinitis.    Cholecalciferol 25 MCG (1000 UT) capsule Take 2 capsules by mouth Daily.    clopidogrel (PLAVIX) 75 MG tablet Take 1 tablet by mouth Daily    colchicine 0.6 MG tablet Take 1 tablet by mouth Daily.    COLLAGEN PO Take 1 tablet by mouth Daily.    cyclobenzaprine (FLEXERIL) 10 MG tablet Take 1 tablet by mouth Every Night. (Patient taking differently: Take 1 tablet by mouth At Night As Needed for Muscle Spasms.)    dapagliflozin Propanediol  (Farxiga) 10 MG tablet Take 1 tablet by mouth Daily.    desvenlafaxine (PRISTIQ) 50 MG 24 hr tablet Take 1 tablet by mouth Daily.    Diclofenac Sodium (VOLTAREN) 1 % gel gel Apply 4 g topically to the appropriate area as directed 4 (Four) Times a Day As Needed (pain).    Estrogens Conjugated (Premarin) 0.625 MG/GM vaginal cream Insert 1 gram into the vagina every night at bedtime for 2 Weeks, THEN 1 gram into the vagina every night at bedtime 2 (Two) Times a Week.    fluticasone (FLONASE) 50 MCG/ACT nasal spray USE 2 SPRAYS IN EACH NOSTRIL ONCE DAILY AS DIRECTED (Patient taking differently: Administer 2 sprays into the nostril(s) as directed by provider Daily As Needed for Rhinitis or Allergies.)    hydrocortisone 2.5 % cream Apply 1 Application topically to the appropriate area of the face as directed 1 (One) to 2 (Two) Times a Day until approved. (Patient taking differently: Apply 1 Application topically to the appropriate area as directed 2 (Two) Times a Day As Needed.)    IRON-VITAMIN C PO Take 1 tablet by mouth Daily.    linaclotide (Linzess) 72 MCG capsule capsule Take 1 capsule by mouth Every Morning Before Breakfast for 90 days.    metoprolol succinate XL (Toprol XL) 25 MG 24 hr tablet Take 0.5 tablets by mouth Every Night.    Multiple Vitamins-Minerals (MULTIVITAMIN PO) Take 1 tablet by mouth Every Night.    nitroglycerin (NITROSTAT) 0.4 MG SL tablet Place 1 tablet under the tongue Every 5 (Five) Minutes As Needed for Chest Pain (Systolic BP Greater Than 100). Take no more than 3 doses in 15 minutes.    ondansetron ODT (ZOFRAN-ODT) 4 MG disintegrating tablet Place 1 tablet on the tongue Every 8 (Eight) Hours As Needed for Nausea.    Probiotic Product (PROBIOTIC-10 PO) Take 1 tablet by mouth Every Night.    rosuvastatin (CRESTOR) 40 MG tablet Take 1 tablet by mouth Every Night for 30 days.    sulfamethoxazole-trimethoprim (Bactrim DS) 800-160 MG per tablet Take 1 tablet by mouth 2 (Two) Times a Day.     "testosterone micronized powder Appyl 0.5 mL (2 clicks) to skin daily    Tirzepatide (Mounjaro) 12.5 MG/0.5ML solution auto-injector Inject 12.5 mg under the skin into the appropriate area as directed 1 (One) Time Per Week.    topiramate (TOPAMAX) 50 MG tablet Take 1 tablet by mouth every night at bedtime    traZODone (DESYREL) 100 MG tablet Take 1 tablet by mouth Every Night. (Patient taking differently: Take 1 tablet by mouth At Night As Needed for Sleep or Depression.)     No current facility-administered medications on file prior to visit.          Review of Systems   System review negative  Objective     /68 (BP Location: Left arm)   Pulse 63   Ht 170.2 cm (67\")   Wt 94.3 kg (208 lb)   BMI 32.58 kg/m²       Physical Exam    General : Alert, awake, no acute distress  Neck : Supple, no carotid bruit, no jugular venous distention  CVS : Regular rate and rhythm, no murmur, rubs or gallops  Lungs: Clear to auscultation bilaterally, no crackles or rhonchi  Abdomen: Soft, nontender, bowel sounds heard in all 4 quadrants  Extremities: Warm, well-perfused, no pedal edema  Neurologic: No apparent motor deficit    Result Review :     The following data was reviewed by: Shine Connolly MD on 07/17/2025:    CMP          5/13/2025    04:23 5/21/2025    11:38 6/19/2025    15:09   CMP   Glucose 82  122  110    BUN 14  16  15.2    Creatinine 1.04  1.37  1.19    EGFR 67.3  48.3  57.2    Sodium 137  140  138    Potassium 3.9  3.7  3.8    Chloride 110  107  106    Calcium 8.9  9.6  9.2    Total Protein 6.0  6.9  6.9    Albumin 3.7  4.3  4.3    Globulin  2.6  2.6    Total Bilirubin 0.3  0.4  0.3    Alkaline Phosphatase 72  80  83    AST (SGOT) 23  27  29    ALT (SGPT) 29  27  36    Albumin/Globulin Ratio  1.7  1.7    BUN/Creatinine Ratio 13.5  11.7  12.8    Anion Gap 10.7  11.0  8.9      CBC          5/13/2025    04:23 5/21/2025    11:38 6/19/2025    15:09   CBC   WBC 6.25  10.05  8.95    RBC 4.66  4.69  4.67    Hemoglobin " 13.8  14.0  14.0    Hematocrit 41.5  40.8  42.2    MCV 89.1  87.0  90.4    MCH 29.6  29.9  30.0    MCHC 33.3  34.3  33.2    RDW 13.2  12.7  13.2    Platelets 151  208  175      TSH          12/6/2024    10:24 5/21/2025    11:38   TSH   TSH 0.739  1.160      Lipid Panel          9/5/2024    06:40 12/6/2024    10:24 5/21/2025    11:38   Lipid Panel   Total Cholesterol 114  134  107    Triglycerides 82  88  119    HDL Cholesterol 34  44  41    VLDL Cholesterol 16  17  21    LDL Cholesterol  64  73  45    LDL/HDL Ratio 1.87  1.65  1.03           Data reviewed: Cardiology studies        Results for orders placed during the hospital encounter of 05/11/25    Adult Transthoracic Echo Complete W/ Cont if Necessary Per Protocol    Interpretation Summary    Left ventricular ejection fraction appears to be 56 - 60%.    Left ventricular diastolic function is consistent with (grade I) impaired relaxation.    Estimated right ventricular systolic pressure from tricuspid regurgitation is normal (<35 mmHg).      Results for orders placed during the hospital encounter of 09/01/24    Stress Test With Myocardial Perfusion One Day 09/03/2024 10:00 PM    Interpretation Summary    Left ventricular ejection fraction is mildly reduced (Calculated EF = 45%).    Abnormal, high risk myocardial perfusion imaging study  Perfusion defects are noted.  There is a partially reversible perfusion defect of the distal anterior wall segment suggestive of erika-infarct ischemia.  There is absent uptake of tracer noted in the apical segments distal inferior wall segment distal anterior segment anterolateral and improved septal segments more pronounced on stress images than rest images.    Wall motion abnormalities are noted involving distal anterior and distal inferior and apical segments.  Overall EF is estimated to be around 45%  No transient ischemic dilatation was noted    Constellation of findings are suggestive of mid to distal LAD occlusion which  tends to wraparound apex.  Coronary angiogram is recommended for further evaluation               Assessment and Plan        Diagnoses and all orders for this visit:    1. Coronary artery disease involving native coronary artery of native heart with other form of angina pectoris (Primary)  -     Ambulatory Referral to Cardiac Rehab    2. CAD S/P percutaneous coronary angioplasty    3. Essential hypertension    4. Mixed hyperlipidemia      I would continue with dual antiplatelet therapy with low-dose aspirin and Plavix every 5 control daily.  Continue with high-dose rosuvastatin for goal LDL60 and triglycerides less than 150.  Continue with Farxiga.  Outpatient cardiac rehabilitation.  Continue with lifestyle modification and heart healthy diet.  Regular exercise.  Will reevaluate in 6-month.        Follow Up     Return in about 6 months (around 1/17/2026).    Patient was given instructions and counseling regarding her condition or for health maintenance advice. Please see specific information pulled into the AVS if appropriate.

## 2025-07-21 RX ORDER — ROSUVASTATIN CALCIUM 40 MG/1
40 TABLET, COATED ORAL NIGHTLY
Qty: 30 TABLET | Refills: 0 | Status: SHIPPED | OUTPATIENT
Start: 2025-07-21 | End: 2025-08-20

## 2025-07-21 NOTE — TELEPHONE ENCOUNTER
Patient requested medication refill of Rosuvastatin 40 mg.    Passed all protocol.    Next scheduled appointment scheduled for 1/19/2026    Per provider patient is to continue current medication.     Refilled 30 day supply with 0 refills.

## 2025-07-24 ENCOUNTER — OFFICE VISIT (OUTPATIENT)
Dept: PSYCHIATRY | Facility: CLINIC | Age: 45
End: 2025-07-24
Payer: COMMERCIAL

## 2025-07-24 VITALS
SYSTOLIC BLOOD PRESSURE: 106 MMHG | HEIGHT: 67 IN | DIASTOLIC BLOOD PRESSURE: 65 MMHG | OXYGEN SATURATION: 100 % | HEART RATE: 71 BPM | BODY MASS INDEX: 32.58 KG/M2

## 2025-07-24 DIAGNOSIS — F33.1 MDD (MAJOR DEPRESSIVE DISORDER), RECURRENT EPISODE, MODERATE: ICD-10-CM

## 2025-07-24 DIAGNOSIS — F43.10 POST TRAUMATIC STRESS DISORDER (PTSD): ICD-10-CM

## 2025-07-24 DIAGNOSIS — F41.1 GAD (GENERALIZED ANXIETY DISORDER): Primary | ICD-10-CM

## 2025-07-24 RX ORDER — BUSPIRONE HYDROCHLORIDE 30 MG/1
30 TABLET ORAL 2 TIMES DAILY
Qty: 60 TABLET | Refills: 2 | Status: SHIPPED | OUTPATIENT
Start: 2025-07-24 | End: 2025-10-22

## 2025-07-24 RX ORDER — HYDROXYZINE HYDROCHLORIDE 25 MG/1
25 TABLET, FILM COATED ORAL 3 TIMES DAILY PRN
Qty: 90 TABLET | Refills: 1 | Status: SHIPPED | OUTPATIENT
Start: 2025-07-24 | End: 2025-09-22

## 2025-07-24 NOTE — PROGRESS NOTES
"[facility]  Follow-up Visit    Chief Complaint:  \"I want to get into some counseling\"     Initial History of Present Illness: Bianca Boles is a 43 y.o. female who presents today for initial evaluation regarding psychotherapy.  Bianca presents unaccompanied in no acute distress and engages with me appropriately. Psychotropic regimen with which patient presents is described as  bupropion   mg po daily, buspirone 5 mg po TID, trazodone 100 mg po q HS, desvenlafaxine 50 mg po q day.      History is positive for signs/symptoms suggestive of low mood, low energy, anhedonia, changes in sleep, changes in appetite, guilt, poor concentration, psychomotor changes, thoughts of being better off dead-endorses occasional passive suicidal thoughts, and daydreams about \"not being here.\" Psychiatric screening is negative for pathognomonic history of: TBI, kathryn, psychosis.      Ms. Boles has symptoms of history of significant trauma for which there are related intrusion symptoms related to the traumatic event (distressing memories, flashbacks, nightmares, intense distress associated with triggering stimuli, marked physiological reactions to triggering stimuli), persistent avoidance of triggering stimuli, negative alterations in cognition and mood (memory lapses, negative schemas, distorted cognitions about the event, social withdrawal, feelings of detachment/estrangement, persistent anhedonia), and marked alterations in arousal and reactivity (irritability, reckless behavior, hypervigilance, exaggerated startle, sleep disturbances). She also has a history a history of early exposure to enduring trauma associated with re-experiencing trauma, avoidance, hyperarousal (PTSD) and difficulty managing emotions, negative self-view, relationship difficulties, dissociative symptoms, and demoralization (complex PTSD). Patient has family history of ADHD, and possible ASD, will plan to screen " "for both for informational gathering purposes and not necessarily diagnosing.   Ms. Boles also voices current body image disturbances related to her bariatric procedure and intense fear of gaining the 98 lbs back, patient admits to the irrational fear, but has put on 10 lbs, I recommended sharing these concerns with her PCP.     I have counseled the patient with regard to diagnoses and the recommended treatment regimen as documented below: I will assume prescriptive responsibility (if PCP desires) for CONTINUE bupropion   mg po daily (in am), CONTINUE buspirone 5 mg po TID (patient to take more regularly for greatest efficacy) CONTINUE trazodone  mg po q HS, CONTINUE desvenlafaxine 50 mg po q day . Patient acknowledges the diagnoses per my rendered interpretation. Patient demonstrates awareness/understanding of viable alternatives for treatment as well as potential risks, benefits, and side effects associated with this regimen and is amenable to proceed in this fashion.      Recommended lifestyle changes: 30 minutes of activity to increase HR 2-3 days weekly.     Psychiatric History:  Diagnoses: depression, anxiety  Outpatient history: 14-14 yo  Inpatient history: none  Medication trials: fluoxetine, Zoloft, Effexor, citalopram, bupropion, pristiq, hx of GENESIGHT  Other treatment modalities: Psychotherapy  Self harm: cutting  Suicide attempts: Yes, OD  Abuse or neglect: emotional     Substance Abuse History:   Types/methods/frequency: marijuana  Transtheoretical stage: Maintenence     Social History:  Residence: lives house, my , 2 daughters  Vocation: yes  Source of income: Employed  Last grade completed: high  Pertinent developmental history: ADHD, will screen at next visit  Pertinent legal history: No history   Hobbies/interests: DND, and video games, entertain  Buddhist: Catholicism -\"Taoist light\"  Exercise: walking, but scared to get obsessed  Dietary habits:  history of binge eating , " "type 2 DM  Sleep hygiene: wakes up at 3 am  Social habits: none  Sunlight: There are no concern for under-exposure.  Caffeine intake: no pertinent issues   Hydration habits: no pertinent issues    history: No, grew up in              Interval History Bianca is a 45 y.o. female who presents today for follow-up. Bianca presents unaccompanied in no acute distress and engages with me appropriately.   Current treatment regimen includes:   - bupropion  mg po q day  -buspirone 15 mg po BID  -desvenlafaxine 50 mg po q day  -trazodone 100 mg po q HS  Hydroxyzine 10 mg po TID prn anxiety  Side-effects per given history: none.      Today the patient feels \"alright.' She says that she \"anxiety attack\" issue other than that things have been pretty baseline. She says that her parents are leaving and she has to figure out how to navigate her family situation. She says they have a plan. She is weary of them leaving.  She reports that her anxiety is persistent. Thought process and content are devoid of overt aberration suggestive of acute kathryn/psychosis. The patient denies SI/HI/AVH. There are changes on exam today compared to most recent evaluation.    - sleep: \"okay-da\"   - appetite: moderately controlled      We discussed utilizing a low dose \"rescue\" benzodiazapine to use during severe anxiety attacks-patient is keen to try other options at this time. She is open to increasing the buspirone to 30 mg po BID to target anxiety. We will increase hydroxyzine to 25 mg po TID as needed for anxiety. Reviewed risks of mood changes, and increased sedation. We will continue other regimen. We will continue other regimen.   I have counseled the patient with regard to diagnoses and the recommended treatment regimen as documented below. Patient acknowledges the diagnoses per my rendered interpretation. Patient demonstrates understanding of potential risks/benefits/side effects associated with this regimen and is amenable " to proceed in this fashion.       Social History     Socioeconomic History    Marital status:     Number of children: 2   Tobacco Use    Smoking status: Former     Current packs/day: 0.00     Average packs/day: 0.3 packs/day for 46.6 years (12.2 ttl pk-yrs)     Types: Cigarettes     Start date: 1995     Quit date: 1/15/2020     Years since quittin.5     Passive exposure: Never    Smokeless tobacco: Never    Tobacco comments:     A PACK A WEEK   Vaping Use    Vaping status: Never Used   Substance and Sexual Activity    Alcohol use: Not Currently     Comment: Very rarely    Drug use: Not Currently     Frequency: 0.1 times per week     Types: LSD, Marijuana, MDMA (ecstacy)     Comment: Currently using CBD to help with sleep and anxiety    Sexual activity: Yes     Partners: Male     Birth control/protection: Depo-provera, Inserts       Tobacco use counseling/intervention: patient does not use tobacco.   Problem List:  Patient Active Problem List   Diagnosis    Chronic fatigue    Essential hypertension    Hyperlipidemia    Heartburn    Multiple joint pain    Depression    Gastroparesis    Gastroesophageal reflux disease    Gastric polyps    S/P laparoscopic sleeve gastrectomy    Obesity, Class I, BMI 30-34.9    Anxiety    Urinary tract infection with hematuria    Leukocytosis    Insomnia    Left ureteral stone    Left renal stone    Hematuria    Acute cystitis with hematuria    Right renal mass    Seasonal allergies    Renal cell carcinoma of right kidney    Psoriasis    Palpitation    Nausea    Type 2 diabetes mellitus without complication, without long-term current use of insulin    Left shoulder pain    Frozen shoulder    Impingement syndrome of left shoulder    Dysuria    Encounter for screening for malignant neoplasm of colon    Family history of colon cancer    Constipation    Vitamin D deficiency    CAD S/P percutaneous coronary angioplasty    Stage 3 chronic kidney disease    Recurrent UTI    Chest  pain    Acute coronary syndrome with high troponin    Low libido    Low testosterone level in female    Medication management     Allergy:   Allergies   Allergen Reactions    Morphine Itching and Nausea And Vomiting        Discontinued Medications:  Medications Discontinued During This Encounter   Medication Reason    colchicine 0.6 MG tablet *Therapy completed    cephalexin (KEFLEX) 500 MG capsule *Therapy completed    sulfamethoxazole-trimethoprim (Bactrim DS) 800-160 MG per tablet *Therapy completed    busPIRone (BUSPAR) 15 MG tablet Dose adjustment       Current Medications:   Current Outpatient Medications   Medication Sig Dispense Refill    Apremilast (Otezla) 30 MG tablet Take 1 tablet by mouth 2 (Two) Times a Day. 60 tablet 5    Apremilast (Otezla) 30 MG tablet Take 30 mg by mouth Every 12 (Twelve) Hours. 60 tablet 1    aspirin 81 MG EC tablet Take 1 tablet by mouth Daily. 90 tablet 5    azelaic acid (AZELEX) 15 % gel Apply 1 Application topically to face as directed Daily after cleansing. 50 g 3    betamethasone valerate (VALISONE) 0.1 % cream Apply 1 Application topically to the appropriate area as directed 1 (One) to  2 (Two) Times a Day until approved. 45 g 2    Biotin 20347 MCG tablet Take 1 tablet by mouth Daily.      buPROPion XL (WELLBUTRIN XL) 300 MG 24 hr tablet Take 1 tablet by mouth Daily. 90 tablet 1    CALCIUM-MAGNESIUM-ZINC PO Take 1 tablet by mouth Daily.      cetirizine (zyrTEC) 10 MG tablet Take 1 tablet by mouth Daily As Needed for Allergies or Rhinitis.      Cholecalciferol 25 MCG (1000 UT) capsule Take 2 capsules by mouth Daily. 180 capsule 1    clopidogrel (PLAVIX) 75 MG tablet Take 1 tablet by mouth Daily 30 tablet 6    COLLAGEN PO Take 1 tablet by mouth Daily.      cyclobenzaprine (FLEXERIL) 10 MG tablet Take 1 tablet by mouth Every Night. (Patient taking differently: Take 1 tablet by mouth At Night As Needed for Muscle Spasms.) 30 tablet 0    dapagliflozin Propanediol (Farxiga) 10 MG  tablet Take 1 tablet by mouth Daily. 90 tablet 1    desvenlafaxine (PRISTIQ) 50 MG 24 hr tablet Take 1 tablet by mouth Daily. 90 tablet 1    Diclofenac Sodium (VOLTAREN) 1 % gel gel Apply 4 g topically to the appropriate area as directed 4 (Four) Times a Day As Needed (pain). 200 g 1    Estrogens Conjugated (Premarin) 0.625 MG/GM vaginal cream Insert 1 gram into the vagina every night at bedtime for 2 Weeks, THEN 1 gram into the vagina every night at bedtime 2 (Two) Times a Week. 30 g 3    fluticasone (FLONASE) 50 MCG/ACT nasal spray USE 2 SPRAYS IN EACH NOSTRIL ONCE DAILY AS DIRECTED (Patient taking differently: Administer 2 sprays into the nostril(s) as directed by provider Daily As Needed for Rhinitis or Allergies.) 48 g 1    hydrocortisone 2.5 % cream Apply 1 Application topically to the appropriate area of the face as directed 1 (One) to 2 (Two) Times a Day until approved. (Patient taking differently: Apply 1 Application topically to the appropriate area as directed 2 (Two) Times a Day As Needed.) 30 g 2    IRON-VITAMIN C PO Take 1 tablet by mouth Daily.      linaclotide (Linzess) 72 MCG capsule capsule Take 1 capsule by mouth Every Morning Before Breakfast for 90 days. 90 capsule 3    metoprolol succinate XL (Toprol XL) 25 MG 24 hr tablet Take 0.5 tablets by mouth Every Night.      Multiple Vitamins-Minerals (MULTIVITAMIN PO) Take 1 tablet by mouth Every Night.      nitroglycerin (NITROSTAT) 0.4 MG SL tablet Place 1 tablet under the tongue Every 5 (Five) Minutes As Needed for Chest Pain (Systolic BP Greater Than 100). Take no more than 3 doses in 15 minutes. 60 tablet 2    ondansetron ODT (ZOFRAN-ODT) 4 MG disintegrating tablet Place 1 tablet on the tongue Every 8 (Eight) Hours As Needed for Nausea.      Probiotic Product (PROBIOTIC-10 PO) Take 1 tablet by mouth Every Night.      rosuvastatin (CRESTOR) 40 MG tablet Take 1 tablet by mouth Every Night for 30 days. 30 tablet 0    testosterone micronized powder  Appyl 0.5 mL (2 clicks) to skin daily 15 g 0    Tirzepatide (Mounjaro) 12.5 MG/0.5ML solution auto-injector Inject 12.5 mg under the skin into the appropriate area as directed 1 (One) Time Per Week. 2 mL 5    traZODone (DESYREL) 100 MG tablet Take 1 tablet by mouth Every Night. (Patient taking differently: Take 1 tablet by mouth At Night As Needed for Sleep or Depression.) 90 tablet 1    topiramate (TOPAMAX) 50 MG tablet Take 1 tablet by mouth every night at bedtime (Patient not taking: Reported on 7/24/2025) 90 tablet 1     No current facility-administered medications for this visit.     Past Medical History:  Past Medical History:   Diagnosis Date    Acid reflux     Anxiety     Anxiety and depression     Benign essential hypertension     Borderline personality disorder     Cancer 2021    renal cancer/mass, PARTIAL NEPH, RIGHT    Chronic kidney disease     Coronary artery disease     Diabetes     Diabetes mellitus     type ii, doesn't check bg at home     Diabetes mellitus, type 2     Elevated cholesterol     Fatty liver 1995    Fracture, foot 09/2017    Frozen shoulder 08/02/2023    GERD (gastroesophageal reflux disease)     High triglycerides     History of bariatric surgery 02/04/2021    GASTRIC SLEEVE    History of kidney stones     HTN (hypertension)     Hyperlipemia     Hyperlipidemia     Hypertriglyceridemia     Kidney stone     Low back pain     Lumbar herniated disc     Myocardial infarction 09/2024    Obesity     Panic attacks     PCOS (polycystic ovarian syndrome)     Periarthritis of shoulder 01/23    Psoriasis     ELBOWS    PTSD (post-traumatic stress disorder) 2024    Renal insufficiency     Rotator cuff syndrome 01/23    Seasonal allergies     Self-injurious behavior 1994    Spinal headache     AFTER PAIN EPIDURALS.  NO BLOOD PATCH    Suicide attempt 1995    Urinary tract infection      Past Surgical History:  Past Surgical History:   Procedure Laterality Date    ABDOMINAL SURGERY  12/2020    Gastric  sleeve    ADENOIDECTOMY  1984    BARIATRIC SURGERY      CARDIAC CATHETERIZATION N/A 2024    Procedure: Left Heart Cath;  Surgeon: Toño Rodriguez MD;  Location: [facility] CATH INVASIVE LOCATION;  Service: Cardiovascular;  Laterality: N/A;    CARDIAC CATHETERIZATION N/A 2024    Procedure: Coronary angiography;  Surgeon: Toño Rodriguez MD;  Location: [facility] CATH INVASIVE LOCATION;  Service: Cardiovascular;  Laterality: N/A;    CARDIAC CATHETERIZATION N/A 2024    Procedure: Angioplasty-coronary;  Surgeon: Toño Rodriguez MD;  Location: [facility] CATH INVASIVE LOCATION;  Service: Cardiovascular;  Laterality: N/A;    CARDIAC CATHETERIZATION N/A 2025    Procedure: Left Heart Cath/Possible PCI;  Surgeon: Shine Childs MD;  Location: [facility] CATH INVASIVE LOCATION;  Service: Cardiovascular;  Laterality: N/A;     SECTION  ,    COLONOSCOPY N/A 2024    Procedure: COLONOSCOPY;  Surgeon: Terrence Fry MD;  Location: [facility] ENDOSCOPY;  Service: Gastroenterology;  Laterality: N/A;  NORMAL COLONOSCOPY    CORONARY STENT PLACEMENT  2024    CYSTOSCOPY BLADDER STONE LITHOTRIPSY      EAR TUBES  1984    ENDOSCOPY N/A 10/20/2020    Procedure: ESOPHAGOGASTRODUODENOSCOPY WITH BIOPSY;  Surgeon: Fidel Grajeda Jr., MD;  Location: [facility] ENDOSCOPY;  Service: General;  Laterality: N/A;  PRE- GERD  POST- RETAINED FOOD, GASTRITIS, GASTRIC POLYPS    ENDOSCOPY  2014    FOOT FRACTURE SURGERY Left 2017    screw placed    GASTRECTOMY  2020    GASTRIC SLEEVE LAPAROSCOPIC N/A 2020    Procedure: GASTRIC SLEEVE LAPAROSCOPIC;  Surgeon: Fidel Grajeda Jr., MD;  [facility];  Service: Bariatric;  Laterality: N/A;    KIDNEY STONE SURGERY  21    LITHOTRIPSY  21    NEPHRECTOMY PARTIAL Right 11/15/2021    Procedure: NEPHRECTOMY PARTIAL LAPAROSCOPIC WITH DAVINCI ROBOT;  Surgeon: Lori Troy MD;  Location: [facility] MAIN OR;  Service: Robotics - DaVinci;   "Laterality: Right;    SHOULDER ARTHROSCOPY Left 08/14/2023    Procedure: LEFT SHOULDER MANIPULATION;  Surgeon: Domenic Bradley MD;  Location: [facility];  Service: Orthopedics;  Laterality: Left;    TONSILLECTOMY  1984    UPPER GASTROINTESTINAL ENDOSCOPY      URETEROSCOPY LASER LITHOTRIPSY WITH STENT INSERTION Right 09/28/2021    Procedure: CYSTOSCOPY, RIGHT URETEROSCOPY, LASERTRIPSY, STONE BASKET EXTRACTION AND STENT INSERTION;  Surgeon: Lori Troy MD;  Location: [facility];  Service: Urology;  Laterality: Right;    URETEROSCOPY LASER LITHOTRIPSY WITH STENT INSERTION Left 11/08/2022    Procedure: URETEROSCOPY LASER LITHOTRIPSY WITH URETERAL STENT INSERTION;  Surgeon: Lori Troy MD;  Location: [facility];  Service: Urology;  Laterality: Left;       MENTAL STATUS EXAM   General Appearance:  Cleanly groomed and dressed and well developed  Eye Contact:  Good eye contact  Attitude:  Cooperative, polite and candid  Motor Activity:  Normal gait, posture  Muscle Strength:  Normal  Speech:  Normal rate, tone, volume  Mood and affect:  Normal, pleasant  Thought Process:  Logical and goal-directed  Associations/ Thought Content:  No delusions  Hallucinations:  None  Suicidal Ideations:  Not present  Homicidal Ideation:  Not present  Sensorium:  Alert and clear  Orientation:  Person, place, time and situation  Immediate Recall, Recent, and Remote Memory:  Intact  Attention Span/ Concentration:  Good  Fund of Knowledge:  Appropriate for age and educational level  Intellectual Functioning:  Average range  Insight:  Good  Judgement:  Good  Reliability:  Good  Impulse Control:  Good      Vital Signs:   /65   Pulse 71   Ht 170.2 cm (67\")   SpO2 100%   BMI 32.58 kg/m²    Lab Results:   Admission on 06/19/2025, Discharged on 06/19/2025   Component Date Value Ref Range Status    QT Interval 06/19/2025 383  ms Final    QTC Interval 06/19/2025 429  ms Final    HS Troponin T 06/19/2025 13  <14 ng/L " Final    Glucose 06/19/2025 110 (H)  65 - 99 mg/dL Final    BUN 06/19/2025 15.2  6.0 - 20.0 mg/dL Final    Creatinine 06/19/2025 1.19 (H)  0.57 - 1.00 mg/dL Final    Sodium 06/19/2025 138  136 - 145 mmol/L Final    Potassium 06/19/2025 3.8  3.5 - 5.2 mmol/L Final    Chloride 06/19/2025 106  98 - 107 mmol/L Final    CO2 06/19/2025 23.1  22.0 - 29.0 mmol/L Final    Calcium 06/19/2025 9.2  8.6 - 10.5 mg/dL Final    Total Protein 06/19/2025 6.9  6.0 - 8.5 g/dL Final    Albumin 06/19/2025 4.3  3.5 - 5.2 g/dL Final    ALT (SGPT) 06/19/2025 36 (H)  1 - 33 U/L Final    AST (SGOT) 06/19/2025 29  1 - 32 U/L Final    Alkaline Phosphatase 06/19/2025 83  39 - 117 U/L Final    Total Bilirubin 06/19/2025 0.3  0.0 - 1.2 mg/dL Final    Globulin 06/19/2025 2.6  gm/dL Final    A/G Ratio 06/19/2025 1.7  g/dL Final    BUN/Creatinine Ratio 06/19/2025 12.8  7.0 - 25.0 Final    Anion Gap 06/19/2025 8.9  5.0 - 15.0 mmol/L Final    eGFR 06/19/2025 57.2 (L)  >60.0 mL/min/1.73 Final    Lipase 06/19/2025 74 (H)  13 - 60 U/L Final    proBNP 06/19/2025 111.4  0.0 - 450.0 pg/mL Final    Magnesium 06/19/2025 2.2  1.6 - 2.6 mg/dL Final    Extra Tube 06/19/2025 Hold for add-ons.   Final    Auto resulted.    Extra Tube 06/19/2025 hold for add-on   Final    Auto resulted    Extra Tube 06/19/2025 Hold for add-ons.   Final    Auto resulted.    Extra Tube 06/19/2025 Hold for add-ons.   Final    Auto resulted    WBC 06/19/2025 8.95  3.40 - 10.80 10*3/mm3 Final    RBC 06/19/2025 4.67  3.77 - 5.28 10*6/mm3 Final    Hemoglobin 06/19/2025 14.0  12.0 - 15.9 g/dL Final    Hematocrit 06/19/2025 42.2  34.0 - 46.6 % Final    MCV 06/19/2025 90.4  79.0 - 97.0 fL Final    MCH 06/19/2025 30.0  26.6 - 33.0 pg Final    MCHC 06/19/2025 33.2  31.5 - 35.7 g/dL Final    RDW 06/19/2025 13.2  12.3 - 15.4 % Final    RDW-SD 06/19/2025 43.3  37.0 - 54.0 fl Final    MPV 06/19/2025 9.2  6.0 - 12.0 fL Final    Platelets 06/19/2025 175  140 - 450 10*3/mm3 Final    Neutrophil %  06/19/2025 68.3  42.7 - 76.0 % Final    Lymphocyte % 06/19/2025 21.2  19.6 - 45.3 % Final    Monocyte % 06/19/2025 7.7  5.0 - 12.0 % Final    Eosinophil % 06/19/2025 1.6  0.3 - 6.2 % Final    Basophil % 06/19/2025 0.6  0.0 - 1.5 % Final    Immature Grans % 06/19/2025 0.6 (H)  0.0 - 0.5 % Final    Neutrophils, Absolute 06/19/2025 6.12  1.70 - 7.00 10*3/mm3 Final    Lymphocytes, Absolute 06/19/2025 1.90  0.70 - 3.10 10*3/mm3 Final    Monocytes, Absolute 06/19/2025 0.69  0.10 - 0.90 10*3/mm3 Final    Eosinophils, Absolute 06/19/2025 0.14  0.00 - 0.40 10*3/mm3 Final    Basophils, Absolute 06/19/2025 0.05  0.00 - 0.20 10*3/mm3 Final    Immature Grans, Absolute 06/19/2025 0.05  0.00 - 0.05 10*3/mm3 Final    nRBC 06/19/2025 0.0  0.0 - 0.2 /100 WBC Final    D-Dimer, Quantitative 06/19/2025 <0.27  0.00 - 0.50 MCGFEU/mL Final   Office Visit on 05/21/2025   Component Date Value Ref Range Status    HS Troponin T 05/21/2025 12  <14 ng/L Final    WBC 05/21/2025 10.05  3.40 - 10.80 10*3/mm3 Final    RBC 05/21/2025 4.69  3.77 - 5.28 10*6/mm3 Final    Hemoglobin 05/21/2025 14.0  12.0 - 15.9 g/dL Final    Hematocrit 05/21/2025 40.8  34.0 - 46.6 % Final    MCV 05/21/2025 87.0  79.0 - 97.0 fL Final    MCH 05/21/2025 29.9  26.6 - 33.0 pg Final    MCHC 05/21/2025 34.3  31.5 - 35.7 g/dL Final    RDW 05/21/2025 12.7  12.3 - 15.4 % Final    RDW-SD 05/21/2025 39.6  37.0 - 54.0 fl Final    MPV 05/21/2025 9.8  6.0 - 12.0 fL Final    Platelets 05/21/2025 208  140 - 450 10*3/mm3 Final    Neutrophil % 05/21/2025 72.4  42.7 - 76.0 % Final    Lymphocyte % 05/21/2025 18.8 (L)  19.6 - 45.3 % Final    Monocyte % 05/21/2025 6.8  5.0 - 12.0 % Final    Eosinophil % 05/21/2025 1.0  0.3 - 6.2 % Final    Basophil % 05/21/2025 0.3  0.0 - 1.5 % Final    Immature Grans % 05/21/2025 0.7 (H)  0.0 - 0.5 % Final    Neutrophils, Absolute 05/21/2025 7.28 (H)  1.70 - 7.00 10*3/mm3 Final    Lymphocytes, Absolute 05/21/2025 1.89  0.70 - 3.10 10*3/mm3 Final     Monocytes, Absolute 05/21/2025 0.68  0.10 - 0.90 10*3/mm3 Final    Eosinophils, Absolute 05/21/2025 0.10  0.00 - 0.40 10*3/mm3 Final    Basophils, Absolute 05/21/2025 0.03  0.00 - 0.20 10*3/mm3 Final    Immature Grans, Absolute 05/21/2025 0.07 (H)  0.00 - 0.05 10*3/mm3 Final    nRBC 05/21/2025 0.0  0.0 - 0.2 /100 WBC Final    Glucose 05/21/2025 122 (H)  65 - 99 mg/dL Final    BUN 05/21/2025 16  6 - 20 mg/dL Final    Creatinine 05/21/2025 1.37 (H)  0.57 - 1.00 mg/dL Final    Sodium 05/21/2025 140  136 - 145 mmol/L Final    Potassium 05/21/2025 3.7  3.5 - 5.2 mmol/L Final    Chloride 05/21/2025 107  98 - 107 mmol/L Final    CO2 05/21/2025 22.0  22.0 - 29.0 mmol/L Final    Calcium 05/21/2025 9.6  8.6 - 10.5 mg/dL Final    Total Protein 05/21/2025 6.9  6.0 - 8.5 g/dL Final    Albumin 05/21/2025 4.3  3.5 - 5.2 g/dL Final    ALT (SGPT) 05/21/2025 27  1 - 33 U/L Final    AST (SGOT) 05/21/2025 27  1 - 32 U/L Final    Alkaline Phosphatase 05/21/2025 80  39 - 117 U/L Final    Total Bilirubin 05/21/2025 0.4  0.0 - 1.2 mg/dL Final    Globulin 05/21/2025 2.6  gm/dL Final    A/G Ratio 05/21/2025 1.7  g/dL Final    BUN/Creatinine Ratio 05/21/2025 11.7  7.0 - 25.0 Final    Anion Gap 05/21/2025 11.0  5.0 - 15.0 mmol/L Final    eGFR 05/21/2025 48.3 (L)  >60.0 mL/min/1.73 Final    Microalbumin/Creatinine Ratio 05/21/2025 79.0 (H)  0.0 - 29.0 mg/g Final    Creatinine, Urine 05/21/2025 197.4  mg/dL Final    Microalbumin, Urine 05/21/2025 15.6  mg/dL Final    Hemoglobin A1C 05/21/2025 4.80  4.80 - 5.60 % Final    Total Cholesterol 05/21/2025 107  0 - 200 mg/dL Final    Triglycerides 05/21/2025 119  0 - 150 mg/dL Final    HDL Cholesterol 05/21/2025 41  40 - 60 mg/dL Final    LDL Cholesterol  05/21/2025 45  0 - 100 mg/dL Final    VLDL Cholesterol 05/21/2025 21  5 - 40 mg/dL Final    LDL/HDL Ratio 05/21/2025 1.03   Final    TSH 05/21/2025 1.160  0.270 - 4.200 uIU/mL Final    Color, UA 05/21/2025 Dark Yellow (A)  Yellow, Straw Final     Appearance, UA 05/21/2025 Cloudy (A)  Clear Final    pH, UA 05/21/2025 5.5  5.0 - 8.0 Final    Specific Gravity, UA 05/21/2025 >1.030 (H)  1.005 - 1.030 Final    Glucose, UA 05/21/2025 >=1000 mg/dL (3+) (A)  Negative Final    Ketones, UA 05/21/2025 15 mg/dL (1+) (A)  Negative Final    Bilirubin, UA 05/21/2025 Negative  Negative Final    Blood, UA 05/21/2025 Small (1+) (A)  Negative Final    Protein, UA 05/21/2025 30 mg/dL (1+) (A)  Negative Final    Leuk Esterase, UA 05/21/2025 Small (1+) (A)  Negative Final    Nitrite, UA 05/21/2025 Negative  Negative Final    Urobilinogen, UA 05/21/2025 0.2 E.U./dL  0.2 - 1.0 E.U./dL Final    25 Hydroxy, Vitamin D 05/21/2025 83.9  30.0 - 100.0 ng/ml Final    Extra Tube 05/21/2025 Hold for add-ons.   Final    Auto resulted.    RBC, UA 05/21/2025 6-10 (A)  None Seen, 0-2 /HPF Final    WBC, UA 05/21/2025 Too Numerous to Count (A)  None Seen, 0-2 /HPF Final    Bacteria, UA 05/21/2025 4+ (A)  None Seen /HPF Final    Squamous Epithelial Cells, UA 05/21/2025 0-2  None Seen, 0-2 /HPF Final    Hyaline Casts, UA 05/21/2025 3-6  None Seen /LPF Final    Calcium Oxalate Crystals, UA 05/21/2025 Present  None Seen /HPF Final    Methodology 05/21/2025 Automated Microscopy   Final    Urine Culture 05/21/2025 >100,000 CFU/mL Escherichia coli (A)   Final   No results displayed because visit has over 200 results.      Clinical Support on 05/02/2025   Component Date Value Ref Range Status    HCG, Urine, QL 05/02/2025 Negative  Negative Final    Lot Number 05/02/2025 [id number]   Final    Internal Positive Control 05/02/2025 Positive  Positive, Passed Final    Internal Negative Control 05/02/2025 Passed  Negative, Passed Final    Expiration Date 05/02/2025 05/10/2025   Final   Lab on 04/23/2025   Component Date Value Ref Range Status    Sed Rate 04/23/2025 <1  0 - 20 mm/hr Final    HS C-Reactive Protein 04/23/2025 0.074  0.010 - 0.500 mg/dL Final   Admission on 04/22/2025, Discharged on 04/22/2025    Component Date Value Ref Range Status    Color 04/22/2025 Other (A)   Final    Clarity, UA 04/22/2025 Cloudy (A)   Final    Glucose, UA 04/22/2025 2+ (A)  mg/dL Final    Bilirubin 04/22/2025 Negative   Final    Ketones, UA 04/22/2025 Negative   Final    Specific Gravity  04/22/2025 1.015  1.005 - 1.030 Final    Blood, UA 04/22/2025 3+ (A)   Final    pH, Urine 04/22/2025 6.5  5.0 - 8.0 Final    Protein, POC 04/22/2025 1+ (A)  mg/dL Final    Urobilinogen, UA 04/22/2025 0.2 E.U./dL   Final    Nitrite, UA 04/22/2025 Negative   Final    Leukocytes 04/22/2025 Small (1+) (A)   Final    Urine Culture 04/22/2025 >100,000 CFU/mL Escherichia coli (A)   Final   Admission on 04/16/2025, Discharged on 04/16/2025   Component Date Value Ref Range Status    QT Interval 04/16/2025 387  ms Final    QTC Interval 04/16/2025 422  ms Final    HS Troponin T 04/16/2025 12  <14 ng/L Final    Glucose 04/16/2025 78  65 - 99 mg/dL Final    BUN 04/16/2025 21 (H)  6 - 20 mg/dL Final    Creatinine 04/16/2025 1.32 (H)  0.57 - 1.00 mg/dL Final    Sodium 04/16/2025 140  136 - 145 mmol/L Final    Potassium 04/16/2025 4.5  3.5 - 5.2 mmol/L Final    Chloride 04/16/2025 104  98 - 107 mmol/L Final    CO2 04/16/2025 22.9  22.0 - 29.0 mmol/L Final    Calcium 04/16/2025 9.7  8.6 - 10.5 mg/dL Final    Total Protein 04/16/2025 7.6  6.0 - 8.5 g/dL Final    Albumin 04/16/2025 4.4  3.5 - 5.2 g/dL Final    ALT (SGPT) 04/16/2025 33  1 - 33 U/L Final    AST (SGOT) 04/16/2025 30  1 - 32 U/L Final    Alkaline Phosphatase 04/16/2025 91  39 - 117 U/L Final    Total Bilirubin 04/16/2025 0.3  0.0 - 1.2 mg/dL Final    Globulin 04/16/2025 3.2  gm/dL Final    A/G Ratio 04/16/2025 1.4  g/dL Final    BUN/Creatinine Ratio 04/16/2025 15.9  7.0 - 25.0 Final    Anion Gap 04/16/2025 13.1  5.0 - 15.0 mmol/L Final    eGFR 04/16/2025 50.5 (L)  >60.0 mL/min/1.73 Final    Lipase 04/16/2025 85 (H)  13 - 60 U/L Final    proBNP 04/16/2025 66.2  0.0 - 450.0 pg/mL Final    Magnesium  04/16/2025 2.3  1.6 - 2.6 mg/dL Final    Extra Tube 04/16/2025 Hold for add-ons.   Final    Auto resulted.    Extra Tube 04/16/2025 hold for add-on   Final    Auto resulted    Extra Tube 04/16/2025 Hold for add-ons.   Final    Auto resulted.    Extra Tube 04/16/2025 Hold for add-ons.   Final    Auto resulted    WBC 04/16/2025 8.33  3.40 - 10.80 10*3/mm3 Final    RBC 04/16/2025 4.99  3.77 - 5.28 10*6/mm3 Final    Hemoglobin 04/16/2025 14.9  12.0 - 15.9 g/dL Final    Hematocrit 04/16/2025 44.6  34.0 - 46.6 % Final    MCV 04/16/2025 89.4  79.0 - 97.0 fL Final    MCH 04/16/2025 29.9  26.6 - 33.0 pg Final    MCHC 04/16/2025 33.4  31.5 - 35.7 g/dL Final    RDW 04/16/2025 13.0  12.3 - 15.4 % Final    RDW-SD 04/16/2025 42.5  37.0 - 54.0 fl Final    MPV 04/16/2025 9.7  6.0 - 12.0 fL Final    Platelets 04/16/2025 195  140 - 450 10*3/mm3 Final    Neutrophil % 04/16/2025 56.4  42.7 - 76.0 % Final    Lymphocyte % 04/16/2025 31.8  19.6 - 45.3 % Final    Monocyte % 04/16/2025 8.8  5.0 - 12.0 % Final    Eosinophil % 04/16/2025 1.8  0.3 - 6.2 % Final    Basophil % 04/16/2025 0.6  0.0 - 1.5 % Final    Immature Grans % 04/16/2025 0.6 (H)  0.0 - 0.5 % Final    Neutrophils, Absolute 04/16/2025 4.70  1.70 - 7.00 10*3/mm3 Final    Lymphocytes, Absolute 04/16/2025 2.65  0.70 - 3.10 10*3/mm3 Final    Monocytes, Absolute 04/16/2025 0.73  0.10 - 0.90 10*3/mm3 Final    Eosinophils, Absolute 04/16/2025 0.15  0.00 - 0.40 10*3/mm3 Final    Basophils, Absolute 04/16/2025 0.05  0.00 - 0.20 10*3/mm3 Final    Immature Grans, Absolute 04/16/2025 0.05  0.00 - 0.05 10*3/mm3 Final    nRBC 04/16/2025 0.0  0.0 - 0.2 /100 WBC Final    HS Troponin T 04/16/2025 12  <14 ng/L Final    Troponin T Numeric Delta 04/16/2025 0  Abnormal if >/=3 ng/L Final   Lab on 03/03/2025   Component Date Value Ref Range Status    Ferritin 03/03/2025 76.90  13.00 - 150.00 ng/mL Final    Iron 03/03/2025 62  37 - 145 mcg/dL Final    Folate 03/03/2025 >20.00  4.78 - 24.20 ng/mL  Final    25 Hydroxy, Vitamin D 03/03/2025 91.1  30.0 - 100.0 ng/ml Final   Office Visit on 02/03/2025   Component Date Value Ref Range Status    Color 02/03/2025 Yellow  Yellow, Straw, Dark Yellow, Sheri Final    Clarity, UA 02/03/2025 Clear  Clear Final    Specific Gravity  02/03/2025 1.025  1.005 - 1.030 Final    pH, Urine 02/03/2025 6.0  5.0 - 8.0 Final    Leukocytes 02/03/2025 Trace (A)  Negative Final    Nitrite, UA 02/03/2025 Positive (A)  Negative Final    Protein, POC 02/03/2025 30 mg/dL (A)  Negative mg/dL Final    Glucose, UA 02/03/2025 1000 mg/dL (A)  Negative mg/dL Final    Ketones, UA 02/03/2025 Negative  Negative Final    Urobilinogen, UA 02/03/2025 0.2 E.U./dL  Normal, 0.2 E.U./dL Final    Bilirubin 02/03/2025 Negative  Negative Final    Blood, UA 02/03/2025 Negative  Negative Final    Lot Number 02/03/2025 -   Final    Expiration Date 02/03/2025 -   Final       ASSESSMENT AND PLAN:    ICD-10-CM ICD-9-CM   1. INDIRA (generalized anxiety disorder)  F41.1 300.02   2. MDD (major depressive disorder), recurrent episode, moderate  F33.1 296.32   3. Post traumatic stress disorder (PTSD)  F43.10 309.81       Bianca is a 45 y.o. female who presents today for follow-up regarding medication response and recent life stressors. We have discussed the interval history and the treatment plan below, including potential R/B/SE of the recommended regimen of which the patient demonstrates understanding. Patient is agreeable to call 911 or go to the nearest ER should she become concerned for her own safety and/or the safety of those around her. There are are no overt indices of acute kathryn/psychosis on exam today. NELSON reviewed and is as expected.    Medication regimen:   increase buspirone to 30 mg po BID,   increase hydroxyzine 25 mg po TID prn anxiety   Continue bupropion  mg po q day  continue desvenlafaxine 50 mg po q day  continue trazodone 100 mg po q HS  The patient is advised not to misuse prescribed  medications or to use them with any exogenous substances that aren't disclosed to this provider as they may interact with the regimen to the patient's detriment.   Risk Assessment: protracted risk is moderate, imminent risk is moderate. Do note that this is subject to change with the [employer] of new stressors, treatment non-adherence, use of substances, and/or new medical ails.   Monitoring: reviewed labs/imaging as populated above; ordered  Therapy: will see Evie Luevano in September  Follow-up: 6 weeks  Communications: N/A    TREATMENT PLAN/GOALS: challenge patterns of living conducive to symptom burden, implement recommended regimen as above with augmentative, intermittent supportive psychotherapy to reduce symptom burden. Patient acknowledged and verbally consented to continue treatment. The importance of adherence to the recommended treatment and interval follow-up appointments was again emphasized today: patient has good treatment adherence per given history. Patient was today reminded to limit daily caffeine intake, hydrate appropriately, eat healthy and nutritious foods, engage sleep hygiene measures, engage appropriate exposure to sunlight, engage with hobbies in balance with life necessities, and exercise appropriate to their capacity to do so.       Parts of this note are electronic transcriptions/translations of spoken language to printed text using the Dragon Dictation system.    Electronically signed by CARLTON Carbajal, 07/24/25

## 2025-07-30 DIAGNOSIS — R79.89 LOW TESTOSTERONE LEVEL IN FEMALE: ICD-10-CM

## 2025-07-30 DIAGNOSIS — R68.82 LOW LIBIDO: ICD-10-CM

## 2025-07-30 RX ORDER — TESTOSTERONE MICRONIZED 100 %
POWDER (GRAM) MISCELLANEOUS
Qty: 15 G | Refills: 0 | OUTPATIENT
Start: 2025-07-30

## 2025-08-01 ENCOUNTER — CLINICAL SUPPORT (OUTPATIENT)
Dept: FAMILY MEDICINE CLINIC | Facility: CLINIC | Age: 45
End: 2025-08-01
Payer: COMMERCIAL

## 2025-08-01 DIAGNOSIS — R79.89 LOW TESTOSTERONE LEVEL IN FEMALE: ICD-10-CM

## 2025-08-01 DIAGNOSIS — Z30.42 ENCOUNTER FOR SURVEILLANCE OF INJECTABLE CONTRACEPTIVE: Primary | ICD-10-CM

## 2025-08-01 DIAGNOSIS — R68.82 LOW LIBIDO: ICD-10-CM

## 2025-08-01 RX ORDER — MEDROXYPROGESTERONE ACETATE 150 MG/ML
150 INJECTION, SUSPENSION INTRAMUSCULAR ONCE
Status: COMPLETED | OUTPATIENT
Start: 2025-08-01 | End: 2025-08-01

## 2025-08-01 RX ORDER — TESTOSTERONE MICRONIZED 100 %
POWDER (GRAM) MISCELLANEOUS
Qty: 15 G | Refills: 0 | OUTPATIENT
Start: 2025-08-01

## 2025-08-01 RX ADMIN — MEDROXYPROGESTERONE ACETATE 150 MG: 150 INJECTION, SUSPENSION INTRAMUSCULAR at 13:51

## 2025-08-04 DIAGNOSIS — R68.82 LOW LIBIDO: ICD-10-CM

## 2025-08-04 DIAGNOSIS — R79.89 LOW TESTOSTERONE LEVEL IN FEMALE: ICD-10-CM

## 2025-08-05 RX ORDER — TESTOSTERONE MICRONIZED 100 %
POWDER (GRAM) MISCELLANEOUS
Qty: 15 G | Refills: 0 | OUTPATIENT
Start: 2025-08-05

## 2025-08-15 ENCOUNTER — SPECIALTY PHARMACY (OUTPATIENT)
Dept: PHARMACY | Facility: TELEHEALTH | Age: 45
End: 2025-08-15
Payer: COMMERCIAL

## 2025-08-18 RX ORDER — ROSUVASTATIN CALCIUM 40 MG/1
40 TABLET, COATED ORAL NIGHTLY
Qty: 30 TABLET | Refills: 0 | Status: SHIPPED | OUTPATIENT
Start: 2025-08-18 | End: 2025-08-19 | Stop reason: SDUPTHER

## 2025-08-19 ENCOUNTER — OFFICE VISIT (OUTPATIENT)
Dept: FAMILY MEDICINE CLINIC | Facility: CLINIC | Age: 45
End: 2025-08-19
Payer: COMMERCIAL

## 2025-08-19 VITALS
HEIGHT: 67 IN | WEIGHT: 211.8 LBS | OXYGEN SATURATION: 96 % | BODY MASS INDEX: 33.24 KG/M2 | HEART RATE: 76 BPM | TEMPERATURE: 97.2 F | SYSTOLIC BLOOD PRESSURE: 112 MMHG | DIASTOLIC BLOOD PRESSURE: 72 MMHG

## 2025-08-19 DIAGNOSIS — F51.01 PRIMARY INSOMNIA: ICD-10-CM

## 2025-08-19 DIAGNOSIS — F41.9 ANXIETY: ICD-10-CM

## 2025-08-19 DIAGNOSIS — Z12.31 SCREENING MAMMOGRAM FOR BREAST CANCER: ICD-10-CM

## 2025-08-19 DIAGNOSIS — N18.30 STAGE 3 CHRONIC KIDNEY DISEASE, UNSPECIFIED WHETHER STAGE 3A OR 3B CKD: ICD-10-CM

## 2025-08-19 DIAGNOSIS — R79.89 LOW TESTOSTERONE LEVEL IN FEMALE: ICD-10-CM

## 2025-08-19 DIAGNOSIS — F33.0 MILD EPISODE OF RECURRENT MAJOR DEPRESSIVE DISORDER: ICD-10-CM

## 2025-08-19 DIAGNOSIS — E78.2 MIXED HYPERLIPIDEMIA: ICD-10-CM

## 2025-08-19 DIAGNOSIS — E11.9 TYPE 2 DIABETES MELLITUS WITHOUT COMPLICATION, WITHOUT LONG-TERM CURRENT USE OF INSULIN: Primary | ICD-10-CM

## 2025-08-19 DIAGNOSIS — N39.0 RECURRENT UTI: ICD-10-CM

## 2025-08-19 DIAGNOSIS — Z79.899 MEDICATION MANAGEMENT: ICD-10-CM

## 2025-08-19 DIAGNOSIS — K21.9 GASTROESOPHAGEAL REFLUX DISEASE WITHOUT ESOPHAGITIS: ICD-10-CM

## 2025-08-19 DIAGNOSIS — I10 ESSENTIAL HYPERTENSION: ICD-10-CM

## 2025-08-19 DIAGNOSIS — Z79.890 HORMONE REPLACEMENT THERAPY: ICD-10-CM

## 2025-08-19 DIAGNOSIS — R68.82 LOW LIBIDO: ICD-10-CM

## 2025-08-19 LAB
ALBUMIN SERPL-MCNC: 4.2 G/DL (ref 3.5–5.2)
ALBUMIN UR-MCNC: <1.2 MG/DL
ALBUMIN/GLOB SERPL: 1.5 G/DL
ALP SERPL-CCNC: 76 U/L (ref 39–117)
ALT SERPL W P-5'-P-CCNC: 36 U/L (ref 1–33)
AMPHET+METHAMPHET UR QL: NEGATIVE
AMPHETAMINE INTERNAL CONTROL: ABNORMAL
AMPHETAMINES UR QL: NEGATIVE
ANION GAP SERPL CALCULATED.3IONS-SCNC: 10.5 MMOL/L (ref 5–15)
AST SERPL-CCNC: 33 U/L (ref 1–32)
BARBITURATE INTERNAL CONTROL: ABNORMAL
BARBITURATES UR QL SCN: NEGATIVE
BASOPHILS # BLD AUTO: 0.06 10*3/MM3 (ref 0–0.2)
BASOPHILS NFR BLD AUTO: 0.6 % (ref 0–1.5)
BENZODIAZ UR QL SCN: NEGATIVE
BENZODIAZEPINE INTERNAL CONTROL: ABNORMAL
BILIRUB SERPL-MCNC: 0.3 MG/DL (ref 0–1.2)
BILIRUB UR QL STRIP: NEGATIVE
BUN SERPL-MCNC: 12 MG/DL (ref 6–20)
BUN/CREAT SERPL: 12.6 (ref 7–25)
BUPRENORPHINE INTERNAL CONTROL: ABNORMAL
BUPRENORPHINE SERPL-MCNC: NEGATIVE NG/ML
CALCIUM SPEC-SCNC: 9.3 MG/DL (ref 8.6–10.5)
CANNABINOIDS SERPL QL: POSITIVE
CHLORIDE SERPL-SCNC: 106 MMOL/L (ref 98–107)
CHOLEST SERPL-MCNC: 111 MG/DL (ref 0–200)
CLARITY UR: ABNORMAL
CO2 SERPL-SCNC: 23.5 MMOL/L (ref 22–29)
COCAINE INTERNAL CONTROL: ABNORMAL
COCAINE UR QL: NEGATIVE
COLOR UR: ABNORMAL
CREAT SERPL-MCNC: 0.95 MG/DL (ref 0.57–1)
CREAT UR-MCNC: 202.6 MG/DL
DEPRECATED RDW RBC AUTO: 38.5 FL (ref 37–54)
EGFRCR SERPLBLD CKD-EPI 2021: 75 ML/MIN/1.73
EOSINOPHIL # BLD AUTO: 0.19 10*3/MM3 (ref 0–0.4)
EOSINOPHIL NFR BLD AUTO: 2 % (ref 0.3–6.2)
ERYTHROCYTE [DISTWIDTH] IN BLOOD BY AUTOMATED COUNT: 11.9 % (ref 12.3–15.4)
ESTRADIOL SERPL HS-MCNC: 61.7 PG/ML
EXPIRATION DATE: ABNORMAL
GLOBULIN UR ELPH-MCNC: 2.8 GM/DL
GLUCOSE SERPL-MCNC: 101 MG/DL (ref 65–99)
GLUCOSE UR STRIP-MCNC: ABNORMAL MG/DL
HBA1C MFR BLD: 4.9 % (ref 4.8–5.6)
HCT VFR BLD AUTO: 42.4 % (ref 34–46.6)
HDLC SERPL-MCNC: 51 MG/DL (ref 40–60)
HGB BLD-MCNC: 14.2 G/DL (ref 12–15.9)
HGB UR QL STRIP.AUTO: NEGATIVE
HOLD SPECIMEN: NORMAL
IMM GRANULOCYTES # BLD AUTO: 0.05 10*3/MM3 (ref 0–0.05)
IMM GRANULOCYTES NFR BLD AUTO: 0.5 % (ref 0–0.5)
KETONES UR QL STRIP: NEGATIVE
LDLC SERPL CALC-MCNC: 44 MG/DL (ref 0–100)
LDLC/HDLC SERPL: 0.87 {RATIO}
LEUKOCYTE ESTERASE UR QL STRIP.AUTO: NEGATIVE
LYMPHOCYTES # BLD AUTO: 2.17 10*3/MM3 (ref 0.7–3.1)
LYMPHOCYTES NFR BLD AUTO: 22.7 % (ref 19.6–45.3)
Lab: ABNORMAL
MCH RBC QN AUTO: 29.8 PG (ref 26.6–33)
MCHC RBC AUTO-ENTMCNC: 33.5 G/DL (ref 31.5–35.7)
MCV RBC AUTO: 88.9 FL (ref 79–97)
MDMA (ECSTASY) INTERNAL CONTROL: ABNORMAL
MDMA UR QL SCN: NEGATIVE
METHADONE INTERNAL CONTROL: ABNORMAL
METHADONE UR QL SCN: NEGATIVE
METHAMPHETAMINE INTERNAL CONTROL: ABNORMAL
MICROALBUMIN/CREAT UR: NORMAL MG/G{CREAT}
MONOCYTES # BLD AUTO: 0.69 10*3/MM3 (ref 0.1–0.9)
MONOCYTES NFR BLD AUTO: 7.2 % (ref 5–12)
MORPHINE INTERNAL CONTROL: ABNORMAL
MORPHINE/OPIATES SCREEN, URINE: NEGATIVE
NEUTROPHILS NFR BLD AUTO: 6.42 10*3/MM3 (ref 1.7–7)
NEUTROPHILS NFR BLD AUTO: 67 % (ref 42.7–76)
NITRITE UR QL STRIP: NEGATIVE
NRBC BLD AUTO-RTO: 0 /100 WBC (ref 0–0.2)
OXYCODONE INTERNAL CONTROL: ABNORMAL
OXYCODONE UR QL SCN: NEGATIVE
PCP UR QL SCN: NEGATIVE
PH UR STRIP.AUTO: 6 [PH] (ref 5–8)
PHENCYCLIDINE INTERNAL CONTROL: ABNORMAL
PLATELET # BLD AUTO: 198 10*3/MM3 (ref 140–450)
PMV BLD AUTO: 10.1 FL (ref 6–12)
POTASSIUM SERPL-SCNC: 4.3 MMOL/L (ref 3.5–5.2)
PROT SERPL-MCNC: 7 G/DL (ref 6–8.5)
PROT UR QL STRIP: ABNORMAL
RBC # BLD AUTO: 4.77 10*6/MM3 (ref 3.77–5.28)
SODIUM SERPL-SCNC: 140 MMOL/L (ref 136–145)
SP GR UR STRIP: 1.03 (ref 1–1.03)
T4 SERPL-MCNC: 7.19 MCG/DL (ref 4.5–11.7)
THC INTERNAL CONTROL: ABNORMAL
TRIGL SERPL-MCNC: 78 MG/DL (ref 0–150)
TSH SERPL DL<=0.05 MIU/L-ACNC: 0.84 UIU/ML (ref 0.27–4.2)
UROBILINOGEN UR QL STRIP: ABNORMAL
VLDLC SERPL-MCNC: 16 MG/DL (ref 5–40)
WBC NRBC COR # BLD AUTO: 9.58 10*3/MM3 (ref 3.4–10.8)

## 2025-08-19 PROCEDURE — 81003 URINALYSIS AUTO W/O SCOPE: CPT | Performed by: NURSE PRACTITIONER

## 2025-08-19 PROCEDURE — 83036 HEMOGLOBIN GLYCOSYLATED A1C: CPT | Performed by: NURSE PRACTITIONER

## 2025-08-19 PROCEDURE — 80061 LIPID PANEL: CPT | Performed by: NURSE PRACTITIONER

## 2025-08-19 PROCEDURE — 84402 ASSAY OF FREE TESTOSTERONE: CPT | Performed by: NURSE PRACTITIONER

## 2025-08-19 PROCEDURE — 84436 ASSAY OF TOTAL THYROXINE: CPT | Performed by: NURSE PRACTITIONER

## 2025-08-19 PROCEDURE — 82670 ASSAY OF TOTAL ESTRADIOL: CPT | Performed by: NURSE PRACTITIONER

## 2025-08-19 PROCEDURE — 84403 ASSAY OF TOTAL TESTOSTERONE: CPT | Performed by: NURSE PRACTITIONER

## 2025-08-19 PROCEDURE — 80050 GENERAL HEALTH PANEL: CPT | Performed by: NURSE PRACTITIONER

## 2025-08-19 PROCEDURE — 82043 UR ALBUMIN QUANTITATIVE: CPT | Performed by: NURSE PRACTITIONER

## 2025-08-19 PROCEDURE — 82570 ASSAY OF URINE CREATININE: CPT | Performed by: NURSE PRACTITIONER

## 2025-08-19 RX ORDER — TOPIRAMATE 50 MG/1
50 TABLET, FILM COATED ORAL
Qty: 90 TABLET | Refills: 1 | Status: SHIPPED | OUTPATIENT
Start: 2025-08-19 | End: 2025-08-19

## 2025-08-19 RX ORDER — ROSUVASTATIN CALCIUM 40 MG/1
40 TABLET, COATED ORAL NIGHTLY
Qty: 90 TABLET | Refills: 1 | Status: SHIPPED | OUTPATIENT
Start: 2025-08-19 | End: 2026-02-15

## 2025-08-19 RX ORDER — TESTOSTERONE MICRONIZED 100 %
POWDER (GRAM) MISCELLANEOUS
Qty: 15 G | Refills: 0 | Status: SHIPPED | OUTPATIENT
Start: 2025-08-19

## 2025-08-19 RX ORDER — FLUTICASONE PROPIONATE 50 MCG
2 SPRAY, SUSPENSION (ML) NASAL DAILY
Qty: 48 G | Refills: 1 | Status: SHIPPED | OUTPATIENT
Start: 2025-08-19

## 2025-08-19 RX ORDER — CONJUGATED ESTROGENS 0.62 MG/G
CREAM VAGINAL
Qty: 30 G | Refills: 3 | Status: SHIPPED | OUTPATIENT
Start: 2025-08-19

## 2025-08-19 RX ORDER — TRAZODONE HYDROCHLORIDE 100 MG/1
100 TABLET ORAL NIGHTLY
Qty: 90 TABLET | Refills: 1 | Status: SHIPPED | OUTPATIENT
Start: 2025-08-19

## 2025-08-19 RX ORDER — DAPAGLIFLOZIN 10 MG/1
10 TABLET, FILM COATED ORAL DAILY
Qty: 90 TABLET | Refills: 1 | Status: SHIPPED | OUTPATIENT
Start: 2025-08-19

## 2025-08-24 LAB
TESTOST FREE SERPL-MCNC: <0.2 PG/ML (ref 0–4.2)
TESTOST SERPL-MCNC: 6.1 NG/DL

## 2025-08-25 DIAGNOSIS — R74.8 ELEVATED LIVER ENZYMES: Primary | ICD-10-CM

## 2025-08-26 RX ORDER — ONDANSETRON 4 MG/1
4 TABLET, ORALLY DISINTEGRATING ORAL EVERY 8 HOURS PRN
Qty: 30 TABLET | Refills: 2 | Status: SHIPPED | OUTPATIENT
Start: 2025-08-26

## 2025-08-29 ENCOUNTER — TELEMEDICINE (OUTPATIENT)
Dept: FAMILY MEDICINE CLINIC | Facility: TELEHEALTH | Age: 45
End: 2025-08-29
Payer: COMMERCIAL

## 2025-08-29 DIAGNOSIS — H92.01 ACUTE OTALGIA, RIGHT: ICD-10-CM

## 2025-08-29 DIAGNOSIS — J32.9 SINUSITIS, UNSPECIFIED CHRONICITY, UNSPECIFIED LOCATION: Primary | ICD-10-CM

## (undated) DEVICE — 6F .070 XB LAD 3.5 100CM: Brand: VISTA BRITE TIP

## (undated) DEVICE — ENDOSCOPIC VALVE WITH ADAPTER.: Brand: SURSEAL® II

## (undated) DEVICE — SYS IRR PUMP SGL ACTN VAC SYR 10CC

## (undated) DEVICE — NON-WOVEN ADHESIVE WOUND DRESSING: Brand: PRIMAPORE ADHESIVE WOUND DRSG 7.2*5CM

## (undated) DEVICE — Device

## (undated) DEVICE — NITINOL STONE RETRIEVAL BASKET: Brand: ESCAPE

## (undated) DEVICE — GW SUREGLIDE FLXTIP STR/TP .035 3X150CM EA/5

## (undated) DEVICE — DUAL LUMEN URETERAL CATHETER

## (undated) DEVICE — CATH IMG IVUS EAGLE EYE PLATIN RX DIGITAL .014IN 5FR

## (undated) DEVICE — DRSNG SURESITE WNDW 2.38X2.75

## (undated) DEVICE — BITEBLOCK OMNI BLOC

## (undated) DEVICE — TOWEL,OR,DSP,ST,BLUE,STD,4/PK,20PK/CS: Brand: MEDLINE

## (undated) DEVICE — DEV COMP RAD PRELUDESYNC 24CM

## (undated) DEVICE — ADAPT CLN BIOGUARD AIR/H2O DISP

## (undated) DEVICE — SUT VIC 2/0 SH 27IN

## (undated) DEVICE — TROCAR: Brand: KII FIOS FIRST ENTRY

## (undated) DEVICE — SUT VIC 0 SUTUPAK TIES 18IN J906G

## (undated) DEVICE — PERCLOSE PROGLIDE™ SUTURE-MEDIATED CLOSURE SYSTEM: Brand: PERCLOSE PROGLIDE™

## (undated) DEVICE — FIBR LASR HOLMIUM COMPAT 272MH DISP

## (undated) DEVICE — GUIDEWIRE 38/150/MC/PTFE/3J: Brand: GUIDEWIRE

## (undated) DEVICE — ANTIBACTERIAL VIOLET BRAIDED (POLYGLACTIN 910), SYNTHETIC ABSORBABLE SUTURE: Brand: COATED VICRYL

## (undated) DEVICE — KTTNER ENDO BLNT DISSCT

## (undated) DEVICE — SUT MNCRYL PLS ANTIB UD 4/0 PS2 18IN

## (undated) DEVICE — TUBING, SUCTION, 1/4" X 10', STRAIGHT: Brand: MEDLINE

## (undated) DEVICE — GW SUREGLIDE FLXTIP STR/TP .035 3X150CM

## (undated) DEVICE — CONN JET HYDRA H20 AUXILIARY DISP

## (undated) DEVICE — DAVINCI-LF: Brand: MEDLINE INDUSTRIES, INC.

## (undated) DEVICE — SYS CLOSE PORTII CARTR/THOMASN XL

## (undated) DEVICE — LN SMPL CO2 SHTRM SD STREAM W/M LUER

## (undated) DEVICE — BASIC SINGLE BASIN-LF: Brand: MEDLINE INDUSTRIES, INC.

## (undated) DEVICE — APL DUPLOSPRAYER MIS 40CM

## (undated) DEVICE — TRY PREP SCRB VAG PVP

## (undated) DEVICE — FRCP BX RADJAW4 NDL 2.8 240CM LG OG BX40

## (undated) DEVICE — APPL HEMOS FOR DELIVERY FLOSEAL

## (undated) DEVICE — TREK CORONARY DILATATION CATHETER 2.50 MM X 15 MM / RAPID-EXCHANGE: Brand: TREK

## (undated) DEVICE — LAPAROSCOPIC TROCAR SLEEVE/SINGLE USE: Brand: KII® OPTICAL ACCESS SYSTEM

## (undated) DEVICE — GW SUREGLIDE FLXTIP ANG/TP .038 3X150CM

## (undated) DEVICE — ENDOPATH XCEL BLADELESS TROCARS WITH STABILITY SLEEVES: Brand: ENDOPATH XCEL

## (undated) DEVICE — CONN TBG Y 5 IN 1 LF STRL

## (undated) DEVICE — CATH F6 ST JR 4 100CM: Brand: SUPERTORQUE

## (undated) DEVICE — GAMMEX® NON-LATEX SIZE 7.5, STERILE NEOPRENE POWDER-FREE SURGICAL GLOVE: Brand: GAMMEX

## (undated) DEVICE — SOL NACL 0.9PCT 1000ML

## (undated) DEVICE — CANNULA SEAL

## (undated) DEVICE — GW FC FLOP/TP .035 260CM 3MM

## (undated) DEVICE — VBT GC 6F .070 JR 3.5 100CM: Brand: VISTA BRITE TIP

## (undated) DEVICE — ARM DRAPE

## (undated) DEVICE — SLV SCD LEG COMFORT KENDALLSCD MD REPROC

## (undated) DEVICE — NON-WOVEN ADHESIVE WOUND DRESSING: Brand: PRIMAPORE ADHESIVE DRESSING 10*8CM

## (undated) DEVICE — Y-TYPE TUR/BLADDER IRRIGATION SET, REGULATING CLAMP

## (undated) DEVICE — URETERAL ACCESS SHEATH SET: Brand: NAVIGATOR HD

## (undated) DEVICE — GW SUREGLIDE FLXTIP ANG/TP .038 3X150CM EA/5

## (undated) DEVICE — PK OSC LAP SLV 40

## (undated) DEVICE — GLIDESHEATH SLENDER STAINLESS STEEL KIT: Brand: GLIDESHEATH SLENDER

## (undated) DEVICE — NC TREK NEO™ CORONARY DILATATION CATHETER 3.50 MM X 20 MM / RAPID-EXCHANGE: Brand: NC TREK NEO™

## (undated) DEVICE — SHEATH ACC URETRL UROPASS 12/14F 24CM

## (undated) DEVICE — LAPAROSCOPIC SMOKE FILTRATION SYSTEM: Brand: PALL LAPAROSHIELD® PLUS LAPAROSCOPIC SMOKE FILTRATION SYSTEM

## (undated) DEVICE — CATH F5 INF JR 3.5 100CM: Brand: INFINITI

## (undated) DEVICE — SOL IRR NACL 0.9PCT 3000ML

## (undated) DEVICE — ST ACC MICROPUNCTURE STFF .018 ECHO/PLAT/TP 4F/10CM 21G/7CM

## (undated) DEVICE — NC TREK NEO™ CORONARY DILATATION CATHETER 4.00 MM X 12 MM / RAPID-EXCHANGE: Brand: NC TREK NEO™

## (undated) DEVICE — ECHELON FLEX POWERED PLUS LONG ARTICULATING ENDOSCOPIC LINEAR CUTTER, 60MM: Brand: ECHELON FLEX

## (undated) DEVICE — Device: Brand: DEFENDO AIR/WATER/SUCTION AND BIOPSY VALVE

## (undated) DEVICE — SI AVANTI+ 6F STD W/GW  NO OBT: Brand: AVANTI

## (undated) DEVICE — NC TREK NEO™ CORONARY DILATATION CATHETER 4.00 MM X 15 MM / RAPID-EXCHANGE: Brand: NC TREK NEO™

## (undated) DEVICE — GOWN,REINFORCE,POLY,SIRUS,BREATH SLV,XLG: Brand: MEDLINE

## (undated) DEVICE — TIP COVER ACCESSORY

## (undated) DEVICE — MSK PROC CURAPLEX O2 2/ADAPT 7FT

## (undated) DEVICE — THE EASYGRIP FLO-41 PRECISION MIS DELIVERY SYSTEM (EASYGRIP FLO-41 SYSTEM) IS A STERILE, SINGLE-USE DEVICE THAT CONSISTS OF TWO COMPONENTS: (1) ONE APPLICATOR DEVICE WITH A 41 CM LONG CANNULA (5 MM OUTER DIAMETER) AND (2) ONE EMPTY 1.5 ML SYRINGE.: Brand: EASYGRIP FLO-41

## (undated) DEVICE — Device: Brand: ASAHI SION BLUE

## (undated) DEVICE — LAPAROSCOPIC SCISSORS: Brand: EPIX LAPAROSCOPIC SCISSORS

## (undated) DEVICE — GLV SURG BIOGEL LTX PF 8 1/2

## (undated) DEVICE — SOL IRRG H2O PL/BG 1000ML STRL

## (undated) DEVICE — CATH F5 INF JL 3.5 100CM: Brand: INFINITI

## (undated) DEVICE — PTCA DILATATION CATHETER: Brand: NC EMERGE®

## (undated) DEVICE — LINER SURG CANSTR SXN S/RIGD 1500CC

## (undated) DEVICE — 1000ML,PRESSURE INFUSER W/STOPCOCK: Brand: MEDLINE

## (undated) DEVICE — MARKR SKIN W/RULR AND LBL

## (undated) DEVICE — GLV SURG SIGNATURE ESSENTIAL PF LTX SZ8.5

## (undated) DEVICE — 2, DISPOSABLE SUCTION/IRRIGATOR WITHOUT DISPOSABLE TIP: Brand: STRYKEFLOW

## (undated) DEVICE — STERILE POLYISOPRENE POWDER-FREE SURGICAL GLOVES: Brand: PROTEXIS

## (undated) DEVICE — SOLIDIFIER LIQLOC PLS 1500CC BT

## (undated) DEVICE — CATH F6 ST PIG 155 110CM 6SH: Brand: SUPER TORQUE

## (undated) DEVICE — SUT VIC 0 UR6 27IN VCP603H

## (undated) DEVICE — SENSR O2 OXIMAX FNGR A/ 18IN NONSTR

## (undated) DEVICE — GLV SURG SENSICARE SLT PF LF 6.5 STRL

## (undated) DEVICE — SOL IRR NACL 0.9PCT BT 1000ML

## (undated) DEVICE — TOTAL TRAY, 16FR 10ML SIL FOLEY, URN: Brand: MEDLINE

## (undated) DEVICE — KT ORCA ORCAPOD DISP STRL

## (undated) DEVICE — CATH F6 ST JL 4 100CM: Brand: SUPERTORQUE

## (undated) DEVICE — VIOLET POLYDIOXANONE POLYMER, SYNTHETIC ABSORBABLE SUTURE CLIPS: Brand: LAPRA-TY

## (undated) DEVICE — VISUALIZATION SYSTEM: Brand: CLEARIFY

## (undated) DEVICE — CYSTO PACK: Brand: MEDLINE INDUSTRIES, INC.

## (undated) DEVICE — RUNTHROUGH NS EXTRA FLOPPY PTCA GUIDEWIRE: Brand: RUNTHROUGH

## (undated) DEVICE — THE FLOSEAL MALLEABLE TIP AND TRIMMABLE TIP ARE INTENDED FOR DELIVERY OF FLOSEAL HEMOSTATIC MATRIX.: Brand: FLOSEAL SPECIAL APPLICATOR TIPS

## (undated) DEVICE — VIOLET BRAIDED (POLYGLACTIN 910), SYNTHETIC ABSORBABLE SUTURE: Brand: COATED VICRYL

## (undated) DEVICE — ENSEAL LAPAROSCOPIC TISSUE SEALER G2 ARTICULATING CURVED JAW FOR USE WITH G2 GENERATOR 5MM DIAMETER 45CM SHAFT LENGTH: Brand: ENSEAL

## (undated) DEVICE — APPL CHLORAPREP HI/LITE 26ML ORNG

## (undated) DEVICE — TREK CORONARY DILATATION CATHETER 2.50 MM X 12 MM / RAPID-EXCHANGE: Brand: TREK

## (undated) DEVICE — TISSUE RETRIEVAL SYSTEM: Brand: INZII RETRIEVAL SYSTEM

## (undated) DEVICE — CATH LAB PACK: Brand: MEDLINE INDUSTRIES, INC.